# Patient Record
Sex: FEMALE | Race: WHITE | NOT HISPANIC OR LATINO | Employment: OTHER | URBAN - METROPOLITAN AREA
[De-identification: names, ages, dates, MRNs, and addresses within clinical notes are randomized per-mention and may not be internally consistent; named-entity substitution may affect disease eponyms.]

---

## 2017-01-13 ENCOUNTER — TELEPHONE (OUTPATIENT)
Dept: CARDIOLOGY | Facility: CLINIC | Age: 72
End: 2017-01-13

## 2017-01-13 NOTE — TELEPHONE ENCOUNTER
Patient has 3 concerns today 1) Refill for Praluent will be coming from her pharmacy 2) She wants to make sure her Edis for the Praluent is still in place? 3) She takes Megha for car sickness and found out with her medications there are contraindications with her medications and Megha.  What medication do we suggest for her for car sickness?  Recommend she contact Polina on Team 1 for Praluent questions. And Contact [facility] number given for recommendations to take for car sickness that is safe for her.  Patient states she is very disappointed that there are so many medication reactions to medications and in the past pharmacists have not been helpful.

## 2017-01-18 ENCOUNTER — OFFICE VISIT (OUTPATIENT)
Dept: CARDIOLOGY | Facility: CLINIC | Age: 72
End: 2017-01-18
Payer: COMMERCIAL

## 2017-01-18 VITALS — HEIGHT: 68 IN | HEART RATE: 68 BPM | SYSTOLIC BLOOD PRESSURE: 134 MMHG | DIASTOLIC BLOOD PRESSURE: 56 MMHG

## 2017-01-18 DIAGNOSIS — T75.3XXA MOTION SICKNESS, INITIAL ENCOUNTER: ICD-10-CM

## 2017-01-18 DIAGNOSIS — I25.110 CORONARY ARTERY DISEASE INVOLVING NATIVE CORONARY ARTERY OF NATIVE HEART WITH UNSTABLE ANGINA PECTORIS (H): Primary | ICD-10-CM

## 2017-01-18 PROCEDURE — 99214 OFFICE O/P EST MOD 30 MIN: CPT | Performed by: PHYSICIAN ASSISTANT

## 2017-01-18 RX ORDER — CIPROFLOXACIN 500 MG/1
500 TABLET, FILM COATED ORAL 2 TIMES DAILY
COMMUNITY
End: 2017-02-15

## 2017-01-18 RX ORDER — RANOLAZINE 500 MG/1
500 TABLET, EXTENDED RELEASE ORAL
Qty: 30 TABLET | Refills: 11 | Status: ON HOLD
Start: 2017-01-18 | End: 2017-02-24

## 2017-01-18 RX ORDER — SCOLOPAMINE TRANSDERMAL SYSTEM 1 MG/1
PATCH, EXTENDED RELEASE TRANSDERMAL
Qty: 4 PATCH | Refills: 3 | Status: SHIPPED
Start: 2017-01-18 | End: 2017-02-15

## 2017-01-18 NOTE — Clinical Note
2017    Gaye Zimmer  [facility]  [address] [place] [address]    RE: Sarah EMELIA Longo       Dear Colleague,    I had the pleasure of seeing Sarah KAPOOR Donta in the [facility].    PRIMARY CARDIOLOGIST:  Dr. Feroz Burgos.      REASON FOR VISIT:  Coronary artery disease followup.      Ms. Longo is a 71-year-old woman with past medical history significant for the followin.  Coronary artery disease with a history of stenting in , where she underwent a Taxus stent to the RCA.  She redeveloped chest and back discomfort in the spring of 2016, had an abnormal stress test and went for an angiogram.  She was found to have a trifurcation lesion in the OM2, as well as a lesion in the distal LAD.  There was plan for medical management, but she ended up at [place] with trifurcation stents to her superior and main branch of the obtuse marginal and a dilated inferior branch.  She also had a stented LAD at that time.  She redeveloped symptoms again in the  and was taken to the Cath Lab and was found to have a 90% restenosis of the proximal OM bifurcation stent in the superior branch and this was stented.   2.  Dyslipidemia, intolerant to statins, on Praluent.   3.  Hypertension.   4.  Type 2 diabetes.   5.  Type 1 Brugada pattern provoked by fever and possible Lamictal use (this differs from Brugada syndrome).  Please refer to consult by Dr. Nation 2016.      I had met her before the angiogram when she described back pain and chest heaviness, worsened with exercise and relieved with rest.  She underwent stenting and her back pain never really improved.  She also continued to have intermittent chest pain.  We worked on increasing Imdur to 60 mg, but she felt like her blood pressures were too low and although she felt fine, she was just very concerned that she would pass out with her lower diastolic in the 40s.  Since that time she has done okay.  She has  been taking his pantoprazole about once a day and this seems to be somewhat helpful.  She got a cortisone shot in her back.  This was not helpful.  She was told by her orthopedist that she has stenosis, scoliosis and arthritis of her spine and this is some of the reason for her back pain.  She is exercising.  She is doing about a half an hour on the treadmill or bike and then this sometimes ends in  extreme fatigue or occasionally with pain in her back and otherwise she does okay.  She is taking her medications as directed.  She has multiple questions about interactions, one with pantoprazole and Plavix, and another whether she can take gray safely for motion sickness, and finally whether or not she needs a stress test.      SOCIAL HISTORY:  She is .  Her 's name is Kwadwo.  She has a dog at home.  She is a lifelong nonsmoker, no alcohol use.  She was previously eating about 3 hamburgers a week, working on changing her diet.  She is exercising now multiple times a week.  She has 3 children.  She has a daughter who is 44, who is trying to conceive via in vitro fertilization.      PHYSICAL EXAMINATION:   GENERAL:  Well-developed, well-nourished woman in no acute distress.   HEENT:  Normocephalic, atraumatic.   HEART:  Regular in rate and rhythm.  I do not appreciate murmur, rub or gallop.  Carotids without bruit.   LUNGS:  Clear bilaterally.   EXTREMITIES:  Without peripheral edema with 2+ posterior tibialis pulses bilaterally.   SKIN:  Warm and dry.     Outpatient Encounter Prescriptions as of 1/18/2017   Medication Sig Dispense Refill     ciprofloxacin (CIPRO) 500 MG tablet Take 500 mg by mouth 2 times daily       PANTOPRAZOLE SODIUM PO Take 20 mg by mouth every morning (before breakfast)       scopolamine (TRANSDERM) 72 hr patch Place 1 patch onto the skin every 72 hours.  Apply to hairless area behind one ear at least 4 hours before travel.  Remove old patch and change every 3 days. 4 patch 3      ranolazine (RANEXA) 500 MG 12 hr tablet Take 1 tablet (500 mg) by mouth every morning (before breakfast) 30 tablet 11     lisinopril (PRINIVIL/ZESTRIL) 2.5 MG tablet Take 1 tablet (2.5 mg) by mouth daily 90 tablet 3     nebivolol (BYSTOLIC) 2.5 MG tablet Take 1 tablet (2.5 mg) by mouth daily 90 tablet 3     isosorbide mononitrate (IMDUR) 60 MG 24 hr tablet Take 1 tablet (60 mg) by mouth daily (Patient taking differently: Take 60 mg by mouth daily Patient taking 1/2 tab daily ( 30mg ) daily) 90 tablet 3     linagliptin-metFORMIN (JENTADUETO) 2.5-500 MG TABS per tablet Take 1 tablet by mouth 2 times daily (with meals)       cholecalciferol 2000 UNITS CAPS Take 1 capsule by mouth daily       amoxicillin (AMOXIL) 500 MG capsule PRN before dental work       NONFORMULARY Tumeric 500 mg bid       clopidogrel (PLAVIX) 75 MG tablet Take 1 tablet (75 mg) by mouth daily 90 tablet 3     mirabegron (MYRBETRIQ) 50 MG 24 hr tablet Take 1 tablet (50 mg) by mouth daily 90 tablet 3     Gymnema Sylvestris Leaf POWD 500 mg daily       nitroglycerin (NITROSTAT) 0.4 MG SL tablet Place 1 tablet (0.4 mg) under the tongue every 5 minutes as needed for chest pain If symptoms persist after 3 doses (15 minutes) call 911. 25 tablet 3     alirocumab (PRALUENT) 75 MG/ML injectable pen Inject 1 mL (75 mg) Subcutaneous every 14 days 2 mL 11     STATIN NOT PRESCRIBED, INTENTIONAL, 1 each At Bedtime Statin not prescribed intentionally due to Allergy to statin 0 each 0     Omega-3 Fatty Acids (OMEGA-3 FISH OIL PO) Take 2,400 mg by mouth 2 times daily (with meals)       CINNAMON PO Take 2 capsules by mouth 2 times daily       GLIMEPIRIDE 4 MG OR TABS 1 twice daily       SYNTHROID 125 MCG OR TABS 1 tablet daily       RESTASIS 0.05 % OP EMUL twice daily       CO ENZYME Q-10 50 MG OR CAPS daliy  0     ASPIRIN 81 MG OR TABS 1 tab po QD (Once per day)       No facility-administered encounter medications on file as of 1/18/2017.      ASSESSMENT AND PLAN:    1.  Coronary artery disease with some ongoing back discomfort that may be orthopedic given her scoliosis, arthritis and spinal stenosis versus anginal; I think it is less likely to be anginal as she had no change with stenting.  That being said, she does have some fatigue as well with exercise and I do think it is reasonable at this point to do a trial of ranolazine.  She is very concerned about her blood pressure being lower and is not interested in any medications that would drop her blood pressure.  We are going to do a trial of 500 mg of ranolazine in the morning.  If this is helpful, we could increase to 500 mg b.i.d.  Cost will likely be an issue, but we will see if it is worth fighting that fight and see how symptoms go.  I did discuss possible adverse effects including constipation with this patient.  We will otherwise continue her cardiac medications that include aspirin, Plavix, Bystolic and lisinopril.  We will also continue her Imdur 30 mg.  She does not feel comfortable going up to 60 mg.   2.  Dyslipidemia, intolerant of statins, on Praluent with excellent control.   3.  Hypertension, reasonable control.   4.  Type 2 diabetes per primary.   5.  Motion sickness.  I did some research about the interaction with gray and possible Plavix and aspirin use.  There is a recommendation against gray and Coumadin, but I found nothing on Plavix and aspirin.  I think if she needs to take gray 1 day for a long trip, this is reasonable.  As an alternative, I also prescribed scopolamine patches for her to use if necessary.   6.  Arthritic pain.  I did say, in general, she should avoid NSAIDs.  Again now if she needs to take 1 Aleve or 2 Aleve tablets once a week, I think this is reasonable to help with her pain.  I did recommend she not take more than that.      Thank you for allowing me to participate in this delightful patient's care.     Sincerely,    Karen Alexander PA-C

## 2017-01-18 NOTE — MR AVS SNAPSHOT
After Visit Summary   1/18/2017    Sarah Longo    MRN: [id number]           Patient Information     Date Of Birth          1945        Visit Information        Provider Department      1/18/2017 10:10 AM More, Karen Torres PA-C [facility]        Today's Diagnoses     Coronary artery disease involving native coronary artery of native heart with unstable angina pectoris (H)    -  1     Motion sickness, initial encounter           Care Instructions    Thanks for coming into [facility] today.    We discussed: that you can take Aleve/ naproxen once in a while (one a week or so) if needed for pain.    Try scopolamine patches for your motion sickness.  You can leave these on for up to 3 days.      Medication changes:  Increase pantoprazole to twice a day.    Try ranolazine 500 mg once a day to see if your back pain/ angina improves.  If it does, we can look at increasing it to twice a day.       Follow up: as scheduled with Dr. Burgos.        Please call my nurse at [phone] with any questions or concerns.    Scheduling phone number: [phone]  Reminder: Please bring in all current medications, over the counter supplements and vitamin bottles to your next appointment.                Follow-ups after your visit        Your next 10 appointments already scheduled     Feb 15, 2017  2:45 PM   Return Visit with Feroz Burgos MD   [facility] ([facility])    [address] [place] [id number] [phone]              Who to contact     If you have questions or need follow up information about today's clinic visit or your schedule please contact [facility] directly at [phone].  Normal or non-critical lab and imaging results will be communicated to you by MyChart, letter or phone within 4 business days after  "the clinic has received the results. If you do not hear from us within 7 days, please contact the clinic through [facility] or phone. If you have a critical or abnormal lab result, we will notify you by phone as soon as possible.  Submit refill requests through [facility] or call your pharmacy and they will forward the refill request to us. Please allow 3 business days for your refill to be completed.          Additional Information About Your Visit        Care EveryWhere ID     This is your Care EveryWhere ID. This could be used by other organizations to access your [place] medical records  [id number]        Your Vitals Were     Pulse Height                68 1.727 m (5' 8\")           Blood Pressure from Last 3 Encounters:   01/18/17 134/56   11/30/16 120/60   11/22/16 106/61    Weight from Last 3 Encounters:   11/30/16 78.926 kg (174 lb)   11/22/16 78.019 kg (172 lb)   11/18/16 78.382 kg (172 lb 12.8 oz)              Today, you had the following     No orders found for display         Today's Medication Changes          These changes are accurate as of: 1/18/17 11:16 AM.  If you have any questions, ask your nurse or doctor.               Start taking these medicines.        Dose/Directions    ranolazine 500 MG 12 hr tablet   Commonly known as:  RANEXA   Used for:  Coronary artery disease involving native coronary artery of native heart with unstable angina pectoris (H)   Started by:  Karen Alexander PA-C        Dose:  500 mg   Take 1 tablet (500 mg) by mouth every morning (before breakfast)   Quantity:  30 tablet   Refills:  11       scopolamine 72 hr patch   Commonly known as:  TRANSDERM   Used for:  Motion sickness, initial encounter   Started by:  Karen Alexander PA-C        Place 1 patch onto the skin every 72 hours.  Apply to hairless area behind one ear at least 4 hours before travel.  Remove old patch and change every 3 days.   Quantity:  4 patch   Refills:  3         These medicines have " changed or have updated prescriptions.        Dose/Directions    isosorbide mononitrate 60 MG 24 hr tablet   Commonly known as:  IMDUR   This may have changed:  additional instructions   Used for:  Stable angina (H)        Dose:  60 mg   Take 1 tablet (60 mg) by mouth daily   Quantity:  90 tablet   Refills:  3            Where to get your medicines      These medications were sent to [facility] - [facility] [address] [facility] [address]  [address] [facility] [address]     Phone:  [phone]    - scopolamine 72 hr patch      Some of these will need a paper prescription and others can be bought over the counter.  Ask your nurse if you have questions.     You don't need a prescription for these medications    - ranolazine 500 MG 12 hr tablet             Primary Care Provider Office Phone # Fax #    Gaye Zimmer [phone]       [facility] [address] [facility] [address]        Thank you!     Thank you for choosing [facility] [place]  for your care. Our goal is always to provide you with excellent care. Hearing back from our patients is one way we can continue to improve our services. Please take a few minutes to complete the written survey that you may receive in the mail after your visit with us. Thank you!             Your Updated Medication List - Protect others around you: Learn how to safely use, store and throw away your medicines at www.disposemymeds.org.          This list is accurate as of: 1/18/17 11:16 AM.  Always use your most recent med list.                   Brand Name Dispense Instructions for use    alirocumab 75 MG/ML injectable pen    PRALUENT    2 mL    Inject 1 mL (75 mg) Subcutaneous every 14 days       amoxicillin 500 MG capsule    AMOXIL     PRN before dental work       aspirin 81 MG tablet      1 tab po QD (Once per day)       cholecalciferol 2000 UNITS Caps      Take 1 capsule by mouth daily       CINNAMON  PO      Take 2 capsules by mouth 2 times daily       CIPRO 500 MG tablet   Generic drug:  ciprofloxacin      Take 500 mg by mouth 2 times daily       clopidogrel 75 MG tablet    PLAVIX    90 tablet    Take 1 tablet (75 mg) by mouth daily       Co Enzyme Q-10 50 MG Caps      daliy       glimepiride 4 MG tablet    AMARYL     1 twice daily       Gymnema Sylvestris Leaf Powd      500 mg daily       isosorbide mononitrate 60 MG 24 hr tablet    IMDUR    90 tablet    Take 1 tablet (60 mg) by mouth daily       JENTADUETO 2.5-500 MG Tabs per tablet   Generic drug:  linagliptin-metFORMIN      Take 1 tablet by mouth 2 times daily (with meals)       lisinopril 2.5 MG tablet    PRINIVIL/Zestril    90 tablet    Take 1 tablet (2.5 mg) by mouth daily       mirabegron 50 MG 24 hr tablet    MYRBETRIQ    90 tablet    Take 1 tablet (50 mg) by mouth daily       nebivolol 2.5 MG tablet    BYSTOLIC    90 tablet    Take 1 tablet (2.5 mg) by mouth daily       nitroglycerin 0.4 MG sublingual tablet    NITROSTAT    25 tablet    Place 1 tablet (0.4 mg) under the tongue every 5 minutes as needed for chest pain If symptoms persist after 3 doses (15 minutes) call 911.       NONFORMULARY      Tumeric 500 mg bid       OMEGA-3 FISH OIL PO      Take 2,400 mg by mouth 2 times daily (with meals)       PANTOPRAZOLE SODIUM PO      Take 20 mg by mouth every morning (before breakfast)       ranolazine 500 MG 12 hr tablet    RANEXA    30 tablet    Take 1 tablet (500 mg) by mouth every morning (before breakfast)       RESTASIS 0.05 % ophthalmic emulsion   Generic drug:  cycloSPORINE      twice daily       scopolamine 72 hr patch    TRANSDERM    4 patch    Place 1 patch onto the skin every 72 hours.  Apply to hairless area behind one ear at least 4 hours before travel.  Remove old patch and change every 3 days.       STATIN NOT PRESCRIBED (INTENTIONAL)     0 each    1 each At Bedtime Statin not prescribed intentionally due to Allergy to statin       SYNTHROID  125 MCG tablet   Generic drug:  levothyroxine      1 tablet daily

## 2017-01-18 NOTE — PROGRESS NOTES
PRIMARY CARDIOLOGIST:  Dr. Feroz Burgos.      REASON FOR VISIT:  Coronary artery disease followup.      HISTORY OF PRESENT ILLNESS:  Ms. Longo is a 71-year-old woman with past medical history significant for the followin.  Coronary artery disease with a history of stenting in , where she underwent a Taxus stent to the RCA.  She redeveloped chest and back discomfort in the spring of 2016, had an abnormal stress test and went for an angiogram.  She was found to have a trifurcation lesion in the OM2, as well as a lesion in the distal LAD.  There was plan for medical management, but she ended up at [place] with trifurcation stents to her superior and main branch of the obtuse marginal and a dilated inferior branch.  She also had a stented LAD at that time.  She redeveloped symptoms again in the  and was taken to the Cath Lab and was found to have a 90% restenosis of the proximal OM bifurcation stent in the superior branch and this was stented.   2.  Dyslipidemia, intolerant to statins, on Praluent.   3.  Hypertension.   4.  Type 2 diabetes.   5.  Type 1 Brugada pattern provoked by fever and possible Lamictal use (this differs from Brugada syndrome).  Please refer to consult by Dr. Nation 2016.      I had met her before the angiogram when she described back pain and chest heaviness, worsened with exercise and relieved with rest.  She underwent stenting and her back pain never really improved.  She also continued to have intermittent chest pain.  We worked on increasing Imdur to 60 mg, but she felt like her blood pressures were too low and although she felt fine, she was just very concerned that she would pass out with her lower diastolic in the 40s.  Since that time she has done okay.  She has been taking his pantoprazole about once a day and this seems to be somewhat helpful.  She got a cortisone shot in her back.  This was not helpful.  She was told by her orthopedist that she has stenosis, scoliosis  and arthritis of her spine and this is some of the reason for her back pain.  She is exercising.  She is doing about a half an hour on the treadmill or bike and then this sometimes ends in  extreme fatigue or occasionally with pain in her back and otherwise she does okay.  She is taking her medications as directed.  She has multiple questions about interactions, one with pantoprazole and Plavix, and another whether she can take gray safely for motion sickness, and finally whether or not she needs a stress test.      SOCIAL HISTORY:  She is .  Her 's name is Kwadwo.  She has a dog at home.  She is a lifelong nonsmoker, no alcohol use.  She was previously eating about 3 hamburgers a week, working on changing her diet.  She is exercising now multiple times a week.  She has 3 children.  She has a daughter who is 44, who is trying to conceive via in vitro fertilization.      PHYSICAL EXAMINATION:   GENERAL:  Well-developed, well-nourished woman in no acute distress.   HEENT:  Normocephalic, atraumatic.   HEART:  Regular in rate and rhythm.  I do not appreciate murmur, rub or gallop.  Carotids without bruit.   LUNGS:  Clear bilaterally.   EXTREMITIES:  Without peripheral edema with 2+ posterior tibialis pulses bilaterally.   SKIN:  Warm and dry.      ASSESSMENT AND PLAN:   1.  Coronary artery disease with some ongoing back discomfort that may be orthopedic given her scoliosis, arthritis and spinal stenosis versus anginal; I think it is less likely to be anginal as she had no change with stenting.  That being said, she does have some fatigue as well with exercise and I do think it is reasonable at this point to do a trial of ranolazine.  She is very concerned about her blood pressure being lower and is not interested in any medications that would drop her blood pressure.  We are going to do a trial of 500 mg of ranolazine in the morning.  If this is helpful, we could increase to 500 mg b.i.d.  Cost will  likely be an issue, but we will see if it is worth fighting that fight and see how symptoms go.  I did discuss possible adverse effects including constipation with this patient.  We will otherwise continue her cardiac medications that include aspirin, Plavix, Bystolic and lisinopril.  We will also continue her Imdur 30 mg.  She does not feel comfortable going up to 60 mg.   2.  Dyslipidemia, intolerant of statins, on Praluent with excellent control.   3.  Hypertension, reasonable control.   4.  Type 2 diabetes per primary.   5.  Motion sickness.  I did some research about the interaction with gray and possible Plavix and aspirin use.  There is a recommendation against gray and Coumadin, but I found nothing on Plavix and aspirin.  I think if she needs to take gray 1 day for a long trip, this is reasonable.  As an alternative, I also prescribed scopolamine patches for her to use if necessary.   6.  Arthritic pain.  I did say, in general, she should avoid NSAIDs.  Again now if she needs to take 1 Aleve or 2 Aleve tablets once a week, I think this is reasonable to help with her pain.  I did recommend she not take more than that.      Thank you for allowing me to participate in this delightful patient's care.      SHYANN Elizabeth PA-C             D: 2017 11:41   T: 2017 17:21   MT: al      Name:     KATHY PERRY   MRN:      [id number]        Account:      [id number]   :      1945           Service Date: 2017      Document: [id number]

## 2017-01-18 NOTE — PROGRESS NOTES
[id number]  HPI and Plan:   See dictation    Orders this Visit:  No orders of the defined types were placed in this encounter.     Orders Placed This Encounter   Medications     ciprofloxacin (CIPRO) 500 MG tablet     Sig: Take 500 mg by mouth 2 times daily     PANTOPRAZOLE SODIUM PO     Sig: Take 20 mg by mouth every morning (before breakfast)     scopolamine (TRANSDERM) 72 hr patch     Sig: Place 1 patch onto the skin every 72 hours.  Apply to hairless area behind one ear at least 4 hours before travel.  Remove old patch and change every 3 days.     Dispense:  4 patch     Refill:  3     ranolazine (RANEXA) 500 MG 12 hr tablet     Sig: Take 1 tablet (500 mg) by mouth every morning (before breakfast)     Dispense:  30 tablet     Refill:  11     There are no discontinued medications.      Encounter Diagnoses   Name Primary?     Coronary artery disease involving native coronary artery of native heart with unstable angina pectoris (H) Yes     Motion sickness, initial encounter        CURRENT MEDICATIONS:  Current Outpatient Prescriptions   Medication Sig Dispense Refill     ciprofloxacin (CIPRO) 500 MG tablet Take 500 mg by mouth 2 times daily       PANTOPRAZOLE SODIUM PO Take 20 mg by mouth every morning (before breakfast)       scopolamine (TRANSDERM) 72 hr patch Place 1 patch onto the skin every 72 hours.  Apply to hairless area behind one ear at least 4 hours before travel.  Remove old patch and change every 3 days. 4 patch 3     ranolazine (RANEXA) 500 MG 12 hr tablet Take 1 tablet (500 mg) by mouth every morning (before breakfast) 30 tablet 11     lisinopril (PRINIVIL/ZESTRIL) 2.5 MG tablet Take 1 tablet (2.5 mg) by mouth daily 90 tablet 3     nebivolol (BYSTOLIC) 2.5 MG tablet Take 1 tablet (2.5 mg) by mouth daily 90 tablet 3     isosorbide mononitrate (IMDUR) 60 MG 24 hr tablet Take 1 tablet (60 mg) by mouth daily (Patient taking differently: Take 60 mg by mouth daily Patient taking 1/2 tab daily ( 30mg ) daily) 90  tablet 3     linagliptin-metFORMIN (JENTADUETO) 2.5-500 MG TABS per tablet Take 1 tablet by mouth 2 times daily (with meals)       cholecalciferol 2000 UNITS CAPS Take 1 capsule by mouth daily       amoxicillin (AMOXIL) 500 MG capsule PRN before dental work       NONFORMULARY Tumeric 500 mg bid       clopidogrel (PLAVIX) 75 MG tablet Take 1 tablet (75 mg) by mouth daily 90 tablet 3     mirabegron (MYRBETRIQ) 50 MG 24 hr tablet Take 1 tablet (50 mg) by mouth daily 90 tablet 3     Gymnema Sylvestris Leaf POWD 500 mg daily       nitroglycerin (NITROSTAT) 0.4 MG SL tablet Place 1 tablet (0.4 mg) under the tongue every 5 minutes as needed for chest pain If symptoms persist after 3 doses (15 minutes) call 911. 25 tablet 3     alirocumab (PRALUENT) 75 MG/ML injectable pen Inject 1 mL (75 mg) Subcutaneous every 14 days 2 mL 11     STATIN NOT PRESCRIBED, INTENTIONAL, 1 each At Bedtime Statin not prescribed intentionally due to Allergy to statin 0 each 0     Omega-3 Fatty Acids (OMEGA-3 FISH OIL PO) Take 2,400 mg by mouth 2 times daily (with meals)       CINNAMON PO Take 2 capsules by mouth 2 times daily       GLIMEPIRIDE 4 MG OR TABS 1 twice daily       SYNTHROID 125 MCG OR TABS 1 tablet daily       RESTASIS 0.05 % OP EMUL twice daily       CO ENZYME Q-10 50 MG OR CAPS daliy  0     ASPIRIN 81 MG OR TABS 1 tab po QD (Once per day)         ALLERGIES     Allergies   Allergen Reactions     No Known Drug Allergies        PAST MEDICAL HISTORY:  Past Medical History   Diagnosis Date     Type II or unspecified type diabetes mellitus without mention of complication, not stated as uncontrolled      Dr. Majano     Other and unspecified hyperlipidemia      Unspecified essential hypertension      Osteoarthritis      HYPERPLASTIC  POLYP       colonoscopy in 5-10 yrs.     Coronary artery disease 4/28/2016     PTCA with MAYA x 2 to OM1 and MAYA x 1 to p.LAD (4/21/2016 at [place]); PTCA with intracoronary stent placement of RCA June 2004; MAYA to  OM1 2016     Hypothyroidism      hypothyroid     Cystocele, midline 2010     Depression      OAB (overactive bladder)      Postmenopausal bleeding      Urinary frequency 9/29/10     followed by urology. no benefit from detrol or sanctura. month trial of macrobid and flomax 10/10     Shoulder blade pain 2016       PAST SURGICAL HISTORY:  Past Surgical History   Procedure Laterality Date     C ct angiography coronary  2005     mod soft plaque LAD and Cx, follow up with left heart cath     C ligate fallopian tube       Hc removal of tonsils,13+ y/o       Surgical history of -        Uterine endometrial ablation     Excise lesion upper extremity  2013     Procedure: EXCISE LESION UPPER EXTREMITY;  EXCISION RIGHT ARM ANTICUBITAL LIPOMA ;  Surgeon: Jayson Joe MD;  Location: [facility]     Colonoscopy       Remove stimulator sacral nerve N/A 2015     Procedure: REMOVE STIMULATOR SACRAL NERVE;  Surgeon: Gertrudis Frausto MD;  Location: [facility]     Heart cath left heart cath  3/2/2016     No intervention - aggressive medical management     Heart cath left heart cath  2016      MAYA x 2 to OM1, MAYA to LAD, at [place]     Knee surgery  4/20/10     right knee replacement     Heart cath left heart cath  2004     MAYA to RCA     Heart cath left heart cath  3/28/2002     Mild to moderate 3 vessel CAD. Medical management recommended.      Heart cath left heart cath  2016     MAYA to OM1       FAMILY HISTORY:  Family History   Problem Relation Age of Onset     C.A.D. Father      MI , age 83, had high lipids     Hypertension Mother      Genitourinary Problems Mother      renal failure     CEREBROVASCULAR DISEASE Maternal Grandfather      cerebral hemorrhage     DIABETES Maternal Uncle      Cancer - colorectal Maternal Aunt      DIABETES Maternal Grandmother      Fractures Mother      hip     Hyperlipidemia Father      Hypertension Father      Coronary Artery Disease Father       "OSTEOPOROSIS Mother        SOCIAL HISTORY:  Social History     Social History     Marital Status:      Spouse Name: Kwadwo     Number of Children: 3     Years of Education: N/A     Occupational History     PT Aide None       Retired     Social History Main Topics     Smoking status: Never Smoker      Smokeless tobacco: None     Alcohol Use: No     Drug Use: No     Sexual Activity:     Partners: Male     Birth Control/ Protection: Post-menopausal     Other Topics Concern     Caffeine Concern Yes     6-7 cups caffeine per day     Sleep Concern No     Stress Concern Yes     Weight Concern No     Special Diet No     eats healthy      Exercise Yes      walking & active life style      Parent/Sibling W/ Cabg, Mi Or Angioplasty Before 65f 55m? No     Social History Narrative       Review of Systems:  Skin:  Negative     Eyes:  Positive for glasses  ENT:  Negative    Respiratory:  Positive for cough  Cardiovascular:    Positive for;chest pain (with exercise, center of back, resolved with rest.)  Gastroenterology: Negative    Genitourinary:  Positive for (UTI , currently on cipro)    Musculoskeletal:  Positive for back pain;arthritis (stenosis, no relief from last cortisone)  Neurologic:  Negative    Psychiatric:  Negative    Heme/Lymph/Imm:  Positive for allergies  Endocrine:  Positive for diabetes    Physical Exam:  Vitals: /56 mmHg  Pulse 68  Ht 1.727 m (5' 8\")  Wt    Please refer to dictation for physical exam    Recent Lab Results:  LIPID RESULTS:  Lab Results   Component Value Date    CHOL 148 09/27/2016    HDL 70 09/27/2016    LDL 64 09/27/2016    TRIG 69 09/27/2016    CHOLHDLRATIO 4.2 10/08/2015    CHOLHDLRATIO 2.6 03/23/2015       LIVER ENZYME RESULTS:  Lab Results   Component Value Date    AST 17 10/08/2015    ALT 7 09/27/2016       CBC RESULTS:  Lab Results   Component Value Date    WBC 4.1 11/22/2016    RBC 4.09 11/22/2016    HGB 12.9 11/22/2016    HCT 37.8 11/22/2016    MCV 92 11/22/2016    MCH " 31.5 11/22/2016    MCHC 34.1 11/22/2016    RDW 12.9 11/22/2016     11/22/2016       BMP RESULTS:  Lab Results   Component Value Date     11/25/2016    POTASSIUM 5.1 11/25/2016    CHLORIDE 104 11/25/2016    CO2 28 11/22/2016    ANIONGAP 5 11/22/2016    * 11/25/2016    GLC 89 05/11/2012    BUN 20 11/25/2016    BUN NOT APPLICABLE 05/11/2012    CR 0.81 11/25/2016    CR 0.74 11/01/2004    GFRESTIMATED >90  Non  GFR Calc   11/22/2016    GFRESTBLACK >90   GFR Calc   11/22/2016    LUCIUS 10 11/25/2016        A1C RESULTS:  Lab Results   Component Value Date    A1C 7.8* 10/08/2015       INR RESULTS:  Lab Results   Component Value Date    INR 0.90 11/22/2016    INR 0.92 02/25/2016           [facility] [place]  [address] [place] [address]

## 2017-02-09 ENCOUNTER — TRANSFERRED RECORDS (OUTPATIENT)
Dept: CARDIOLOGY | Facility: CLINIC | Age: 72
End: 2017-02-09

## 2017-02-09 LAB
ANION GAP SERPL CALCULATED.3IONS-SCNC: NORMAL MMOL/L
BUN SERPL-MCNC: 19 MG/DL
CALCIUM SERPL-MCNC: 10.3 MG/DL
CHLORIDE SERPLBLD-SCNC: 104 MMOL/L
CHOL/HDLC RATIO - QUEST: 2
CHOLEST SERPL-MCNC: 136 MG/DL
CO2 SERPL-SCNC: 25 MMOL/L
CREAT SERPL-MCNC: 0.73 MG/DL
GFR SERPL CREATININE-BSD FRML MDRD: 97 ML/MIN/1.73M2
GLUCOSE SERPL-MCNC: 185 MG/DL (ref 70–99)
HBA1C MFR BLD: 9.2 % (ref 0–5.7)
HDLC SERPL-MCNC: 67 MG/DL
LDLC SERPL CALC-MCNC: 56 MG/DL
NONHDLC SERPL-MCNC: NORMAL MG/DL
POTASSIUM SERPL-SCNC: 4.5 MMOL/L
SODIUM SERPL-SCNC: 139 MMOL/L
T4 FREE SERPL-MCNC: 1.39 NG/DL
TRIGL SERPL-MCNC: 64 MG/DL
TSH SERPL-ACNC: 0.42 MCU/ML

## 2017-02-15 ENCOUNTER — OFFICE VISIT (OUTPATIENT)
Dept: CARDIOLOGY | Facility: CLINIC | Age: 72
End: 2017-02-15
Attending: PHYSICIAN ASSISTANT
Payer: COMMERCIAL

## 2017-02-15 VITALS
HEIGHT: 68 IN | BODY MASS INDEX: 26.52 KG/M2 | HEART RATE: 72 BPM | SYSTOLIC BLOOD PRESSURE: 110 MMHG | DIASTOLIC BLOOD PRESSURE: 62 MMHG | WEIGHT: 175 LBS

## 2017-02-15 DIAGNOSIS — R07.9 ACUTE CHEST PAIN: ICD-10-CM

## 2017-02-15 DIAGNOSIS — I25.110 CORONARY ARTERY DISEASE INVOLVING NATIVE CORONARY ARTERY OF NATIVE HEART WITH UNSTABLE ANGINA PECTORIS (H): Primary | ICD-10-CM

## 2017-02-15 DIAGNOSIS — E78.00 HYPERCHOLESTEROLEMIA: ICD-10-CM

## 2017-02-15 DIAGNOSIS — I10 ESSENTIAL HYPERTENSION WITH GOAL BLOOD PRESSURE LESS THAN 130/80: ICD-10-CM

## 2017-02-15 DIAGNOSIS — E11.9 TYPE 2 DIABETES MELLITUS WITHOUT COMPLICATION, UNSPECIFIED LONG TERM INSULIN USE STATUS: ICD-10-CM

## 2017-02-15 DIAGNOSIS — I20.89 STABLE ANGINA (H): ICD-10-CM

## 2017-02-15 PROCEDURE — 99214 OFFICE O/P EST MOD 30 MIN: CPT | Performed by: INTERNAL MEDICINE

## 2017-02-15 NOTE — LETTER
February 15, 2017             Gaye Zimmer MD    [facility]   [address] [place] [address]      RE:    Sarah Longo   MRN:  [id number]   :  1945      Dear Dr. Zimmer:       I had the opportunity to see Ms. Sarah Longo in Cardiology Clinic today at the [facility] in [place] for reevaluation of recurrent coronary artery disease.  She has cardiac risk factors including hypertension, diabetes and dyslipidemia.  Her diabetes recently has not been under good control, but her cholesterol numbers have been excellent since she has been on Praluent, a PCSK9 inhibitor.  She underwent angioplasty and stenting of the proximal LAD and a large obtuse marginal artery at the [facility] early in  for treatment of symptoms of angina which we believe was primarily aching discomfort in her upper back between her shoulder blades.  She then developed recurrent symptoms and a stress test showed a large area of inferolateral ischemia.  I referred her back to the cardiac catheterization laboratory and she had evidence of severe in-stent restenosis of her circumflex artery in November.  Her right coronary artery had been previously stented and looked good.  Her LAD stent looked good, although she had 80% stenosis in the very distal portion of the LAD where the vessel was not more than 2 mm in diameter.  We treated her restenotic lesion with angioplasty and drug-eluting stent and she has been on aspirin and Plavix since then for prevention of stent thrombosis.      Unfortunately, she continues to have a lot of back pain problems.  It sounds like it is involving her upper, middle and lower back and it is certainly difficult to separate these pains from her chronic arthritis issues.  She is seeing a back surgeon here in the near future.  She did try an epidural injection, but apparently it was not successful in helping to relieve her discomfort.  She is not able to exercise as well  as she would like because of the chronic back pain.  Our attempts at treating this with medications have not been successful at preventing her discomfort.  She continues on isosorbide mononitrate 30 mg a day.  She did increase the dose to 45 mg recently.  She did not take Ranexa.  She is concerned about her EKG which had demonstrated findings for Brugada criteria in the past when she had a febrile illness.  Her blood pressure control is excellent and heart rate is good.  She has many questions about drug interactions with Plavix.      At this point, I am not sure what we are dealing with in terms of her symptoms, but they seem to be primarily chronic back pain issues.  I did review her angiogram films with her and showed her the results of her previous angiogram from November after her stenting of her circumflex.      On examination today, her blood pressure was 110/62, heart rate 72 and weight 175 pounds.  Lungs were clear Heart rhythm is regular.  She has no cardiac murmurs or carotid bruits.      IMPRESSIONS:  Ms. Sarah Longo is a 71-year-old woman with recurrent coronary artery disease.  She has had stenting of her right coronary artery and most recently her LAD and circumflex earlier in 2016 with restenosis 6 months later involving her circumflex and that was stented again in November with a drug-eluting stent.  It did not seem to change her back pain symptoms after she had revascularization.  She does have 80% stenosis in the apical LAD, within a vessel may be too small to treat with angioplasty or stenting.  I told her that we should manage that medically.  I believe we have pretty much maximized her antianginal medications.  She is hesitant to use her Ranexa and I agreed that with her Brugada ECG in the past that we should probably not take the chance.  However, I am really not sure whether she is having angina at all.  This may all be related to her musculoskeletal back pain issues.      I suggested we  do a stress test to see if there is any significant ischemia at this point and she wished to wait a few weeks on that.  After we get that result, we can talk to her more about whether we should reconsider another angiogram or continue with medical management and focus on her arthritic back pain issues.      Thank you for allowing me to participate in Ms. Longo' care.      Sincerely,         Feroz Burgos MD, F.A.C.C.

## 2017-02-15 NOTE — PROGRESS NOTES
HPI and Plan:   See dictation    Orders Placed This Encounter   Procedures     NM Lexiscan stress test (nuc card)     Basic metabolic panel     Follow-Up with Cardiologist     Follow-Up with Cardiac Advanced Practice Provider       Orders Placed This Encounter   Medications     linagliptin-metFORMIN (JENTADUETO) 2.5-1000 MG per tablet     Sig: Take 1 tablet by mouth 2 times daily (with meals)       Medications Discontinued During This Encounter   Medication Reason     ciprofloxacin (CIPRO) 500 MG tablet Stopped by Patient     linagliptin-metFORMIN (JENTADUETO) 2.5-500 MG TABS per tablet Stopped by Patient     scopolamine (TRANSDERM) 72 hr patch Stopped by Patient         Encounter Diagnoses   Name Primary?     Stable angina (H)      Coronary artery disease involving native coronary artery of native heart with unstable angina pectoris (H) Yes     Hypercholesterolemia      Essential hypertension with goal blood pressure less than 130/80      Type 2 diabetes mellitus without complication, unspecified long term insulin use status (H)      Acute chest pain        CURRENT MEDICATIONS:  Current Outpatient Prescriptions   Medication Sig Dispense Refill     linagliptin-metFORMIN (JENTADUETO) 2.5-1000 MG per tablet Take 1 tablet by mouth 2 times daily (with meals)       PANTOPRAZOLE SODIUM PO Take 20 mg by mouth 2 times daily (before meals)        lisinopril (PRINIVIL/ZESTRIL) 2.5 MG tablet Take 1 tablet (2.5 mg) by mouth daily 90 tablet 3     nebivolol (BYSTOLIC) 2.5 MG tablet Take 1 tablet (2.5 mg) by mouth daily 90 tablet 3     isosorbide mononitrate (IMDUR) 60 MG 24 hr tablet Take 1 tablet (60 mg) by mouth daily (Patient taking differently: Take 60 mg by mouth daily Patient has been taking 45mg) 90 tablet 3     cholecalciferol 2000 UNITS CAPS Take 1 capsule by mouth daily       amoxicillin (AMOXIL) 500 MG capsule PRN before dental work       NONFORMULARY Tumeric 500 mg bid       clopidogrel (PLAVIX) 75 MG tablet Take 1  tablet (75 mg) by mouth daily 90 tablet 3     mirabegron (MYRBETRIQ) 50 MG 24 hr tablet Take 1 tablet (50 mg) by mouth daily 90 tablet 3     Gymnema Sylvestris Leaf POWD 500 mg daily       nitroglycerin (NITROSTAT) 0.4 MG SL tablet Place 1 tablet (0.4 mg) under the tongue every 5 minutes as needed for chest pain If symptoms persist after 3 doses (15 minutes) call 911. 25 tablet 3     alirocumab (PRALUENT) 75 MG/ML injectable pen Inject 1 mL (75 mg) Subcutaneous every 14 days 2 mL 11     STATIN NOT PRESCRIBED, INTENTIONAL, 1 each At Bedtime Statin not prescribed intentionally due to Allergy to statin 0 each 0     Omega-3 Fatty Acids (OMEGA-3 FISH OIL PO) Take 2,400 mg by mouth 2 times daily (with meals)       CINNAMON PO Take 2 capsules by mouth 2 times daily       GLIMEPIRIDE 4 MG OR TABS 1 tablet BID       SYNTHROID 125 MCG OR TABS 1 tablet daily       RESTASIS 0.05 % OP EMUL twice daily       CO ENZYME Q-10 50 MG OR CAPS daliy  0     ASPIRIN 81 MG OR TABS 1 tab po QD (Once per day)       ranolazine (RANEXA) 500 MG 12 hr tablet Take 1 tablet (500 mg) by mouth every morning (before breakfast) (Patient not taking: Reported on 2/15/2017) 30 tablet 11       ALLERGIES     Allergies   Allergen Reactions     No Known Drug Allergies        PAST MEDICAL HISTORY:  Past Medical History   Diagnosis Date     Coronary artery disease 4/28/2016     PTCA with MAYA x 2 to OM1 and MAYA x 1 to p.LAD (4/21/2016 at [place]); PTCA with intracoronary stent placement of RCA June 2004; MAYA to OM1 11/2016     Cystocele, midline 09/29/2010     Depression      HYPERPLASTIC  POLYP       colonoscopy in 5-10 yrs.     Hypothyroidism      hypothyroid     OAB (overactive bladder)      Osteoarthritis      Other and unspecified hyperlipidemia      Postmenopausal bleeding      Shoulder blade pain 5/31/2016     Type II or unspecified type diabetes mellitus without mention of complication, not stated as uncontrolled      Dr. Majano     Unspecified essential  hypertension      Urinary frequency 9/29/10     followed by urology. no benefit from detrol or sanctura. month trial of macrobid and flomax 10/10       PAST SURGICAL HISTORY:  Past Surgical History   Procedure Laterality Date     C ct angiography coronary  2005     mod soft plaque LAD and Cx, follow up with left heart cath     C ligate fallopian tube       Hc removal of tonsils,13+ y/o       Surgical history of -        Uterine endometrial ablation     Excise lesion upper extremity  2013     Procedure: EXCISE LESION UPPER EXTREMITY;  EXCISION RIGHT ARM ANTICUBITAL LIPOMA ;  Surgeon: Jayson Joe MD;  Location: [facility]     Colonoscopy       Remove stimulator sacral nerve N/A 2015     Procedure: REMOVE STIMULATOR SACRAL NERVE;  Surgeon: Gertrudis Frausto MD;  Location: [facility]     Heart cath left heart cath  3/2/2016     No intervention - aggressive medical management     Heart cath left heart cath  2016      MAYA x 2 to OM1, MAYA to LAD, at [place]     Knee surgery  4/20/10     right knee replacement     Heart cath left heart cath  2004     MAYA to RCA     Heart cath left heart cath  3/28/2002     Mild to moderate 3 vessel CAD. Medical management recommended.      Heart cath left heart cath  2016     MAYA to OM1       FAMILY HISTORY:  Family History   Problem Relation Age of Onset     C.A.D. Father      MI , age 83, had high lipids     Hyperlipidemia Father      Hypertension Father      Coronary Artery Disease Father      Hypertension Mother      Genitourinary Problems Mother      renal failure     Fractures Mother      hip     OSTEOPOROSIS Mother      CEREBROVASCULAR DISEASE Maternal Grandfather      cerebral hemorrhage     DIABETES Maternal Uncle      Cancer - colorectal Maternal Aunt      DIABETES Maternal Grandmother        SOCIAL HISTORY:  Social History     Social History     Marital status:      Spouse name: Kwadwo     Number of children: 3     Years of education:  "N/A     Occupational History     PT Aide None       Retired     Social History Main Topics     Smoking status: Never Smoker     Smokeless tobacco: None     Alcohol use No     Drug use: No     Sexual activity: Yes     Partners: Male     Birth control/ protection: Post-menopausal     Other Topics Concern     Caffeine Concern Yes     6-7 cups caffeine per day     Sleep Concern No     Stress Concern Yes     Weight Concern No     Special Diet No     eats healthy      Exercise Yes      walking & active life style      Parent/Sibling W/ Cabg, Mi Or Angioplasty Before 65f 55m? No     Social History Narrative       Review of Systems:  Skin:  Negative       Eyes:  Positive for glasses cataract surgery 2014  ENT:  Negative      Respiratory:  Positive for cough;dyspnea on exertion occasional dry cough   Cardiovascular:    Positive for;palpitations;chest pain    Gastroenterology: Negative      Genitourinary:  Positive for urinary frequency    Musculoskeletal:  Positive for back pain;arthritis upper back pain between shoulder blades  Neurologic:  Negative      Psychiatric:  Positive for anxiety;excessive stress;depression;sleep disturbances    Heme/Lymph/Imm:  Positive for allergies    Endocrine:  Positive for diabetes      Physical Exam:  Vitals: /62  Pulse 72  Ht 1.727 m (5' 8\")  Wt 79.4 kg (175 lb)  BMI 26.61 kg/m2    Constitutional:           Skin:           Head:           Eyes:           ENT:           Neck:           Chest:             Cardiac:                    Abdomen:           Vascular:                                          Extremities and Back:                 Neurological:                 CC  Karen Alexander PA-C  [facility] AT [place] [address]  [place] [id number]              "

## 2017-02-15 NOTE — MR AVS SNAPSHOT
After Visit Summary   2/15/2017    Sarah Longo    MRN: [id number]           Patient Information     Date Of Birth          1945        Visit Information        Provider Department      2/15/2017 2:45 PM Feroz Burgos MD [facility] AT [place]        Today's Diagnoses     Coronary artery disease involving native coronary artery of native heart with unstable angina pectoris (H)    -  1    Stable angina (H)        Hypercholesterolemia        Essential hypertension with goal blood pressure less than 130/80        Type 2 diabetes mellitus without complication, unspecified long term insulin use status (H)        Acute chest pain           Follow-ups after your visit        Additional Services     Follow-Up with Cardiac Advanced Practice Provider           Follow-Up with Cardiologist                 Your next 10 appointments already scheduled     May 23, 2017 10:00 AM CDT   PHYSICAL with Shireen Neves MD   [facility] ([facility])    [address] [place] [address]  [address]  [facility] [address]   [phone]            May 23, 2017 10:30 AM CDT   MA SCREENING DIGITAL BILATERAL with WEMA1   [facility] [place] ([facility])    [address] [place] [address], [address]  [facility] [address]   [phone]           Do not use any powder, lotion or deodorant under your arms or on your breast. If you do, we will ask you to remove it before your exam.  Wear comfortable, two-piece clothing.  If you have any allergies, tell your care team.  Bring any previous mammograms from other facilities or have them mailed to the breast center.              Future tests that were ordered for you today     Open Future Orders        Priority Expected Expires Ordered    Follow-Up with Cardiac Advanced Practice Provider Routine 5/16/2017 2/15/2018 2/15/2017    Basic metabolic panel Routine 8/14/2017 2/15/2018 2/15/2017     "Follow-Up with Cardiologist Routine 8/14/2017 2/15/2018 2/15/2017    NM Lexiscan stress test (nuc card) Routine 2/22/2017 2/15/2018 2/15/2017            Who to contact     If you have questions or need follow up information about today's clinic visit or your schedule please contact [facility] AT [place] directly at [phone].  Normal or non-critical lab and imaging results will be communicated to you by MyChart, letter or phone within 4 business days after the clinic has received the results. If you do not hear from us within 7 days, please contact the clinic through MyChart or phone. If you have a critical or abnormal lab result, we will notify you by phone as soon as possible.  Submit refill requests through [employer] or call your pharmacy and they will forward the refill request to us. Please allow 3 business days for your refill to be completed.          Additional Information About Your Visit        Care EveryWhere ID     This is your Care EveryWhere ID. This could be used by other organizations to access your [place] medical records  [id number]        Your Vitals Were     Pulse Height BMI (Body Mass Index)             72 1.727 m (5' 8\") 26.61 kg/m2          Blood Pressure from Last 3 Encounters:   02/15/17 110/62   01/18/17 134/56   11/30/16 120/60    Weight from Last 3 Encounters:   02/15/17 79.4 kg (175 lb)   11/30/16 78.9 kg (174 lb)   11/22/16 78 kg (172 lb)              We Performed the Following     Follow-Up with Cardiologist          Today's Medication Changes          These changes are accurate as of: 2/15/17  3:34 PM.  If you have any questions, ask your nurse or doctor.               These medicines have changed or have updated prescriptions.        Dose/Directions    isosorbide mononitrate 60 MG 24 hr tablet   Commonly known as:  IMDUR   This may have changed:  additional instructions   Used for:  Stable angina (H)        Dose:  60 mg   Take 1 tablet (60 mg) by " mouth daily   Quantity:  90 tablet   Refills:  3                Primary Care Provider Office Phone # Fax #    Gaye Zimmer [phone]       [facility] [address]        Thank you!     Thank you for choosing [facility] [place]  for your care. Our goal is always to provide you with excellent care. Hearing back from our patients is one way we can continue to improve our services. Please take a few minutes to complete the written survey that you may receive in the mail after your visit with us. Thank you!             Your Updated Medication List - Protect others around you: Learn how to safely use, store and throw away your medicines at www.disposemymeds.org.          This list is accurate as of: 2/15/17  3:34 PM.  Always use your most recent med list.                   Brand Name Dispense Instructions for use    alirocumab 75 MG/ML injectable pen    PRALUENT    2 mL    Inject 1 mL (75 mg) Subcutaneous every 14 days       amoxicillin 500 MG capsule    AMOXIL     PRN before dental work       aspirin 81 MG tablet      1 tab po QD (Once per day)       cholecalciferol 2000 UNITS Caps      Take 1 capsule by mouth daily       CINNAMON PO      Take 2 capsules by mouth 2 times daily       clopidogrel 75 MG tablet    PLAVIX    90 tablet    Take 1 tablet (75 mg) by mouth daily       Co Enzyme Q-10 50 MG Caps      daliy       glimepiride 4 MG tablet    AMARYL     1 tablet BID       Gymnema Sylvestris Leaf Powd      500 mg daily       isosorbide mononitrate 60 MG 24 hr tablet    IMDUR    90 tablet    Take 1 tablet (60 mg) by mouth daily       JENTADUETO 2.5-1000 MG per tablet   Generic drug:  linagliptin-metFORMIN      Take 1 tablet by mouth 2 times daily (with meals)       lisinopril 2.5 MG tablet    PRINIVIL/Zestril    90 tablet    Take 1 tablet (2.5 mg) by mouth daily       mirabegron 50 MG 24 hr tablet    MYRBETRIQ    90 tablet    Take 1  tablet (50 mg) by mouth daily       nebivolol 2.5 MG tablet    BYSTOLIC    90 tablet    Take 1 tablet (2.5 mg) by mouth daily       nitroglycerin 0.4 MG sublingual tablet    NITROSTAT    25 tablet    Place 1 tablet (0.4 mg) under the tongue every 5 minutes as needed for chest pain If symptoms persist after 3 doses (15 minutes) call 911.       NONFORMULARY      Tumeric 500 mg bid       OMEGA-3 FISH OIL PO      Take 2,400 mg by mouth 2 times daily (with meals)       PANTOPRAZOLE SODIUM PO      Take 20 mg by mouth 2 times daily (before meals)       ranolazine 500 MG 12 hr tablet    RANEXA    30 tablet    Take 1 tablet (500 mg) by mouth every morning (before breakfast)       RESTASIS 0.05 % ophthalmic emulsion   Generic drug:  cycloSPORINE      twice daily       STATIN NOT PRESCRIBED (INTENTIONAL)     0 each    1 each At Bedtime Statin not prescribed intentionally due to Allergy to statin       SYNTHROID 125 MCG tablet   Generic drug:  levothyroxine      1 tablet daily

## 2017-02-16 ENCOUNTER — TELEPHONE (OUTPATIENT)
Dept: CARDIOLOGY | Facility: CLINIC | Age: 72
End: 2017-02-16

## 2017-02-16 NOTE — PROGRESS NOTES
February 15, 2017             Gaye Zimmer MD    [facility]   [address] [place] [address]      RE:    Sarah Longo   MRN:  [id number]   :  1945      Dear Dr. Zimmer:       I had the opportunity to see Ms. Sarah Longo in Cardiology Clinic today at the [facility] in [place] for reevaluation of recurrent coronary artery disease.  She has cardiac risk factors including hypertension, diabetes and dyslipidemia.  Her diabetes recently has not been under good control, but her cholesterol numbers have been excellent since she has been on Praluent, a PCSK9 inhibitor.  She underwent angioplasty and stenting of the proximal LAD and a large obtuse marginal artery at the [facility] early in  for treatment of symptoms of angina which we believe was primarily aching discomfort in her upper back between her shoulder blades.  She then developed recurrent symptoms and a stress test showed a large area of inferolateral ischemia.  I referred her back to the cardiac catheterization laboratory and she had evidence of severe in-stent restenosis of her circumflex artery in November.  Her right coronary artery had been previously stented and looked good.  Her LAD stent looked good, although she had 80% stenosis in the very distal portion of the LAD where the vessel was not more than 2 mm in diameter.  We treated her restenotic lesion with angioplasty and drug-eluting stent and she has been on aspirin and Plavix since then for prevention of stent thrombosis.      Unfortunately, she continues to have a lot of back pain problems.  It sounds like it is involving her upper, middle and lower back and it is certainly difficult to separate these pains from her chronic arthritis issues.  She is seeing a back surgeon here in the near future.  She did try an epidural injection, but apparently it was not successful in helping to relieve her discomfort.  She is not able to exercise as well  as she would like because of the chronic back pain.  Our attempts at treating this with medications have not been successful at preventing her discomfort.  She continues on isosorbide mononitrate 30 mg a day.  She did increase the dose to 45 mg recently.  She did not take Ranexa.  She is concerned about her EKG which had demonstrated findings for Brugada criteria in the past when she had a febrile illness.  Her blood pressure control is excellent and heart rate is good.  She has many questions about drug interactions with Plavix.      At this point, I am not sure what we are dealing with in terms of her symptoms, but they seem to be primarily chronic back pain issues.  I did review her angiogram films with her and showed her the results of her previous angiogram from November after her stenting of her circumflex.      On examination today, her blood pressure was 110/62, heart rate 72 and weight 175 pounds.  Lungs were clear Heart rhythm is regular.  She has no cardiac murmurs or carotid bruits.      IMPRESSIONS:  Ms. Sarah Longo is a 71-year-old woman with recurrent coronary artery disease.  She has had stenting of her right coronary artery and most recently her LAD and circumflex earlier in 2016 with restenosis 6 months later involving her circumflex and that was stented again in November with a drug-eluting stent.  It did not seem to change her back pain symptoms after she had revascularization.  She does have 80% stenosis in the apical LAD, within a vessel may be too small to treat with angioplasty or stenting.  I told her that we should manage that medically.  I believe we have pretty much maximized her antianginal medications.  She is hesitant to use her Ranexa and I agreed that with her Brugada ECG in the past that we should probably not take the chance.  However, I am really not sure whether she is having angina at all.  This may all be related to her musculoskeletal back pain issues.      I suggested we  do a stress test to see if there is any significant ischemia at this point and she wished to wait a few weeks on that.  After we get that result, we can talk to her more about whether we should reconsider another angiogram or continue with medical management and focus on her arthritic back pain issues.      Thank you for allowing me to participate in Ms. Longo' care.      Sincerely,         Feroz Lea MD, F.A.C.C.         FEROZ LEA MD, [facility]             D: 02/15/2017 17:27   T: 2017 09:20   MT: K[place]      Name:     KATHY LONGO   MRN:      [id number]        Account:      [id number]   :      1945           Service Date: 02/15/2017      Document: [id number]

## 2017-02-16 NOTE — TELEPHONE ENCOUNTER
Pt called - left voice message to please call her back regarding lexiscan tomorrow.  Called pt - Pt is aware to not have caffiene 12hrs before test.  She will not take Metformin in the morning.  All other medications are okay to take.  She is not on a diuretic.    Pt requesting a call tomorrow afternoon with results.  Informed her I will get results if they are available tomorrow afternoon.  Dr. Burgos will be back in clinic on Tuesday - in [place] on Monday.  No further questions.  Call prn.

## 2017-02-17 ENCOUNTER — HOSPITAL ENCOUNTER (OUTPATIENT)
Dept: CARDIOLOGY | Facility: CLINIC | Age: 72
Discharge: HOME | End: 2017-02-17
Attending: INTERNAL MEDICINE | Admitting: INTERNAL MEDICINE
Payer: COMMERCIAL

## 2017-02-17 ENCOUNTER — TELEPHONE (OUTPATIENT)
Dept: CARDIOLOGY | Facility: CLINIC | Age: 72
End: 2017-02-17

## 2017-02-17 DIAGNOSIS — I20.89 STABLE ANGINA (H): ICD-10-CM

## 2017-02-17 PROCEDURE — 93018 CV STRESS TEST I&R ONLY: CPT | Performed by: INTERNAL MEDICINE

## 2017-02-17 PROCEDURE — 78452 HT MUSCLE IMAGE SPECT MULT: CPT | Mod: 26 | Performed by: INTERNAL MEDICINE

## 2017-02-17 PROCEDURE — 93016 CV STRESS TEST SUPVJ ONLY: CPT | Performed by: INTERNAL MEDICINE

## 2017-02-17 PROCEDURE — 25000128 H RX IP 250 OP 636: Performed by: INTERNAL MEDICINE

## 2017-02-17 PROCEDURE — A9502 TC99M TETROFOSMIN: HCPCS | Performed by: INTERNAL MEDICINE

## 2017-02-17 PROCEDURE — 93017 CV STRESS TEST TRACING ONLY: CPT

## 2017-02-17 PROCEDURE — 34300033 ZZH RX 343: Performed by: INTERNAL MEDICINE

## 2017-02-17 RX ORDER — ACYCLOVIR 200 MG/1
0-1 CAPSULE ORAL
Status: DISCONTINUED | OUTPATIENT
Start: 2017-02-17 | End: 2017-02-18 | Stop reason: HOSPADM

## 2017-02-17 RX ORDER — AMINOPHYLLINE 25 MG/ML
50-100 INJECTION, SOLUTION INTRAVENOUS
Status: COMPLETED | OUTPATIENT
Start: 2017-02-17 | End: 2017-02-17

## 2017-02-17 RX ORDER — ALBUTEROL SULFATE 90 UG/1
2 AEROSOL, METERED RESPIRATORY (INHALATION) EVERY 5 MIN PRN
Status: DISCONTINUED | OUTPATIENT
Start: 2017-02-17 | End: 2017-02-18 | Stop reason: HOSPADM

## 2017-02-17 RX ORDER — REGADENOSON 0.08 MG/ML
0.4 INJECTION, SOLUTION INTRAVENOUS ONCE
Status: COMPLETED | OUTPATIENT
Start: 2017-02-17 | End: 2017-02-17

## 2017-02-17 RX ADMIN — REGADENOSON 0.4 MG: 0.08 INJECTION, SOLUTION INTRAVENOUS at 13:51

## 2017-02-17 RX ADMIN — TETROFOSMIN 3.38 MCI.: 0.23 INJECTION, POWDER, LYOPHILIZED, FOR SOLUTION INTRAVENOUS at 12:18

## 2017-02-17 RX ADMIN — AMINOPHYLLINE 50 MG: 25 INJECTION, SOLUTION INTRAVENOUS at 15:16

## 2017-02-17 RX ADMIN — TETROFOSMIN 9.1 MCI.: 0.23 INJECTION, POWDER, LYOPHILIZED, FOR SOLUTION INTRAVENOUS at 13:52

## 2017-02-17 NOTE — TELEPHONE ENCOUNTER
"Called pt - reviewed lexiscan nuc gxt results - moderate amount of ischemia inferior mid to basal, inferolateral wall.  Reviewed with Dr. Burgos who recommends pt come into office next Tuesday 02/22/2017 for office visit.  Pt instructed to \"take it easy\" over the weekend per Dr. Burgos.  Plan for office visit Tuesday with tentative cor angio Friday Feb 24.    Reviewed area of ischemia \"usually\" supplied with RCA or Cflx or OM branch which was stented in Nov 2016.  Not able to say if it is instent restenosis or where the narrowing definitely is.  Pt understands.  She will come for OV Tuesday with DR. Burgos.    "

## 2017-02-17 NOTE — TELEPHONE ENCOUNTER
Dr. Jiang read nuclear stress test and recommends patient limit her activity over the weekend, make sure she is on the appropriate medications and needs an appt early next week for an angiogram.  Consulted Dr. Burgos and will see patient on Tuesday at 11:15 Feb. 21, 2017 to review results, as her back pain might be from this and he does not recommend waiting as long as last time.  Hold for possible heart cath was placed with Dr. Lee on Friday 2/24.

## 2017-02-21 ENCOUNTER — OFFICE VISIT (OUTPATIENT)
Dept: CARDIOLOGY | Facility: CLINIC | Age: 72
End: 2017-02-21
Payer: COMMERCIAL

## 2017-02-21 VITALS
HEIGHT: 68 IN | HEART RATE: 63 BPM | WEIGHT: 173.8 LBS | BODY MASS INDEX: 26.34 KG/M2 | SYSTOLIC BLOOD PRESSURE: 118 MMHG | DIASTOLIC BLOOD PRESSURE: 62 MMHG

## 2017-02-21 DIAGNOSIS — E11.9 TYPE 2 DIABETES MELLITUS WITHOUT COMPLICATION, UNSPECIFIED LONG TERM INSULIN USE STATUS: ICD-10-CM

## 2017-02-21 DIAGNOSIS — I10 ESSENTIAL HYPERTENSION: ICD-10-CM

## 2017-02-21 DIAGNOSIS — E78.00 HYPERCHOLESTEROLEMIA: ICD-10-CM

## 2017-02-21 DIAGNOSIS — I20.9 ANGINA PECTORIS (H): Primary | ICD-10-CM

## 2017-02-21 DIAGNOSIS — I25.110 CORONARY ARTERY DISEASE INVOLVING NATIVE CORONARY ARTERY OF NATIVE HEART WITH UNSTABLE ANGINA PECTORIS (H): ICD-10-CM

## 2017-02-21 DIAGNOSIS — R94.39 ABNORMAL CARDIOVASCULAR STRESS TEST: ICD-10-CM

## 2017-02-21 PROCEDURE — 99214 OFFICE O/P EST MOD 30 MIN: CPT | Performed by: INTERNAL MEDICINE

## 2017-02-21 NOTE — MR AVS SNAPSHOT
After Visit Summary   2/21/2017    Sarah Longo    MRN: [id number]           Patient Information     Date Of Birth          1945        Visit Information        Provider Department      2/21/2017 11:15 AM Feroz Burgos MD [facility] AT [place]        Today's Diagnoses     Angina pectoris (H)    -  1    Essential hypertension        Coronary artery disease involving native coronary artery of native heart with unstable angina pectoris (H)        Hypercholesterolemia        Type 2 diabetes mellitus without complication, unspecified long term insulin use status (H)        Abnormal cardiovascular stress test           Follow-ups after your visit        Additional Services     Follow-Up with Cardiologist                 Your next 10 appointments already scheduled     May 23, 2017 10:00 AM CDT   PHYSICAL with Shireen Neves MD   [facility] ([facility]    [address] [place] [address] [address]  [facility] [address]   [phone]            May 23, 2017 10:30 AM CDT   MA SCREENING DIGITAL BILATERAL with WEMA1   [facility] ([facility])    [address] [place] [address] [address]  [facility] [address]   [phone]           Do not use any powder, lotion or deodorant under your arms or on your breast. If you do, we will ask you to remove it before your exam.  Wear comfortable, two-piece clothing.  If you have any allergies, tell your care team.  Bring any previous mammograms from other facilities or have them mailed to the breast center.              Future tests that were ordered for you today     Open Future Orders        Priority Expected Expires Ordered    Follow-Up with Cardiologist Routine 3/23/2017 2/21/2018 2/21/2017    Cardiac Cath: Coronary Angiography Adult Order Routine 2/24/2017 2/16/2018 2/21/2017            Who to contact     If you have questions or need follow up  "information about today's clinic visit or your schedule please contact [facility] AT [place] directly at [phone].  Normal or non-critical lab and imaging results will be communicated to you by MyChart, letter or phone within 4 business days after the clinic has received the results. If you do not hear from us within 7 days, please contact the clinic through MyChart or phone. If you have a critical or abnormal lab result, we will notify you by phone as soon as possible.  Submit refill requests through [employer] or call your pharmacy and they will forward the refill request to us. Please allow 3 business days for your refill to be completed.          Additional Information About Your Visit        Care EveryWhere ID     This is your Care EveryWhere ID. This could be used by other organizations to access your [place] medical records  [id number]        Your Vitals Were     Pulse Height BMI (Body Mass Index)             63 1.727 m (5' 8\") 26.43 kg/m2          Blood Pressure from Last 3 Encounters:   02/21/17 118/62   02/15/17 110/62   01/18/17 134/56    Weight from Last 3 Encounters:   02/21/17 78.8 kg (173 lb 12.8 oz)   02/15/17 79.4 kg (175 lb)   11/30/16 78.9 kg (174 lb)                 Today's Medication Changes          These changes are accurate as of: 2/21/17 12:26 PM.  If you have any questions, ask your nurse or doctor.               These medicines have changed or have updated prescriptions.        Dose/Directions    isosorbide mononitrate 60 MG 24 hr tablet   Commonly known as:  IMDUR   This may have changed:  additional instructions   Used for:  Stable angina (H)        Dose:  60 mg   Take 1 tablet (60 mg) by mouth daily   Quantity:  90 tablet   Refills:  3                Primary Care Provider Office Phone # Fax #    Gaye Zimmer [phone]       [facility] [address] [place] [address]        Thank you!     Thank you for choosing [facility] AT [place]  for your care. Our goal is always to provide you with excellent care. Hearing back from our patients is one way we can continue to improve our services. Please take a few minutes to complete the written survey that you may receive in the mail after your visit with us. Thank you!             Your Updated Medication List - Protect others around you: Learn how to safely use, store and throw away your medicines at www.disposemymeds.org.          This list is accurate as of: 2/21/17 12:26 PM.  Always use your most recent med list.                   Brand Name Dispense Instructions for use    alirocumab 75 MG/ML injectable pen    PRALUENT    2 mL    Inject 1 mL (75 mg) Subcutaneous every 14 days       amoxicillin 500 MG capsule    AMOXIL     PRN before dental work       aspirin 81 MG tablet      1 tab po QD (Once per day)       cholecalciferol 2000 UNITS Caps      Take 1 capsule by mouth daily       CINNAMON PO      Take 2 capsules by mouth 2 times daily       clopidogrel 75 MG tablet    PLAVIX    90 tablet    Take 1 tablet (75 mg) by mouth daily       Co Enzyme Q-10 50 MG Caps      daliy       glimepiride 4 MG tablet    AMARYL     1 tablet BID       Gymnema Sylvestris Leaf Powd      500 mg daily       isosorbide mononitrate 60 MG 24 hr tablet    IMDUR    90 tablet    Take 1 tablet (60 mg) by mouth daily       JENTADUETO 2.5-1000 MG per tablet   Generic drug:  linagliptin-metFORMIN      Take 1 tablet by mouth 2 times daily (with meals)       lisinopril 2.5 MG tablet    PRINIVIL/Zestril    90 tablet    Take 1 tablet (2.5 mg) by mouth daily       mirabegron 50 MG 24 hr tablet    MYRBETRIQ    90 tablet    Take 1 tablet (50 mg) by mouth daily       nebivolol 2.5 MG tablet    BYSTOLIC    90 tablet    Take 1 tablet (2.5 mg) by mouth daily       nitroglycerin 0.4 MG sublingual tablet    NITROSTAT    25 tablet    Place 1 tablet (0.4 mg) under the tongue every 5 minutes as needed for  chest pain If symptoms persist after 3 doses (15 minutes) call 911.       NONFORMULARY      Tumeric 500 mg bid       OMEGA-3 FISH OIL PO      Take 2,400 mg by mouth 2 times daily (with meals)       PANTOPRAZOLE SODIUM PO      Take 20 mg by mouth 2 times daily (before meals)       ranolazine 500 MG 12 hr tablet    RANEXA    30 tablet    Take 1 tablet (500 mg) by mouth every morning (before breakfast)       RESTASIS 0.05 % ophthalmic emulsion   Generic drug:  cycloSPORINE      twice daily       SALONPAS 1.2-5.7-6.3 % Ptch   Generic drug:  Camphor-Menthol-Methyl Sal      Patient uses 3-4 patches daily       STATIN NOT PRESCRIBED (INTENTIONAL)     0 each    1 each At Bedtime Statin not prescribed intentionally due to Allergy to statin       SYNTHROID 125 MCG tablet   Generic drug:  levothyroxine      1 tablet daily

## 2017-02-21 NOTE — PROGRESS NOTES
2017             Gaye Zimmer MD    [facility]   [address] [place] [address]      RE:    Sarah Longo   MRN:  [id number]   :  1945      Dear Dr. Zimmer      I had the opportunity to see Ms. Sarah Longo in Cardiology Clinic today at the [facility] in [place] for reevaluation of coronary artery disease and anginal symptoms.  I just saw her recently on 02/15/2017 and she indicated that she continued to have aching discomfort in her upper back between her shoulder blades similar to what she had prior to her angioplasty and stenting procedures performed initially in 2016 at the [facility].  At that time she had severe disease within the circumflex, just proximal to large trifurcating obtuse marginal artery.  She also had a significant lesion within the proximal LAD stented with good result at that time.  The stent within the right coronary artery was widely patent.        Unfortunately, she had recurrent discomfort in November and I referred her back for coronary angiography at that time.  She was found to have severe restenosis in the proximal portion of her circumflex stent and that was restented with another drug-eluting stent at that time.  She tells me that she never really had relief of her upper back pain after that procedure.  She has continued to have that discomfort over the last 3 months.  The pattern that is difficult for me to elucidate, but it does not necessarily seem typical for angina.  It seems to be more continuous and not necessarily a pattern consistent with angina.  However, it is somewhat similar by description to her previous anginal episodes.      For that reason, I referred her back for stress testing to look at that situation again and the stress test does suggest some mild ischemia within a moderate territory involving the inferior and inferolateral walls.  There is also a small apical reversible defect.  According to  the last angiogram, she had a severe stenosis within the apical LAD which probably corresponds to that territory.      She is back today to talk about the results of that stress test and to try to determine the next steps in her evaluation and treatment        On examination today, her blood pressure 118/62, heart rate 63 and weight 173 pounds.  Her lungs are clear.  Heart rhythm is regular.  I do not hear any cardiac or carotid bruits.      IMPRESSIONS:  Ms. Sarah Longo is a 71-year-old woman with coronary artery disease including a history of stenting of her right coronary artery many years ago and more recently stenting of her circumflex and LAD in 04/2016 followed by a significant circumflex restenosis treated with repeat stenting of her circumflex artery in 11/2016.  At that time her only residual significant disease was within the apical LAD, an artery that is probably too small to treat with angioplasty or stenting.  I reviewed her nuclear imaging films with her, which clearly do show some mild ischemia within the inferior, inferolateral wall, although I am hopeful that that could possibly be artifactual and be an artifact resulting from difficulty with normalization of images.  Nevertheless, she does have continued discomfort in her upper back between her shoulder blades and she is contemplating some treatment of that as musculoskeletal pain.  I had hoped to rule out the possibility of recurrent coronary artery disease first using stress testing, but her abnormal stress test result gives me no option but to refer her on for coronary angiography for further evaluation.      We certainly could find that she has no new coronary artery disease blockages since her November angiogram and I think we would all be very relieved that that was the case.  I certainly do not think her apical LAD is causing her significant problems.  The other possibility includes recurrent restenosis within the circumflex artery  which may be amenable to repeat stenting.  If not, we might need to consider brachytherapy or bypass surgery.  She is definitely not interested in bypass surgery unless it is the very last option available.  If that is the case, she would probably want to stop and talk to her cardiologist that she saw at the [facility] to discuss that issue further.  She would be agreeable to repeat stenting right away if that could be done.      I have reviewed the angiogram with her again as well as risks and benefits and she is agreeable to proceed.  I will get that set up here with a couple of days and continue on her medical therapy until then.      Thank you for allowing me to participate in Ms. Longo' care.      Sincerely,         Feroz Lea MD, F.A.C.C.         FEROZ LEA MD, [facility]             D: 2017 17:10   T: 2017 17:45   MT: KENDALL      Name:     KATHY LONGO   MRN:      [id number]        Account:      [id number]   :      1945           Service Date: 2017      Document: [id number]

## 2017-02-21 NOTE — PROGRESS NOTES
HPI and Plan:   See dictation    Orders Placed This Encounter   Procedures     Follow-Up with Cardiologist       Orders Placed This Encounter   Medications     Camphor-Menthol-Methyl Sal (SALONPAS) 1.2-5.7-6.3 % PTCH     Sig: Patient uses 3-4 patches daily       There are no discontinued medications.      Encounter Diagnoses   Name Primary?     Essential hypertension      Coronary artery disease involving native coronary artery of native heart with unstable angina pectoris (H)      Hypercholesterolemia      Type 2 diabetes mellitus without complication, unspecified long term insulin use status (H)      Angina pectoris (H) Yes     Abnormal cardiovascular stress test        CURRENT MEDICATIONS:  Current Outpatient Prescriptions   Medication Sig Dispense Refill     Camphor-Menthol-Methyl Sal (SALONPAS) 1.2-5.7-6.3 % PTCH Patient uses 3-4 patches daily       linagliptin-metFORMIN (JENTADUETO) 2.5-1000 MG per tablet Take 1 tablet by mouth 2 times daily (with meals)       PANTOPRAZOLE SODIUM PO Take 20 mg by mouth 2 times daily (before meals)        lisinopril (PRINIVIL/ZESTRIL) 2.5 MG tablet Take 1 tablet (2.5 mg) by mouth daily 90 tablet 3     nebivolol (BYSTOLIC) 2.5 MG tablet Take 1 tablet (2.5 mg) by mouth daily 90 tablet 3     isosorbide mononitrate (IMDUR) 60 MG 24 hr tablet Take 1 tablet (60 mg) by mouth daily (Patient taking differently: Take 60 mg by mouth daily Patient has been taking 45mg) 90 tablet 3     cholecalciferol 2000 UNITS CAPS Take 1 capsule by mouth daily       NONFORMULARY Tumeric 500 mg bid       clopidogrel (PLAVIX) 75 MG tablet Take 1 tablet (75 mg) by mouth daily 90 tablet 3     mirabegron (MYRBETRIQ) 50 MG 24 hr tablet Take 1 tablet (50 mg) by mouth daily 90 tablet 3     Gymnema Sylvestris Leaf POWD 500 mg daily       nitroglycerin (NITROSTAT) 0.4 MG SL tablet Place 1 tablet (0.4 mg) under the tongue every 5 minutes as needed for chest pain If symptoms persist after 3 doses (15 minutes) call  [phone]. 25 tablet 3     alirocumab (PRALUENT) 75 MG/ML injectable pen Inject 1 mL (75 mg) Subcutaneous every 14 days 2 mL 11     Omega-3 Fatty Acids (OMEGA-3 FISH OIL PO) Take 2,400 mg by mouth 2 times daily (with meals)       CINNAMON PO Take 2 capsules by mouth 2 times daily       GLIMEPIRIDE 4 MG OR TABS 1 tablet BID       SYNTHROID 125 MCG OR TABS 1 tablet daily       RESTASIS 0.05 % OP EMUL twice daily       CO ENZYME Q-10 50 MG OR CAPS daliy  0     ASPIRIN 81 MG OR TABS 1 tab po QD (Once per day)       ranolazine (RANEXA) 500 MG 12 hr tablet Take 1 tablet (500 mg) by mouth every morning (before breakfast) (Patient not taking: Reported on 2/15/2017) 30 tablet 11     amoxicillin (AMOXIL) 500 MG capsule PRN before dental work       STATIN NOT PRESCRIBED, INTENTIONAL, 1 each At Bedtime Statin not prescribed intentionally due to Allergy to statin 0 each 0       ALLERGIES     Allergies   Allergen Reactions     No Known Drug Allergies        PAST MEDICAL HISTORY:  Past Medical History   Diagnosis Date     Coronary artery disease 4/28/2016     PTCA with MAYA x 2 to OM1 and MAYA x 1 to p.LAD (4/21/2016 at [place]); PTCA with intracoronary stent placement of RCA June 2004; MAYA to OM1 11/2016     Cystocele, midline 09/29/2010     Depression      HYPERPLASTIC  POLYP       colonoscopy in 5-10 yrs.     Hypothyroidism      hypothyroid     OAB (overactive bladder)      Osteoarthritis      Other and unspecified hyperlipidemia      Postmenopausal bleeding      Shoulder blade pain 5/31/2016     Type II or unspecified type diabetes mellitus without mention of complication, not stated as uncontrolled      Dr. Majano     Unspecified essential hypertension      Urinary frequency 9/29/10     followed by urology. no benefit from detrol or sanctura. month trial of macrobid and flomax 10/10       PAST SURGICAL HISTORY:  Past Surgical History   Procedure Laterality Date     C ct angiography coronary  1/2005     mod soft plaque LAD and Cx, follow  up with left heart cath     C ligate fallopian tube       Hc removal of tonsils,13+ y/o       Surgical history of -        Uterine endometrial ablation     Excise lesion upper extremity  2013     Procedure: EXCISE LESION UPPER EXTREMITY;  EXCISION RIGHT ARM ANTICUBITAL LIPOMA ;  Surgeon: Jayson Joe MD;  Location: [facility]     Colonoscopy       Remove stimulator sacral nerve N/A 2015     Procedure: REMOVE STIMULATOR SACRAL NERVE;  Surgeon: Gertrudis Frausto MD;  Location: [facility]     Heart cath left heart cath  3/2/2016     No intervention - aggressive medical management     Heart cath left heart cath  2016      MAYA x 2 to OM1, MAYA to LAD, at [facility]     Knee surgery  4/20/10     right knee replacement     Heart cath left heart cath  2004     MAYA to RCA     Heart cath left heart cath  3/28/2002     Mild to moderate 3 vessel CAD. Medical management recommended.      Heart cath left heart cath  2016     MAYA to OM1       FAMILY HISTORY:  Family History   Problem Relation Age of Onset     C.A.D. Father      MI , age 83, had high lipids     Hyperlipidemia Father      Hypertension Father      Coronary Artery Disease Father      Hypertension Mother      Genitourinary Problems Mother      renal failure     Fractures Mother      hip     OSTEOPOROSIS Mother      CEREBROVASCULAR DISEASE Maternal Grandfather      cerebral hemorrhage     DIABETES Maternal Uncle      Cancer - colorectal Maternal Aunt      DIABETES Maternal Grandmother        SOCIAL HISTORY:  Social History     Social History     Marital status:      Spouse name: Kwadwo     Number of children: 3     Years of education: N/A     Occupational History     PT Aide None       Retired     Social History Main Topics     Smoking status: Never Smoker     Smokeless tobacco: None     Alcohol use No     Drug use: No     Sexual activity: Yes     Partners: Male     Birth control/ protection: Post-menopausal     Other Topics Concern  "    Caffeine Concern Yes     6-7 cups caffeine per day     Sleep Concern No     Stress Concern Yes     Weight Concern No     Special Diet No     eats healthy      Exercise Yes      walking & active life style      Parent/Sibling W/ Cabg, Mi Or Angioplasty Before 65f 55m? No     Social History Narrative       Review of Systems:  Skin:  Negative       Eyes:  Positive for glasses    ENT:  Negative      Respiratory:  Negative       Cardiovascular:    Positive for;chest pain on and off  Gastroenterology: Negative      Genitourinary:  not assessed      Musculoskeletal:  Positive for back pain;arthritis    Neurologic:  Negative      Psychiatric:  Positive for anxiety    Heme/Lymph/Imm:  Negative      Endocrine:  Positive for diabetes      Physical Exam:  Vitals: /62  Pulse 63  Ht 1.727 m (5' 8\")  Wt 78.8 kg (173 lb 12.8 oz)  BMI 26.43 kg/m2    Constitutional:  cooperative, alert and oriented, well developed, well nourished, in no acute distress        Skin:  warm and dry to the touch, no apparent skin lesions or masses noted        Head:  normocephalic, no masses or lesions        Eyes:  pupils equal and round, conjunctivae and lids unremarkable, sclera white, no xanthalasma, EOMS intact, no nystagmus        ENT:  no pallor or cyanosis, dentition good        Neck:  carotid pulses are full and equal bilaterally, JVP normal, no carotid bruit, no thyromegaly        Chest:  normal breath sounds, clear to auscultation, normal A-P diameter, normal symmetry, normal respiratory excursion, no use of accessory muscles          Cardiac: regular rhythm, normal S1/S2, no S3 or S4, apical impulse not displaced, no murmurs, gallops or rubs                  Abdomen:           Vascular: pulses full and equal, no bruits auscultated                                        Extremities and Back:  no deformities, clubbing, cyanosis, erythema observed;no edema              Neurological:  affect appropriate, oriented to time, person and " place;no gross motor deficits              IWONA MCKAY [facility]  [address] [place] [address]

## 2017-02-21 NOTE — LETTER
2017             Gaye Zimmer MD    [facility]   [address] [place] [address]      RE:    Sarah Longo   MRN:  [id number]   :  1945      Dear Dr. Zimmer      I had the opportunity to see Ms. Sarah Longo in Cardiology Clinic today at the [facility] in [place] for reevaluation of coronary artery disease and anginal symptoms.  I just saw her recently on 02/15/2017 and she indicated that she continued to have aching discomfort in her upper back between her shoulder blades similar to what she had prior to her angioplasty and stenting procedures performed initially in 2016 at the [facility].  At that time she had severe disease within the circumflex, just proximal to large trifurcating obtuse marginal artery.  She also had a significant lesion within the proximal LAD stented with good result at that time.  The stent within the right coronary artery was widely patent.        Unfortunately, she had recurrent discomfort in November and I referred her back for coronary angiography at that time.  She was found to have severe restenosis in the proximal portion of her circumflex stent and that was restented with another drug-eluting stent at that time.  She tells me that she never really had relief of her upper back pain after that procedure.  She has continued to have that discomfort over the last 3 months.  The pattern that is difficult for me to elucidate, but it does not necessarily seem typical for angina.  It seems to be more continuous and not necessarily a pattern consistent with angina.  However, it is somewhat similar by description to her previous anginal episodes.      For that reason, I referred her back for stress testing to look at that situation again and the stress test does suggest some mild ischemia within a moderate territory involving the inferior and inferolateral walls.  There is also a small apical reversible defect.  According to  the last angiogram, she had a severe stenosis within the apical LAD which probably corresponds to that territory.      She is back today to talk about the results of that stress test and to try to determine the next steps in her evaluation and treatment        On examination today, her blood pressure 118/62, heart rate 63 and weight 173 pounds.  Her lungs are clear.  Heart rhythm is regular.  I do not hear any cardiac or carotid bruits.      IMPRESSIONS:  Ms. Sarah Longo is a 71-year-old woman with coronary artery disease including a history of stenting of her right coronary artery many years ago and more recently stenting of her circumflex and LAD in 04/2016 followed by a significant circumflex restenosis treated with repeat stenting of her circumflex artery in 11/2016.  At that time her only residual significant disease was within the apical LAD, an artery that is probably too small to treat with angioplasty or stenting.  I reviewed her nuclear imaging films with her, which clearly do show some mild ischemia within the inferior, inferolateral wall, although I am hopeful that that could possibly be artifactual and be an artifact resulting from difficulty with normalization of images.  Nevertheless, she does have continued discomfort in her upper back between her shoulder blades and she is contemplating some treatment of that as musculoskeletal pain.  I had hoped to rule out the possibility of recurrent coronary artery disease first using stress testing, but her abnormal stress test result gives me no option but to refer her on for coronary angiography for further evaluation.      We certainly could find that she has no new coronary artery disease blockages since her November angiogram and I think we would all be very relieved that that was the case.  I certainly do not think her apical LAD is causing her significant problems.  The other possibility includes recurrent restenosis within the circumflex artery  which may be amenable to repeat stenting.  If not, we might need to consider brachytherapy or bypass surgery.  She is definitely not interested in bypass surgery unless it is the very last option available.  If that is the case, she would probably want to stop and talk to her cardiologist that she saw at the [facility] to discuss that issue further.  She would be agreeable to repeat stenting right away if that could be done.      I have reviewed the angiogram with her again as well as risks and benefits and she is agreeable to proceed.  I will get that set up here with a couple of days and continue on her medical therapy until then.      Thank you for allowing me to participate in Ms. Longo' care.      Sincerely,         Feroz Burgos MD, F.A.C.C.

## 2017-02-23 ENCOUNTER — TELEPHONE (OUTPATIENT)
Dept: CARDIOLOGY | Facility: CLINIC | Age: 72
End: 2017-02-23

## 2017-02-23 DIAGNOSIS — R94.39 ABNORMAL CARDIOVASCULAR STRESS TEST: Primary | ICD-10-CM

## 2017-02-23 RX ORDER — LIDOCAINE 40 MG/G
CREAM TOPICAL
Status: CANCELLED | OUTPATIENT
Start: 2017-02-23

## 2017-02-23 RX ORDER — SODIUM CHLORIDE 9 MG/ML
INJECTION, SOLUTION INTRAVENOUS CONTINUOUS
Status: CANCELLED | OUTPATIENT
Start: 2017-02-23

## 2017-02-23 RX ORDER — POTASSIUM CHLORIDE 1500 MG/1
20 TABLET, EXTENDED RELEASE ORAL
Status: CANCELLED | OUTPATIENT
Start: 2017-02-23

## 2017-02-23 NOTE — TELEPHONE ENCOUNTER
Spoke to patient to review left heart cath instructions scheduled tomorrow, 2/24/17 to arrive at 9am for a  11am procedure in [place]. Pt to check in at [place] desk. Instructed patient to not eat or drink after midnight and take AM medications to include 81mg Aspirin and 75mg Plavix. Verified patient does NOT have a known contrast allergy. Verified pt's  Kwadwo will drive patient home and be available 24 hours post angiogram. Verified pt will hold Metformin-Linagliptin and Glimepiride the morning of procedure. Reviewed to hold Metformin-Linagliptin x 2 days post procedure and need for BMP at PMD and get OK from PM to restart. Pt stated understanding and will call PMD as soon as off phone with me. Answered all Rayna's questions and verbalized understanding. Orders placed in EPIC. JOLANTA Carpenter

## 2017-02-23 NOTE — TELEPHONE ENCOUNTER
Received message from JOLANTA Burciaga that patient has questions about reapplying for funding through the [employer]. I contacted patient to let her know since she was awarded funding last year she is already in the system. I encouraged her to call the [employer] number to initiate her application as it may take a few months or more to get approval. Pt has hx of CAD (s/p MAYA x2 to OM and MAYA to LAD at [place] 4/2016; MAYA to RCA 2004), HTN, HLD. Praluent has been working well for her. Her last LDL (9/27/16) was 64, down from 3/17/16 which was 126. I gave her my name and contact number should she have any other questions or concerns.

## 2017-02-23 NOTE — TELEPHONE ENCOUNTER
Called pt to review pre-cath instructions. Spoke with spouse, pt unavailable. Pt will be back around 1:30-2pm today. Will call back then. JOLANTA Carpenter

## 2017-02-24 ENCOUNTER — APPOINTMENT (OUTPATIENT)
Dept: CARDIOLOGY | Facility: CLINIC | Age: 72
End: 2017-02-24
Attending: INTERNAL MEDICINE
Payer: COMMERCIAL

## 2017-02-24 ENCOUNTER — HOSPITAL ENCOUNTER (OUTPATIENT)
Facility: CLINIC | Age: 72
Discharge: HOME | End: 2017-02-24
Attending: INTERNAL MEDICINE | Admitting: INTERNAL MEDICINE
Payer: COMMERCIAL

## 2017-02-24 VITALS
SYSTOLIC BLOOD PRESSURE: 121 MMHG | BODY MASS INDEX: 26.22 KG/M2 | HEART RATE: 61 BPM | OXYGEN SATURATION: 97 % | HEIGHT: 68 IN | TEMPERATURE: 97.9 F | DIASTOLIC BLOOD PRESSURE: 71 MMHG | WEIGHT: 173 LBS | RESPIRATION RATE: 16 BRPM

## 2017-02-24 DIAGNOSIS — Z98.890 STATUS POST CORONARY ANGIOGRAM: Primary | ICD-10-CM

## 2017-02-24 DIAGNOSIS — R94.39 ABNORMAL CARDIOVASCULAR STRESS TEST: ICD-10-CM

## 2017-02-24 LAB
ANION GAP SERPL CALCULATED.3IONS-SCNC: 5 MMOL/L (ref 3–14)
APTT PPP: 23 SEC (ref 22–37)
BUN SERPL-MCNC: 15 MG/DL (ref 7–30)
CALCIUM SERPL-MCNC: 9.1 MG/DL (ref 8.5–10.1)
CHLORIDE SERPL-SCNC: 111 MMOL/L (ref 94–109)
CO2 SERPL-SCNC: 26 MMOL/L (ref 20–32)
CREAT SERPL-MCNC: 0.64 MG/DL (ref 0.52–1.04)
ERYTHROCYTE [DISTWIDTH] IN BLOOD BY AUTOMATED COUNT: 12.9 % (ref 10–15)
GFR SERPL CREATININE-BSD FRML MDRD: ABNORMAL ML/MIN/1.7M2
GLUCOSE SERPL-MCNC: 189 MG/DL (ref 70–99)
HCT VFR BLD AUTO: 36 % (ref 35–47)
HGB BLD-MCNC: 12.1 G/DL (ref 11.7–15.7)
INR PPP: 0.94 (ref 0.86–1.14)
MCH RBC QN AUTO: 30.8 PG (ref 26.5–33)
MCHC RBC AUTO-ENTMCNC: 33.6 G/DL (ref 31.5–36.5)
MCV RBC AUTO: 92 FL (ref 78–100)
PLATELET # BLD AUTO: 179 10E9/L (ref 150–450)
POTASSIUM SERPL-SCNC: 4.6 MMOL/L (ref 3.4–5.3)
RBC # BLD AUTO: 3.93 10E12/L (ref 3.8–5.2)
SODIUM SERPL-SCNC: 142 MMOL/L (ref 133–144)
WBC # BLD AUTO: 4.4 10E9/L (ref 4–11)

## 2017-02-24 PROCEDURE — 27210914 ZZH SHEATH CR8

## 2017-02-24 PROCEDURE — 93458 L HRT ARTERY/VENTRICLE ANGIO: CPT

## 2017-02-24 PROCEDURE — 93571 IV DOP VEL&/PRESS C FLO 1ST: CPT | Mod: 26 | Performed by: INTERNAL MEDICINE

## 2017-02-24 PROCEDURE — 36415 COLL VENOUS BLD VENIPUNCTURE: CPT | Performed by: INTERNAL MEDICINE

## 2017-02-24 PROCEDURE — B2151ZZ FLUOROSCOPY OF LEFT HEART USING LOW OSMOLAR CONTRAST: ICD-10-PCS | Performed by: INTERNAL MEDICINE

## 2017-02-24 PROCEDURE — 25000128 H RX IP 250 OP 636: Performed by: INTERNAL MEDICINE

## 2017-02-24 PROCEDURE — 93005 ELECTROCARDIOGRAM TRACING: CPT

## 2017-02-24 PROCEDURE — 93458 L HRT ARTERY/VENTRICLE ANGIO: CPT | Mod: 26 | Performed by: INTERNAL MEDICINE

## 2017-02-24 PROCEDURE — 99153 MOD SED SAME PHYS/QHP EA: CPT

## 2017-02-24 PROCEDURE — 99152 MOD SED SAME PHYS/QHP 5/>YRS: CPT | Performed by: INTERNAL MEDICINE

## 2017-02-24 PROCEDURE — 85027 COMPLETE CBC AUTOMATED: CPT | Performed by: INTERNAL MEDICINE

## 2017-02-24 PROCEDURE — C1769 GUIDE WIRE: HCPCS

## 2017-02-24 PROCEDURE — 27211089 ZZH KIT ACIST INJECTOR CR3

## 2017-02-24 PROCEDURE — 85730 THROMBOPLASTIN TIME PARTIAL: CPT | Performed by: INTERNAL MEDICINE

## 2017-02-24 PROCEDURE — 4A023N7 MEASUREMENT OF CARDIAC SAMPLING AND PRESSURE, LEFT HEART, PERCUTANEOUS APPROACH: ICD-10-PCS | Performed by: INTERNAL MEDICINE

## 2017-02-24 PROCEDURE — 27210787 ZZH MANIFOLD CR2

## 2017-02-24 PROCEDURE — 27210795 ZZH PAD DEFIB QUICK CR4

## 2017-02-24 PROCEDURE — 85610 PROTHROMBIN TIME: CPT | Performed by: INTERNAL MEDICINE

## 2017-02-24 PROCEDURE — 27210946 ZZH KIT HC TOTES DISP CR8

## 2017-02-24 PROCEDURE — 40000065 ZZH STATISTIC EKG NON-CHARGEABLE

## 2017-02-24 PROCEDURE — 40000852 ZZH STATISTIC HEART CATH LAB OR EP LAB

## 2017-02-24 PROCEDURE — 93571 IV DOP VEL&/PRESS C FLO 1ST: CPT

## 2017-02-24 PROCEDURE — 99152 MOD SED SAME PHYS/QHP 5/>YRS: CPT

## 2017-02-24 PROCEDURE — 25000125 ZZHC RX 250: Performed by: INTERNAL MEDICINE

## 2017-02-24 PROCEDURE — 27210856 ZZH ACCESS HEART CATH CR2

## 2017-02-24 PROCEDURE — 93010 ELECTROCARDIOGRAM REPORT: CPT | Performed by: INTERNAL MEDICINE

## 2017-02-24 PROCEDURE — 80048 BASIC METABOLIC PNL TOTAL CA: CPT | Performed by: INTERNAL MEDICINE

## 2017-02-24 PROCEDURE — B2111ZZ FLUOROSCOPY OF MULTIPLE CORONARY ARTERIES USING LOW OSMOLAR CONTRAST: ICD-10-PCS | Performed by: INTERNAL MEDICINE

## 2017-02-24 PROCEDURE — 99153 MOD SED SAME PHYS/QHP EA: CPT | Performed by: INTERNAL MEDICINE

## 2017-02-24 PROCEDURE — 4A033BC MEASUREMENT OF ARTERIAL PRESSURE, CORONARY, PERCUTANEOUS APPROACH: ICD-10-PCS | Performed by: INTERNAL MEDICINE

## 2017-02-24 RX ORDER — ENALAPRILAT 1.25 MG/ML
1.25-2.5 INJECTION INTRAVENOUS
Status: DISCONTINUED | OUTPATIENT
Start: 2017-02-24 | End: 2017-02-24 | Stop reason: HOSPADM

## 2017-02-24 RX ORDER — HYDROCODONE BITARTRATE AND ACETAMINOPHEN 5; 325 MG/1; MG/1
1-2 TABLET ORAL EVERY 4 HOURS PRN
Status: DISCONTINUED | OUTPATIENT
Start: 2017-02-24 | End: 2017-02-24 | Stop reason: HOSPADM

## 2017-02-24 RX ORDER — POTASSIUM CHLORIDE 1500 MG/1
20 TABLET, EXTENDED RELEASE ORAL
Status: DISCONTINUED | OUTPATIENT
Start: 2017-02-24 | End: 2017-02-24 | Stop reason: HOSPADM

## 2017-02-24 RX ORDER — HYDRALAZINE HYDROCHLORIDE 20 MG/ML
10-20 INJECTION INTRAMUSCULAR; INTRAVENOUS
Status: DISCONTINUED | OUTPATIENT
Start: 2017-02-24 | End: 2017-02-24 | Stop reason: HOSPADM

## 2017-02-24 RX ORDER — GLIMEPIRIDE 4 MG/1
4 TABLET ORAL 2 TIMES DAILY WITH MEALS
Status: DISCONTINUED | OUTPATIENT
Start: 2017-02-24 | End: 2017-02-24 | Stop reason: HOSPADM

## 2017-02-24 RX ORDER — ACETAMINOPHEN 325 MG/1
325-650 TABLET ORAL EVERY 4 HOURS PRN
Status: DISCONTINUED | OUTPATIENT
Start: 2017-02-24 | End: 2017-02-24 | Stop reason: HOSPADM

## 2017-02-24 RX ORDER — LIDOCAINE 40 MG/G
CREAM TOPICAL
Status: DISCONTINUED | OUTPATIENT
Start: 2017-02-24 | End: 2017-02-24 | Stop reason: HOSPADM

## 2017-02-24 RX ORDER — VERAPAMIL HYDROCHLORIDE 2.5 MG/ML
1-2.5 INJECTION, SOLUTION INTRAVENOUS
Status: DISCONTINUED | OUTPATIENT
Start: 2017-02-24 | End: 2017-02-24 | Stop reason: HOSPADM

## 2017-02-24 RX ORDER — PANTOPRAZOLE SODIUM 20 MG/1
20 TABLET, DELAYED RELEASE ORAL
Status: DISCONTINUED | OUTPATIENT
Start: 2017-02-24 | End: 2017-02-24 | Stop reason: HOSPADM

## 2017-02-24 RX ORDER — FLUMAZENIL 0.1 MG/ML
0.2 INJECTION, SOLUTION INTRAVENOUS
Status: DISCONTINUED | OUTPATIENT
Start: 2017-02-24 | End: 2017-02-24 | Stop reason: HOSPADM

## 2017-02-24 RX ORDER — SODIUM CHLORIDE 9 MG/ML
INJECTION, SOLUTION INTRAVENOUS CONTINUOUS
Status: DISCONTINUED | OUTPATIENT
Start: 2017-02-24 | End: 2017-02-24 | Stop reason: HOSPADM

## 2017-02-24 RX ORDER — NALOXONE HYDROCHLORIDE 0.4 MG/ML
0.4 INJECTION, SOLUTION INTRAMUSCULAR; INTRAVENOUS; SUBCUTANEOUS EVERY 5 MIN PRN
Status: DISCONTINUED | OUTPATIENT
Start: 2017-02-24 | End: 2017-02-24 | Stop reason: HOSPADM

## 2017-02-24 RX ORDER — PHENYLEPHRINE HCL IN 0.9% NACL 1 MG/10 ML
20-100 SYRINGE (ML) INTRAVENOUS
Status: DISCONTINUED | OUTPATIENT
Start: 2017-02-24 | End: 2017-02-24 | Stop reason: HOSPADM

## 2017-02-24 RX ORDER — NIFEDIPINE 10 MG/1
10 CAPSULE ORAL
Status: DISCONTINUED | OUTPATIENT
Start: 2017-02-24 | End: 2017-02-24 | Stop reason: HOSPADM

## 2017-02-24 RX ORDER — NITROGLYCERIN 0.4 MG/1
0.4 TABLET SUBLINGUAL EVERY 5 MIN PRN
Status: DISCONTINUED | OUTPATIENT
Start: 2017-02-24 | End: 2017-02-24 | Stop reason: HOSPADM

## 2017-02-24 RX ORDER — HEPARIN SODIUM 1000 [USP'U]/ML
1000-10000 INJECTION, SOLUTION INTRAVENOUS; SUBCUTANEOUS EVERY 5 MIN PRN
Status: DISCONTINUED | OUTPATIENT
Start: 2017-02-24 | End: 2017-02-24 | Stop reason: HOSPADM

## 2017-02-24 RX ORDER — DIPHENHYDRAMINE HYDROCHLORIDE 50 MG/ML
25-50 INJECTION INTRAMUSCULAR; INTRAVENOUS
Status: DISCONTINUED | OUTPATIENT
Start: 2017-02-24 | End: 2017-02-24 | Stop reason: HOSPADM

## 2017-02-24 RX ORDER — BUPIVACAINE HYDROCHLORIDE 2.5 MG/ML
1-10 INJECTION, SOLUTION EPIDURAL; INFILTRATION; INTRACAUDAL
Status: DISCONTINUED | OUTPATIENT
Start: 2017-02-24 | End: 2017-02-24 | Stop reason: HOSPADM

## 2017-02-24 RX ORDER — DEXTROSE MONOHYDRATE 25 G/50ML
25-50 INJECTION, SOLUTION INTRAVENOUS
Status: DISCONTINUED | OUTPATIENT
Start: 2017-02-24 | End: 2017-02-24 | Stop reason: HOSPADM

## 2017-02-24 RX ORDER — NEBIVOLOL 2.5 MG/1
2.5 TABLET ORAL DAILY
Status: DISCONTINUED | OUTPATIENT
Start: 2017-02-24 | End: 2017-02-24 | Stop reason: HOSPADM

## 2017-02-24 RX ORDER — MORPHINE SULFATE 2 MG/ML
1-2 INJECTION, SOLUTION INTRAMUSCULAR; INTRAVENOUS EVERY 5 MIN PRN
Status: DISCONTINUED | OUTPATIENT
Start: 2017-02-24 | End: 2017-02-24 | Stop reason: HOSPADM

## 2017-02-24 RX ORDER — EPTIFIBATIDE 2 MG/ML
180 INJECTION, SOLUTION INTRAVENOUS EVERY 10 MIN PRN
Status: DISCONTINUED | OUTPATIENT
Start: 2017-02-24 | End: 2017-02-24 | Stop reason: HOSPADM

## 2017-02-24 RX ORDER — ASPIRIN 81 MG/1
81 TABLET, CHEWABLE ORAL DAILY
Status: DISCONTINUED | OUTPATIENT
Start: 2017-02-25 | End: 2017-02-24 | Stop reason: HOSPADM

## 2017-02-24 RX ORDER — NALOXONE HYDROCHLORIDE 0.4 MG/ML
.1-.4 INJECTION, SOLUTION INTRAMUSCULAR; INTRAVENOUS; SUBCUTANEOUS
Status: DISCONTINUED | OUTPATIENT
Start: 2017-02-24 | End: 2017-02-24 | Stop reason: HOSPADM

## 2017-02-24 RX ORDER — CLOPIDOGREL BISULFATE 75 MG/1
75 TABLET ORAL DAILY
Status: DISCONTINUED | OUTPATIENT
Start: 2017-02-25 | End: 2017-02-24 | Stop reason: HOSPADM

## 2017-02-24 RX ORDER — DEXTROSE MONOHYDRATE 25 G/50ML
12.5-5 INJECTION, SOLUTION INTRAVENOUS EVERY 30 MIN PRN
Status: DISCONTINUED | OUTPATIENT
Start: 2017-02-24 | End: 2017-02-24 | Stop reason: HOSPADM

## 2017-02-24 RX ORDER — NICOTINE POLACRILEX 4 MG
15-30 LOZENGE BUCCAL
Status: DISCONTINUED | OUTPATIENT
Start: 2017-02-24 | End: 2017-02-24 | Stop reason: HOSPADM

## 2017-02-24 RX ORDER — FUROSEMIDE 10 MG/ML
20-100 INJECTION INTRAMUSCULAR; INTRAVENOUS
Status: DISCONTINUED | OUTPATIENT
Start: 2017-02-24 | End: 2017-02-24 | Stop reason: HOSPADM

## 2017-02-24 RX ORDER — LIDOCAINE HYDROCHLORIDE 10 MG/ML
30 INJECTION, SOLUTION EPIDURAL; INFILTRATION; INTRACAUDAL; PERINEURAL
Status: DISCONTINUED | OUTPATIENT
Start: 2017-02-24 | End: 2017-02-24 | Stop reason: HOSPADM

## 2017-02-24 RX ORDER — ONDANSETRON 2 MG/ML
4 INJECTION INTRAMUSCULAR; INTRAVENOUS EVERY 4 HOURS PRN
Status: DISCONTINUED | OUTPATIENT
Start: 2017-02-24 | End: 2017-02-24 | Stop reason: HOSPADM

## 2017-02-24 RX ORDER — METHYLPREDNISOLONE SODIUM SUCCINATE 125 MG/2ML
125 INJECTION, POWDER, LYOPHILIZED, FOR SOLUTION INTRAMUSCULAR; INTRAVENOUS
Status: DISCONTINUED | OUTPATIENT
Start: 2017-02-24 | End: 2017-02-24 | Stop reason: HOSPADM

## 2017-02-24 RX ORDER — NITROGLYCERIN 5 MG/ML
100-500 VIAL (ML) INTRAVENOUS
Status: DISCONTINUED | OUTPATIENT
Start: 2017-02-24 | End: 2017-02-24 | Stop reason: HOSPADM

## 2017-02-24 RX ORDER — NICARDIPINE HYDROCHLORIDE 2.5 MG/ML
100 INJECTION INTRAVENOUS
Status: DISCONTINUED | OUTPATIENT
Start: 2017-02-24 | End: 2017-02-24 | Stop reason: HOSPADM

## 2017-02-24 RX ORDER — CLOPIDOGREL BISULFATE 75 MG/1
75 TABLET ORAL
Status: DISCONTINUED | OUTPATIENT
Start: 2017-02-24 | End: 2017-02-24 | Stop reason: HOSPADM

## 2017-02-24 RX ORDER — CLOPIDOGREL BISULFATE 75 MG/1
300-600 TABLET ORAL
Status: DISCONTINUED | OUTPATIENT
Start: 2017-02-24 | End: 2017-02-24 | Stop reason: HOSPADM

## 2017-02-24 RX ORDER — DOPAMINE HYDROCHLORIDE 160 MG/100ML
2-20 INJECTION, SOLUTION INTRAVENOUS CONTINUOUS PRN
Status: DISCONTINUED | OUTPATIENT
Start: 2017-02-24 | End: 2017-02-24 | Stop reason: HOSPADM

## 2017-02-24 RX ORDER — POTASSIUM CHLORIDE 29.8 MG/ML
20 INJECTION INTRAVENOUS
Status: DISCONTINUED | OUTPATIENT
Start: 2017-02-24 | End: 2017-02-24 | Stop reason: HOSPADM

## 2017-02-24 RX ORDER — IOPAMIDOL 755 MG/ML
65 INJECTION, SOLUTION INTRAVASCULAR ONCE
Status: COMPLETED | OUTPATIENT
Start: 2017-02-24 | End: 2017-02-24

## 2017-02-24 RX ORDER — NALOXONE HYDROCHLORIDE 0.4 MG/ML
.2-.4 INJECTION, SOLUTION INTRAMUSCULAR; INTRAVENOUS; SUBCUTANEOUS
Status: DISCONTINUED | OUTPATIENT
Start: 2017-02-24 | End: 2017-02-24 | Stop reason: HOSPADM

## 2017-02-24 RX ORDER — LIDOCAINE HYDROCHLORIDE 10 MG/ML
1-10 INJECTION, SOLUTION EPIDURAL; INFILTRATION; INTRACAUDAL; PERINEURAL
Status: COMPLETED | OUTPATIENT
Start: 2017-02-24 | End: 2017-02-24

## 2017-02-24 RX ORDER — NITROGLYCERIN 20 MG/100ML
.07-2 INJECTION INTRAVENOUS CONTINUOUS PRN
Status: DISCONTINUED | OUTPATIENT
Start: 2017-02-24 | End: 2017-02-24 | Stop reason: HOSPADM

## 2017-02-24 RX ORDER — ASPIRIN 81 MG/1
81-324 TABLET, CHEWABLE ORAL
Status: DISCONTINUED | OUTPATIENT
Start: 2017-02-24 | End: 2017-02-24 | Stop reason: HOSPADM

## 2017-02-24 RX ORDER — PROTAMINE SULFATE 10 MG/ML
1-5 INJECTION, SOLUTION INTRAVENOUS
Status: DISCONTINUED | OUTPATIENT
Start: 2017-02-24 | End: 2017-02-24 | Stop reason: HOSPADM

## 2017-02-24 RX ORDER — LORAZEPAM 2 MG/ML
.5-2 INJECTION INTRAMUSCULAR EVERY 4 HOURS PRN
Status: DISCONTINUED | OUTPATIENT
Start: 2017-02-24 | End: 2017-02-24 | Stop reason: HOSPADM

## 2017-02-24 RX ORDER — PRASUGREL 10 MG/1
10-60 TABLET, FILM COATED ORAL
Status: DISCONTINUED | OUTPATIENT
Start: 2017-02-24 | End: 2017-02-24 | Stop reason: HOSPADM

## 2017-02-24 RX ORDER — ADENOSINE 3 MG/ML
12-12000 INJECTION, SOLUTION INTRAVENOUS
Status: DISCONTINUED | OUTPATIENT
Start: 2017-02-24 | End: 2017-02-24 | Stop reason: HOSPADM

## 2017-02-24 RX ORDER — POTASSIUM CHLORIDE 7.45 MG/ML
10 INJECTION INTRAVENOUS
Status: DISCONTINUED | OUTPATIENT
Start: 2017-02-24 | End: 2017-02-24 | Stop reason: HOSPADM

## 2017-02-24 RX ORDER — ASPIRIN 325 MG
325 TABLET ORAL
Status: DISCONTINUED | OUTPATIENT
Start: 2017-02-24 | End: 2017-02-24 | Stop reason: HOSPADM

## 2017-02-24 RX ORDER — PROTAMINE SULFATE 10 MG/ML
25-100 INJECTION, SOLUTION INTRAVENOUS EVERY 5 MIN PRN
Status: DISCONTINUED | OUTPATIENT
Start: 2017-02-24 | End: 2017-02-24 | Stop reason: HOSPADM

## 2017-02-24 RX ORDER — EPTIFIBATIDE 2 MG/ML
2 INJECTION, SOLUTION INTRAVENOUS CONTINUOUS PRN
Status: DISCONTINUED | OUTPATIENT
Start: 2017-02-24 | End: 2017-02-24 | Stop reason: HOSPADM

## 2017-02-24 RX ORDER — FENTANYL CITRATE 50 UG/ML
25-50 INJECTION, SOLUTION INTRAMUSCULAR; INTRAVENOUS
Status: DISCONTINUED | OUTPATIENT
Start: 2017-02-24 | End: 2017-02-24 | Stop reason: HOSPADM

## 2017-02-24 RX ORDER — DOBUTAMINE HYDROCHLORIDE 200 MG/100ML
2-20 INJECTION INTRAVENOUS CONTINUOUS PRN
Status: DISCONTINUED | OUTPATIENT
Start: 2017-02-24 | End: 2017-02-24 | Stop reason: HOSPADM

## 2017-02-24 RX ORDER — LEVOTHYROXINE SODIUM 125 UG/1
125 TABLET ORAL DAILY
Status: DISCONTINUED | OUTPATIENT
Start: 2017-02-25 | End: 2017-02-24 | Stop reason: HOSPADM

## 2017-02-24 RX ORDER — NITROGLYCERIN 5 MG/ML
100-200 VIAL (ML) INTRAVENOUS
Status: DISCONTINUED | OUTPATIENT
Start: 2017-02-24 | End: 2017-02-24 | Stop reason: HOSPADM

## 2017-02-24 RX ORDER — SODIUM NITROPRUSSIDE 25 MG/ML
100-200 INJECTION INTRAVENOUS
Status: DISCONTINUED | OUTPATIENT
Start: 2017-02-24 | End: 2017-02-24 | Stop reason: HOSPADM

## 2017-02-24 RX ORDER — LISINOPRIL 2.5 MG/1
2.5 TABLET ORAL DAILY
Status: DISCONTINUED | OUTPATIENT
Start: 2017-02-24 | End: 2017-02-24 | Stop reason: HOSPADM

## 2017-02-24 RX ORDER — PROMETHAZINE HYDROCHLORIDE 25 MG/ML
6.25-25 INJECTION, SOLUTION INTRAMUSCULAR; INTRAVENOUS EVERY 4 HOURS PRN
Status: DISCONTINUED | OUTPATIENT
Start: 2017-02-24 | End: 2017-02-24 | Stop reason: HOSPADM

## 2017-02-24 RX ADMIN — IOPAMIDOL 65 ML: 755 INJECTION, SOLUTION INTRAVASCULAR at 13:15

## 2017-02-24 RX ADMIN — LIDOCAINE HYDROCHLORIDE 1 ML: 10 INJECTION, SOLUTION EPIDURAL; INFILTRATION; INTRACAUDAL; PERINEURAL at 12:40

## 2017-02-24 RX ADMIN — SODIUM CHLORIDE: 9 INJECTION, SOLUTION INTRAVENOUS at 09:04

## 2017-02-24 RX ADMIN — FENTANYL CITRATE 50 MCG: 50 INJECTION, SOLUTION INTRAMUSCULAR; INTRAVENOUS at 12:34

## 2017-02-24 RX ADMIN — ADENOSINE 96 MCG: 3 INJECTION, SOLUTION INTRAVENOUS at 13:04

## 2017-02-24 RX ADMIN — FENTANYL CITRATE 50 MCG: 50 INJECTION, SOLUTION INTRAMUSCULAR; INTRAVENOUS at 12:39

## 2017-02-24 RX ADMIN — LIDOCAINE HYDROCHLORIDE 9 ML: 10 INJECTION, SOLUTION EPIDURAL; INFILTRATION; INTRACAUDAL; PERINEURAL at 12:51

## 2017-02-24 RX ADMIN — ADENOSINE 96 MCG: 3 INJECTION, SOLUTION INTRAVENOUS at 13:03

## 2017-02-24 RX ADMIN — MIDAZOLAM HYDROCHLORIDE 1 MG: 1 INJECTION, SOLUTION INTRAMUSCULAR; INTRAVENOUS at 12:34

## 2017-02-24 RX ADMIN — MIDAZOLAM HYDROCHLORIDE 1 MG: 1 INJECTION, SOLUTION INTRAMUSCULAR; INTRAVENOUS at 12:39

## 2017-02-24 NOTE — IP AVS SNAPSHOT
MRN:[id number]                      After Visit Summary   2/24/2017    Sarah Longo    MRN: [id number]           Visit Information        Department      2/24/2017  7:19 AM [facility]          Review of your medicines      UNREVIEWED medicines. Ask your doctor about these medicines        Dose / Directions    alirocumab 75 MG/ML injectable pen   Commonly known as:  PRALUENT   Used for:  Hyperlipidemia, unspecified hyperlipidemia        Dose:  75 mg   Inject 1 mL (75 mg) Subcutaneous every 14 days   Quantity:  2 mL   Refills:  11       amoxicillin 500 MG capsule   Commonly known as:  AMOXIL        PRN before dental work   Refills:  0       aspirin 81 MG tablet        1 tab po QD (Once per day)   Refills:  0       cholecalciferol 2000 UNITS Caps        Dose:  1 capsule   Take 1 capsule by mouth daily   Refills:  0       CINNAMON PO        Dose:  2 capsule   Take 2 capsules by mouth 2 times daily   Refills:  0       clopidogrel 75 MG tablet   Commonly known as:  PLAVIX   Used for:  Coronary artery disease involving native coronary artery of native heart without angina pectoris        Dose:  75 mg   Take 1 tablet (75 mg) by mouth daily   Quantity:  90 tablet   Refills:  3       Co Enzyme Q-10 50 MG Caps        daliy   Refills:  0       glimepiride 4 MG tablet   Commonly known as:  AMARYL        1 tablet BID   Refills:  0       Gymnema Sylvestris Leaf Powd        Dose:  500 mg   500 mg daily   Refills:  0       isosorbide mononitrate 60 MG 24 hr tablet   Commonly known as:  IMDUR   Used for:  Stable angina (H)        Dose:  60 mg   Take 1 tablet (60 mg) by mouth daily   Quantity:  90 tablet   Refills:  3       JENTADUETO 2.5-1000 MG per tablet   Generic drug:  linagliptin-metFORMIN        Dose:  1 tablet   Take 1 tablet by mouth 2 times daily (with meals)   Refills:  0       lisinopril 2.5 MG tablet   Commonly known as:  PRINIVIL/Zestril   Used for:  Stable angina (H)         Dose:  2.5 mg   Take 1 tablet (2.5 mg) by mouth daily   Quantity:  90 tablet   Refills:  3       mirabegron 50 MG 24 hr tablet   Commonly known as:  MYRBETRIQ   Used for:  OAB (overactive bladder)        Dose:  50 mg   Take 1 tablet (50 mg) by mouth daily   Quantity:  90 tablet   Refills:  3       nebivolol 2.5 MG tablet   Commonly known as:  BYSTOLIC   Used for:  Coronary artery disease involving native coronary artery of native heart without angina pectoris        Dose:  2.5 mg   Take 1 tablet (2.5 mg) by mouth daily   Quantity:  90 tablet   Refills:  3       nitroglycerin 0.4 MG sublingual tablet   Commonly known as:  NITROSTAT   Used for:  Coronary artery disease involving native coronary artery of native heart without angina pectoris        Dose:  0.4 mg   Place 1 tablet (0.4 mg) under the tongue every 5 minutes as needed for chest pain If symptoms persist after 3 doses (15 minutes) call 911.   Quantity:  25 tablet   Refills:  3       NONFORMULARY        Tumeric 500 mg bid   Refills:  0       OMEGA-3 FISH OIL PO        Dose:  2400 mg   Take 2,400 mg by mouth 2 times daily (with meals)   Refills:  0       PANTOPRAZOLE SODIUM PO        Dose:  20 mg   Take 20 mg by mouth 2 times daily (before meals)   Refills:  0       RESTASIS 0.05 % ophthalmic emulsion   Generic drug:  cycloSPORINE        twice daily   Refills:  0       SALONPAS 1.2-5.7-6.3 % Ptch   Generic drug:  Camphor-Menthol-Methyl Sal        Patient uses 3-4 patches daily   Refills:  0       STATIN NOT PRESCRIBED (INTENTIONAL)   Used for:  Coronary artery disease involving native coronary artery of native heart without angina pectoris        Dose:  1 each   1 each At Bedtime Statin not prescribed intentionally due to Allergy to statin   Quantity:  0 each   Refills:  0       SYNTHROID 125 MCG tablet   Generic drug:  levothyroxine        1 tablet daily   Refills:  0                Protect others around you: Learn how to safely use, store and throw away your  medicines at www.disposemymeds.org.         Follow-ups after your visit        Your next 10 appointments already scheduled     Mar 16, 2017  3:10 PM CDT   Return Visit with Karen Alexander PA-C   [facility]ART AT [place] ([facility])    [address]  [place] [address]   [phone]            May 17, 2017  8:45 AM CDT   Return Visit with Feroz Burgos MD   [facility] AT [place] ([facility])    [address]  [facility] [address]   [phone]            May 23, 2017 10:00 AM CDT   [facility] Sh[facility]en [facility]es [facility]   [facility]    [address]  [facility] [address]   [phone]            May 23, 2017 10:30 AM CDT   MA SCREENING DIGITAL BILATERAL with WEMA1   [facility] ([facility] [place])    [address] [place] [address], [address]  [facility] [address]   [phone]           Do not use any powder, lotion or deodorant under your arms or on your breast. If you do, we will ask you to remove it before your exam.  Wear comfortable, two-piece clothing.  If you have any allergies, tell your care team.  Bring any previous mammograms from other facilities or have them mailed to the breast center.               Care Instructions        After Care Instructions     Discharge Instructions - IF on Metformin (Glucophage or Glucovance) or Metformin containing medications       IF on Metformin (Glucophage or Glucovance) or Metformin containing medications , schedule a Basic Metabolic Panel at [facility] Heart or Primary Clinic in 48 - 72 hours post procedure and PRIOR TO resuming the Metformin or Metformin containing medications.  Hold Metformin (Glucophage or Glucovance) or Metformin containing medications until after the Basic Metabolic Panel on the 2nd or 3rd day following the procedure.  May resume  after blood draw is complete.                  Further instructions from your care team       Cardiac Angioplasty/Stent Discharge Instructions    [facility] at [place]:  [phone] [facility] (7 days a week)    After you go home:      Have an adult stay with you until tomorrow.    Drink extra fluids for 2 days.    You may resume your normal diet.    No smoking       For 24 hours - due to the sedation you received:    Relax and take it easy.    Do NOT make any important or legal decisions.    Do NOT drive or operate machines at home or at work.    Do NOT drink alcohol.    Care of Wrist & Groin Puncture Sites:      For the first 24 hrs - check the puncture site every 1-2 hours while awake.    For 2 days, when you cough, sneeze, laugh or move your bowels,  hold your hand over the puncture site and press firmly on/above the site    Remove the bandaid after 24 hours. If there is minor oozing, apply another bandaid and remove it after 12 hours.    It is normal to have a small bruise or pea size lump at the site.    You may shower tomorrow. Do NOT take a bath, or use a hot tub or pool for at least 3 days. Do NOT scrub the site. Do not use lotion or powder near the puncture site.    Activity - For 2 days:    Wrist site:      do not use your hand or arm to support your weight (such as rising from a chair)     do not bend your wrist (such as lifting a garage door).    do not lift more than 5 pounds or exercise your arm (such as tennis, golf or bowling).    Do NOT do any heavy activity such as exercise, lifting, or straining.     Groin site:        No stooping or squatting    Do NOT do any heavy activity such as exercise, lifting, or straining.     No housework, yard work or any activity that make you sweat    Do NOT lift more than 10 pounds    Bleeding:    If you start bleeding from the site in your wrist:      Sit down and press firmly on/above the site with your fingers for 10 minutes.     Once  "bleeding stops, keep your arm still for 2 hours.     Call [facility] as soon as you can.    If you start bleeding from the site in your groin:      Lie down flat and press firmly on/above the site for 10 minutes.    Once bleeding stops, lay flat for 2 hours.     Call [facility] as soon as you can.    Call 911 right away if you have heavy bleeding or bleeding that does not stop.      Medicines:      You are taking antiplatelet medications Plavix, do not stop taking it .        You are on Metformin (Glucophage), do not restart it until you have blood tests (within 2 to 3 days after discharge).  After you have your blood drawn, you may restart the Metformin.    Take your medications as directed.    If you have stopped any other medicines, check with your provider about when to restart them.    Follow Up Appointments:      Follow up with [facility] Nurse Practitioner at [facility] of patient preference in 7-10 days.    Call the clinic if:      You have increased pain or a large or growing hard lump around the site.    The site is red, swollen, hot or tender.    Blood or fluid is draining from the site.    You have chills or a fever greater than 101 F (38 C).    Your arm or leg turns feels numb, cool or changes color.    You have hives, a rash or unusual itching.    New pain in the back or belly that you cannot control with Tylenol.    Any questions or concerns.    [facility] [place]:    [phone] [facility] (7 days a week)                   Additional Information About Your Visit        Care EveryWhere ID     This is your Care EveryWhere ID. This could be used by other organizations to access your [place] medical records  [id number]        Your Vitals Were     Blood Pressure Pulse Temperature Respirations Height Weight    100/54 61 97.9  F (36.6  C) (Oral) 16 1.727 m (5' 8\") 78.5 kg (173 lb)    Pulse Oximetry BMI (Body Mass Index)                96% 26.3 kg/m2           Primary Care " Provider Office Phone # Fax Dunia Zimmer [phone]      Thank you!     Thank you for choosing [facility] for your care. Our goal is always to provide you with excellent care. Hearing back from our patients is one way we can continue to improve our services. Please take a few minutes to complete the written survey that you may receive in the mail after you visit with us. Thank you!             Medication List: This is a list of all your medications and when to take them. Check marks below indicate your daily home schedule. Keep this list as a reference.      Medications           Morning Afternoon Evening Bedtime As Needed    alirocumab 75 MG/ML injectable pen   Commonly known as:  PRALUENT   Inject 1 mL (75 mg) Subcutaneous every 14 days                                amoxicillin 500 MG capsule   Commonly known as:  AMOXIL   PRN before dental work                                aspirin 81 MG tablet   1 tab po QD (Once per day)                                cholecalciferol 2000 UNITS Caps   Take 1 capsule by mouth daily                                CINNAMON PO   Take 2 capsules by mouth 2 times daily                                clopidogrel 75 MG tablet   Commonly known as:  PLAVIX   Take 1 tablet (75 mg) by mouth daily                                Co Enzyme Q-10 50 MG Caps   daliy                                glimepiride 4 MG tablet   Commonly known as:  AMARYL   1 tablet BID                                Gymnema Sylvestris Leaf Powd   500 mg daily                                isosorbide mononitrate 60 MG 24 hr tablet   Commonly known as:  IMDUR   Take 1 tablet (60 mg) by mouth daily                                JENTADUETO 2.5-1000 MG per tablet   Take 1 tablet by mouth 2 times daily (with meals)   Generic drug:  linagliptin-metFORMIN                                lisinopril 2.5 MG tablet   Commonly known as:  PRINIVIL/Zestril   Take 1 tablet (2.5 mg) by mouth daily                                 mirabegron 50 MG 24 hr tablet   Commonly known as:  MYRBETRIQ   Take 1 tablet (50 mg) by mouth daily                                nebivolol 2.5 MG tablet   Commonly known as:  BYSTOLIC   Take 1 tablet (2.5 mg) by mouth daily                                nitroglycerin 0.4 MG sublingual tablet   Commonly known as:  NITROSTAT   Place 1 tablet (0.4 mg) under the tongue every 5 minutes as needed for chest pain If symptoms persist after 3 doses (15 minutes) call 911.                                NONFORMULARY   Tumeric 500 mg bid                                OMEGA-3 FISH OIL PO   Take 2,400 mg by mouth 2 times daily (with meals)                                PANTOPRAZOLE SODIUM PO   Take 20 mg by mouth 2 times daily (before meals)                                RESTASIS 0.05 % ophthalmic emulsion   twice daily   Generic drug:  cycloSPORINE                                SALONPAS 1.2-5.7-6.3 % Ptch   Patient uses 3-4 patches daily   Generic drug:  Camphor-Menthol-Methyl Sal                                STATIN NOT PRESCRIBED (INTENTIONAL)   1 each At Bedtime Statin not prescribed intentionally due to Allergy to statin                                SYNTHROID 125 MCG tablet   1 tablet daily   Generic drug:  levothyroxine

## 2017-02-24 NOTE — IP AVS SNAPSHOT
[place] [address]    [place] [address]    Phone:  [phone]                                       After Visit Summary   2/24/2017    Sarah Longo    MRN: [id number]           After Visit Summary Signature Page     I have received my discharge instructions, and my questions have been answered. I have discussed any challenges I see with this plan with the nurse or doctor.    ..........................................................................................................................................  Patient/Patient Representative Signature      ..........................................................................................................................................  Patient Representative Print Name and Relationship to Patient    ..................................................               ................................................  Date                                            Time    ..........................................................................................................................................  Reviewed by Signature/Title    ...................................................              ..............................................  Date                                                            Time

## 2017-02-24 NOTE — DISCHARGE INSTRUCTIONS
Cardiac Angioplasty/Stent Discharge Instructions    [facility] at [place]:  [phone] [facility] (7 days a week)    After you go home:      Have an adult stay with you until tomorrow.    Drink extra fluids for 2 days.    You may resume your normal diet.    No smoking       For 24 hours - due to the sedation you received:    Relax and take it easy.    Do NOT make any important or legal decisions.    Do NOT drive or operate machines at home or at work.    Do NOT drink alcohol.    Care of Wrist & Groin Puncture Sites:      For the first 24 hrs - check the puncture site every 1-2 hours while awake.    For 2 days, when you cough, sneeze, laugh or move your bowels,  hold your hand over the puncture site and press firmly on/above the site    Remove the bandaid after 24 hours. If there is minor oozing, apply another bandaid and remove it after 12 hours.    It is normal to have a small bruise or pea size lump at the site.    You may shower tomorrow. Do NOT take a bath, or use a hot tub or pool for at least 3 days. Do NOT scrub the site. Do not use lotion or powder near the puncture site.    Activity - For 2 days:    Wrist site:      do not use your hand or arm to support your weight (such as rising from a chair)     do not bend your wrist (such as lifting a garage door).    do not lift more than 5 pounds or exercise your arm (such as tennis, golf or bowling).    Do NOT do any heavy activity such as exercise, lifting, or straining.     Groin site:        No stooping or squatting    Do NOT do any heavy activity such as exercise, lifting, or straining.     No housework, yard work or any activity that make you sweat    Do NOT lift more than 10 pounds    Bleeding:    If you start bleeding from the site in your wrist:      Sit down and press firmly on/above the site with your fingers for 10 minutes.     Once bleeding stops, keep your arm still for 2 hours.     Call [facility] as soon as you can.    If  you start bleeding from the site in your groin:      Lie down flat and press firmly on/above the site for 10 minutes.    Once bleeding stops, lay flat for 2 hours.     Call [facility] as soon as you can.    Call 911 right away if you have heavy bleeding or bleeding that does not stop.      Medicines:      You are taking antiplatelet medications Plavix, do not stop taking it .        You are on Metformin (Glucophage), do not restart it until you have blood tests (within 2 to 3 days after discharge).  After you have your blood drawn, you may restart the Metformin.    Take your medications as directed.    If you have stopped any other medicines, check with your provider about when to restart them.    Follow Up Appointments:      Follow up with [facility] Nurse Practitioner at [facility] of patient preference in 7-10 days.    Call the clinic if:      You have increased pain or a large or growing hard lump around the site.    The site is red, swollen, hot or tender.    Blood or fluid is draining from the site.    You have chills or a fever greater than 101 F (38 C).    Your arm or leg turns feels numb, cool or changes color.    You have hives, a rash or unusual itching.    New pain in the back or belly that you cannot control with Tylenol.    Any questions or concerns.    [facility] at [place]:    [phone] [facility] (7 days a week)

## 2017-02-24 NOTE — PROGRESS NOTES
Pt resting comfortably post angiogram, VSS. Right groin and left wrist sites are dry and intact with no bleeding or hematoma. Report given to Shanthi DURAND for shift change.

## 2017-02-24 NOTE — PROGRESS NOTES
1730- Up and walked to bathroom and voided. Right groin and left wrist intact. Eating dinner.  1800- Care Suites Discharge Summary  Discharge Criteria:   Discharge Criteria met per MD orders: Yes.   Vital signs stable.     Pt demonstrates ability to ambulate safely: Yes.  (See discharge questionnaire for additional information)  Discharge instructions & education:   Discharge instructions reviewed with patient and . Patient verbalizes understanding.   Medications:   Patient will be discharging on new medications- No. Patient verbalizes reason for use, start date, and side effects NA.  Items returned to patient:   Home and hospital acquired medications returned to patient NA   Listed belongings gathered and returned to patient: Yes  Patient discharged to home in car with  driving.  Shanthi Holden RN

## 2017-02-24 NOTE — PROCEDURES
Previously placed stents are widely patent.  Nl L MAin.  50% stenosis in lateral branch of trifurcation OM 1 (unchanged)  50% mid and distal LAD with 80% apical LAD.  30-50% mid RCA FFR into PL 0.86    LVEDP 14. LVEF 55%    Rec Med Rx. I suspect back pain is back pain

## 2017-02-25 LAB — INTERPRETATION ECG - MUSE: NORMAL

## 2017-03-03 ENCOUNTER — TELEPHONE (OUTPATIENT)
Dept: CARDIOLOGY | Facility: CLINIC | Age: 72
End: 2017-03-03

## 2017-03-03 NOTE — TELEPHONE ENCOUNTER
Pt called to report that she is having issues getting albert money through the [employer] for her praluent. She also spoke to [facility] and they do not provide aid for medicare patients. Pt was told to call [facility] Friday 3/10 to see if any albert money is available. I told pt I will send update to  to see if he is ok with pt switching to repatha if she is unable to get assistance for the praluent. Patrick STOVER

## 2017-03-03 NOTE — TELEPHONE ENCOUNTER
Patient called and left  advising that her Left Wrist was black and blue and still painful s/p Heart Cath on 2/24/17(patient had failed left radial approach and was subsequently accessed via R fem artery). RN called patient to inquire further regarding her symptoms. Patient denied being febrile, denied warmth or redness to the left wrist, denied firmness around the puncture site, and denied sensation loss, numbness, coolness, or tingling to her left hand and fingers. RN advised patient that black and blue bruising is expected and that healing can time can vary based on individual patient. RN advised patient to minimize use of Left wrist until symptoms resolve. RN reviewed with patient that she should call clinic if she becomes febrile (>101), develops redness or warmth to the left wrist, experiences sensation loss, extended periods of coolness, or numbness to the left hand or fingers, or if skin surrounding puncture site on left wrist becomes firm. Patient acknowledged understanding and agreed with plan.

## 2017-03-07 ENCOUNTER — TELEPHONE (OUTPATIENT)
Dept: CARDIOLOGY | Facility: CLINIC | Age: 72
End: 2017-03-07

## 2017-03-07 ENCOUNTER — OFFICE VISIT (OUTPATIENT)
Dept: CARDIOLOGY | Facility: CLINIC | Age: 72
End: 2017-03-07
Payer: COMMERCIAL

## 2017-03-07 VITALS — HEART RATE: 62 BPM | HEIGHT: 68 IN | DIASTOLIC BLOOD PRESSURE: 60 MMHG | SYSTOLIC BLOOD PRESSURE: 104 MMHG

## 2017-03-07 DIAGNOSIS — I25.10 CORONARY ARTERY DISEASE INVOLVING NATIVE CORONARY ARTERY OF NATIVE HEART WITHOUT ANGINA PECTORIS: ICD-10-CM

## 2017-03-07 DIAGNOSIS — I10 ESSENTIAL HYPERTENSION: ICD-10-CM

## 2017-03-07 DIAGNOSIS — M25.532 LEFT WRIST PAIN: Primary | ICD-10-CM

## 2017-03-07 DIAGNOSIS — E78.5 HYPERLIPIDEMIA, UNSPECIFIED HYPERLIPIDEMIA TYPE: ICD-10-CM

## 2017-03-07 PROCEDURE — 99215 OFFICE O/P EST HI 40 MIN: CPT | Performed by: PHYSICIAN ASSISTANT

## 2017-03-07 NOTE — LETTER
3/7/2017    Gaye Zimmer  [facility] [place]   [address]  [facility] [address]    RE: Sarah Longo       Dear Colleague,    I had the pleasure of seeing Sarah Longo in the [facility].    PRIMARY CARDIOLOGIST:  Dr. Feroz Burgos.      REASON FOR CLINIC VISIT:   Left wrist pain     Sarah is a very delightful 71-year-old female with past medical history notable for CAD (history of MAYA to the RCA in 2004, received trifurcation stenting to the OM2 in 2016, she redeveloped chest discomfort in 11/2016 requiring repeat coronary angiogram with intervention to proximal OM with bifurcation stenting, more recently on 02/24/2017 the patient was set up for coronary angiogram for anginal symptoms.  Her previous stents were patent.  She was noted to have 50% lesion in the PDA with FFR of 0.86 and 50% in the mid and distal LAD as well as 80% in the apical LAD which were all noted to be unchanged since her last angiogram.  Continued medical management was recommended for patient's 3-vessel coronary artery disease), dyslipidemia (intolerant to statins, on Praluent), hypertension, type 2 diabetes, and type 1 Brugada pattern which was provoked by fever and possible Lamictal use (refer to Dr. Nation' clinic note on 07/02/2016).      Rayna presents to clinic today for left wrist pain as well as cold sensation to her left hand.  Rayna states that she is still somewhat tender on the left wrist.  She also notices that her fingers will get really cold at times. Not necessarily triggered by cold air. She has noticed her skin turning white. The patient worries that there may be something wrong with her left wrist since the angiogram, and she would like to have it evaluated.  She denies any numbness, or tingling.  She denies any drainage or bleeding at at insertion site.  She denies any weakness to the left wrist.  The patient has no documented history of Raynaud's phenomenon.  She is on the  smallest dose of Bystolic, which she has been tolerating well.      CURRENT CARDIAC MEDICATIONS:   1.  Lisinopril 2.5 mg daily.   2.  Bystolic 2.5 mg daily.   3.  Imdur 45 mg daily.   4.  Plavix 75 mg daily.   5.  Nitrostat 0.4 mg as needed for chest discomfort.   6.  Aspirin 81 mg daily.      The remainder of her allergies, social history and review of systems were reviewed and as are documented separately.      VITAL SIGNS:  Blood pressure 104/60 mmHg, pulse 62 beats per minute.  Weight not assessed today.      PHYSICAL EXAMINATION:   GENERAL:  NAD.   RESPIRATORY:  Clear to auscultation bilaterally.   CARDIAC:  Regular rate and rhythm without murmurs, clicks or gallop.   SKIN:  Very Mild ecchymosis at the left radial site. No evidence of hematoma. No skin discoloration of either hand.   VASCULAR:  1+ left radial pulse, no bruit, 2+ right radial pulse.  Capillary refill is less than 2 seconds on both hands.       ASSESSMENT AND PLAN:   Rayna is a delightful 71-year-old female who recently had coronary angiogram for evaluation of anginal symptoms.  Her previous stents were noted to be widely patient.  She was noted to have moderate lesions without any significant changes compared to previous studies.  No intervention was performed.  She was recommended with continued medical management for her 3-vessel coronary artery disease.  Left radial approach was attempted initially.  Approach was switched to femoral site as the wire could not be advanced through the Left radial artery.      Rayna's past medical history is notable for CAD with multiple stentings since 2005, hypertension, hyperlipidemia, hypothyroidism, diabetes type 2, Brugada type 1 pattern that was thought to be provoked by fever and possible use of Lamictal.        Rayna presents to Cardiology Clinic today for continued left wrist discomfort as well as intermittent cold sensation to her left fingers.  On exam, she has intact radial pulses, but on the left side  it is noted to be 1+.  Capillary refill is normal.  I do not appreciate any evidence of hematoma.  No bruit.  No numbness or tingling or weakness to the left hand.  At this time, I do not think she has significant injury to her left wrist from the angiogram.  I suspect she continues to have discomfort as multiple attempts were made to access through the Left radial artery per cath report. The patient later did admit that her discomfort has improved partially since the angiogram.  At this time, we will continue to watch this.     In regards to intermittent cold sensation to her left hand, I suspect this may represent Raynaud's phenomenon.  The patient has not had this issue in the past.  She states that it became more of a concern in the last few months.  Her recent TSH has been normal.  She is on the smallest dose of Bystolic.  She was on a different beta blockade agent in the past, and this was discontinued due to profound fatigue.     At this time, we will continue to monitor her symptoms.  She does have followup with my colleague, Karen Alexander, on 03/16/2017.  If the cold sensation to her left hand continues or gets worse, we can certainly try stopping Bystolic for the short term to evaluate for any improvement. Patient stated the cold sensation is not too bothersome. The patient verbalized understanding of this.      I also reviewed the case with Dr. Lee today, who did not recommend any further evaluation of her left wrist discomfort.      Rayna verbalized understanding of the care plan today, and she is in agreement with it.       Thank you for allowing me to participate in the care of this delightful patient today.     Sincerely,    Cynthia Huff PA-C     [facility]

## 2017-03-07 NOTE — MR AVS SNAPSHOT
After Visit Summary   3/7/2017    Sarah Longo    MRN: [id number]           Patient Information     Date Of Birth          1945        Visit Information        Provider Department      3/7/2017 1:50 PM Cynthia Huff PA-C [facility]        Today's Diagnoses     Left wrist pain    -  1    Coronary artery disease involving native coronary artery of native heart without angina pectoris        Essential hypertension        Hyperlipidemia, unspecified hyperlipidemia type          Care Instructions    Today's Plan:   1. It is expected for you to have some Left wrist tenderness as they tried to go in through your wrist artery multiple times. This probably irritated the tissue around your wrist artery. You have good blood flow to your Left hand. Let's continue to monitor this. Dr. Lee is in agreement with me. If the pain gets worse, give us a call.     2. Let us know if you continue to have coolness or discoloration to your fingers. We can try to stop your Bystolic to see if this improves it.     3. Contact your endocrinologist about your thyroid medication questions.     4. Come back to see my colleague, Karen Alexander, on 3/16/17.     If you have questions or concerns please call my nurse at [phone].     Scheduling phone number: [phone]  Reminder: Please bring in all current medications, over the counter supplements and vitamin bottles to your next appointment.    It was a pleasure seeing you today!     Cynthia Huff  3/7/2017            Follow-ups after your visit        Your next 10 appointments already scheduled     Mar 16, 2017  3:10 PM CDT   Return Visit with Karen Alexander PA-C   [facility] ([facility])    [address] [place] [id number]   [phone]            May 17, 2017  8:45 AM CDT   Return Visit with Feroz Burgos MD   [facility]  "[facility] [place] ([facility])    [address]  [place] [address] [phone]            May 23, 2017 10:00 AM CDT   PHYSICAL with Shireen Neves MD   [facility] [place] ([facility] [place])    [address]  [address]  [place] [address]   [phone]            May 23, 2017 10:30 AM CDT   MA SCREENING DIGITAL BILATERAL with WEMA1   [facility] [place] ([facility])    [address]  [place] [address]   [phone]           Do not use any powder, lotion or deodorant under your arms or on your breast. If you do, we will ask you to remove it before your exam.  Wear comfortable, two-piece clothing.  If you have any allergies, tell your care team.  Bring any previous mammograms from other facilities or have them mailed to the breast center.              Who to contact     If you have questions or need follow up information about today's clinic visit or your schedule please contact [facility] [place] directly at [phone].  Normal or non-critical lab and imaging results will be communicated to you by MyChart, letter or phone within 4 business days after the clinic has received the results. If you do not hear from us within 7 days, please contact the clinic through Skinfix[employer] or phone. If you have a critical or abnormal lab result, we will notify you by phone as soon as possible.  Submit refill requests through VeriTran or call your pharmacy and they will forward the refill request to us. Please allow 3 business days for your refill to be completed.          Additional Information About Your Visit        VeriTran Information     VeriTran lets you send messages to your doctor, view your test results, renew your prescriptions, schedule appointments and more. To sign up, go to [email or web address] . Click on \"Log in\" on the left side of the screen, " "which will take you to the Welcome page. Then click on \"Sign up Now\" on the right side of the page.     You will be asked to enter the access code listed below, as well as some personal information. Please follow the directions to create your username and password.     Your access code is: [email or web address]  Expires: 2017  4:20 PM     Your access code will  in 90 days. If you need help or a new code, please call your [place] clinic or [phone].        Care EveryWhere ID     This is your Care EveryWhere ID. This could be used by other organizations to access your [place] medical records  [id number]        Your Vitals Were     Pulse Height                62 1.727 m (5' 8\")           Blood Pressure from Last 3 Encounters:   17 104/60   17 121/71   17 118/62    Weight from Last 3 Encounters:   17 78.5 kg (173 lb)   17 78.8 kg (173 lb 12.8 oz)   02/15/17 79.4 kg (175 lb)              Today, you had the following     No orders found for display         Today's Medication Changes          These changes are accurate as of: 3/7/17  4:20 PM.  If you have any questions, ask your nurse or doctor.               These medicines have changed or have updated prescriptions.        Dose/Directions    isosorbide mononitrate 60 MG 24 hr tablet   Commonly known as:  IMDUR   This may have changed:  additional instructions   Used for:  Stable angina (H)        Dose:  60 mg   Take 1 tablet (60 mg) by mouth daily   Quantity:  90 tablet   Refills:  3                Primary Care Provider Office Phone # Fax #    Gaye NIVIA Salgadoheidi [phone]       [facility] [address] [address]        Thank you!     Thank you for choosing [facility] [place]  for your care. Our goal is always to provide you with excellent care. Hearing back from our patients is one way we can continue to improve our services. Please take a few minutes to complete the " written survey that you may receive in the mail after your visit with us. Thank you!             Your Updated Medication List - Protect others around you: Learn how to safely use, store and throw away your medicines at www.disposemymeds.org.          This list is accurate as of: 3/7/17  4:20 PM.  Always use your most recent med list.                   Brand Name Dispense Instructions for use    alirocumab 75 MG/ML injectable pen    PRALUENT    2 mL    Inject 1 mL (75 mg) Subcutaneous every 14 days       amoxicillin 500 MG capsule    AMOXIL     PRN before dental work       aspirin 81 MG tablet      1 tab po QD (Once per day)       cholecalciferol 2000 UNITS Caps      Take 1 capsule by mouth daily       CINNAMON PO      Take 2 capsules by mouth 2 times daily       clopidogrel 75 MG tablet    PLAVIX    90 tablet    Take 1 tablet (75 mg) by mouth daily       Co Enzyme Q-10 50 MG Caps      daliy       glimepiride 4 MG tablet    AMARYL     1 tablet BID       Gymnema Sylvestris Leaf Powd      500 mg 2 times daily       isosorbide mononitrate 60 MG 24 hr tablet    IMDUR    90 tablet    Take 1 tablet (60 mg) by mouth daily       JENTADUETO 2.5-1000 MG per tablet   Generic drug:  linagliptin-metFORMIN      Take 1 tablet by mouth 2 times daily (with meals)       lisinopril 2.5 MG tablet    PRINIVIL/Zestril    90 tablet    Take 1 tablet (2.5 mg) by mouth daily       mirabegron 50 MG 24 hr tablet    MYRBETRIQ    90 tablet    Take 1 tablet (50 mg) by mouth daily       nebivolol 2.5 MG tablet    BYSTOLIC    90 tablet    Take 1 tablet (2.5 mg) by mouth daily       nitroglycerin 0.4 MG sublingual tablet    NITROSTAT    25 tablet    Place 1 tablet (0.4 mg) under the tongue every 5 minutes as needed for chest pain If symptoms persist after 3 doses (15 minutes) call 911.       NONFORMULARY      Tumeric 500 mg bid       OMEGA-3 FISH OIL PO      Take 2,400 mg by mouth 2 times daily (with meals)       PANTOPRAZOLE SODIUM PO      Take 20  mg by mouth 2 times daily (before meals)       RESTASIS 0.05 % ophthalmic emulsion   Generic drug:  cycloSPORINE      twice daily       SALONPAS 1.2-5.7-6.3 % Ptch   Generic drug:  Camphor-Menthol-Methyl Sal      Patient uses 3-4 patches daily       STATIN NOT PRESCRIBED (INTENTIONAL)     0 each    1 each At Bedtime Statin not prescribed intentionally due to Allergy to statin       SYNTHROID 125 MCG tablet   Generic drug:  levothyroxine      1 tablet daily

## 2017-03-07 NOTE — PATIENT INSTRUCTIONS
Today's Plan:   1. It is expected for you to have some Left wrist tenderness as they tried to go in through your wrist artery multiple times. This probably irritated the tissue around your wrist artery. You have good blood flow to your Left hand. Let's continue to monitor this. Dr. Lee is in agreement with me. If the pain gets worse, give us a call.     2. Let us know if you continue to have coolness or discoloration to your fingers. We can try to stop your Bystolic to see if this improves it.     3. Contact your endocrinologist about your thyroid medication questions.     4. Come back to see my colleague, Karen Alexander, on 3/16/17.     If you have questions or concerns please call my nurse at [phone].     Scheduling phone number: [phone]  Reminder: Please bring in all current medications, over the counter supplements and vitamin bottles to your next appointment.    It was a pleasure seeing you today!     Cynthia Huff  3/7/2017

## 2017-03-07 NOTE — TELEPHONE ENCOUNTER
"Patient called and left VM today advising that she was experiencing left hand \"coolness and white and blue discoloration.\" Dr. Burgos advised of patients symptoms and recommended patient be seen today in clinic by MARK. RN called patient and advised what Dr. Burgos recommended. Patient agreeable to plan. Patient scheduled to see MARK Cynthia Quintanilla at 1:50pm.   "

## 2017-03-07 NOTE — PROGRESS NOTES
HPI and Plan:   See dictation. Confirmation # [id number].    Orders this Visit:  No orders of the defined types were placed in this encounter.    No orders of the defined types were placed in this encounter.    There are no discontinued medications.      No diagnosis found.    CURRENT MEDICATIONS:  Current Outpatient Prescriptions   Medication Sig Dispense Refill     Camphor-Menthol-Methyl Sal (SALONPAS) 1.2-5.7-6.3 % PTCH Patient uses 3-4 patches daily       linagliptin-metFORMIN (JENTADUETO) 2.5-1000 MG per tablet Take 1 tablet by mouth 2 times daily (with meals)       PANTOPRAZOLE SODIUM PO Take 20 mg by mouth 2 times daily (before meals)        lisinopril (PRINIVIL/ZESTRIL) 2.5 MG tablet Take 1 tablet (2.5 mg) by mouth daily 90 tablet 3     nebivolol (BYSTOLIC) 2.5 MG tablet Take 1 tablet (2.5 mg) by mouth daily 90 tablet 3     isosorbide mononitrate (IMDUR) 60 MG 24 hr tablet Take 1 tablet (60 mg) by mouth daily (Patient taking differently: Take 60 mg by mouth daily Patient has been taking 45 mg) 90 tablet 3     cholecalciferol 2000 UNITS CAPS Take 1 capsule by mouth daily       amoxicillin (AMOXIL) 500 MG capsule PRN before dental work       NONFORMULARY Tumeric 500 mg bid       clopidogrel (PLAVIX) 75 MG tablet Take 1 tablet (75 mg) by mouth daily 90 tablet 3     mirabegron (MYRBETRIQ) 50 MG 24 hr tablet Take 1 tablet (50 mg) by mouth daily 90 tablet 3     Gymnema Sylvestris Leaf POWD 500 mg 2 times daily        nitroglycerin (NITROSTAT) 0.4 MG SL tablet Place 1 tablet (0.4 mg) under the tongue every 5 minutes as needed for chest pain If symptoms persist after 3 doses (15 minutes) call 911. 25 tablet 3     alirocumab (PRALUENT) 75 MG/ML injectable pen Inject 1 mL (75 mg) Subcutaneous every 14 days 2 mL 11     STATIN NOT PRESCRIBED, INTENTIONAL, 1 each At Bedtime Statin not prescribed intentionally due to Allergy to statin 0 each 0     Omega-3 Fatty Acids (OMEGA-3 FISH OIL PO) Take 2,400 mg by mouth 2 times daily  (with meals)       CINNAMON PO Take 2 capsules by mouth 2 times daily       GLIMEPIRIDE 4 MG OR TABS 1 tablet BID       SYNTHROID 125 MCG OR TABS 1 tablet daily       RESTASIS 0.05 % OP EMUL twice daily       CO ENZYME Q-10 50 MG OR CAPS daliy  0     ASPIRIN 81 MG OR TABS 1 tab po QD (Once per day)         ALLERGIES     Allergies   Allergen Reactions     No Known Drug Allergies        PAST MEDICAL HISTORY:  Past Medical History   Diagnosis Date     Coronary artery disease 4/28/2016     PTCA with MAYA x 2 to OM1 and MAYA x 1 to p.LAD (4/21/2016 at [place]); PTCA with intracoronary stent placement of RCA June 2004; MAYA to OM1 11/2016     Cystocele, midline 09/29/2010     Depression      HYPERPLASTIC  POLYP       colonoscopy in 5-10 yrs.     Hypothyroidism      hypothyroid     OAB (overactive bladder)      Osteoarthritis      Other and unspecified hyperlipidemia      Postmenopausal bleeding      Shoulder blade pain 5/31/2016     Type II or unspecified type diabetes mellitus without mention of complication, not stated as uncontrolled      Dr. Majano     Unspecified essential hypertension      Urinary frequency 9/29/10     followed by urology. no benefit from detrol or sanctura. month trial of macrobid and flomax 10/10       PAST SURGICAL HISTORY:  Past Surgical History   Procedure Laterality Date     C ct angiography coronary  1/2005     mod soft plaque LAD and Cx, follow up with left heart cath     C ligate fallopian tube       Hc removal of tonsils,13+ y/o       Surgical history of -        Uterine endometrial ablation     Excise lesion upper extremity  6/5/2013     Procedure: EXCISE LESION UPPER EXTREMITY;  EXCISION RIGHT ARM ANTICUBITAL LIPOMA ;  Surgeon: Jayson Joe MD;  Location: [facility]     Colonoscopy       Remove stimulator sacral nerve N/A 11/2/2015     Procedure: REMOVE STIMULATOR SACRAL NERVE;  Surgeon: Gertruids Frausto MD;  Location: [facility]     Heart cath left heart cath  3/2/2016     No  intervention - aggressive medical management     Heart cath left heart cath  2016      MAYA x 2 to OM1, MAYA to LAD, at [place]     Knee surgery  4/20/10     right knee replacement     Heart cath left heart cath  2004     MAYA to RCA     Heart cath left heart cath  3/28/2002     Mild to moderate 3 vessel CAD. Medical management recommended.      Heart cath left heart cath  2016     MAYA to OM1       FAMILY HISTORY:  Family History   Problem Relation Age of Onset     C.A.D. Father      MI , age 83, had high lipids     Hyperlipidemia Father      Hypertension Father      Coronary Artery Disease Father      Hypertension Mother      Genitourinary Problems Mother      renal failure     Fractures Mother      hip     OSTEOPOROSIS Mother      CEREBROVASCULAR DISEASE Maternal Grandfather      cerebral hemorrhage     DIABETES Maternal Uncle      Cancer - colorectal Maternal Aunt      DIABETES Maternal Grandmother        SOCIAL HISTORY:  Social History     Social History     Marital status:      Spouse name: Kwadwo     Number of children: 3     Years of education: N/A     Occupational History     PT Aide None       Retired     Social History Main Topics     Smoking status: Never Smoker     Smokeless tobacco: None     Alcohol use No     Drug use: No     Sexual activity: Yes     Partners: Male     Birth control/ protection: Post-menopausal     Other Topics Concern     Caffeine Concern Yes     6-7 cups caffeine per day     Sleep Concern No     Stress Concern Yes     Weight Concern No     Special Diet No     eats healthy      Exercise Yes      walking & active life style      Parent/Sibling W/ Cabg, Mi Or Angioplasty Before 65f 55m? No     Social History Narrative       Review of Systems:  Skin:  Negative     Eyes:  Positive for glasses  ENT:  Negative    Respiratory:  Positive for dyspnea on exertion (going up and down stairs)  Cardiovascular:  Negative    Gastroenterology: Negative    Genitourinary:   "Negative    Musculoskeletal:  Positive for back pain;arthritis  Neurologic:  Negative    Psychiatric:  Positive for anxiety  Heme/Lymph/Imm:  Negative    Endocrine:  Positive for diabetes    Physical Exam:  Vitals: /60  Pulse 62  Ht 1.727 m (5' 8\")   Please refer to dictation for physical exam    Recent Lab Results:  LIPID RESULTS:  Lab Results   Component Value Date    CHOL 148 09/27/2016    HDL 70 09/27/2016    LDL 64 09/27/2016    TRIG 69 09/27/2016    CHOLHDLRATIO 4.2 10/08/2015    CHOLHDLRATIO 2.6 03/23/2015       LIVER ENZYME RESULTS:  Lab Results   Component Value Date    AST 17 10/08/2015    ALT 7 09/27/2016       CBC RESULTS:  Lab Results   Component Value Date    WBC 4.4 02/24/2017    RBC 3.93 02/24/2017    HGB 12.1 02/24/2017    HCT 36.0 02/24/2017    MCV 92 02/24/2017    MCH 30.8 02/24/2017    MCHC 33.6 02/24/2017    RDW 12.9 02/24/2017     02/24/2017       BMP RESULTS:  Lab Results   Component Value Date     02/24/2017    POTASSIUM 4.6 02/24/2017    CHLORIDE 111 (H) 02/24/2017    CO2 26 02/24/2017    ANIONGAP 5 02/24/2017     (H) 02/24/2017    GLC 89 05/11/2012    BUN 15 02/24/2017    BUN NOT APPLICABLE 05/11/2012    CR 0.64 02/24/2017    CR 0.74 11/01/2004    GFRESTIMATED >90  Non  GFR Calc   02/24/2017    GFRESTBLACK >90   GFR Calc   02/24/2017    LUCIUS 9.1 02/24/2017        A1C RESULTS:  Lab Results   Component Value Date    A1C 7.8 (A) 10/08/2015       INR RESULTS:  Lab Results   Component Value Date    INR 0.94 02/24/2017    INR 0.90 11/22/2016         Cynthia Huff PA-C   3/7/2017  Pager: [phone]          "

## 2017-03-08 NOTE — PROGRESS NOTES
PRIMARY CARDIOLOGIST:  Dr. Feroz Burgos.      REASON FOR CLINIC VISIT:   Left wrist pain      HISTORY OF PRESENT ILLNESS:     Sarah is a very delightful 71-year-old female with past medical history notable for CAD (history of MAYA to the RCA in 2004, received trifurcation stenting to the OM2 in 2016, she redeveloped chest discomfort in 11/2016 requiring repeat coronary angiogram with intervention to proximal OM with bifurcation stenting, more recently on 02/24/2017 the patient was set up for coronary angiogram for anginal symptoms.  Her previous stents were patent.  She was noted to have 50% lesion in the PDA with FFR of 0.86 and 50% in the mid and distal LAD as well as 80% in the apical LAD which were all noted to be unchanged since her last angiogram.  Continued medical management was recommended for patient's 3-vessel coronary artery disease), dyslipidemia (intolerant to statins, on Praluent), hypertension, type 2 diabetes, and type 1 Brugada pattern which was provoked by fever and possible Lamictal use (refer to Dr. Nation' clinic note on 07/02/2016).      Rayna presents to clinic today for left wrist pain as well as cold sensation to her left hand.  Rayna states that she is still somewhat tender on the left wrist.  She also notices that her fingers will get really cold at times. Not necessarily triggered by cold air. She has noticed her skin turning white. The patient worries that there may be something wrong with her left wrist since the angiogram, and she would like to have it evaluated.  She denies any numbness, or tingling.  She denies any drainage or bleeding at at insertion site.  She denies any weakness to the left wrist.  The patient has no documented history of Raynaud's phenomenon.  She is on the smallest dose of Bystolic, which she has been tolerating well.      CURRENT CARDIAC MEDICATIONS:   1.  Lisinopril 2.5 mg daily.   2.  Bystolic 2.5 mg daily.   3.  Imdur 45 mg daily.   4.  Plavix 75 mg daily.   5.   Nitrostat 0.4 mg as needed for chest discomfort.   6.  Aspirin 81 mg daily.      The remainder of her allergies, social history and review of systems were reviewed and as are documented separately.      VITAL SIGNS:  Blood pressure 104/60 mmHg, pulse 62 beats per minute.  Weight not assessed today.      PHYSICAL EXAMINATION:   GENERAL:  NAD.   RESPIRATORY:  Clear to auscultation bilaterally.   CARDIAC:  Regular rate and rhythm without murmurs, clicks or gallop.   SKIN:  Very Mild ecchymosis at the left radial site. No evidence of hematoma. No skin discoloration of either hand.   VASCULAR:  1+ left radial pulse, no bruit, 2+ right radial pulse.  Capillary refill is less than 2 seconds on both hands.       ASSESSMENT AND PLAN:   Rayna is a delightful 71-year-old female who recently had coronary angiogram for evaluation of anginal symptoms.  Her previous stents were noted to be widely patient.  She was noted to have moderate lesions without any significant changes compared to previous studies.  No intervention was performed.  She was recommended with continued medical management for her 3-vessel coronary artery disease.  Left radial approach was attempted initially.  Approach was switched to femoral site as the wire could not be advanced through the Left radial artery.      Rayna's past medical history is notable for CAD with multiple stentings since 2005, hypertension, hyperlipidemia, hypothyroidism, diabetes type 2, Brugada type 1 pattern that was thought to be provoked by fever and possible use of Lamictal.        Rayna presents to Cardiology Clinic today for continued left wrist discomfort as well as intermittent cold sensation to her left fingers.  On exam, she has intact radial pulses, but on the left side it is noted to be 1+.  Capillary refill is normal.  I do not appreciate any evidence of hematoma.  No bruit.  No numbness or tingling or weakness to the left hand.  At this time, I do not think she has  significant injury to her left wrist from the angiogram.  I suspect she continues to have discomfort as multiple attempts were made to access through the Left radial artery per cath report. The patient later did admit that her discomfort has improved partially since the angiogram.  At this time, we will continue to watch this.     In regards to intermittent cold sensation to her left hand, I suspect this may represent Raynaud's phenomenon.  The patient has not had this issue in the past.  She states that it became more of a concern in the last few months.  Her recent TSH has been normal.  She is on the smallest dose of Bystolic.  She was on a different beta blockade agent in the past, and this was discontinued due to profound fatigue.     At this time, we will continue to monitor her symptoms.  She does have followup with my colleague, Karen Alexander, on 2017.  If the cold sensation to her left hand continues or gets worse, we can certainly try stopping Bystolic for the short term to evaluate for any improvement. Patient stated the cold sensation is not too bothersome. The patient verbalized understanding of this.      I also reviewed the case with Dr. Lee today, who did not recommend any further evaluation of her left wrist discomfort.      Rayna verbalized understanding of the care plan today, and she is in agreement with it.       Thank you for allowing me to participate in the care of this delightful patient today.         CHELY SANCHEZ PA-C             D: 2017 14:46   T: 2017 02:06   MT: LD      Name:     KATHY PERRY   MRN:      [id number]        Account:      [id number]   :      1945           Service Date: 2017      Document: [id number]

## 2017-03-10 ENCOUNTER — TELEPHONE (OUTPATIENT)
Dept: CARDIOLOGY | Facility: CLINIC | Age: 72
End: 2017-03-10

## 2017-03-10 DIAGNOSIS — E78.5 HYPERLIPIDEMIA LDL GOAL <100: Primary | ICD-10-CM

## 2017-03-10 NOTE — TELEPHONE ENCOUNTER
Received message from patient that she has been communicating with the [employer] to continue using the rest of her albert for the use of Praluent. At this point she is quite frustrated because she isnt' getting anywhere. I contacted patient to discuss the possibility of switching from Praluent to Repatha. I explained that  gave his okay to do this. I also explained that she may get better coverage for the medication through her insurance. Pt currently has [employer] with medicare part D. I discussed that if ARE approves the medication and the co-pay is high she may be eligible for funding assistance through the [employer]. Pt was quite surprise and more than willing to switch over to Repatha. Her coverage for Praluent will end the first week of April. I told her I would submit Repatha script for [place] and contact her when I hear back. I will send her some information on Repatha. Will plan on touching base with patient when I hear back from  Specialty

## 2017-03-14 ENCOUNTER — TELEPHONE (OUTPATIENT)
Dept: CARDIOLOGY | Facility: CLINIC | Age: 72
End: 2017-03-14

## 2017-03-16 ENCOUNTER — OFFICE VISIT (OUTPATIENT)
Dept: CARDIOLOGY | Facility: CLINIC | Age: 72
End: 2017-03-16
Payer: COMMERCIAL

## 2017-03-16 VITALS — SYSTOLIC BLOOD PRESSURE: 106 MMHG | DIASTOLIC BLOOD PRESSURE: 66 MMHG | HEIGHT: 68 IN | HEART RATE: 68 BPM

## 2017-03-16 DIAGNOSIS — E78.2 MIXED HYPERLIPIDEMIA: ICD-10-CM

## 2017-03-16 DIAGNOSIS — I10 ESSENTIAL HYPERTENSION: Primary | ICD-10-CM

## 2017-03-16 DIAGNOSIS — I25.10 CORONARY ARTERY DISEASE INVOLVING NATIVE CORONARY ARTERY OF NATIVE HEART WITHOUT ANGINA PECTORIS: ICD-10-CM

## 2017-03-16 PROCEDURE — 99214 OFFICE O/P EST MOD 30 MIN: CPT | Performed by: PHYSICIAN ASSISTANT

## 2017-03-16 NOTE — MR AVS SNAPSHOT
After Visit Summary   3/16/2017    Sarah Longo    MRN: [id number]           Patient Information     Date Of Birth          1945        Visit Information        Provider Department      3/16/2017 3:10 PM More, Karen Torres PA-C [facility] [place]        Today's Diagnoses     Essential hypertension    -  1    Mixed hyperlipidemia        Coronary artery disease involving native coronary artery of native heart without angina pectoris          Care Instructions    Thanks for coming into [facility] clinic today.    We discussed: I'm glad your angiogram look good.   Please discuss with your endocrinologist about what your insulin regimen would look like if you did it.  It may not be as bad as you think it would be.      Tell your granddaughter that she should look into getting a new insulin pump.  She should also ask who gets pancreas transplant or islet cells.      Medication changes:  Continue current medications.      Follow up: as scheduled with Dr. Burgos.        Please call my nurse at [phone] with any questions or concerns.    Scheduling phone number: [phone]  Reminder: Please bring in all current medications, over the counter supplements and vitamin bottles to your next appointment.              Follow-ups after your visit        Your next 10 appointments already scheduled     May 17, 2017  8:45 AM CDT   Return Visit with Feroz Burgos MD   [facility] [place] ([facility])    [address]  [place] [address] [phone]            May 23, 2017 10:00 AM CDT   PHYSICAL with Shireen Neves MD   [facility] [place] ([facility] [place])    [address]  [address]  [place] [address] [phone]            May 23, 2017 10:30 AM CDT   MA SCREENING DIGITAL BILATERAL with WEMA1   [facility] [place] ([place]  "[place] [facility]    [address]  [place] [address] [phone]           Do not use any powder, lotion or deodorant under your arms or on your breast. If you do, we will ask you to remove it before your exam.  Wear comfortable, two-piece clothing.  If you have any allergies, tell your care team.  Bring any previous mammograms from other facilities or have them mailed to the breast center.              Who to contact     If you have questions or need follow up information about today's clinic visit or your schedule please contact [facility] [place] directly at [phone].  Normal or non-critical lab and imaging results will be communicated to you by MyChart, letter or phone within 4 business days after the clinic has received the results. If you do not hear from us within 7 days, please contact the clinic through [email or web address] or phone. If you have a critical or abnormal lab result, we will notify you by phone as soon as possible.  Submit refill requests through AskBot or call your pharmacy and they will forward the refill request to us. Please allow 3 business days for your refill to be completed.          Additional Information About Your Visit        MyChart Information     AskBot lets you send messages to your doctor, view your test results, renew your prescriptions, schedule appointments and more. To sign up, go to [email or web address] . Click on \"Log in\" on the left side of the screen, which will take you to the Welcome page. Then click on \"Sign up Now\" on the right side of the page.     You will be asked to enter the access code listed below, as well as some personal information. Please follow the directions to create your username and password.     Your access code is: [email or web address]  Expires: 2017  5:20 PM     Your access code will  in 90 days. If you need help or a new code, please call your [place] clinic or [phone].      " "  Care EveryWhere ID     This is your Care EveryWhere ID. This could be used by other organizations to access your [facility] medical records  [id number]        Your Vitals Were     Pulse Height                68 1.727 m (5' 8\")           Blood Pressure from Last 3 Encounters:   03/16/17 106/66   03/07/17 104/60   02/24/17 121/71    Weight from Last 3 Encounters:   02/24/17 78.5 kg (173 lb)   02/21/17 78.8 kg (173 lb 12.8 oz)   02/15/17 79.4 kg (175 lb)              Today, you had the following     No orders found for display         Today's Medication Changes          These changes are accurate as of: 3/16/17  4:08 PM.  If you have any questions, ask your nurse or doctor.               These medicines have changed or have updated prescriptions.        Dose/Directions    evolocumab 140 MG/ML prefilled autoinjector   Commonly known as:  REPATHA SURECLICK   This may have changed:  additional instructions   Used for:  Hyperlipidemia LDL goal <100        Dose:  140 mg   Inject 1 mL (140 mg) Subcutaneous every 14 days   Quantity:  2 mL   Refills:  11       isosorbide mononitrate 60 MG 24 hr tablet   Commonly known as:  IMDUR   This may have changed:  additional instructions   Used for:  Stable angina (H)        Dose:  60 mg   Take 1 tablet (60 mg) by mouth daily   Quantity:  90 tablet   Refills:  3                Primary Care Provider Office Phone # Fax #    Gaye Zimmer [phone]       [place] [address] [address] [facility] [address]        Thank you!     Thank you for choosing [facility] [place]  for your care. Our goal is always to provide you with excellent care. Hearing back from our patients is one way we can continue to improve our services. Please take a few minutes to complete the written survey that you may receive in the mail after your visit with us. Thank you!             Your Updated Medication List - Protect others around you: Learn how to safely " use, store and throw away your medicines at www.disposemymeds.org.          This list is accurate as of: 3/16/17  4:08 PM.  Always use your most recent med list.                   Brand Name Dispense Instructions for use    alirocumab 75 MG/ML injectable pen    PRALUENT    2 mL    Inject 1 mL (75 mg) Subcutaneous every 14 days       amoxicillin 500 MG capsule    AMOXIL     PRN before dental work       aspirin 81 MG tablet      1 tab po QD (Once per day)       cholecalciferol 2000 UNITS Caps      Take 1 capsule by mouth daily       CINNAMON PO      Take 2 capsules by mouth 2 times daily       clopidogrel 75 MG tablet    PLAVIX    90 tablet    Take 1 tablet (75 mg) by mouth daily       Co Enzyme Q-10 50 MG Caps      daliy       evolocumab 140 MG/ML prefilled autoinjector    REPATHA SURECLICK    2 mL    Inject 1 mL (140 mg) Subcutaneous every 14 days       glimepiride 4 MG tablet    AMARYL     1 tablet BID       Gymnema Sylvestris Leaf Powd      500 mg 2 times daily       isosorbide mononitrate 60 MG 24 hr tablet    IMDUR    90 tablet    Take 1 tablet (60 mg) by mouth daily       JENTADUETO 2.5-1000 MG per tablet   Generic drug:  linagliptin-metFORMIN      Take 1 tablet by mouth 2 times daily (with meals)       lisinopril 2.5 MG tablet    PRINIVIL/Zestril    90 tablet    Take 1 tablet (2.5 mg) by mouth daily       mirabegron 50 MG 24 hr tablet    MYRBETRIQ    90 tablet    Take 1 tablet (50 mg) by mouth daily       nebivolol 2.5 MG tablet    BYSTOLIC    90 tablet    Take 1 tablet (2.5 mg) by mouth daily       nitroglycerin 0.4 MG sublingual tablet    NITROSTAT    25 tablet    Place 1 tablet (0.4 mg) under the tongue every 5 minutes as needed for chest pain If symptoms persist after 3 doses (15 minutes) call 911.       NONFORMULARY      Tumeric 500 mg bid       OMEGA-3 FISH OIL PO      Take 2,400 mg by mouth 2 times daily (with meals)       PANTOPRAZOLE SODIUM PO      Take 20 mg by mouth 2 times daily (before meals)        RESTASIS 0.05 % ophthalmic emulsion   Generic drug:  cycloSPORINE      twice daily       SALONPAS 1.2-5.7-6.3 % Ptch   Generic drug:  Camphor-Menthol-Methyl Sal      Patient uses 3-4 patches daily       STATIN NOT PRESCRIBED (INTENTIONAL)     0 each    1 each At Bedtime Statin not prescribed intentionally due to Allergy to statin       SYNTHROID 125 MCG tablet   Generic drug:  levothyroxine      1 tablet daily

## 2017-03-16 NOTE — PATIENT INSTRUCTIONS
Thanks for coming into [facility] clinic today.    We discussed: I'm glad your angiogram look good.   Please discuss with your endocrinologist about what your insulin regimen would look like if you did it.  It may not be as bad as you think it would be.      Tell your granddaughter that she should look into getting a new insulin pump.  She should also ask who gets pancreas transplant or islet cells.      Medication changes:  Continue current medications.      Follow up: as scheduled with Dr. Burgos.        Please call my nurse at [phone] with any questions or concerns.    Scheduling phone number: [phone]  Reminder: Please bring in all current medications, over the counter supplements and vitamin bottles to your next appointment.

## 2017-03-16 NOTE — PROGRESS NOTES
[id number]  HPI and Plan:   See dictation    Orders this Visit:  No orders of the defined types were placed in this encounter.    No orders of the defined types were placed in this encounter.    There are no discontinued medications.      Encounter Diagnoses   Name Primary?     Essential hypertension Yes     Mixed hyperlipidemia      Coronary artery disease involving native coronary artery of native heart without angina pectoris        CURRENT MEDICATIONS:  Current Outpatient Prescriptions   Medication Sig Dispense Refill     evolocumab (REPATHA SURECLICK) 140 MG/ML prefilled autoinjector Inject 1 mL (140 mg) Subcutaneous every 14 days (Patient taking differently: Inject 140 mg Subcutaneous every 14 days Has not started) 2 mL 11     Camphor-Menthol-Methyl Sal (SALONPAS) 1.2-5.7-6.3 % PTCH Patient uses 3-4 patches daily       linagliptin-metFORMIN (JENTADUETO) 2.5-1000 MG per tablet Take 1 tablet by mouth 2 times daily (with meals)       PANTOPRAZOLE SODIUM PO Take 20 mg by mouth 2 times daily (before meals)        lisinopril (PRINIVIL/ZESTRIL) 2.5 MG tablet Take 1 tablet (2.5 mg) by mouth daily 90 tablet 3     nebivolol (BYSTOLIC) 2.5 MG tablet Take 1 tablet (2.5 mg) by mouth daily 90 tablet 3     isosorbide mononitrate (IMDUR) 60 MG 24 hr tablet Take 1 tablet (60 mg) by mouth daily (Patient taking differently: Take 60 mg by mouth daily Patient has been taking 45 mg) 90 tablet 3     cholecalciferol 2000 UNITS CAPS Take 1 capsule by mouth daily       amoxicillin (AMOXIL) 500 MG capsule PRN before dental work       NONFORMULARY Tumeric 500 mg bid       clopidogrel (PLAVIX) 75 MG tablet Take 1 tablet (75 mg) by mouth daily 90 tablet 3     mirabegron (MYRBETRIQ) 50 MG 24 hr tablet Take 1 tablet (50 mg) by mouth daily 90 tablet 3     Gymnema Sylvestris Leaf POWD 500 mg 2 times daily        nitroglycerin (NITROSTAT) 0.4 MG SL tablet Place 1 tablet (0.4 mg) under the tongue every 5 minutes as needed for chest pain If symptoms  persist after 3 doses (15 minutes) call 911. 25 tablet 3     alirocumab (PRALUENT) 75 MG/ML injectable pen Inject 1 mL (75 mg) Subcutaneous every 14 days 2 mL 11     Omega-3 Fatty Acids (OMEGA-3 FISH OIL PO) Take 2,400 mg by mouth 2 times daily (with meals)       CINNAMON PO Take 2 capsules by mouth 2 times daily       GLIMEPIRIDE 4 MG OR TABS 1 tablet BID       SYNTHROID 125 MCG OR TABS 1 tablet daily       RESTASIS 0.05 % OP EMUL twice daily       CO ENZYME Q-10 50 MG OR CAPS daliy  0     ASPIRIN 81 MG OR TABS 1 tab po QD (Once per day)       STATIN NOT PRESCRIBED, INTENTIONAL, 1 each At Bedtime Statin not prescribed intentionally due to Allergy to statin (Patient not taking: Reported on 3/16/2017) 0 each 0       ALLERGIES     Allergies   Allergen Reactions     No Known Drug Allergies        PAST MEDICAL HISTORY:  Past Medical History   Diagnosis Date     Coronary artery disease 4/28/2016     PTCA with MAYA x 2 to OM1 and MAYA x 1 to p.LAD (4/21/2016 at [place]); PTCA with intracoronary stent placement of RCA June 2004; MAYA to OM1 11/2016     Cystocele, midline 09/29/2010     Depression      HYPERPLASTIC  POLYP       colonoscopy in 5-10 yrs.     Hypothyroidism      hypothyroid     OAB (overactive bladder)      Osteoarthritis      Other and unspecified hyperlipidemia      Postmenopausal bleeding      Shoulder blade pain 5/31/2016     Type II or unspecified type diabetes mellitus without mention of complication, not stated as uncontrolled      Dr. Majano     Unspecified essential hypertension      Urinary frequency 9/29/10     followed by urology. no benefit from detrol or sanctura. month trial of macrobid and flomax 10/10       PAST SURGICAL HISTORY:  Past Surgical History   Procedure Laterality Date     C ct angiography coronary  1/2005     mod soft plaque LAD and Cx, follow up with left heart cath     C ligate fallopian tube       Hc removal of tonsils,13+ y/o       Surgical history of -        Uterine endometrial  ablation     Excise lesion upper extremity  2013     Procedure: EXCISE LESION UPPER EXTREMITY;  EXCISION RIGHT ARM ANTICUBITAL LIPOMA ;  Surgeon: Jayson Joe MD;  Location: [facility]     Colonoscopy       Remove stimulator sacral nerve N/A 2015     Procedure: REMOVE STIMULATOR SACRAL NERVE;  Surgeon: Gertrudis Frausto MD;  Location: [facility]     Heart cath left heart cath  3/2/2016     No intervention - aggressive medical management     Heart cath left heart cath  2016      MAYA x 2 to OM1, MAYA to LAD, at [place]     Knee surgery  4/20/10     right knee replacement     Heart cath left heart cath  2004     MAYA to RCA     Heart cath left heart cath  3/28/2002     Mild to moderate 3 vessel CAD. Medical management recommended.      Heart cath left heart cath  2016     MAYA to OM1       FAMILY HISTORY:  Family History   Problem Relation Age of Onset     C.A.D. Father      MI , age 83, had high lipids     Hyperlipidemia Father      Hypertension Father      Coronary Artery Disease Father      Hypertension Mother      Genitourinary Problems Mother      renal failure     Fractures Mother      hip     OSTEOPOROSIS Mother      CEREBROVASCULAR DISEASE Maternal Grandfather      cerebral hemorrhage     DIABETES Maternal Uncle      Cancer - colorectal Maternal Aunt      DIABETES Maternal Grandmother        SOCIAL HISTORY:  Social History     Social History     Marital status:      Spouse name: Kwadwo     Number of children: 3     Years of education: N/A     Occupational History     PT Aide None       Retired     Social History Main Topics     Smoking status: Never Smoker     Smokeless tobacco: None     Alcohol use No     Drug use: No     Sexual activity: Yes     Partners: Male     Birth control/ protection: Post-menopausal     Other Topics Concern     Caffeine Concern Yes     6-7 cups caffeine per day     Sleep Concern No     Stress Concern Yes     Weight Concern No     Special Diet No      "eats healthy      Exercise Yes      walking & active life style      Parent/Sibling W/ Cabg, Mi Or Angioplasty Before 65f 55m? No     Social History Narrative       Review of Systems:  Skin:  Negative     Eyes:  Positive for glasses  ENT:       Respiratory:  Positive for dyspnea on exertion  Cardiovascular:  Negative;palpitations;chest pain;cyanosis;syncope or near-syncope;lightheadedness;dizziness;fatigue;edema    Gastroenterology: Negative    Genitourinary:  not assessed    Musculoskeletal:  Negative for back pain;arthritis  Neurologic:  Negative    Psychiatric:  Positive for sleep disturbances;anxiety  Heme/Lymph/Imm:  Positive for allergies  Endocrine:  Positive for diabetes    Physical Exam:  Vitals: /66 (BP Location: Left arm, Cuff Size: Adult Regular)  Pulse 68  Ht 1.727 m (5' 8\")   Please refer to dictation for physical exam    Recent Lab Results:  LIPID RESULTS:  Lab Results   Component Value Date    CHOL 148 09/27/2016    HDL 70 09/27/2016    LDL 64 09/27/2016    TRIG 69 09/27/2016    CHOLHDLRATIO 4.2 10/08/2015    CHOLHDLRATIO 2.6 03/23/2015       LIVER ENZYME RESULTS:  Lab Results   Component Value Date    AST 17 10/08/2015    ALT 7 09/27/2016       CBC RESULTS:  Lab Results   Component Value Date    WBC 4.4 02/24/2017    RBC 3.93 02/24/2017    HGB 12.1 02/24/2017    HCT 36.0 02/24/2017    MCV 92 02/24/2017    MCH 30.8 02/24/2017    MCHC 33.6 02/24/2017    RDW 12.9 02/24/2017     02/24/2017       BMP RESULTS:  Lab Results   Component Value Date     02/24/2017    POTASSIUM 4.6 02/24/2017    CHLORIDE 111 (H) 02/24/2017    CO2 26 02/24/2017    ANIONGAP 5 02/24/2017     (H) 02/24/2017    GLC 89 05/11/2012    BUN 15 02/24/2017    BUN NOT APPLICABLE 05/11/2012    CR 0.64 02/24/2017    CR 0.74 11/01/2004    GFRESTIMATED >90  Non  GFR Calc   02/24/2017    GFRESTBLACK >90   GFR Calc   02/24/2017    LUCIUS 9.1 02/24/2017        A1C RESULTS:  Lab Results "   Component Value Date    A1C 7.8 (A) 10/08/2015       INR RESULTS:  Lab Results   Component Value Date    INR 0.94 02/24/2017    INR 0.90 11/22/2016           CC  No referring provider defined for this encounter.

## 2017-03-17 NOTE — PROGRESS NOTES
PRIMARY CARDIOLOGIST:  Dr. Feroz Burgos.      REASON FOR VISIT:  Coronary artery disease, angiogram followup.      HISTORY OF PRESENT ILLNESS:  Ms. Longo is a 71-year-old woman with past medical history significant for the followin.  Coronary artery disease with history of stenting back to  where she underwent a Taxus stent to her RCA.  She redeveloped symptoms in the spring of 2016; her anginal equivalent is chest and back discomfort.  She had an abnormal stress test and went for an angiogram where she was found to have a trifurcation lesion of the OM2 as well as the distal LAD lesion.  This was initially managed medically, but she ended up at [place] with trifurcation stents to her superior and main branch of the obtuse marginal and dilated the inferior branch.  She also had an LAD stent at that time.  She developed symptoms again in the  and was taken back to the Cath Lab and found to have a 90% restenosis of proximal OM bifurcation stent in the superior branch.  This was stented.  Unfortunately, she continued to have discomfort and had a stress test showing mild ischemia and went again back for an angiogram; this was .  Here, she was found to have patent stents, negative FFR across her PDA with her ongoing 80% LAD lesion.   2.  Dyslipidemia, intolerant to statins, on Praluent.   3.  Hypertension.   4.  Type 2 diabetes.   5.  Type 1 Brugada pattern provoked by fever and possible Lamictal use (this differs from Brugada syndrome).  Please refer to consult by Dr. Nation 2016.   6.  Pain in her left wrist with difficult access from the radial site.      She comes in today for routine followup.  She was seen by Ms. Daria PA-C, last week for her left wrist pain.  She had good pulses then and the pain was improving and there was no further workup required.  She tells me this week her pain has continued to improve.  Her symptoms overall are okay.  She continues to have coldness in her fingers  and sometimes the fingers get white.  Her cold hands do not really bother her and she does not have to wear gloves in the grocery store.  Her cardiac symptoms are otherwise stable.  She still has dyspnea on exertion but not chest pain at this point.  She has been working without difficulty.  She tells me that her endocrinologist would like her to go on insulin, but she does not want to.  She refuses to tell me her hemoglobin A1c numbers because they are too high.      SOCIAL HISTORY:  She is .  Her 's name is Kwadwo.  They have a dog at home.  She is a lifelong nonsmoker, no alcohol use.  Previously was eating poorly, 3 hamburgers a week, now working on changing her diet.  She is exercising.  She has 3 children, including a daughter who is 44, trying to conceive by in vitro fertilization.  She also has a granddaughter who is 18, who wants to become a PA and is a type 1 diabetic, resistant on going on a pump.      PHYSICAL EXAMINATION:   GENERAL:  Well-developed, well-nourished, chatty woman in no acute distress.   HEENT:  Normocephalic, atraumatic.   HEART:  Regular in rate and rhythm.  I do not appreciate murmur, rub or gallop.  Carotids are without bruit.   LUNGS:  Clear bilaterally.   EXTREMITIES:  Without peripheral edema, 2+ radial and ulnar pulses bilaterally.      ASSESSMENT AND PLAN:   1.  Coronary artery disease with recent angiogram without significant restenosis.  We discussed limiting her risk factors for future MI by appropriate diet, exercise, excellent lipid control and most importantly control of her diabetes.  I did encourage her to consider going on insulin if this would improve her A1c; she is considering.  Otherwise, at this point her anginal symptoms are fairly stable to resolved.   2.  Dyslipidemia, intolerant to statins.  Now switching to Repatha due to cost issues.  Excellent control.  LDL 64, HDL 70, total cholesterol 148.   3.  Hypertension, well-controlled.   4.  Cold hands,  likely secondary to Bystolic adverse effects.  She is fine staying on it now that she knows what she is doing.   5.  Type 2 diabetes.  Again, I strongly recommend if her endocrinologist is recommending insulin to get her A1c under control, I would agree.  I encouraged her to look at what the regimen would look like before deciding absolutely she will not do it.  She is agreeable to that.      Thank you for allowing me to participate in this delightful patient's care.  We will have her follow up as scheduled with Dr. Burgos in May, sooner with concerns.         EDWIN MCNAMARA PA-C             D: 2017 17:06   T: 2017 10:31   MT: al      Name:     KATHY PERRY   MRN:      [id number]        Account:      [id number]   :      1945           Service Date: 2017      Document: [id number]

## 2017-03-21 ENCOUNTER — TELEPHONE (OUTPATIENT)
Dept: CARDIOLOGY | Facility: CLINIC | Age: 72
End: 2017-03-21

## 2017-03-21 NOTE — TELEPHONE ENCOUNTER
Received message from patient asking for a 90 supply of Praluent. She was informed by [facility] that her prescription  on 3/10/17. Pt said she still has albert funds from the [employer] and would like to use it up. I contacted [facility] pharmacy and gave a verbal order to pharmacist (Megan) for 90 day supply. Pt is in the process of switching to Repatha (this was okay'd by ), however, the prescription was just submitted on 3/10, approval/denial is pending at this point. I contacted patient to let her know I called in a verbal order for her Praluent, she gave verbal understanding that she does not have any refills after the 90 days. I explained that I will contact her once Repatha is approved or denied. Left message for Joi Waggoner at  Specialty pharmacy to find out the status of Repatha application. Awaiting return call.

## 2017-03-23 ENCOUNTER — TELEPHONE (OUTPATIENT)
Dept: CARDIOLOGY | Facility: CLINIC | Age: 72
End: 2017-03-23

## 2017-03-23 NOTE — TELEPHONE ENCOUNTER
[facility] Prior Authorization Team   Phone: [phone]  Fax: [phone]    PA Initiation    Medication: Repatha  Insurance Company: [employer] - Phone [phone] Fax [phone]  Pharmacy Filling the Rx: [facility] - [place], [place] - [address]  Filling Pharmacy Phone: [phone]  Filling Pharmacy Fax: [phone]  Start Date: 3/23/2017

## 2017-03-23 NOTE — TELEPHONE ENCOUNTER
Returning patient's call. Pt stated that [facility] did not honor the 90 day supply of Praluent, however, they did honor it for one month. Pt said this will at least buy her a month's time until she hears from [facility] for approval/denial for Repatha. She was hoping to use the rest of her PAN foundation fund to cover her for 90 days. I told her I will contact the  Specialty pharmacy to find out where we are in the process. I explained to her that if Repatha is denied we will proceed with an appeal letter. I will update patient when I hear back from  Specialty pharmacy.

## 2017-03-27 NOTE — TELEPHONE ENCOUNTER
PRIOR AUTHORIZATION DENIED    Medication: Repatha - Denied    Denial Date: 3/25/2017    Denial Rational: Missing information: baseline lipid panel, high intensity statins tried and failed and adverse side effects experienced with statins. Note: Lipid panel submitted included all results from 3/21/2007 to present and tried/failed included in initial request were: Crestor 5mg, Lipitor 20mg daily, Caduet, Zetia 10mg daily, Welchol - all caused muscle aches.     Appeal Information: Appeals can can be done over the phone by calling [phone] be faxed to:    [facility]  Complaints, Appeals, and Grievances  [address]   [place] [address]    Please let us know if you would like to appeal this denial. If appealing, please provide a letter of medical necessity.

## 2017-03-30 ENCOUNTER — TELEPHONE (OUTPATIENT)
Dept: CARDIOLOGY | Facility: CLINIC | Age: 72
End: 2017-03-30

## 2017-03-30 NOTE — TELEPHONE ENCOUNTER
Contacted patient to let her know [employer] has denied Repatha and that I will be sending an appeal letter. I explained that the turn around may be running 10-14 days. Once the appeal letter is addressed with an approval she will be notified with the amount of the co-pay. If the co-pay is too expensive she can submit an application to the [employer] for financial assistance. She gave verbal understanding of instructions and had no other questions.

## 2017-04-05 ENCOUNTER — TELEPHONE (OUTPATIENT)
Dept: CARDIOLOGY | Facility: CLINIC | Age: 72
End: 2017-04-05

## 2017-04-05 NOTE — TELEPHONE ENCOUNTER
An appeal letter for the denial of Repatha was faxed to [place] (attention: Appeals) @ [phone]. Will await response then call patient back with approval or denial.

## 2017-04-06 ENCOUNTER — TELEPHONE (OUTPATIENT)
Dept: CARDIOLOGY | Facility: CLINIC | Age: 72
End: 2017-04-06

## 2017-04-06 NOTE — TELEPHONE ENCOUNTER
Attempt to reach pt to review this with her. Home phone busy, mobile phone no answer and VM full. Will attempt at another time.

## 2017-04-06 NOTE — TELEPHONE ENCOUNTER
Placed call to [facility]. Spoke with rep in regards to getting the last 30 days of Praluent filled ASAP, prior to 4/10 so pt can use [employer] money for her co-pay. Rep states her Praluent PA  on 17 so she cannot fill any more Praluent. I asked why the delay - as per our records pt called directly for last refill on 3/22/17 and in our records JOLANTA Fish also verbally authorized for her last refill on 3/23/17. Rep states it was filled - two injections of Praluent shipped to pt on 3/22/17.     Essentially, pt's PA approval for Praluent from 16 - 17 is now  and we are awaiting decision on appeal letter for Repatha. Pt did tell me she isn't due for her last Praluent inj until  (she has one last one at home in the fridge). I informed her that we 'should' have an answer from her insurance prior to  when she would be due for another dose.

## 2017-04-06 NOTE — TELEPHONE ENCOUNTER
Returned pt call. She was wondering whether or not we heard if Repatha was approved or denied. Informed pt Repatha denied - appeal letter has been sent and now we are awaiting her insurance to respond. She has already been informed by JOLANTA Fish that a decision from her insurance company on the appeal will be about 14 days. Pt very concerned and became quite upset that she does not yet know this will go through. Also very concerned she won't be able to use her PAN money for the last 30 days of Praluent refills that was approved on last year's Rx. States PAN will only pay until 4/10.     Told pt we are working very hard for her and doing what we can - it is a waiting process on the insurance. I will call [facility] pharmacy to see if they've filled last 2 injections of Praluent and billed PAN yet and pt wishes to call [facility] to check on the appeals status.

## 2017-04-06 NOTE — TELEPHONE ENCOUNTER
Pt returned the call. Reviewed with her the below - that last refill on her 4/4/2016 to 4/4/2017 PA approval was sent to her on 3/22/17. Pt confused on why she cannot get more, after a new Rx was sent on 3/22. Explained to her, this med requires a new PA approval by her insurance annually before it could be filled. Pt understood but very frustrated with insurance. Again - pt has Praluent that will cover her until 4/28 when she would need Repatha filled by if approved. Told pt 'IF' Repatha approved we could get her samples until shipments are set up with [facility] pharmacy, but again, possibility that Repatha might still be denied. Pt understood, and is aware nothing left to discuss until we hear back from [facility] on appeal.

## 2017-04-12 NOTE — TELEPHONE ENCOUNTER
I received update from JOLANTA Duval for  that [facility] called and pt has been approved for repatha. Pt called earlier this morning to find out if she has been approved. I called her back to let her know. She is going to call [facility] to find out cost of medication. If too expensive will have to send [employer] safety net foundation forms. Patrick STOVER

## 2017-04-12 NOTE — TELEPHONE ENCOUNTER
Pt left message that the cost of Repatha is to expensive for her. [facility] application faxed to [phone] to see if patient can qualify for funding assistance. Will await response from [employer].

## 2017-04-13 NOTE — TELEPHONE ENCOUNTER
Contacted patient to let her know her [employer] application was faxed yesterday. I informed her that it may take 7-10 days. Pt mentioned that she had spoken with [facility] pharmacy yesterday to see if she could get a PA for another Praluent injection. She said she was trying to use the rest of her funding from the [employer], however, she was not able to this due to the expiration date of the funding. I did inform her that a PA for Praluent would not be allowed because she has already been approved for Repatha. The cost of the Repatha for her would be $ 460.00 per injection. The next step is to see if she can qualify for financial assistance from [employer]. I told patient I will call [employer] today to see if they did receive her application. I discussed that if the application process will be longer than 10 days, it may be appropriate to ask for Repatha samples. I will call patient back today to update her.

## 2017-04-18 ENCOUNTER — TELEPHONE (OUTPATIENT)
Dept: CARDIOLOGY | Facility: CLINIC | Age: 72
End: 2017-04-18

## 2017-04-18 NOTE — TELEPHONE ENCOUNTER
Received message Repatha samples from [facility] this morning. Contacted patient to let her know she can be supplemented with Repatha samples (one months' worth - 2 injections) while she is waiting for a response from the [employer]. Pt will  samples tomorrow (4/19) morning. Told to ask for Team 1 RN.

## 2017-04-24 ENCOUNTER — TELEPHONE (OUTPATIENT)
Dept: CARDIOLOGY | Facility: CLINIC | Age: 72
End: 2017-04-24

## 2017-05-17 ENCOUNTER — TELEPHONE (OUTPATIENT)
Dept: CARDIOLOGY | Facility: CLINIC | Age: 72
End: 2017-05-17

## 2017-05-17 ENCOUNTER — OFFICE VISIT (OUTPATIENT)
Dept: CARDIOLOGY | Facility: CLINIC | Age: 72
End: 2017-05-17
Attending: INTERNAL MEDICINE
Payer: COMMERCIAL

## 2017-05-17 VITALS
WEIGHT: 174 LBS | HEIGHT: 68 IN | HEART RATE: 67 BPM | DIASTOLIC BLOOD PRESSURE: 58 MMHG | SYSTOLIC BLOOD PRESSURE: 112 MMHG | BODY MASS INDEX: 26.37 KG/M2

## 2017-05-17 DIAGNOSIS — E78.2 MIXED HYPERLIPIDEMIA: ICD-10-CM

## 2017-05-17 DIAGNOSIS — I25.10 CORONARY ARTERY DISEASE INVOLVING NATIVE CORONARY ARTERY OF NATIVE HEART WITHOUT ANGINA PECTORIS: ICD-10-CM

## 2017-05-17 DIAGNOSIS — E11.9 TYPE 2 DIABETES MELLITUS WITHOUT COMPLICATION, UNSPECIFIED LONG TERM INSULIN USE STATUS: ICD-10-CM

## 2017-05-17 DIAGNOSIS — I20.89 STABLE ANGINA (H): Primary | ICD-10-CM

## 2017-05-17 DIAGNOSIS — R94.39 ABNORMAL CARDIOVASCULAR STRESS TEST: ICD-10-CM

## 2017-05-17 DIAGNOSIS — I10 ESSENTIAL HYPERTENSION: ICD-10-CM

## 2017-05-17 PROCEDURE — 99214 OFFICE O/P EST MOD 30 MIN: CPT | Performed by: INTERNAL MEDICINE

## 2017-05-17 RX ORDER — ISOSORBIDE MONONITRATE 30 MG/1
45 TABLET, EXTENDED RELEASE ORAL DAILY
Qty: 135 TABLET | Refills: 3 | Status: SHIPPED | OUTPATIENT
Start: 2017-05-17 | End: 2017-12-12

## 2017-05-17 RX ORDER — RANOLAZINE 500 MG/1
500 TABLET, EXTENDED RELEASE ORAL 2 TIMES DAILY
Qty: 60 TABLET | Refills: 11 | Status: SHIPPED | OUTPATIENT
Start: 2017-05-17 | End: 2017-08-16

## 2017-05-17 NOTE — PROGRESS NOTES
HPI and Plan:   See dictation    Orders Placed This Encounter   Procedures     Lipid Profile     ALT     Follow-Up with Cardiologist       Orders Placed This Encounter   Medications     isosorbide mononitrate (IMDUR) 30 MG 24 hr tablet     Sig: Take 1.5 tablets (45 mg) by mouth daily     Dispense:  135 tablet     Refill:  3     ranolazine (RANEXA) 500 MG 12 hr tablet     Sig: Take 1 tablet (500 mg) by mouth 2 times daily     Dispense:  60 tablet     Refill:  11       Medications Discontinued During This Encounter   Medication Reason     alirocumab (PRALUENT) 75 MG/ML injectable pen Alternate therapy     isosorbide mononitrate (IMDUR) 60 MG 24 hr tablet Reorder         Encounter Diagnoses   Name Primary?     Abnormal cardiovascular stress test      Essential hypertension      Stable angina (H) Yes     Coronary artery disease involving native coronary artery of native heart without angina pectoris      Mixed hyperlipidemia      Type 2 diabetes mellitus without complication, unspecified long term insulin use status (H)        CURRENT MEDICATIONS:  Current Outpatient Prescriptions   Medication Sig Dispense Refill     isosorbide mononitrate (IMDUR) 30 MG 24 hr tablet Take 1.5 tablets (45 mg) by mouth daily 135 tablet 3     ranolazine (RANEXA) 500 MG 12 hr tablet Take 1 tablet (500 mg) by mouth 2 times daily 60 tablet 11     evolocumab (REPATHA SURECLICK) 140 MG/ML prefilled autoinjector Inject 1 mL (140 mg) Subcutaneous every 14 days (Patient taking differently: Inject 140 mg Subcutaneous every 14 days Has not started) 2 mL 11     linagliptin-metFORMIN (JENTADUETO) 2.5-1000 MG per tablet Take 1 tablet by mouth 2 times daily (with meals)       PANTOPRAZOLE SODIUM PO Take 20 mg by mouth 2 times daily (before meals)        lisinopril (PRINIVIL/ZESTRIL) 2.5 MG tablet Take 1 tablet (2.5 mg) by mouth daily 90 tablet 3     nebivolol (BYSTOLIC) 2.5 MG tablet Take 1 tablet (2.5 mg) by mouth daily 90 tablet 3     cholecalciferol  2000 UNITS CAPS Take 1 capsule by mouth daily       NONFORMULARY Tumeric 500 mg bid       clopidogrel (PLAVIX) 75 MG tablet Take 1 tablet (75 mg) by mouth daily 90 tablet 3     mirabegron (MYRBETRIQ) 50 MG 24 hr tablet Take 1 tablet (50 mg) by mouth daily 90 tablet 3     Luannea Sylvestris Leaf POWD 500 mg 2 times daily        nitroglycerin (NITROSTAT) 0.4 MG SL tablet Place 1 tablet (0.4 mg) under the tongue every 5 minutes as needed for chest pain If symptoms persist after 3 doses (15 minutes) call 911. 25 tablet 3     Omega-3 Fatty Acids (OMEGA-3 FISH OIL PO) Take 2,400 mg by mouth 2 times daily (with meals)       CINNAMON PO Take 2 capsules by mouth 2 times daily       GLIMEPIRIDE 4 MG OR TABS 1 tablet BID       SYNTHROID 125 MCG OR TABS 1 tablet daily       RESTASIS 0.05 % OP EMUL twice daily       CO ENZYME Q-10 50 MG OR CAPS daliy  0     ASPIRIN 81 MG OR TABS 1 tab po QD (Once per day)       Camphor-Menthol-Methyl Sal (SALONPAS) 1.2-5.7-6.3 % PTCH Patient uses 3-4 patches daily       [DISCONTINUED] isosorbide mononitrate (IMDUR) 60 MG 24 hr tablet Take 1 tablet (60 mg) by mouth daily (Patient taking differently: Take 60 mg by mouth daily Patient has been taking 45 mg) 90 tablet 3     amoxicillin (AMOXIL) 500 MG capsule Reported on 5/17/2017       STATIN NOT PRESCRIBED, INTENTIONAL, 1 each At Bedtime Statin not prescribed intentionally due to Allergy to statin (Patient not taking: Reported on 3/16/2017) 0 each 0       ALLERGIES     Allergies   Allergen Reactions     No Known Drug Allergies        PAST MEDICAL HISTORY:  Past Medical History:   Diagnosis Date     Coronary artery disease 4/28/2016    PTCA with MAYA x 2 to OM1 and MAYA x 1 to p.LAD (4/21/2016 at [place]); PTCA with intracoronary stent placement of RCA June 2004; MAYA to OM1 11/2016     Cystocele, midline 09/29/2010     Depression      HYPERPLASTIC  POLYP      colonoscopy in 5-10 yrs.     Hypothyroidism     hypothyroid     OAB (overactive bladder)       Osteoarthritis      Other and unspecified hyperlipidemia      Postmenopausal bleeding      Shoulder blade pain 2016     Type II or unspecified type diabetes mellitus without mention of complication, not stated as uncontrolled     Dr. Majano     Unspecified essential hypertension      Urinary frequency 9/29/10    followed by urology. no benefit from detrol or sanctura. month trial of macrobid and flomax 10/10       PAST SURGICAL HISTORY:  Past Surgical History:   Procedure Laterality Date     C CT ANGIOGRAPHY CORONARY  2005    mod soft plaque LAD and Cx, follow up with left heart cath     C LIGATE FALLOPIAN TUBE       COLONOSCOPY       EXCISE LESION UPPER EXTREMITY  2013    Procedure: EXCISE LESION UPPER EXTREMITY;  EXCISION RIGHT ARM ANTICUBITAL LIPOMA ;  Surgeon: Jayson Joe MD;  Location: [facility] REMOVAL OF TONSILS,12+ Y/O       HEART CATH LEFT HEART CATH  3/2/2016    No intervention - aggressive medical management     HEART CATH LEFT HEART CATH  2016     MAYA x 2 to OM1, MAYA to LAD, at [place]     HEART CATH LEFT HEART CATH  2004    MAYA to RCA     HEART CATH LEFT HEART CATH  3/28/2002    Mild to moderate 3 vessel CAD. Medical management recommended.      HEART CATH LEFT HEART CATH  2016    MAYA to OM1     KNEE SURGERY  4/20/10    right knee replacement     REMOVE STIMULATOR SACRAL NERVE N/A 2015    Procedure: REMOVE STIMULATOR SACRAL NERVE;  Surgeon: Gertrudis Frausto MD;  Location: [facility]     SURGICAL HISTORY OF -       Uterine endometrial ablation       FAMILY HISTORY:  Family History   Problem Relation Age of Onset     C.A.D. Father      MI , age 83, had high lipids     Hyperlipidemia Father      Hypertension Father      Coronary Artery Disease Father      Hypertension Mother      Genitourinary Problems Mother      renal failure     Fractures Mother      hip     OSTEOPOROSIS Mother      CEREBROVASCULAR DISEASE Maternal Grandfather      cerebral hemorrhage  "    DIABETES Maternal Uncle      Cancer - colorectal Maternal Aunt      DIABETES Maternal Grandmother        SOCIAL HISTORY:  Social History     Social History     Marital status:      Spouse name: Kwadwo     Number of children: 3     Years of education: N/A     Occupational History     PT Aide None       Retired     Social History Main Topics     Smoking status: Never Smoker     Smokeless tobacco: None     Alcohol use No     Drug use: No     Sexual activity: Yes     Partners: Male     Birth control/ protection: Post-menopausal     Other Topics Concern     Caffeine Concern Yes     6-7 cups caffeine per day     Sleep Concern No     Stress Concern Yes     Weight Concern No     Special Diet No     eats healthy      Exercise Yes      walking & active life style      Parent/Sibling W/ Cabg, Mi Or Angioplasty Before 65f 55m? No     Social History Narrative       Review of Systems:  Skin:  Negative       Eyes:  Positive for glasses    ENT:  Negative      Respiratory:  Negative       Cardiovascular:  Negative      Gastroenterology: Negative      Genitourinary:  not assessed      Musculoskeletal:  Positive for arthritis pain everywhere  Neurologic:  Positive for numbness or tingling of hands hands will turn white occ.  Psychiatric:  Positive for anxiety    Heme/Lymph/Imm:  Negative      Endocrine:  Positive for diabetes;thyroid disorder      Physical Exam:  Vitals: /58  Pulse 67  Ht 1.727 m (5' 8\")  Wt 78.9 kg (174 lb)  BMI 26.46 kg/m2    Constitutional:  cooperative, alert and oriented, well developed, well nourished, in no acute distress        Skin:  warm and dry to the touch, no apparent skin lesions or masses noted        Head:  normocephalic, no masses or lesions        Eyes:  pupils equal and round, conjunctivae and lids unremarkable, sclera white, no xanthalasma, EOMS intact, no nystagmus        ENT:  no pallor or cyanosis, dentition good        Neck:  carotid pulses are full and equal bilaterally, JVP " normal, no carotid bruit, no thyromegaly        Chest:  normal breath sounds, clear to auscultation, normal A-P diameter, normal symmetry, normal respiratory excursion, no use of accessory muscles          Cardiac: regular rhythm, normal S1/S2, no S3 or S4, apical impulse not displaced, no murmurs, gallops or rubs                  Abdomen:           Vascular: pulses full and equal, no bruits auscultated                                        Extremities and Back:  no deformities, clubbing, cyanosis, erythema observed;no edema              Neurological:  affect appropriate, oriented to time, person and place;no gross motor deficits              CC  Feroz Burgos MD   [facility]  [address]  [place] [address]

## 2017-05-17 NOTE — MR AVS SNAPSHOT
After Visit Summary   5/17/2017    Sarah Longo    MRN: [id number]           Patient Information     Date Of Birth          1945        Visit Information        Provider Department      5/17/2017 8:45 AM Feroz Burgos MD [facility] [place]        Today's Diagnoses     Stable angina (H)    -  1    Abnormal cardiovascular stress test        Essential hypertension        Coronary artery disease involving native coronary artery of native heart without angina pectoris        Mixed hyperlipidemia        Type 2 diabetes mellitus without complication, unspecified long term insulin use status (H)           Follow-ups after your visit        Additional Services     Follow-Up with Cardiologist                 Your next 10 appointments already scheduled     May 23, 2017 10:00 AM CDT   PHYSICAL with Shireen Neves MD   [facility] ([facility])    [address] [place] [address]   [phone]            May 23, 2017 10:30 AM CDT   MA SCREENING DIGITAL BILATERAL with WEMA1   [facility] ([facility])    [address] [place] [address] [address]  [facility] [address]   [phone]           Do not use any powder, lotion or deodorant under your arms or on your breast. If you do, we will ask you to remove it before your exam.  Wear comfortable, two-piece clothing.  If you have any allergies, tell your care team.  Bring any previous mammograms from other facilities or have them mailed to the breast center.              Future tests that were ordered for you today     Open Future Orders        Priority Expected Expires Ordered    ALT Routine 5/17/2017 5/17/2018 5/17/2017    Lipid Profile Routine 5/18/2017 5/17/2018 5/17/2017    Follow-Up with Cardiologist Routine 9/14/2017 5/17/2018 5/17/2017            Who to contact     If you have questions or need follow up information  "about today's clinic visit or your schedule please contact [facility] AT [place] directly at [phone].  Normal or non-critical lab and imaging results will be communicated to you by MyChart, letter or phone within 4 business days after the clinic has received the results. If you do not hear from us within 7 days, please contact the clinic through Ofercityhart or phone. If you have a critical or abnormal lab result, we will notify you by phone as soon as possible.  Submit refill requests through Sailthru or call your pharmacy and they will forward the refill request to us. Please allow 3 business days for your refill to be completed.          Additional Information About Your Visit        OfercityharNXTM Information     Sailthru lets you send messages to your doctor, view your test results, renew your prescriptions, schedule appointments and more. To sign up, go to [email or web address] . Click on \"Log in\" on the left side of the screen, which will take you to the Welcome page. Then click on \"Sign up Now\" on the right side of the page.     You will be asked to enter the access code listed below, as well as some personal information. Please follow the directions to create your username and password.     Your access code is: [email or web address]  Expires: 2017  5:20 PM     Your access code will  in 90 days. If you need help or a new code, please call your [place] clinic or [phone].        Care EveryWhere ID     This is your Care EveryWhere ID. This could be used by other organizations to access your [place] medical records  [id number]        Your Vitals Were     Pulse Height BMI (Body Mass Index)             67 1.727 m (5' 8\") 26.46 kg/m2          Blood Pressure from Last 3 Encounters:   17 112/58   17 106/66   17 104/60    Weight from Last 3 Encounters:   17 78.9 kg (174 lb)   17 78.5 kg (173 lb)   17 78.8 kg (173 lb 12.8 oz)              We " Performed the Following     Follow-Up with Cardiologist          Today's Medication Changes          These changes are accurate as of: 5/17/17  9:49 AM.  If you have any questions, ask your nurse or doctor.               Start taking these medicines.        Dose/Directions    ranolazine 500 MG 12 hr tablet   Commonly known as:  RANEXA   Used for:  Stable angina (H)   Started by:  Feroz Burgos MD        Dose:  500 mg   Take 1 tablet (500 mg) by mouth 2 times daily   Quantity:  60 tablet   Refills:  11         These medicines have changed or have updated prescriptions.        Dose/Directions    evolocumab 140 MG/ML prefilled autoinjector   Commonly known as:  REPATHA SURECLICK   This may have changed:  additional instructions   Used for:  Hyperlipidemia LDL goal <100        Dose:  140 mg   Inject 1 mL (140 mg) Subcutaneous every 14 days   Quantity:  2 mL   Refills:  11       isosorbide mononitrate 30 MG 24 hr tablet   Commonly known as:  IMDUR   This may have changed:    - medication strength  - how much to take   Used for:  Stable angina (H)   Changed by:  Feroz Burgos MD        Dose:  45 mg   Take 1.5 tablets (45 mg) by mouth daily   Quantity:  135 tablet   Refills:  3         Stop taking these medicines if you haven't already. Please contact your care team if you have questions.     alirocumab 75 MG/ML injectable pen   Commonly known as:  PRALUENT   Stopped by:  Feroz Burgos MD                Where to get your medicines      These medications were sent to [facility] [address] [place] [address]     Phone:  [phone]     isosorbide mononitrate 30 MG 24 hr tablet         Some of these will need a paper prescription and others can be bought over the counter.  Ask your nurse if you have questions.     Bring a paper prescription for each of these medications     ranolazine 500 MG 12 hr tablet                Primary Care Provider Office  Phone # Fax #    Gaye Zimmer [phone]       [facility] [address]  [facility] [address]        Thank you!     Thank you for choosing [facility] AT [place]  for your care. Our goal is always to provide you with excellent care. Hearing back from our patients is one way we can continue to improve our services. Please take a few minutes to complete the written survey that you may receive in the mail after your visit with us. Thank you!             Your Updated Medication List - Protect others around you: Learn how to safely use, store and throw away your medicines at www.disposemymeds.org.          This list is accurate as of: 5/17/17  9:49 AM.  Always use your most recent med list.                   Brand Name Dispense Instructions for use    amoxicillin 500 MG capsule    AMOXIL     Reported on 5/17/2017       aspirin 81 MG tablet      1 tab po QD (Once per day)       cholecalciferol 2000 UNITS Caps      Take 1 capsule by mouth daily       CINNAMON PO      Take 2 capsules by mouth 2 times daily       clopidogrel 75 MG tablet    PLAVIX    90 tablet    Take 1 tablet (75 mg) by mouth daily       Co Enzyme Q-10 50 MG Caps      daliy       evolocumab 140 MG/ML prefilled autoinjector    REPATHA SURECLICK    2 mL    Inject 1 mL (140 mg) Subcutaneous every 14 days       glimepiride 4 MG tablet    AMARYL     1 tablet BID       Gymnema Sylvestris Leaf Powd      500 mg 2 times daily       isosorbide mononitrate 30 MG 24 hr tablet    IMDUR    135 tablet    Take 1.5 tablets (45 mg) by mouth daily       JENTADUETO 2.5-1000 MG per tablet   Generic drug:  linagliptin-metFORMIN      Take 1 tablet by mouth 2 times daily (with meals)       lisinopril 2.5 MG tablet    PRINIVIL/Zestril    90 tablet    Take 1 tablet (2.5 mg) by mouth daily       mirabegron 50 MG 24 hr tablet    MYRBETRIQ    90 tablet    Take 1 tablet (50 mg) by mouth daily       nebivolol 2.5 MG tablet     BYSTOLIC    90 tablet    Take 1 tablet (2.5 mg) by mouth daily       nitroglycerin 0.4 MG sublingual tablet    NITROSTAT    25 tablet    Place 1 tablet (0.4 mg) under the tongue every 5 minutes as needed for chest pain If symptoms persist after 3 doses (15 minutes) call 911.       NONFORMULARY      Tumeric 500 mg bid       OMEGA-3 FISH OIL PO      Take 2,400 mg by mouth 2 times daily (with meals)       PANTOPRAZOLE SODIUM PO      Take 20 mg by mouth 2 times daily (before meals)       ranolazine 500 MG 12 hr tablet    RANEXA    60 tablet    Take 1 tablet (500 mg) by mouth 2 times daily       RESTASIS 0.05 % ophthalmic emulsion   Generic drug:  cycloSPORINE      twice daily       SALONPAS 1.2-5.7-6.3 % Ptch   Generic drug:  Camphor-Menthol-Methyl Sal      Patient uses 3-4 patches daily       STATIN NOT PRESCRIBED (INTENTIONAL)     0 each    1 each At Bedtime Statin not prescribed intentionally due to Allergy to statin       SYNTHROID 125 MCG tablet   Generic drug:  levothyroxine      1 tablet daily

## 2017-05-17 NOTE — TELEPHONE ENCOUNTER
She needs to be on the Plavix. She should restart it but I need to talk to the oral surgeon. Can you get the doctor's name for me so I can call him? Thanks. FREEMAN

## 2017-05-17 NOTE — TELEPHONE ENCOUNTER
Call received by Team 1 nurses. 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 Wants Dr. Burgos input on the matter.   Will message Dr. Burgos to advise.

## 2017-05-18 ENCOUNTER — TRANSFERRED RECORDS (OUTPATIENT)
Dept: CARDIOLOGY | Facility: CLINIC | Age: 72
End: 2017-05-18

## 2017-05-18 ENCOUNTER — TELEPHONE (OUTPATIENT)
Dept: CARDIOLOGY | Facility: CLINIC | Age: 72
End: 2017-05-18

## 2017-05-18 ENCOUNTER — DOCUMENTATION ONLY (OUTPATIENT)
Dept: CARDIOLOGY | Facility: CLINIC | Age: 72
End: 2017-05-18

## 2017-05-18 NOTE — PROGRESS NOTES
I called Dr. 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 Ms. Longo called us and told us that she was instructed to stop her plavix for this procedure, which I think carries a high risk of stent thombosis. Dr. Aguiar tells me that he did not tell her to hold her plavix. In fact, he told her that he had completed her treatment in the office yesterday after he performed a biopsy of her gums and observed her for half hour with no bleeding issues found. He plans no further treatment. She should continue her plavix and aspirin as usual. Feroz Burgos MD

## 2017-05-18 NOTE — TELEPHONE ENCOUNTER
"Called and updated that Dr. Burgos and Oral Surgeon talked today, and yes, she is to continue on Plavix and ASA. And that the oral surgeon stated that no planned further oral surgery.   Patient stated, \"well not yet, I am awaiting the results of the biopsy, and I may need more surgery.\"  reassured that will need to address that when and if it occurs.  "

## 2017-05-18 NOTE — PROGRESS NOTES
HISTORY OF PRESENT ILLNESS:  I had the opportunity to see Ms. Sarah Longo in Cardiology Clinic today at the [facility] in [place] for reevaluation of recurrent coronary artery disease and cardiac risk factors including hypertension, dyslipidemia and type 2 diabetes.  As you know, she underwent angioplasty and stenting of her obtuse marginal and LAD at the [facility] in 04/2016 and then returned with recurrent symptoms in 11/2016 and had restenosis with repeat stenting of her obtuse marginal at that time.  This is a drug-eluting stent.  The LAD did not show any evidence of restenosis.  There was significant residual disease in the apical LAD, but this is a very small caliber artery and had not changed from previous angiograms and was therefore left alone, and medical therapy was recommended.      She was having some concerning symptoms again in February which prompted us to refer her for a Lexiscan nuclear perfusion imaging study on 02/17/2017.  That study demonstrated a moderate-sized inferior and inferolateral defect suggestive of ischemia, but when we went on to do the angiogram again, there was no change in her coronary artery disease findings since the completion of her November angiogram study.  There was no progression in her restenosis or new significant lesions.  Again, medical management was recommended.      Since then, she has been bothered by a lot of back pain.  She has low, middle and upper back pain.  However, it was the upper back pain that we were concerned about in the past as a possible anginal equivalent.  This certainly makes it challenging for us to determine whether she is having recurrent angina or whether this may be all attributable to musculoskeletal pain.  She takes Imdur 45 mg a day and believes that the discomfort in her upper back seems to get better shortly after she takes her Imdur.  This discomfort does not reliably occur with exertion but seems to come  back if she tries to mow the lawn, which of course requires some upper extremity exercise.  She does not have any chest pain symptoms or discomfort down her arms or in her neck or jaw.  She has no shortness of breath or dizzy spells.      She continues to have the chronic leg pains that she attributes to statin drug use from the past.  She is currently on Repatha and seems to be doing well with that.      PHYSICAL EXAMINATION:  On examination today her blood pressure was 112/58, heart rate 67, weight 174 pounds.  Her lungs are clear.  Heart rhythm is regular.  She has no cardiac murmurs or carotid bruits.      IMPRESSIONS:  Ms. Sarah Longo is a 72-year-old woman with multiple cardiac risk factors including hypertension, dyslipidemia and suboptimally controlled type 2 diabetes.  She also has recurrent coronary artery disease, and I am concerned that she may have had some increased risk of restenosis due to her poorly controlled diabetes.  Her hemoglobin A1c in February was 9.2.  However, I am not highly suspicious that she has significant restenosis at this time.  She does have some persistent mild discomfort in her upper back between her shoulder blades which may have been an anginal equivalent in the past but does not feel as severe as it has when she had last restenosis episode.  For that reason, I think we can continue to try to treat this medically and I will have her stay on the Imdur, which seems to help.  I suggested that she try Ranexa 500 mg p.o. b.i.d. and have given her some samples.  If the discomfort gets better with this, perhaps we can continue that medication as well.  If not, that may give more support to the idea that this may not be cardiac in nature.      I will have her follow up with some repeat cholesterol numbers to make sure that she is doing well on her Repatha and have her follow up with me again in September.  In the meantime, I have encouraged her to follow up with you and her  endocrinologist to manage her diabetes more aggressively and to follow her endocrinologist's advice in terms of medical therapy to accomplish that goal.      Thank you for allowing me to participate in Ms. Longo' care.  She did mention that she was going to have a biopsy of her gums for evaluation of a small spot of possible cancer.  I advised her not to stop her aspirin and Plavix for that procedure but to manage any kind of persistent bleeding locally.  She apparently is not a good candidate for an epidural injection because of the use of Plavix.  I do not think we can stop that in her situation until at least a year has gone by, in order to avoid the possibility of acute thrombosis or restenosis.      Thank you for allowing me to participate in Ms. Longo' care.      cc:   [place] [address]         GUNNAR LEA MD, [facility]             D: 2017 10:51   T: 2017 04:55   MT: LD      Name:     KATHY LONGO   MRN:      [id number]        Account:      [id number]   :      1945           Service Date: 2017      Document: [id number]

## 2017-05-23 ENCOUNTER — RADIANT APPOINTMENT (OUTPATIENT)
Dept: MAMMOGRAPHY | Facility: CLINIC | Age: 72
End: 2017-05-23
Payer: COMMERCIAL

## 2017-05-23 ENCOUNTER — OFFICE VISIT (OUTPATIENT)
Dept: OBGYN | Facility: CLINIC | Age: 72
End: 2017-05-23
Payer: COMMERCIAL

## 2017-05-23 VITALS
DIASTOLIC BLOOD PRESSURE: 56 MMHG | BODY MASS INDEX: 26.37 KG/M2 | WEIGHT: 174 LBS | SYSTOLIC BLOOD PRESSURE: 92 MMHG | HEIGHT: 68 IN

## 2017-05-23 DIAGNOSIS — Z01.419 ENCOUNTER FOR GYNECOLOGICAL EXAMINATION WITHOUT ABNORMAL FINDING: Primary | ICD-10-CM

## 2017-05-23 DIAGNOSIS — Z12.31 VISIT FOR SCREENING MAMMOGRAM: ICD-10-CM

## 2017-05-23 PROCEDURE — 99397 PER PM REEVAL EST PAT 65+ YR: CPT | Performed by: OBSTETRICS & GYNECOLOGY

## 2017-05-23 PROCEDURE — G0202 SCR MAMMO BI INCL CAD: HCPCS | Mod: TC

## 2017-05-23 NOTE — MR AVS SNAPSHOT
"              After Visit Summary   5/23/2017    Sarah Longo    MRN: [id number]           Patient Information     Date Of Birth          1945        Visit Information        Provider Department      5/23/2017 10:00 AM Shireen Neves MD [facility]        Today's Diagnoses     Encounter for gynecological examination without abnormal finding    -  1       Follow-ups after your visit        Your next 10 appointments already scheduled     Aug 28, 2017 10:45 AM CDT   Return Visit with Feroz Burgos MD   [facility] ([facility])    [address] [place] [address]  [place] [id number] [phone]              Who to contact     If you have questions or need follow up information about today's clinic visit or your schedule please contact [facility] directly at [phone].  Normal or non-critical lab and imaging results will be communicated to you by MyChart, letter or phone within 4 business days after the clinic has received the results. If you do not hear from us within 7 days, please contact the clinic through MyChart or phone. If you have a critical or abnormal lab result, we will notify you by phone as soon as possible.  Submit refill requests through Canevaflor or call your pharmacy and they will forward the refill request to us. Please allow 3 business days for your refill to be completed.          Additional Information About Your Visit        MyChart Information     Canevaflor lets you send messages to your doctor, view your test results, renew your prescriptions, schedule appointments and more. To sign up, go to [email or web address] . Click on \"Log in\" on the left side of the screen, which will take you to the Welcome page. Then click on \"Sign up Now\" on the right side of the page.     You will be asked to enter the access code listed below, as well as some personal information. Please follow the " "directions to create your username and password.     Your access code is: [email or web address]  Expires: 2017  5:20 PM     Your access code will  in 90 days. If you need help or a new code, please call your [place] clinic or [phone].        Care EveryWhere ID     This is your Care EveryWhere ID. This could be used by other organizations to access your [place] medical records  [id number]        Your Vitals Were     Height BMI (Body Mass Index)                5' 8\" (1.727 m) 26.46 kg/m2           Blood Pressure from Last 3 Encounters:   17 92/56   17 112/58   17 106/66    Weight from Last 3 Encounters:   17 174 lb (78.9 kg)   17 174 lb (78.9 kg)   17 173 lb (78.5 kg)              Today, you had the following     No orders found for display         Today's Medication Changes          These changes are accurate as of: 17 11:30 AM.  If you have any questions, ask your nurse or doctor.               These medicines have changed or have updated prescriptions.        Dose/Directions    evolocumab 140 MG/ML prefilled autoinjector   Commonly known as:  REPATHA SURECLICK   This may have changed:  additional instructions   Used for:  Hyperlipidemia LDL goal <100        Dose:  140 mg   Inject 1 mL (140 mg) Subcutaneous every 14 days   Quantity:  2 mL   Refills:  11                Primary Care Provider Office Phone # Fax #    Gaye GONZÁLES Naomiheidi [phone]       [address] [address]        Thank you!     Thank you for choosing [facility]  for your care. Our goal is always to provide you with excellent care. Hearing back from our patients is one way we can continue to improve our services. Please take a few minutes to complete the written survey that you may receive in the mail after your visit with us. Thank you!             Your Updated Medication List - Protect others around you: Learn how to safely use, store " and throw away your medicines at www.disposemymeds.org.          This list is accurate as of: 5/23/17 11:30 AM.  Always use your most recent med list.                   Brand Name Dispense Instructions for use    amoxicillin 500 MG capsule    AMOXIL     Reported on 5/17/2017       aspirin 81 MG tablet      1 tab po QD (Once per day)       cholecalciferol 2000 UNITS Caps      Take 1 capsule by mouth daily       CINNAMON PO      Take 2 capsules by mouth 2 times daily       clopidogrel 75 MG tablet    PLAVIX    90 tablet    Take 1 tablet (75 mg) by mouth daily       Co Enzyme Q-10 50 MG Caps      daliy       evolocumab 140 MG/ML prefilled autoinjector    REPATHA SURECLICK    2 mL    Inject 1 mL (140 mg) Subcutaneous every 14 days       glimepiride 4 MG tablet    AMARYL     1 tablet BID       Gymnema Sylvestris Leaf Powd      500 mg 2 times daily       isosorbide mononitrate 30 MG 24 hr tablet    IMDUR    135 tablet    Take 1.5 tablets (45 mg) by mouth daily       JENTADUETO 2.5-1000 MG per tablet   Generic drug:  linagliptin-metFORMIN      Take 1 tablet by mouth 2 times daily (with meals)       lisinopril 2.5 MG tablet    PRINIVIL/Zestril    90 tablet    Take 1 tablet (2.5 mg) by mouth daily       mirabegron 50 MG 24 hr tablet    MYRBETRIQ    90 tablet    Take 1 tablet (50 mg) by mouth daily       nebivolol 2.5 MG tablet    BYSTOLIC    90 tablet    Take 1 tablet (2.5 mg) by mouth daily       nitroglycerin 0.4 MG sublingual tablet    NITROSTAT    25 tablet    Place 1 tablet (0.4 mg) under the tongue every 5 minutes as needed for chest pain If symptoms persist after 3 doses (15 minutes) call 911.       NONFORMULARY      Tumeric 500 mg bid       OMEGA-3 FISH OIL PO      Take 2,400 mg by mouth 2 times daily (with meals)       PANTOPRAZOLE SODIUM PO      Take 20 mg by mouth 2 times daily (before meals)       ranolazine 500 MG 12 hr tablet    RANEXA    60 tablet    Take 1 tablet (500 mg) by mouth 2 times daily       RESTASIS  0.05 % ophthalmic emulsion   Generic drug:  cycloSPORINE      twice daily       SALONPAS 1.2-5.7-6.3 % Ptch   Generic drug:  Camphor-Menthol-Methyl Sal      Patient uses 3-4 patches daily       STATIN NOT PRESCRIBED (INTENTIONAL)     0 each    1 each At Bedtime Statin not prescribed intentionally due to Allergy to statin       SYNTHROID 125 MCG tablet   Generic drug:  levothyroxine      1 tablet daily

## 2017-05-23 NOTE — PROGRESS NOTES
Sarah is a 72 year old  female who presents for annual exam.     Besides routine health maintenance, still having incontinence problems, using pessary and Myrebetriq, but hard due to expense.    Do you have a Health Care Directive?: No: Advance care planning was reviewed with patient; patient declined at this time.    Fall risk:   Fallen 2 or more times in the past year?: No  Any fall with injury in the past year?: No    HPI:  Patient followed by Dr Nayak  Failed interstim  Multiple stents, on plavix, not surgical candidate  Patient has pessary, needs to clean once a month  The patient's PCP is Gaye Zimmer as needed and also has endocrinologist and cardiologist.      GYNECOLOGIC HISTORY:  No LMP recorded. Patient is postmenopausal..   reports that she has never smoked. She does not have any smokeless tobacco history on file.  Patient is not sexually active.  STD testing offered?  not sexually active      HEALTH MAINTENANCE:  Cholesterol:   Cholesterol   Date Value Ref Range Status   2016 148 <200 mg/dL Final   2016 139 <200 mg/dL Final   Last Mammo: 16, Result: normal, Next Mammo: today   Pap: N/A, over age 71  Colonoscopy:  05, Result: polyp, repeat 5 years, Next Colonoscopy: patient unsure if needing another exam, thought is was due 10 years,   Health maintenance updated:  yes    HISTORY:  Obstetric History       T3      TAB0   SAB0   E0   M0   L3       # Outcome Date GA Lbr Berhane/2nd Weight Sex Delivery Anes PTL Lv   3 Term     F    Y   2 Term     F    Y   1 Term     M    Y        Patient Active Problem List   Diagnosis     Essential hypertension     Hyperlipidemia     Osteoarthrosis, unspecified whether generalized or localized, involving lower leg     Diabetes mellitus, type 2 (H)     Other specified gastritis without mention of hemorrhage     Low back pain     Lumbar radiculopathy     CAD (coronary artery disease)     Status post coronary angiogram      Shoulder blade pain     Past Surgical History:   Procedure Laterality Date     C CT ANGIOGRAPHY CORONARY  2005    mod soft plaque LAD and Cx, follow up with left heart cath     C LIGATE FALLOPIAN TUBE       COLONOSCOPY       EXCISE LESION UPPER EXTREMITY  2013    Procedure: EXCISE LESION UPPER EXTREMITY;  EXCISION RIGHT ARM ANTICUBITAL LIPOMA ;  Surgeon: Jayson Joe MD;  Location: [facility] REMOVAL OF TONSILS,12+ Y/O       HEART CATH LEFT HEART CATH  3/2/2016    No intervention - aggressive medical management     HEART CATH LEFT HEART CATH  2016     MAYA x 2 to OM1, MAYA to LAD, at [facility]     HEART CATH LEFT HEART CATH  2004    MAYA to RCA     HEART CATH LEFT HEART CATH  3/28/2002    Mild to moderate 3 vessel CAD. Medical management recommended.      HEART CATH LEFT HEART CATH  2016    MAYA to OM1     KNEE SURGERY  4/20/10    right knee replacement     REMOVE STIMULATOR SACRAL NERVE N/A 2015    Procedure: REMOVE STIMULATOR SACRAL NERVE;  Surgeon: Gertrudis Frausto MD;  Location: [facility]     SURGICAL HISTORY OF -       Uterine endometrial ablation      Social History   Substance Use Topics     Smoking status: Never Smoker     Smokeless tobacco: Not on file     Alcohol use No      Problem (# of Occurrences) Relation (Name,Age of Onset)    C.A.D. (1) Father: MI , age 83, had high lipids    CEREBROVASCULAR DISEASE (1) Maternal Grandfather: cerebral hemorrhage    Cancer - colorectal (1) Maternal Aunt    Coronary Artery Disease (1) Father    DIABETES (2) Maternal Uncle, Maternal Grandmother    Fractures (1) Mother: hip    Genitourinary Problems (1) Mother: renal failure    Hyperlipidemia (1) Father    Hypertension (2) Father, Mother    OSTEOPOROSIS (1) Mother            Current Outpatient Prescriptions   Medication Sig     isosorbide mononitrate (IMDUR) 30 MG 24 hr tablet Take 1.5 tablets (45 mg) by mouth daily     ranolazine (RANEXA) 500 MG 12 hr tablet Take 1 tablet (500 mg)  by mouth 2 times daily     evolocumab (REPATHA SURECLICK) 140 MG/ML prefilled autoinjector Inject 1 mL (140 mg) Subcutaneous every 14 days (Patient taking differently: Inject 140 mg Subcutaneous every 14 days Has not started)     Camphor-Menthol-Methyl Sal (SALONPAS) 1.2-5.7-6.3 % PTCH Patient uses 3-4 patches daily     linagliptin-metFORMIN (JENTADUETO) 2.5-1000 MG per tablet Take 1 tablet by mouth 2 times daily (with meals)     PANTOPRAZOLE SODIUM PO Take 20 mg by mouth 2 times daily (before meals)      lisinopril (PRINIVIL/ZESTRIL) 2.5 MG tablet Take 1 tablet (2.5 mg) by mouth daily     nebivolol (BYSTOLIC) 2.5 MG tablet Take 1 tablet (2.5 mg) by mouth daily     cholecalciferol 2000 UNITS CAPS Take 1 capsule by mouth daily     amoxicillin (AMOXIL) 500 MG capsule Reported on 5/17/2017     NONFORMULARY Tumeric 500 mg bid     clopidogrel (PLAVIX) 75 MG tablet Take 1 tablet (75 mg) by mouth daily     mirabegron (MYRBETRIQ) 50 MG 24 hr tablet Take 1 tablet (50 mg) by mouth daily     Gymnema Sylvestris Leaf POWD 500 mg 2 times daily      nitroglycerin (NITROSTAT) 0.4 MG SL tablet Place 1 tablet (0.4 mg) under the tongue every 5 minutes as needed for chest pain If symptoms persist after 3 doses (15 minutes) call 911.     STATIN NOT PRESCRIBED, INTENTIONAL, 1 each At Bedtime Statin not prescribed intentionally due to Allergy to statin     Omega-3 Fatty Acids (OMEGA-3 FISH OIL PO) Take 2,400 mg by mouth 2 times daily (with meals)     CINNAMON PO Take 2 capsules by mouth 2 times daily     GLIMEPIRIDE 4 MG OR TABS 1 tablet BID     SYNTHROID 125 MCG OR TABS 1 tablet daily     RESTASIS 0.05 % OP EMUL twice daily     CO ENZYME Q-10 50 MG OR CAPS daliy     ASPIRIN 81 MG OR TABS 1 tab po QD (Once per day)     No current facility-administered medications for this visit.        Allergies   Allergen Reactions     No Known Drug Allergies        Past medical, surgical, social and family history were reviewed and updated in  "EPIC.    ROS:   12 point review of systems negative other than symptoms noted below.  Genitourinary: Frequency  Musculoskeletal: Joint Pain and Muscle Cramps  Psychiatric: Anxiety    EXAM:  BP 92/56  Ht 5' 8\" (1.727 m)  Wt 174 lb (78.9 kg)  BMI 26.46 kg/m2   BMI: Body mass index is 26.46 kg/(m^2).    EXAM:  Constitutional: Appearance: Well nourished, well developed alert, in no acute distress  Neck:  Lymph Nodes:  No lymphadenopathy present    Thyroid:  Gland size normal, nontender, no nodules or masses present  on palpation  Chest:  Respiratory Effort:  Breathing unlabored  Cardiovascular:Heart    Auscultation:  Regular rate, normal rhythm, no murmurs present  Breasts: Inspection of Breasts:  No lymphadenopathy present    Palpation of Breasts and Axillae:  No masses present on palpation, no  breast tenderness    Axillary Lymph Nodes:  No lymphadenopathy present  Gastrointestinal:  Abdominal Examination:  Abdomen nontender to palpation, tone normal without     rigidity or guarding, no masses present, umbilicus without lesions    Liver and speen:  No hepatomegaly present, liver nontender to palpation    Hernias:  No hernias present  Lymphatic: Lymph Nodes:  No other lymphadenopathy present  Skin:  General Inspection:  No rashes present, no lesions present, no areas of  discoloration.    Genitalia and Groin:  No rashes present, no lesions present, no areas of  discoloration, no masses present  Neurologic/Psychiatric:    Mental Status:  Oriented X3     Pelvic Exam:  External Genitalia:     Normal appearance for age, no discharge present, no tenderness present, no inflammatory lesions present, color normal  Vagina:     Normal vaginal vault without central or paravaginal defects, no discharge present, no inflammatory lesions present, no masses present  Bladder:     Nontender to palpation  Urethra:   Urethral Body:  Urethra palpation normal, urethra structural support normal   Urethral Meatus:  No erythema or lesions " present  Cervix:     Appearance healthy, no lesions present, nontender to palpation, no bleeding present  Uterus:     Nontender to palpation, no masses present, position anteflexed, mobility: normal  Adnexa:     No adnexal tenderness present, no adnexal masses present  Perineum:     Perineum within normal limits, no evidence of trauma, no rashes or skin lesions present  Anus:     Anus within normal limits, no hemorrhoids present  Inguinal Lymph Nodes:     No lymphadenopathy present  Pubic Hair:     Normal pubic hair distribution for age  Genitalia and Groin:     No rashes present, no lesions present, no areas of discoloration, no masses present    COUNSELING:   Reviewed preventive health counseling, as reflected in patient instructions       disc prolapse    BMI:  Body mass index is 26.46 kg/(m^2).  Weight management plan: Patient was referred to their PCP to discuss a diet and exercise plan.   reports that she has never smoked. She does not have any smokeless tobacco history on file.      ASSESSMENT:  72 year old female with satisfactory annual exam.    ICD-10-CM    1. Encounter for gynecological examination without abnormal finding Z01.419        PLAN:  Needs lash and mesh suspension with Dr Brown if she ever gets off plavix      Shireen Neves MD

## 2017-06-06 ENCOUNTER — TELEPHONE (OUTPATIENT)
Dept: CARDIOLOGY | Facility: CLINIC | Age: 72
End: 2017-06-06

## 2017-06-06 NOTE — TELEPHONE ENCOUNTER
"Received message that patient has questions regarding a letter she received from [employer]. Contacted patient to discuss the questions she has. She will receive free medication (Repatha) from [employer] from 5/24/17 thru 12/31/17. Pt said she is \"very grateful\" for the free medication. She wanted to know if she has to re-apply to [employer] [facility] before the end date. I suggested that she could call [employer] several months prior to the end date to see what the process will be. She agreed to do so.   "

## 2017-06-08 ENCOUNTER — TRANSFERRED RECORDS (OUTPATIENT)
Dept: HEALTH INFORMATION MANAGEMENT | Facility: CLINIC | Age: 72
End: 2017-06-08

## 2017-06-23 DIAGNOSIS — I25.10 CORONARY ARTERY DISEASE INVOLVING NATIVE CORONARY ARTERY OF NATIVE HEART WITHOUT ANGINA PECTORIS: Primary | ICD-10-CM

## 2017-07-12 DIAGNOSIS — I25.10 CORONARY ARTERY DISEASE INVOLVING NATIVE CORONARY ARTERY OF NATIVE HEART WITHOUT ANGINA PECTORIS: ICD-10-CM

## 2017-07-12 RX ORDER — CLOPIDOGREL BISULFATE 75 MG/1
75 TABLET ORAL DAILY
Qty: 90 TABLET | Refills: 3 | Status: SHIPPED | OUTPATIENT
Start: 2017-07-12 | End: 2017-07-17

## 2017-07-12 NOTE — TELEPHONE ENCOUNTER
Refill for Plavix 75 mg sent per patient request to [facility] in [place] for 90 days with 3 refills.

## 2017-07-17 DIAGNOSIS — I25.10 CORONARY ARTERY DISEASE INVOLVING NATIVE CORONARY ARTERY OF NATIVE HEART WITHOUT ANGINA PECTORIS: ICD-10-CM

## 2017-07-17 RX ORDER — CLOPIDOGREL BISULFATE 75 MG/1
75 TABLET ORAL DAILY
Qty: 90 TABLET | Refills: 2 | Status: SHIPPED | OUTPATIENT
Start: 2017-07-17 | End: 2017-12-12

## 2017-08-02 ENCOUNTER — TELEPHONE (OUTPATIENT)
Dept: CARDIOLOGY | Facility: CLINIC | Age: 72
End: 2017-08-02

## 2017-08-02 NOTE — TELEPHONE ENCOUNTER
Patient called in stating that she needs a PA through [facility] for Bystolic 2.5 MG daily. Out of pocket is 400.00/mo. I told her that I will send a request to our PA team and we will keep her updated. Adi

## 2017-08-02 NOTE — TELEPHONE ENCOUNTER
Prior Authorization Approval    Authorization Effective Date: 7/3/2017  Authorization Expiration Date: 8/2/2018  Medication: Bystolic 2.5 MG - APPROVED  Approved Dose/Quantity:   Reference #:     Insurance Company: [employer] - Phone [phone] Fax [phone]  Expected CoPay: $136.51 @ 90 DAY SUPPLY     CoPay Card Available:      Foundation Assistance Needed:    Which Pharmacy is filling the prescription (Not needed for infusion/clinic administered): [facility] [address] [place] [address]  Pharmacy Notified: Yes  Patient Notified: Yes

## 2017-08-03 DIAGNOSIS — I25.10 CAD (CORONARY ARTERY DISEASE): ICD-10-CM

## 2017-08-03 DIAGNOSIS — I10 ESSENTIAL HYPERTENSION: Primary | ICD-10-CM

## 2017-08-03 RX ORDER — NEBIVOLOL 5 MG/1
TABLET ORAL
Qty: 90 TABLET | Refills: 3 | Status: SHIPPED | OUTPATIENT
Start: 2017-08-03 | End: 2017-08-28

## 2017-08-03 NOTE — TELEPHONE ENCOUNTER
After multiple phone calls and conversations with patient today, she finally decided to go with Bystolic because she is tolerating this so well and does not want to chance switching to a different beta blocker that could give her side effects. To lower her out of pocket cost we submitted a new Rx for Bystolic 5 MG-1/2 tablet daily # 45 x 3 refills. The RX that was sent over this morning dispensed #90 but pharmacy felt it should be dispensed as # 45. It will cost her 68.26 which is acceptable to her rather than 136.51 for 90 days. Adi

## 2017-08-03 NOTE — TELEPHONE ENCOUNTER
Patient informed of PA approval for Bystolic but not willing to do co pay.Apparently, she is in the so called do-nut hole. She talked to [facility] about formulary beta blockers. Covered drugs would be Nadolol,Bisoprolol and labetalol. Labetalol is a tier 2 and no PA required. It would cost 24-36.00/mo. If we called [employer]( [phone]) to change to a tier 1 it would not cost her anything. She is requesting Dr. Burgos to see if he would approve Labetalol 100 MG BID in place of Bystolic 2.5 MG. I told her that our team would run this by Dr. Burgos next Monday being that she is done to about 5 pills remaining. Adi

## 2017-08-15 ENCOUNTER — TRANSFERRED RECORDS (OUTPATIENT)
Dept: HEALTH INFORMATION MANAGEMENT | Facility: CLINIC | Age: 72
End: 2017-08-15

## 2017-08-25 ENCOUNTER — DOCUMENTATION ONLY (OUTPATIENT)
Dept: CARDIOLOGY | Facility: CLINIC | Age: 72
End: 2017-08-25

## 2017-08-28 ENCOUNTER — OFFICE VISIT (OUTPATIENT)
Dept: CARDIOLOGY | Facility: CLINIC | Age: 72
End: 2017-08-28
Attending: INTERNAL MEDICINE
Payer: COMMERCIAL

## 2017-08-28 VITALS
DIASTOLIC BLOOD PRESSURE: 59 MMHG | HEART RATE: 61 BPM | SYSTOLIC BLOOD PRESSURE: 131 MMHG | WEIGHT: 169 LBS | BODY MASS INDEX: 25.61 KG/M2 | HEIGHT: 68 IN

## 2017-08-28 DIAGNOSIS — I10 ESSENTIAL HYPERTENSION: ICD-10-CM

## 2017-08-28 DIAGNOSIS — E11.9 TYPE 2 DIABETES MELLITUS WITHOUT COMPLICATION, UNSPECIFIED LONG TERM INSULIN USE STATUS: ICD-10-CM

## 2017-08-28 DIAGNOSIS — I20.89 STABLE ANGINA (H): ICD-10-CM

## 2017-08-28 DIAGNOSIS — E78.2 MIXED HYPERLIPIDEMIA: ICD-10-CM

## 2017-08-28 DIAGNOSIS — I25.10 CORONARY ARTERY DISEASE INVOLVING NATIVE CORONARY ARTERY OF NATIVE HEART WITHOUT ANGINA PECTORIS: Primary | ICD-10-CM

## 2017-08-28 DIAGNOSIS — E78.5 HYPERLIPIDEMIA LDL GOAL <100: ICD-10-CM

## 2017-08-28 LAB
ALT SERPL W P-5'-P-CCNC: 11 U/L (ref 5–30)
CHOLEST SERPL-MCNC: 111 MG/DL
HDLC SERPL-MCNC: 53 MG/DL
LDLC SERPL CALC-MCNC: 45 MG/DL
NONHDLC SERPL-MCNC: 58 MG/DL
TRIGL SERPL-MCNC: 66 MG/DL

## 2017-08-28 PROCEDURE — 99214 OFFICE O/P EST MOD 30 MIN: CPT | Performed by: INTERNAL MEDICINE

## 2017-08-28 PROCEDURE — 36415 COLL VENOUS BLD VENIPUNCTURE: CPT | Performed by: INTERNAL MEDICINE

## 2017-08-28 PROCEDURE — 84460 ALANINE AMINO (ALT) (SGPT): CPT | Performed by: INTERNAL MEDICINE

## 2017-08-28 PROCEDURE — 80061 LIPID PANEL: CPT | Performed by: INTERNAL MEDICINE

## 2017-08-28 RX ORDER — NEBIVOLOL 5 MG/1
5 TABLET ORAL DAILY
Qty: 90 TABLET | Refills: 3 | Status: SHIPPED | OUTPATIENT
Start: 2017-08-28 | End: 2018-02-02

## 2017-08-28 RX ORDER — LISINOPRIL 5 MG/1
2.5 TABLET ORAL DAILY
Qty: 45 TABLET | Refills: 3 | Status: SHIPPED | OUTPATIENT
Start: 2017-08-28 | End: 2018-02-02

## 2017-08-28 NOTE — MR AVS SNAPSHOT
"              After Visit Summary   8/28/2017    Sarah Longo    MRN: [id number]           Patient Information     Date Of Birth          1945        Visit Information        Provider Department      8/28/2017 10:45 AM Feroz Burgos MD [facility]        Today's Diagnoses     Coronary artery disease involving native coronary artery of native heart without angina pectoris    -  1    Essential hypertension        Stable angina (H)        Type 2 diabetes mellitus without complication, unspecified long term insulin use status (H)        Hyperlipidemia LDL goal <100           Follow-ups after your visit        Additional Services     Follow-Up with Cardiologist                 Future tests that were ordered for you today     Open Future Orders        Priority Expected Expires Ordered    Follow-Up with Cardiologist Routine 2/24/2018 8/28/2018 8/28/2017            Who to contact     If you have questions or need follow up information about today's clinic visit or your schedule please contact [facility] directly at [phone].  Normal or non-critical lab and imaging results will be communicated to you by Neuralahart, letter or phone within 4 business days after the clinic has received the results. If you do not hear from us within 7 days, please contact the clinic through [facility] or phone. If you have a critical or abnormal lab result, we will notify you by phone as soon as possible.  Submit refill requests through [employer] or call your pharmacy and they will forward the refill request to us. Please allow 3 business days for your refill to be completed.          Additional Information About Your Visit        Neuralahart Information     ActionFlow lets you send messages to your doctor, view your test results, renew your prescriptions, schedule appointments and more. To sign up, go to [email or web address] . Click on \"Log in\" on the " "left side of the screen, which will take you to the Welcome page. Then click on \"Sign up Now\" on the right side of the page.     You will be asked to enter the access code listed below, as well as some personal information. Please follow the directions to create your username and password.     Your access code is: [email or web address]  Expires: 2017 11:50 AM     Your access code will  in 90 days. If you need help or a new code, please call your [facility] clinic or [phone].        Care EveryWhere ID     This is your Care EveryWhere ID. This could be used by other organizations to access your [facility] medical records  [id number]        Your Vitals Were     Pulse Height BMI (Body Mass Index)             61 1.727 m (5' 8\") 25.7 kg/m2          Blood Pressure from Last 3 Encounters:   17 131/59   17 92/56   17 112/58    Weight from Last 3 Encounters:   17 76.7 kg (169 lb)   17 78.9 kg (174 lb)   17 78.9 kg (174 lb)              We Performed the Following     Follow-Up with Cardiologist          Today's Medication Changes          These changes are accurate as of: 17 11:50 AM.  If you have any questions, ask your nurse or doctor.               These medicines have changed or have updated prescriptions.        Dose/Directions    evolocumab 140 MG/ML prefilled autoinjector   Commonly known as:  REPATHA SURECLICK   This may have changed:  additional instructions   Used for:  Hyperlipidemia LDL goal <100        Dose:  140 mg   Inject 1 mL (140 mg) Subcutaneous every 14 days   Quantity:  2 mL   Refills:  11       lisinopril 5 MG tablet   Commonly known as:  PRINIVIL/ZESTRIL   This may have changed:  medication strength   Used for:  Stable angina (H)   Changed by:  Feroz Burgos MD        Dose:  2.5 mg   Take 0.5 tablets (2.5 mg) by mouth daily   Quantity:  45 tablet   Refills:  3       nebivolol 5 MG tablet   Commonly known as:  BYSTOLIC   This may have changed:    - how " much to take  - how to take this  - when to take this  - additional instructions   Used for:  Essential hypertension, Coronary artery disease involving native coronary artery of native heart without angina pectoris   Changed by:  Feroz Burgos MD        Dose:  5 mg   Take 1 tablet (5 mg) by mouth daily   Quantity:  90 tablet   Refills:  3            Where to get your medicines      These medications were sent to [facility] [id number] [address]  [address] [facility] [address]     Phone:  [phone]     lisinopril 5 MG tablet    nebivolol 5 MG tablet                Primary Care Provider Office Phone # Fax #    Gaye Zimmer [phone]       [facility] [address]  [facility] [address]        Equal Access to Services     JESSICA LITTLE AH: Hadii marc cartwright hadasho Soomaali, waaxda luqadaha, qaybta kaalmada adeegyada, waxay idiin hayaan jamie khguy taylor . So [facility] [phone].    ATENCIÓN: Si habla español, tiene a briggs disposición servicios gratuitos de asistencia lingüística. Llame al [phone].    We comply with applicable federal civil rights laws and [place] laws. We do not discriminate on the basis of race, color, national origin, age, disability sex, sexual orientation or gender identity.            Thank you!     Thank you for choosing [facility]  for your care. Our goal is always to provide you with excellent care. Hearing back from our patients is one way we can continue to improve our services. Please take a few minutes to complete the written survey that you may receive in the mail after your visit with us. Thank you!             Your Updated Medication List - Protect others around you: Learn how to safely use, store and throw away your medicines at www.disposemymeds.org.          This list is accurate as of: 8/28/17 11:50 AM.  Always use your most recent med list.                   Brand  Name Dispense Instructions for use Diagnosis    amoxicillin 500 MG capsule    AMOXIL     as needed Reported on 5/17/2017        aspirin 81 MG tablet      1 tab po QD (Once per day)        cholecalciferol 2000 UNITS Caps      Take 1 capsule by mouth daily        CINNAMON PO      Take 2 capsules by mouth 2 times daily        clopidogrel 75 MG tablet    PLAVIX    90 tablet    Take 1 tablet (75 mg) by mouth daily    Coronary artery disease involving native coronary artery of native heart without angina pectoris       Co Enzyme Q-10 50 MG Caps      daliy        evolocumab 140 MG/ML prefilled autoinjector    REPATHA SURECLICK    2 mL    Inject 1 mL (140 mg) Subcutaneous every 14 days    Hyperlipidemia LDL goal <100       glimepiride 4 MG tablet    AMARYL     1 tablet BID        Gymnema Sylvestris Leaf Powd      500 mg 2 times daily        isosorbide mononitrate 30 MG 24 hr tablet    IMDUR    135 tablet    Take 1.5 tablets (45 mg) by mouth daily    Stable angina (H)       JENTADUETO 2.5-1000 MG per tablet   Generic drug:  linagliptin-metFORMIN      Take 1 tablet by mouth 2 times daily (with meals)        lisinopril 5 MG tablet    PRINIVIL/ZESTRIL    45 tablet    Take 0.5 tablets (2.5 mg) by mouth daily    Stable angina (H)       mirabegron 50 MG 24 hr tablet    MYRBETRIQ    90 tablet    Take 1 tablet (50 mg) by mouth daily    OAB (overactive bladder)       nebivolol 5 MG tablet    BYSTOLIC    90 tablet    Take 1 tablet (5 mg) by mouth daily    Essential hypertension, Coronary artery disease involving native coronary artery of native heart without angina pectoris       nitroGLYcerin 0.4 MG sublingual tablet    NITROSTAT    25 tablet    Place 1 tablet (0.4 mg) under the tongue every 5 minutes as needed for chest pain If symptoms persist after 3 doses (15 minutes) call 911.    Coronary artery disease involving native coronary artery of native heart without angina pectoris       NONFORMULARY      Tumeric 500 mg bid         OMEGA-3 FISH OIL PO      Take 2,400 mg by mouth 2 times daily (with meals)        PANTOPRAZOLE SODIUM PO      Take 20 mg by mouth 2 times daily (before meals)        RESTASIS 0.05 % ophthalmic emulsion   Generic drug:  cycloSPORINE      twice daily        SALONPAS 1.2-5.7-6.3 % Ptch   Generic drug:  Camphor-Menthol-Methyl Sal      Patient uses 3-4 patches daily        STATIN NOT PRESCRIBED (INTENTIONAL)     0 each    1 each At Bedtime Statin not prescribed intentionally due to Allergy to statin    Coronary artery disease involving native coronary artery of native heart without angina pectoris       SYNTHROID 125 MCG tablet   Generic drug:  levothyroxine      1 tablet daily

## 2017-08-28 NOTE — LETTER
2017             Gaye Zimmer MD   [place] [id number]      RE: Sarah Longo   MRN: [id number]   : 1945       Dear Gaye:      I had the opportunity to see Ms. Sarah Longo in Cardiology Clinic today at the [facility] in [place] for reevaluation of recurrent coronary artery disease and multiple risk factors, including hypertension, dyslipidemia and type 2 diabetes.  Her last revascularization was performed on 2016 at the [facility].  She had a stress test in 2017 suggesting some inferior and inferolateral ischemia.  A repeat cardiac catheterization in 2017 revealed that her stents were widely patent with no evidence of restenosis, and there were no new significant coronary artery disease lesions identified.  She did not require additional stenting.  Medical management was recommended.        Her anginal equivalent seems to be upper back pain between her shoulder blades.  She has had one episode of this in the last few months since I saw her.  It happened when she woke up in the morning.  She took a nitroglycerin and lay down with her feet up, and the pain went away.  Otherwise, she has done well and has not had any chest pain, shortness of breath, palpitations, lightheadedness or syncope.      She continues to deal with low back pain issues as well and is going to talk to a neurosurgeon about consideration of back surgery.        PHYSICAL EXAMINATION:  Blood pressure is 131/59.  She tells me her blood pressure is high because she is quite stressed about financial issues.  Apparently, her  has been racking up large amounts of credit card debt.  Her heart rate is 61 beats per minute.  Weight is 169 pounds.  Her lungs are clear.  Her rhythm is regular.  She has no cardiac murmurs or carotid bruits.      IMPRESSIONS:  Ms. Sarah Longo is a 72-year-old woman with multiple cardiac risk  factors, including hypertension, dyslipidemia and type 2 diabetes.  Her blood pressure is generally under excellent control, her cholesterol numbers are now excellent after starting Repatha, and her type 2 diabetes is being managed aggressively.  She has had some recurrent angina issues, primarily with pain between her shoulder blades in her upper back, but that seems to be relatively infrequent now.  She is no longer on Repatha that we had recommended initially.  She was on Ranexa for 2 months with no noticeable benefit and stopped it due to cost reasons as well.      I am pleased that she is doing relatively well and not having significant heart symptoms at this point.  I told her that she could stop her Plavix on 11/22/2017 and consider back surgery at that time.  Otherwise, I will plan to see her back again in 6 months for reevaluation.      Sincerely,      Feroz Burgos MD, FACC

## 2017-08-28 NOTE — PROGRESS NOTES
2017             Gaye Zimmer MD   [place] [id number]      RE: Sarah Longo   MRN: [id number]   : 1945       Dear Gaye:      I had the opportunity to see Ms. Sarah Longo in Cardiology Clinic today at the [facility] in [place] for reevaluation of recurrent coronary artery disease and multiple risk factors, including hypertension, dyslipidemia and type 2 diabetes.  Her last revascularization was performed on 2016 at the [facility].  She had a stress test in 2017 suggesting some inferior and inferolateral ischemia.  A repeat cardiac catheterization in 2017 revealed that her stents were widely patent with no evidence of restenosis, and there were no new significant coronary artery disease lesions identified.  She did not require additional stenting.  Medical management was recommended.        Her anginal equivalent seems to be upper back pain between her shoulder blades.  She has had one episode of this in the last few months since I saw her.  It happened when she woke up in the morning.  She took a nitroglycerin and lay down with her feet up, and the pain went away.  Otherwise, she has done well and has not had any chest pain, shortness of breath, palpitations, lightheadedness or syncope.      She continues to deal with low back pain issues as well and is going to talk to a neurosurgeon about consideration of back surgery.        PHYSICAL EXAMINATION:  Blood pressure is 131/59.  She tells me her blood pressure is high because she is quite stressed about financial issues.  Apparently, her  has been racking up large amounts of credit card debt.  Her heart rate is 61 beats per minute.  Weight is 169 pounds.  Her lungs are clear.  Her rhythm is regular.  She has no cardiac murmurs or carotid bruits.      IMPRESSIONS:  Ms. Sarah Longo is a 72-year-old woman with multiple cardiac risk  factors, including hypertension, dyslipidemia and type 2 diabetes.  Her blood pressure is generally under excellent control, her cholesterol numbers are now excellent after starting Repatha, and her type 2 diabetes is being managed aggressively.  She has had some recurrent angina issues, primarily with pain between her shoulder blades in her upper back, but that seems to be relatively infrequent now.  She is no longer on Repatha that we had recommended initially.  She was on Ranexa for 2 months with no noticeable benefit and stopped it due to cost reasons as well.      I am pleased that she is doing relatively well and not having significant heart symptoms at this point.  I told her that she could stop her Plavix on 2017 and consider back surgery at that time.  Otherwise, I will plan to see her back again in 6 months for reevaluation.      Sincerely,      Feroz Burgos MD, [facility]         FEROZ BURGOS MD, [facility]             D: 2017 12:57   T: 2017 13:17   MT: [email or web address]      Name:     KATHY PERRY   MRN:      [id number]        Account:      [id number]   :      1945           Service Date: 2017      Document: [id number]

## 2017-08-28 NOTE — PROGRESS NOTES
HPI and Plan:   See dictation    Orders Placed This Encounter   Procedures     Follow-Up with Cardiologist       Orders Placed This Encounter   Medications     nebivolol (BYSTOLIC) 5 MG tablet     Sig: Take 1 tablet (5 mg) by mouth daily     Dispense:  90 tablet     Refill:  3     lisinopril (PRINIVIL/ZESTRIL) 5 MG tablet     Sig: Take 0.5 tablets (2.5 mg) by mouth daily     Dispense:  45 tablet     Refill:  3       Medications Discontinued During This Encounter   Medication Reason     nebivolol (BYSTOLIC) 5 MG tablet Reorder     lisinopril (PRINIVIL/ZESTRIL) 2.5 MG tablet Reorder         Encounter Diagnoses   Name Primary?     Coronary artery disease involving native coronary artery of native heart without angina pectoris Yes     Essential hypertension      Stable angina (H)      Type 2 diabetes mellitus without complication, unspecified long term insulin use status (H)      Hyperlipidemia LDL goal <100        CURRENT MEDICATIONS:  Current Outpatient Prescriptions   Medication Sig Dispense Refill     nebivolol (BYSTOLIC) 5 MG tablet Take 1 tablet (5 mg) by mouth daily 90 tablet 3     lisinopril (PRINIVIL/ZESTRIL) 5 MG tablet Take 0.5 tablets (2.5 mg) by mouth daily 45 tablet 3     clopidogrel (PLAVIX) 75 MG tablet Take 1 tablet (75 mg) by mouth daily 90 tablet 2     isosorbide mononitrate (IMDUR) 30 MG 24 hr tablet Take 1.5 tablets (45 mg) by mouth daily 135 tablet 3     evolocumab (REPATHA SURECLICK) 140 MG/ML prefilled autoinjector Inject 1 mL (140 mg) Subcutaneous every 14 days (Patient taking differently: Inject 140 mg Subcutaneous every 14 days Has not started) 2 mL 11     Camphor-Menthol-Methyl Sal (SALONPAS) 1.2-5.7-6.3 % PTCH Patient uses 3-4 patches daily       linagliptin-metFORMIN (JENTADUETO) 2.5-1000 MG per tablet Take 1 tablet by mouth 2 times daily (with meals)       PANTOPRAZOLE SODIUM PO Take 20 mg by mouth 2 times daily (before meals)        cholecalciferol 2000 UNITS CAPS Take 1 capsule by mouth  daily       amoxicillin (AMOXIL) 500 MG capsule as needed Reported on 5/17/2017       NONFORMULARY Tumeric 500 mg bid       mirabegron (MYRBETRIQ) 50 MG 24 hr tablet Take 1 tablet (50 mg) by mouth daily 90 tablet 3     Gymnema Sylvestris Leaf POWD 500 mg 2 times daily        nitroglycerin (NITROSTAT) 0.4 MG SL tablet Place 1 tablet (0.4 mg) under the tongue every 5 minutes as needed for chest pain If symptoms persist after 3 doses (15 minutes) call 911. 25 tablet 3     STATIN NOT PRESCRIBED, INTENTIONAL, 1 each At Bedtime Statin not prescribed intentionally due to Allergy to statin 0 each 0     Omega-3 Fatty Acids (OMEGA-3 FISH OIL PO) Take 2,400 mg by mouth 2 times daily (with meals)       CINNAMON PO Take 2 capsules by mouth 2 times daily       GLIMEPIRIDE 4 MG OR TABS 1 tablet BID       SYNTHROID 125 MCG OR TABS 1 tablet daily       RESTASIS 0.05 % OP EMUL twice daily       CO ENZYME Q-10 50 MG OR CAPS daliy  0     ASPIRIN 81 MG OR TABS 1 tab po QD (Once per day)       [DISCONTINUED] nebivolol (BYSTOLIC) 5 MG tablet Take 1/2 pill ( 2.5 MG ) once daily 90 tablet 3     [DISCONTINUED] lisinopril (PRINIVIL/ZESTRIL) 2.5 MG tablet Take 1 tablet (2.5 mg) by mouth daily 90 tablet 3       ALLERGIES     Allergies   Allergen Reactions     No Known Drug Allergies        PAST MEDICAL HISTORY:  Past Medical History:   Diagnosis Date     Coronary artery disease 4/28/2016    PTCA with MAYA x 2 to OM1 and MAYA x 1 to p.LAD (4/21/2016 at [place]); PTCA with intracoronary stent placement of RCA June 2004; MAYA to OM1 11/2016     Cystocele, midline 09/29/2010     Depression      HYPERPLASTIC  POLYP      colonoscopy in 5-10 yrs.     Hypothyroidism     hypothyroid     OAB (overactive bladder)      Osteoarthritis      Other and unspecified hyperlipidemia      Postmenopausal bleeding      Shoulder blade pain 5/31/2016     Type II or unspecified type diabetes mellitus without mention of complication, not stated as uncontrolled     Dr. Majano      Unspecified essential hypertension      Urinary frequency 9/29/10    followed by urology. no benefit from detrol or sanctura. month trial of macrobid and flomax 10/10       PAST SURGICAL HISTORY:  Past Surgical History:   Procedure Laterality Date     C CT ANGIOGRAPHY CORONARY  2005    mod soft plaque LAD and Cx, follow up with left heart cath     C LIGATE FALLOPIAN TUBE       COLONOSCOPY       EXCISE LESION UPPER EXTREMITY  2013    Procedure: EXCISE LESION UPPER EXTREMITY;  EXCISION RIGHT ARM ANTICUBITAL LIPOMA ;  Surgeon: Jayson Joe MD;  Location: [facility] REMOVAL OF TONSILS,12+ Y/O       HEART CATH LEFT HEART CATH  3/2/2016    No intervention - aggressive medical management     HEART CATH LEFT HEART CATH  2016     MAYA x 2 to OM1, MAYA to LAD, at [place]     HEART CATH LEFT HEART CATH  2004    MAYA to RCA     HEART CATH LEFT HEART CATH  3/28/2002    Mild to moderate 3 vessel CAD. Medical management recommended.      HEART CATH LEFT HEART CATH  2016    MAYA to OM1     KNEE SURGERY  4/20/10    right knee replacement     REMOVE STIMULATOR SACRAL NERVE N/A 2015    Procedure: REMOVE STIMULATOR SACRAL NERVE;  Surgeon: Gertrudis Frausto MD;  Location: [facility]     SURGICAL HISTORY OF -       Uterine endometrial ablation       FAMILY HISTORY:  Family History   Problem Relation Age of Onset     C.A.D. Father      MI , age 83, had high lipids     Hyperlipidemia Father      Hypertension Father      Coronary Artery Disease Father      Hypertension Mother      Genitourinary Problems Mother      renal failure     Fractures Mother      hip     OSTEOPOROSIS Mother      CEREBROVASCULAR DISEASE Maternal Grandfather      cerebral hemorrhage     DIABETES Maternal Uncle      Cancer - colorectal Maternal Aunt      DIABETES Maternal Grandmother        SOCIAL HISTORY:  Social History     Social History     Marital status:      Spouse name: Kwadwo     Number of children: 3     Years  "of education: N/A     Occupational History     PT Aide None       Retired     Social History Main Topics     Smoking status: Never Smoker     Smokeless tobacco: None     Alcohol use No     Drug use: No     Sexual activity: Yes     Partners: Male     Birth control/ protection: Post-menopausal     Other Topics Concern     Caffeine Concern Yes     6-7 cups caffeine per day     Sleep Concern No     Stress Concern Yes     Weight Concern No     Special Diet No     eats healthy      Exercise Yes      walking & active life style      Parent/Sibling W/ Cabg, Mi Or Angioplasty Before 65f 55m? No     Social History Narrative       Review of Systems:  Skin:  Negative       Eyes:  Positive for glasses    ENT:  Negative      Respiratory:  Positive for dyspnea on exertion (going up stairs) occasional dry cough   Cardiovascular:    Positive for (chest discomfort followed by pain in the back)    Gastroenterology: Negative      Genitourinary:  Negative      Musculoskeletal:  Positive for arthritis pain everywhere  Neurologic:  Positive for numbness or tingling of hands hands will turn white occ.  Psychiatric:  Positive for anxiety    Heme/Lymph/Imm:  Negative      Endocrine:  Positive for diabetes;thyroid disorder      Physical Exam:  Vitals: /59  Pulse 61  Ht 1.727 m (5' 8\")  Wt 76.7 kg (169 lb)  BMI 25.7 kg/m2    Constitutional:  cooperative, alert and oriented, well developed, well nourished, in no acute distress        Skin:  warm and dry to the touch, no apparent skin lesions or masses noted        Head:  normocephalic, no masses or lesions        Eyes:  pupils equal and round, conjunctivae and lids unremarkable, sclera white, no xanthalasma, EOMS intact, no nystagmus        ENT:  no pallor or cyanosis, dentition good        Neck:  carotid pulses are full and equal bilaterally, JVP normal, no carotid bruit, no thyromegaly        Chest:  normal breath sounds, clear to auscultation, normal A-P diameter, normal symmetry, " normal respiratory excursion, no use of accessory muscles          Cardiac: regular rhythm, normal S1/S2, no S3 or S4, apical impulse not displaced, no murmurs, gallops or rubs                  Abdomen:           Vascular: pulses full and equal, no bruits auscultated                                        Extremities and Back:  no deformities, clubbing, cyanosis, erythema observed;no edema              Neurological:  affect appropriate, oriented to time, person and place;no gross motor deficits              CC  Feroz Burgos MD  [id number]  [place] [id number]

## 2017-09-18 ENCOUNTER — TELEPHONE (OUTPATIENT)
Dept: OBGYN | Facility: CLINIC | Age: 72
End: 2017-09-18

## 2017-09-18 NOTE — TELEPHONE ENCOUNTER
Is concerned with symptoms she is having - would like to discuss with nurse    Wants to know if there is a test to detect ovarian cancer. Has been told she does not need a pap anymore.

## 2017-09-18 NOTE — TELEPHONE ENCOUNTER
There isn't a good screening test for ovarian cancer  Her husbands family history has no relation to her.  GI is the appropriate place for her evaluation.    We can see her for a pelvic exam to feel ovaries if she wishes but blood tests don't help this and ultrasound can't pick it up early so is only ordered if her pelvic exam was abnormal, not for screening purposes since research studies have shown that blood tests and ultrasounds don't help in early detection but are only useful when a pelvic mass is present.    She had a normal exam in May so highly unlikely any ovarian cancer is present.

## 2017-09-18 NOTE — TELEPHONE ENCOUNTER
"Pt has symptoms of cramping, loose stools and bloating. Pt does not get PAP due to age, pt has been to PCP had labs and everything is fine. Pt is now going to GI for her symptoms. Pt met a women who had ovarian cancer and she is nervous about her GI issues now being due to these symptoms. Pt states she is even more nervous as\"all or her husbands female relatives\" have had ovarian cancer. Pt would like to know if there is a test that can be done for ovarian cancer and would like to have this screening if for nothing other than piece of mind. Routing to Dr. Neves to review and advise. .   "

## 2017-10-09 ENCOUNTER — OFFICE VISIT (OUTPATIENT)
Dept: OBGYN | Facility: CLINIC | Age: 72
End: 2017-10-09
Payer: COMMERCIAL

## 2017-10-09 VITALS
HEART RATE: 64 BPM | SYSTOLIC BLOOD PRESSURE: 126 MMHG | DIASTOLIC BLOOD PRESSURE: 60 MMHG | WEIGHT: 172 LBS | HEIGHT: 68 IN | BODY MASS INDEX: 26.07 KG/M2

## 2017-10-09 DIAGNOSIS — R14.0 BLOATING: Primary | ICD-10-CM

## 2017-10-09 PROCEDURE — 99213 OFFICE O/P EST LOW 20 MIN: CPT | Performed by: OBSTETRICS & GYNECOLOGY

## 2017-10-09 NOTE — PROGRESS NOTES
SUBJECTIVE:                                                   Sarah Longo is a 72 year old female who presents to clinic today for the following health issue(s):  Patient presents with:  Gyn Exam: also c/o L foot ulcer, had pedicure recently so not sure if from there      HPI:  Patient very anxious about ovarian cancer, multiple cases on husbands side  Saw GI and had negative evaluate  No bleeding or spotting  Had mainly loose stools and bloating.   Has a sore on foot after pedicure.          No LMP recorded. Patient is postmenopausal..   Patient is sexually active, .  Using menopause for contraception.    reports that she has never smoked. She has never used smokeless tobacco.      STD testing offered?  Declined    Health maintenance updated:  yes    Today's PHQ-2 Score: No flowsheet data found.  Today's PHQ-9 Score:   PHQ-9 SCORE 2016   Total Score 3     Today's DIANNA-7 Score:   DIANNA-7 SCORE 2016   Total Score 6       Problem list and histories reviewed & adjusted, as indicated.  Additional history: as documented.    Patient Active Problem List   Diagnosis     Essential hypertension     Hyperlipidemia     Osteoarthrosis, unspecified whether generalized or localized, involving lower leg     Diabetes mellitus, type 2 (H)     Other specified gastritis without mention of hemorrhage     Low back pain     Lumbar radiculopathy     CAD (coronary artery disease)     Status post coronary angiogram     Shoulder blade pain     Past Surgical History:   Procedure Laterality Date     ------------OTHER-------------      Interstim     Ablation       C CT ANGIOGRAPHY CORONARY  2005    mod soft plaque LAD and Cx, follow up with left heart cath     C LIGATE FALLOPIAN TUBE       COLONOSCOPY       EXCISE LESION UPPER EXTREMITY  2013    Procedure: EXCISE LESION UPPER EXTREMITY;  EXCISION RIGHT ARM ANTICUBITAL LIPOMA ;  Surgeon: Jayson Joe MD;  Location: [facility] REMOVAL OF TONSILS,12+ Y/O        HEART CATH LEFT HEART CATH  3/2/2016    No intervention - aggressive medical management     HEART CATH LEFT HEART CATH  2016     MAYA x 2 to OM1, MAYA to LAD, at [place]     HEART CATH LEFT HEART CATH  2004    MAYA to RCA     HEART CATH LEFT HEART CATH  3/28/2002    Mild to moderate 3 vessel CAD. Medical management recommended.      HEART CATH LEFT HEART CATH  2016    MAYA to OM1     KNEE SURGERY  4/20/10    right knee replacement     REMOVE STIMULATOR SACRAL NERVE N/A 2015    Procedure: REMOVE STIMULATOR SACRAL NERVE;  Surgeon: Gertrudis Frausto MD;  Location: [facility]     SURGICAL HISTORY OF -       Uterine endometrial ablation      Social History   Substance Use Topics     Smoking status: Never Smoker     Smokeless tobacco: Never Used     Alcohol use No      Problem (# of Occurrences) Relation (Name,Age of Onset)    C.A.D. (1) Father: MI , age 83, had high lipids    CEREBROVASCULAR DISEASE (1) Maternal Grandfather: cerebral hemorrhage    Colon Cancer (1) Maternal Aunt    Coronary Artery Disease (1) Father    DIABETES (2) Maternal Uncle, Maternal Grandmother    Fractures (1) Mother: hip    Genitourinary Problems (1) Mother: renal failure    Hyperlipidemia (1) Father    Hypertension (2) Father, Mother    OSTEOPOROSIS (1) Mother            Current Outpatient Prescriptions   Medication Sig     nebivolol (BYSTOLIC) 5 MG tablet Take 1 tablet (5 mg) by mouth daily     lisinopril (PRINIVIL/ZESTRIL) 5 MG tablet Take 0.5 tablets (2.5 mg) by mouth daily     clopidogrel (PLAVIX) 75 MG tablet Take 1 tablet (75 mg) by mouth daily     isosorbide mononitrate (IMDUR) 30 MG 24 hr tablet Take 1.5 tablets (45 mg) by mouth daily     evolocumab (REPATHA SURECLICK) 140 MG/ML prefilled autoinjector Inject 1 mL (140 mg) Subcutaneous every 14 days (Patient taking differently: Inject 140 mg Subcutaneous every 14 days Has not started)     Camphor-Menthol-Methyl Sal (SALONPAS) 1.2-5.7-6.3 % PTCH Patient uses 3-4  "patches daily     linagliptin-metFORMIN (JENTADUETO) 2.5-1000 MG per tablet Take 1 tablet by mouth 2 times daily (with meals)     PANTOPRAZOLE SODIUM PO Take 20 mg by mouth 2 times daily (before meals)      cholecalciferol 2000 UNITS CAPS Take 1 capsule by mouth daily     NONFORMULARY Tumeric 500 mg bid     mirabegron (MYRBETRIQ) 50 MG 24 hr tablet Take 1 tablet (50 mg) by mouth daily     Gymnema Sylvestris Leaf POWD 500 mg 2 times daily      nitroglycerin (NITROSTAT) 0.4 MG SL tablet Place 1 tablet (0.4 mg) under the tongue every 5 minutes as needed for chest pain If symptoms persist after 3 doses (15 minutes) call 911.     STATIN NOT PRESCRIBED, INTENTIONAL, 1 each At Bedtime Statin not prescribed intentionally due to Allergy to statin     Omega-3 Fatty Acids (OMEGA-3 FISH OIL PO) Take 2,400 mg by mouth 2 times daily (with meals)     CINNAMON PO Take 2 capsules by mouth 2 times daily     GLIMEPIRIDE 4 MG OR TABS 1 tablet BID     SYNTHROID 125 MCG OR TABS 1 tablet daily     RESTASIS 0.05 % OP EMUL twice daily     CO ENZYME Q-10 50 MG OR CAPS daliy     ASPIRIN 81 MG OR TABS 1 tab po QD (Once per day)     amoxicillin (AMOXIL) 500 MG capsule as needed Reported on 5/17/2017     No current facility-administered medications for this visit.      Allergies   Allergen Reactions     No Known Drug Allergies        ROS:  12 point review of systems negative other than symptoms noted below.  Gastrointestinal: Diarrhea  Genitourinary: Cramps, Pelvic Pain and Urgency  Skin: New Skin Lesions  Musculoskeletal: Joint Pain  Psychiatric: Anxiety    OBJECTIVE:     /60  Pulse 64  Ht 5' 8\" (1.727 m)  Wt 172 lb (78 kg)  BMI 26.15 kg/m2  Body mass index is 26.15 kg/(m^2).    Exam:  Constitutional:  Appearance: Well nourished, well developed alert, in no acute distress  Gastrointestinal:  Abdominal Examination:  Abdomen nontender to palpation, tone normal without rigidity or guarding, no masses present, umbilicus without lesions; " Liver/Spleen:  No hepatomegaly present, liver nontender to palpation; Hernias:  No hernias present  Skin:General Inspection:  No rashes present, no lesions present, no areas of discoloration; Genitalia and Groin:  No rashes present, no lesions present, no areas of discoloration, no masses present.  Neurologic/Psychiatric:  Mental Status:  Oriented X3   Pelvic Exam:  External Genitalia:     Normal appearance for age, no discharge present, no tenderness present, no inflammatory lesions present, color normal  Vagina:     Normal vaginal vault without central or paravaginal defects, no discharge present, no inflammatory lesions present, no masses present  Bladder:     Nontender to palpation  Urethra:   Urethral Body:  Urethra palpation normal, urethra structural support normal   Urethral Meatus:  No erythema or lesions present  Cervix:     Appearance healthy, no lesions present, nontender to palpation, no bleeding present  Uterus:     Uterus: firm, normal sized and nontender, anteverted in position.   Adnexa:     No adnexal tenderness present, no adnexal masses present  Perineum:     Perineum within normal limits, no evidence of trauma, no rashes or skin lesions present  Anus:     Anus within normal limits, no hemorrhoids present  Inguinal Lymph Nodes:     No lymphadenopathy present  Pubic Hair:     Normal pubic hair distribution for age  Genitalia and Groin:     No rashes present, no lesions present, no areas of discoloration, no masses present       In-Clinic Test Results:  No results found for this or any previous visit (from the past 24 hour(s)).    ASSESSMENT/PLAN:                                                        ICD-10-CM    1. Bloating R14.0        There are no Patient Instructions on file for this visit.    Reassured patient  She has a normal pelvic exam and no sign of gyn issues  She is overly anxious about cancer so I tried to reassure her.   Face to face time 15 minute, more than 50% counceling    Shireen  KEYA Neves MD  [facility] FOR [facility]

## 2017-10-09 NOTE — MR AVS SNAPSHOT
"              After Visit Summary   10/9/2017    Sarah Longo    MRN: [id number]           Patient Information     Date Of Birth          1945        Visit Information        Provider Department      10/9/2017 11:20 AM Shireen Neves MD [facility]        Today's Diagnoses     Bloating    -  1       Follow-ups after your visit        Who to contact     If you have questions or need follow up information about today's clinic visit or your schedule please contact [facility] directly at [phone].  Normal or non-critical lab and imaging results will be communicated to you by [employer], letter or phone within 4 business days after the clinic has received the results. If you do not hear from us within 7 days, please contact the clinic through [employer] or phone. If you have a critical or abnormal lab result, we will notify you by phone as soon as possible.  Submit refill requests through [employer] or call your pharmacy and they will forward the refill request to us. Please allow 3 business days for your refill to be completed.          Additional Information About Your Visit        MyChart Information     [employer] lets you send messages to your doctor, view your test results, renew your prescriptions, schedule appointments and more. To sign up, go to [email or web address] . Click on \"Log in\" on the left side of the screen, which will take you to the Welcome page. Then click on \"Sign up Now\" on the right side of the page.     You will be asked to enter the access code listed below, as well as some personal information. Please follow the directions to create your username and password.     Your access code is: [email or web address]  Expires: 2017 11:50 AM     Your access code will  in 90 days. If you need help or a new code, please call your [facility]) or [phone].        Care EveryWhere ID     This is your Care EveryWhere ID. This could be used by other " "organizations to access your [facility] medical records  [id number]        Your Vitals Were     Pulse Height BMI (Body Mass Index)             64 5' 8\" (1.727 m) 26.15 kg/m2          Blood Pressure from Last 3 Encounters:   10/09/17 126/60   08/28/17 131/59   05/23/17 92/56    Weight from Last 3 Encounters:   10/09/17 172 lb (78 kg)   08/28/17 169 lb (76.7 kg)   05/23/17 174 lb (78.9 kg)              Today, you had the following     No orders found for display         Today's Medication Changes          These changes are accurate as of: 10/9/17  2:27 PM.  If you have any questions, ask your nurse or doctor.               These medicines have changed or have updated prescriptions.        Dose/Directions    evolocumab 140 MG/ML prefilled autoinjector   Commonly known as:  REPATHA SURECLICK   This may have changed:  additional instructions   Used for:  Hyperlipidemia LDL goal <100        Dose:  140 mg   Inject 1 mL (140 mg) Subcutaneous every 14 days   Quantity:  2 mL   Refills:  11                Primary Care Provider Office Phone # Fax #    Gaye Zimmer [phone]       [facility] [address] [place] [address]        Equal Access to Services     JESSICA LITTLE AH: Hadii marc cartwright hadanno Sokennethali, waaxda luqadaha, qaybta kaalmada adeegyada, meredith castillo. So [facility] [phone].    ATENCIÓN: Si habla español, tiene a briggs disposición servicios gratuitos de asistencia lingüística. Llame al [phone].    We comply with applicable federal civil rights laws and [place] laws. We do not discriminate on the basis of race, color, national origin, age, disability, sex, sexual orientation, or gender identity.            Thank you!     Thank you for choosing [facility]  for your care. Our goal is always to provide you with excellent care. Hearing back from our patients is one way we can continue to improve our services. Please take a few minutes to " complete the written survey that you may receive in the mail after your visit with us. Thank you!             Your Updated Medication List - Protect others around you: Learn how to safely use, store and throw away your medicines at www.disposemymeds.org.          This list is accurate as of: 10/9/17  2:27 PM.  Always use your most recent med list.                   Brand Name Dispense Instructions for use Diagnosis    amoxicillin 500 MG capsule    AMOXIL     as needed Reported on 5/17/2017        aspirin 81 MG tablet      1 tab po QD (Once per day)        cholecalciferol 2000 UNITS Caps      Take 1 capsule by mouth daily        CINNAMON PO      Take 2 capsules by mouth 2 times daily        clopidogrel 75 MG tablet    PLAVIX    90 tablet    Take 1 tablet (75 mg) by mouth daily    Coronary artery disease involving native coronary artery of native heart without angina pectoris       Co Enzyme Q-10 50 MG Caps      daliy        evolocumab 140 MG/ML prefilled autoinjector    REPATHA SURECLICK    2 mL    Inject 1 mL (140 mg) Subcutaneous every 14 days    Hyperlipidemia LDL goal <100       glimepiride 4 MG tablet    AMARYL     1 tablet BID        Gymnema Sylvestris Leaf Powd      500 mg 2 times daily        isosorbide mononitrate 30 MG 24 hr tablet    IMDUR    135 tablet    Take 1.5 tablets (45 mg) by mouth daily    Stable angina (H)       JENTADUETO 2.5-1000 MG per tablet   Generic drug:  linagliptin-metFORMIN      Take 1 tablet by mouth 2 times daily (with meals)        lisinopril 5 MG tablet    PRINIVIL/ZESTRIL    45 tablet    Take 0.5 tablets (2.5 mg) by mouth daily    Stable angina (H)       mirabegron 50 MG 24 hr tablet    MYRBETRIQ    90 tablet    Take 1 tablet (50 mg) by mouth daily    OAB (overactive bladder)       nebivolol 5 MG tablet    BYSTOLIC    90 tablet    Take 1 tablet (5 mg) by mouth daily    Essential hypertension, Coronary artery disease involving native coronary artery of native heart without angina  pectoris       nitroGLYcerin 0.4 MG sublingual tablet    NITROSTAT    25 tablet    Place 1 tablet (0.4 mg) under the tongue every 5 minutes as needed for chest pain If symptoms persist after 3 doses (15 minutes) call 911.    Coronary artery disease involving native coronary artery of native heart without angina pectoris       NONFORMULARY      Tumeric 500 mg bid        OMEGA-3 FISH OIL PO      Take 2,400 mg by mouth 2 times daily (with meals)        PANTOPRAZOLE SODIUM PO      Take 20 mg by mouth 2 times daily (before meals)        RESTASIS 0.05 % ophthalmic emulsion   Generic drug:  cycloSPORINE      twice daily        SALONPAS 1.2-5.7-6.3 % Ptch   Generic drug:  Camphor-Menthol-Methyl Sal      Patient uses 3-4 patches daily        STATIN NOT PRESCRIBED (INTENTIONAL)     0 each    1 each At Bedtime Statin not prescribed intentionally due to Allergy to statin    Coronary artery disease involving native coronary artery of native heart without angina pectoris       SYNTHROID 125 MCG tablet   Generic drug:  levothyroxine      1 tablet daily

## 2017-10-31 ENCOUNTER — CARE COORDINATION (OUTPATIENT)
Dept: CARDIOLOGY | Facility: CLINIC | Age: 72
End: 2017-10-31

## 2017-10-31 ENCOUNTER — TELEPHONE (OUTPATIENT)
Dept: CARDIOLOGY | Facility: CLINIC | Age: 72
End: 2017-10-31

## 2017-10-31 NOTE — TELEPHONE ENCOUNTER
RN called patient back to discuss the [employer] year end application requirements. Per the [employer], the patient was told she needed to apply ASAP. Patient will complete her part of the form and bring it to the office tomorrow. RN will complete the clinic's part of the form and fax to [employer].

## 2017-10-31 NOTE — PROGRESS NOTES
VM from pt stating she had a question for nurse. Returned call to pt, she states she figured it out and no longer has a question. Reviewed with pt nurse line for Dr. Burgos to call. Pt wrote it down. Told pt to call with questions or concerns in the future. JOLANTA Carpenter 1:30 PM 10/31/17

## 2017-11-14 ENCOUNTER — MEDICAL CORRESPONDENCE (OUTPATIENT)
Dept: HEALTH INFORMATION MANAGEMENT | Facility: CLINIC | Age: 72
End: 2017-11-14

## 2017-11-14 NOTE — TELEPHONE ENCOUNTER
[employer] [facility] application for Repatha signed by Dr. Burgso. Form faxed to [employer], copy sent to scan, copy mailed to pt.     Called pt to update: called her home-was directed to call her cell, [phone], no answer and no option for VM. Will try again later.    Ilene Shelton [facility] RN 11:48 AM 11/14/17  [phone]

## 2017-11-14 NOTE — TELEPHONE ENCOUNTER
Reviewed w/ pt [employer] for Repatha had been signed by Dr. Burgos, copy to her, to scan to [employer]. She appreciates assistance.    Ilene Shelton [facility] RN 1:14 PM 11/14/17  [phone]

## 2017-11-20 ENCOUNTER — CARE COORDINATION (OUTPATIENT)
Dept: CARDIOLOGY | Facility: CLINIC | Age: 72
End: 2017-11-20

## 2017-11-20 NOTE — TELEPHONE ENCOUNTER
Received VM from pt who states she received word today that her Repatha request was approved.  Will forward to Repatha coordinator, JOLANTA Berrios as FYI.  JOLANTA Gallardo 2:10 PM 11/20/2017

## 2017-11-20 NOTE — PROGRESS NOTES
"Received call from pt requesting to know when she can discontinue Plavix.    Per chart review, Dr. Burgos's OV note from 8/28/17 states, \"I told her that she could stop her Plavix on 11/22/2017 and consider back surgery at that time.\"    Reviewed with pt that Dr. Burgos recommends she can stop Plavix on 11/22/17.  Pt verbalized understanding and states she will contact back doctor to figure out when she can epidural after she stops Plavix on 11/22/17.  JOLANTA Gallardo 2:07 PM 11/20/2017    "

## 2017-12-12 ENCOUNTER — DOCUMENTATION ONLY (OUTPATIENT)
Dept: CARDIOLOGY | Facility: CLINIC | Age: 72
End: 2017-12-12

## 2017-12-12 ENCOUNTER — CARE COORDINATION (OUTPATIENT)
Dept: CARDIOLOGY | Facility: CLINIC | Age: 72
End: 2017-12-12

## 2017-12-12 ENCOUNTER — HOSPITAL ENCOUNTER (EMERGENCY)
Facility: CLINIC | Age: 72
Discharge: HOME | End: 2017-12-13
Attending: EMERGENCY MEDICINE | Admitting: EMERGENCY MEDICINE
Payer: COMMERCIAL

## 2017-12-12 DIAGNOSIS — R42 LIGHTHEADEDNESS: ICD-10-CM

## 2017-12-12 DIAGNOSIS — I20.89 STABLE ANGINA (H): ICD-10-CM

## 2017-12-12 DIAGNOSIS — R73.9 HYPERGLYCEMIA: ICD-10-CM

## 2017-12-12 DIAGNOSIS — N30.00 ACUTE CYSTITIS WITHOUT HEMATURIA: ICD-10-CM

## 2017-12-12 LAB
ALBUMIN UR-MCNC: NEGATIVE MG/DL
ANION GAP SERPL CALCULATED.3IONS-SCNC: 11 MMOL/L (ref 6–17)
APPEARANCE UR: CLEAR
BACTERIA #/AREA URNS HPF: ABNORMAL /HPF
BILIRUB UR QL STRIP: NEGATIVE
BUN SERPL-MCNC: 23 MG/DL (ref 7–30)
CA-I BLD-SCNC: 5.2 MG/DL (ref 4.4–5.2)
CHLORIDE BLD-SCNC: 104 MMOL/L (ref 94–109)
CO2 BLD-SCNC: 23 MMOL/L (ref 20–32)
COLOR UR AUTO: ABNORMAL
CREAT BLD-MCNC: 0.8 MG/DL (ref 0.52–1.04)
GFR SERPL CREATININE-BSD FRML MDRD: 71 ML/MIN/1.7M2
GLUCOSE BLD-MCNC: 399 MG/DL (ref 70–99)
GLUCOSE UR STRIP-MCNC: >499 MG/DL
HCT VFR BLD CALC: 36 %PCV (ref 35–47)
HGB BLD CALC-MCNC: 12.2 G/DL (ref 11.7–15.7)
HGB UR QL STRIP: NEGATIVE
KETONES UR STRIP-MCNC: NEGATIVE MG/DL
LEUKOCYTE ESTERASE UR QL STRIP: ABNORMAL
NITRATE UR QL: NEGATIVE
PH UR STRIP: 6 PH (ref 5–7)
POTASSIUM BLD-SCNC: 4.4 MMOL/L (ref 3.4–5.3)
RBC #/AREA URNS AUTO: 2 /HPF (ref 0–2)
SODIUM BLD-SCNC: 138 MMOL/L (ref 133–144)
SOURCE: ABNORMAL
SP GR UR STRIP: 1.03 (ref 1–1.03)
SQUAMOUS #/AREA URNS AUTO: <1 /HPF (ref 0–1)
UROBILINOGEN UR STRIP-MCNC: 0 MG/DL (ref 0–2)
WBC #/AREA URNS AUTO: 22 /HPF (ref 0–2)

## 2017-12-12 PROCEDURE — 80047 BASIC METABLC PNL IONIZED CA: CPT

## 2017-12-12 PROCEDURE — 99283 EMERGENCY DEPT VISIT LOW MDM: CPT | Mod: 25

## 2017-12-12 PROCEDURE — 96360 HYDRATION IV INFUSION INIT: CPT

## 2017-12-12 PROCEDURE — 87086 URINE CULTURE/COLONY COUNT: CPT | Performed by: EMERGENCY MEDICINE

## 2017-12-12 PROCEDURE — 85014 HEMATOCRIT: CPT

## 2017-12-12 PROCEDURE — 81001 URINALYSIS AUTO W/SCOPE: CPT | Performed by: EMERGENCY MEDICINE

## 2017-12-12 PROCEDURE — 87088 URINE BACTERIA CULTURE: CPT | Performed by: EMERGENCY MEDICINE

## 2017-12-12 PROCEDURE — 87186 SC STD MICRODIL/AGAR DIL: CPT | Performed by: EMERGENCY MEDICINE

## 2017-12-12 PROCEDURE — 25000128 H RX IP 250 OP 636: Performed by: EMERGENCY MEDICINE

## 2017-12-12 RX ORDER — ISOSORBIDE MONONITRATE 30 MG/1
15 TABLET, EXTENDED RELEASE ORAL DAILY
COMMUNITY
End: 2018-01-16

## 2017-12-12 RX ORDER — CEPHALEXIN 500 MG/1
500 CAPSULE ORAL ONCE
Status: COMPLETED | OUTPATIENT
Start: 2017-12-12 | End: 2017-12-13

## 2017-12-12 RX ORDER — CEPHALEXIN 500 MG/1
500 CAPSULE ORAL 2 TIMES DAILY
Qty: 14 CAPSULE | Refills: 0 | Status: SHIPPED | OUTPATIENT
Start: 2017-12-12 | End: 2017-12-19

## 2017-12-12 RX ADMIN — SODIUM CHLORIDE, POTASSIUM CHLORIDE, SODIUM LACTATE AND CALCIUM CHLORIDE 1000 ML: 600; 310; 30; 20 INJECTION, SOLUTION INTRAVENOUS at 23:06

## 2017-12-12 ASSESSMENT — ENCOUNTER SYMPTOMS
LIGHT-HEADEDNESS: 1
DIZZINESS: 1
NAUSEA: 1

## 2017-12-12 NOTE — ED AVS SNAPSHOT
[facility] Emergency Department    [address] [place] [address]    Phone:  [phone]    Fax:  [phone]                                       Sarah Longo   MRN: [id number]    Department:  [facility] Emergency Department   Date of Visit:  12/12/2017           Patient Information     Date Of Birth          1945        Your diagnoses for this visit were:     Acute cystitis without hematuria     Lightheadedness     Hyperglycemia        You were seen by Blair Sawyer MD.      Follow-up Information     Follow up with Gaye Zimmer In 2 days.    Specialty:  Internal Medicine    Contact information:    [facility]  [address] [facility] [address]  [phone]          Discharge Instructions         Understanding Urinary Tract Infections (UTIs)  Most UTIs are caused by bacteria, although they may also be caused by viruses or fungi. Bacteria from the bowel are the most common source of infection. The infection may start because of any of the following:    Sexual activity. During sex, bacteria can travel from the penis, vagina, or rectum into the urethra.     Bacteria on the skin outside the rectum may travel into the urethra. This is more common in women since the rectum and urethra are closer to each other than in men. Wiping from front to back after using the toilet and keeping the area clean can help prevent germs from getting to the urethra.    Blockage of urine flow through the urinary tract. If urine sits too long, germs may start to grow out of control.      Parts of the urinary tract  The infection can occur in any part of the urinary tract.    The kidneys collect and store urine.    The ureters carry urine from the kidneys to the bladder.    The bladder holds urine until you are ready to let it out.    The urethra carries urine from the bladder out of the body. It is shorter in women, so bacteria can move through it more  easily. The urethra is longer in men, so a UTI is less likely to reach the bladder or kidneys in men.  Date Last Reviewed: 1/1/2017 2000-2017 [employer]. [address] [place] [address], [place] [address]. All rights reserved. This information is not intended as a substitute for professional medical care. Always follow your healthcare professional's instructions.    Discharge Instructions  Urinary Tract Infection  You or your child have been diagnosed with a urinary tract infection, or UTI. The urinary tract includes the kidneys (which make urine/pee), ureters (the tubes that carry urine/pee from the kidneys to the bladder), the bladder (which stores urine/pee), and urethra (the tube that carries urine/pee out of the bladder). Urinary tract infections occur when bacteria travel up the urethra into the bladder (bladder infection) and, in some cases, from there into the kidneys (kidney infection).  Generally, every Emergency Department visit should have a follow-up clinic visit with either a primary or a specialty clinic/provider. Please follow-up as instructed by your emergency provider today.  Return to the Emergency Department if:    You or your child have severe back pain.    You or your child are vomiting (throwing up) so that you cannot take your medicine.    You or your child have a new fever (had not previously had a fever) over 101 F.    You or your child have confusion or are very weak, or feel very ill.    Your child seems much more ill, will not wake up, will not respond right, or is crying for a long time and will not calm down.    You or your child are showing signs of dehydration. These signs may include decreased urination (pee), dry mouth/gums/tongue, or decreased activity.    Follow-up with your provider:     Children under 24 months need to be seen by their regular provider within one week after a diagnosis of a UTI. It may be necessary to do some more tests to look at the child s kidney or  bladder.    You should begin to feel better within 24 - 48 hours of starting your antibiotic; follow-up with your regular clinic/doctor/provider if this is not the case.    Treatment:     You will be treated with an antibiotic to kill the bacteria. We have to make an educated guess, based on what we know about common bacteria and antibiotics, as to which antibiotic will work for your infection. We will be correct most times but there will be some cases where the antibiotic chosen is not correct (see urine cultures below).    Take a pain medication such as acetaminophen (Tylenol ) or ibuprofen (Advil , Motrin , Nuprin ).    Phenazopyridine (Pyridium , Uristat ) is a prescription medication that numbs the bladder to reduce the burning pain of some UTIs.  The same medication is available in a non-prescription version (Azo-Standard , Urodol ). This medication will change the color of the urine and tears (usually blue or orange). If you wear contacts, do not wear them while taking this medication as they may be stained by the medication.    Urine Cultures:    If indicated, a urine culture may have been performed today. This test generally takes 24-48 hours to complete so the results are not known at this time. The results can confirm that an infection is present but also determine which antibiotic is effective for the specific bacteria that is causing the infection. If your urine culture shows that the antibiotic you were given today will not work to treat your infection, we will attempt to contact you to make arrangements to change the antibiotic. If the culture confirms that the antibiotic is effective for your infection, you will not be contacted. We often recommend follow-up with your regular physician/provider on the culture results regardless of this process.    Antibiotic Warning:     If you have been placed on antibiotics - watch for signs of allergic reaction.  These include rash, lip swelling, difficulty  "breathing, wheezing, and dizziness.  If you develop any of these symptoms, stop the antibiotic immediately and go to an emergency room or urgent care for evaluation.    Probiotics: If you have been given an antibiotic, you may want to also take a probiotic pill or eat yogurt with live cultures. Probiotics have \"good bacteria\" to help your intestines stay healthy. Studies have shown that probiotics help prevent diarrhea and other intestine problems (including C. diff infection) when you take antibiotics. You can buy these without a prescription in the pharmacy section of the store.   If you were given a prescription for medicine here today, be sure to read all of the information (including the package insert) that comes with your prescription.  This will include important information about the medicine, its side effects, and any warnings that you need to know about.  The pharmacist who fills the prescription can provide more information and answer questions you may have about the medicine.  If you have questions or concerns that the pharmacist cannot address, please call or return to the Emergency Department.   Remember that you can always come back to the Emergency Department if you are not able to see your regular provider in the amount of time listed above, if you get any new symptoms, or if there is anything that worries you.          Future Appointments        Provider Department Dept Phone Center    2/2/2018 1:15 PM GUNNAR LEA MD [facility] [phone] [facility]      24 Hour Appointment Hotline       To make an appointment at any [facility], call [phone] ([phone]). If you don't have a family doctor or clinic, we will help you find one. [facility] are conveniently located to serve the needs of you and your family.             Review of your medicines      START taking        Dose / Directions Last dose taken    cephALEXin 500 MG capsule "   Commonly known as:  KEFLEX   Dose:  500 mg   Quantity:  14 capsule        Take 1 capsule (500 mg) by mouth 2 times daily for 7 days   Refills:  0          Our records show that you are taking the medicines listed below. If these are incorrect, please call your family doctor or clinic.        Dose / Directions Last dose taken    amoxicillin 500 MG capsule   Commonly known as:  AMOXIL        as needed Reported on 5/17/2017   Refills:  0        aspirin 81 MG tablet        1 tab po QD (Once per day)   Refills:  0        cholecalciferol 2000 UNITS Caps   Dose:  1 capsule        Take 1 capsule by mouth daily   Refills:  0        CINNAMON PO   Dose:  2 capsule        Take 2 capsules by mouth 2 times daily   Refills:  0        Co Enzyme Q-10 50 MG Caps        daliy   Refills:  0        evolocumab 140 MG/ML prefilled autoinjector   Commonly known as:  REPATHA SURECLICK   Dose:  140 mg   Quantity:  2 mL        Inject 1 mL (140 mg) Subcutaneous every 14 days   Refills:  11        glimepiride 4 MG tablet   Commonly known as:  AMARYL        1 tablet BID   Refills:  0        Gymnema Sylvestris Leaf Powd   Dose:  500 mg        500 mg 2 times daily   Refills:  0        isosorbide mononitrate 30 MG 24 hr tablet   Commonly known as:  IMDUR   Dose:  15 mg        Take 15 mg by mouth daily   Refills:  0        JENTADUETO 2.5-1000 MG per tablet   Dose:  1 tablet   Generic drug:  linagliptin-metFORMIN        Take 1 tablet by mouth 2 times daily (with meals)   Refills:  0        lisinopril 5 MG tablet   Commonly known as:  PRINIVIL/ZESTRIL   Dose:  2.5 mg   Quantity:  45 tablet        Take 0.5 tablets (2.5 mg) by mouth daily   Refills:  3        mirabegron 50 MG 24 hr tablet   Commonly known as:  MYRBETRIQ   Dose:  50 mg   Quantity:  90 tablet        Take 1 tablet (50 mg) by mouth daily   Refills:  3        nebivolol 5 MG tablet   Commonly known as:  BYSTOLIC   Dose:  5 mg   Quantity:  90 tablet        Take 1 tablet (5 mg) by mouth daily    Refills:  3        nitroGLYcerin 0.4 MG sublingual tablet   Commonly known as:  NITROSTAT   Dose:  0.4 mg   Quantity:  25 tablet        Place 1 tablet (0.4 mg) under the tongue every 5 minutes as needed for chest pain If symptoms persist after 3 doses (15 minutes) call 911.   Refills:  3        NONFORMULARY        Tumeric 500 mg bid   Refills:  0        OMEGA-3 FISH OIL PO   Dose:  2400 mg        Take 2,400 mg by mouth 2 times daily (with meals)   Refills:  0        PANTOPRAZOLE SODIUM PO   Dose:  20 mg        Take 20 mg by mouth 2 times daily (before meals)   Refills:  0        RESTASIS 0.05 % ophthalmic emulsion   Generic drug:  cycloSPORINE        twice daily   Refills:  0        SALONPAS 1.2-5.7-6.3 % Ptch   Generic drug:  Camphor-Menthol-Methyl Sal        Patient uses 3-4 patches daily   Refills:  0        STATIN NOT PRESCRIBED (INTENTIONAL)   Dose:  1 each   Quantity:  0 each        1 each At Bedtime Statin not prescribed intentionally due to Allergy to statin   Refills:  0        SYNTHROID 125 MCG tablet   Generic drug:  levothyroxine        1 tablet daily   Refills:  0                Prescriptions were sent or printed at these locations (1 Prescription)                   Other Prescriptions                Printed at Department/Unit printer (1 of 1)         cephALEXin (KEFLEX) 500 MG capsule                Procedures and tests performed during your visit     ISTAT Basic Met ICa HCT POCT    ISTAT Chem 8    Peripheral IV: Standard    UA with Microscopic      Orders Needing Specimen Collection     None      Pending Results     No orders found from 12/10/2017 to 12/13/2017.            Pending Culture Results     No orders found from 12/10/2017 to 12/13/2017.            Pending Results Instructions     If you had any lab results that were not finalized at the time of your Discharge, you can call the ED Lab Result RN at [phone]. You will be contacted by this team for any positive Lab results or changes in  treatment. The nurses are available 7 days a week from 10A to 6:30P.  You can leave a message 24 hours per day and they will return your call.        Test Results From Your Hospital Stay        12/12/2017 11:36 PM      Component Results     Component Value Ref Range & Units Status    Color Urine Straw  Final    Appearance Urine Clear  Final    Glucose Urine >499 (A) NEG^Negative mg/dL Final    Bilirubin Urine Negative NEG^Negative Final    Ketones Urine Negative NEG^Negative mg/dL Final    Specific Gravity Urine 1.029 1.003 - 1.035 Final    Blood Urine Negative NEG^Negative Final    pH Urine 6.0 5.0 - 7.0 pH Final    Protein Albumin Urine Negative NEG^Negative mg/dL Final    Urobilinogen mg/dL 0.0 0.0 - 2.0 mg/dL Final    Nitrite Urine Negative NEG^Negative Final    Leukocyte Esterase Urine Moderate (A) NEG^Negative Final    Source Midstream Urine  Final    WBC Urine 22 (H) 0 - 2 /HPF Final    RBC Urine 2 0 - 2 /HPF Final    Bacteria Urine Few (A) NEG^Negative /HPF Final    Squamous Epithelial /HPF Urine <1 0 - 1 /HPF Final         12/12/2017 11:51 PM      Component Results     Component Value Ref Range & Units Status    Sodium 138 133 - 144 mmol/L Final    Potassium 4.4 3.4 - 5.3 mmol/L Final    Chloride 104 94 - 109 mmol/L Final    Total CO2 23 20 - 32 mmol/L Final    Anion Gap 11 6 - 17 mmol/L Final    Glucose 399 (H) 70 - 99 mg/dL Final    Urea Nitrogen 23 7 - 30 mg/dL Final    Creatinine 0.8 0.52 - 1.04 mg/dL Final    GFR Estimate 71 >60 mL/min/1.7m2 Final    GFR Estimate If Black 86 >60 mL/min/1.7m2 Final    Calcium Ionized 5.2 4.4 - 5.2 mg/dL Final    Hemoglobin 12.2 11.7 - 15.7 g/dL Final    Hematocrit - POCT 36 35.0 - 47.0 %PCV Final                Clinical Quality Measure: Blood Pressure Screening     Your blood pressure was checked while you were in the emergency department today. The last reading we obtained was  BP: 160/65 . Please read the guidelines below about what these numbers mean and what you  "should do about them.  If your systolic blood pressure (the top number) is less than 120 and your diastolic blood pressure (the bottom number) is less than 80, then your blood pressure is normal. There is nothing more that you need to do about it.  If your systolic blood pressure (the top number) is 120-139 or your diastolic blood pressure (the bottom number) is 80-89, your blood pressure may be higher than it should be. You should have your blood pressure rechecked within a year by a primary care provider.  If your systolic blood pressure (the top number) is 140 or greater or your diastolic blood pressure (the bottom number) is 90 or greater, you may have high blood pressure. High blood pressure is treatable, but if left untreated over time it can put you at risk for heart attack, stroke, or kidney failure. You should have your blood pressure rechecked by a primary care provider within the next 4 weeks.  If your provider in the emergency department today gave you specific instructions to follow-up with your doctor or provider even sooner than that, you should follow that instruction and not wait for up to 4 weeks for your follow-up visit.        Thank you for choosing [facility]       Thank you for choosing [facility] for your care. Our goal is always to provide you with excellent care. Hearing back from our patients is one way we can continue to improve our services. Please take a few minutes to complete the written survey that you may receive in the mail after you visit with us. Thank you!        [employer] Information     [email or web address] lets you send messages to your doctor, view your test results, renew your prescriptions, schedule appointments and more. To sign up, go to [email or web address] . Click on \"Log in\" on the left side of the screen, which will take you to the Welcome page. Then click on \"Sign up Now\" on the right side of the page.     You will be asked to enter the access code listed below, as well as some " personal information. Please follow the directions to create your username and password.     Your access code is: [email or web address]  Expires: 3/12/2018 11:52 PM     Your access code will  in 90 days. If you need help or a new code, please call your [facility] clinic or [phone].        Care EveryWhere ID     This is your Care EveryWhere ID. This could be used by other organizations to access your [facility] medical records  [id number]        Equal Access to Services     [facility] : Hadleno templetono Soindra, waaxda luqadaha, qaybta kaalmada adelamontyacalista, meredith taylor . So [facility] [phone].    ATENCIÓN: Si habla español, tiene a briggs disposición servicios gratuitos de asistencia lingüística. Llame al [phone].    We comply with applicable federal civil rights laws and [place] laws. We do not discriminate on the basis of race, color, national origin, age, disability, sex, sexual orientation, or gender identity.            After Visit Summary       This is your record. Keep this with you and show to your community pharmacist(s) and doctor(s) at your next visit.

## 2017-12-12 NOTE — ED AVS SNAPSHOT
[facility] Emergency Department    [address]    [facility] [address]    Phone:  [phone]    Fax:  [phone]                                       Sarah Longo   MRN: [id number]    Department:  [facility] Emergency Department   Date of Visit:  12/12/2017           After Visit Summary Signature Page     I have received my discharge instructions, and my questions have been answered. I have discussed any challenges I see with this plan with the nurse or doctor.    ..........................................................................................................................................  Patient/Patient Representative Signature      ..........................................................................................................................................  Patient Representative Print Name and Relationship to Patient    ..................................................               ................................................  Date                                            Time    ..........................................................................................................................................  Reviewed by Signature/Title    ...................................................              ..............................................  Date                                                            Time

## 2017-12-12 NOTE — PROGRESS NOTES
Adjusted pt's med list to reflect current imdur dose.    Ilene Shelton [facility] RN 4:45 PM 12/12/17  [phone]

## 2017-12-12 NOTE — PROGRESS NOTES
Pt calls to report lightheadedness. She denies presyncope/syncope. She reports BP was checked after she received epidural inj for back pain and was 104/50. She states she is uncomfortable w/ BP being this low.     In the AM she takes imdur 30mg (ordered for 45mg daily), and in PM she takes lisinopril 2.5mg, and bystolic 5mg. She believes imdur is causing her sx and low BP.     We discussed options re: med adjustments Dr. Burgos may recommend. She prefers to lower imdur to 15mg QAM and hold tonight's lisinopril and bystolic. She has not been experiencing CP. I instructed her to be watchful for reoccurrence of CP and asked her to call RN if she has worsening lightheadedness, or if sx don't improve after above changes, if she has increased CP episodes. Discussed w/ her that we can arrange in clinic f/u if sx don't improve w/ above changes. I told her to call 911 if she feels presyncopal/syncopal.     She does not have access to a BP cuff. She wonders if Dr. Burgos will rx one for her and what cost will be. Will forward to Dr. Burgos and I told her to call her insurance to discuss cost.     Ilene Shelton [facility] RN 4:41 PM 12/12/17  [phone]

## 2017-12-13 VITALS
SYSTOLIC BLOOD PRESSURE: 153 MMHG | DIASTOLIC BLOOD PRESSURE: 71 MMHG | RESPIRATION RATE: 18 BRPM | TEMPERATURE: 98.4 F | HEART RATE: 68 BPM | OXYGEN SATURATION: 99 %

## 2017-12-13 PROCEDURE — 25000132 ZZH RX MED GY IP 250 OP 250 PS 637: Performed by: EMERGENCY MEDICINE

## 2017-12-13 RX ADMIN — CEPHALEXIN 500 MG: 500 CAPSULE ORAL at 00:02

## 2017-12-13 NOTE — DISCHARGE INSTRUCTIONS
Understanding Urinary Tract Infections (UTIs)  Most UTIs are caused by bacteria, although they may also be caused by viruses or fungi. Bacteria from the bowel are the most common source of infection. The infection may start because of any of the following:    Sexual activity. During sex, bacteria can travel from the penis, vagina, or rectum into the urethra.     Bacteria on the skin outside the rectum may travel into the urethra. This is more common in women since the rectum and urethra are closer to each other than in men. Wiping from front to back after using the toilet and keeping the area clean can help prevent germs from getting to the urethra.    Blockage of urine flow through the urinary tract. If urine sits too long, germs may start to grow out of control.      Parts of the urinary tract  The infection can occur in any part of the urinary tract.    The kidneys collect and store urine.    The ureters carry urine from the kidneys to the bladder.    The bladder holds urine until you are ready to let it out.    The urethra carries urine from the bladder out of the body. It is shorter in women, so bacteria can move through it more easily. The urethra is longer in men, so a UTI is less likely to reach the bladder or kidneys in men.  Date Last Reviewed: 1/1/2017 2000-2017 [employer]. [address] [place] [address], [place] [id number]. All rights reserved. This information is not intended as a substitute for professional medical care. Always follow your healthcare professional's instructions.    Discharge Instructions  Urinary Tract Infection  You or your child have been diagnosed with a urinary tract infection, or UTI. The urinary tract includes the kidneys (which make urine/pee), ureters (the tubes that carry urine/pee from the kidneys to the bladder), the bladder (which stores urine/pee), and urethra (the tube that carries urine/pee out of the bladder). Urinary tract infections occur when bacteria  travel up the urethra into the bladder (bladder infection) and, in some cases, from there into the kidneys (kidney infection).  Generally, every Emergency Department visit should have a follow-up clinic visit with either a primary or a specialty clinic/provider. Please follow-up as instructed by your emergency provider today.  Return to the Emergency Department if:    You or your child have severe back pain.    You or your child are vomiting (throwing up) so that you cannot take your medicine.    You or your child have a new fever (had not previously had a fever) over 101 F.    You or your child have confusion or are very weak, or feel very ill.    Your child seems much more ill, will not wake up, will not respond right, or is crying for a long time and will not calm down.    You or your child are showing signs of dehydration. These signs may include decreased urination (pee), dry mouth/gums/tongue, or decreased activity.    Follow-up with your provider:     Children under 24 months need to be seen by their regular provider within one week after a diagnosis of a UTI. It may be necessary to do some more tests to look at the child s kidney or bladder.    You should begin to feel better within 24 - 48 hours of starting your antibiotic; follow-up with your regular clinic/doctor/provider if this is not the case.    Treatment:     You will be treated with an antibiotic to kill the bacteria. We have to make an educated guess, based on what we know about common bacteria and antibiotics, as to which antibiotic will work for your infection. We will be correct most times but there will be some cases where the antibiotic chosen is not correct (see urine cultures below).    Take a pain medication such as acetaminophen (Tylenol ) or ibuprofen (Advil , Motrin , Nuprin ).    Phenazopyridine (Pyridium , Uristat ) is a prescription medication that numbs the bladder to reduce the burning pain of some UTIs.  The same medication is  "available in a non-prescription version (Azo-Standard , Urodol ). This medication will change the color of the urine and tears (usually blue or orange). If you wear contacts, do not wear them while taking this medication as they may be stained by the medication.    Urine Cultures:    If indicated, a urine culture may have been performed today. This test generally takes 24-48 hours to complete so the results are not known at this time. The results can confirm that an infection is present but also determine which antibiotic is effective for the specific bacteria that is causing the infection. If your urine culture shows that the antibiotic you were given today will not work to treat your infection, we will attempt to contact you to make arrangements to change the antibiotic. If the culture confirms that the antibiotic is effective for your infection, you will not be contacted. We often recommend follow-up with your regular physician/provider on the culture results regardless of this process.    Antibiotic Warning:     If you have been placed on antibiotics - watch for signs of allergic reaction.  These include rash, lip swelling, difficulty breathing, wheezing, and dizziness.  If you develop any of these symptoms, stop the antibiotic immediately and go to an emergency room or urgent care for evaluation.    Probiotics: If you have been given an antibiotic, you may want to also take a probiotic pill or eat yogurt with live cultures. Probiotics have \"good bacteria\" to help your intestines stay healthy. Studies have shown that probiotics help prevent diarrhea and other intestine problems (including C. diff infection) when you take antibiotics. You can buy these without a prescription in the pharmacy section of the store.   If you were given a prescription for medicine here today, be sure to read all of the information (including the package insert) that comes with your prescription.  This will include important " information about the medicine, its side effects, and any warnings that you need to know about.  The pharmacist who fills the prescription can provide more information and answer questions you may have about the medicine.  If you have questions or concerns that the pharmacist cannot address, please call or return to the Emergency Department.   Remember that you can always come back to the Emergency Department if you are not able to see your regular provider in the amount of time listed above, if you get any new symptoms, or if there is anything that worries you.

## 2017-12-13 NOTE — ED PROVIDER NOTES
History     Chief Complaint:  Dizziness      HPI   Sarah Longo is a 72 year old diabetic female, with a history of HTN. who presents with dizziness, which she described as lightheadedness, and nausea in the context of an epidural given this afternoon. She was told if she had lightheadedness to come to the ED. She was lightheaded upon standing up prior to arrival with a blood pressure of 104/50, which has been moving up and down. She notes her sugars are high, which they normally are after she receives the epidural steroid injection. She's not been vomiting.    Allergies:  No known drug allergies.     Medications:    Imdur  Bystolic  Lisinopril  Evolocumab   Salonpas  Linagliptin-Metformin  Pantoprazole  Cholecalciferol  Mirabegron  Gymnema Sylvestris Leaf  Nitroglycerin  Statin  Cinnamon  Glimepiride  Synthroid  Restasis   Coenzyme Q  Aspirin 81 mg     Past Medical History:    Arthritis  CAD  Cystocele  Depression  Hyperplastic polyp  Hypothyroidism  Overactive bladder  Osteoarthritis  HLD  HTN  Postmenopausal bleeding  DM type 2    Past Surgical History:    Ablation  CT angiography coronary  Ligate fallopian tube  Colonoscopy  Excise lesion upper extremity   Tonsillectomy  Left heart cath, no intervention  Left heart cath, MAYA x2 to OM1, MAYA to LAD   Left heart cath, MAYA to RCA  Left heart cath, mild to moderate 3 vessel CAD  Left heart cath, MAYA to OM1  Knee surgery  Remove stimulator sacral nerve  Uterine endometrial ablation    Family History:    CAD  HLD  HTN  Osteoporosis     Social History:  Marital Status:   Presents to the ED with her .   Tobacco Use: Never  Alcohol Use: No  PCP: Gaye Zimmer     Review of Systems   Gastrointestinal: Positive for nausea.   Neurological: Positive for dizziness and light-headedness.   All other systems reviewed and are negative.    Physical Exam   First Vitals:  BP: 184/77  Pulse: 94  Temp: 98.4  F (36.9  C)  Resp: 18  SpO2: 99 %      Physical  Exam  General: Patient is alert and interactive when I enter the room  Head:  The scalp, face, and head appear normal  Eyes:  The pupils are equal, round, and reactive to light    Conjunctivae and sclerae are normal  ENT:    External acoustic canals are normal    The oropharynx is normal without erythema.     Uvula is in the midline  Neck:  Normal range of motion  CV:  Regular rate. S1/S2. No murmurs.   Resp:  Lungs are clear without wheezes or rales. No distress  GI:  Abdomen is soft, no rigidity, guarding, or rebound    No distension. No tenderness to palpation in any quadrant.     MS:  Normal tone. Joints grossly normal without effusions.     No asymmetric leg swelling, calf or thigh tenderness.      Normal motor assessment of all extremities.  Skin:  No rash or lesions noted. Normal capillary refill noted  Neuro: Speech is normal and fluent. Face is symmetric.     Moving all extremities well. 5/5 UE and LE strength. Reflexes 2+ and symmetric.   Psych:  Awake. Alert.  Normal affect.  Appropriate interactions.  Lymph: No anterior cervical lymphadenopathy noted      Emergency Department Course     Laboratory:  Component Results   Component Value Flag Ref Range Units Status Collected Lab   Color Urine Straw    Final 12/12/2017 11:12 PM [facility]   Appearance Urine Clear    Final 12/12/2017 11:12 PM [facility]   Glucose Urine >499 (A) NEG^Negative mg/dL Final 12/12/2017 11:12 PM [facility]   Bilirubin Urine Negative  NEG^Negative  Final 12/12/2017 11:12 PM [facility]   Ketones Urine Negative  NEG^Negative mg/dL Final 12/12/2017 11:12 PM [facility]   Specific Gravity Urine 1.029  1.003 - 1.035  Final 12/12/2017 11:12 PM [facility]   Blood Urine Negative  NEG^Negative  Final 12/12/2017 11:12 PM [facility]   pH Urine 6.0  5.0 - 7.0 pH Final 12/12/2017 11:12 PM [facility]   Protein Albumin Urine Negative  NEG^Negative mg/dL Final 12/12/2017 11:12 PM [facility]   Urobilinogen mg/dL 0.0  0.0 - 2.0 mg/dL Final 12/12/2017 11:12 PM [facility]   Nitrite Urine  Negative  NEG^Negative  Final 12/12/2017 11:12 PM [facility]   Leukocyte Esterase Urine Moderate (A) NEG^Negative  Final 12/12/2017 11:12 PM [facility]   Source Midstream Urine    Final 12/12/2017 11:12 PM [facility]   WBC Urine 22 (H) 0 - 2 /HPF Final 12/12/2017 11:12 PM [facility]   RBC Urine 2  0 - 2 /HPF Final 12/12/2017 11:12 PM [facility]   Bacteria Urine Few (A) NEG^Negative /HPF Final 12/12/2017 11:12 PM [facility]   Squamous Epithelial /HPF Urine <1  0 - 1 /HPF Final 12/12/2017 11:12 PM [facility]       Interventions:  2306: Lactated ringers, 1 liter, IV bolus    Medications   lactated ringers BOLUS 1,000 mL (1,000 mLs Intravenous New Bag 12/12/17 2306)       Emergency Department Course:  Nursing notes and vitals reviewed.  2258: I performed an exam of the patient as documented above.  The above workup was undertaken.  1150*: I rechecked the patient and discussed results.  She ambulated and did well    Findings and plan explained to the Patient. Patient discharged home, status improved, with instructions regarding supportive care, medications, and reasons to return as well as the importance of close follow-up was reviewed. Patient was prescribed  keflex.     Impression & Plan      Medical Decision Making:  Sarah Longo is a 72 year old female who presents for evaluation of lightheadedness.  This is clearly not a vertiginous sensation so would not do further workup for peripheral or central vertigo.  A broad differential of their lightheadedness was considered including anemia, electrolyte abnormality, medication, stroike, ACS, orthostasis, dissection, dehydration, presyncope, arrhythmias, metabolic disturbances, endocrine disturbances, hyperglycemia, infection, etc. No serious etiologies of lightheadedness were found in workup here today.  They feel improved after fluids.  She has hyperglycemia without acidosis or an anion gap. This is likely due to steroid effect from her epidural today. She also has a urinary tract infection.  I will treat her with Keflex at home we talked about minimizing falls tonightI feel she is safe to go home at this time. I think the hyperglycemia will trend down slowly as a steroid leaves her system. I would not hospitalize her at this point.Supportive outpatient management is indicated.       Diagnosis:    ICD-10-CM    1. Acute cystitis without hematuria N30.00 ISTAT Basic Met ICa HCT POCT     ISTAT Basic Met ICa HCT POCT   2. Lightheadedness R42    3. Hyperglycemia R73.9        Disposition:  discharged to home    Discharge Medications:  New Prescriptions    CEPHALEXIN (KEFLEX) 500 MG CAPSULE    Take 1 capsule (500 mg) by mouth 2 times daily for 7 days              Blair Sawyer MD  12/13/17 0004

## 2017-12-13 NOTE — ED NOTES
Pt reports she had an epidural this afternoon for back pain. Ever since then pt has been feeling dizzy and was concerned that her blood pressure was low.

## 2017-12-13 NOTE — TELEPHONE ENCOUNTER
OK to reduce Imdur to 15 mg daily. Do not make too many changes at once. BP is not dangerously low. BP cuff is ok to prescribe. Send Rx to patient. EE

## 2017-12-14 NOTE — PROGRESS NOTES
Called and spoke with pt.  Discussed that Dr. Burgos agrees that she can decrease Imdur to 15mg daily.  Also discussed that Dr. Burgos will Rx BP cuff.  She reports she saw her PMD yesterday, 12/13, and was given Rx for BP cuff that was denied by her insurance and nurse and PMD clinic is working on appealing the denial. Advised pt that once she gets BP cuff, call clinic if BP is consistently low even with decreased Imdur dose of 15mg daily and will review with Dr. Moody or Felipe.  Pt verbalized understanding and agrees with this plan.  JOLANTA Gallardo 4:40 PM 12/14/2017

## 2017-12-15 ENCOUNTER — TELEPHONE (OUTPATIENT)
Dept: EMERGENCY MEDICINE | Facility: CLINIC | Age: 72
End: 2017-12-15

## 2017-12-15 DIAGNOSIS — Z91.89 AT RISK FOR NAUSEA: ICD-10-CM

## 2017-12-15 DIAGNOSIS — N30.00 ACUTE CYSTITIS WITHOUT HEMATURIA: ICD-10-CM

## 2017-12-15 LAB
BACTERIA SPEC CULT: ABNORMAL
Lab: ABNORMAL
SPECIMEN SOURCE: ABNORMAL

## 2017-12-15 RX ORDER — SULFAMETHOXAZOLE/TRIMETHOPRIM 800-160 MG
1 TABLET ORAL 2 TIMES DAILY
Qty: 6 TABLET | Refills: 0 | Status: SHIPPED | OUTPATIENT
Start: 2017-12-15 | End: 2017-12-18

## 2017-12-15 RX ORDER — ONDANSETRON 4 MG/1
4 TABLET, ORALLY DISINTEGRATING ORAL EVERY 12 HOURS PRN
Qty: 10 TABLET | Refills: 0 | Status: SHIPPED | OUTPATIENT
Start: 2017-12-15 | End: 2017-12-18

## 2017-12-15 NOTE — TELEPHONE ENCOUNTER
[facility] Emergency Department Lab result notification [Adult-Female]    [place] ED lab result protocol used  Urine Culture    Reason for call  Notify of lab results, assess symptoms,  review ED providers recommendations/discharge instructions (if necessary) and advise per ED lab result f/u protocol    Lab Result (including Rx patient on, if applicable)  Final Urine Culture Report on 12/15/17  [place] ED discharge antibiotic: Cephalexin (Keflex) 500 mg capsule, 1 capsule (500 mg) by mouth 2 times daily for 7 days  #1. Bacteria, >100,000 colonies/mL Citrobacter freundii complex,  is [RESISTANT] to ED discharge antibiotic.   Change in treatment as per [place] ED Lab result protocol.     Information table from ED Provider visit on 12/12/17  ED diagnosis   Acute cystitis without hematuria    ED provider   Blair Sawyer MD   Symptoms reported at ED visit (Chief complaint, HPI) Sarah Longo is a 72 year old diabetic female, with a history of HTN. who presents with dizziness, which she described as lightheadedness, and nausea in the context of an epidural given this afternoon. She was told if she had lightheadedness to come to the ED. She was lightheaded upon standing up prior to arrival with a blood pressure of 104/50, which has been moving up and down. She notes her sugars are high, which they normally are after she receives the epidural steroid injection. She's not been vomiting.      ED providers Impression and Plan (applicable information) Sarah Longo is a 72 year old female who presents for evaluation of lightheadedness.  This is clearly not a vertiginous sensation so would not do further workup for peripheral or central vertigo.  A broad differential of their lightheadedness was considered including anemia, electrolyte abnormality, medication, stroike, ACS, orthostasis, dissection, dehydration, presyncope, arrhythmias, metabolic disturbances, endocrine disturbances,  "hyperglycemia, infection, etc. No serious etiologies of lightheadedness were found in workup here today.  They feel improved after fluids.  She has hyperglycemia without acidosis or an anion gap. This is likely due to steroid effect from her epidural today. She also has a urinary tract infection. I will treat her with Keflex at home we talked about minimizing falls tonightI feel she is safe to go home at this time. I think the hyperglycemia will trend down slowly as a steroid leaves her system. I would not hospitalize her at this point.Supportive outpatient management is indicated.    Significant Medical hx, if applicable CAD, HTN, DM, overactive bladder, cystocele   Coumadin/Warfarin [Yes /No] No   Creatinine Level (mg/dl) Not available   Creatinine clearance (ml/min), if applicable Not available   Pregnant (Yes/No/NA) No   Breastfeeding (Yes/No/NA) No   Allergies NKDA   Weight, if applicable 78 Kg      RN Assessment (Patient s current Symptoms), include time called.  [Insert Left message here if message left]  Msg left at 10:04 am.  \"I've been feeling miserable\".  Continued urinary frequency/urgency.  Home care advise, questions answered.  Zofran ordered PRN per RN protocol per patient's request.    RN Recommendations/Instructions per [place] ED lab result protocol  Patient notified of lab result and treatment recommendations.  Rx for Bactrim sent to [Pharmacy - [facility]].  RN reviewed information about stopping Keflex once Bactrim obtained.    Please Contact your PCP clinic or return to the Emergency department if your:    Symptoms return.    Symptoms do not improve after 3 days on antibiotic.    Symptoms do not resolve after completing antibiotic.    Symptoms worsen or other concerning symptom's.    PCP follow-up Questions asked: YES       Kika Warren RN    [facility] RN  Lung Nodule and ED Lab Results F/U RN  Epic pool (ED late result f/u RN) : P [id number]   # [phone]    Copy of Lab result   " Order   Urine Culture Aerobic Bacterial [[id number]] (Order [id number])   Exam Information   Exam Date Exam Time Accession # Results    12/12/17 11:12 PM [id number]    Component Results   Component Collected Lab   Specimen Description 12/12/2017 11:12 PM 75   Midstream Urine   Special Requests 12/12/2017 11:12 PM 75   Specimen received in preservative   Culture Micro (Abnormal) 12/12/2017 11:12 PM 75   >100,000 colonies/mL   Citrobacter freundii complex      Culture & Susceptibility   CITROBACTER FREUNDII COMPLEX   Antibiotic Interpretation Sensitivity Unit Method Status   AMPICILLIN Resistant >=32 ug/mL SUHA Final   AMPICILLIN/SULBACTAM Resistant >=32 ug/mL SUHA Final   CEFAZOLIN Resistant >=64 ug/mL SUHA Final   Comment: Cefazolin SUHA breakpoints are for the treatment of uncomplicated urinary tract   infections.  For the treatment of systemic infections, please contact the   laboratory for additional testing.   CEFEPIME Sensitive <=1 ug/mL SUHA Final   CEFOXITIN Resistant >=64 ug/mL SUHA Final   CEFTAZIDIME Sensitive <=1 ug/mL SUHA Final   CEFTRIAXONE Sensitive <=1 ug/mL SUHA Final   CIPROFLOXACIN Sensitive <=0.25 ug/mL SUHA Final   GENTAMICIN Sensitive <=1 ug/mL SUHA Final   LEVOFLOXACIN Sensitive <=0.12 ug/mL SUHA Final   NITROFURANTOIN Sensitive <=16 ug/mL SUHA Final   Piperacillin/Tazo Sensitive <=4 ug/mL SUHA Final   TOBRAMYCIN Sensitive <=1 ug/mL SUHA Final   Trimethoprim/Sulfa Sensitive <=1/19 ug/mL SUHA Final

## 2018-01-16 ENCOUNTER — CARE COORDINATION (OUTPATIENT)
Dept: CARDIOLOGY | Facility: CLINIC | Age: 73
End: 2018-01-16

## 2018-01-16 DIAGNOSIS — I10 BENIGN ESSENTIAL HYPERTENSION: Primary | ICD-10-CM

## 2018-01-16 RX ORDER — ISOSORBIDE MONONITRATE 30 MG/1
15 TABLET, EXTENDED RELEASE ORAL DAILY
Qty: 30 TABLET | Refills: 0 | Status: SHIPPED | OUTPATIENT
Start: 2018-01-16 | End: 2018-02-02

## 2018-01-16 NOTE — TELEPHONE ENCOUNTER
Received call from patient requesting a refill on her Imdur. Pt was last seen on 8/2017 by , no changes in medication dose. Told patient will refill Imdur for 30 days. She has a scheduled 6 month f/u to see  on 2/2/18. Medication (Imdur 30 mg) tabs e-scripted to [facility] pharmacy in [place] per pt request. Dorinda Leahy RN 11:15 AM 01/16/18

## 2018-01-25 ENCOUNTER — TELEPHONE (OUTPATIENT)
Dept: CARDIOLOGY | Facility: CLINIC | Age: 73
End: 2018-01-25

## 2018-01-25 NOTE — TELEPHONE ENCOUNTER
Pt called - left voice message to call back  Called pt back - she is concerned over cost of Bystolic.  She has enough to last until her appt 02/02/2018 with Dr. Burgos.  Asked her to continue and discuss at that OV with Dr. Burgos.  Pt taking 1/2tab of the 5 mg bystolic daily  She also takes 1/2tab of lisinopril 5mg daily  She states BP has been labile-  She called 12/12/2017 with concerns of lightheadedness and /60.  she was told to present to ER if presyncope or syncope.   Pt went to ER- In ER dictation it is noted BP was 104/70 but Vital signs flow sheet showing /77 . HR 92bpm.  12/12/2017 ER BMP showing normal results except glucose 399.   Pt also states she is very fatigued - wonders if she should have B12.  Encouraged her to follow up with PCP regarding that question.  OV 02/02/2018.

## 2018-02-02 ENCOUNTER — OFFICE VISIT (OUTPATIENT)
Dept: CARDIOLOGY | Facility: CLINIC | Age: 73
End: 2018-02-02
Payer: COMMERCIAL

## 2018-02-02 VITALS
HEIGHT: 68 IN | BODY MASS INDEX: 26.27 KG/M2 | WEIGHT: 173.3 LBS | HEART RATE: 72 BPM | DIASTOLIC BLOOD PRESSURE: 74 MMHG | SYSTOLIC BLOOD PRESSURE: 138 MMHG

## 2018-02-02 DIAGNOSIS — I20.89 STABLE ANGINA (H): ICD-10-CM

## 2018-02-02 DIAGNOSIS — G89.29 CHRONIC LOW BACK PAIN WITHOUT SCIATICA, UNSPECIFIED BACK PAIN LATERALITY: ICD-10-CM

## 2018-02-02 DIAGNOSIS — I10 ESSENTIAL HYPERTENSION: ICD-10-CM

## 2018-02-02 DIAGNOSIS — I25.10 CORONARY ARTERY DISEASE INVOLVING NATIVE CORONARY ARTERY OF NATIVE HEART WITHOUT ANGINA PECTORIS: Primary | ICD-10-CM

## 2018-02-02 DIAGNOSIS — M54.50 CHRONIC LOW BACK PAIN WITHOUT SCIATICA, UNSPECIFIED BACK PAIN LATERALITY: ICD-10-CM

## 2018-02-02 DIAGNOSIS — R07.9 CHEST PAIN, UNSPECIFIED TYPE: ICD-10-CM

## 2018-02-02 DIAGNOSIS — E78.5 HYPERLIPIDEMIA, UNSPECIFIED HYPERLIPIDEMIA TYPE: ICD-10-CM

## 2018-02-02 DIAGNOSIS — E11.9 TYPE 2 DIABETES MELLITUS WITHOUT COMPLICATION, UNSPECIFIED LONG TERM INSULIN USE STATUS: ICD-10-CM

## 2018-02-02 PROCEDURE — 99214 OFFICE O/P EST MOD 30 MIN: CPT | Performed by: INTERNAL MEDICINE

## 2018-02-02 RX ORDER — LISINOPRIL 5 MG/1
5 TABLET ORAL DAILY
Qty: 90 TABLET | Refills: 3 | Status: SHIPPED | OUTPATIENT
Start: 2018-02-02 | End: 2018-05-09

## 2018-02-02 RX ORDER — LABETALOL 100 MG/1
50 TABLET, FILM COATED ORAL 2 TIMES DAILY
Qty: 90 TABLET | Refills: 3 | Status: SHIPPED | OUTPATIENT
Start: 2018-02-02 | End: 2018-02-05 | Stop reason: ALTCHOICE

## 2018-02-02 RX ORDER — ISOSORBIDE MONONITRATE 30 MG/1
15 TABLET, EXTENDED RELEASE ORAL DAILY
Qty: 45 TABLET | Refills: 3 | Status: SHIPPED | OUTPATIENT
Start: 2018-02-02 | End: 2018-02-28

## 2018-02-02 RX ORDER — CELECOXIB 100 MG/1
100 CAPSULE ORAL DAILY PRN
Qty: 60 CAPSULE | Refills: 11 | Status: SHIPPED | OUTPATIENT
Start: 2018-02-02 | End: 2018-03-07

## 2018-02-02 NOTE — PROGRESS NOTES
HPI and Plan:   See dictation    Orders Placed This Encounter   Procedures     NM Lexiscan stress test (nuc card)     Basic metabolic panel     Lipid Profile     ALT     Follow-Up with Cardiologist     Follow-Up with Cardiac Advanced Practice Provider       Orders Placed This Encounter   Medications     empagliflozin (JARDIANCE) 25 MG TABS tablet     Sig: Take 25 mg by mouth every other day     labetalol (NORMODYNE) 100 MG tablet     Sig: Take 0.5 tablets (50 mg) by mouth 2 times daily     Dispense:  90 tablet     Refill:  3     isosorbide mononitrate (IMDUR) 30 MG 24 hr tablet     Sig: Take 0.5 tablets (15 mg) by mouth daily     Dispense:  45 tablet     Refill:  3     lisinopril (PRINIVIL/ZESTRIL) 5 MG tablet     Sig: Take 1 tablet (5 mg) by mouth daily     Dispense:  90 tablet     Refill:  3     celecoxib (CELEBREX) 100 MG capsule     Sig: Take 1 capsule (100 mg) by mouth daily as needed for moderate pain     Dispense:  60 capsule     Refill:  11       Medications Discontinued During This Encounter   Medication Reason     linagliptin-metFORMIN (JENTADUETO) 2.5-1000 MG per tablet Stopped by Patient     PANTOPRAZOLE SODIUM PO Stopped by Patient     nebivolol (BYSTOLIC) 5 MG tablet      isosorbide mononitrate (IMDUR) 30 MG 24 hr tablet Reorder     lisinopril (PRINIVIL/ZESTRIL) 5 MG tablet Reorder         Encounter Diagnoses   Name Primary?     Coronary artery disease involving native coronary artery of native heart without angina pectoris Yes     Essential hypertension      Stable angina (H)      Hyperlipidemia, unspecified hyperlipidemia type      Type 2 diabetes mellitus without complication, unspecified long term insulin use status (H)      Chest pain, unspecified type      Chronic low back pain without sciatica, unspecified back pain laterality        CURRENT MEDICATIONS:  Current Outpatient Prescriptions   Medication Sig Dispense Refill     empagliflozin (JARDIANCE) 25 MG TABS tablet Take 25 mg by mouth every  other day       labetalol (NORMODYNE) 100 MG tablet Take 0.5 tablets (50 mg) by mouth 2 times daily 90 tablet 3     isosorbide mononitrate (IMDUR) 30 MG 24 hr tablet Take 0.5 tablets (15 mg) by mouth daily 45 tablet 3     lisinopril (PRINIVIL/ZESTRIL) 5 MG tablet Take 1 tablet (5 mg) by mouth daily 90 tablet 3     celecoxib (CELEBREX) 100 MG capsule Take 1 capsule (100 mg) by mouth daily as needed for moderate pain 60 capsule 11     evolocumab (REPATHA SURECLICK) 140 MG/ML prefilled autoinjector Inject 1 mL (140 mg) Subcutaneous every 14 days (Patient taking differently: Inject 140 mg Subcutaneous every 14 days Has not started) 2 mL 11     Camphor-Menthol-Methyl Sal (SALONPAS) 1.2-5.7-6.3 % PTCH Patient uses 3-4 patches daily       cholecalciferol 2000 UNITS CAPS Take 1 capsule by mouth daily       amoxicillin (AMOXIL) 500 MG capsule as needed Reported on 5/17/2017       NONFORMULARY Tumeric 500 mg bid       mirabegron (MYRBETRIQ) 50 MG 24 hr tablet Take 1 tablet (50 mg) by mouth daily 90 tablet 3     Gymnema Sylvestris Leaf POWD 500 mg 2 times daily        nitroglycerin (NITROSTAT) 0.4 MG SL tablet Place 1 tablet (0.4 mg) under the tongue every 5 minutes as needed for chest pain If symptoms persist after 3 doses (15 minutes) call 911. 25 tablet 3     Omega-3 Fatty Acids (OMEGA-3 FISH OIL PO) Take 2,400 mg by mouth 2 times daily (with meals)       CINNAMON PO Take 2 capsules by mouth 2 times daily       GLIMEPIRIDE 4 MG OR TABS 1 tablet BID       SYNTHROID 125 MCG OR TABS 1 tablet daily       RESTASIS 0.05 % OP EMUL twice daily       CO ENZYME Q-10 50 MG OR CAPS daliy  0     ASPIRIN 81 MG OR TABS 1 tab po QD (Once per day)       [DISCONTINUED] isosorbide mononitrate (IMDUR) 30 MG 24 hr tablet Take 0.5 tablets (15 mg) by mouth daily 30 tablet 0     [DISCONTINUED] lisinopril (PRINIVIL/ZESTRIL) 5 MG tablet Take 0.5 tablets (2.5 mg) by mouth daily 45 tablet 3     STATIN NOT PRESCRIBED, INTENTIONAL, 1 each At Bedtime  Statin not prescribed intentionally due to Allergy to statin 0 each 0       ALLERGIES     Allergies   Allergen Reactions     No Known Drug Allergies        PAST MEDICAL HISTORY:  Past Medical History:   Diagnosis Date     Arthritis      Coronary artery disease 04/28/2016    PTCA with MAYA x 2 to OM1 and MAYA x 1 to p.LAD (4/21/2016 at [facility]); PTCA with intracoronary stent placement of RCA June 2004; MAYA to OM1 11/2016     Cystocele, midline 09/29/2010     Depression      HYPERPLASTIC  POLYP      colonoscopy in 5-10 yrs.     Hypothyroidism     hypothyroid- Dr Majano     OAB (overactive bladder)     complex voiding dysfct, failed interstim     Osteoarthritis      Other and unspecified hyperlipidemia      Postmenopausal bleeding      Shoulder blade pain 5/31/2016     Type II or unspecified type diabetes mellitus without mention of complication, not stated as uncontrolled     Dr. Majano     Unspecified essential hypertension      Urinary frequency 9/29/10    followed by urology. no benefit from detrol or sanctura. month trial of macrobid and flomax 10/10       PAST SURGICAL HISTORY:  Past Surgical History:   Procedure Laterality Date     ------------OTHER-------------      Interstim     Ablation       C CT ANGIOGRAPHY CORONARY  1/2005    mod soft plaque LAD and Cx, follow up with left heart cath     C LIGATE FALLOPIAN TUBE       COLONOSCOPY       EXCISE LESION UPPER EXTREMITY  6/5/2013    Procedure: EXCISE LESION UPPER EXTREMITY;  EXCISION RIGHT ARM ANTICUBITAL LIPOMA ;  Surgeon: Jayson Joe MD;  Location: [facility] REMOVAL OF TONSILS,13+ Y/O       HEART CATH LEFT HEART CATH  3/2/2016    No intervention - aggressive medical management     HEART CATH LEFT HEART CATH  4/21/2016     MAYA x 2 to OM1, MAYA to LAD, at [facility]     HEART CATH LEFT HEART CATH  6/29/2004    MAYA to RCA     HEART CATH LEFT HEART CATH  3/28/2002    Mild to moderate 3 vessel CAD. Medical management recommended.      HEART CATH LEFT HEART CATH   2016    MAYA to OM1     KNEE SURGERY  4/20/10    right knee replacement     REMOVE STIMULATOR SACRAL NERVE N/A 2015    Procedure: REMOVE STIMULATOR SACRAL NERVE;  Surgeon: Gertrudis Frausto MD;  Location: [facility]     SURGICAL HISTORY OF -       Uterine endometrial ablation       FAMILY HISTORY:  Family History   Problem Relation Age of Onset     C.A.D. Father      MI , age 83, had high lipids     Hyperlipidemia Father      Hypertension Father      Coronary Artery Disease Father      Hypertension Mother      Genitourinary Problems Mother      renal failure     Fractures Mother      hip     OSTEOPOROSIS Mother      CEREBROVASCULAR DISEASE Maternal Grandfather      cerebral hemorrhage     DIABETES Maternal Uncle      Colon Cancer Maternal Aunt      DIABETES Maternal Grandmother        SOCIAL HISTORY:  Social History     Social History     Marital status:      Spouse name: Kwadwo     Number of children: 3     Years of education: N/A     Occupational History     PT Aide None       Retired     Social History Main Topics     Smoking status: Never Smoker     Smokeless tobacco: Never Used     Alcohol use No     Drug use: No     Sexual activity: Yes     Partners: Male     Birth control/ protection: Post-menopausal     Other Topics Concern     Caffeine Concern Yes     6-7 cups caffeine per day     Sleep Concern No     Stress Concern Yes     Weight Concern No     Special Diet No     eats healthy      Exercise Yes      walking & active life style      Parent/Sibling W/ Cabg, Mi Or Angioplasty Before 65f 55m? No     Social History Narrative       Review of Systems:  Skin:  Negative       Eyes:  Positive for glasses    ENT:  Negative      Respiratory:  Positive for dyspnea on exertion occ   Cardiovascular:    fatigue;chest pain;Positive for occ chest pain and fatigue not improving  Gastroenterology: Negative      Genitourinary:  Negative      Musculoskeletal:  Positive for arthritis;back pain;neck pain   "  Neurologic:  Positive for numbness or tingling of feet    Psychiatric:  Positive for anxiety    Heme/Lymph/Imm:  Negative      Endocrine:  Positive for thyroid disorder;diabetes      Physical Exam:  Vitals: /74  Pulse 72  Ht 1.727 m (5' 8\")  Wt 78.6 kg (173 lb 4.8 oz)  BMI 26.35 kg/m2    Constitutional:  cooperative, alert and oriented, well developed, well nourished, in no acute distress        Skin:  warm and dry to the touch, no apparent skin lesions or masses noted          Head:  normocephalic, no masses or lesions        Eyes:  pupils equal and round, conjunctivae and lids unremarkable, sclera white, no xanthalasma, EOMS intact, no nystagmus        Lymph:      ENT:  no pallor or cyanosis, dentition good        Neck:  carotid pulses are full and equal bilaterally, JVP normal, no carotid bruit        Respiratory:  normal breath sounds, clear to auscultation, normal A-P diameter, normal symmetry, normal respiratory excursion, no use of accessory muscles         Cardiac: regular rhythm, normal S1/S2, no S3 or S4, apical impulse not displaced, no murmurs, gallops or rubs                pulses full and equal, no bruits auscultated                                        GI:  abdomen soft;BS normoactive        Extremities and Muscular Skeletal:  no deformities, clubbing, cyanosis, erythema observed;no edema              Neurological:  no gross motor deficits;affect appropriate        Psych:  Alert and Oriented x 3        CC  No referring provider defined for this encounter.              "

## 2018-02-02 NOTE — LETTER
2/2/2018      Gaye Zimmer  [facility] [address]  [facility] [address]      RE: Sarah Longo       Dear Colleague,    I had the pleasure of seeing Sarah Longo in the [facility].    Service Date: 02/02/2018      HISTORY OF PRESENT ILLNESS:  I had the opportunity to see Mr. Sarah Longo in Cardiology Clinic today at the [facility] in [place] for reevaluation of recurrent coronary artery disease and multiple cardiac risk factors, including hypertension, dyslipidemia and type 2 diabetes.        Her last revascularization was performed in 11/2016 at the [facility].  She had concerns about recurrent coronary artery disease in 02/2017 and a stress test did suggest some inferior and inferolateral ischemia.  Fortunately, her cardiac catheterization after that stress test did not reveal any new coronary disease.  Her stents were widely patent with no evidence of restenosis.      Today, she has concerns again that she may be having recurrent coronary disease problems.  She tells me that she has left-sided chest discomfort from time to time, although it does not seem to be very predictable.  It does not seem to be occurring with any type of exertion and resolves on its own without any treatment.  There is no discomfort in the left arm, neck, jaw or back at the time of this left chest discomfort, but she does have left arm pain at other times.  In the past, she had complained primarily about upper back discomfort between her shoulder blades, but she is not having that now.  She is having mostly chronic low back pain problems due to arthritis.        In addition, she has worsening fatigue and some shortness of breath symptoms and believes that these may be also signs of recurrent coronary disease problems.      She has multiple medication intolerances and I have tried to work with her to provide her medications that would treat her cardiac risk  factors without causing her significant side effects.  However, some of those are more expensive and she has difficulty affording them.  That is certainly another issue to contend with.      She has been working with an orthopedic surgeon to consider back surgery for her chronic low back pain and they have suggested that she try injection therapy instead.      PHYSICAL EXAMINATION:  Blood pressure was 138/74.  I retook it myself and found it to be 134/62.  Heart rate was 72 and weight 173 pounds.  Her lungs are clear.  Heart rhythm is regular.  I do not hear any cardiac murmurs or carotid bruits.      Her cholesterol numbers have been excellent since switching to Repatha.  She is intolerant to statin drugs due to severe muscle pains.      IMPRESSIONS:  Ms. Sarah Longo is a 72-year-old woman with hypertension, dyslipidemia and type 2 diabetes who has had a history of recurrent coronary artery disease requiring multiple revascularization procedures.  This includes drug-eluting stent to the right coronary artery in 2004, drug-eluting stents to the OM and LAD in 04/2016 at the [facility] with recurrent symptoms in 11/2016 and evidence of restenosis within the obtuse marginal, treated once again with stenting here at [facility].  In 02/2017, she had an abnormal stress test, but her cardiac catheterization did not reveal any new coronary artery disease.      She now has some concerning, but atypical symptoms including some left-sided chest discomfort, left arm pain, fatigue and shortness of breath.  I think it is reasonable to reevaluate her stress test at this time rather than going directly to cardiac catheterization.  I will order a Lexiscan nuclear perfusion imaging study and a followup visit with us to discuss those results.      Balancing her medications as the unique challenge.  I will go ahead and stop Bystolic because it is too expensive for her.  I will start her on labetalol 50 mg p.o. b.i.d.  I  will continue isosorbide mononitrate 15 mg daily and increase her lisinopril to 5 mg daily at bedtime.  I encouraged her to use less Naprosyn and she feels that Celebrex is helpful for her.  I have given her some of those to use but encouraged her to use them sparingly only when needed and only once a day.      I will plan to see her back for a routine visit again in 6 months.       Outpatient Encounter Prescriptions as of 2/2/2018   Medication Sig Dispense Refill     empagliflozin (JARDIANCE) 25 MG TABS tablet Take 25 mg by mouth every other day       labetalol (NORMODYNE) 100 MG tablet Take 0.5 tablets (50 mg) by mouth 2 times daily 90 tablet 3     isosorbide mononitrate (IMDUR) 30 MG 24 hr tablet Take 0.5 tablets (15 mg) by mouth daily 45 tablet 3     lisinopril (PRINIVIL/ZESTRIL) 5 MG tablet Take 1 tablet (5 mg) by mouth daily 90 tablet 3     celecoxib (CELEBREX) 100 MG capsule Take 1 capsule (100 mg) by mouth daily as needed for moderate pain 60 capsule 11     evolocumab (REPATHA SURECLICK) 140 MG/ML prefilled autoinjector Inject 1 mL (140 mg) Subcutaneous every 14 days (Patient taking differently: Inject 140 mg Subcutaneous every 14 days Has not started) 2 mL 11     Camphor-Menthol-Methyl Sal (SALONPAS) 1.2-5.7-6.3 % PTCH Patient uses 3-4 patches daily       cholecalciferol 2000 UNITS CAPS Take 1 capsule by mouth daily       amoxicillin (AMOXIL) 500 MG capsule as needed Reported on 5/17/2017       NONFORMULARY Tumeric 500 mg bid       mirabegron (MYRBETRIQ) 50 MG 24 hr tablet Take 1 tablet (50 mg) by mouth daily 90 tablet 3     Gymnema Sylvestris Leaf POWD 500 mg 2 times daily        nitroglycerin (NITROSTAT) 0.4 MG SL tablet Place 1 tablet (0.4 mg) under the tongue every 5 minutes as needed for chest pain If symptoms persist after 3 doses (15 minutes) call 911. 25 tablet 3     Omega-3 Fatty Acids (OMEGA-3 FISH OIL PO) Take 2,400 mg by mouth 2 times daily (with meals)       CINNAMON PO Take 2 capsules by mouth  2 times daily       GLIMEPIRIDE 4 MG OR TABS 1 tablet BID       SYNTHROID 125 MCG OR TABS 1 tablet daily       RESTASIS 0.05 % OP EMUL twice daily       CO ENZYME Q-10 50 MG OR CAPS daliy  0     ASPIRIN 81 MG OR TABS 1 tab po QD (Once per day)       [DISCONTINUED] isosorbide mononitrate (IMDUR) 30 MG 24 hr tablet Take 0.5 tablets (15 mg) by mouth daily 30 tablet 0     [DISCONTINUED] nebivolol (BYSTOLIC) 5 MG tablet Take 1 tablet (5 mg) by mouth daily (Patient taking differently: Take 2.5 mg by mouth daily ) 90 tablet 3     [DISCONTINUED] lisinopril (PRINIVIL/ZESTRIL) 5 MG tablet Take 0.5 tablets (2.5 mg) by mouth daily 45 tablet 3     [DISCONTINUED] linagliptin-metFORMIN (JENTADUETO) 2.5-1000 MG per tablet Take 1 tablet by mouth 2 times daily (with meals)       [DISCONTINUED] PANTOPRAZOLE SODIUM PO Take 20 mg by mouth 2 times daily (before meals)        STATIN NOT PRESCRIBED, INTENTIONAL, 1 each At Bedtime Statin not prescribed intentionally due to Allergy to statin 0 each 0     No facility-administered encounter medications on file as of 2/2/2018.        Again, thank you for allowing me to participate in the care of your patient.      Sincerely,    GUNNAR LEA MD     [facility]

## 2018-02-02 NOTE — MR AVS SNAPSHOT
After Visit Summary   2/2/2018    Sarah Longo    MRN: [id number]           Patient Information     Date Of Birth          1945        Visit Information        Provider Department      2/2/2018 1:15 PM Feroz Burgos MD [facility]        Today's Diagnoses     Coronary artery disease involving native coronary artery of native heart without angina pectoris    -  1    Essential hypertension        Stable angina (H)        Hyperlipidemia, unspecified hyperlipidemia type        Type 2 diabetes mellitus without complication, unspecified long term insulin use status (H)        Chest pain, unspecified type        Chronic low back pain without sciatica, unspecified back pain laterality           Follow-ups after your visit        Additional Services     Follow-Up with Cardiac Advanced Practice Provider           Follow-Up with Cardiologist                 Future tests that were ordered for you today     Open Future Orders        Priority Expected Expires Ordered    Follow-Up with Cardiologist Routine 8/1/2018 2/2/2019 2/2/2018    Basic metabolic panel Routine 8/1/2018 2/2/2019 2/2/2018    Lipid Profile Routine 8/1/2018 2/2/2019 2/2/2018    ALT Routine 8/1/2018 2/2/2019 2/2/2018    Follow-Up with Cardiac Advanced Practice Provider Routine 3/4/2018 2/2/2019 2/2/2018    NM Lexiscan stress test (nuc card) Routine 2/21/2018 2/2/2019 2/2/2018            Who to contact     If you have questions or need follow up information about today's clinic visit or your schedule please contact [facility] directly at [phone].  Normal or non-critical lab and imaging results will be communicated to you by MyChart, letter or phone within 4 business days after the clinic has received the results. If you do not hear from us within 7 days, please contact the clinic through MyChart or phone. If you have a critical or abnormal lab result, we  "will notify you by phone as soon as possible.  Submit refill requests through [employer] or call your pharmacy and they will forward the refill request to us. Please allow 3 business days for your refill to be completed.          Additional Information About Your Visit        I-lightinghart Information     [employer] lets you send messages to your doctor, view your test results, renew your prescriptions, schedule appointments and more. To sign up, go to [email or web address] . Click on \"Log in\" on the left side of the screen, which will take you to the Welcome page. Then click on \"Sign up Now\" on the right side of the page.     You will be asked to enter the access code listed below, as well as some personal information. Please follow the directions to create your username and password.     Your access code is: [email or web address]  Expires: 3/12/2018 11:52 PM     Your access code will  in 90 days. If you need help or a new code, please call your [place] clinic or [phone].        Care EveryWhere ID     This is your Care EveryWhere ID. This could be used by other organizations to access your [place] medical records  [id number]        Your Vitals Were     Pulse Height BMI (Body Mass Index)             72 1.727 m (5' 8\") 26.35 kg/m2          Blood Pressure from Last 3 Encounters:   18 138/74   17 153/71   10/09/17 126/60    Weight from Last 3 Encounters:   18 78.6 kg (173 lb 4.8 oz)   10/09/17 78 kg (172 lb)   17 76.7 kg (169 lb)              We Performed the Following     Follow-Up with Cardiologist          Today's Medication Changes          These changes are accurate as of 18  2:12 PM.  If you have any questions, ask your nurse or doctor.               Start taking these medicines.        Dose/Directions    celecoxib 100 MG capsule   Commonly known as:  celeBREX   Used for:  Chronic low back pain without sciatica, unspecified back pain laterality   Started by:  Feroz Burgos MD        " Dose:  100 mg   Take 1 capsule (100 mg) by mouth daily as needed for moderate pain   Quantity:  60 capsule   Refills:  11       labetalol 100 MG tablet   Commonly known as:  NORMODYNE   Used for:  Essential hypertension   Started by:  Feroz Burgos MD        Dose:  50 mg   Take 0.5 tablets (50 mg) by mouth 2 times daily   Quantity:  90 tablet   Refills:  3         These medicines have changed or have updated prescriptions.        Dose/Directions    evolocumab 140 MG/ML prefilled autoinjector   Commonly known as:  REPATHA SURECLICK   This may have changed:  additional instructions   Used for:  Hyperlipidemia LDL goal <100        Dose:  140 mg   Inject 1 mL (140 mg) Subcutaneous every 14 days   Quantity:  2 mL   Refills:  11       lisinopril 5 MG tablet   Commonly known as:  PRINIVIL/ZESTRIL   This may have changed:  how much to take   Used for:  Stable angina (H)   Changed by:  Feroz Burgos MD        Dose:  5 mg   Take 1 tablet (5 mg) by mouth daily   Quantity:  90 tablet   Refills:  3         Stop taking these medicines if you haven't already. Please contact your care team if you have questions.     nebivolol 5 MG tablet   Commonly known as:  BYSTOLIC   Stopped by:  Feroz Burgos MD                Where to get your medicines      Some of these will need a paper prescription and others can be bought over the counter.  Ask your nurse if you have questions.     Bring a paper prescription for each of these medications     celecoxib 100 MG capsule    isosorbide mononitrate 30 MG 24 hr tablet    labetalol 100 MG tablet    lisinopril 5 MG tablet                Primary Care Provider Office Phone # Fax #    Gaye GONZÁLES Naomiheidi [phone]       [address] [facility] [address]        Equal Access to Services     [facility]: Hadii marc Zambrano, wadiane quintanilla, qaybta jose toribio, meredith catsillo. So [facility] [phone].    ATENCIÓN: Si  misty love, tiene a briggs disposición servicios gratuitos de asistencia lingüística. Ajit akers [phone].    We comply with applicable federal civil rights laws and [place] laws. We do not discriminate on the basis of race, color, national origin, age, disability, sex, sexual orientation, or gender identity.            Thank you!     Thank you for choosing [facility]  for your care. Our goal is always to provide you with excellent care. Hearing back from our patients is one way we can continue to improve our services. Please take a few minutes to complete the written survey that you may receive in the mail after your visit with us. Thank you!             Your Updated Medication List - Protect others around you: Learn how to safely use, store and throw away your medicines at www.disposemymeds.org.          This list is accurate as of 2/2/18  2:12 PM.  Always use your most recent med list.                   Brand Name Dispense Instructions for use Diagnosis    amoxicillin 500 MG capsule    AMOXIL     as needed Reported on 5/17/2017        aspirin 81 MG tablet      1 tab po QD (Once per day)        celecoxib 100 MG capsule    celeBREX    60 capsule    Take 1 capsule (100 mg) by mouth daily as needed for moderate pain    Chronic low back pain without sciatica, unspecified back pain laterality       cholecalciferol 2000 UNITS Caps      Take 1 capsule by mouth daily        CINNAMON PO      Take 2 capsules by mouth 2 times daily        Co Enzyme Q-10 50 MG Caps      daliy        evolocumab 140 MG/ML prefilled autoinjector    REPATHA SURECLICK    2 mL    Inject 1 mL (140 mg) Subcutaneous every 14 days    Hyperlipidemia LDL goal <100       glimepiride 4 MG tablet    AMARYL     1 tablet BID        Gymnema Sylvestris Leaf Powd      500 mg 2 times daily        isosorbide mononitrate 30 MG 24 hr tablet    IMDUR    45 tablet    Take 0.5 tablets (15 mg) by mouth daily        JARDIANCE 25  MG Tabs tablet   Generic drug:  empagliflozin      Take 25 mg by mouth every other day        labetalol 100 MG tablet    NORMODYNE    90 tablet    Take 0.5 tablets (50 mg) by mouth 2 times daily    Essential hypertension       lisinopril 5 MG tablet    PRINIVIL/ZESTRIL    90 tablet    Take 1 tablet (5 mg) by mouth daily    Stable angina (H)       mirabegron 50 MG 24 hr tablet    MYRBETRIQ    90 tablet    Take 1 tablet (50 mg) by mouth daily    OAB (overactive bladder)       nitroGLYcerin 0.4 MG sublingual tablet    NITROSTAT    25 tablet    Place 1 tablet (0.4 mg) under the tongue every 5 minutes as needed for chest pain If symptoms persist after 3 doses (15 minutes) call 911.    Coronary artery disease involving native coronary artery of native heart without angina pectoris       NONFORMULARY      Tumeric 500 mg bid        OMEGA-3 FISH OIL PO      Take 2,400 mg by mouth 2 times daily (with meals)        RESTASIS 0.05 % ophthalmic emulsion   Generic drug:  cycloSPORINE      twice daily        SALONPAS 1.2-5.7-6.3 % Ptch   Generic drug:  Camphor-Menthol-Methyl Sal      Patient uses 3-4 patches daily        STATIN NOT PRESCRIBED (INTENTIONAL)     0 each    1 each At Bedtime Statin not prescribed intentionally due to Allergy to statin    Coronary artery disease involving native coronary artery of native heart without angina pectoris       SYNTHROID 125 MCG tablet   Generic drug:  levothyroxine      1 tablet daily

## 2018-02-02 NOTE — PROGRESS NOTES
Service Date: 02/02/2018      HISTORY OF PRESENT ILLNESS:  I had the opportunity to see Mr. Sarah Longo in Cardiology Clinic today at the [facility] in [place] for reevaluation of recurrent coronary artery disease and multiple cardiac risk factors, including hypertension, dyslipidemia and type 2 diabetes.        Her last revascularization was performed in 11/2016 at the [facility].  She had concerns about recurrent coronary artery disease in 02/2017 and a stress test did suggest some inferior and inferolateral ischemia.  Fortunately, her cardiac catheterization after that stress test did not reveal any new coronary disease.  Her stents were widely patent with no evidence of restenosis.      Today, she has concerns again that she may be having recurrent coronary disease problems.  She tells me that she has left-sided chest discomfort from time to time, although it does not seem to be very predictable.  It does not seem to be occurring with any type of exertion and resolves on its own without any treatment.  There is no discomfort in the left arm, neck, jaw or back at the time of this left chest discomfort, but she does have left arm pain at other times.  In the past, she had complained primarily about upper back discomfort between her shoulder blades, but she is not having that now.  She is having mostly chronic low back pain problems due to arthritis.        In addition, she has worsening fatigue and some shortness of breath symptoms and believes that these may be also signs of recurrent coronary disease problems.      She has multiple medication intolerances and I have tried to work with her to provide her medications that would treat her cardiac risk factors without causing her significant side effects.  However, some of those are more expensive and she has difficulty affording them.  That is certainly another issue to contend with.      She has been working with an orthopedic surgeon  to consider back surgery for her chronic low back pain and they have suggested that she try injection therapy instead.      PHYSICAL EXAMINATION:  Blood pressure was 138/74.  I retook it myself and found it to be 134/62.  Heart rate was 72 and weight 173 pounds.  Her lungs are clear.  Heart rhythm is regular.  I do not hear any cardiac murmurs or carotid bruits.      Her cholesterol numbers have been excellent since switching to Repatha.  She is intolerant to statin drugs due to severe muscle pains.      IMPRESSIONS:  Ms. Sarah Longo is a 72-year-old woman with hypertension, dyslipidemia and type 2 diabetes who has had a history of recurrent coronary artery disease requiring multiple revascularization procedures.  This includes drug-eluting stent to the right coronary artery in 2004, drug-eluting stents to the OM and LAD in 04/2016 at the [facility] with recurrent symptoms in 11/2016 and evidence of restenosis within the obtuse marginal, treated once again with stenting here at [facility].  In 02/2017, she had an abnormal stress test, but her cardiac catheterization did not reveal any new coronary artery disease.      She now has some concerning, but atypical symptoms including some left-sided chest discomfort, left arm pain, fatigue and shortness of breath.  I think it is reasonable to reevaluate her stress test at this time rather than going directly to cardiac catheterization.  I will order a Lexiscan nuclear perfusion imaging study and a followup visit with us to discuss those results.      Balancing her medications as the unique challenge.  I will go ahead and stop Bystolic because it is too expensive for her.  I will start her on labetalol 50 mg p.o. b.i.d.  I will continue isosorbide mononitrate 15 mg daily and increase her lisinopril to 5 mg daily at bedtime.  I encouraged her to use less Naprosyn and she feels that Celebrex is helpful for her.  I have given her some of those to use but  encouraged her to use them sparingly only when needed and only once a day.      I will plan to see her back for a routine visit again in 6 months.      cc:      Gaye Zimmer MD    [address] [place] [address]         GUNNAR LEA MD, [facility]             D: 2018   T: 2018   MT: KENDALL      Name:     KATHY PERRY   MRN:      [id number]        Account:      [id number]   :      1945           Service Date: 2018      Document: [id number]

## 2018-02-05 ENCOUNTER — TELEPHONE (OUTPATIENT)
Dept: CARDIOLOGY | Facility: CLINIC | Age: 73
End: 2018-02-05

## 2018-02-05 DIAGNOSIS — I10 BENIGN ESSENTIAL HYPERTENSION: Primary | ICD-10-CM

## 2018-02-05 RX ORDER — CARVEDILOL 6.25 MG/1
6.25 TABLET ORAL 2 TIMES DAILY WITH MEALS
Qty: 60 TABLET | Refills: 3 | Status: SHIPPED | OUTPATIENT
Start: 2018-02-05 | End: 2018-02-22

## 2018-02-05 NOTE — TELEPHONE ENCOUNTER
Reviewed medications with Dr. Burgos  Do not start labetolol per pt request.  Start Carvedilol 6.25mg twice a day with food.  Follow up OV 3-4weeks with Felipe GABRIEL to assess BP/HR and titrate if needed.  Left message for pt.  Rx to pharmacy per pt request.

## 2018-02-05 NOTE — TELEPHONE ENCOUNTER
"Pt called - left voice message to please call back regarding her medications.  Called pt back -    She has been reading on the internet regarding labetolol and sees that it can cause \"cognitive\" issues.  She would like to try carvedilol instead.  She states it is \"less expensvie\"  And doesn't have the cognitive concerns per her research.  Reviewed pt medications-    She has increased lisinopril to 5mg  She has not tried celebrex yet.  She takes ASA 81mg daily  She states she is on metformin 500mg 2 tabs twice a day which was not on her med list in EPIC.  She also takes amaryl (glypiride)   She is on Repatha   Isosorbide mono 15mg daily  Occasionally uses myrbetriq -  For overactive bladder.    "

## 2018-02-21 ENCOUNTER — HOSPITAL ENCOUNTER (OUTPATIENT)
Dept: CARDIOLOGY | Facility: CLINIC | Age: 73
Discharge: HOME | End: 2018-02-21
Attending: INTERNAL MEDICINE | Admitting: INTERNAL MEDICINE
Payer: COMMERCIAL

## 2018-02-21 VITALS — SYSTOLIC BLOOD PRESSURE: 136 MMHG | HEART RATE: 86 BPM | DIASTOLIC BLOOD PRESSURE: 60 MMHG

## 2018-02-21 DIAGNOSIS — R07.9 CHEST PAIN, UNSPECIFIED TYPE: ICD-10-CM

## 2018-02-21 DIAGNOSIS — I25.10 CORONARY ARTERY DISEASE INVOLVING NATIVE CORONARY ARTERY OF NATIVE HEART WITHOUT ANGINA PECTORIS: ICD-10-CM

## 2018-02-21 PROCEDURE — 93017 CV STRESS TEST TRACING ONLY: CPT

## 2018-02-21 PROCEDURE — A9502 TC99M TETROFOSMIN: HCPCS | Performed by: INTERNAL MEDICINE

## 2018-02-21 PROCEDURE — 93016 CV STRESS TEST SUPVJ ONLY: CPT | Performed by: INTERNAL MEDICINE

## 2018-02-21 PROCEDURE — 25000128 H RX IP 250 OP 636: Performed by: INTERNAL MEDICINE

## 2018-02-21 PROCEDURE — 78452 HT MUSCLE IMAGE SPECT MULT: CPT | Mod: 26 | Performed by: INTERNAL MEDICINE

## 2018-02-21 PROCEDURE — 93018 CV STRESS TEST I&R ONLY: CPT | Performed by: INTERNAL MEDICINE

## 2018-02-21 PROCEDURE — 34300033 ZZH RX 343: Performed by: INTERNAL MEDICINE

## 2018-02-21 RX ORDER — AMINOPHYLLINE 25 MG/ML
50-100 INJECTION, SOLUTION INTRAVENOUS
Status: DISCONTINUED | OUTPATIENT
Start: 2018-02-21 | End: 2018-02-22 | Stop reason: HOSPADM

## 2018-02-21 RX ORDER — REGADENOSON 0.08 MG/ML
0.4 INJECTION, SOLUTION INTRAVENOUS ONCE
Status: COMPLETED | OUTPATIENT
Start: 2018-02-21 | End: 2018-02-21

## 2018-02-21 RX ORDER — ACYCLOVIR 200 MG/1
0-1 CAPSULE ORAL
Status: DISCONTINUED | OUTPATIENT
Start: 2018-02-21 | End: 2018-02-22 | Stop reason: HOSPADM

## 2018-02-21 RX ORDER — ALBUTEROL SULFATE 90 UG/1
2 AEROSOL, METERED RESPIRATORY (INHALATION) EVERY 5 MIN PRN
Status: DISCONTINUED | OUTPATIENT
Start: 2018-02-21 | End: 2018-02-22 | Stop reason: HOSPADM

## 2018-02-21 RX ADMIN — TETROFOSMIN 3.5 MCI.: 1.38 INJECTION, POWDER, LYOPHILIZED, FOR SOLUTION INTRAVENOUS at 09:51

## 2018-02-21 RX ADMIN — TETROFOSMIN 9.47 MCI.: 1.38 INJECTION, POWDER, LYOPHILIZED, FOR SOLUTION INTRAVENOUS at 11:22

## 2018-02-21 RX ADMIN — REGADENOSON 0.4 MG: 0.08 INJECTION, SOLUTION INTRAVENOUS at 11:11

## 2018-02-22 ENCOUNTER — TELEPHONE (OUTPATIENT)
Dept: CARDIOLOGY | Facility: CLINIC | Age: 73
End: 2018-02-22

## 2018-02-22 NOTE — TELEPHONE ENCOUNTER
"Called pt with nuclear gxt results.  Per results- \"no significant ischemia\".  EF 61%.  Informed pt of these results.  She states she is \"extremely tired\".  Reviewed that she stopped her Carvedilol on her own.  I will take it off her med list.  She continues on lisinopril 5mg daily, Imdur 15mg daily.    She is pleased that her gxt came out okay but \"wants some answers\" regarding her fatigue.  Recommended she see her PCP, Dr. Zimmer.   WILMA sloan/Felipe GABRIEL 03/07/2018.  "

## 2018-02-28 DIAGNOSIS — I25.10 CAD (CORONARY ARTERY DISEASE): ICD-10-CM

## 2018-02-28 DIAGNOSIS — I25.10 CAD (CORONARY ARTERY DISEASE): Primary | ICD-10-CM

## 2018-02-28 RX ORDER — ISOSORBIDE MONONITRATE 30 MG/1
15 TABLET, EXTENDED RELEASE ORAL DAILY
Qty: 45 TABLET | Refills: 3 | Status: SHIPPED | OUTPATIENT
Start: 2018-02-28 | End: 2018-03-07

## 2018-02-28 RX ORDER — ISOSORBIDE MONONITRATE 30 MG/1
15 TABLET, EXTENDED RELEASE ORAL DAILY
Qty: 45 TABLET | Refills: 3 | Status: SHIPPED | OUTPATIENT
Start: 2018-02-28 | End: 2018-02-28

## 2018-02-28 NOTE — TELEPHONE ENCOUNTER
Received call from patient with refill request for Isosorbide mononitrate. Pt seen by  on 2/2/18, no dose changes on that ov. Medication e-scripted to [facility] pharmacy in [place] per pt request. Dorinda Leahy RN 2:27 PM 02/28/18

## 2018-03-07 ENCOUNTER — OFFICE VISIT (OUTPATIENT)
Dept: CARDIOLOGY | Facility: CLINIC | Age: 73
End: 2018-03-07
Attending: INTERNAL MEDICINE
Payer: COMMERCIAL

## 2018-03-07 VITALS
HEIGHT: 68 IN | BODY MASS INDEX: 25.52 KG/M2 | HEART RATE: 73 BPM | SYSTOLIC BLOOD PRESSURE: 118 MMHG | WEIGHT: 168.4 LBS | DIASTOLIC BLOOD PRESSURE: 62 MMHG

## 2018-03-07 DIAGNOSIS — E78.00 HYPERCHOLESTEROLEMIA: Primary | ICD-10-CM

## 2018-03-07 DIAGNOSIS — I10 BENIGN ESSENTIAL HYPERTENSION: ICD-10-CM

## 2018-03-07 DIAGNOSIS — I25.10 CORONARY ARTERY DISEASE INVOLVING NATIVE CORONARY ARTERY OF NATIVE HEART WITHOUT ANGINA PECTORIS: ICD-10-CM

## 2018-03-07 PROCEDURE — 99214 OFFICE O/P EST MOD 30 MIN: CPT | Performed by: PHYSICIAN ASSISTANT

## 2018-03-07 RX ORDER — ISOSORBIDE MONONITRATE 30 MG/1
30 TABLET, EXTENDED RELEASE ORAL DAILY
Qty: 90 TABLET | Refills: 3 | Status: SHIPPED | OUTPATIENT
Start: 2018-03-07 | End: 2018-04-30

## 2018-03-07 RX ORDER — MIRABEGRON 50 MG/1
50 TABLET, EXTENDED RELEASE ORAL
COMMUNITY
End: 2018-05-29

## 2018-03-07 RX ORDER — PYRIDOXINE HCL (VITAMIN B6) 100 MG
TABLET ORAL DAILY
COMMUNITY
End: 2019-06-03

## 2018-03-07 NOTE — PATIENT INSTRUCTIONS
Thanks for coming into [facility] clinic today.    We discussed:   Your stress test looked good, normal strength and better than before .     Here's a few ideas to help the overall picture.    Try taking mybetriq for 1 or 2 nights to see if it helps you sleep.    Try a seasonal affective disorder light in the morning.    Consider seeing a pain clinic for your back pain and looking into things like medical marijuana, accupuncture, massage or a spinal stimulator, etc.        Medication changes:  Increase to imdur to 30 mg once a day.    Look into pricing on bystolic.  We may be able to get by with just these for blood pressure.        Follow up: with me in about 1 month.        Please call my nurse at [phone] with any questions or concerns.    Scheduling phone number: [phone]  Reminder: Please bring in all current medications, over the counter supplements and vitamin bottles to your next appointment.

## 2018-03-07 NOTE — LETTER
3/7/2018      Gaye Zimmer  [facility] [address]  [facility] [address]      RE: Sarah Longo       Dear Colleague,    I had the pleasure of seeing Sarah KAPOOR Donta in the [facility].    Service Date: 2018      PRIMARY CARDIOLOGIST:  Dr. Burgos.      REASON FOR VISIT:  Hypertension, coronary artery disease, stress test followup.      HISTORY OF PRESENT ILLNESS:  Ms. Longo is a 72-year-old woman with past cardiac history significant for the followin.  Coronary artery disease with stenting back in  when she underwent Taxus stent to RCA and she redeveloped symptoms in the spring of 2016 with her anginal equivalent of chest and back discomfort.  She had an abnormal stress test and was found to have a trifurcation lesion of the OM2 as well as a distal LAD lesion.  This was initially managed medically due to the complexity of intervention, and she ended up at [facility] with trifurcation stents to her superior and main branch of the obtuse marginal and dilation of the inferior branch.  In addition, she had an LAD stent placed.  She redeveloped symptoms in the  of  and was taken back to the Cath Lab and found to have a 90% restenosis of the proximal OM bifurcation stent in the superior branch and it was stented.  She has gone back for angiogram since then, and stents were patent with negative FFRs.   2.  Dyslipidemia, intolerant to statins and is on Repatha.   3.  Hypertension.   4.  Type 2 diabetes.   5.  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  Please refer to consult from Dr. Nation 2016).      She comes in with multiple concerns.  She was seen by Dr. Burgos about a month ago with profound fatigue and frustration over not being able to afford Bystolic because the price had gone up.  He switched her to labetalol 50 mg b.i.d. and increased her lisinopril to 5 mg at bedtime.  She took  this and felt that she had cognitive issues.  On her own she researched and wanted to try carvedilol.  This was started, and she felt extremely tired with this.  She also underwent a stress test that had no infarct or ischemia but some subtle ST depression.      Her other concerns today include a lot of chronic pain.  She has known spinal stenosis that has pain in her bilateral legs.  She has pain in her low back due to arthritis.  She sleeps poorly as she has an overactive bladder and has to be up and down all night.  She then never falls back to sleep soundly.  She continues to be very fatigued but does not want to take a nap because she feels that it is not a good use of her time.  She tried melatonin and this did help with her sleep, but she was too groggy in the morning.  She takes caffeine pills to help with her energy as well as drinks coffee in the morning.  She does note that she has pain all day long.  It waxes and wanes, but she is rarely without pain.  She has seen an orthopedist.  She has not seen a pain clinic.  She has tried multiple injections through [facility].  She has not tried acupuncture, massage or pain clinic.  She has not been discussed like a spinal stimulator.      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  She is a lifelong nonsmoker, no alcohol use.      PHYSICAL EXAMINATION:   VITAL SIGNS:  Blood pressure today initially is elevated at 138/68, it comes down to 118/62 with talking, pulse is 73, weight is 168 pounds, BMI of 25.   HEENT:  Normocephalic, atraumatic.   HEART:  Regular rate and rhythm without murmur, rub or gallop.   LUNGS:  Clear bilaterally.  Her carotids are quiet.   EXTREMITIES:  Radial pulses are 2+.   EXTREMITIES:  She has no peripheral edema.   SKIN:  Warm and dry.      ASSESSMENT AND PLAN:   1.  Hypertension with adverse effects to carvedilol and labetalol, with her doing well on Bystolic, but it being too expensive.  I am sure her pain also  contributes to her hypertension.  She has had a good response of her blood pressure with Imdur in the past, but it actually dropped too low.  At this point, she was on Bystolic as well.  We discussed that perhaps if we can get her on just a decent dose of Imdur, maybe we could keep her off the beta blocker for both cost and adverse effects reasons.  She typically is very sensitive to medications, and limiting them may be our best bet.  As such, we will keep her off Bystolic, as she is off it right now, continue her lisinopril at 5 mg daily and increase her Imdur from 15 mg daily to 30 mg daily in the morning.  Fortunately, she is not having anginal symptoms.   2.  Coronary artery disease with history of multiple interventions with stress test done on 02/21/2018 showing no infarct or ischemia but sagging ST segments seen diffusely, with 1 mm in the infero and anterolateral leads which is nondiagnostic.  She does not have her back discomfort or chest discomfort, and I suspect most of her fatigue is from her chronic pain.  We will continue to manage this conservatively with aspirin and Repatha as well as Imdur and lisinopril.   3.  Dyslipidemia, on Repatha with intolerance to statins.  LDL 45, HDL 63, total cholesterol 111, well controlled.   4.  Chronic pain in her back.  I did encourage her to seek out other options, perhaps a pain clinic, and actually getting some sleep with the help of melatonin or others would be helpful.  She is interested in medical marijuana, and I did direct her to these clinical facilities.  We do not prescribe medical marijuana.  She would have to get this from her primary or another provider.  I did suggest she start with the pain clinic.  I think if we can get her pain controlled, her fatigue will improve and her blood pressures will come down as well.      Thank you for allowing me to participate in this delightful patient's care.  I will see her back in 1 month.      Karen Alexander PA-C          EDWIN MCNAMARA PA-C             D: 2018   T: 2018   MT: LD      Name:     KATHY PERRY   MRN:      [id number]        Account:      [id number]   :      1945           Service Date: 2018      Document: [id number]         Outpatient Encounter Prescriptions as of 3/7/2018   Medication Sig Dispense Refill     empagliflozin (JARDIANCE) 25 MG TABS tablet Take 12.5 mg by mouth every other day       mirabegron (MYRBETRIQ) 50 MG 24 hr tablet Take 50 mg by mouth Patient not taking daily       Cranberry 500 MG CAPS Take by mouth daily       isosorbide mononitrate (IMDUR) 30 MG 24 hr tablet Take 1 tablet (30 mg) by mouth daily 90 tablet 3     lisinopril (PRINIVIL/ZESTRIL) 5 MG tablet Take 1 tablet (5 mg) by mouth daily 90 tablet 3     evolocumab (REPATHA SURECLICK) 140 MG/ML prefilled autoinjector Inject 1 mL (140 mg) Subcutaneous every 14 days (Patient taking differently: Inject 140 mg Subcutaneous every 14 days Has not started) 2 mL 11     Camphor-Menthol-Methyl Sal (SALONPAS) 1.2-5.7-6.3 % PTCH Patient uses 3-4 patches daily       cholecalciferol 2000 UNITS CAPS Take 1 capsule by mouth daily       amoxicillin (AMOXIL) 500 MG capsule as needed Reported on 2017       NONFORMULARY Tumeric 500 mg bid       Gymnema Sylvestris Leaf POWD 500 mg 2 times daily        nitroglycerin (NITROSTAT) 0.4 MG SL tablet Place 1 tablet (0.4 mg) under the tongue every 5 minutes as needed for chest pain If symptoms persist after 3 doses (15 minutes) call 746. 25 tablet 3     Omega-3 Fatty Acids (OMEGA-3 FISH OIL PO) Take 2,400 mg by mouth 2 times daily (with meals)       CINNAMON PO Take 2 capsules by mouth 2 times daily       GLIMEPIRIDE 4 MG OR TABS 1 tablet BID       SYNTHROID 125 MCG OR TABS 1 tablet daily       CO ENZYME Q-10 50 MG OR CAPS daliy  0     ASPIRIN 81 MG OR TABS 1 tab po QD (Once per day)       [DISCONTINUED] isosorbide mononitrate (IMDUR) 30 MG 24 hr tablet Take 0.5 tablets (15  mg) by mouth daily 45 tablet 3     [DISCONTINUED] empagliflozin (JARDIANCE) 25 MG TABS tablet Take 25 mg by mouth every other day       [DISCONTINUED] celecoxib (CELEBREX) 100 MG capsule Take 1 capsule (100 mg) by mouth daily as needed for moderate pain 60 capsule 11     [DISCONTINUED] mirabegron (MYRBETRIQ) 50 MG 24 hr tablet Take 1 tablet (50 mg) by mouth daily 90 tablet 3     STATIN NOT PRESCRIBED, INTENTIONAL, 1 each At Bedtime Statin not prescribed intentionally due to Allergy to statin 0 each 0     [DISCONTINUED] RESTASIS 0.05 % OP EMUL twice daily       No facility-administered encounter medications on file as of 3/7/2018.        Again, thank you for allowing me to participate in the care of your patient.      Sincerely,    Karen Alexander PA-C     [facility]

## 2018-03-07 NOTE — LETTER
3/7/2018    Gaye Zimmer  [facility] [address]  [facility] [address]    RE: Sarah Longo       Dear Colleague,    I had the pleasure of seeing Sarah Longo in the [facility].    [id number]  HPI and Plan:   See dictation    Orders this Visit:  Orders Placed This Encounter   Procedures     Follow-Up with [facility] - MARK visit     Orders Placed This Encounter   Medications     empagliflozin (JARDIANCE) 25 MG TABS tablet     Sig: Take 12.5 mg by mouth every other day     mirabegron (MYRBETRIQ) 50 MG 24 hr tablet     Sig: Take 50 mg by mouth Patient not taking daily     Cranberry 500 MG CAPS     Sig: Take by mouth daily     isosorbide mononitrate (IMDUR) 30 MG 24 hr tablet     Sig: Take 1 tablet (30 mg) by mouth daily     Dispense:  90 tablet     Refill:  3     Medications Discontinued During This Encounter   Medication Reason     celecoxib (CELEBREX) 100 MG capsule Stopped by Patient     empagliflozin (JARDIANCE) 25 MG TABS tablet Dose adjustment     mirabegron (MYRBETRIQ) 50 MG 24 hr tablet Medication Reconciliation Clean Up     RESTASIS 0.05 % OP EMUL Stopped by Patient     isosorbide mononitrate (IMDUR) 30 MG 24 hr tablet Reorder         Encounter Diagnoses   Name Primary?     Coronary artery disease involving native coronary artery of native heart without angina pectoris      Hypercholesterolemia Yes     Benign essential hypertension        CURRENT MEDICATIONS:  Current Outpatient Prescriptions   Medication Sig Dispense Refill     empagliflozin (JARDIANCE) 25 MG TABS tablet Take 12.5 mg by mouth every other day       mirabegron (MYRBETRIQ) 50 MG 24 hr tablet Take 50 mg by mouth Patient not taking daily       Cranberry 500 MG CAPS Take by mouth daily       isosorbide mononitrate (IMDUR) 30 MG 24 hr tablet Take 1 tablet (30 mg) by mouth daily 90 tablet 3     lisinopril (PRINIVIL/ZESTRIL) 5 MG tablet Take 1 tablet (5 mg) by mouth daily 90 tablet 3     evolocumab  (REPATHA SURECLICK) 140 MG/ML prefilled autoinjector Inject 1 mL (140 mg) Subcutaneous every 14 days (Patient taking differently: Inject 140 mg Subcutaneous every 14 days Has not started) 2 mL 11     Camphor-Menthol-Methyl Sal (SALONPAS) 1.2-5.7-6.3 % PTCH Patient uses 3-4 patches daily       cholecalciferol 2000 UNITS CAPS Take 1 capsule by mouth daily       amoxicillin (AMOXIL) 500 MG capsule as needed Reported on 5/17/2017       NONFORMULARY Tumeric 500 mg bid       Gymnema Sylvestris Leaf POWD 500 mg 2 times daily        nitroglycerin (NITROSTAT) 0.4 MG SL tablet Place 1 tablet (0.4 mg) under the tongue every 5 minutes as needed for chest pain If symptoms persist after 3 doses (15 minutes) call 911. 98 tablet 3     Omega-3 Fatty Acids (OMEGA-3 FISH OIL PO) Take 2,400 mg by mouth 2 times daily (with meals)       CINNAMON PO Take 2 capsules by mouth 2 times daily       GLIMEPIRIDE 4 MG OR TABS 1 tablet BID       SYNTHROID 125 MCG OR TABS 1 tablet daily       CO ENZYME Q-10 50 MG OR CAPS daliy  0     ASPIRIN 81 MG OR TABS 1 tab po QD (Once per day)       [DISCONTINUED] isosorbide mononitrate (IMDUR) 30 MG 24 hr tablet Take 0.5 tablets (15 mg) by mouth daily 45 tablet 3     STATIN NOT PRESCRIBED, INTENTIONAL, 1 each At Bedtime Statin not prescribed intentionally due to Allergy to statin 0 each 0       ALLERGIES     Allergies   Allergen Reactions     No Known Drug Allergies        PAST MEDICAL HISTORY:  Past Medical History:   Diagnosis Date     Arthritis      Coronary artery disease 04/28/2016    PTCA with MAYA x 2 to OM1 and MAYA x 1 to p.LAD (4/21/2016 at [place]); PTCA with intracoronary stent placement of RCA June 2004; MAYA to OM1 11/2016     Cystocele, midline 09/29/2010     Depression      HYPERPLASTIC  POLYP      colonoscopy in 5-10 yrs.     Hypothyroidism     hypothyroid- Dr Majano     OAB (overactive bladder)     complex voiding dysfct, failed interstim     Osteoarthritis      Other and unspecified  hyperlipidemia      Postmenopausal bleeding      Shoulder blade pain 2016     Type II or unspecified type diabetes mellitus without mention of complication, not stated as uncontrolled     Dr. Majano     Unspecified essential hypertension      Urinary frequency 9/29/10    followed by urology. no benefit from detrol or sanctura. month trial of macrobid and flomax 10/10       PAST SURGICAL HISTORY:  Past Surgical History:   Procedure Laterality Date     ------------OTHER-------------      Interstim     Ablation       C CT ANGIOGRAPHY CORONARY  2005    mod soft plaque LAD and Cx, follow up with left heart cath     C LIGATE FALLOPIAN TUBE       COLONOSCOPY       EXCISE LESION UPPER EXTREMITY  2013    Procedure: EXCISE LESION UPPER EXTREMITY;  EXCISION RIGHT ARM ANTICUBITAL LIPOMA ;  Surgeon: Jayson Joe MD;  Location: [facility] REMOVAL OF TONSILS,12+ Y/O       HEART CATH LEFT HEART CATH  3/2/2016    No intervention - aggressive medical management     HEART CATH LEFT HEART CATH  2016     MAYA x 2 to OM1, MAYA to LAD, at [facility]     HEART CATH LEFT HEART CATH  2004    MAYA to RCA     HEART CATH LEFT HEART CATH  3/28/2002    Mild to moderate 3 vessel CAD. Medical management recommended.      HEART CATH LEFT HEART CATH  2016    MAYA to OM1     KNEE SURGERY  4/20/10    right knee replacement     REMOVE STIMULATOR SACRAL NERVE N/A 2015    Procedure: REMOVE STIMULATOR SACRAL NERVE;  Surgeon: Gertrudis Frausto MD;  Location: [facility]     SURGICAL HISTORY OF -       Uterine endometrial ablation       FAMILY HISTORY:  Family History   Problem Relation Age of Onset     C.A.D. Father      MI , age 83, had high lipids     Hyperlipidemia Father      Hypertension Father      Coronary Artery Disease Father      Hypertension Mother      Genitourinary Problems Mother      renal failure     Fractures Mother      hip     OSTEOPOROSIS Mother      CEREBROVASCULAR DISEASE Maternal Grandfather   "    cerebral hemorrhage     DIABETES Maternal Uncle      Colon Cancer Maternal Aunt      DIABETES Maternal Grandmother        SOCIAL HISTORY:  Social History     Social History     Marital status:      Spouse name: Kwadwo     Number of children: 3     Years of education: N/A     Occupational History     PT Aide None       Retired     Social History Main Topics     Smoking status: Never Smoker     Smokeless tobacco: Never Used     Alcohol use No     Drug use: No     Sexual activity: Yes     Partners: Male     Birth control/ protection: Post-menopausal     Other Topics Concern     Caffeine Concern Yes     6-7 cups caffeine per day     Sleep Concern No     Stress Concern Yes     Weight Concern No     Special Diet No     eats healthy      Exercise Yes      walking & active life style      Parent/Sibling W/ Cabg, Mi Or Angioplasty Before 65f 55m? No     Social History Narrative       Review of Systems:  Skin:  Negative     Eyes:  Positive for glasses  ENT:  Negative    Respiratory:  Positive for shortness of breath  Cardiovascular:    fatigue;chest pain;Positive for  Gastroenterology: Negative    Genitourinary:  Negative    Musculoskeletal:  Positive for arthritis;back pain;neck pain  Neurologic:  Positive for numbness or tingling of feet  Psychiatric:  Positive for anxiety  Heme/Lymph/Imm:  Negative    Endocrine:  Positive for thyroid disorder;diabetes    Physical Exam:  Vitals: /62  Pulse 73  Ht 1.727 m (5' 8\")  Wt 76.4 kg (168 lb 6.4 oz)  BMI 25.61 kg/m2   Please refer to dictation for physical exam    Recent Lab Results:  LIPID RESULTS:  Lab Results   Component Value Date    CHOL 111 08/28/2017    HDL 53 08/28/2017    LDL 45 08/28/2017    TRIG 66 08/28/2017    CHOLHDLRATIO 2.0 02/09/2017    CHOLHDLRATIO 2.6 03/23/2015       LIVER ENZYME RESULTS:  Lab Results   Component Value Date    AST 17 10/08/2015    ALT 11 08/28/2017       CBC RESULTS:  Lab Results   Component Value Date    WBC 4.4 02/24/2017    " RBC 3.93 02/24/2017    HGB 12.2 12/12/2017    HCT 36.0 02/24/2017    MCV 92 02/24/2017    MCH 30.8 02/24/2017    MCHC 33.6 02/24/2017    RDW 12.9 02/24/2017     02/24/2017       BMP RESULTS:  Lab Results   Component Value Date     12/12/2017    POTASSIUM 4.4 12/12/2017    CHLORIDE 104 12/12/2017    CO2 26 02/24/2017    ANIONGAP 11 12/12/2017     (H) 12/12/2017    BUN 23 12/12/2017    CR 0.64 02/24/2017    GFRESTIMATED 71 12/12/2017    GFRESTBLACK 86 12/12/2017    LUCIUS 9.1 02/24/2017        A1C RESULTS:  Lab Results   Component Value Date    A1C 9.2 02/09/2017       INR RESULTS:  Lab Results   Component Value Date    INR 0.94 02/24/2017    INR 0.90 11/22/2016           CC  Feroz Burgos MD  [address]  [place] [id number]        Thank you for allowing me to participate in the care of your patient.      Sincerely,     SHERRY StephenC     [facility]    cc:   Feroz Burgos MD  [address]  [place] [id number]

## 2018-03-07 NOTE — PROGRESS NOTES
[id number]  HPI and Plan:   See dictation    Orders this Visit:  Orders Placed This Encounter   Procedures     Follow-[facility] with [facility] - [facility] visit     Orders Placed This Encounter   Medications     empagliflozin (JARDIANCE) 25 MG TABS tablet     Sig: Take 12.5 mg by mouth every other day     mirabegron (MYRBETRIQ) 50 MG 24 hr tablet     Sig: Take 50 mg by mouth Patient not taking daily     Cranberry 500 MG CAPS     Sig: Take by mouth daily     isosorbide mononitrate (IMDUR) 30 MG 24 hr tablet     Sig: Take 1 tablet (30 mg) by mouth daily     Dispense:  90 tablet     Refill:  3     Medications Discontinued During This Encounter   Medication Reason     celecoxib (CELEBREX) 100 MG capsule Stopped by Patient     empagliflozin (JARDIANCE) 25 MG TABS tablet Dose adjustment     mirabegron (MYRBETRIQ) 50 MG 24 hr tablet Medication Reconciliation Clean Up     RESTASIS 0.05 % OP EMUL Stopped by Patient     isosorbide mononitrate (IMDUR) 30 MG 24 hr tablet Reorder         Encounter Diagnoses   Name Primary?     Coronary artery disease involving native coronary artery of native heart without angina pectoris      Hypercholesterolemia Yes     Benign essential hypertension        CURRENT MEDICATIONS:  Current Outpatient Prescriptions   Medication Sig Dispense Refill     empagliflozin (JARDIANCE) 25 MG TABS tablet Take 12.5 mg by mouth every other day       mirabegron (MYRBETRIQ) 50 MG 24 hr tablet Take 50 mg by mouth Patient not taking daily       Cranberry 500 MG CAPS Take by mouth daily       isosorbide mononitrate (IMDUR) 30 MG 24 hr tablet Take 1 tablet (30 mg) by mouth daily 90 tablet 3     lisinopril (PRINIVIL/ZESTRIL) 5 MG tablet Take 1 tablet (5 mg) by mouth daily 90 tablet 3     evolocumab (REPATHA SURECLICK) 140 MG/ML prefilled autoinjector Inject 1 mL (140 mg) Subcutaneous every 14 days (Patient taking differently: Inject 140 mg Subcutaneous every 14 days Has not started) 2 mL 11     Camphor-Menthol-Methyl Sal (SALONPAS)  1.2-5.7-6.3 % PTCH Patient uses 3-4 patches daily       cholecalciferol 2000 UNITS CAPS Take 1 capsule by mouth daily       amoxicillin (AMOXIL) 500 MG capsule as needed Reported on 5/17/2017       NONFORMULARY Tumeric 500 mg bid       Gymnema Sylvestris Leaf POWD 500 mg 2 times daily        nitroglycerin (NITROSTAT) 0.4 MG SL tablet Place 1 tablet (0.4 mg) under the tongue every 5 minutes as needed for chest pain If symptoms persist after 3 doses (15 minutes) call 911. 25 tablet 3     Omega-3 Fatty Acids (OMEGA-3 FISH OIL PO) Take 2,400 mg by mouth 2 times daily (with meals)       CINNAMON PO Take 2 capsules by mouth 2 times daily       GLIMEPIRIDE 4 MG OR TABS 1 tablet BID       SYNTHROID 125 MCG OR TABS 1 tablet daily       CO ENZYME Q-10 50 MG OR CAPS daliy  0     ASPIRIN 81 MG OR TABS 1 tab po QD (Once per day)       [DISCONTINUED] isosorbide mononitrate (IMDUR) 30 MG 24 hr tablet Take 0.5 tablets (15 mg) by mouth daily 45 tablet 3     STATIN NOT PRESCRIBED, INTENTIONAL, 1 each At Bedtime Statin not prescribed intentionally due to Allergy to statin 0 each 0       ALLERGIES     Allergies   Allergen Reactions     No Known Drug Allergies        PAST MEDICAL HISTORY:  Past Medical History:   Diagnosis Date     Arthritis      Coronary artery disease 04/28/2016    PTCA with MAAY x 2 to OM1 and MAYA x 1 to p.LAD (4/21/2016 at [place]); PTCA with intracoronary stent placement of RCA June 2004; MAYA to OM1 11/2016     Cystocele, midline 09/29/2010     Depression      HYPERPLASTIC  POLYP      colonoscopy in 5-10 yrs.     Hypothyroidism     hypothyroid- Dr Majano     OAB (overactive bladder)     complex voiding dysfct, failed interstim     Osteoarthritis      Other and unspecified hyperlipidemia      Postmenopausal bleeding      Shoulder blade pain 5/31/2016     Type II or unspecified type diabetes mellitus without mention of complication, not stated as uncontrolled     Dr. Majano     Unspecified essential hypertension       Urinary frequency 9/29/10    followed by urology. no benefit from detrol or sanctura. month trial of macrobid and flomax 10/10       PAST SURGICAL HISTORY:  Past Surgical History:   Procedure Laterality Date     ------------OTHER-------------      Interstim     Ablation       C CT ANGIOGRAPHY CORONARY  2005    mod soft plaque LAD and Cx, follow up with left heart cath     C LIGATE FALLOPIAN TUBE       COLONOSCOPY       EXCISE LESION UPPER EXTREMITY  2013    Procedure: EXCISE LESION UPPER EXTREMITY;  EXCISION RIGHT ARM ANTICUBITAL LIPOMA ;  Surgeon: Jayson Joe MD;  Location: [facility] REMOVAL OF TONSILS,13+ Y/O       HEART CATH LEFT HEART CATH  3/2/2016    No intervention - aggressive medical management     HEART CATH LEFT HEART CATH  2016     MAYA x 2 to OM1, MAYA to LAD, at [place]     HEART CATH LEFT HEART CATH  2004    MAYA to RCA     HEART CATH LEFT HEART CATH  3/28/2002    Mild to moderate 3 vessel CAD. Medical management recommended.      HEART CATH LEFT HEART CATH  2016    MAYA to OM1     KNEE SURGERY  4/20/10    right knee replacement     REMOVE STIMULATOR SACRAL NERVE N/A 2015    Procedure: REMOVE STIMULATOR SACRAL NERVE;  Surgeon: Gertrudis Frausto MD;  Location: [facility]     SURGICAL HISTORY OF -       Uterine endometrial ablation       FAMILY HISTORY:  Family History   Problem Relation Age of Onset     C.A.D. Father      MI , age 83, had high lipids     Hyperlipidemia Father      Hypertension Father      Coronary Artery Disease Father      Hypertension Mother      Genitourinary Problems Mother      renal failure     Fractures Mother      hip     OSTEOPOROSIS Mother      CEREBROVASCULAR DISEASE Maternal Grandfather      cerebral hemorrhage     DIABETES Maternal Uncle      Colon Cancer Maternal Aunt      DIABETES Maternal Grandmother        SOCIAL HISTORY:  Social History     Social History     Marital status:      Spouse name: Kwadwo Larsen  "children: 3     Years of education: N/A     Occupational History     PT Aide None       Retired     Social History Main Topics     Smoking status: Never Smoker     Smokeless tobacco: Never Used     Alcohol use No     Drug use: No     Sexual activity: Yes     Partners: Male     Birth control/ protection: Post-menopausal     Other Topics Concern     Caffeine Concern Yes     6-7 cups caffeine per day     Sleep Concern No     Stress Concern Yes     Weight Concern No     Special Diet No     eats healthy      Exercise Yes      walking & active life style      Parent/Sibling W/ Cabg, Mi Or Angioplasty Before 65f 55m? No     Social History Narrative       Review of Systems:  Skin:  Negative     Eyes:  Positive for glasses  ENT:  Negative    Respiratory:  Positive for shortness of breath  Cardiovascular:    fatigue;chest pain;Positive for  Gastroenterology: Negative    Genitourinary:  Negative    Musculoskeletal:  Positive for arthritis;back pain;neck pain  Neurologic:  Positive for numbness or tingling of feet  Psychiatric:  Positive for anxiety  Heme/Lymph/Imm:  Negative    Endocrine:  Positive for thyroid disorder;diabetes    Physical Exam:  Vitals: /62  Pulse 73  Ht 1.727 m (5' 8\")  Wt 76.4 kg (168 lb 6.4 oz)  BMI 25.61 kg/m2   Please refer to dictation for physical exam    Recent Lab Results:  LIPID RESULTS:  Lab Results   Component Value Date    CHOL 111 08/28/2017    HDL 53 08/28/2017    LDL 45 08/28/2017    TRIG 66 08/28/2017    CHOLHDLRATIO 2.0 02/09/2017    CHOLHDLRATIO 2.6 03/23/2015       LIVER ENZYME RESULTS:  Lab Results   Component Value Date    AST 17 10/08/2015    ALT 11 08/28/2017       CBC RESULTS:  Lab Results   Component Value Date    WBC 4.4 02/24/2017    RBC 3.93 02/24/2017    HGB 12.2 12/12/2017    HCT 36.0 02/24/2017    MCV 92 02/24/2017    MCH 30.8 02/24/2017    MCHC 33.6 02/24/2017    RDW 12.9 02/24/2017     02/24/2017       BMP RESULTS:  Lab Results   Component Value Date    NA " 138 12/12/2017    POTASSIUM 4.4 12/12/2017    CHLORIDE 104 12/12/2017    CO2 26 02/24/2017    ANIONGAP 11 12/12/2017     (H) 12/12/2017    BUN 23 12/12/2017    CR 0.64 02/24/2017    GFRESTIMATED 71 12/12/2017    GFRESTBLACK 86 12/12/2017    LUCIUS 9.1 02/24/2017        A1C RESULTS:  Lab Results   Component Value Date    A1C 9.2 02/09/2017       INR RESULTS:  Lab Results   Component Value Date    INR 0.94 02/24/2017    INR 0.90 11/22/2016           CC  Feroz Burgos MD  [id number]  [place] [id number]

## 2018-03-07 NOTE — MR AVS SNAPSHOT
After Visit Summary   3/7/2018    Sarah Longo    MRN: [id number]           Patient Information     Date Of Birth          1945        Visit Information        Provider Department      3/7/2018 12:30 PM More, Karen Torres PA-C [facility]   [place]        Today's Diagnoses     Hypercholesterolemia    -  1    Coronary artery disease involving native coronary artery of native heart without angina pectoris        Benign essential hypertension          Care Instructions    Thanks for coming into [facility] today.    We discussed:   Your stress test looked good, normal strength and better than before .     Here's a few ideas to help the overall picture.    Try taking mybetriq for 1 or 2 nights to see if it helps you sleep.    Try a seasonal affective disorder light in the morning.    Consider seeing a pain clinic for your back pain and looking into things like medical marijuana, accupuncture, massage or a spinal stimulator, etc.        Medication changes:  Increase to imdur to 30 mg once a day.    Look into pricing on bystolic.  We may be able to get by with just these for blood pressure.        Follow up: with me in about 1 month.        Please call my nurse at [phone] with any questions or concerns.    Scheduling phone number: [phone]  Reminder: Please bring in all current medications, over the counter supplements and vitamin bottles to your next appointment.                  Follow-ups after your visit        Additional Services     Follow-Up with [facility] - [facility] visit                 Future tests that were ordered for you today     Open Future Orders        Priority Expected Expires Ordered    Follow-Up with [facility] - [facility] visit Routine 4/4/2018 3/7/2019 3/7/2018            Who to contact     If you have questions or need follow up information about today's clinic visit or your schedule please contact [facility]  "[facility]   [place] directly at [phone].  Normal or non-critical lab and imaging results will be communicated to you by MyChart, letter or phone within 4 business days after the clinic has received the results. If you do not hear from us within 7 days, please contact the clinic through MyChart or phone. If you have a critical or abnormal lab result, we will notify you by phone as soon as possible.  Submit refill requests through [employer] or call your pharmacy and they will forward the refill request to us. Please allow 3 business days for your refill to be completed.          Additional Information About Your Visit        [employer] (formerly [employer]) Information     XMOS lets you send messages to your doctor, view your test results, renew your prescriptions, schedule appointments and more. To sign up, go to [email or web address] . Click on \"Log in\" on the left side of the screen, which will take you to the Welcome page. Then click on \"Sign up Now\" on the right side of the page.     You will be asked to enter the access code listed below, as well as some personal information. Please follow the directions to create your username and password.     Your access code is: [email or web address]  Expires: 3/12/2018 11:52 PM     Your access code will  in 90 days. If you need help or a new code, please call your [place] [facility] clinic or [phone].        Care EveryWhere ID     This is your Care EveryWhere ID. This could be used by other organizations to access your [place] [facility] medical records  [id number]        Your Vitals Were     Pulse Height BMI (Body Mass Index)             73 1.727 m (5' 8\") 25.61 kg/m2          Blood Pressure from Last 3 Encounters:   18 138/68   18 136/60   18 138/74    Weight from Last 3 Encounters:   18 76.4 kg (168 lb 6.4 oz)   18 78.6 kg (173 lb 4.8 oz)   10/09/17 78 kg (172 lb)              We Performed the Following     Follow-Up with Cardiac Advanced Practice Provider  "         Today's Medication Changes          These changes are accurate as of 3/7/18  1:20 PM.  If you have any questions, ask your nurse or doctor.               These medicines have changed or have updated prescriptions.        Dose/Directions    evolocumab 140 MG/ML prefilled autoinjector   Commonly known as:  REPATHA SURECLICK   This may have changed:  additional instructions   Used for:  Hyperlipidemia LDL goal <100        Dose:  140 mg   Inject 1 mL (140 mg) Subcutaneous every 14 days   Quantity:  2 mL   Refills:  11       isosorbide mononitrate 30 MG 24 hr tablet   Commonly known as:  IMDUR   This may have changed:  how much to take   Used for:  Coronary artery disease involving native coronary artery of native heart without angina pectoris   Changed by:  Karen Alexander PA-C        Dose:  30 mg   Take 1 tablet (30 mg) by mouth daily   Quantity:  90 tablet   Refills:  3       JARDIANCE 25 MG Tabs tablet   This may have changed:  Another medication with the same name was removed. Continue taking this medication, and follow the directions you see here.   Generic drug:  empagliflozin   Changed by:  Karen Alexander PA-C        Dose:  12.5 mg   Take 12.5 mg by mouth every other day   Refills:  0            Where to get your medicines      These medications were sent to [id number] - [place], [place] - [address]  [address] [facility] [address]     Phone:  [phone]     isosorbide mononitrate 30 MG 24 hr tablet                Primary Care Provider Office Phone # Fax #    Gaye Zimmer [phone]       [address] [facility] [address]        Equal Access to Services     MARIANELA LITTLE : Hadii marc templetono Soindra, waaxda luqadaha, qaybta kaalmada malu, meredith castillo. So [facility] [place] [facility] [phone].    ATENCIÓN: Si habla español, tiene a briggs disposición servicios gratuitos de asistencia lingüística. Llame al  [phone].    We comply with applicable federal civil rights laws and [place] laws. We do not discriminate on the basis of race, color, national origin, age, disability, sex, sexual orientation, or gender identity.            Thank you!     Thank you for choosing [facility]  for your care. Our goal is always to provide you with excellent care. Hearing back from our patients is one way we can continue to improve our services. Please take a few minutes to complete the written survey that you may receive in the mail after your visit with us. Thank you!             Your Updated Medication List - Protect others around you: Learn how to safely use, store and throw away your medicines at www.disposemymeds.org.          This list is accurate as of 3/7/18  1:20 PM.  Always use your most recent med list.                   Brand Name Dispense Instructions for use Diagnosis    amoxicillin 500 MG capsule    AMOXIL     as needed Reported on 5/17/2017        aspirin 81 MG tablet      1 tab po QD (Once per day)        cholecalciferol 2000 UNITS Caps      Take 1 capsule by mouth daily        CINNAMON PO      Take 2 capsules by mouth 2 times daily        Co Enzyme Q-10 50 MG Caps      daliy        Cranberry 500 MG Caps      Take by mouth daily        evolocumab 140 MG/ML prefilled autoinjector    REPATHA SURECLICK    2 mL    Inject 1 mL (140 mg) Subcutaneous every 14 days    Hyperlipidemia LDL goal <100       glimepiride 4 MG tablet    AMARYL     1 tablet BID        Gymnema Sylvestris Leaf Powd      500 mg 2 times daily        isosorbide mononitrate 30 MG 24 hr tablet    IMDUR    90 tablet    Take 1 tablet (30 mg) by mouth daily    Coronary artery disease involving native coronary artery of native heart without angina pectoris       JARDIANCE 25 MG Tabs tablet   Generic drug:  empagliflozin      Take 12.5 mg by mouth every other day        lisinopril 5 MG tablet    PRINIVIL/ZESTRIL    90  tablet    Take 1 tablet (5 mg) by mouth daily    Stable angina (H)       MYRBETRIQ 50 MG 24 hr tablet   Generic drug:  mirabegron      Take 50 mg by mouth Patient not taking daily        nitroGLYcerin 0.4 MG sublingual tablet    NITROSTAT    25 tablet    Place 1 tablet (0.4 mg) under the tongue every 5 minutes as needed for chest pain If symptoms persist after 3 doses (15 minutes) call 911.    Coronary artery disease involving native coronary artery of native heart without angina pectoris       NONFORMULARY      Tumeric 500 mg bid        OMEGA-3 FISH OIL PO      Take 2,400 mg by mouth 2 times daily (with meals)        SALONPAS 1.2-5.7-6.3 % Ptch   Generic drug:  Camphor-Menthol-Methyl Sal      Patient uses 3-4 patches daily        STATIN NOT PRESCRIBED (INTENTIONAL)     0 each    1 each At Bedtime Statin not prescribed intentionally due to Allergy to statin    Coronary artery disease involving native coronary artery of native heart without angina pectoris       SYNTHROID 125 MCG tablet   Generic drug:  levothyroxine      1 tablet daily

## 2018-03-08 NOTE — PROGRESS NOTES
Service Date: 2018      PRIMARY CARDIOLOGIST:  Dr. Burgos.      REASON FOR VISIT:  Hypertension, coronary artery disease, stress test followup.      HISTORY OF PRESENT ILLNESS:  Ms. Longo is a 72-year-old woman with past cardiac history significant for the followin.  Coronary artery disease with stenting back in  when she underwent Taxus stent to RCA and she redeveloped symptoms in the spring of 2016 with her anginal equivalent of chest and back discomfort.  She had an abnormal stress test and was found to have a trifurcation lesion of the OM2 as well as a distal LAD lesion.  This was initially managed medically due to the complexity of intervention, and she ended up at [place] with trifurcation stents to her superior and main branch of the obtuse marginal and dilation of the inferior branch.  In addition, she had an LAD stent placed.  She redeveloped symptoms in the  of  and was taken back to the Cath Lab and found to have a 90% restenosis of the proximal OM bifurcation stent in the superior branch and it was stented.  She has gone back for angiogram since then, and stents were patent with negative FFRs.   2.  Dyslipidemia, intolerant to statins and is on Repatha.   3.  Hypertension.   4.  Type 2 diabetes.   5.  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  Please refer to consult from Dr. Nation 2016).      She comes in with multiple concerns.  She was seen by Dr. Burgos about a month ago with profound fatigue and frustration over not being able to afford Bystolic because the price had gone up.  He switched her to labetalol 50 mg b.i.d. and increased her lisinopril to 5 mg at bedtime.  She took this and felt that she had cognitive issues.  On her own she researched and wanted to try carvedilol.  This was started, and she felt extremely tired with this.  She also underwent a stress test that had no infarct or ischemia but some subtle  ST depression.      Her other concerns today include a lot of chronic pain.  She has known spinal stenosis that has pain in her bilateral legs.  She has pain in her low back due to arthritis.  She sleeps poorly as she has an overactive bladder and has to be up and down all night.  She then never falls back to sleep soundly.  She continues to be very fatigued but does not want to take a nap because she feels that it is not a good use of her time.  She tried melatonin and this did help with her sleep, but she was too groggy in the morning.  She takes caffeine pills to help with her energy as well as drinks coffee in the morning.  She does note that she has pain all day long.  It waxes and wanes, but she is rarely without pain.  She has seen an orthopedist.  She has not seen a pain clinic.  She has tried multiple injections through [facility].  She has not tried acupuncture, massage or pain clinic.  She has not been discussed like a spinal stimulator.      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  She is a lifelong nonsmoker, no alcohol use.      PHYSICAL EXAMINATION:   VITAL SIGNS:  Blood pressure today initially is elevated at 138/68, it comes down to 118/62 with talking, pulse is 73, weight is 168 pounds, BMI of 25.   HEENT:  Normocephalic, atraumatic.   HEART:  Regular rate and rhythm without murmur, rub or gallop.   LUNGS:  Clear bilaterally.  Her carotids are quiet.   EXTREMITIES:  Radial pulses are 2+.   EXTREMITIES:  She has no peripheral edema.   SKIN:  Warm and dry.      ASSESSMENT AND PLAN:   1.  Hypertension with adverse effects to carvedilol and labetalol, with her doing well on Bystolic, but it being too expensive.  I am sure her pain also contributes to her hypertension.  She has had a good response of her blood pressure with Imdur in the past, but it actually dropped too low.  At this point, she was on Bystolic as well.  We discussed that perhaps if we can get her on just a decent  dose of Imdur, maybe we could keep her off the beta blocker for both cost and adverse effects reasons.  She typically is very sensitive to medications, and limiting them may be our best bet.  As such, we will keep her off Bystolic, as she is off it right now, continue her lisinopril at 5 mg daily and increase her Imdur from 15 mg daily to 30 mg daily in the morning.  Fortunately, she is not having anginal symptoms.   2.  Coronary artery disease with history of multiple interventions with stress test done on 2018 showing no infarct or ischemia but sagging ST segments seen diffusely, with 1 mm in the infero and anterolateral leads which is nondiagnostic.  She does not have her back discomfort or chest discomfort, and I suspect most of her fatigue is from her chronic pain.  We will continue to manage this conservatively with aspirin and Repatha as well as Imdur and lisinopril.   3.  Dyslipidemia, on Repatha with intolerance to statins.  LDL 45, HDL 63, total cholesterol 111, well controlled.   4.  Chronic pain in her back.  I did encourage her to seek out other options, perhaps a pain clinic, and actually getting some sleep with the help of melatonin or others would be helpful.  She is interested in medical marijuana, and I did direct her to these clinical facilities.  We do not prescribe medical marijuana.  She would have to get this from her primary or another provider.  I did suggest she start with the pain clinic.  I think if we can get her pain controlled, her fatigue will improve and her blood pressures will come down as well.      Thank you for allowing me to participate in this delightful patient's care.  I will see her back in 1 month.      SHYANN Elizabeth PA-C             D: 2018   T: 2018   MT: LD      Name:     KATHY PERRY   MRN:      [id number]        Account:      [id number]   :      1945           Service Date: 2018      Document:  [id number]

## 2018-04-30 ENCOUNTER — OFFICE VISIT (OUTPATIENT)
Dept: CARDIOLOGY | Facility: CLINIC | Age: 73
End: 2018-04-30
Attending: PHYSICIAN ASSISTANT
Payer: COMMERCIAL

## 2018-04-30 VITALS
BODY MASS INDEX: 25.61 KG/M2 | WEIGHT: 169 LBS | SYSTOLIC BLOOD PRESSURE: 122 MMHG | OXYGEN SATURATION: 98 % | HEART RATE: 74 BPM | DIASTOLIC BLOOD PRESSURE: 70 MMHG | HEIGHT: 68 IN

## 2018-04-30 DIAGNOSIS — M48.00 SPINAL STENOSIS, UNSPECIFIED SPINAL REGION: Primary | ICD-10-CM

## 2018-04-30 DIAGNOSIS — I25.10 CORONARY ARTERY DISEASE INVOLVING NATIVE CORONARY ARTERY OF NATIVE HEART WITHOUT ANGINA PECTORIS: ICD-10-CM

## 2018-04-30 PROCEDURE — 99214 OFFICE O/P EST MOD 30 MIN: CPT | Performed by: PHYSICIAN ASSISTANT

## 2018-04-30 RX ORDER — TRAMADOL HYDROCHLORIDE 50 MG/1
50 TABLET ORAL EVERY 6 HOURS PRN
Qty: 30 TABLET | Refills: 3 | Status: SHIPPED | OUTPATIENT
Start: 2018-04-30

## 2018-04-30 RX ORDER — ISOSORBIDE MONONITRATE 30 MG/1
15 TABLET, EXTENDED RELEASE ORAL DAILY
Qty: 90 TABLET | Refills: 3 | Status: SHIPPED | OUTPATIENT
Start: 2018-04-30 | End: 2018-06-25

## 2018-04-30 NOTE — PATIENT INSTRUCTIONS
Thanks for coming into [facility] clinic today.    We discussed: blood pressure overall looks good.    Let us know if you can find Bystolic affordably, then we can restart.        Medication changes:  Continue current medications.    I'd rather have you take tramadol rather than too much ibuprofen or Aleve.      Follow up: with Dr. Burgos with labs in August.        Please call my nurse at [phone] with any questions or concerns.    Scheduling phone number: [phone]  Reminder: Please bring in all current medications, over the counter supplements and vitamin bottles to your next appointment.

## 2018-04-30 NOTE — MR AVS SNAPSHOT
After Visit Summary   4/30/2018    Sarah Longo    MRN: [id number]           Patient Information     Date Of Birth          1945        Visit Information        Provider Department      4/30/2018 3:50 PM More, Karen Torres PA-C [facility]        Today's Diagnoses     Spinal stenosis, unspecified spinal region    -  1    Coronary artery disease involving native coronary artery of native heart without angina pectoris          Care Instructions    Thanks for coming into [facility] clinic today.    We discussed: blood pressure overall looks good.    Let us know if you can find Bystolic affordably, then we can restart.        Medication changes:  Continue current medications.    I'd rather have you take tramadol rather than too much ibuprofen or Aleve.      Follow up: with Dr. Burgos with labs in August.        Please call my nurse at [phone] with any questions or concerns.    Scheduling phone number: [phone]  Reminder: Please bring in all current medications, over the counter supplements and vitamin bottles to your next appointment.                  Follow-ups after your visit        Additional Services     Follow-Up with Cardiologist                 Your next 10 appointments already scheduled     May 29, 2018 10:00 AM CDT   PHYSICAL with Shireen Neves MD   [facility] ([facility])    [address] [place] [id number]   [phone]            May 29, 2018 10:45 AM CDT   (Arrive by 10:30 AM)   MA SCREENING DIGITAL BILATERAL with WEMA1   [facility] ([facility])    [address] [place] [address] [address]  [facility] [address]   [phone]           Do not use any powder, lotion or deodorant under your arms or on your breast. If you do, we will ask you to remove it before your exam.  Wear comfortable, two-piece clothing.   "If you have any allergies, tell your care team.  Bring any previous mammograms from other facilities or have them mailed to the breast center. Three-dimensional (3D) mammograms are available at [facility] locations in [facility], [place], [place], [facility], [place], [place], and [place]. [facility] locations include [place] and [facility] in [place]. Benefits of 3D mammograms include: - Improved rate of cancer detection - Decreases your chance of having to go back for more tests, which means fewer: - \"False-positive\" results (This means that there is an abnormal area but it isn't cancer.) - Invasive testing procedures, such as a biopsy or surgery - Can provide clearer images of the breast if you have dense breast tissue. 3D mammography is an optional exam that anyone can have with a 2D mammogram. It doesn't replace or take the place of a 2D mammogram. 2D mammograms remain an effective screening test for all women.  Not all insurance companies cover the cost of a 3D mammogram. Check with your insurance.              Future tests that were ordered for you today     Open Future Orders        Priority Expected Expires Ordered    Lipid Profile Routine 8/28/2018 4/30/2019 4/30/2018    ALT Routine 8/28/2018 4/30/2019 4/30/2018    Basic metabolic panel Routine 8/28/2018 4/30/2019 4/30/2018    Follow-Up with Cardiologist Routine 8/28/2018 4/30/2019 4/30/2018            Who to contact     If you have questions or need follow up information about today's clinic visit or your schedule please contact [facility] directly at [phone].  Normal or non-critical lab and imaging results will be communicated to you by MyChart, letter or phone within 4 business days after the clinic has received the results. If you do not hear from us within 7 days, please contact the clinic through MyChart or phone. If you have a critical or abnormal lab result, we will notify " "you by phone as soon as possible.  Submit refill requests through [employer] or call your pharmacy and they will forward the refill request to us. Please allow 3 business days for your refill to be completed.          Additional Information About Your Visit        [employer] Information     [employer] lets you send messages to your doctor, view your test results, renew your prescriptions, schedule appointments and more. To sign up, go to [email or web address] . Click on \"Log in\" on the left side of the screen, which will take you to the Welcome page. Then click on \"Sign up Now\" on the right side of the page.     You will be asked to enter the access code listed below, as well as some personal information. Please follow the directions to create your username and password.     Your access code is: [email or web address]  Expires: 2018  4:04 PM     Your access code will  in 90 days. If you need help or a new code, please call your [facility] clinic or [phone].        Care EveryWhere ID     This is your Care EveryWhere ID. This could be used by other organizations to access your [facility] medical records  [id number]        Your Vitals Were     Pulse Height Pulse Oximetry BMI (Body Mass Index)          74 1.727 m (5' 8\") 98% 25.7 kg/m2         Blood Pressure from Last 3 Encounters:   18 122/70   18 118/62   18 136/60    Weight from Last 3 Encounters:   18 76.7 kg (169 lb)   18 76.4 kg (168 lb 6.4 oz)   18 78.6 kg (173 lb 4.8 oz)              We Performed the Following     Follow-Up with [facility] visit          Today's Medication Changes          These changes are accurate as of 18  4:07 PM.  If you have any questions, ask your nurse or doctor.               Start taking these medicines.        Dose/Directions    traMADol 50 MG tablet   Commonly known as:  ULTRAM   Used for:  Spinal stenosis, unspecified spinal region   Started by:  Karen Alexander PA-C        Dose:  " 50 mg   Take 1 tablet (50 mg) by mouth every 6 hours as needed for moderate to severe pain   Quantity:  30 tablet   Refills:  3         These medicines have changed or have updated prescriptions.        Dose/Directions    evolocumab 140 MG/ML prefilled autoinjector   Commonly known as:  REPATHA SURECLICK   This may have changed:  additional instructions   Used for:  Hyperlipidemia LDL goal <100        Dose:  140 mg   Inject 1 mL (140 mg) Subcutaneous every 14 days   Quantity:  2 mL   Refills:  11            Where to get your medicines      Some of these will need a paper prescription and others can be bought over the counter.  Ask your nurse if you have questions.     Bring a paper prescription for each of these medications     isosorbide mononitrate 30 MG 24 hr tablet    traMADol 50 MG tablet               Information about OPIOIDS     PRESCRIPTION OPIOIDS: WHAT YOU NEED TO KNOW   You have a prescription for an opioid (narcotic) pain medicine. Opioids can cause addiction. If you have a history of chemical dependency of any type, you are at a higher risk of becoming addicted to opioids. Only take this medicine after all other options have been tried. Take it for as short a time and as few doses as possible.     Do not:    Drive. If you drive while taking these medicines, you could be arrested for driving under the influence (DUI).    Operate heavy machinery    Do any other dangerous activities while taking these medicines.     Drink any alcohol while taking these medicines.      Take with any other medicines that contain acetaminophen. Read all labels carefully. Look for the word  acetaminophen  or  Tylenol.  Ask your pharmacist if you have questions or are unsure.    Store your pills in a secure place, locked if possible. We will not replace any lost or stolen medicine. If you don t finish your medicine, please throw away (dispose) as directed by your pharmacist. The [place] Pollution Control Agency has more  information about safe disposal: https://www.pca.[facility].[place].us/living-green/managing-unwanted-medications    All opioids tend to cause constipation. Drink plenty of water and eat foods that have a lot of fiber, such as fruits, vegetables, prune juice, apple juice and high-fiber cereal. Take a laxative (Miralax, milk of magnesia, Colace, Senna) if you don t move your bowels at least every other day.          Primary Care Provider Office Phone # Fax #    Gaye Zimmer [phone]       [facility] [address] [facility] [address]        Equal Access to Services     JESSICA LITTLE : Hadii aad ku hadasho Soindra, waaxda luqadaha, qaybta kaalmada malu, meredith taylor . So [facility] [phone].    ATENCIÓN: Si habla español, tiene a briggs disposición servicios gratuitos de asistencia lingüística. Llame al [phone].    We comply with applicable federal civil rights laws and [place] laws. We do not discriminate on the basis of race, color, national origin, age, disability, sex, sexual orientation, or gender identity.            Thank you!     Thank you for choosing [facility]  for your care. Our goal is always to provide you with excellent care. Hearing back from our patients is one way we can continue to improve our services. Please take a few minutes to complete the written survey that you may receive in the mail after your visit with us. Thank you!             Your Updated Medication List - Protect others around you: Learn how to safely use, store and throw away your medicines at www.disposemymeds.org.          This list is accurate as of 4/30/18  4:07 PM.  Always use your most recent med list.                   Brand Name Dispense Instructions for use Diagnosis    amoxicillin 500 MG capsule    AMOXIL     as needed Reported on 5/17/2017        aspirin 81 MG tablet      1 tab po QD (Once per day)        cholecalciferol 2000 units  Caps      Take 1 capsule by mouth daily        CINNAMON PO      Take 2 capsules by mouth 2 times daily        Co Enzyme Q-10 50 MG Caps      daliy        Cranberry 500 MG Caps      Take by mouth daily        evolocumab 140 MG/ML prefilled autoinjector    REPATHA SURECLICK    2 mL    Inject 1 mL (140 mg) Subcutaneous every 14 days    Hyperlipidemia LDL goal <100       glimepiride 4 MG tablet    AMARYL     1 tablet BID        Gymnema Sylvestris Leaf Powd      500 mg 2 times daily        isosorbide mononitrate 30 MG 24 hr tablet    IMDUR    90 tablet    Take 0.5 tablets (15 mg) by mouth daily    Coronary artery disease involving native coronary artery of native heart without angina pectoris       JARDIANCE 25 MG Tabs tablet   Generic drug:  empagliflozin      Take 12.5 mg by mouth every other day        lisinopril 5 MG tablet    PRINIVIL/ZESTRIL    90 tablet    Take 1 tablet (5 mg) by mouth daily    Stable angina (H)       MYRBETRIQ 50 MG 24 hr tablet   Generic drug:  mirabegron      Take 50 mg by mouth Patient not taking daily        nitroGLYcerin 0.4 MG sublingual tablet    NITROSTAT    25 tablet    Place 1 tablet (0.4 mg) under the tongue every 5 minutes as needed for chest pain If symptoms persist after 3 doses (15 minutes) call 911.    Coronary artery disease involving native coronary artery of native heart without angina pectoris       NONFORMULARY      Tumeric 500 mg bid        OMEGA-3 FISH OIL PO      Take 2,400 mg by mouth 2 times daily (with meals)        SALONPAS 1.2-5.7-6.3 % Ptch   Generic drug:  Camphor-Menthol-Methyl Sal      Patient uses 3-4 patches daily        STATIN NOT PRESCRIBED (INTENTIONAL)     0 each    1 each At Bedtime Statin not prescribed intentionally due to Allergy to statin    Coronary artery disease involving native coronary artery of native heart without angina pectoris       SYNTHROID 125 MCG tablet   Generic drug:  levothyroxine      1 tablet daily        traMADol 50 MG tablet     ULTRAM    30 tablet    Take 1 tablet (50 mg) by mouth every 6 hours as needed for moderate to severe pain    Spinal stenosis, unspecified spinal region

## 2018-04-30 NOTE — LETTER
4/30/2018    Gaye Zimmer  [facility] [address]  [place] [address]    RE: Sarah Longo       Dear Colleague,    I had the pleasure of seeing Sarah Longo in the [facility].    [id number]  HPI and Plan:   See dictation    Orders this Visit:  Orders Placed This Encounter   Procedures     Lipid Profile     ALT     Basic metabolic panel     Follow-Up with Cardiologist     Orders Placed This Encounter   Medications     traMADol (ULTRAM) 50 MG tablet     Sig: Take 1 tablet (50 mg) by mouth every 6 hours as needed for moderate to severe pain     Dispense:  30 tablet     Refill:  3     isosorbide mononitrate (IMDUR) 30 MG 24 hr tablet     Sig: Take 0.5 tablets (15 mg) by mouth daily     Dispense:  90 tablet     Refill:  3     Medications Discontinued During This Encounter   Medication Reason     isosorbide mononitrate (IMDUR) 30 MG 24 hr tablet Reorder         Encounter Diagnoses   Name Primary?     Coronary artery disease involving native coronary artery of native heart without angina pectoris      Spinal stenosis, unspecified spinal region Yes       CURRENT MEDICATIONS:  Current Outpatient Prescriptions   Medication Sig Dispense Refill     amoxicillin (AMOXIL) 500 MG capsule as needed Reported on 5/17/2017       ASPIRIN 81 MG OR TABS 1 tab po QD (Once per day)       Camphor-Menthol-Methyl Sal (SALONPAS) 1.2-5.7-6.3 % PTCH Patient uses 3-4 patches daily       cholecalciferol 2000 UNITS CAPS Take 1 capsule by mouth daily       CINNAMON PO Take 2 capsules by mouth 2 times daily       CO ENZYME Q-10 50 MG OR CAPS daliy  0     Cranberry 500 MG CAPS Take by mouth daily       empagliflozin (JARDIANCE) 25 MG TABS tablet Take 12.5 mg by mouth every other day       evolocumab (REPATHA SURECLICK) 140 MG/ML prefilled autoinjector Inject 1 mL (140 mg) Subcutaneous every 14 days (Patient taking differently: Inject 140 mg Subcutaneous every 14 days Has not started) 2 mL 11      GLIMEPIRIDE 4 MG OR TABS 1 tablet BID       Gymnema Sylvestris Leaf POWD 500 mg 2 times daily        isosorbide mononitrate (IMDUR) 30 MG 24 hr tablet Take 0.5 tablets (15 mg) by mouth daily 90 tablet 3     lisinopril (PRINIVIL/ZESTRIL) 5 MG tablet Take 1 tablet (5 mg) by mouth daily 90 tablet 3     mirabegron (MYRBETRIQ) 50 MG 24 hr tablet Take 50 mg by mouth Patient not taking daily       nitroglycerin (NITROSTAT) 0.4 MG SL tablet Place 1 tablet (0.4 mg) under the tongue every 5 minutes as needed for chest pain If symptoms persist after 3 doses (15 minutes) call 911. 25 tablet 3     NONFORMULARY Tumeric 500 mg bid       Omega-3 Fatty Acids (OMEGA-3 FISH OIL PO) Take 2,400 mg by mouth 2 times daily (with meals)       STATIN NOT PRESCRIBED, INTENTIONAL, 1 each At Bedtime Statin not prescribed intentionally due to Allergy to statin 0 each 0     SYNTHROID 125 MCG OR TABS 1 tablet daily       traMADol (ULTRAM) 50 MG tablet Take 1 tablet (50 mg) by mouth every 6 hours as needed for moderate to severe pain 30 tablet 3     [DISCONTINUED] isosorbide mononitrate (IMDUR) 30 MG 24 hr tablet Take 1 tablet (30 mg) by mouth daily (Patient taking differently: Take 15 mg by mouth daily ) 90 tablet 3       ALLERGIES     Allergies   Allergen Reactions     No Known Drug Allergies        PAST MEDICAL HISTORY:  Past Medical History:   Diagnosis Date     Arthritis      Coronary artery disease 04/28/2016    PTCA with MAYA x 2 to OM1 and MAYA x 1 to p.LAD (4/21/2016 at [place]); PTCA with intracoronary stent placement of RCA June 2004; MAYA to OM1 11/2016     Cystocele, midline 09/29/2010     Depression      HYPERPLASTIC  POLYP      colonoscopy in 5-10 yrs.     Hypothyroidism     hypothyroid- Dr Majano     OAB (overactive bladder)     complex voiding dysfct, failed interstim     Osteoarthritis      Other and unspecified hyperlipidemia      Postmenopausal bleeding      Shoulder blade pain 5/31/2016     Type II or unspecified type diabetes  mellitus without mention of complication, not stated as uncontrolled     Dr. Majano     Unspecified essential hypertension      Urinary frequency 9/29/10    followed by urology. no benefit from detrol or sanctura. month trial of macrobid and flomax 10/10       PAST SURGICAL HISTORY:  Past Surgical History:   Procedure Laterality Date     ------------OTHER-------------      Interstim     Ablation       C CT ANGIOGRAPHY CORONARY  2005    mod soft plaque LAD and Cx, follow up with left heart cath     C LIGATE FALLOPIAN TUBE       COLONOSCOPY       EXCISE LESION UPPER EXTREMITY  2013    Procedure: EXCISE LESION UPPER EXTREMITY;  EXCISION RIGHT ARM ANTICUBITAL LIPOMA ;  Surgeon: Jayson Joe MD;  Location: [facility] REMOVAL OF TONSILS,12+ Y/O       HEART CATH LEFT HEART CATH  3/2/2016    No intervention - aggressive medical management     HEART CATH LEFT HEART CATH  2016     MAYA x 2 to OM1, MAYA to LAD, at [place]     HEART CATH LEFT HEART CATH  2004    MAYA to RCA     HEART CATH LEFT HEART CATH  3/28/2002    Mild to moderate 3 vessel CAD. Medical management recommended.      HEART CATH LEFT HEART CATH  2016    MAYA to OM1     KNEE SURGERY  4/20/10    right knee replacement     REMOVE STIMULATOR SACRAL NERVE N/A 2015    Procedure: REMOVE STIMULATOR SACRAL NERVE;  Surgeon: Gertrudis Frausto MD;  Location: [facility]     SURGICAL HISTORY OF -       Uterine endometrial ablation       FAMILY HISTORY:  Family History   Problem Relation Age of Onset     C.A.D. Father      MI , age 83, had high lipids     Hyperlipidemia Father      Hypertension Father      Coronary Artery Disease Father      Hypertension Mother      Genitourinary Problems Mother      renal failure     Fractures Mother      hip     OSTEOPOROSIS Mother      CEREBROVASCULAR DISEASE Maternal Grandfather      cerebral hemorrhage     DIABETES Maternal Uncle      Colon Cancer Maternal Aunt      DIABETES Maternal Grandmother   "      SOCIAL HISTORY:  Social History     Social History     Marital status:      Spouse name: Kwadwo     Number of children: 3     Years of education: N/A     Occupational History     PT Aide None       Retired     Social History Main Topics     Smoking status: Never Smoker     Smokeless tobacco: Never Used     Alcohol use No     Drug use: No     Sexual activity: Yes     Partners: Male     Birth control/ protection: Post-menopausal     Other Topics Concern     Caffeine Concern Yes     6-7 cups caffeine per day     Sleep Concern No     Stress Concern Yes     Weight Concern No     Special Diet No     eats healthy      Exercise Yes      walking & active life style      Parent/Sibling W/ Cabg, Mi Or Angioplasty Before 65f 55m? No     Social History Narrative       Review of Systems:  Skin:  Negative     Eyes:  Positive for glasses  ENT:  Negative    Respiratory:  Positive for dyspnea on exertion  Cardiovascular:    fatigue;Positive for  Gastroenterology: Negative    Genitourinary:  Negative    Musculoskeletal:  Positive for arthritis;back pain;neck pain  Neurologic:  Positive for numbness or tingling of feet  Psychiatric:  Positive for anxiety  Heme/Lymph/Imm:  Negative    Endocrine:  Positive for thyroid disorder;diabetes    Physical Exam:  Vitals: /70  Pulse 74  Ht 1.727 m (5' 8\")  Wt 76.7 kg (169 lb)  SpO2 98%  BMI 25.7 kg/m2   Please refer to dictation for physical exam    Recent Lab Results:  LIPID RESULTS:  Lab Results   Component Value Date    CHOL 111 08/28/2017    HDL 53 08/28/2017    LDL 45 08/28/2017    TRIG 66 08/28/2017    CHOLHDLRATIO 2.0 02/09/2017    CHOLHDLRATIO 2.6 03/23/2015       LIVER ENZYME RESULTS:  Lab Results   Component Value Date    AST 17 10/08/2015    ALT 11 08/28/2017       CBC RESULTS:  Lab Results   Component Value Date    WBC 4.4 02/24/2017    RBC 3.93 02/24/2017    HGB 12.2 12/12/2017    HCT 36.0 02/24/2017    MCV 92 02/24/2017    MCH 30.8 02/24/2017    MCHC 33.6 " 02/24/2017    RDW 12.9 02/24/2017     02/24/2017       BMP RESULTS:  Lab Results   Component Value Date     12/12/2017    POTASSIUM 4.4 12/12/2017    CHLORIDE 104 12/12/2017    CO2 26 02/24/2017    ANIONGAP 11 12/12/2017     (H) 12/12/2017    BUN 23 12/12/2017    CR 0.64 02/24/2017    GFRESTIMATED 71 12/12/2017    GFRESTBLACK 86 12/12/2017    LUCIUS 9.1 02/24/2017        A1C RESULTS:  Lab Results   Component Value Date    A1C 9.2 02/09/2017       INR RESULTS:  Lab Results   Component Value Date    INR 0.94 02/24/2017    INR 0.90 11/22/2016           CC  Karen Alexander PA-C  [address]  [place] [id number]        Thank you for allowing me to participate in the care of your patient.      Sincerely,     Karen Alexander PA-C     [facility]    cc:   Karen Alexander PA-C  [address]  [place] [id number]

## 2018-04-30 NOTE — LETTER
2018      Gaye Zimmer  [facility] [address]  [facility] [address]      RE: Sarah Longo       Dear Colleague,    I had the pleasure of seeing Sarah KAPOOR Donta in the [facility].    Service Date: 2018      PRIMARY CARDIOLOGIST:  Dr. Burgos.      REASON FOR VISIT:  Hypertension, coronary artery disease followup.      HISTORY OF PRESENT ILLNESS:  Ms. Longo is a 72-year-old woman with past medical history significant for the followin.  Coronary artery disease with stenting back in  where she underwent Taxus stent to her RCA.  She redeveloped symptoms in the spring of 2016 with chest and back discomfort, which is her anginal equivalent, and was found to have abnormal stress test and a trifurcation lesion of the OM2 as well as a distal LAD lesion.  This was initially managed medically due to the complexity of the intervention, and then she ended up at [place] with trifurcation stents to her superior and main branch of the obtuse marginal and dilatation of the inferior branch.  She also had an LAD stent placed.  She redeveloped symptoms in the  of  was found to have a 90% restenosis of the proximal OM bifurcation stent in the superior branch, and this was stented.  Since then she has had an angiogram with patent stents and negative fractional flow reserves, last in 2017.   2.  Dyslipidemia.  Intolerant of statins, on Repatha.   3.  Hypertension.   4.  Type 2 diabetes.   5.  Type 1 Brugada pattern provoked by fever and possible Lamictal use (this differs from Brugada syndrome -- please refer to Dr. Nation' consult 2016).            I last saw her because she had had problems with her blood pressure.  She had preferred Bystolic but due to cost had been switched to labetalol, and that caused fatigue.  She tried carvedilol and felt fatigued with that as well.  She underwent a stress test, and this showed no infarct or ischemia but some  subtle ST depression.  She had a lot of chronic pain at that time, and we discussed just controlling her blood pressures with her current medications, not adding the beta blocker back on.      Today she comes in stating she is doing okay from a heart standpoint.  She has back pain, but this is a complex with her spinal stenosis.  She has not had chest pain.  She denies shortness of breath outside of what she would expect.  It is off and on, but she does not think it is abnormal.  She denies orthopnea or PND.  She continues to struggle with back pain.  She has seen a variety of specialists and is now considering a human amniotic fluid injection, and apparently was told that the wrong steroid was used in her back and that perhaps she could get a different steroid injection that would be helpful.  She is pursuing this but needs insurance to cover them.      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  She is a lifelong nonsmoker, no alcohol use.      PHYSICAL EXAMINATION:   GENERAL:  Well-developed, well-nourished woman in no acute distress.   HEENT:  Normocephalic, atraumatic.   HEART:  Regular in rate and rhythm with a 2/6 systolic murmur heard best at the lower left sternal border.   LUNGS:  Clear.  Her carotids are quiet.   EXTREMITIES:  No peripheral edema.   SKIN:  Warm and dry.      ASSESSMENT AND PLAN:   1.  Hypertension, well-controlled.  Intolerant to carvedilol and labetalol due to fatigue and well tolerated on Bystolic, but off due to cost.  At this point, her blood pressure is reasonably controlled on Imdur 15 mg daily and lisinopril 5 mg daily.  Her EF is normal, and although having her on a beta blocker would be ideal, do not have absolutely strong evidence that would require her to be on one.  If she can get Bystolic in an affordable fashion, I would consider restarting it.  If not, we will keep her on the medications she is on.   2.  Coronary artery disease with history of  multiple interventions without recurrent symptoms.  We will maintain her on aspirin and Repatha.   3.  Dyslipidemia, on Repatha with good results.  Repeat lipid panel this summer.   4.  Chronic back and neck pain with her taking Aleve 2 pills in the morning as well as ibuprofen occasionally later in the day and Tylenol.  The Tylenol is not helpful for her.  At this point, I actually prefer to have her take tramadol instead of the NSAIDs, both for her hypertension and her coronary history.  For that reason, I did give her a prescription of 50 mg tablets of tramadol 30 with 3 refills.  Future prescriptions would ideally go through her primary or pain clinic, but again given the cardiac risks of NSAIDs, Ultram is likely a reasonable choice.  I also encouraged her to continue pursuing alternate workup for her pain, and she is doing so.                      Thank you for allowing me to participate in this delightful patient's care.      SHYANN Elizabeth PA-C      D: 2018   T: 2018   MT: LD      Name:     KATHY PERRY   MRN:      [id number]        Account:      [id number]   :      1945           Service Date: 2018      Document: [id number]         Outpatient Encounter Prescriptions as of 2018   Medication Sig Dispense Refill     amoxicillin (AMOXIL) 500 MG capsule as needed Reported on 2017       ASPIRIN 81 MG OR TABS 1 tab po QD (Once per day)       Camphor-Menthol-Methyl Sal (SALONPAS) 1.2-5.7-6.3 % PTCH Patient uses 3-4 patches daily       cholecalciferol 2000 UNITS CAPS Take 1 capsule by mouth daily       CINNAMON PO Take 2 capsules by mouth 2 times daily       CO ENZYME Q-10 50 MG OR CAPS daliy  0     Cranberry 500 MG CAPS Take by mouth daily       empagliflozin (JARDIANCE) 25 MG TABS tablet Take 12.5 mg by mouth every other day       evolocumab (REPATHA SURECLICK) 140 MG/ML prefilled autoinjector Inject 1 mL (140 mg) Subcutaneous every 14 days  (Patient taking differently: Inject 140 mg Subcutaneous every 14 days Has not started) 2 mL 11     GLIMEPIRIDE 4 MG OR TABS 1 tablet BID       Gymnema Sylvestris Leaf POWD 500 mg 2 times daily        isosorbide mononitrate (IMDUR) 30 MG 24 hr tablet Take 0.5 tablets (15 mg) by mouth daily 90 tablet 3     lisinopril (PRINIVIL/ZESTRIL) 5 MG tablet Take 1 tablet (5 mg) by mouth daily 90 tablet 3     mirabegron (MYRBETRIQ) 50 MG 24 hr tablet Take 50 mg by mouth Patient not taking daily       nitroglycerin (NITROSTAT) 0.4 MG SL tablet Place 1 tablet (0.4 mg) under the tongue every 5 minutes as needed for chest pain If symptoms persist after 3 doses (15 minutes) call 911. 25 tablet 3     NONFORMULARY Tumeric 500 mg bid       Omega-3 Fatty Acids (OMEGA-3 FISH OIL PO) Take 2,400 mg by mouth 2 times daily (with meals)       STATIN NOT PRESCRIBED, INTENTIONAL, 1 each At Bedtime Statin not prescribed intentionally due to Allergy to statin 0 each 0     SYNTHROID 125 MCG OR TABS 1 tablet daily       traMADol (ULTRAM) 50 MG tablet Take 1 tablet (50 mg) by mouth every 6 hours as needed for moderate to severe pain 30 tablet 3     [DISCONTINUED] isosorbide mononitrate (IMDUR) 30 MG 24 hr tablet Take 1 tablet (30 mg) by mouth daily (Patient taking differently: Take 15 mg by mouth daily ) 90 tablet 3     No facility-administered encounter medications on file as of 4/30/2018.        Again, thank you for allowing me to participate in the care of your patient.      Sincerely,    Karen Alexander PA-C     [facility]

## 2018-04-30 NOTE — PROGRESS NOTES
[id number]  HPI and Plan:   See dictation    Orders this Visit:  Orders Placed This Encounter   Procedures     Lipid Profile     ALT     Basic metabolic panel     Follow-Up with Cardiologist     Orders Placed This Encounter   Medications     traMADol (ULTRAM) 50 MG tablet     Sig: Take 1 tablet (50 mg) by mouth every 6 hours as needed for moderate to severe pain     Dispense:  30 tablet     Refill:  3     isosorbide mononitrate (IMDUR) 30 MG 24 hr tablet     Sig: Take 0.5 tablets (15 mg) by mouth daily     Dispense:  90 tablet     Refill:  3     Medications Discontinued During This Encounter   Medication Reason     isosorbide mononitrate (IMDUR) 30 MG 24 hr tablet Reorder         Encounter Diagnoses   Name Primary?     Coronary artery disease involving native coronary artery of native heart without angina pectoris      Spinal stenosis, unspecified spinal region Yes       CURRENT MEDICATIONS:  Current Outpatient Prescriptions   Medication Sig Dispense Refill     amoxicillin (AMOXIL) 500 MG capsule as needed Reported on 5/17/2017       ASPIRIN 81 MG OR TABS 1 tab po QD (Once per day)       Camphor-Menthol-Methyl Sal (SALONPAS) 1.2-5.7-6.3 % PTCH Patient uses 3-4 patches daily       cholecalciferol 2000 UNITS CAPS Take 1 capsule by mouth daily       CINNAMON PO Take 2 capsules by mouth 2 times daily       CO ENZYME Q-10 50 MG OR CAPS daliy  0     Cranberry 500 MG CAPS Take by mouth daily       empagliflozin (JARDIANCE) 25 MG TABS tablet Take 12.5 mg by mouth every other day       evolocumab (REPATHA SURECLICK) 140 MG/ML prefilled autoinjector Inject 1 mL (140 mg) Subcutaneous every 14 days (Patient taking differently: Inject 140 mg Subcutaneous every 14 days Has not started) 2 mL 11     GLIMEPIRIDE 4 MG OR TABS 1 tablet BID       Gymnema Sylvestris Leaf POWD 500 mg 2 times daily        isosorbide mononitrate (IMDUR) 30 MG 24 hr tablet Take 0.5 tablets (15 mg) by mouth daily 90 tablet 3     lisinopril (PRINIVIL/ZESTRIL) 5  MG tablet Take 1 tablet (5 mg) by mouth daily 90 tablet 3     mirabegron (MYRBETRIQ) 50 MG 24 hr tablet Take 50 mg by mouth Patient not taking daily       nitroglycerin (NITROSTAT) 0.4 MG SL tablet Place 1 tablet (0.4 mg) under the tongue every 5 minutes as needed for chest pain If symptoms persist after 3 doses (15 minutes) call 911. 25 tablet 3     NONFORMULARY Tumeric 500 mg bid       Omega-3 Fatty Acids (OMEGA-3 FISH OIL PO) Take 2,400 mg by mouth 2 times daily (with meals)       STATIN NOT PRESCRIBED, INTENTIONAL, 1 each At Bedtime Statin not prescribed intentionally due to Allergy to statin 0 each 0     SYNTHROID 125 MCG OR TABS 1 tablet daily       traMADol (ULTRAM) 50 MG tablet Take 1 tablet (50 mg) by mouth every 6 hours as needed for moderate to severe pain 30 tablet 3     [DISCONTINUED] isosorbide mononitrate (IMDUR) 30 MG 24 hr tablet Take 1 tablet (30 mg) by mouth daily (Patient taking differently: Take 15 mg by mouth daily ) 90 tablet 3       ALLERGIES     Allergies   Allergen Reactions     No Known Drug Allergies        PAST MEDICAL HISTORY:  Past Medical History:   Diagnosis Date     Arthritis      Coronary artery disease 04/28/2016    PTCA with MAYA x 2 to OM1 and MAYA x 1 to p.LAD (4/21/2016 at [place]); PTCA with intracoronary stent placement of RCA June 2004; MAYA to OM1 11/2016     Cystocele, midline 09/29/2010     Depression      HYPERPLASTIC  POLYP      colonoscopy in 5-10 yrs.     Hypothyroidism     hypothyroid- Dr Majano     OAB (overactive bladder)     complex voiding dysfct, failed interstim     Osteoarthritis      Other and unspecified hyperlipidemia      Postmenopausal bleeding      Shoulder blade pain 5/31/2016     Type II or unspecified type diabetes mellitus without mention of complication, not stated as uncontrolled     Dr. Majano     Unspecified essential hypertension      Urinary frequency 9/29/10    followed by urology. no benefit from detrol or sanctura. month trial of macrobid and  flomax 10/10       PAST SURGICAL HISTORY:  Past Surgical History:   Procedure Laterality Date     ------------OTHER-------------      Interstim     Ablation       C CT ANGIOGRAPHY CORONARY  2005    mod soft plaque LAD and Cx, follow up with left heart cath     C LIGATE FALLOPIAN TUBE       COLONOSCOPY       EXCISE LESION UPPER EXTREMITY  2013    Procedure: EXCISE LESION UPPER EXTREMITY;  EXCISION RIGHT ARM ANTICUBITAL LIPOMA ;  Surgeon: Jayson Joe MD;  Location: [facility] REMOVAL OF TONSILS,12+ Y/O       HEART CATH LEFT HEART CATH  3/2/2016    No intervention - aggressive medical management     HEART CATH LEFT HEART CATH  2016     MAYA x 2 to OM1, MAYA to LAD, at [place]     HEART CATH LEFT HEART CATH  2004    MAYA to RCA     HEART CATH LEFT HEART CATH  3/28/2002    Mild to moderate 3 vessel CAD. Medical management recommended.      HEART CATH LEFT HEART CATH  2016    MAYA to OM1     KNEE SURGERY  4/20/10    right knee replacement     REMOVE STIMULATOR SACRAL NERVE N/A 2015    Procedure: REMOVE STIMULATOR SACRAL NERVE;  Surgeon: Gertrudis Frausto MD;  Location: [facility]     SURGICAL HISTORY OF -       Uterine endometrial ablation       FAMILY HISTORY:  Family History   Problem Relation Age of Onset     C.A.D. Father      MI , age 83, had high lipids     Hyperlipidemia Father      Hypertension Father      Coronary Artery Disease Father      Hypertension Mother      Genitourinary Problems Mother      renal failure     Fractures Mother      hip     OSTEOPOROSIS Mother      CEREBROVASCULAR DISEASE Maternal Grandfather      cerebral hemorrhage     DIABETES Maternal Uncle      Colon Cancer Maternal Aunt      DIABETES Maternal Grandmother        SOCIAL HISTORY:  Social History     Social History     Marital status:      Spouse name: Kwadwo     Number of children: 3     Years of education: N/A     Occupational History     PT Aide None       Retired     Social History Main  "Topics     Smoking status: Never Smoker     Smokeless tobacco: Never Used     Alcohol use No     Drug use: No     Sexual activity: Yes     Partners: Male     Birth control/ protection: Post-menopausal     Other Topics Concern     Caffeine Concern Yes     6-7 cups caffeine per day     Sleep Concern No     Stress Concern Yes     Weight Concern No     Special Diet No     eats healthy      Exercise Yes      walking & active life style      Parent/Sibling W/ Cabg, Mi Or Angioplasty Before 65f 55m? No     Social History Narrative       Review of Systems:  Skin:  Negative     Eyes:  Positive for glasses  ENT:  Negative    Respiratory:  Positive for dyspnea on exertion  Cardiovascular:    fatigue;Positive for  Gastroenterology: Negative    Genitourinary:  Negative    Musculoskeletal:  Positive for arthritis;back pain;neck pain  Neurologic:  Positive for numbness or tingling of feet  Psychiatric:  Positive for anxiety  Heme/Lymph/Imm:  Negative    Endocrine:  Positive for thyroid disorder;diabetes    Physical Exam:  Vitals: /70  Pulse 74  Ht 1.727 m (5' 8\")  Wt 76.7 kg (169 lb)  SpO2 98%  BMI 25.7 kg/m2   Please refer to dictation for physical exam    Recent Lab Results:  LIPID RESULTS:  Lab Results   Component Value Date    CHOL 111 08/28/2017    HDL 53 08/28/2017    LDL 45 08/28/2017    TRIG 66 08/28/2017    CHOLHDLRATIO 2.0 02/09/2017    CHOLHDLRATIO 2.6 03/23/2015       LIVER ENZYME RESULTS:  Lab Results   Component Value Date    AST 17 10/08/2015    ALT 11 08/28/2017       CBC RESULTS:  Lab Results   Component Value Date    WBC 4.4 02/24/2017    RBC 3.93 02/24/2017    HGB 12.2 12/12/2017    HCT 36.0 02/24/2017    MCV 92 02/24/2017    MCH 30.8 02/24/2017    MCHC 33.6 02/24/2017    RDW 12.9 02/24/2017     02/24/2017       BMP RESULTS:  Lab Results   Component Value Date     12/12/2017    POTASSIUM 4.4 12/12/2017    CHLORIDE 104 12/12/2017    CO2 26 02/24/2017    ANIONGAP 11 12/12/2017     " (H) 12/12/2017    BUN 23 12/12/2017    CR 0.64 02/24/2017    GFRESTIMATED 71 12/12/2017    GFRESTBLACK 86 12/12/2017    LUCIUS 9.1 02/24/2017        A1C RESULTS:  Lab Results   Component Value Date    A1C 9.2 02/09/2017       INR RESULTS:  Lab Results   Component Value Date    INR 0.94 02/24/2017    INR 0.90 11/22/2016           CC  Karen Alexander, PA-C  [id number]  [place] [id number]

## 2018-05-01 NOTE — PROGRESS NOTES
Service Date: 2018      PRIMARY CARDIOLOGIST:  Dr. Burgos.      REASON FOR VISIT:  Hypertension, coronary artery disease followup.      HISTORY OF PRESENT ILLNESS:  Ms. Longo is a 72-year-old woman with past medical history significant for the followin.  Coronary artery disease with stenting back in  where she underwent Taxus stent to her RCA.  She redeveloped symptoms in the spring of 2016 with chest and back discomfort, which is her anginal equivalent, and was found to have abnormal stress test and a trifurcation lesion of the OM2 as well as a distal LAD lesion.  This was initially managed medically due to the complexity of the intervention, and then she ended up at [place] with trifurcation stents to her superior and main branch of the obtuse marginal and dilatation of the inferior branch.  She also had an LAD stent placed.  She redeveloped symptoms in the  of  was found to have a 90% restenosis of the proximal OM bifurcation stent in the superior branch, and this was stented.  Since then she has had an angiogram with patent stents and negative fractional flow reserves, last in 2017.   2.  Dyslipidemia.  Intolerant of statins, on Repatha.   3.  Hypertension.   4.  Type 2 diabetes.   5.  Type 1 Brugada pattern provoked by fever and possible Lamictal use (this differs from Brugada syndrome -- please refer to Dr. Nation' consult 2016).      I last saw her because she had had problems with her blood pressure.  She had preferred Bystolic but due to cost had been switched to labetalol, and that caused fatigue.  She tried carvedilol and felt fatigued with that as well.  She underwent a stress test, and this showed no infarct or ischemia but some subtle ST depression.  She had a lot of chronic pain at that time, and we discussed just controlling her blood pressures with her current medications, not adding the beta blocker back on.      Today she comes in stating she is doing okay from a heart  standpoint.  She has back pain, but this is a complex with her spinal stenosis.  She has not had chest pain.  She denies shortness of breath outside of what she would expect.  It is off and on, but she does not think it is abnormal.  She denies orthopnea or PND.  She continues to struggle with back pain.  She has seen a variety of specialists and is now considering a human amniotic fluid injection, and apparently was told that the wrong steroid was used in her back and that perhaps she could get a different steroid injection that would be helpful.  She is pursuing this but needs insurance to cover them.      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  She is a lifelong nonsmoker, no alcohol use.      PHYSICAL EXAMINATION:   GENERAL:  Well-developed, well-nourished woman in no acute distress.   HEENT:  Normocephalic, atraumatic.   HEART:  Regular in rate and rhythm with a 2/6 systolic murmur heard best at the lower left sternal border.   LUNGS:  Clear.  Her carotids are quiet.   EXTREMITIES:  No peripheral edema.   SKIN:  Warm and dry.      ASSESSMENT AND PLAN:   1.  Hypertension, well-controlled.  Intolerant to carvedilol and labetalol due to fatigue and well tolerated on Bystolic, but off due to cost.  At this point, her blood pressure is reasonably controlled on Imdur 15 mg daily and lisinopril 5 mg daily.  Her EF is normal, and although having her on a beta blocker would be ideal, do not have absolutely strong evidence that would require her to be on one.  If she can get Bystolic in an affordable fashion, I would consider restarting it.  If not, we will keep her on the medications she is on.   2.  Coronary artery disease with history of multiple interventions without recurrent symptoms.  We will maintain her on aspirin and Repatha.   3.  Dyslipidemia, on Repatha with good results.  Repeat lipid panel this summer.   4.  Chronic back and neck pain with her taking Aleve 2 pills in the morning  as well as ibuprofen occasionally later in the day and Tylenol.  The Tylenol is not helpful for her.  At this point, I actually prefer to have her take tramadol instead of the NSAIDs, both for her hypertension and her coronary history.  For that reason, I did give her a prescription of 50 mg tablets of tramadol 30 with 3 refills.  Future prescriptions would ideally go through her primary or pain clinic, but again given the cardiac risks of NSAIDs, Ultram is likely a reasonable choice.  I also encouraged her to continue pursuing alternate workup for her pain, and she is doing so.      Thank you for allowing me to participate in this delightful patient's care.      SHYANN Elizabeth PA-C             D: 2018   T: 2018   MT: LD      Name:     KATHY PERRY   MRN:      [id number]        Account:      [id number]   :      1945           Service Date: 2018      Document: [id number]

## 2018-05-09 DIAGNOSIS — I20.89 STABLE ANGINA (H): ICD-10-CM

## 2018-05-09 RX ORDER — LISINOPRIL 5 MG/1
5 TABLET ORAL DAILY
Qty: 90 TABLET | Refills: 3 | Status: SHIPPED | OUTPATIENT
Start: 2018-05-09 | End: 2019-06-26

## 2018-05-09 NOTE — TELEPHONE ENCOUNTER
Received message from patient that she needs a refill for her Lisinopril. Pt was last seen by Felipe on 4/30/18.  Medication e-scripted to [facility] pharmacy per her request. Call attempted to reach patient to let her know medication was e-scripted to her pharmacy. Phone busy, unable to leave message. Dorinda Leahy RN 3:17 PM 05/09/18

## 2018-05-29 ENCOUNTER — OFFICE VISIT (OUTPATIENT)
Dept: OBGYN | Facility: CLINIC | Age: 73
End: 2018-05-29
Payer: COMMERCIAL

## 2018-05-29 ENCOUNTER — RADIANT APPOINTMENT (OUTPATIENT)
Dept: MAMMOGRAPHY | Facility: CLINIC | Age: 73
End: 2018-05-29
Payer: COMMERCIAL

## 2018-05-29 VITALS
DIASTOLIC BLOOD PRESSURE: 60 MMHG | BODY MASS INDEX: 25.4 KG/M2 | WEIGHT: 167.6 LBS | SYSTOLIC BLOOD PRESSURE: 112 MMHG | HEIGHT: 68 IN | HEART RATE: 78 BPM

## 2018-05-29 DIAGNOSIS — Z01.419 ENCOUNTER FOR GYNECOLOGICAL EXAMINATION WITHOUT ABNORMAL FINDING: Primary | ICD-10-CM

## 2018-05-29 DIAGNOSIS — Z12.31 VISIT FOR SCREENING MAMMOGRAM: ICD-10-CM

## 2018-05-29 DIAGNOSIS — N39.41 URINARY INCONTINENCE, URGE: ICD-10-CM

## 2018-05-29 PROCEDURE — 77063 BREAST TOMOSYNTHESIS BI: CPT | Mod: TC

## 2018-05-29 PROCEDURE — 99397 PER PM REEVAL EST PAT 65+ YR: CPT | Performed by: OBSTETRICS & GYNECOLOGY

## 2018-05-29 PROCEDURE — 77067 SCR MAMMO BI INCL CAD: CPT | Mod: TC

## 2018-05-29 RX ORDER — MIRABEGRON 50 MG/1
50 TABLET, EXTENDED RELEASE ORAL DAILY
Qty: 90 TABLET | Refills: 3 | Status: SHIPPED | OUTPATIENT
Start: 2018-05-29 | End: 2019-06-03

## 2018-05-29 ASSESSMENT — ANXIETY QUESTIONNAIRES
IF YOU CHECKED OFF ANY PROBLEMS ON THIS QUESTIONNAIRE, HOW DIFFICULT HAVE THESE PROBLEMS MADE IT FOR YOU TO DO YOUR WORK, TAKE CARE OF THINGS AT HOME, OR GET ALONG WITH OTHER PEOPLE: SOMEWHAT DIFFICULT
GAD7 TOTAL SCORE: 5
7. FEELING AFRAID AS IF SOMETHING AWFUL MIGHT HAPPEN: NOT AT ALL
5. BEING SO RESTLESS THAT IT IS HARD TO SIT STILL: NOT AT ALL
2. NOT BEING ABLE TO STOP OR CONTROL WORRYING: SEVERAL DAYS
6. BECOMING EASILY ANNOYED OR IRRITABLE: SEVERAL DAYS
3. WORRYING TOO MUCH ABOUT DIFFERENT THINGS: SEVERAL DAYS
1. FEELING NERVOUS, ANXIOUS, OR ON EDGE: SEVERAL DAYS

## 2018-05-29 ASSESSMENT — PATIENT HEALTH QUESTIONNAIRE - PHQ9: 5. POOR APPETITE OR OVEREATING: SEVERAL DAYS

## 2018-05-29 NOTE — PROGRESS NOTES
Sarah is a 73 year old  female who presents for annual exam.     Besides routine health maintenance, she has no other health concerns today .    Do you have a Health Care Directive?: No: Advance care planning was reviewed with patient; patient declined at this time.    Fall risk:   Fallen 2 or more times in the past year?: No  Any fall with injury in the past year?: No    HPI:  The patient's PCP is Gaye Zimmer.    Patient wanted to discuss her ongoing issues with severe back pain.  Has seen spine surgery and they are reluctant to try surgery due to possible lack of benefit or worsening of her condition.  Patient wants to try a therapy with amniotic fluid injection. Hoping to get prior auth paperwork done with her orthopedic MD.   Her pain is really impacting her life.     Has known coronary artery disease with stents, diabetes.     Long history of OVERACTIVE BLADDER, complex voiding dysfct.  Failed interstim implant.  She  has pretty significant urge incontinence and the myrbetriq is the only thing that helps.  [place] so has been [place]  [place] at a [place] [facility] who uses a [place] supplier. Wants refill with paper prescription today.  Wondering about possible botox injections which I have suggested she hold off for now.  She has a very complicated medical history and if she develops inability to avoid it would be very hard to deal with on top of all her other problems.     GYNECOLOGIC HISTORY:  No LMP recorded. Patient is postmenopausal..   reports that she has never smoked. She has never used smokeless tobacco.    Patient is not sexually active.  STD testing offered?  Declined  Last PHQ-9 score on record=   PHQ-9 SCORE 2018   Total Score 5     Last GAD7 score on record=   DIANNA-7 SCORE 2016   Total Score 6 5     Alcohol Score = 0    HEALTH MAINTENANCE:  Cholesterol: Patient has labs done with her pcp.  Last Mammo: 17, Result: normal, Next Mammo: today    Pap: (No results found for: PAP )  DEXA: 17 (Care Everywhere)  Colonoscopy: 5/5/15 (Care Everywhere), Result:  polyps, Next Colonoscopy: 8 years per patient    Health maintenance updated:  yes    HISTORY:  Obstetric History       T3      L3     SAB0   TAB0   Ectopic0   Multiple0   Live Births3       # Outcome Date GA Lbr Berhane/2nd Weight Sex Delivery Anes PTL Lv   3 Term     F    DEE   2 Term     F    DEE   1 Term     M    DEE        Patient Active Problem List   Diagnosis     Benign essential hypertension     Hypercholesterolemia     Osteoarthrosis, unspecified whether generalized or localized, involving lower leg     Diabetes mellitus, type 2 (H)     Other specified gastritis without mention of hemorrhage     Low back pain     Lumbar radiculopathy     Coronary artery disease involving native coronary artery of native heart without angina pectoris     Status post coronary angiogram     Shoulder blade pain     Past Surgical History:   Procedure Laterality Date     ------------OTHER-------------      Interstim     Ablation       C CT ANGIOGRAPHY CORONARY  2005    mod soft plaque LAD and Cx, follow up with left heart cath     C LIGATE FALLOPIAN TUBE       COLONOSCOPY       EXCISE LESION UPPER EXTREMITY  2013    Procedure: EXCISE LESION UPPER EXTREMITY;  EXCISION RIGHT ARM ANTICUBITAL LIPOMA ;  Surgeon: Jayson Joe MD;  Location: [facility] REMOVAL OF TONSILS,12+ Y/O       HEART CATH LEFT HEART CATH  3/2/2016    No intervention - aggressive medical management     HEART CATH LEFT HEART CATH  2016     MAYA x 2 to OM1, MAYA to LAD, at [place]     HEART CATH LEFT HEART CATH  2004    MAYA to RCA     HEART CATH LEFT HEART CATH  3/28/2002    Mild to moderate 3 vessel CAD. Medical management recommended.      HEART CATH LEFT HEART CATH  2016    MAYA to OM1     KNEE SURGERY  4/20/10    right knee replacement     REMOVE STIMULATOR SACRAL NERVE N/A 2015    Procedure: REMOVE  STIMULATOR SACRAL NERVE;  Surgeon: Gertrudis Frausto MD;  Location: [facility]     SURGICAL HISTORY OF -       Uterine endometrial ablation      Social History   Substance Use Topics     Smoking status: Never Smoker     Smokeless tobacco: Never Used     Alcohol use No      Problem (# of Occurrences) Relation (Name,Age of Onset)    C.A.D. (1) Father: MI , age 83, had high lipids    CEREBROVASCULAR DISEASE (1) Maternal Grandfather: cerebral hemorrhage    Colon Cancer (1) Maternal Aunt    Coronary Artery Disease (1) Father    DIABETES (2) Maternal Uncle, Maternal Grandmother    Fractures (1) Mother: hip    Genitourinary Problems (1) Mother: renal failure    Hyperlipidemia (1) Father    Hypertension (2) Father, Mother    OSTEOPOROSIS (1) Mother            Current Outpatient Prescriptions   Medication Sig     amoxicillin (AMOXIL) 500 MG capsule as needed Reported on 2017     ASPIRIN 81 MG OR TABS 1 tab po QD (Once per day)     Camphor-Menthol-Methyl Sal (SALONPAS) 1.2-5.7-6.3 % PTCH Patient uses 3-4 patches daily     CINNAMON PO Take 2 capsules by mouth 2 times daily     CO ENZYME Q-10 50 MG OR CAPS daliy     Cranberry 500 MG CAPS Take by mouth daily     empagliflozin (JARDIANCE) 25 MG TABS tablet Take 12.5 mg by mouth every other day     evolocumab (REPATHA SURECLICK) 140 MG/ML prefilled autoinjector Inject 1 mL (140 mg) Subcutaneous every 14 days (Patient taking differently: Inject 140 mg Subcutaneous every 14 days Has not started)     GLIMEPIRIDE 4 MG OR TABS 1 tablet BID     Gymnema Sylvestris Leaf POWD 500 mg 2 times daily      isosorbide mononitrate (IMDUR) 30 MG 24 hr tablet Take 0.5 tablets (15 mg) by mouth daily     lisinopril (PRINIVIL/ZESTRIL) 5 MG tablet Take 1 tablet (5 mg) by mouth daily     mirabegron (MYRBETRIQ) 50 MG 24 hr tablet Take 1 tablet (50 mg) by mouth daily Patient not taking daily     nitroglycerin (NITROSTAT) 0.4 MG SL tablet Place 1 tablet (0.4 mg) under the tongue every 5 minutes  "as needed for chest pain If symptoms persist after 3 doses (15 minutes) call 911.     NONFORMULARY Tumeric 500 mg bid     Omega-3 Fatty Acids (OMEGA-3 FISH OIL PO) Take 2,400 mg by mouth 2 times daily (with meals)     STATIN NOT PRESCRIBED, INTENTIONAL, 1 each At Bedtime Statin not prescribed intentionally due to Allergy to statin     SYNTHROID 125 MCG OR TABS 1 tablet daily     traMADol (ULTRAM) 50 MG tablet Take 1 tablet (50 mg) by mouth every 6 hours as needed for moderate to severe pain     No current facility-administered medications for this visit.        Allergies   Allergen Reactions     No Known Drug Allergies        Past medical, surgical, social and family history were reviewed and updated in EPIC.    ROS:   12 point review of systems negative other than symptoms noted below.  Constitutional: Fatigue  Eyes: Spots  Respiratory: Shortness of Breath  Gastrointestinal: Bloating  Genitourinary: Urgency  Musculoskeletal: Joint Pain, Muscle Cramps and Muscular Weakness  Psychiatric: Anxiety    EXAM:  /60  Pulse 78  Ht 5' 8\" (1.727 m)  Wt 167 lb 9.6 oz (76 kg)  BMI 25.48 kg/m2   BMI: Body mass index is 25.48 kg/(m^2).    EXAM:  Constitutional: Appearance: Well nourished, well developed alert, in no acute distress  Neck:  Lymph Nodes:  No lymphadenopathy present    Thyroid:  Gland size normal, nontender, no nodules or masses present  on palpation  Chest:  Respiratory Effort:  Breathing unlabored  Cardiovascular:Heart    Auscultation:  Regular rate, normal rhythm, no murmurs present  Breasts: Inspection of Breasts:  No lymphadenopathy present., Palpation of Breasts and Axillae:  No masses present on palpation, no breast tenderness., Axillary Lymph Nodes:  No lymphadenopathy present. and No nodularity, asymmetry or nipple discharge bilaterally.  Gastrointestinal:  Abdominal Examination:  Abdomen nontender to palpation, tone normal without     rigidity or guarding, no masses present, umbilicus without " lesions    Liver and speen:  No hepatomegaly present, liver nontender to palpation    Hernias:  No hernias present  Lymphatic: Lymph Nodes:  No other lymphadenopathy present  Skin:  General Inspection:  No rashes present, no lesions present, no areas of  discoloration.    Genitalia and Groin:  No rashes present, no lesions present, no areas of  discoloration, no masses present  Neurologic/Psychiatric:    Mental Status:  Oriented X3     Pelvic Exam:  External Genitalia:     Normal appearance for age, no discharge present, no tenderness present, no inflammatory lesions present, color normal  Vagina:     Normal vaginal vault without central or paravaginal defects, no discharge present, no inflammatory lesions present, no masses present  Bladder:     Nontender to palpation  Urethra:   Urethral Body:  Urethra palpation normal, urethra structural support normal   Urethral Meatus:  No erythema or lesions present  Cervix:     Appearance healthy, no lesions present, nontender to palpation, no bleeding present  Uterus:     Uterus: firm, normal sized and nontender, midplane in position.   Adnexa:     No adnexal tenderness present, no adnexal masses present  Perineum:     Perineum within normal limits, no evidence of trauma, no rashes or skin lesions present  Anus:     Anus within normal limits, no hemorrhoids present  Inguinal Lymph Nodes:     No lymphadenopathy present  Pubic Hair:     Normal pubic hair distribution for age  Genitalia and Groin:     No rashes present, no lesions present, no areas of discoloration, no masses present        BMI:  Body mass index is 25.48 kg/(m^2).  Weight management plan: wt is ok for her age   reports that she has never smoked. She has never used smokeless tobacco.      ASSESSMENT:  73 year old female with satisfactory annual exam.    ICD-10-CM    1. Encounter for gynecological examination without abnormal finding Z01.419    2. Urinary incontinence, urge N39.41 mirabegron (MYRBETRIQ) 50 MG 24  hr tablet       PLAN:  Refilled myrbetriq  Patient will follow up with her ortho again for short office consult to discuss prior authorization paperwork for [employer] insurance.   Internal medicine and cardiology manage the rest of her medical problems.       Shireen Neves MD

## 2018-05-29 NOTE — MR AVS SNAPSHOT
"              After Visit Summary   5/29/2018    Sarah Longo    MRN: [id number]           Patient Information     Date Of Birth          1945        Visit Information        Provider Department      5/29/2018 10:00 AM Shireen Neves MD [facility]        Today's Diagnoses     Encounter for gynecological examination without abnormal finding    -  1    Urinary incontinence, urge           Follow-ups after your visit        Your next 10 appointments already scheduled     Jul 27, 2018 10:15 AM CDT   Return Visit with Feroz Burgos MD   [facility] ([facility])    [address] [place], [place] [id number]  [facility] [id number] [phone] OPT 2              Who to contact     If you have questions or need follow up information about today's clinic visit or your schedule please contact [facility] directly at [phone].  Normal or non-critical lab and imaging results will be communicated to you by MyChart, letter or phone within 4 business days after the clinic has received the results. If you do not hear from us within 7 days, please contact the clinic through MyChart or phone. If you have a critical or abnormal lab result, we will notify you by phone as soon as possible.  Submit refill requests through [employer] or call your pharmacy and they will forward the refill request to us. Please allow 3 business days for your refill to be completed.          Additional Information About Your Visit        MyChart Information     Cylex lets you send messages to your doctor, view your test results, renew your prescriptions, schedule appointments and more. To sign up, go to [email or web address] . Click on \"Log in\" on the left side of the screen, which will take you to the Welcome page. Then click on \"Sign up Now\" on the right side of the page.     You will be asked to enter the access code listed below, as well as some personal " "information. Please follow the directions to create your username and password.     Your access code is: [email or web address]  Expires: 2018  4:04 PM     Your access code will  in 90 days. If you need help or a new code, please call your [facility] clinic or [phone].        Care EveryWhere ID     This is your Care EveryWhere ID. This could be used by other organizations to access your [facility] medical records  [id number]        Your Vitals Were     Pulse Height BMI (Body Mass Index)             78 5' 8\" (1.727 m) 25.48 kg/m2          Blood Pressure from Last 3 Encounters:   18 112/60   18 122/70   18 118/62    Weight from Last 3 Encounters:   18 167 lb 9.6 oz (76 kg)   18 169 lb (76.7 kg)   18 168 lb 6.4 oz (76.4 kg)              Today, you had the following     No orders found for display         Today's Medication Changes          These changes are accurate as of 18  9:48 PM.  If you have any questions, ask your nurse or doctor.               These medicines have changed or have updated prescriptions.        Dose/Directions    evolocumab 140 MG/ML prefilled autoinjector   Commonly known as:  REPATHA SURECLICK   This may have changed:  additional instructions   Used for:  Hyperlipidemia LDL goal <100        Dose:  140 mg   Inject 1 mL (140 mg) Subcutaneous every 14 days   Quantity:  2 mL   Refills:  11       mirabegron 50 MG 24 hr tablet   Commonly known as:  MYRBETRIQ   This may have changed:  when to take this   Used for:  Urinary incontinence, urge   Changed by:  Shireen Neves MD        Dose:  50 mg   Take 1 tablet (50 mg) by mouth daily Patient not taking daily   Quantity:  90 tablet   Refills:  3            Where to get your medicines      Some of these will need a paper prescription and others can be bought over the counter.  Ask your nurse if you have questions.     Bring a paper prescription for each of these medications     mirabegron 50 MG 24 hr tablet "                Primary Care Provider Office Phone # Fax #    Gaye Zimmer [phone]       [facility] [address] [facility] [address]        Equal Access to Services     JESSICA LITTLE : Hadii aad ku hadanno Soomaali, waaxda luqadaha, qaybta kaalmada adeegyada, meredith levin laPauliecara castillo. So [facility] [phone].    ATENCIÓN: Si habla español, tiene a briggs disposición servicios gratuitos de asistencia lingüística. [facility] [phone].    We comply with applicable federal civil rights laws and [place] laws. We do not discriminate on the basis of race, color, national origin, age, disability, sex, sexual orientation, or gender identity.            Thank you!     Thank you for choosing [facility]  for your care. Our goal is always to provide you with excellent care. Hearing back from our patients is one way we can continue to improve our services. Please take a few minutes to complete the written survey that you may receive in the mail after your visit with us. Thank you!             Your Updated Medication List - Protect others around you: Learn how to safely use, store and throw away your medicines at www.disposemymeds.org.          This list is accurate as of 5/29/18  9:48 PM.  Always use your most recent med list.                   Brand Name Dispense Instructions for use Diagnosis    amoxicillin 500 MG capsule    AMOXIL     as needed Reported on 5/17/2017        aspirin 81 MG tablet      1 tab po QD (Once per day)        CINNAMON PO      Take 2 capsules by mouth 2 times daily        Co Enzyme Q-10 50 MG Caps      daliy        Cranberry 500 MG Caps      Take by mouth daily        evolocumab 140 MG/ML prefilled autoinjector    REPATHA SURECLICK    2 mL    Inject 1 mL (140 mg) Subcutaneous every 14 days    Hyperlipidemia LDL goal <100       glimepiride 4 MG tablet    AMARYL     1 tablet BID        Gymnema Sylvestris Leaf Powd      500 mg 2 times daily         isosorbide mononitrate 30 MG 24 hr tablet    IMDUR    90 tablet    Take 0.5 tablets (15 mg) by mouth daily    Coronary artery disease involving native coronary artery of native heart without angina pectoris       JARDIANCE 25 MG Tabs tablet   Generic drug:  empagliflozin      Take 12.5 mg by mouth every other day        lisinopril 5 MG tablet    PRINIVIL/ZESTRIL    90 tablet    Take 1 tablet (5 mg) by mouth daily    Stable angina (H)       mirabegron 50 MG 24 hr tablet    MYRBETRIQ    90 tablet    Take 1 tablet (50 mg) by mouth daily Patient not taking daily    Urinary incontinence, urge       nitroGLYcerin 0.4 MG sublingual tablet    NITROSTAT    25 tablet    Place 1 tablet (0.4 mg) under the tongue every 5 minutes as needed for chest pain If symptoms persist after 3 doses (15 minutes) call 911.    Coronary artery disease involving native coronary artery of native heart without angina pectoris       NONFORMULARY      Tumeric 500 mg bid        OMEGA-3 FISH OIL PO      Take 2,400 mg by mouth 2 times daily (with meals)        SALONPAS 1.2-5.7-6.3 % Ptch   Generic drug:  Camphor-Menthol-Methyl Sal      Patient uses 3-4 patches daily        STATIN NOT PRESCRIBED (INTENTIONAL)     0 each    1 each At Bedtime Statin not prescribed intentionally due to Allergy to statin    Coronary artery disease involving native coronary artery of native heart without angina pectoris       SYNTHROID 125 MCG tablet   Generic drug:  levothyroxine      1 tablet daily        traMADol 50 MG tablet    ULTRAM    30 tablet    Take 1 tablet (50 mg) by mouth every 6 hours as needed for moderate to severe pain    Spinal stenosis, unspecified spinal region

## 2018-05-30 ASSESSMENT — ANXIETY QUESTIONNAIRES: GAD7 TOTAL SCORE: 5

## 2018-05-30 ASSESSMENT — PATIENT HEALTH QUESTIONNAIRE - PHQ9: SUM OF ALL RESPONSES TO PHQ QUESTIONS 1-9: 5

## 2018-06-25 DIAGNOSIS — I25.10 CORONARY ARTERY DISEASE INVOLVING NATIVE CORONARY ARTERY OF NATIVE HEART WITHOUT ANGINA PECTORIS: ICD-10-CM

## 2018-06-25 RX ORDER — ISOSORBIDE MONONITRATE 30 MG/1
15 TABLET, EXTENDED RELEASE ORAL DAILY
Qty: 90 TABLET | Refills: 3 | Status: SHIPPED | OUTPATIENT
Start: 2018-06-25 | End: 2019-10-03

## 2018-06-25 NOTE — TELEPHONE ENCOUNTER
Received VM from pt requesting refill of Imdur be sent to [facility].     Received refill request for:  Imdur  Last OV was: 4/30/18 with SHYANN Wang  F/U scheduled: 7/27/18 with Dr. Burgos  New script sent to: [facility] in [place], [place]    Attempted to call pt to review that Imdur Rx has been sent to [facility], phone line busy.    JOLANTA Gallardo 11:04 AM 6/25/2018

## 2018-07-27 ENCOUNTER — OFFICE VISIT (OUTPATIENT)
Dept: CARDIOLOGY | Facility: CLINIC | Age: 73
End: 2018-07-27
Payer: COMMERCIAL

## 2018-07-27 VITALS
WEIGHT: 169.3 LBS | HEIGHT: 68 IN | DIASTOLIC BLOOD PRESSURE: 62 MMHG | HEART RATE: 64 BPM | BODY MASS INDEX: 25.66 KG/M2 | SYSTOLIC BLOOD PRESSURE: 128 MMHG

## 2018-07-27 DIAGNOSIS — E11.9 TYPE 2 DIABETES MELLITUS WITHOUT COMPLICATION, UNSPECIFIED LONG TERM INSULIN USE STATUS: ICD-10-CM

## 2018-07-27 DIAGNOSIS — E78.5 HYPERLIPIDEMIA, UNSPECIFIED HYPERLIPIDEMIA TYPE: ICD-10-CM

## 2018-07-27 DIAGNOSIS — I25.10 CORONARY ARTERY DISEASE INVOLVING NATIVE CORONARY ARTERY OF NATIVE HEART WITHOUT ANGINA PECTORIS: Primary | ICD-10-CM

## 2018-07-27 DIAGNOSIS — I25.10 CORONARY ARTERY DISEASE INVOLVING NATIVE CORONARY ARTERY OF NATIVE HEART WITHOUT ANGINA PECTORIS: ICD-10-CM

## 2018-07-27 DIAGNOSIS — I10 BENIGN ESSENTIAL HYPERTENSION: ICD-10-CM

## 2018-07-27 DIAGNOSIS — E78.00 HYPERCHOLESTEROLEMIA: ICD-10-CM

## 2018-07-27 LAB
ALT SERPL W P-5'-P-CCNC: 25 U/L (ref 5–30)
ANION GAP SERPL CALCULATED.3IONS-SCNC: 14.6 MMOL/L (ref 6–17)
BUN SERPL-MCNC: 19 MG/DL (ref 7–30)
CALCIUM SERPL-MCNC: 10 MG/DL (ref 8.5–10.5)
CHLORIDE SERPL-SCNC: 102 MMOL/L (ref 98–107)
CHOLEST SERPL-MCNC: 130 MG/DL
CO2 SERPL-SCNC: 27 MMOL/L (ref 23–29)
CREAT SERPL-MCNC: 0.81 MG/DL (ref 0.7–1.3)
GFR SERPL CREATININE-BSD FRML MDRD: 70 ML/MIN/1.7M2
GLUCOSE SERPL-MCNC: 185 MG/DL (ref 70–105)
HDLC SERPL-MCNC: 64 MG/DL
LDLC SERPL CALC-MCNC: 51 MG/DL
NONHDLC SERPL-MCNC: 66 MG/DL
POTASSIUM SERPL-SCNC: 4.6 MMOL/L (ref 3.5–5.1)
SODIUM SERPL-SCNC: 139 MMOL/L (ref 136–145)
TRIGL SERPL-MCNC: 73 MG/DL

## 2018-07-27 PROCEDURE — 99214 OFFICE O/P EST MOD 30 MIN: CPT | Performed by: INTERNAL MEDICINE

## 2018-07-27 PROCEDURE — 80061 LIPID PANEL: CPT | Performed by: INTERNAL MEDICINE

## 2018-07-27 PROCEDURE — 80048 BASIC METABOLIC PNL TOTAL CA: CPT | Performed by: INTERNAL MEDICINE

## 2018-07-27 PROCEDURE — 36415 COLL VENOUS BLD VENIPUNCTURE: CPT | Performed by: INTERNAL MEDICINE

## 2018-07-27 PROCEDURE — 84460 ALANINE AMINO (ALT) (SGPT): CPT | Performed by: INTERNAL MEDICINE

## 2018-07-27 NOTE — LETTER
7/27/2018      Gaye Zimmer  [facility] [address] [facility] [address]      RE: Sarah Longo       Dear Colleague,    I had the pleasure of seeing Sarah Longo in the [facility].    Service Date: 07/27/2018      HISTORY OF PRESENT ILLNESS:  I had the opportunity to see Ms. Sarah Longo in Cardiology Clinic today for re-evaluation of recurrent coronary artery disease issues and multiple cardiac risk factors including hypertension, dyslipidemia and type 2 diabetes.      Her last coronary revascularization procedure was performed in 11/2016 at the [facility].  Since then, we have had concerns about recurrent coronary artery disease, but her stress tests have been stable.  Her nuclear perfusion study with Lexiscan on 02/21/2018 demonstrated no areas of significant ischemia or infarction and her ejection fraction was normal at 61%.  However, she has had multiple angioplasty and stenting procedures and we have had to remain very vigilant regarding recurrent coronary artery disease.  Her revascularization has included 2 stents to her obtuse marginal, stenting to the LAD, and stenting of her right coronary artery in the more distant past in 2004.  Fortunately, she is not currently experiencing any symptoms suggestive of angina.  She is not having any chest pain and is not having any of the upper back discomfort between her shoulder blades that she had prior to her last stenting procedure.  Her breathing seems appropriate.      She is now far more limited by severe unrelenting low back pain problems.  She indicates that she has had a diagnosis of spinal stenosis and is looking into procedures for treatment.      Her dyslipidemia is under excellent control with Repatha.  She had tried multiple different statin drugs and had been intolerant to those medications in the past.  She seems to tolerate Repatha quite well.  Her blood pressure has also been under  consistently good control.  She is on medical therapy for her diabetes and doing well with that as well.        On examination, currently her blood pressure is 128/62, heart rate 64 and weight 169 pounds.  Her lungs are clear.  Heart rhythm is regular.  I hear no cardiac murmurs or carotid bruits.      IMPRESSIONS:  Ms. Sarah Longo is a 73-year-old woman with recurrent coronary artery disease and multivessel coronary artery stenting in the past.  She has multiple cardiac risk factors including hypertension, type 2 diabetes and dyslipidemia which are all treated well with medical therapy currently.  Fortunately, she is not having any symptoms suggestive of recurrent coronary artery disease at this time.      Nevertheless, she remains at increased risk of significant cardiac complications based on her long and complex cardiac history as she considers management options for her chronic low back pain problems which have become disabling.  Her least appropriate option at this point seems to be back surgery which would incur increased risk of cardiac complications as compared to minimally invasive options.      We will continue to follow up with her for cardiac issues and plan on seeing her back in Cardiology Clinic again in 6 months for re-evaluation and repeat a stress test again at that time.      cc:   [place] [address]         GUNNAR LEA MD, [facility]             D: 2018   T: 2018   MT: ERIKA      Name:     SARAH LONGO   MRN:      [id number]        Account:      [id number]   :      1945           Service Date: 2018      Document: [id number]         Outpatient Encounter Prescriptions as of 2018   Medication Sig Dispense Refill     amoxicillin (AMOXIL) 500 MG capsule as needed Reported on 2017       ASPIRIN 81 MG OR TABS 1 tab po QD (Once per day)       Camphor-Menthol-Methyl Sal (SALONPAS)  1.2-5.7-6.3 % PTCH Patient uses 3-4 patches daily       CINNAMON PO Take 2 capsules by mouth 2 times daily       CO ENZYME Q-10 50 MG OR CAPS daliy  0     Cranberry 500 MG CAPS Take by mouth daily       empagliflozin (JARDIANCE) 25 MG TABS tablet Take 12.5 mg by mouth every other day       evolocumab (REPATHA SURECLICK) 140 MG/ML prefilled autoinjector Inject 1 mL (140 mg) Subcutaneous every 14 days (Patient taking differently: Inject 140 mg Subcutaneous every 14 days Has not started) 2 mL 11     GLIMEPIRIDE 4 MG OR TABS 1 tablet BID       Gymnema Sylvestris Leaf POWD 500 mg 2 times daily        isosorbide mononitrate (IMDUR) 30 MG 24 hr tablet Take 0.5 tablets (15 mg) by mouth daily 90 tablet 3     lisinopril (PRINIVIL/ZESTRIL) 5 MG tablet Take 1 tablet (5 mg) by mouth daily 90 tablet 3     MILK THISTLE PO Take by mouth daily       mirabegron (MYRBETRIQ) 50 MG 24 hr tablet Take 1 tablet (50 mg) by mouth daily Patient not taking daily 90 tablet 3     nitroglycerin (NITROSTAT) 0.4 MG SL tablet Place 1 tablet (0.4 mg) under the tongue every 5 minutes as needed for chest pain If symptoms persist after 3 doses (15 minutes) call 911. 25 tablet 3     NONFORMULARY Tumeric 500 mg bid       Omega-3 Fatty Acids (OMEGA-3 FISH OIL PO) Take 2,400 mg by mouth 2 times daily (with meals)       SYNTHROID 125 MCG OR TABS 1 tablet daily       traMADol (ULTRAM) 50 MG tablet Take 1 tablet (50 mg) by mouth every 6 hours as needed for moderate to severe pain 30 tablet 3     VITAMIN E PO Take by mouth daily       STATIN NOT PRESCRIBED, INTENTIONAL, 1 each At Bedtime Statin not prescribed intentionally due to Allergy to statin (Patient not taking: Reported on 7/27/2018) 0 each 0     No facility-administered encounter medications on file as of 7/27/2018.      Again, thank you for allowing me to participate in the care of your patient.      Sincerely,    GUNNAR LEA MD     [facility]

## 2018-07-27 NOTE — MR AVS SNAPSHOT
After Visit Summary   7/27/2018    Sarah Longo    MRN: [id number]           Patient Information     Date Of Birth          1945        Visit Information        Provider Department      7/27/2018 10:15 AM Feroz Burgos MD [facility]        Today's Diagnoses     Coronary artery disease involving native coronary artery of native heart without angina pectoris    -  1    Hypercholesterolemia        Benign essential hypertension        Type 2 diabetes mellitus without complication, unspecified long term insulin use status (H)           Follow-ups after your visit        Additional Services     Follow-Up with Cardiologist                 Future tests that were ordered for you today     Open Future Orders        Priority Expected Expires Ordered    NM Lexiscan stress test (nuc card) Routine 1/23/2019 7/27/2019 7/27/2018    Follow-Up with Cardiologist Routine 1/23/2019 7/27/2019 7/27/2018    Basic metabolic panel Routine 1/23/2019 7/27/2019 7/27/2018            Who to contact     If you have questions or need follow up information about today's clinic visit or your schedule please contact [facility] directly at [phone].  Normal or non-critical lab and imaging results will be communicated to you by Bandwave Systemshart, letter or phone within 4 business days after the clinic has received the results. If you do not hear from us within 7 days, please contact the clinic through HydroLogext or phone. If you have a critical or abnormal lab result, we will notify you by phone as soon as possible.  Submit refill requests through Reniac or call your pharmacy and they will forward the refill request to us. Please allow 3 business days for your refill to be completed.          Additional Information About Your Visit        Bandwave SystemsharZipnosis Information     Reniac lets you send messages to your doctor, view your test results, renew your  "prescriptions, schedule appointments and more. To sign up, go to [email or web address] . Click on \"Log in\" on the left side of the screen, which will take you to the Welcome page. Then click on \"Sign up Now\" on the right side of the page.     You will be asked to enter the access code listed below, as well as some personal information. Please follow the directions to create your username and password.     Your access code is: [email or web address]  Expires: 10/25/2018  8:51 AM     Your access code will  in 90 days. If you need help or a new code, please call your [facility] clinic or [phone].        Care EveryWhere ID     This is your Care EveryWhere ID. This could be used by other organizations to access your [facility] medical records  [id number]        Your Vitals Were     Pulse Height BMI (Body Mass Index)             64 1.727 m (5' 8\") 25.74 kg/m2          Blood Pressure from Last 3 Encounters:   18 128/62   18 112/60   18 122/70    Weight from Last 3 Encounters:   18 76.8 kg (169 lb 4.8 oz)   18 76 kg (167 lb 9.6 oz)   18 76.7 kg (169 lb)              We Performed the Following     Follow-Up with Cardiologist          Today's Medication Changes          These changes are accurate as of 18 12:59 PM.  If you have any questions, ask your nurse or doctor.               These medicines have changed or have updated prescriptions.        Dose/Directions    evolocumab 140 MG/ML prefilled autoinjector   Commonly known as:  REPATHA SURECLICK   This may have changed:  additional instructions   Used for:  Hyperlipidemia LDL goal <100        Dose:  140 mg   Inject 1 mL (140 mg) Subcutaneous every 14 days   Quantity:  2 mL   Refills:  11                Primary Care Provider Office Phone # Fax #    Gaye Zimmer [phone]       [facility] [address] [place] [address]        Equal Access to Services     JESSICA LITTLE AH: Shamir Zambrano, " waaxda lucheryadaha, qaybta kaalmada malu, meredith moraaamichel ah. So [facility] [phone].    ATENCIÓN: Si misty love, tiene a briggs disposición servicios gratuitos de asistencia lingüística. Ajit al [phone].    We comply with applicable federal civil rights laws and [place] laws. We do not discriminate on the basis of race, color, national origin, age, disability, sex, sexual orientation, or gender identity.            Thank you!     Thank you for choosing [facility]  for your care. Our goal is always to provide you with excellent care. Hearing back from our patients is one way we can continue to improve our services. Please take a few minutes to complete the written survey that you may receive in the mail after your visit with us. Thank you!             Your Updated Medication List - Protect others around you: Learn how to safely use, store and throw away your medicines at www.disposemymeds.org.          This list is accurate as of 7/27/18 12:59 PM.  Always use your most recent med list.                   Brand Name Dispense Instructions for use Diagnosis    amoxicillin 500 MG capsule    AMOXIL     as needed Reported on 5/17/2017        aspirin 81 MG tablet      1 tab po QD (Once per day)        CINNAMON PO      Take 2 capsules by mouth 2 times daily        Co Enzyme Q-10 50 MG Caps      daliy        Cranberry 500 MG Caps      Take by mouth daily        evolocumab 140 MG/ML prefilled autoinjector    REPATHA SURECLICK    2 mL    Inject 1 mL (140 mg) Subcutaneous every 14 days    Hyperlipidemia LDL goal <100       glimepiride 4 MG tablet    AMARYL     1 tablet BID        Gymnema Sylvestris Leaf Powd      500 mg 2 times daily        isosorbide mononitrate 30 MG 24 hr tablet    IMDUR    90 tablet    Take 0.5 tablets (15 mg) by mouth daily    Coronary artery disease involving native coronary artery of native heart without angina pectoris       JARDIANCE  25 MG Tabs tablet   Generic drug:  empagliflozin      Take 12.5 mg by mouth every other day        lisinopril 5 MG tablet    PRINIVIL/ZESTRIL    90 tablet    Take 1 tablet (5 mg) by mouth daily    Stable angina (H)       MILK THISTLE PO      Take by mouth daily        mirabegron 50 MG 24 hr tablet    MYRBETRIQ    90 tablet    Take 1 tablet (50 mg) by mouth daily Patient not taking daily    Urinary incontinence, urge       nitroGLYcerin 0.4 MG sublingual tablet    NITROSTAT    25 tablet    Place 1 tablet (0.4 mg) under the tongue every 5 minutes as needed for chest pain If symptoms persist after 3 doses (15 minutes) call 911.    Coronary artery disease involving native coronary artery of native heart without angina pectoris       NONFORMULARY      Tumeric 500 mg bid        OMEGA-3 FISH OIL PO      Take 2,400 mg by mouth 2 times daily (with meals)        SALONPAS 1.2-5.7-6.3 % Ptch   Generic drug:  Camphor-Menthol-Methyl Sal      Patient uses 3-4 patches daily        STATIN NOT PRESCRIBED (INTENTIONAL)     0 each    1 each At Bedtime Statin not prescribed intentionally due to Allergy to statin    Coronary artery disease involving native coronary artery of native heart without angina pectoris       SYNTHROID 125 MCG tablet   Generic drug:  levothyroxine      1 tablet daily        traMADol 50 MG tablet    ULTRAM    30 tablet    Take 1 tablet (50 mg) by mouth every 6 hours as needed for moderate to severe pain    Spinal stenosis, unspecified spinal region       VITAMIN E PO      Take by mouth daily

## 2018-07-27 NOTE — PROGRESS NOTES
HPI and Plan:   See dictation    Orders Placed This Encounter   Procedures     NM Lexiscan stress test (nuc card)     Basic metabolic panel     Follow-Up with Cardiologist       Orders Placed This Encounter   Medications     MILK THISTLE PO     Sig: Take by mouth daily     VITAMIN E PO     Sig: Take by mouth daily       There are no discontinued medications.      Encounter Diagnoses   Name Primary?     Coronary artery disease involving native coronary artery of native heart without angina pectoris Yes     Hypercholesterolemia      Benign essential hypertension      Type 2 diabetes mellitus without complication, unspecified long term insulin use status (H)        CURRENT MEDICATIONS:  Current Outpatient Prescriptions   Medication Sig Dispense Refill     amoxicillin (AMOXIL) 500 MG capsule as needed Reported on 5/17/2017       ASPIRIN 81 MG OR TABS 1 tab po QD (Once per day)       Camphor-Menthol-Methyl Sal (SALONPAS) 1.2-5.7-6.3 % PTCH Patient uses 3-4 patches daily       CINNAMON PO Take 2 capsules by mouth 2 times daily       CO ENZYME Q-10 50 MG OR CAPS daliy  0     Cranberry 500 MG CAPS Take by mouth daily       empagliflozin (JARDIANCE) 25 MG TABS tablet Take 12.5 mg by mouth every other day       evolocumab (REPATHA SURECLICK) 140 MG/ML prefilled autoinjector Inject 1 mL (140 mg) Subcutaneous every 14 days (Patient taking differently: Inject 140 mg Subcutaneous every 14 days Has not started) 2 mL 11     GLIMEPIRIDE 4 MG OR TABS 1 tablet BID       Gymnema Sylvestris Leaf POWD 500 mg 2 times daily        isosorbide mononitrate (IMDUR) 30 MG 24 hr tablet Take 0.5 tablets (15 mg) by mouth daily 90 tablet 3     lisinopril (PRINIVIL/ZESTRIL) 5 MG tablet Take 1 tablet (5 mg) by mouth daily 90 tablet 3     MILK THISTLE PO Take by mouth daily       mirabegron (MYRBETRIQ) 50 MG 24 hr tablet Take 1 tablet (50 mg) by mouth daily Patient not taking daily 90 tablet 3     nitroglycerin (NITROSTAT) 0.4 MG SL tablet Place 1  tablet (0.4 mg) under the tongue every 5 minutes as needed for chest pain If symptoms persist after 3 doses (15 minutes) call 911. 25 tablet 3     NONFORMULARY Tumeric 500 mg bid       Omega-3 Fatty Acids (OMEGA-3 FISH OIL PO) Take 2,400 mg by mouth 2 times daily (with meals)       SYNTHROID 125 MCG OR TABS 1 tablet daily       traMADol (ULTRAM) 50 MG tablet Take 1 tablet (50 mg) by mouth every 6 hours as needed for moderate to severe pain 30 tablet 3     VITAMIN E PO Take by mouth daily       STATIN NOT PRESCRIBED, INTENTIONAL, 1 each At Bedtime Statin not prescribed intentionally due to Allergy to statin (Patient not taking: Reported on 7/27/2018) 0 each 0       ALLERGIES     Allergies   Allergen Reactions     No Known Drug Allergies        PAST MEDICAL HISTORY:  Past Medical History:   Diagnosis Date     Arthritis      Coronary artery disease 04/28/2016    PTCA with MAYA x 2 to OM1 and MAYA x 1 to p.LAD (4/21/2016 at [place]); PTCA with intracoronary stent placement of RCA June 2004; MAYA to OM1 11/2016     Cystocele, midline 09/29/2010     Depression      HYPERPLASTIC  POLYP      colonoscopy in 5-10 yrs.     Hypothyroidism     hypothyroid- Dr Majano     OAB (overactive bladder)     complex voiding dysfct, failed interstim     Osteoarthritis      Other and unspecified hyperlipidemia      Postmenopausal bleeding      Shoulder blade pain 5/31/2016     Type II or unspecified type diabetes mellitus without mention of complication, not stated as uncontrolled     Dr. Majano     Unspecified essential hypertension      Urinary frequency 9/29/10    followed by urology. no benefit from detrol or sanctura. month trial of macrobid and flomax 10/10       PAST SURGICAL HISTORY:  Past Surgical History:   Procedure Laterality Date     ------------OTHER-------------      Interstim     Ablation       C CT ANGIOGRAPHY CORONARY  1/2005    mod soft plaque LAD and Cx, follow up with left heart cath     C LIGATE FALLOPIAN TUBE        COLONOSCOPY       EXCISE LESION UPPER EXTREMITY  2013    Procedure: EXCISE LESION UPPER EXTREMITY;  EXCISION RIGHT ARM ANTICUBITAL LIPOMA ;  Surgeon: Jayson Joe MD;  Location: [facility] REMOVAL OF TONSILS,12+ Y/O       HEART CATH LEFT HEART CATH  3/2/2016    No intervention - aggressive medical management     HEART CATH LEFT HEART CATH  2016     MAYA x 2 to OM1, MAYA to LAD, at [place]     HEART CATH LEFT HEART CATH  2004    MAYA to RCA     HEART CATH LEFT HEART CATH  3/28/2002    Mild to moderate 3 vessel CAD. Medical management recommended.      HEART CATH LEFT HEART CATH  2016    MAYA to OM1     KNEE SURGERY  4/20/10    right knee replacement     REMOVE STIMULATOR SACRAL NERVE N/A 2015    Procedure: REMOVE STIMULATOR SACRAL NERVE;  Surgeon: Gertrudis Frausto MD;  Location: [facility]     SURGICAL HISTORY OF -       Uterine endometrial ablation       FAMILY HISTORY:  Family History   Problem Relation Age of Onset     C.A.D. Father      MI , age 83, had high lipids     Hyperlipidemia Father      Hypertension Father      Coronary Artery Disease Father      Hypertension Mother      Genitourinary Problems Mother      renal failure     Fractures Mother      hip     Osteoperosis Mother      Cerebrovascular Disease Maternal Grandfather      cerebral hemorrhage     Diabetes Maternal Uncle      Colon Cancer Maternal Aunt      Diabetes Maternal Grandmother        SOCIAL HISTORY:  Social History     Social History     Marital status:      Spouse name: Kwadwo     Number of children: 3     Years of education: N/A     Occupational History     PT Aide None       Retired     Social History Main Topics     Smoking status: Never Smoker     Smokeless tobacco: Never Used     Alcohol use No     Drug use: No     Sexual activity: No     Other Topics Concern     Caffeine Concern Yes     6-7 cups caffeine per day     Sleep Concern No     Stress Concern Yes     Weight Concern No     Special  "Diet No     eats healthy      Exercise Yes      walking & active life style      Parent/Sibling W/ Cabg, Mi Or Angioplasty Before 65f 55m? No     Social History Narrative       Review of Systems:  Skin:  Negative       Eyes:  Positive for glasses    ENT:  Negative      Respiratory:  Positive for dyspnea on exertion occ   Cardiovascular:    palpitations;Positive for    Gastroenterology: Negative      Genitourinary:  Negative      Musculoskeletal:  Positive for arthritis;joint pain;back pain    Neurologic:  Negative      Psychiatric:  Positive for anxiety    Heme/Lymph/Imm:  Negative      Endocrine:  Positive for thyroid disorder      Physical Exam:  Vitals: /62  Pulse 64  Ht 1.727 m (5' 8\")  Wt 76.8 kg (169 lb 4.8 oz)  BMI 25.74 kg/m2    Constitutional:  cooperative, alert and oriented, well developed, well nourished, in no acute distress        Skin:  warm and dry to the touch, no apparent skin lesions or masses noted          Head:  normocephalic, no masses or lesions        Eyes:  pupils equal and round, conjunctivae and lids unremarkable, sclera white, no xanthalasma, EOMS intact, no nystagmus        Lymph:      ENT:  no pallor or cyanosis, dentition good        Neck:  carotid pulses are full and equal bilaterally, JVP normal, no carotid bruit        Respiratory:  normal breath sounds, clear to auscultation, normal A-P diameter, normal symmetry, normal respiratory excursion, no use of accessory muscles         Cardiac: regular rhythm, normal S1/S2, no S3 or S4, apical impulse not displaced, no murmurs, gallops or rubs                pulses full and equal, no bruits auscultated                                        GI:  abdomen soft;BS normoactive        Extremities and Muscular Skeletal:  no deformities, clubbing, cyanosis, erythema observed;no edema              Neurological:  no gross motor deficits;affect appropriate        Psych:  Alert and Oriented x 3        CC  Gaye Zimmer  [facility] " [facility]  [address] [place] [address]

## 2018-07-27 NOTE — PROGRESS NOTES
Service Date: 07/27/2018      HISTORY OF PRESENT ILLNESS:  I had the opportunity to see Ms. Sarah Longo in Cardiology Clinic today for re-evaluation of recurrent coronary artery disease issues and multiple cardiac risk factors including hypertension, dyslipidemia and type 2 diabetes.      Her last coronary revascularization procedure was performed in 11/2016 at the [facility].  Since then, we have had concerns about recurrent coronary artery disease, but her stress tests have been stable.  Her nuclear perfusion study with Lexiscan on 02/21/2018 demonstrated no areas of significant ischemia or infarction and her ejection fraction was normal at 61%.  However, she has had multiple angioplasty and stenting procedures and we have had to remain very vigilant regarding recurrent coronary artery disease.  Her revascularization has included 2 stents to her obtuse marginal, stenting to the LAD, and stenting of her right coronary artery in the more distant past in 2004.  Fortunately, she is not currently experiencing any symptoms suggestive of angina.  She is not having any chest pain and is not having any of the upper back discomfort between her shoulder blades that she had prior to her last stenting procedure.  Her breathing seems appropriate.      She is now far more limited by severe unrelenting low back pain problems.  She indicates that she has had a diagnosis of spinal stenosis and is looking into procedures for treatment.      Her dyslipidemia is under excellent control with Repatha.  She had tried multiple different statin drugs and had been intolerant to those medications in the past.  She seems to tolerate Repatha quite well.  Her blood pressure has also been under consistently good control.  She is on medical therapy for her diabetes and doing well with that as well.        On examination, currently her blood pressure is 128/62, heart rate 64 and weight 169 pounds.  Her lungs are clear.  Heart rhythm is  regular.  I hear no cardiac murmurs or carotid bruits.      IMPRESSIONS:  Ms. Sarah Longo is a 73-year-old woman with recurrent coronary artery disease and multivessel coronary artery stenting in the past.  She has multiple cardiac risk factors including hypertension, type 2 diabetes and dyslipidemia which are all treated well with medical therapy currently.  Fortunately, she is not having any symptoms suggestive of recurrent coronary artery disease at this time.      Nevertheless, she remains at increased risk of significant cardiac complications based on her long and complex cardiac history as she considers management options for her chronic low back pain problems which have become disabling.  Her least appropriate option at this point seems to be back surgery which would incur increased risk of cardiac complications as compared to minimally invasive options.      We will continue to follow up with her for cardiac issues and plan on seeing her back in Cardiology Clinic again in 6 months for re-evaluation and repeat a stress test again at that time.      cc:   [place] [address]         GUNNAR LEA MD, [facility]             D: 2018   T: 2018   MT: ERIKA      Name:     SARAH LONGO   MRN:      [id number]        Account:      [id number]   :      1945           Service Date: 2018      Document: [id number]

## 2018-07-27 NOTE — LETTER
7/27/2018    Gaye Zimmer  [facility] [address]  [place] [address]    RE: Sarah Longo       Dear Colleague,    I had the pleasure of seeing Sarah Longo in the [facility].    HPI and Plan:   See dictation    Orders Placed This Encounter   Procedures     NM Lexiscan stress test (nuc card)     Basic metabolic panel     Follow-Up with Cardiologist       Orders Placed This Encounter   Medications     MILK THISTLE PO     Sig: Take by mouth daily     VITAMIN E PO     Sig: Take by mouth daily       There are no discontinued medications.      Encounter Diagnoses   Name Primary?     Coronary artery disease involving native coronary artery of native heart without angina pectoris Yes     Hypercholesterolemia      Benign essential hypertension      Type 2 diabetes mellitus without complication, unspecified long term insulin use status (H)        CURRENT MEDICATIONS:  Current Outpatient Prescriptions   Medication Sig Dispense Refill     amoxicillin (AMOXIL) 500 MG capsule as needed Reported on 5/17/2017       ASPIRIN 81 MG OR TABS 1 tab po QD (Once per day)       Camphor-Menthol-Methyl Sal (SALONPAS) 1.2-5.7-6.3 % PTCH Patient uses 3-4 patches daily       CINNAMON PO Take 2 capsules by mouth 2 times daily       CO ENZYME Q-10 50 MG OR CAPS daliy  0     Cranberry 500 MG CAPS Take by mouth daily       empagliflozin (JARDIANCE) 25 MG TABS tablet Take 12.5 mg by mouth every other day       evolocumab (REPATHA SURECLICK) 140 MG/ML prefilled autoinjector Inject 1 mL (140 mg) Subcutaneous every 14 days (Patient taking differently: Inject 140 mg Subcutaneous every 14 days Has not started) 2 mL 11     GLIMEPIRIDE 4 MG OR TABS 1 tablet BID       Gymnema Sylvestris Leaf POWD 500 mg 2 times daily        isosorbide mononitrate (IMDUR) 30 MG 24 hr tablet Take 0.5 tablets (15 mg) by mouth daily 90 tablet 3     lisinopril (PRINIVIL/ZESTRIL) 5 MG tablet Take 1 tablet (5 mg) by mouth  daily 90 tablet 3     MILK THISTLE PO Take by mouth daily       mirabegron (MYRBETRIQ) 50 MG 24 hr tablet Take 1 tablet (50 mg) by mouth daily Patient not taking daily 90 tablet 3     nitroglycerin (NITROSTAT) 0.4 MG SL tablet Place 1 tablet (0.4 mg) under the tongue every 5 minutes as needed for chest pain If symptoms persist after 3 doses (15 minutes) call 911. 25 tablet 3     NONFORMULARY Tumeric 500 mg bid       Omega-3 Fatty Acids (OMEGA-3 FISH OIL PO) Take 2,400 mg by mouth 2 times daily (with meals)       SYNTHROID 125 MCG OR TABS 1 tablet daily       traMADol (ULTRAM) 50 MG tablet Take 1 tablet (50 mg) by mouth every 6 hours as needed for moderate to severe pain 30 tablet 3     VITAMIN E PO Take by mouth daily       STATIN NOT PRESCRIBED, INTENTIONAL, 1 each At Bedtime Statin not prescribed intentionally due to Allergy to statin (Patient not taking: Reported on 7/27/2018) 0 each 0       ALLERGIES     Allergies   Allergen Reactions     No Known Drug Allergies        PAST MEDICAL HISTORY:  Past Medical History:   Diagnosis Date     Arthritis      Coronary artery disease 04/28/2016    PTCA with MAYA x 2 to OM1 and MAYA x 1 to p.LAD (4/21/2016 at [place]); PTCA with intracoronary stent placement of RCA June 2004; MAYA to OM1 11/2016     Cystocele, midline 09/29/2010     Depression      HYPERPLASTIC  POLYP      colonoscopy in 5-10 yrs.     Hypothyroidism     hypothyroid- Dr Majano     OAB (overactive bladder)     complex voiding dysfct, failed interstim     Osteoarthritis      Other and unspecified hyperlipidemia      Postmenopausal bleeding      Shoulder blade pain 5/31/2016     Type II or unspecified type diabetes mellitus without mention of complication, not stated as uncontrolled     Dr. Majano     Unspecified essential hypertension      Urinary frequency 9/29/10    followed by urology. no benefit from detrol or sanctura. month trial of macrobid and flomax 10/10       PAST SURGICAL HISTORY:  Past Surgical History:    Procedure Laterality Date     ------------OTHER-------------      Interstim     Ablation       C CT ANGIOGRAPHY CORONARY  2005    mod soft plaque LAD and Cx, follow up with left heart cath     C LIGATE FALLOPIAN TUBE       COLONOSCOPY       EXCISE LESION UPPER EXTREMITY  2013    Procedure: EXCISE LESION UPPER EXTREMITY;  EXCISION RIGHT ARM ANTICUBITAL LIPOMA ;  Surgeon: Jayson Joe MD;  Location: [facility] REMOVAL OF TONSILS,13+ Y/O       HEART CATH LEFT HEART CATH  3/2/2016    No intervention - aggressive medical management     HEART CATH LEFT HEART CATH  2016     MAYA x 2 to OM1, MAYA to LAD, at [place]     HEART CATH LEFT HEART CATH  2004    MAYA to RCA     HEART CATH LEFT HEART CATH  3/28/2002    Mild to moderate 3 vessel CAD. Medical management recommended.      HEART CATH LEFT HEART CATH  2016    MAYA to OM1     KNEE SURGERY  4/20/10    right knee replacement     REMOVE STIMULATOR SACRAL NERVE N/A 2015    Procedure: REMOVE STIMULATOR SACRAL NERVE;  Surgeon: Gertrudis Frausto MD;  Location: [facility]     SURGICAL HISTORY OF -       Uterine endometrial ablation       FAMILY HISTORY:  Family History   Problem Relation Age of Onset     C.A.D. Father      MI , age 83, had high lipids     Hyperlipidemia Father      Hypertension Father      Coronary Artery Disease Father      Hypertension Mother      Genitourinary Problems Mother      renal failure     Fractures Mother      hip     Osteoperosis Mother      Cerebrovascular Disease Maternal Grandfather      cerebral hemorrhage     Diabetes Maternal Uncle      Colon Cancer Maternal Aunt      Diabetes Maternal Grandmother        SOCIAL HISTORY:  Social History     Social History     Marital status:      Spouse name: Kwadwo     Number of children: 3     Years of education: N/A     Occupational History     PT Aide None       Retired     Social History Main Topics     Smoking status: Never Smoker     Smokeless tobacco: Never  "Used     Alcohol use No     Drug use: No     Sexual activity: No     Other Topics Concern     Caffeine Concern Yes     6-7 cups caffeine per day     Sleep Concern No     Stress Concern Yes     Weight Concern No     Special Diet No     eats healthy      Exercise Yes      walking & active life style      Parent/Sibling W/ Cabg, Mi Or Angioplasty Before 65f 55m? No     Social History Narrative       Review of Systems:  Skin:  Negative       Eyes:  Positive for glasses    ENT:  Negative      Respiratory:  Positive for dyspnea on exertion occ   Cardiovascular:    palpitations;Positive for    Gastroenterology: Negative      Genitourinary:  Negative      Musculoskeletal:  Positive for arthritis;joint pain;back pain    Neurologic:  Negative      Psychiatric:  Positive for anxiety    Heme/Lymph/Imm:  Negative      Endocrine:  Positive for thyroid disorder      Physical Exam:  Vitals: /62  Pulse 64  Ht 1.727 m (5' 8\")  Wt 76.8 kg (169 lb 4.8 oz)  BMI 25.74 kg/m2    Constitutional:  cooperative, alert and oriented, well developed, well nourished, in no acute distress        Skin:  warm and dry to the touch, no apparent skin lesions or masses noted          Head:  normocephalic, no masses or lesions        Eyes:  pupils equal and round, conjunctivae and lids unremarkable, sclera white, no xanthalasma, EOMS intact, no nystagmus        Lymph:      ENT:  no pallor or cyanosis, dentition good        Neck:  carotid pulses are full and equal bilaterally, JVP normal, no carotid bruit        Respiratory:  normal breath sounds, clear to auscultation, normal A-P diameter, normal symmetry, normal respiratory excursion, no use of accessory muscles         Cardiac: regular rhythm, normal S1/S2, no S3 or S4, apical impulse not displaced, no murmurs, gallops or rubs                pulses full and equal, no bruits auscultated                                        GI:  abdomen soft;BS normoactive        Extremities and Muscular " Skeletal:  no deformities, clubbing, cyanosis, erythema observed;no edema              Neurological:  no gross motor deficits;affect appropriate        Psych:  Alert and Oriented x 3        CC  Gaye [place]KINS  [address] [place] [address]                Thank you for allowing me to participate in the care of your patient.      Sincerely,     GUNNAR LEA MD     [facility]    cc:   Gaye PERKINS  [address] [id number]

## 2018-08-02 ENCOUNTER — CARE COORDINATION (OUTPATIENT)
Dept: CARDIOLOGY | Facility: CLINIC | Age: 73
End: 2018-08-02

## 2018-08-02 NOTE — PROGRESS NOTES
Pt called back and stated  was going to state that she is high risk for back surgery in most recent dictation. I reviewed OV note from 7/27 and it does state that:      Nevertheless, she remains at increased risk of significant cardiac complications based on her long and complex cardiac history as she considers management options for her chronic low back pain problems which have become disabling.  Her least appropriate option at this point seems to be back surgery which would incur increased risk of cardiac complications as compared to minimally invasive options.     Pt stated this would be sufficient and will have her   OV note this afternoon. Patrick STOVER August 2, 2018, 3:34 PM

## 2018-08-02 NOTE — PROGRESS NOTES
Pt called and left message stating  was to write her a letter that would be helpful for a case she is fighting against her insurance company. I left message for pt to call back to discuss further. Will message update to . Patrick STOVER August 2, 2018, 10:03 AM

## 2018-11-01 DIAGNOSIS — E78.5 HYPERLIPIDEMIA LDL GOAL <100: ICD-10-CM

## 2018-11-02 ENCOUNTER — TELEPHONE (OUTPATIENT)
Dept: CARDIOLOGY | Facility: CLINIC | Age: 73
End: 2018-11-02

## 2018-11-02 NOTE — TELEPHONE ENCOUNTER
[facility] Prior Authorization Team   Phone: [phone]  Fax: [phone]    PA Initiation    Medication: Repatha - PA Pending  Insurance Company: [employer] - Phone [phone] Fax [phone]  Pharmacy Filling the Rx: [facility] - [place], [place] - [facility] [address]  Filling Pharmacy Phone: [phone]  Filling Pharmacy Fax: [phone]  Start Date: 11/2/2018

## 2018-11-02 NOTE — TELEPHONE ENCOUNTER
Prior Authorization Approval    Authorization Effective Date: 10/3/2018  Authorization Expiration Date: 11/1/2021  Medication: Repatha - PA Approved  Approved Dose/Quantity: 2ml per 28 days  Reference #: [id number]   Insurance Company: [employer] - Phone [phone] Fax [phone]  Expected CoPay: $458.25     CoPay Card Available:      Foundation Assistance Needed:    Which Pharmacy is filling the prescription (Not needed for infusion/clinic administered): [facility] - [place], [place] - [facility] [address]  Pharmacy Notified: Yes  Patient Notified: Yes

## 2018-11-19 ENCOUNTER — TELEPHONE (OUTPATIENT)
Dept: CARDIOLOGY | Facility: CLINIC | Age: 73
End: 2018-11-19

## 2018-11-19 NOTE — TELEPHONE ENCOUNTER
RN called pt back in response to a voicemail message. Clarification given regarding the [employer] forms. Pt will complete and send back the required sections of the form.

## 2019-01-10 ENCOUNTER — TELEPHONE (OUTPATIENT)
Dept: CARDIOLOGY | Facility: CLINIC | Age: 74
End: 2019-01-10

## 2019-01-10 NOTE — TELEPHONE ENCOUNTER
RN spoke with patient regarding the [employer]. Per [employer] the application for renewal was not received. RN called [employer] and they had not received the application sent on 11/16/18. RN refaxed the application today and informed pt. Pt will call with any questions or concerns.

## 2019-01-29 ENCOUNTER — HOSPITAL ENCOUNTER (OUTPATIENT)
Dept: CARDIOLOGY | Facility: CLINIC | Age: 74
Discharge: HOME | End: 2019-01-29
Attending: INTERNAL MEDICINE | Admitting: INTERNAL MEDICINE
Payer: COMMERCIAL

## 2019-01-29 VITALS — HEART RATE: 85 BPM | SYSTOLIC BLOOD PRESSURE: 118 MMHG | DIASTOLIC BLOOD PRESSURE: 60 MMHG

## 2019-01-29 DIAGNOSIS — I10 BENIGN ESSENTIAL HYPERTENSION: ICD-10-CM

## 2019-01-29 DIAGNOSIS — I25.10 CORONARY ARTERY DISEASE INVOLVING NATIVE CORONARY ARTERY OF NATIVE HEART WITHOUT ANGINA PECTORIS: ICD-10-CM

## 2019-01-29 LAB
ANION GAP SERPL CALCULATED.3IONS-SCNC: 12.3 MMOL/L (ref 6–17)
BUN SERPL-MCNC: 18 MG/DL (ref 7–30)
CALCIUM SERPL-MCNC: 10.2 MG/DL (ref 8.5–10.5)
CHLORIDE SERPL-SCNC: 102 MMOL/L (ref 98–107)
CO2 SERPL-SCNC: 28 MMOL/L (ref 23–29)
CREAT SERPL-MCNC: 0.84 MG/DL (ref 0.7–1.3)
GFR SERPL CREATININE-BSD FRML MDRD: 67 ML/MIN/{1.73_M2}
GLUCOSE SERPL-MCNC: 216 MG/DL (ref 70–105)
POTASSIUM SERPL-SCNC: 4.3 MMOL/L (ref 3.5–5.1)
SODIUM SERPL-SCNC: 138 MMOL/L (ref 136–145)

## 2019-01-29 PROCEDURE — 93016 CV STRESS TEST SUPVJ ONLY: CPT | Performed by: INTERNAL MEDICINE

## 2019-01-29 PROCEDURE — 36415 COLL VENOUS BLD VENIPUNCTURE: CPT | Performed by: INTERNAL MEDICINE

## 2019-01-29 PROCEDURE — 78452 HT MUSCLE IMAGE SPECT MULT: CPT | Mod: 26 | Performed by: INTERNAL MEDICINE

## 2019-01-29 PROCEDURE — 25000128 H RX IP 250 OP 636: Performed by: INTERNAL MEDICINE

## 2019-01-29 PROCEDURE — 34300033 ZZH RX 343: Performed by: INTERNAL MEDICINE

## 2019-01-29 PROCEDURE — 80048 BASIC METABOLIC PNL TOTAL CA: CPT | Performed by: INTERNAL MEDICINE

## 2019-01-29 PROCEDURE — A9502 TC99M TETROFOSMIN: HCPCS | Performed by: INTERNAL MEDICINE

## 2019-01-29 PROCEDURE — 93018 CV STRESS TEST I&R ONLY: CPT | Performed by: INTERNAL MEDICINE

## 2019-01-29 PROCEDURE — 78452 HT MUSCLE IMAGE SPECT MULT: CPT

## 2019-01-29 RX ORDER — CAFFEINE 200 MG
200 TABLET ORAL
Status: DISCONTINUED | OUTPATIENT
Start: 2019-01-29 | End: 2019-01-30 | Stop reason: HOSPADM

## 2019-01-29 RX ORDER — ALBUTEROL SULFATE 90 UG/1
2 AEROSOL, METERED RESPIRATORY (INHALATION) EVERY 5 MIN PRN
Status: DISCONTINUED | OUTPATIENT
Start: 2019-01-29 | End: 2019-01-30 | Stop reason: HOSPADM

## 2019-01-29 RX ORDER — CAFFEINE CITRATE 20 MG/ML
60 SOLUTION INTRAVENOUS
Status: DISCONTINUED | OUTPATIENT
Start: 2019-01-29 | End: 2019-01-30 | Stop reason: HOSPADM

## 2019-01-29 RX ORDER — AMINOPHYLLINE 25 MG/ML
50-100 INJECTION, SOLUTION INTRAVENOUS
Status: DISCONTINUED | OUTPATIENT
Start: 2019-01-29 | End: 2019-01-30 | Stop reason: HOSPADM

## 2019-01-29 RX ORDER — REGADENOSON 0.08 MG/ML
0.4 INJECTION, SOLUTION INTRAVENOUS ONCE
Status: COMPLETED | OUTPATIENT
Start: 2019-01-29 | End: 2019-01-29

## 2019-01-29 RX ORDER — ACYCLOVIR 200 MG/1
0-1 CAPSULE ORAL
Status: DISCONTINUED | OUTPATIENT
Start: 2019-01-29 | End: 2019-01-30 | Stop reason: HOSPADM

## 2019-01-29 RX ADMIN — REGADENOSON 0.4 MG: 0.08 INJECTION, SOLUTION INTRAVENOUS at 13:05

## 2019-01-29 RX ADMIN — TETROFOSMIN 9.53 MCI.: 1.38 INJECTION, POWDER, LYOPHILIZED, FOR SOLUTION INTRAVENOUS at 13:08

## 2019-01-29 RX ADMIN — TETROFOSMIN 3.5 MCI.: 1.38 INJECTION, POWDER, LYOPHILIZED, FOR SOLUTION INTRAVENOUS at 11:45

## 2019-01-30 ENCOUNTER — CARE COORDINATION (OUTPATIENT)
Dept: CARDIOLOGY | Facility: CLINIC | Age: 74
End: 2019-01-30

## 2019-01-30 NOTE — PROGRESS NOTES
Stress test dated 1/29/19 ordered by Dr. Burgos noted. New area of small to medium ischemia. Pt has visit on Friday 2/1 with Dr. Burgos for review. Messaged to Dr. Burgos with update prior to 2/1 visit. JOLANTA Carpenter 10:22 AM 01/30/19 1/29/2019 1:09 PM  KATHY PERRY  73 years  Female  1945.    Indication/Clinical History: Coronary artery disease    Impression  1.  Myocardial perfusion imaging using single isotope technique  demonstrated a small to medium area of inferior wall ischemia.   2. Gated images demonstrated normal wall motion.  The left ventricular  systolic function is calculated at 60%.  3. Compared to the prior study from February 2018 the inferior  ischemia appears to be new.

## 2019-02-01 ENCOUNTER — OFFICE VISIT (OUTPATIENT)
Dept: CARDIOLOGY | Facility: CLINIC | Age: 74
End: 2019-02-01
Payer: COMMERCIAL

## 2019-02-01 VITALS
HEART RATE: 68 BPM | BODY MASS INDEX: 25.72 KG/M2 | HEIGHT: 68 IN | SYSTOLIC BLOOD PRESSURE: 120 MMHG | DIASTOLIC BLOOD PRESSURE: 62 MMHG | WEIGHT: 169.7 LBS

## 2019-02-01 DIAGNOSIS — I20.89 STABLE ANGINA (H): ICD-10-CM

## 2019-02-01 DIAGNOSIS — R94.39 ABNORMAL CARDIOVASCULAR STRESS TEST: ICD-10-CM

## 2019-02-01 DIAGNOSIS — R93.1 ABNORMAL FINDINGS ON DIAGNOSTIC IMAGING OF HEART AND CORONARY CIRCULATION: ICD-10-CM

## 2019-02-01 DIAGNOSIS — E78.5 HYPERLIPIDEMIA LDL GOAL <100: ICD-10-CM

## 2019-02-01 DIAGNOSIS — I10 ESSENTIAL HYPERTENSION: ICD-10-CM

## 2019-02-01 DIAGNOSIS — I25.10 CORONARY ARTERY DISEASE INVOLVING NATIVE CORONARY ARTERY OF NATIVE HEART WITHOUT ANGINA PECTORIS: Primary | ICD-10-CM

## 2019-02-01 PROCEDURE — 99214 OFFICE O/P EST MOD 30 MIN: CPT | Performed by: INTERNAL MEDICINE

## 2019-02-01 RX ORDER — CHOLECALCIFEROL (VITAMIN D3) 50 MCG
1 TABLET ORAL 2 TIMES DAILY
COMMUNITY

## 2019-02-01 ASSESSMENT — MIFFLIN-ST. JEOR: SCORE: 1328.25

## 2019-02-01 NOTE — LETTER
2/1/2019    Gaye Zimmer  [facility] [address] [place] [address]    RE: Sarah Longo       Dear Colleague,    I had the pleasure of seeing Sarah Longo in the [facility].    HPI and Plan:   See dictation    Orders Placed This Encounter   Procedures     Lipid Profile     ALT     Basic metabolic panel     Basic metabolic panel     Follow-Up with Cardiologist     Follow-Up with Cardiac Advanced Practice Provider       Orders Placed This Encounter   Medications     metFORMIN (GLUCOPHAGE) 1000 MG tablet     Sig: Take 1,000 mg by mouth 2 times daily (with meals)     vitamin D3 (CHOLECALCIFEROL) 2000 units tablet     Sig: Take 1 tablet by mouth 2 times daily     TURMERIC PO     Sig: Take by mouth 2 times daily       Medications Discontinued During This Encounter   Medication Reason     NONFORMULARY Medication Reconciliation Clean Up         Encounter Diagnoses   Name Primary?     Coronary artery disease involving native coronary artery of native heart without angina pectoris Yes     Hyperlipidemia LDL goal <100      Essential hypertension      Stable angina (H)      Abnormal cardiovascular stress test      Abnormal findings on diagnostic imaging of heart and coronary circulation        CURRENT MEDICATIONS:  Current Outpatient Medications   Medication Sig Dispense Refill     amoxicillin (AMOXIL) 500 MG capsule as needed Reported on 5/17/2017       ASPIRIN 81 MG OR TABS 1 tab po QD (Once per day)       Camphor-Menthol-Methyl Sal (SALONPAS) 1.2-5.7-6.3 % PTCH Patient uses 3-4 patches daily       CINNAMON PO Take 2 capsules by mouth 2 times daily       Cranberry 500 MG CAPS Take by mouth daily       empagliflozin (JARDIANCE) 25 MG TABS tablet Take 12.5 mg by mouth every other day       evolocumab (REPATHA SURECLICK) 140 MG/ML prefilled autoinjector Inject 1 mL (140 mg) Subcutaneous every 14 days 2 mL 11     GLIMEPIRIDE 4 MG OR TABS 1 tablet BID       Gymnema  Sylvestris Leaf POWD 500 mg 2 times daily        isosorbide mononitrate (IMDUR) 30 MG 24 hr tablet Take 0.5 tablets (15 mg) by mouth daily 90 tablet 3     lisinopril (PRINIVIL/ZESTRIL) 5 MG tablet Take 1 tablet (5 mg) by mouth daily 90 tablet 3     metFORMIN (GLUCOPHAGE) 1000 MG tablet Take 1,000 mg by mouth 2 times daily (with meals)       MILK THISTLE PO Take by mouth daily       mirabegron (MYRBETRIQ) 50 MG 24 hr tablet Take 1 tablet (50 mg) by mouth daily Patient not taking daily 90 tablet 3     nitroglycerin (NITROSTAT) 0.4 MG SL tablet Place 1 tablet (0.4 mg) under the tongue every 5 minutes as needed for chest pain If symptoms persist after 3 doses (15 minutes) call 911. 25 tablet 3     Omega-3 Fatty Acids (OMEGA-3 FISH OIL PO) Take 2,400 mg by mouth 2 times daily (with meals)       SYNTHROID 125 MCG OR TABS 1 tablet daily       traMADol (ULTRAM) 50 MG tablet Take 1 tablet (50 mg) by mouth every 6 hours as needed for moderate to severe pain 30 tablet 3     TURMERIC PO Take by mouth 2 times daily       vitamin D3 (CHOLECALCIFEROL) 2000 units tablet Take 1 tablet by mouth 2 times daily       VITAMIN E PO Take by mouth daily       CO ENZYME Q-10 50 MG OR CAPS daliy  0     STATIN NOT PRESCRIBED, INTENTIONAL, 1 each At Bedtime Statin not prescribed intentionally due to Allergy to statin (Patient not taking: Reported on 7/27/2018) 0 each 0       ALLERGIES     Allergies   Allergen Reactions     No Known Drug Allergies        PAST MEDICAL HISTORY:  Past Medical History:   Diagnosis Date     Arthritis      Coronary artery disease 04/28/2016    PTCA with MAYA x 2 to OM1 and MAYA x 1 to p.LAD (4/21/2016 at [place]); PTCA with intracoronary stent placement of RCA June 2004; MAYA to OM1 11/2016     Cystocele, midline 09/29/2010     Depression      HYPERPLASTIC  POLYP      colonoscopy in 5-10 yrs.     Hypothyroidism     hypothyroid- Dr Majano     OAB (overactive bladder)     complex voiding dysfct, failed interstim      Osteoarthritis      Other and unspecified hyperlipidemia      Postmenopausal bleeding      Shoulder blade pain 2016     Type II or unspecified type diabetes mellitus without mention of complication, not stated as uncontrolled     Dr. Majano     Unspecified essential hypertension      Urinary frequency 9/29/10    followed by urology. no benefit from detrol or sanctura. month trial of macrobid and flomax 10/10       PAST SURGICAL HISTORY:  Past Surgical History:   Procedure Laterality Date     ------------OTHER-------------      Interstim     Ablation       C CT ANGIOGRAPHY CORONARY  2005    mod soft plaque LAD and Cx, follow up with left heart cath     C LIGATE FALLOPIAN TUBE       COLONOSCOPY       EXCISE LESION UPPER EXTREMITY  2013    Procedure: EXCISE LESION UPPER EXTREMITY;  EXCISION RIGHT ARM ANTICUBITAL LIPOMA ;  Surgeon: Jayson Joe MD;  Location: [facility] REMOVAL OF TONSILS,13+ Y/O       HEART CATH LEFT HEART CATH  3/2/2016    No intervention - aggressive medical management     HEART CATH LEFT HEART CATH  2016     MAYA x 2 to OM1, MAYA to LAD, at [place]     HEART CATH LEFT HEART CATH  2004    MAYA to RCA     HEART CATH LEFT HEART CATH  3/28/2002    Mild to moderate 3 vessel CAD. Medical management recommended.      HEART CATH LEFT HEART CATH  2016    MAYA to OM1     KNEE SURGERY  4/20/10    right knee replacement     REMOVE STIMULATOR SACRAL NERVE N/A 2015    Procedure: REMOVE STIMULATOR SACRAL NERVE;  Surgeon: Gertrudis Frausto MD;  Location: [facility]     SURGICAL HISTORY OF -       Uterine endometrial ablation       FAMILY HISTORY:  Family History   Problem Relation Age of Onset     C.A.D. Father         MI , age 83, had high lipids     Hyperlipidemia Father      Hypertension Father      Coronary Artery Disease Father      Hypertension Mother      Genitourinary Problems Mother         renal failure     Fractures Mother         hip     Osteoporosis Mother       Cerebrovascular Disease Maternal Grandfather         cerebral hemorrhage     Diabetes Maternal Uncle      Colon Cancer Maternal Aunt      Diabetes Maternal Grandmother        SOCIAL HISTORY:  Social History     Socioeconomic History     Marital status:      Spouse name: Kwadwo     Number of children: 3     Years of education: None     Highest education level: None   Social Needs     Financial resource strain: None     Food insecurity - worry: None     Food insecurity - inability: None     Transportation needs - medical: None     Transportation needs - non-medical: None   Occupational History     Occupation: PT Aide     Employer: NONE      Employer: RETIRED   Tobacco Use     Smoking status: Never Smoker     Smokeless tobacco: Never Used   Substance and Sexual Activity     Alcohol use: No     Alcohol/week: 0.0 oz     Drug use: No     Sexual activity: No     Partners: Male     Birth control/protection: Post-menopausal   Other Topics Concern      Service Not Asked     Blood Transfusions Not Asked     Caffeine Concern Yes     Comment: 6-7 cups caffeine per day     Occupational Exposure Not Asked     Hobby Hazards Not Asked     Sleep Concern No     Stress Concern Yes     Weight Concern No     Special Diet No     Comment: eats healthy      Back Care Not Asked     Exercise Yes     Comment:  walking & active life style      Bike Helmet Not Asked     Seat Belt Not Asked     Self-Exams Not Asked     Parent/sibling w/ CABG, MI or angioplasty before 65F 55M? No   Social History Narrative     None       Review of Systems:  Skin:  Negative       Eyes:  Positive for glasses    ENT:  Negative      Respiratory:  Positive for dyspnea on exertion occ   Cardiovascular:    chest pain;Positive for occ  Gastroenterology: Negative      Genitourinary:  Negative      Musculoskeletal:  Positive for back pain;arthritis    Neurologic:  Negative      Psychiatric:  Positive for anxiety    Heme/Lymph/Imm:  Negative      Endocrine:   "Positive for thyroid disorder;diabetes      Physical Exam:  Vitals: /62   Pulse 68   Ht 1.727 m (5' 8\")   Wt 77 kg (169 lb 11.2 oz)   BMI 25.80 kg/m       Constitutional:  cooperative, alert and oriented, well developed, well nourished, in no acute distress        Skin:  warm and dry to the touch, no apparent skin lesions or masses noted          Head:  normocephalic, no masses or lesions        Eyes:  pupils equal and round, conjunctivae and lids unremarkable, sclera white, no xanthalasma, EOMS intact, no nystagmus        Lymph:      ENT:  no pallor or cyanosis, dentition good        Neck:  carotid pulses are full and equal bilaterally, JVP normal, no carotid bruit        Respiratory:  normal breath sounds, clear to auscultation, normal A-P diameter, normal symmetry, normal respiratory excursion, no use of accessory muscles         Cardiac: regular rhythm, normal S1/S2, no S3 or S4, apical impulse not displaced, no murmurs, gallops or rubs                pulses full and equal, no bruits auscultated                                        GI:  abdomen soft;BS normoactive        Extremities and Muscular Skeletal:  no deformities, clubbing, cyanosis, erythema observed;no edema              Neurological:  no gross motor deficits;affect appropriate        Psych:  Alert and Oriented x 3        CC  Feroz Burgos MD  [address]  [place] [address]                Thank you for allowing me to participate in the care of your patient.      Sincerely,     FEROZ BURGOS MD     [facility]    cc:   Feroz Burgos MD  [address]  [place] [address]        "

## 2019-02-01 NOTE — PROGRESS NOTES
HPI and Plan:   See dictation    Orders Placed This Encounter   Procedures     Lipid Profile     ALT     Basic metabolic panel     Basic metabolic panel     Follow-Up with Cardiologist     Follow-Up with Cardiac Advanced Practice Provider       Orders Placed This Encounter   Medications     metFORMIN (GLUCOPHAGE) 1000 MG tablet     Sig: Take 1,000 mg by mouth 2 times daily (with meals)     vitamin D3 (CHOLECALCIFEROL) 2000 units tablet     Sig: Take 1 tablet by mouth 2 times daily     TURMERIC PO     Sig: Take by mouth 2 times daily       Medications Discontinued During This Encounter   Medication Reason     NONFORMULARY Medication Reconciliation Clean Up         Encounter Diagnoses   Name Primary?     Coronary artery disease involving native coronary artery of native heart without angina pectoris Yes     Hyperlipidemia LDL goal <100      Essential hypertension      Stable angina (H)      Abnormal cardiovascular stress test      Abnormal findings on diagnostic imaging of heart and coronary circulation        CURRENT MEDICATIONS:  Current Outpatient Medications   Medication Sig Dispense Refill     amoxicillin (AMOXIL) 500 MG capsule as needed Reported on 5/17/2017       ASPIRIN 81 MG OR TABS 1 tab po QD (Once per day)       Camphor-Menthol-Methyl Sal (SALONPAS) 1.2-5.7-6.3 % PTCH Patient uses 3-4 patches daily       CINNAMON PO Take 2 capsules by mouth 2 times daily       Cranberry 500 MG CAPS Take by mouth daily       empagliflozin (JARDIANCE) 25 MG TABS tablet Take 12.5 mg by mouth every other day       evolocumab (REPATHA SURECLICK) 140 MG/ML prefilled autoinjector Inject 1 mL (140 mg) Subcutaneous every 14 days 2 mL 11     GLIMEPIRIDE 4 MG OR TABS 1 tablet BID       Gymnema Sylvestris Leaf POWD 500 mg 2 times daily        isosorbide mononitrate (IMDUR) 30 MG 24 hr tablet Take 0.5 tablets (15 mg) by mouth daily 90 tablet 3     lisinopril (PRINIVIL/ZESTRIL) 5 MG tablet Take 1 tablet (5 mg) by mouth daily 90 tablet  3     metFORMIN (GLUCOPHAGE) 1000 MG tablet Take 1,000 mg by mouth 2 times daily (with meals)       MILK THISTLE PO Take by mouth daily       mirabegron (MYRBETRIQ) 50 MG 24 hr tablet Take 1 tablet (50 mg) by mouth daily Patient not taking daily 90 tablet 3     nitroglycerin (NITROSTAT) 0.4 MG SL tablet Place 1 tablet (0.4 mg) under the tongue every 5 minutes as needed for chest pain If symptoms persist after 3 doses (15 minutes) call 911. 25 tablet 3     Omega-3 Fatty Acids (OMEGA-3 FISH OIL PO) Take 2,400 mg by mouth 2 times daily (with meals)       SYNTHROID 125 MCG OR TABS 1 tablet daily       traMADol (ULTRAM) 50 MG tablet Take 1 tablet (50 mg) by mouth every 6 hours as needed for moderate to severe pain 30 tablet 3     TURMERIC PO Take by mouth 2 times daily       vitamin D3 (CHOLECALCIFEROL) 2000 units tablet Take 1 tablet by mouth 2 times daily       VITAMIN E PO Take by mouth daily       CO ENZYME Q-10 50 MG OR CAPS daliy  0     STATIN NOT PRESCRIBED, INTENTIONAL, 1 each At Bedtime Statin not prescribed intentionally due to Allergy to statin (Patient not taking: Reported on 7/27/2018) 0 each 0       ALLERGIES     Allergies   Allergen Reactions     No Known Drug Allergies        PAST MEDICAL HISTORY:  Past Medical History:   Diagnosis Date     Arthritis      Coronary artery disease 04/28/2016    PTCA with MAYA x 2 to OM1 and MAYA x 1 to p.LAD (4/21/2016 at [place]); PTCA with intracoronary stent placement of RCA June 2004; MAYA to OM1 11/2016     Cystocele, midline 09/29/2010     Depression      HYPERPLASTIC  POLYP      colonoscopy in 5-10 yrs.     Hypothyroidism     hypothyroid- Dr Majano     OAB (overactive bladder)     complex voiding dysfct, failed interstim     Osteoarthritis      Other and unspecified hyperlipidemia      Postmenopausal bleeding      Shoulder blade pain 5/31/2016     Type II or unspecified type diabetes mellitus without mention of complication, not stated as uncontrolled     Dr. Majano      Unspecified essential hypertension      Urinary frequency 9/29/10    followed by urology. no benefit from detrol or sanctura. month trial of macrobid and flomax 10/10       PAST SURGICAL HISTORY:  Past Surgical History:   Procedure Laterality Date     ------------OTHER-------------      Interstim     Ablation       C CT ANGIOGRAPHY CORONARY  2005    mod soft plaque LAD and Cx, follow up with left heart cath     C LIGATE FALLOPIAN TUBE       COLONOSCOPY       EXCISE LESION UPPER EXTREMITY  2013    Procedure: EXCISE LESION UPPER EXTREMITY;  EXCISION RIGHT ARM ANTICUBITAL LIPOMA ;  Surgeon: Jayson Joe MD;  Location: [facility] REMOVAL OF TONSILS,13+ Y/O       HEART CATH LEFT HEART CATH  3/2/2016    No intervention - aggressive medical management     HEART CATH LEFT HEART CATH  2016     MAYA x 2 to OM1, MAYA to LAD, at [place]     HEART CATH LEFT HEART CATH  2004    MAYA to RCA     HEART CATH LEFT HEART CATH  3/28/2002    Mild to moderate 3 vessel CAD. Medical management recommended.      HEART CATH LEFT HEART CATH  2016    MAYA to OM1     KNEE SURGERY  4/20/10    right knee replacement     REMOVE STIMULATOR SACRAL NERVE N/A 2015    Procedure: REMOVE STIMULATOR SACRAL NERVE;  Surgeon: Gertrudis Frausto MD;  Location: [facility]     SURGICAL HISTORY OF -       Uterine endometrial ablation       FAMILY HISTORY:  Family History   Problem Relation Age of Onset     C.A.D. Father         MI , age 83, had high lipids     Hyperlipidemia Father      Hypertension Father      Coronary Artery Disease Father      Hypertension Mother      Genitourinary Problems Mother         renal failure     Fractures Mother         hip     Osteoporosis Mother      Cerebrovascular Disease Maternal Grandfather         cerebral hemorrhage     Diabetes Maternal Uncle      Colon Cancer Maternal Aunt      Diabetes Maternal Grandmother        SOCIAL HISTORY:  Social History     Socioeconomic History     Marital  "status:      Spouse name: Kwadwo     Number of children: 3     Years of education: None     Highest education level: None   Social Needs     Financial resource strain: None     Food insecurity - worry: None     Food insecurity - inability: None     Transportation needs - medical: None     Transportation needs - non-medical: None   Occupational History     Occupation: PT Aide     Employer: NONE      Employer: RETIRED   Tobacco Use     Smoking status: Never Smoker     Smokeless tobacco: Never Used   Substance and Sexual Activity     Alcohol use: No     Alcohol/week: 0.0 oz     Drug use: No     Sexual activity: No     Partners: Male     Birth control/protection: Post-menopausal   Other Topics Concern      Service Not Asked     Blood Transfusions Not Asked     Caffeine Concern Yes     Comment: 6-7 cups caffeine per day     Occupational Exposure Not Asked     Hobby Hazards Not Asked     Sleep Concern No     Stress Concern Yes     Weight Concern No     Special Diet No     Comment: eats healthy      Back Care Not Asked     Exercise Yes     Comment:  walking & active life style      Bike Helmet Not Asked     Seat Belt Not Asked     Self-Exams Not Asked     Parent/sibling w/ CABG, MI or angioplasty before 65F 55M? No   Social History Narrative     None       Review of Systems:  Skin:  Negative       Eyes:  Positive for glasses    ENT:  Negative      Respiratory:  Positive for dyspnea on exertion occ   Cardiovascular:    chest pain;Positive for occ  Gastroenterology: Negative      Genitourinary:  Negative      Musculoskeletal:  Positive for back pain;arthritis    Neurologic:  Negative      Psychiatric:  Positive for anxiety    Heme/Lymph/Imm:  Negative      Endocrine:  Positive for thyroid disorder;diabetes      Physical Exam:  Vitals: /62   Pulse 68   Ht 1.727 m (5' 8\")   Wt 77 kg (169 lb 11.2 oz)   BMI 25.80 kg/m      Constitutional:  cooperative, alert and oriented, well developed, well nourished, in " no acute distress        Skin:  warm and dry to the touch, no apparent skin lesions or masses noted          Head:  normocephalic, no masses or lesions        Eyes:  pupils equal and round, conjunctivae and lids unremarkable, sclera white, no xanthalasma, EOMS intact, no nystagmus        Lymph:      ENT:  no pallor or cyanosis, dentition good        Neck:  carotid pulses are full and equal bilaterally, JVP normal, no carotid bruit        Respiratory:  normal breath sounds, clear to auscultation, normal A-P diameter, normal symmetry, normal respiratory excursion, no use of accessory muscles         Cardiac: regular rhythm, normal S1/S2, no S3 or S4, apical impulse not displaced, no murmurs, gallops or rubs                pulses full and equal, no bruits auscultated                                        GI:  abdomen soft;BS normoactive        Extremities and Muscular Skeletal:  no deformities, clubbing, cyanosis, erythema observed;no edema              Neurological:  no gross motor deficits;affect appropriate        Psych:  Alert and Oriented x 3        CC  Feroz Burgos MD  [address]  [place] [id number]

## 2019-02-01 NOTE — PROGRESS NOTES
Service Date: 02/01/2019      HISTORY OF PRESENT ILLNESS:  I had the opportunity to see Ms. Sarah Longo in Cardiology Clinic today at [facility] in [place] for reevaluation of recurrent coronary artery disease and multiple cardiac risk factors including hypertension, dyslipidemia and type 2 diabetes.      She has had multiple angioplasty and stenting procedures in the past dating back to 2004 when she had stenting of her right coronary artery.  In 2016 she underwent stenting of the LAD and left circumflex at the [facility].  She returned with recurrent symptoms in 11/2016 and had evidence of significant restenosis in one of the stents to the obtuse marginal and had that stented again.  In 02/2017 she was having recurrent symptoms, although her stress test was unremarkable for significant ischemia.  We decided to reevaluate for recurrent restenosis issues with an angiogram, and fortunately everything looked good there.      She now returns with continuing symptoms primarily of back pain.  She has chronic low back pain issues and intermittent back pain in her upper back between her shoulder blades radiating to her posterior left shoulder and down her left arm.  A few days ago her pain was more severe, radiating to both arms and really concerned her for recurrent heart pain issues.  We did a stress test on 01/29/2019 which suggested a small to moderate-sized area of ischemia involving the inferior wall.  I reviewed those nuclear imaging results and images personally, and I agree there is some concern for ischemia there.      She has been on excellent medical therapy.  She cannot tolerate statins but has been on Repatha with excellent cholesterol numbers.  Her diabetes is being treated aggressively.  Her blood pressure numbers have been excellent.        PHYSICAL EXAMINATION:  On today's examination her blood pressure is 120/62, heart rate 68 and weight 169 pounds.  Her lungs are clear.   Heart rhythm is regular.  She has no cardiac murmurs or carotid bruits.      IMPRESSIONS:  Ms. Sarah Perry is a 73-year-old woman with a history of multiple angioplasty and stenting procedures for recurrent coronary artery disease in the past.  This includes stenting of the right coronary artery (), stenting of the LAD and obtuse marginal arteries (), and treatment of restenosis within the obtuse marginal with additional stenting ().  She now has recurrent pain primarily in the left upper back radiating down both arms and into the left shoulder, which may have been her anginal pattern in the past.  Her stress test is abnormal suggesting inferior ischemia.  I suggested that we need to reevaluate this situation with coronary angiography again.  She understands and agrees to proceed.  She understands the risks of bleeding, renal failure, heart attack, stroke, emergency surgery and death.      She has had procedures done by Dr. Lee in the past and requests that only Dr. Lee do her procedure.  She would appreciate a radial artery approach.  We will have her follow up with us again after the procedure for reevaluation.      cc:   [place] [address]         GUNNAR LEA MD, [facility]             D: 2019   T: 2019   MT: LD      Name:     SARAH PERRY   MRN:      [id number]        Account:      [id number]   :      1945           Service Date: 2019      Document: [id number]

## 2019-02-01 NOTE — LETTER
2/1/2019      Gaye Zimmer  [facility] [address]  [facility] [address]      RE: Sarah Longo       Dear Colleague,    I had the pleasure of seeing Sarah Longo in the [facility].    Service Date: 02/01/2019      HISTORY OF PRESENT ILLNESS:  I had the opportunity to see Ms. Sarah Longo in Cardiology Clinic today at [facility] in [place] for reevaluation of recurrent coronary artery disease and multiple cardiac risk factors including hypertension, dyslipidemia and type 2 diabetes.      She has had multiple angioplasty and stenting procedures in the past dating back to 2004 when she had stenting of her right coronary artery.  In 2016 she underwent stenting of the LAD and left circumflex at the [facility].  She returned with recurrent symptoms in 11/2016 and had evidence of significant restenosis in one of the stents to the obtuse marginal and had that stented again.  In 02/2017 she was having recurrent symptoms, although her stress test was unremarkable for significant ischemia.  We decided to reevaluate for recurrent restenosis issues with an angiogram, and fortunately everything looked good there.      She now returns with continuing symptoms primarily of back pain.  She has chronic low back pain issues and intermittent back pain in her upper back between her shoulder blades radiating to her posterior left shoulder and down her left arm.  A few days ago her pain was more severe, radiating to both arms and really concerned her for recurrent heart pain issues.  We did a stress test on 01/29/2019 which suggested a small to moderate-sized area of ischemia involving the inferior wall.  I reviewed those nuclear imaging results and images personally, and I agree there is some concern for ischemia there.      She has been on excellent medical therapy.  She cannot tolerate statins but has been on Repatha with excellent cholesterol numbers.   Her diabetes is being treated aggressively.  Her blood pressure numbers have been excellent.        PHYSICAL EXAMINATION:  On today's examination her blood pressure is 120/62, heart rate 68 and weight 169 pounds.  Her lungs are clear.  Heart rhythm is regular.  She has no cardiac murmurs or carotid bruits.      IMPRESSIONS:  Ms. Sarah Perry is a 73-year-old woman with a history of multiple angioplasty and stenting procedures for recurrent coronary artery disease in the past.  This includes stenting of the right coronary artery (), stenting of the LAD and obtuse marginal arteries (), and treatment of restenosis within the obtuse marginal with additional stenting ().  She now has recurrent pain primarily in the left upper back radiating down both arms and into the left shoulder, which may have been her anginal pattern in the past.  Her stress test is abnormal suggesting inferior ischemia.  I suggested that we need to reevaluate this situation with coronary angiography again.  She understands and agrees to proceed.  She understands the risks of bleeding, renal failure, heart attack, stroke, emergency surgery and death.      She has had procedures done by Dr. Lee in the past and requests that only Dr. Lee do her procedure.  She would appreciate a radial artery approach.  We will have her follow up with us again after the procedure for reevaluation.      cc:   [place] [address]         GUNNAR LEA MD, [facility]             D: 2019   T: 2019   MT: LD      Name:     SARAH PERRY   MRN:      [id number]        Account:      [id number]   :      1945           Service Date: 2019      Document: [id number]         Outpatient Encounter Medications as of 2019   Medication Sig Dispense Refill     amoxicillin (AMOXIL) 500 MG capsule as needed Reported on 2017       ASPIRIN 81 MG OR TABS  1 tab po QD (Once per day)       Camphor-Menthol-Methyl Sal (SALONPAS) 1.2-5.7-6.3 % PTCH Patient uses 3-4 patches daily       CINNAMON PO Take 2 capsules by mouth 2 times daily       Cranberry 500 MG CAPS Take by mouth daily       empagliflozin (JARDIANCE) 25 MG TABS tablet Take 12.5 mg by mouth every other day       evolocumab (REPATHA SURECLICK) 140 MG/ML prefilled autoinjector Inject 1 mL (140 mg) Subcutaneous every 14 days 2 mL 11     GLIMEPIRIDE 4 MG OR TABS 1 tablet BID       Gymnema Sylvestris Leaf POWD 500 mg 2 times daily        isosorbide mononitrate (IMDUR) 30 MG 24 hr tablet Take 0.5 tablets (15 mg) by mouth daily 90 tablet 3     lisinopril (PRINIVIL/ZESTRIL) 5 MG tablet Take 1 tablet (5 mg) by mouth daily 90 tablet 3     metFORMIN (GLUCOPHAGE) 1000 MG tablet Take 1,000 mg by mouth 2 times daily (with meals)       MILK THISTLE PO Take by mouth daily       mirabegron (MYRBETRIQ) 50 MG 24 hr tablet Take 1 tablet (50 mg) by mouth daily Patient not taking daily 90 tablet 3     nitroglycerin (NITROSTAT) 0.4 MG SL tablet Place 1 tablet (0.4 mg) under the tongue every 5 minutes as needed for chest pain If symptoms persist after 3 doses (15 minutes) call 911. 25 tablet 3     Omega-3 Fatty Acids (OMEGA-3 FISH OIL PO) Take 2,400 mg by mouth 2 times daily (with meals)       SYNTHROID 125 MCG OR TABS 1 tablet daily       traMADol (ULTRAM) 50 MG tablet Take 1 tablet (50 mg) by mouth every 6 hours as needed for moderate to severe pain 30 tablet 3     TURMERIC PO Take by mouth 2 times daily       vitamin D3 (CHOLECALCIFEROL) 2000 units tablet Take 1 tablet by mouth 2 times daily       VITAMIN E PO Take by mouth daily       CO ENZYME Q-10 50 MG OR CAPS daliy  0     STATIN NOT PRESCRIBED, INTENTIONAL, 1 each At Bedtime Statin not prescribed intentionally due to Allergy to statin (Patient not taking: Reported on 7/27/2018) 0 each 0     [DISCONTINUED] NONFORMULARY Tumeric 500 mg bid       No facility-administered  encounter medications on file as of 2/1/2019.            Again, thank you for allowing me to participate in the care of your patient.      Sincerely,    GUNNAR LEA MD     [facility]

## 2019-02-05 ENCOUNTER — TRANSFERRED RECORDS (OUTPATIENT)
Dept: HEALTH INFORMATION MANAGEMENT | Facility: CLINIC | Age: 74
End: 2019-02-05

## 2019-02-13 ENCOUNTER — CARE COORDINATION (OUTPATIENT)
Dept: CARDIOLOGY | Facility: CLINIC | Age: 74
End: 2019-02-13

## 2019-02-13 DIAGNOSIS — I25.10 CAD (CORONARY ARTERY DISEASE): Primary | ICD-10-CM

## 2019-02-13 RX ORDER — POTASSIUM CHLORIDE 1500 MG/1
20 TABLET, EXTENDED RELEASE ORAL
Status: CANCELLED | OUTPATIENT
Start: 2019-02-13

## 2019-02-13 RX ORDER — LIDOCAINE 40 MG/G
CREAM TOPICAL
Status: CANCELLED | OUTPATIENT
Start: 2019-02-13

## 2019-02-13 RX ORDER — ASPIRIN 81 MG/1
81 TABLET ORAL DAILY
Status: CANCELLED | OUTPATIENT
Start: 2019-02-13

## 2019-02-13 RX ORDER — SODIUM CHLORIDE 9 MG/ML
INJECTION, SOLUTION INTRAVENOUS CONTINUOUS
Status: CANCELLED | OUTPATIENT
Start: 2019-02-13

## 2019-02-13 NOTE — PROGRESS NOTES
Pt scheduled for left heart cath on 2/14 @ 10:00, check in time @ 0800, at [facility]. Pt called with prep instructions. Pt instructed not to eat or drink anything after midnight, except for AM medications with small sip of water. Pt to take her ASA in the AM. She will hold her oral diabetic meds morning of cath, including her metformin. Pt to hold metformin 48 hours post cath. She will have post cath BMP on 2/15 to make sure renal function stable before resuming metformin. No known dye allergy. Pt has someone to drive her and stay with her post cath. She had no further questions at this time. Patrick STOVER February 13, 2019, 2:17 PM

## 2019-02-14 ENCOUNTER — HOSPITAL ENCOUNTER (OUTPATIENT)
Facility: CLINIC | Age: 74
Discharge: HOME | End: 2019-02-14
Admitting: INTERNAL MEDICINE
Payer: COMMERCIAL

## 2019-02-14 ENCOUNTER — SURGERY (OUTPATIENT)
Age: 74
End: 2019-02-14
Payer: COMMERCIAL

## 2019-02-14 VITALS
HEIGHT: 68 IN | OXYGEN SATURATION: 98 % | RESPIRATION RATE: 16 BRPM | DIASTOLIC BLOOD PRESSURE: 68 MMHG | SYSTOLIC BLOOD PRESSURE: 119 MMHG | TEMPERATURE: 98 F | WEIGHT: 169.7 LBS | HEART RATE: 71 BPM | BODY MASS INDEX: 25.72 KG/M2

## 2019-02-14 DIAGNOSIS — R94.39 ABNORMAL CARDIOVASCULAR STRESS TEST: ICD-10-CM

## 2019-02-14 DIAGNOSIS — I25.10 CORONARY ARTERY DISEASE INVOLVING NATIVE CORONARY ARTERY OF NATIVE HEART WITHOUT ANGINA PECTORIS: ICD-10-CM

## 2019-02-14 LAB
ANION GAP SERPL CALCULATED.3IONS-SCNC: 7 MMOL/L (ref 3–14)
APTT PPP: 23 SEC (ref 22–37)
BUN SERPL-MCNC: 21 MG/DL (ref 7–30)
CALCIUM SERPL-MCNC: 9.6 MG/DL (ref 8.5–10.1)
CHLORIDE SERPL-SCNC: 110 MMOL/L (ref 94–109)
CO2 SERPL-SCNC: 24 MMOL/L (ref 20–32)
CREAT SERPL-MCNC: 0.65 MG/DL (ref 0.52–1.04)
ERYTHROCYTE [DISTWIDTH] IN BLOOD BY AUTOMATED COUNT: 12.8 % (ref 10–15)
GFR SERPL CREATININE-BSD FRML MDRD: 88 ML/MIN/{1.73_M2}
GLUCOSE SERPL-MCNC: 197 MG/DL (ref 70–99)
HCT VFR BLD AUTO: 38 % (ref 35–47)
HGB BLD-MCNC: 12.6 G/DL (ref 11.7–15.7)
INR PPP: 0.88 (ref 0.86–1.14)
MCH RBC QN AUTO: 29.9 PG (ref 26.5–33)
MCHC RBC AUTO-ENTMCNC: 33.2 G/DL (ref 31.5–36.5)
MCV RBC AUTO: 90 FL (ref 78–100)
PLATELET # BLD AUTO: 184 10E9/L (ref 150–450)
POTASSIUM SERPL-SCNC: 4.4 MMOL/L (ref 3.4–5.3)
RBC # BLD AUTO: 4.21 10E12/L (ref 3.8–5.2)
SODIUM SERPL-SCNC: 141 MMOL/L (ref 133–144)
WBC # BLD AUTO: 4.2 10E9/L (ref 4–11)

## 2019-02-14 PROCEDURE — 40000235 ZZH STATISTIC TELEMETRY

## 2019-02-14 PROCEDURE — 99152 MOD SED SAME PHYS/QHP 5/>YRS: CPT | Performed by: INTERNAL MEDICINE

## 2019-02-14 PROCEDURE — 85610 PROTHROMBIN TIME: CPT | Performed by: INTERNAL MEDICINE

## 2019-02-14 PROCEDURE — 93458 L HRT ARTERY/VENTRICLE ANGIO: CPT | Performed by: INTERNAL MEDICINE

## 2019-02-14 PROCEDURE — 25000128 H RX IP 250 OP 636: Performed by: INTERNAL MEDICINE

## 2019-02-14 PROCEDURE — 36415 COLL VENOUS BLD VENIPUNCTURE: CPT

## 2019-02-14 PROCEDURE — 99152 MOD SED SAME PHYS/QHP 5/>YRS: CPT | Mod: GC | Performed by: INTERNAL MEDICINE

## 2019-02-14 PROCEDURE — 80048 BASIC METABOLIC PNL TOTAL CA: CPT | Performed by: INTERNAL MEDICINE

## 2019-02-14 PROCEDURE — 93010 ELECTROCARDIOGRAM REPORT: CPT | Performed by: INTERNAL MEDICINE

## 2019-02-14 PROCEDURE — 27210794 ZZH OR GENERAL SUPPLY STERILE: Performed by: INTERNAL MEDICINE

## 2019-02-14 PROCEDURE — 25000125 ZZHC RX 250: Performed by: INTERNAL MEDICINE

## 2019-02-14 PROCEDURE — C1894 INTRO/SHEATH, NON-LASER: HCPCS | Performed by: INTERNAL MEDICINE

## 2019-02-14 PROCEDURE — C1769 GUIDE WIRE: HCPCS | Performed by: INTERNAL MEDICINE

## 2019-02-14 PROCEDURE — 99153 MOD SED SAME PHYS/QHP EA: CPT | Performed by: INTERNAL MEDICINE

## 2019-02-14 PROCEDURE — 40000065 ZZH STATISTIC EKG NON-CHARGEABLE

## 2019-02-14 PROCEDURE — 85730 THROMBOPLASTIN TIME PARTIAL: CPT | Performed by: INTERNAL MEDICINE

## 2019-02-14 PROCEDURE — 25800030 ZZH RX IP 258 OP 636: Performed by: INTERNAL MEDICINE

## 2019-02-14 PROCEDURE — 93005 ELECTROCARDIOGRAM TRACING: CPT

## 2019-02-14 PROCEDURE — 85027 COMPLETE CBC AUTOMATED: CPT | Performed by: INTERNAL MEDICINE

## 2019-02-14 PROCEDURE — 40000852 ZZH STATISTIC HEART CATH LAB OR EP LAB

## 2019-02-14 PROCEDURE — 93458 L HRT ARTERY/VENTRICLE ANGIO: CPT | Mod: 26 | Performed by: INTERNAL MEDICINE

## 2019-02-14 RX ORDER — NEBIVOLOL 2.5 MG/1
2.5 TABLET ORAL DAILY
Qty: 30 TABLET | Refills: 11 | Status: SHIPPED | OUTPATIENT
Start: 2019-02-14 | End: 2019-02-15

## 2019-02-14 RX ORDER — PHENYLEPHRINE HCL IN 0.9% NACL 1 MG/10 ML
20-100 SYRINGE (ML) INTRAVENOUS
Status: DISCONTINUED | OUTPATIENT
Start: 2019-02-14 | End: 2019-02-14 | Stop reason: HOSPADM

## 2019-02-14 RX ORDER — NIFEDIPINE 10 MG/1
10 CAPSULE ORAL
Status: DISCONTINUED | OUTPATIENT
Start: 2019-02-14 | End: 2019-02-14 | Stop reason: HOSPADM

## 2019-02-14 RX ORDER — SODIUM NITROPRUSSIDE 25 MG/ML
100-200 INJECTION INTRAVENOUS
Status: DISCONTINUED | OUTPATIENT
Start: 2019-02-14 | End: 2019-02-14 | Stop reason: HOSPADM

## 2019-02-14 RX ORDER — HEPARIN SODIUM 1000 [USP'U]/ML
1000-10000 INJECTION, SOLUTION INTRAVENOUS; SUBCUTANEOUS EVERY 5 MIN PRN
Status: DISCONTINUED | OUTPATIENT
Start: 2019-02-14 | End: 2019-02-14 | Stop reason: HOSPADM

## 2019-02-14 RX ORDER — NALOXONE HYDROCHLORIDE 0.4 MG/ML
.1-.4 INJECTION, SOLUTION INTRAMUSCULAR; INTRAVENOUS; SUBCUTANEOUS
Status: DISCONTINUED | OUTPATIENT
Start: 2019-02-14 | End: 2019-02-14 | Stop reason: HOSPADM

## 2019-02-14 RX ORDER — PROPOFOL 10 MG/ML
5-75 INJECTION, EMULSION INTRAVENOUS CONTINUOUS
Status: DISCONTINUED | OUTPATIENT
Start: 2019-02-14 | End: 2019-02-14 | Stop reason: HOSPADM

## 2019-02-14 RX ORDER — NALOXONE HYDROCHLORIDE 0.4 MG/ML
0.4 INJECTION, SOLUTION INTRAMUSCULAR; INTRAVENOUS; SUBCUTANEOUS EVERY 5 MIN PRN
Status: DISCONTINUED | OUTPATIENT
Start: 2019-02-14 | End: 2019-02-14 | Stop reason: HOSPADM

## 2019-02-14 RX ORDER — SODIUM CHLORIDE 9 MG/ML
INJECTION, SOLUTION INTRAVENOUS CONTINUOUS
Status: DISCONTINUED | OUTPATIENT
Start: 2019-02-14 | End: 2019-02-14 | Stop reason: HOSPADM

## 2019-02-14 RX ORDER — ADENOSINE 3 MG/ML
12-12000 INJECTION, SOLUTION INTRAVENOUS
Status: DISCONTINUED | OUTPATIENT
Start: 2019-02-14 | End: 2019-02-14 | Stop reason: HOSPADM

## 2019-02-14 RX ORDER — POTASSIUM CHLORIDE 1500 MG/1
20 TABLET, EXTENDED RELEASE ORAL
Status: DISCONTINUED | OUTPATIENT
Start: 2019-02-14 | End: 2019-02-14 | Stop reason: HOSPADM

## 2019-02-14 RX ORDER — POTASSIUM CHLORIDE 29.8 MG/ML
20 INJECTION INTRAVENOUS
Status: DISCONTINUED | OUTPATIENT
Start: 2019-02-14 | End: 2019-02-14 | Stop reason: HOSPADM

## 2019-02-14 RX ORDER — PROPOFOL 10 MG/ML
10-20 INJECTION, EMULSION INTRAVENOUS EVERY 30 MIN PRN
Status: DISCONTINUED | OUTPATIENT
Start: 2019-02-14 | End: 2019-02-14 | Stop reason: HOSPADM

## 2019-02-14 RX ORDER — ASPIRIN 325 MG
325 TABLET ORAL
Status: DISCONTINUED | OUTPATIENT
Start: 2019-02-14 | End: 2019-02-14 | Stop reason: HOSPADM

## 2019-02-14 RX ORDER — LIDOCAINE HYDROCHLORIDE 10 MG/ML
30 INJECTION, SOLUTION EPIDURAL; INFILTRATION; INTRACAUDAL; PERINEURAL
Status: DISCONTINUED | OUTPATIENT
Start: 2019-02-14 | End: 2019-02-14 | Stop reason: HOSPADM

## 2019-02-14 RX ORDER — NITROGLYCERIN 5 MG/ML
100-200 VIAL (ML) INTRAVENOUS
Status: DISCONTINUED | OUTPATIENT
Start: 2019-02-14 | End: 2019-02-14 | Stop reason: HOSPADM

## 2019-02-14 RX ORDER — NITROGLYCERIN 20 MG/100ML
.07-2 INJECTION INTRAVENOUS CONTINUOUS PRN
Status: DISCONTINUED | OUTPATIENT
Start: 2019-02-14 | End: 2019-02-14 | Stop reason: HOSPADM

## 2019-02-14 RX ORDER — ONDANSETRON 2 MG/ML
4 INJECTION INTRAMUSCULAR; INTRAVENOUS EVERY 4 HOURS PRN
Status: DISCONTINUED | OUTPATIENT
Start: 2019-02-14 | End: 2019-02-14 | Stop reason: HOSPADM

## 2019-02-14 RX ORDER — HYDROCODONE BITARTRATE AND ACETAMINOPHEN 5; 325 MG/1; MG/1
1-2 TABLET ORAL EVERY 4 HOURS PRN
Status: DISCONTINUED | OUTPATIENT
Start: 2019-02-14 | End: 2019-02-14 | Stop reason: HOSPADM

## 2019-02-14 RX ORDER — ATROPINE SULFATE 0.1 MG/ML
.5-1 INJECTION INTRAVENOUS
Status: DISCONTINUED | OUTPATIENT
Start: 2019-02-14 | End: 2019-02-14 | Stop reason: HOSPADM

## 2019-02-14 RX ORDER — METOPROLOL TARTRATE 1 MG/ML
5 INJECTION, SOLUTION INTRAVENOUS EVERY 5 MIN PRN
Status: DISCONTINUED | OUTPATIENT
Start: 2019-02-14 | End: 2019-02-14 | Stop reason: HOSPADM

## 2019-02-14 RX ORDER — ENALAPRILAT 1.25 MG/ML
1.25-2.5 INJECTION INTRAVENOUS
Status: DISCONTINUED | OUTPATIENT
Start: 2019-02-14 | End: 2019-02-14 | Stop reason: HOSPADM

## 2019-02-14 RX ORDER — FLUMAZENIL 0.1 MG/ML
0.2 INJECTION, SOLUTION INTRAVENOUS
Status: DISCONTINUED | OUTPATIENT
Start: 2019-02-14 | End: 2019-02-14 | Stop reason: HOSPADM

## 2019-02-14 RX ORDER — DEXTROSE MONOHYDRATE 25 G/50ML
12.5-5 INJECTION, SOLUTION INTRAVENOUS EVERY 30 MIN PRN
Status: DISCONTINUED | OUTPATIENT
Start: 2019-02-14 | End: 2019-02-14 | Stop reason: HOSPADM

## 2019-02-14 RX ORDER — NICARDIPINE HYDROCHLORIDE 2.5 MG/ML
100 INJECTION INTRAVENOUS
Status: DISCONTINUED | OUTPATIENT
Start: 2019-02-14 | End: 2019-02-14 | Stop reason: HOSPADM

## 2019-02-14 RX ORDER — NITROGLYCERIN 0.4 MG/1
0.4 TABLET SUBLINGUAL EVERY 5 MIN PRN
Status: DISCONTINUED | OUTPATIENT
Start: 2019-02-14 | End: 2019-02-14 | Stop reason: HOSPADM

## 2019-02-14 RX ORDER — VERAPAMIL HYDROCHLORIDE 2.5 MG/ML
1-2.5 INJECTION, SOLUTION INTRAVENOUS
Status: COMPLETED | OUTPATIENT
Start: 2019-02-14 | End: 2019-02-14

## 2019-02-14 RX ORDER — LORAZEPAM 2 MG/ML
.5-2 INJECTION INTRAMUSCULAR EVERY 4 HOURS PRN
Status: DISCONTINUED | OUTPATIENT
Start: 2019-02-14 | End: 2019-02-14 | Stop reason: HOSPADM

## 2019-02-14 RX ORDER — CLOPIDOGREL 300 MG/1
300-600 TABLET, FILM COATED ORAL
Status: DISCONTINUED | OUTPATIENT
Start: 2019-02-14 | End: 2019-02-14 | Stop reason: HOSPADM

## 2019-02-14 RX ORDER — DIPHENHYDRAMINE HYDROCHLORIDE 50 MG/ML
25-50 INJECTION INTRAMUSCULAR; INTRAVENOUS
Status: DISCONTINUED | OUTPATIENT
Start: 2019-02-14 | End: 2019-02-14 | Stop reason: HOSPADM

## 2019-02-14 RX ORDER — PROTAMINE SULFATE 10 MG/ML
1-5 INJECTION, SOLUTION INTRAVENOUS
Status: DISCONTINUED | OUTPATIENT
Start: 2019-02-14 | End: 2019-02-14 | Stop reason: HOSPADM

## 2019-02-14 RX ORDER — POTASSIUM CHLORIDE 7.45 MG/ML
10 INJECTION INTRAVENOUS
Status: DISCONTINUED | OUTPATIENT
Start: 2019-02-14 | End: 2019-02-14 | Stop reason: HOSPADM

## 2019-02-14 RX ORDER — METOPROLOL SUCCINATE 25 MG/1
25 TABLET, EXTENDED RELEASE ORAL DAILY
Qty: 30 TABLET | Refills: 0 | Status: SHIPPED | OUTPATIENT
Start: 2019-02-14 | End: 2019-02-15

## 2019-02-14 RX ORDER — NEBIVOLOL 2.5 MG/1
2.5 TABLET ORAL DAILY
Status: DISCONTINUED | OUTPATIENT
Start: 2019-02-14 | End: 2019-02-14 | Stop reason: HOSPADM

## 2019-02-14 RX ORDER — NITROGLYCERIN 5 MG/ML
100-500 VIAL (ML) INTRAVENOUS
Status: DISCONTINUED | OUTPATIENT
Start: 2019-02-14 | End: 2019-02-14 | Stop reason: HOSPADM

## 2019-02-14 RX ORDER — ASPIRIN 81 MG/1
81 TABLET ORAL DAILY
Status: DISCONTINUED | OUTPATIENT
Start: 2019-02-14 | End: 2019-02-14 | Stop reason: HOSPADM

## 2019-02-14 RX ORDER — FENTANYL CITRATE 50 UG/ML
25-50 INJECTION, SOLUTION INTRAMUSCULAR; INTRAVENOUS
Status: DISCONTINUED | OUTPATIENT
Start: 2019-02-14 | End: 2019-02-14 | Stop reason: HOSPADM

## 2019-02-14 RX ORDER — EPINEPHRINE 1 MG/ML
0.3 INJECTION, SOLUTION, CONCENTRATE INTRAVENOUS
Status: DISCONTINUED | OUTPATIENT
Start: 2019-02-14 | End: 2019-02-14 | Stop reason: HOSPADM

## 2019-02-14 RX ORDER — PROTAMINE SULFATE 10 MG/ML
25-100 INJECTION, SOLUTION INTRAVENOUS EVERY 5 MIN PRN
Status: DISCONTINUED | OUTPATIENT
Start: 2019-02-14 | End: 2019-02-14 | Stop reason: HOSPADM

## 2019-02-14 RX ORDER — MORPHINE SULFATE 2 MG/ML
1-2 INJECTION, SOLUTION INTRAMUSCULAR; INTRAVENOUS EVERY 5 MIN PRN
Status: DISCONTINUED | OUTPATIENT
Start: 2019-02-14 | End: 2019-02-14 | Stop reason: HOSPADM

## 2019-02-14 RX ORDER — CLOPIDOGREL BISULFATE 75 MG/1
75 TABLET ORAL
Status: DISCONTINUED | OUTPATIENT
Start: 2019-02-14 | End: 2019-02-14 | Stop reason: HOSPADM

## 2019-02-14 RX ORDER — HYDRALAZINE HYDROCHLORIDE 20 MG/ML
10-20 INJECTION INTRAMUSCULAR; INTRAVENOUS
Status: DISCONTINUED | OUTPATIENT
Start: 2019-02-14 | End: 2019-02-14 | Stop reason: HOSPADM

## 2019-02-14 RX ORDER — METHYLPREDNISOLONE SODIUM SUCCINATE 125 MG/2ML
125 INJECTION, POWDER, LYOPHILIZED, FOR SOLUTION INTRAMUSCULAR; INTRAVENOUS
Status: DISCONTINUED | OUTPATIENT
Start: 2019-02-14 | End: 2019-02-14 | Stop reason: HOSPADM

## 2019-02-14 RX ORDER — LIDOCAINE 40 MG/G
CREAM TOPICAL
Status: DISCONTINUED | OUTPATIENT
Start: 2019-02-14 | End: 2019-02-14 | Stop reason: HOSPADM

## 2019-02-14 RX ORDER — NALOXONE HYDROCHLORIDE 0.4 MG/ML
.2-.4 INJECTION, SOLUTION INTRAMUSCULAR; INTRAVENOUS; SUBCUTANEOUS
Status: DISCONTINUED | OUTPATIENT
Start: 2019-02-14 | End: 2019-02-14 | Stop reason: HOSPADM

## 2019-02-14 RX ORDER — DOPAMINE HYDROCHLORIDE 160 MG/100ML
2-20 INJECTION, SOLUTION INTRAVENOUS CONTINUOUS PRN
Status: DISCONTINUED | OUTPATIENT
Start: 2019-02-14 | End: 2019-02-14 | Stop reason: HOSPADM

## 2019-02-14 RX ORDER — FUROSEMIDE 10 MG/ML
20-100 INJECTION INTRAMUSCULAR; INTRAVENOUS
Status: DISCONTINUED | OUTPATIENT
Start: 2019-02-14 | End: 2019-02-14 | Stop reason: HOSPADM

## 2019-02-14 RX ORDER — LIDOCAINE HYDROCHLORIDE 10 MG/ML
1-10 INJECTION, SOLUTION EPIDURAL; INFILTRATION; INTRACAUDAL; PERINEURAL
Status: DISCONTINUED | OUTPATIENT
Start: 2019-02-14 | End: 2019-02-14 | Stop reason: HOSPADM

## 2019-02-14 RX ORDER — ASPIRIN 81 MG/1
81-324 TABLET, CHEWABLE ORAL
Status: DISCONTINUED | OUTPATIENT
Start: 2019-02-14 | End: 2019-02-14 | Stop reason: HOSPADM

## 2019-02-14 RX ORDER — IOPAMIDOL 755 MG/ML
INJECTION, SOLUTION INTRAVASCULAR
Status: DISCONTINUED | OUTPATIENT
Start: 2019-02-14 | End: 2019-02-14 | Stop reason: HOSPADM

## 2019-02-14 RX ORDER — BUPIVACAINE HYDROCHLORIDE 2.5 MG/ML
1-10 INJECTION, SOLUTION EPIDURAL; INFILTRATION; INTRACAUDAL
Status: DISCONTINUED | OUTPATIENT
Start: 2019-02-14 | End: 2019-02-14 | Stop reason: HOSPADM

## 2019-02-14 RX ORDER — VERAPAMIL HYDROCHLORIDE 2.5 MG/ML
1-2.5 INJECTION, SOLUTION INTRAVENOUS
Status: DISCONTINUED | OUTPATIENT
Start: 2019-02-14 | End: 2019-02-14 | Stop reason: HOSPADM

## 2019-02-14 RX ORDER — DOBUTAMINE HYDROCHLORIDE 200 MG/100ML
2-20 INJECTION INTRAVENOUS CONTINUOUS PRN
Status: DISCONTINUED | OUTPATIENT
Start: 2019-02-14 | End: 2019-02-14 | Stop reason: HOSPADM

## 2019-02-14 RX ORDER — PRASUGREL 10 MG/1
10-60 TABLET, FILM COATED ORAL
Status: DISCONTINUED | OUTPATIENT
Start: 2019-02-14 | End: 2019-02-14 | Stop reason: HOSPADM

## 2019-02-14 RX ORDER — ATROPINE SULFATE 0.1 MG/ML
0.5 INJECTION INTRAVENOUS EVERY 5 MIN PRN
Status: DISCONTINUED | OUTPATIENT
Start: 2019-02-14 | End: 2019-02-14 | Stop reason: HOSPADM

## 2019-02-14 RX ORDER — ACETAMINOPHEN 325 MG/1
325-650 TABLET ORAL EVERY 4 HOURS PRN
Status: DISCONTINUED | OUTPATIENT
Start: 2019-02-14 | End: 2019-02-14 | Stop reason: HOSPADM

## 2019-02-14 RX ADMIN — FENTANYL CITRATE 50 MCG: 50 INJECTION, SOLUTION INTRAMUSCULAR; INTRAVENOUS at 11:05

## 2019-02-14 RX ADMIN — HEPARIN SODIUM 5000 UNITS: 1000 INJECTION, SOLUTION INTRAVENOUS; SUBCUTANEOUS at 10:55

## 2019-02-14 RX ADMIN — NITROGLYCERIN 200 MCG: 5 INJECTION, SOLUTION INTRAVENOUS at 10:54

## 2019-02-14 RX ADMIN — IOPAMIDOL 80 ML: 755 INJECTION, SOLUTION INTRAVENOUS at 11:14

## 2019-02-14 RX ADMIN — MIDAZOLAM 1 MG: 1 INJECTION INTRAMUSCULAR; INTRAVENOUS at 11:06

## 2019-02-14 RX ADMIN — FENTANYL CITRATE 50 MCG: 50 INJECTION, SOLUTION INTRAMUSCULAR; INTRAVENOUS at 10:53

## 2019-02-14 RX ADMIN — MIDAZOLAM 1 MG: 1 INJECTION INTRAMUSCULAR; INTRAVENOUS at 10:53

## 2019-02-14 RX ADMIN — SODIUM CHLORIDE: 9 INJECTION, SOLUTION INTRAVENOUS at 08:30

## 2019-02-14 RX ADMIN — MIDAZOLAM 1 MG: 1 INJECTION INTRAMUSCULAR; INTRAVENOUS at 10:48

## 2019-02-14 RX ADMIN — LIDOCAINE HYDROCHLORIDE 1 ML: 10 INJECTION, SOLUTION EPIDURAL; INFILTRATION; INTRACAUDAL; PERINEURAL at 10:52

## 2019-02-14 RX ADMIN — VERAPAMIL HYDROCHLORIDE 2.5 MG: 2.5 INJECTION, SOLUTION INTRAVENOUS at 10:54

## 2019-02-14 ASSESSMENT — MIFFLIN-ST. JEOR: SCORE: 1328.25

## 2019-02-14 NOTE — DISCHARGE INSTRUCTIONS
Cardiac Angiogram Discharge Instructions - Right Radial Artery    After you go home:      Have an adult stay with you until tomorrow.    Drink extra fluids for 2 days.    You may resume your normal diet.    No smoking       For 24 hours - due to the sedation you received:    Relax and take it easy.    Do NOT make any important or legal decisions.    Do NOT drive or operate machines at home or at work.    Do NOT drink alcohol.    Care of Wrist Puncture Site:      For the first 24 hrs - check the puncture site every 1-2 hours while awake.    It is normal to have soreness at the puncture site and mild tingling in your hand for up to 3 days.    Remove the bandaid after 24 hours. If there is minor oozing, apply another bandaid and remove it after 12 hours.    You may shower tomorrow.  Do NOT take a bath, or use a hot tub or pool for at least 3 days. Do NOT scrub the site. Do not use lotion or powder near the puncture site.           Activity:        For 2 days:     do not use your hand or arm to support your weight (such as rising from a chair)     do not bend your wrist (such as lifting a garage door).    do not lift more than 5 pounds or exercise your arm (such as tennis, golf or bowling).    Do NOT do any heavy activity such as exercise, lifting, or straining.     Bleeding:      If you start bleeding from the site in your wrist, sit down and press firmly on/above the site for 10 minutes.     Once bleeding stops, keep arm still for 2 hours.     Call [facility] as soon as you can.       Call 911 right away if you have heavy bleeding or bleeding that does not stop.      Medicines:    New prescription for metoprolol XL or Bystolic.  Per Dr. Lee you may choose the one that is right for you base on cost and tolerated side effects.  DO NOT TAKE BOTH.      If you are taking an antiplatelet medication such as Plavix, Brilinta or Effient, do not stop taking it until you talk to your cardiologist.        If you are on  Metformin (Glucophage), do not restart it until you have blood tests (within 2 to 3 days after discharge).  After you have your blood drawn, you may restart the Metformin.     Take your medications, including blood thinners, unless your provider tells you not to.  If you take Coumadin (Warfarin), have your INR checked by your provider in  3-5 days. Call your clinic to schedule this.    If you have stopped any medicines, check with your provider about when to restart them.    Follow Up Appointments:      Follow up with [facility] Nurse Practitioner at [facility] of patient preference in 7-10 days.    Call the clinic if:      You have a large or growing hard lump around the site.    The site is red, swollen, hot or tender.    Blood or fluid is draining from the site.    You have chills or a fever greater than 101 F (38 C).    Your arm feels numb, cool or changes color.    You have hives, a rash or unusual itching.    Any questions or concerns.          [facility] [place]:    [phone] [facility] (7 days a week)

## 2019-02-14 NOTE — IP AVS SNAPSHOT
MRN:[id number]                      After Visit Summary   2/14/2019    Sarah Longo    MRN: [id number]           Visit Information        Department      2/14/2019  7:33 AM [facility]          Review of your medicines      UNREVIEWED medicines. Ask your doctor about these medicines       Dose / Directions   amoxicillin 500 MG capsule  Commonly known as:  AMOXIL      as needed Reported on 5/17/2017  Refills:  0     aspirin 81 MG tablet  Commonly known as:  ASA      1 tab po QD (Once per day)  Refills:  0     CINNAMON PO      Dose:  2 capsule  Take 2 capsules by mouth 2 times daily  Refills:  0     Co Enzyme Q-10 50 MG Caps      daliy  Refills:  0     Cranberry 500 MG Caps      Take by mouth daily  Refills:  0     evolocumab 140 MG/ML prefilled autoinjector  Commonly known as:  REPATHA SURECLICK  Used for:  Hyperlipidemia LDL goal <100      Dose:  140 mg  Inject 1 mL (140 mg) Subcutaneous every 14 days  Quantity:  2 mL  Refills:  11     glimepiride 4 MG tablet  Commonly known as:  AMARYL      1 tablet BID  Refills:  0     Gymnema Sylvestris Leaf Powd      Dose:  500 mg  500 mg 2 times daily  Refills:  0     isosorbide mononitrate 30 MG 24 hr tablet  Commonly known as:  IMDUR  Used for:  Coronary artery disease involving native coronary artery of native heart without angina pectoris      Dose:  15 mg  Take 0.5 tablets (15 mg) by mouth daily  Quantity:  90 tablet  Refills:  3     JARDIANCE 25 MG Tabs tablet  Generic drug:  empagliflozin      Dose:  12.5 mg  Take 12.5 mg by mouth every other day  Refills:  0     lisinopril 5 MG tablet  Commonly known as:  PRINIVIL/ZESTRIL  Used for:  Stable angina (H)      Dose:  5 mg  Take 1 tablet (5 mg) by mouth daily  Quantity:  90 tablet  Refills:  3     metFORMIN 1000 MG tablet  Commonly known as:  GLUCOPHAGE      Dose:  1000 mg  Take 1,000 mg by mouth 2 times daily (with meals)  Refills:  0     MILK THISTLE PO      Take by mouth  daily  Refills:  0     mirabegron 50 MG 24 hr tablet  Commonly known as:  MYRBETRIQ  Used for:  Urinary incontinence, urge      Dose:  50 mg  Take 1 tablet (50 mg) by mouth daily Patient not taking daily  Quantity:  90 tablet  Refills:  3     nitroGLYcerin 0.4 MG sublingual tablet  Commonly known as:  NITROSTAT  Used for:  Coronary artery disease involving native coronary artery of native heart without angina pectoris      Dose:  0.4 mg  Place 1 tablet (0.4 mg) under the tongue every 5 minutes as needed for chest pain If symptoms persist after 3 doses (15 minutes) call 911.  Quantity:  25 tablet  Refills:  3     OMEGA-3 FISH OIL PO      Dose:  2400 mg  Take 2,400 mg by mouth 2 times daily (with meals)  Refills:  0     SALONPAS 1.2-5.7-6.3 % Ptch  Generic drug:  Camphor-Menthol-Methyl Sal      Patient uses 3-4 patches daily  Refills:  0     STATIN NOT PRESCRIBED  Commonly known as:  INTENTIONAL  Used for:  Coronary artery disease involving native coronary artery of native heart without angina pectoris      Dose:  1 each  1 each At Bedtime Statin not prescribed intentionally due to Allergy to statin  Quantity:  0 each  Refills:  0     SYNTHROID 125 MCG tablet  Generic drug:  levothyroxine      1 tablet daily  Refills:  0     traMADol 50 MG tablet  Commonly known as:  ULTRAM  Used for:  Spinal stenosis, unspecified spinal region      Dose:  50 mg  Take 1 tablet (50 mg) by mouth every 6 hours as needed for moderate to severe pain  Quantity:  30 tablet  Refills:  3     TURMERIC PO      Take by mouth 2 times daily  Refills:  0     vitamin D3 2000 units tablet  Commonly known as:  CHOLECALCIFEROL      Dose:  1 tablet  Take 1 tablet by mouth 2 times daily  Refills:  0     VITAMIN E PO      Take by mouth daily  Refills:  0        START taking       Dose / Directions   metoprolol succinate ER 25 MG 24 hr tablet  Commonly known as:  TOPROL-XL  Used for:  Coronary artery disease involving native coronary artery of native heart  without angina pectoris      Dose:  25 mg  Take 1 tablet (25 mg) by mouth daily  Quantity:  30 tablet  Refills:  0     nebivolol 2.5 MG tablet  Commonly known as:  BYSTOLIC  Used for:  Coronary artery disease involving native coronary artery of native heart without angina pectoris      Dose:  2.5 mg  Take 1 tablet (2.5 mg) by mouth daily  Quantity:  30 tablet  Refills:  11           Where to get your medicines      Some of these will need a paper prescription and others can be bought over the counter. Ask your nurse if you have questions.    Bring a paper prescription for each of these medications  metoprolol succinate ER 25 MG 24 hr tablet  nebivolol 2.5 MG tablet           Prescriptions were sent or printed at these locations (2 Prescriptions)            [id number] - [place], [place] - [address]   [address] [facility] [address]    Telephone:  [phone]   Fax:  [phone]   Hours:                  Printed at Department/Unit printer (2 of 2)         metoprolol succinate ER (TOPROL-XL) 25 MG 24 hr tablet               nebivolol (BYSTOLIC) 2.5 MG tablet                Protect others around you: Learn how to safely use, store and throw away your medicines at www.disposemymeds.org.       Follow-ups after your visit       Your next 10 appointments already scheduled    Feb 15, 2019  1:00 PM CST  LAB with [facility]  [facility] ([facility]) [address] [place] [id number] [phone]   Please do not eat 10-12 hours before your appointment if you are coming in fasting for labs on lipids, cholesterol, or glucose (sugar). This does not apply to pregnant women. Water, hot tea and black coffee (with nothing added) are okay. Do not drink other fluids, diet soda or chew gum.   Feb 27, 2019  3:50 PM CST  Return Visit with Karen Alexander PA-C  [facility] ([facility]) [address] [place] [address]  [place] [id number]  [facility] [address]  [phone] OPT 2   Mar 07, 2019  9:30 AM CST  LAB with [facility]  [facility] ([facility]) [address] [place] [id number]  [phone]   Please do not eat 10-12 hours before your appointment if you are coming in fasting for labs on lipids, cholesterol, or glucose (sugar). This does not apply to pregnant women. Water, hot tea and black coffee (with nothing added) are okay. Do not drink other fluids, diet soda or chew gum.   Mar 07, 2019 10:30 AM CST  Return Visit with Karen Alexander PA-C  [facility] ([facility]) [address] [place] [phone]  [phone] OPT 2      Care Instructions       After Care Instructions     Discharge Instructions - IF on Metformin (Glucophage or Glucovance) or Metformin containing medications      IF on Metformin (Glucophage or Glucovance) or Metformin containing medications , schedule a Basic Metabolic Panel at [facility] or Primary Clinic in 48 - 72 hours post procedure and PRIOR TO resuming the Metformin or Metformin containing medications.  Hold Metformin (Glucophage or Glucovance) or Metformin containing medications until after the Basic Metabolic Panel on the 2nd or 3rd day following the procedure.  May resume after blood draw is complete.           Further instructions from your care team       Cardiac Angiogram Discharge Instructions - Right Radial Artery    After you go home:      Have an adult stay with you until tomorrow.    Drink extra fluids for 2 days.    You may resume your normal diet.    No smoking       For 24 hours - due to the sedation you received:    Relax and take it easy.    Do NOT make any important or legal decisions.    Do NOT drive or operate machines at home or at work.    Do NOT drink alcohol.    Care of Wrist Puncture Site:      For the first 24 hrs - check the  puncture site every 1-2 hours while awake.    It is normal to have soreness at the puncture site and mild tingling in your hand for up to 3 days.    Remove the bandaid after 24 hours. If there is minor oozing, apply another bandaid and remove it after 12 hours.    You may shower tomorrow.  Do NOT take a bath, or use a hot tub or pool for at least 3 days. Do NOT scrub the site. Do not use lotion or powder near the puncture site.           Activity:        For 2 days:     do not use your hand or arm to support your weight (such as rising from a chair)     do not bend your wrist (such as lifting a garage door).    do not lift more than 5 pounds or exercise your arm (such as tennis, golf or bowling).    Do NOT do any heavy activity such as exercise, lifting, or straining.     Bleeding:      If you start bleeding from the site in your wrist, sit down and press firmly on/above the site for 10 minutes.     Once bleeding stops, keep arm still for 2 hours.     Call [facility] as soon as you can.       Call 911 right away if you have heavy bleeding or bleeding that does not stop.      Medicines:    New prescription for metoprolol XL or Bystolic.  Per Dr. Lee you may choose the one that is right for you base on cost and tolerated side effects.  DO NOT TAKE BOTH.      If you are taking an antiplatelet medication such as Plavix, Brilinta or Effient, do not stop taking it until you talk to your cardiologist.        If you are on Metformin (Glucophage), do not restart it until you have blood tests (within 2 to 3 days after discharge).  After you have your blood drawn, you may restart the Metformin.     Take your medications, including blood thinners, unless your provider tells you not to.  If you take Coumadin (Warfarin), have your INR checked by your provider in  3-5 days. Call your clinic to schedule this.    If you have stopped any medicines, check with your provider about when to restart them.    Follow Up  "Appointments:      Follow up with [facility] Nurse Practitioner at [facility] of patient preference in 7-10 days.    Call the clinic if:      You have a large or growing hard lump around the site.    The site is red, swollen, hot or tender.    Blood or fluid is draining from the site.    You have chills or a fever greater than 101 F (38 C).    Your arm feels numb, cool or changes color.    You have hives, a rash or unusual itching.    Any questions or concerns.          [facility] [place]:    [phone] [facility] (7 days a week)            Additional Information About Your Visit       Care EveryWhere ID    This is your Care EveryWhere ID. This could be used by other organizations to access your [facility] medical records  [id number]       Your Vitals Were     Blood Pressure   105/61          Pulse   67          Temperature   98  F (36.7  C) (Oral)          Respirations   16          Height   1.727 m (5' 8\")             Weight   77 kg (169 lb 11.2 oz)    Pulse Oximetry   97%    BMI (Body Mass Index)   25.80 kg/m           Primary Care Provider Office Phone # Fax #    Gaye Zimmer [phone]      Equal Access to Services    JESSICA LITTLE : Hadii aad ku hadasho Soomaali, waaxda luqadaha, qaybta kaalmada adeegyada, waxay maitein hayerroln jamie castillo. So [facility] [phone].    ATENCIÓN: Si habla español, tiene a briggs disposición servicios gratuitos de asistencia lingüística. Reganame al [phone].    We comply with applicable federal civil rights laws and [place] laws. We do not discriminate on the basis of race, color, national origin, age, disability, sex, sexual orientation, or gender identity.           Thank you!    Thank you for choosing [facility] for your care. Our goal is always to provide you with excellent care. Hearing back from our patients is one way we can continue to improve our services. Please take a few minutes to complete the written survey that " you may receive in the mail after you visit with us. Thank you!            Medication List      Medications          Morning Afternoon Evening Bedtime As Needed    metoprolol succinate ER 25 MG 24 hr tablet  Also known as:  TOPROL-XL  INSTRUCTIONS:  Take 1 tablet (25 mg) by mouth daily                     nebivolol 2.5 MG tablet  Also known as:  BYSTOLIC  INSTRUCTIONS:  Take 1 tablet (2.5 mg) by mouth daily                       ASK your doctor about these medications          Morning Afternoon Evening Bedtime As Needed    amoxicillin 500 MG capsule  Also known as:  AMOXIL  INSTRUCTIONS:  as needed Reported on 5/17/2017                     aspirin 81 MG tablet  Also known as:  ASA  INSTRUCTIONS:  1 tab po QD (Once per day)                     CINNAMON PO  INSTRUCTIONS:  Take 2 capsules by mouth 2 times daily                     Co Enzyme Q-10 50 MG Caps  INSTRUCTIONS:  daliy                     Cranberry 500 MG Caps  INSTRUCTIONS:  Take by mouth daily                     evolocumab 140 MG/ML prefilled autoinjector  Also known as:  REPATHA SURECLICK  INSTRUCTIONS:  Inject 1 mL (140 mg) Subcutaneous every 14 days  Doctor's comments:  [employer] safety net foundation renewal                     glimepiride 4 MG tablet  Also known as:  AMARYL  INSTRUCTIONS:  1 tablet BID                     Gymnema Sylvestris Leaf Powd  INSTRUCTIONS:  500 mg 2 times daily                     isosorbide mononitrate 30 MG 24 hr tablet  Also known as:  IMDUR  INSTRUCTIONS:  Take 0.5 tablets (15 mg) by mouth daily                     JARDIANCE 25 MG Tabs tablet  INSTRUCTIONS:  Take 12.5 mg by mouth every other day  Generic drug:  empagliflozin                     lisinopril 5 MG tablet  Also known as:  PRINIVIL/ZESTRIL  INSTRUCTIONS:  Take 1 tablet (5 mg) by mouth daily                     metFORMIN 1000 MG tablet  Also known as:  GLUCOPHAGE  INSTRUCTIONS:  Take 1,000 mg by mouth 2 times daily (with meals)                     MILK THISTLE  PO  INSTRUCTIONS:  Take by mouth daily                     mirabegron 50 MG 24 hr tablet  Also known as:  MYRBETRIQ  INSTRUCTIONS:  Take 1 tablet (50 mg) by mouth daily Patient not taking daily                     nitroGLYcerin 0.4 MG sublingual tablet  Also known as:  NITROSTAT  INSTRUCTIONS:  Place 1 tablet (0.4 mg) under the tongue every 5 minutes as needed for chest pain If symptoms persist after 3 doses (15 minutes) call 911.  LAST TAKEN:  Ask your nurse or doctor                     OMEGA-3 FISH OIL PO  INSTRUCTIONS:  Take 2,400 mg by mouth 2 times daily (with meals)                     SALONPAS 1.2-5.7-6.3 % Ptch  INSTRUCTIONS:  Patient uses 3-4 patches daily  Generic drug:  Camphor-Menthol-Methyl Sal                     STATIN NOT PRESCRIBED  Also known as:  INTENTIONAL  INSTRUCTIONS:  1 each At Bedtime Statin not prescribed intentionally due to Allergy to statin                     SYNTHROID 125 MCG tablet  INSTRUCTIONS:  1 tablet daily  Generic drug:  levothyroxine                     traMADol 50 MG tablet  Also known as:  ULTRAM  INSTRUCTIONS:  Take 1 tablet (50 mg) by mouth every 6 hours as needed for moderate to severe pain                     TURMERIC PO  INSTRUCTIONS:  Take by mouth 2 times daily                     vitamin D3 2000 units tablet  Also known as:  CHOLECALCIFEROL  INSTRUCTIONS:  Take 1 tablet by mouth 2 times daily                     VITAMIN E PO  INSTRUCTIONS:  Take by mouth daily

## 2019-02-14 NOTE — PROGRESS NOTES
Care Suites Arrival    Reason for Visit: Left heart cath.    A/O. Denies pain. Labs WNL. IV flds infusing. Contrast D/C instructions reviewed with pt with verbal understanding received. Copy given to patient. Verbal education re angio/angioplasty/stent reviewed.  All questions & concerns addressed.  Baseline pulses WNL, no femoral bruits.    Pt resting in bed, denies additional needs at this time, call light within reach.  Kwadwo (spouse)  will stay with pt overnight.      0820 Dr Lee at bedside to speak with pt & spouse. Consent signed at this time.    1037  Voided.  To cath lab.    1120 Pt returned from Cath Lab. TR band intact to right wrist.  No oozing or hematoma noted. Area soft & flat. Right arm elevated on pillows. Pt denies pain. Pt instructed on activity restrictions with right wrist. Verbal understanding received from pt.    1215 Air released from TR band per protocol.    1230 Pt taking diet & flds well. No complaints    1330 TR band removed. Bandaid applied to site. Area soft & flat.    1400 OOB - steady on feet. Ambulated in halls to bathroom with good mason. No change in puncture site assessment with activity.     Discharge teaching & instructions given to both pt & spouse, Kwadwo w/ verbal understanding received. All questions & concerns addressed.    1425 Pt discharged per w/c to private vehicle. All personal belongings taken w/ pt.

## 2019-02-14 NOTE — IP AVS SNAPSHOT
[place] [address] [place] [address]  Phone:  [phone]                                    After Visit Summary   2/14/2019    Sarah Longo    MRN: [id number]           After Visit Summary Signature Page    I have received my discharge instructions, and my questions have been answered. I have discussed any challenges I see with this plan with the nurse or doctor.    ..........................................................................................................................................  Patient/Patient Representative Signature      ..........................................................................................................................................  Patient Representative Print Name and Relationship to Patient    ..................................................               ................................................  Date                                   Time    ..........................................................................................................................................  Reviewed by Signature/Title    ...................................................              ..............................................  Date                                               Time          22EPIC Rev 08/18

## 2019-02-15 ENCOUNTER — CARE COORDINATION (OUTPATIENT)
Dept: CARDIOLOGY | Facility: CLINIC | Age: 74
End: 2019-02-15

## 2019-02-15 ENCOUNTER — TELEPHONE (OUTPATIENT)
Dept: CARDIOLOGY | Facility: CLINIC | Age: 74
End: 2019-02-15

## 2019-02-15 DIAGNOSIS — I25.10 CORONARY ARTERY DISEASE INVOLVING NATIVE CORONARY ARTERY OF NATIVE HEART WITHOUT ANGINA PECTORIS: ICD-10-CM

## 2019-02-15 DIAGNOSIS — I25.10 CAD (CORONARY ARTERY DISEASE): ICD-10-CM

## 2019-02-15 LAB
ANION GAP SERPL CALCULATED.3IONS-SCNC: 12.3 MMOL/L (ref 6–17)
BUN SERPL-MCNC: 17 MG/DL (ref 7–30)
CALCIUM SERPL-MCNC: 10 MG/DL (ref 8.5–10.5)
CHLORIDE SERPL-SCNC: 102 MMOL/L (ref 98–107)
CO2 SERPL-SCNC: 27 MMOL/L (ref 23–29)
CREAT SERPL-MCNC: 0.87 MG/DL (ref 0.7–1.3)
GFR SERPL CREATININE-BSD FRML MDRD: 64 ML/MIN/{1.73_M2}
GLUCOSE SERPL-MCNC: 268 MG/DL (ref 70–105)
POTASSIUM SERPL-SCNC: 4.3 MMOL/L (ref 3.5–5.1)
SODIUM SERPL-SCNC: 137 MMOL/L (ref 136–145)

## 2019-02-15 PROCEDURE — 36415 COLL VENOUS BLD VENIPUNCTURE: CPT | Performed by: INTERNAL MEDICINE

## 2019-02-15 PROCEDURE — 80048 BASIC METABOLIC PNL TOTAL CA: CPT | Performed by: INTERNAL MEDICINE

## 2019-02-15 RX ORDER — NEBIVOLOL 5 MG/1
TABLET ORAL
Qty: 90 TABLET | Refills: 3 | Status: SHIPPED | OUTPATIENT
Start: 2019-02-15 | End: 2019-06-03

## 2019-02-15 NOTE — PROGRESS NOTES
Call from pt, she had angiogram yesterday; no interventions. Dr. Lee recommended adding either or Bystolic 2.5mg daily. Pt reports she has been on Toprol XL in the past and does not tolerate it. She has been on Bystolic in the past, but was too expensive. Pt prefers to try to get the Bystolic. Pt reports she did call insurance through [employer] today and they told her our office will need to call [phone] to initiate PA. Pt also requested 5mg tablets of Bystolic and to cut them in half, as it is more affordable for her.    Pt also asked if she should restart her Metformin tomorrow if labs OK this afternoon. Pt scheduled for BMP this afternoon. Told pt, we will watch for results and call her this afternoon to let her know results and if OK to restart Metformin. Also told pt I will attempt to call [facility] about Bystolic and will give her update this afternoon when we call with results of labs. Pt stated understanding.     Called [employer] ([phone]), spoke with Lanette in prior authorization. Pt's plan prefers pt have tried Atenolol/Carvedilol/Metoprolol Tartrate/Metoprolol Succinate; reviewed pt has been on Toprol XL before and has not tolerated it. Pt has been on Bystolic before and tolerated well, just was too expensive. Would like to initiate PA for Bystolic. PA approved for Bystolic 5mg tablets, 1/2 tab daily, 90 day supply. Cost to pt first time would be $286.50 due to deductible of $200, then next refills would only be $86.50 for 90 day supply through [facility]. Per Lanette, will need us to [facility] for this, and then they will call pt to verify shipping address and get to pt.     Returned call to pt, reviewed Bystolic approved and approximate cost above per [facility]. Pt stated understanding. Reviewed samples will be left at  for 1-2 weeks until she can get Bystolic from [employer] Pt stated understanding and will  when she gets labs this afternoon.  JOLANTA Carpneter 11:16 AM 02/15/19

## 2019-02-15 NOTE — TELEPHONE ENCOUNTER
Spoke with Patient, Heart cath wrist puncture site is clean and dry. No bruising. CMS intact. Patient states is follow post cath discharge instructions.   All questions answered.

## 2019-02-16 LAB — INTERPRETATION ECG - MUSE: NORMAL

## 2019-02-18 ENCOUNTER — CARE COORDINATION (OUTPATIENT)
Dept: CARDIOLOGY | Facility: CLINIC | Age: 74
End: 2019-02-18

## 2019-02-18 NOTE — PROGRESS NOTES
Pt called, she has some concerns since taking Bystolic 2.5mg daily. Pt reports she has been wearing her husbands iwatch, and it has been recording her HR. Pt reports typically HR 60-70's. She started Bystolic on Saturday, and that day her HR ranged from 41-70 bpm. Pt reports she looked back at the report and it showed HR in low 50's for most of the night after taking Bystolic. Pt does not know if she had any symptoms, as she was asleep with low HR's. Pt concerned, as she states back in 2016, there was concern for Brugada syndrome. Pt reports she wore a monitor and it did not show that. Pt states she only took Bystolic the one day. She has not taken it since, and HR's back in 60-70's. Pt wondering if she should wear a Holter monitor. Pt has follow up with HARVEY Wang 2/27/19. Pt wanting Dr. Burgos's input. Messaged to Dr. Burgos for review. JOLANTA Carpenter 11:17 AM 02/18/19

## 2019-02-20 NOTE — PROGRESS NOTES
Reviewed with Dr. Burgos in clinic. He said he is not concerned with pt's HR dipping into the 40s at night and said his HR will go into the 30s at night. He said pt can continue taking Bystolic unless other side effects that are bothering her. Returned call to pt, no answer, left  requesting call back. Cherelle Gaston RN on 2/20/2019 at 12:13 PM

## 2019-02-20 NOTE — PROGRESS NOTES
Pt called back and left VM to return the call. Returned the call, reviewed with pt that Dr. Burgos is comfortable with her re-trying Bystolic for HR's as low as 41 bpm during the night. Pt denies any symptoms or low HR's like that during the day when she took Bystolic. Pt hesitant to resume this medication. Pt states she is not comfortable resuming Bystolic and realizes she may be worrying needlessly but said she will wait to come in for her OV on 2/27 with SHYANN Esposito first. Pt wondering if insurance will cover an Apple watch for her to monitor her HR. Pt denies any irregular HR's or tachy or mayra HR's. Pt reports her her HR is in the 60s-70s on average and no symptoms of any sort. She was just worried about the low HR at night the couple of days she took Bystolic and that has been worrying her since. Pt said she will be willing to retry Bystolic after her OV if Karen agrees this medication is necessary. A year ago she tried Bystolic but then stopped it due to the high cost, but pt said she has since learned she can fill some meds via mail order from [facility] at a fraction of the cost. Cherelle Gaston RN on 2/20/2019 at 3:46 PM

## 2019-02-27 ENCOUNTER — OFFICE VISIT (OUTPATIENT)
Dept: CARDIOLOGY | Facility: CLINIC | Age: 74
End: 2019-02-27
Payer: COMMERCIAL

## 2019-02-27 VITALS
SYSTOLIC BLOOD PRESSURE: 130 MMHG | DIASTOLIC BLOOD PRESSURE: 74 MMHG | HEIGHT: 68 IN | HEART RATE: 76 BPM | WEIGHT: 171.4 LBS | BODY MASS INDEX: 25.98 KG/M2 | OXYGEN SATURATION: 99 %

## 2019-02-27 DIAGNOSIS — I25.10 CORONARY ARTERY DISEASE INVOLVING NATIVE CORONARY ARTERY OF NATIVE HEART WITHOUT ANGINA PECTORIS: Primary | ICD-10-CM

## 2019-02-27 PROCEDURE — 99214 OFFICE O/P EST MOD 30 MIN: CPT | Performed by: PHYSICIAN ASSISTANT

## 2019-02-27 ASSESSMENT — MIFFLIN-ST. JEOR: SCORE: 1335.97

## 2019-02-27 NOTE — LETTER
2/27/2019    Gaye Zimmer  [facility] [address]  [facility] [address]    RE: Sarah Longo       Dear Colleague,    I had the pleasure of seeing Sarah Longo in the [facility].    [id number]  HPI and Plan:   See dictation    Orders this Visit:  Orders Placed This Encounter   Procedures     Lipid Profile     ALT     Follow-Up with Cardiologist     Echocardiogram Complete     No orders of the defined types were placed in this encounter.    There are no discontinued medications.      Encounter Diagnosis   Name Primary?     Coronary artery disease involving native coronary artery of native heart without angina pectoris Yes       CURRENT MEDICATIONS:  Current Outpatient Medications   Medication Sig Dispense Refill     amoxicillin (AMOXIL) 500 MG capsule as needed Reported on 5/17/2017       ASPIRIN 81 MG OR TABS 1 tab po QD (Once per day)       Camphor-Menthol-Methyl Sal (SALONPAS) 1.2-5.7-6.3 % PTCH Patient uses 3-4 patches daily       CINNAMON PO Take 2 capsules by mouth 2 times daily       CO ENZYME Q-10 50 MG OR CAPS daliy  0     Cranberry 500 MG CAPS Take by mouth daily       empagliflozin (JARDIANCE) 25 MG TABS tablet Take 12.5 mg by mouth every other day       evolocumab (REPATHA SURECLICK) 140 MG/ML prefilled autoinjector Inject 1 mL (140 mg) Subcutaneous every 14 days 2 mL 11     GLIMEPIRIDE 4 MG OR TABS 1 tablet BID       Gymnema Sylvestris Leaf POWD 500 mg 2 times daily        isosorbide mononitrate (IMDUR) 30 MG 24 hr tablet Take 0.5 tablets (15 mg) by mouth daily 90 tablet 3     lisinopril (PRINIVIL/ZESTRIL) 5 MG tablet Take 1 tablet (5 mg) by mouth daily 90 tablet 3     metFORMIN (GLUCOPHAGE) 1000 MG tablet Take 1,000 mg by mouth 2 times daily (with meals)       MILK THISTLE PO Take by mouth daily       mirabegron (MYRBETRIQ) 50 MG 24 hr tablet Take 1 tablet (50 mg) by mouth daily Patient not taking daily 90 tablet 3     nitroglycerin (NITROSTAT) 0.4 MG  SL tablet Place 1 tablet (0.4 mg) under the tongue every 5 minutes as needed for chest pain If symptoms persist after 3 doses (15 minutes) call 911. 25 tablet 3     Omega-3 Fatty Acids (OMEGA-3 FISH OIL PO) Take 2,400 mg by mouth 2 times daily (with meals)       SYNTHROID 125 MCG OR TABS 1 tablet daily       traMADol (ULTRAM) 50 MG tablet Take 1 tablet (50 mg) by mouth every 6 hours as needed for moderate to severe pain 30 tablet 3     TURMERIC PO Take by mouth 2 times daily       vitamin D3 (CHOLECALCIFEROL) 2000 units tablet Take 1 tablet by mouth 2 times daily       VITAMIN E PO Take by mouth daily       nebivolol (BYSTOLIC) 5 MG tablet Take 0.5 tablet or 2.5mg daily (Patient not taking: Reported on 2/27/2019) 90 tablet 3     STATIN NOT PRESCRIBED, INTENTIONAL, 1 each At Bedtime Statin not prescribed intentionally due to Allergy to statin (Patient not taking: Reported on 7/27/2018) 0 each 0       ALLERGIES     Allergies   Allergen Reactions     No Known Drug Allergies        PAST MEDICAL HISTORY:  Past Medical History:   Diagnosis Date     Arthritis      Coronary artery disease 04/28/2016    PTCA with MAYA x 2 to OM1 and MAYA x 1 to p.LAD (4/21/2016 at [place]); PTCA with intracoronary stent placement of RCA June 2004; MAYA to OM1 11/2016     Cystocele, midline 09/29/2010     Depression      HYPERPLASTIC  POLYP      colonoscopy in 5-10 yrs.     Hypothyroidism     hypothyroid- Dr Majano     OAB (overactive bladder)     complex voiding dysfct, failed interstim     Osteoarthritis      Other and unspecified hyperlipidemia      Postmenopausal bleeding      Shoulder blade pain 5/31/2016     Type II or unspecified type diabetes mellitus without mention of complication, not stated as uncontrolled     Dr. Majano     Unspecified essential hypertension      Urinary frequency 9/29/10    followed by urology. no benefit from detrol or sanctura. month trial of macrobid and flomax 10/10       PAST SURGICAL HISTORY:  Past Surgical  History:   Procedure Laterality Date     ------------OTHER-------------      Interstim     Ablation       C CT ANGIOGRAPHY CORONARY  2005    mod soft plaque LAD and Cx, follow up with left heart cath     C LIGATE FALLOPIAN TUBE      endometrial ablation     CARDIAC SURGERY       CHOLECYSTECTOMY       COLONOSCOPY       CV HEART CATHETERIZATION WITH POSSIBLE INTERVENTION N/A 2019    Procedure: Coronary Angiogram;  Surgeon: uSdarshan Lee MD;  Location:  HEART CARDIAC CATH LAB     EXCISE LESION UPPER EXTREMITY  2013    Procedure: EXCISE LESION UPPER EXTREMITY;  EXCISION RIGHT ARM ANTICUBITAL LIPOMA ;  Surgeon: Jayson Joe MD;  Location: [facility] REMOVAL OF TONSILS,13+ Y/O       HEART CATH LEFT HEART CATH  3/2/2016    No intervention - aggressive medical management     HEART CATH LEFT HEART CATH  2016     MAYA x 2 to OM1, MAYA to LAD, at [place]     HEART CATH LEFT HEART CATH  2004    MAYA to RCA     HEART CATH LEFT HEART CATH  3/28/2002    Mild to moderate 3 vessel CAD. Medical management recommended.      HEART CATH LEFT HEART CATH  2016    MAYA to OM1     hypothyroid       KNEE SURGERY  4/20/10    right knee replacement     ORTHOPEDIC SURGERY      total knee      REMOVE STIMULATOR SACRAL NERVE N/A 2015    Procedure: REMOVE STIMULATOR SACRAL NERVE;  Surgeon: Gertrudis Frausto MD;  Location: [facility]     SURGICAL HISTORY OF -       Uterine endometrial ablation       FAMILY HISTORY:  Family History   Problem Relation Age of Onset     C.A.D. Father         MI , age 83, had high lipids     Hyperlipidemia Father      Hypertension Father      Coronary Artery Disease Father      Hypertension Mother      Genitourinary Problems Mother         renal failure     Fractures Mother         hip     Osteoporosis Mother      Cerebrovascular Disease Maternal Grandfather         cerebral hemorrhage     Diabetes Maternal Uncle      Colon Cancer Maternal Aunt      Diabetes  Maternal Grandmother        SOCIAL HISTORY:  Social History     Socioeconomic History     Marital status:      Spouse name: Kwadwo     Number of children: 3     Years of education: None     Highest education level: None   Occupational History     Occupation: PT Aide     Employer: NONE      Employer: RETIRED   Social Needs     Financial resource strain: None     Food insecurity:     Worry: None     Inability: None     Transportation needs:     Medical: None     Non-medical: None   Tobacco Use     Smoking status: Never Smoker     Smokeless tobacco: Never Used   Substance and Sexual Activity     Alcohol use: No     Alcohol/week: 0.0 oz     Drug use: No     Sexual activity: No     Partners: Male     Birth control/protection: Post-menopausal   Lifestyle     Physical activity:     Days per week: None     Minutes per session: None     Stress: None   Relationships     Social connections:     Talks on phone: None     Gets together: None     Attends Baptism service: None     Active member of club or organization: None     Attends meetings of clubs or organizations: None     Relationship status: None     Intimate partner violence:     Fear of current or ex partner: None     Emotionally abused: None     Physically abused: None     Forced sexual activity: None   Other Topics Concern      Service Not Asked     Blood Transfusions Not Asked     Caffeine Concern Yes     Comment: 6-7 cups caffeine per day     Occupational Exposure Not Asked     Hobby Hazards Not Asked     Sleep Concern No     Stress Concern Yes     Weight Concern No     Special Diet No     Comment: eats healthy      Back Care Not Asked     Exercise Yes     Comment:  walking & active life style      Bike Helmet Not Asked     Seat Belt Not Asked     Self-Exams Not Asked     Parent/sibling w/ CABG, MI or angioplasty before 65F 55M? No   Social History Narrative     None       Review of Systems:  Skin:  Negative     Eyes:  Positive for glasses  ENT:   "Negative    Respiratory:  Positive for dyspnea on exertion  Cardiovascular:  Negative chest pain;Positive for  Gastroenterology: Negative    Genitourinary:  Negative urinary frequency  Musculoskeletal:  Positive for back pain;arthritis  Neurologic:  Negative numbness or tingling of feet  Psychiatric:  Positive for anxiety  Heme/Lymph/Imm:  Negative allergies  Endocrine:  Positive for thyroid disorder;diabetes    Physical Exam:  Vitals: /74   Pulse 76   Ht 1.727 m (5' 8\")   Wt 77.7 kg (171 lb 6.4 oz)   SpO2 99%   BMI 26.06 kg/m      Please refer to dictation for physical exam    Recent Lab Results:  LIPID RESULTS:  Lab Results   Component Value Date    CHOL 130 07/27/2018    HDL 64 07/27/2018    LDL 51 07/27/2018    TRIG 73 07/27/2018    CHOLHDLRATIO 2.0 02/09/2017    CHOLHDLRATIO 2.6 03/23/2015       LIVER ENZYME RESULTS:  Lab Results   Component Value Date    AST 17 10/08/2015    ALT 25 07/27/2018       CBC RESULTS:  Lab Results   Component Value Date    WBC 4.2 02/14/2019    RBC 4.21 02/14/2019    HGB 12.6 02/14/2019    HCT 38.0 02/14/2019    MCV 90 02/14/2019    MCH 29.9 02/14/2019    MCHC 33.2 02/14/2019    RDW 12.8 02/14/2019     02/14/2019       BMP RESULTS:  Lab Results   Component Value Date     02/15/2019    POTASSIUM 4.3 02/15/2019    CHLORIDE 102 02/15/2019    CO2 27 02/15/2019    ANIONGAP 12.3 02/15/2019     (H) 02/15/2019    BUN 17 02/15/2019    CR 0.87 02/15/2019    GFRESTIMATED 64 02/15/2019    GFRESTBLACK 77 02/15/2019    LUCIUS 10.0 02/15/2019        A1C RESULTS:  Lab Results   Component Value Date    A1C 9.2 02/09/2017       INR RESULTS:  Lab Results   Component Value Date    INR 0.88 02/14/2019    INR 0.94 02/24/2017           CC  No referring provider defined for this encounter.        Thank you for allowing me to participate in the care of your patient.      Sincerely,     Karen Alexander PARazC     [facility]    cc:   No referring provider " defined for this encounter.

## 2019-02-27 NOTE — LETTER
2019      Gaye Zimmer  [facility] [address]  [facility] [address]      RE: Sarah EMELIA Longo       Dear Colleague,    I had the pleasure of seeing Sarah KAPOOR Donta in the [facility].    Service Date: 2019      PRIMARY CARDIOLOGIST:  Dr. Feroz Burgos.      REASON FOR VISIT:  Angiogram followup.      HISTORY OF PRESENT ILLNESS:  Ms. Longo is a 73-year-old woman with past medical history significant for the followin.  Coronary artery disease with stenting in , where she underwent a Taxus stent to the RCA.  She redeveloped symptoms in the spring of 2016 with her anginal equivalent being chest and back discomfort, but primarily back.  She had abnormal stress test and was found to have a trifurcation lesion of the OM2 as well as a distal LAD lesion.  This was initially managed medically due to the complexity of the intervention, but she ended up getting trifurcation stents at [place] to superior and main branch of her OM and dilation of her inferior branch.  She also had LAD stents placed.  In the , she was taken back to the Cath Lab for redevelopment of symptoms and she had a 90% in-stent stenosis of the proximal OM bifurcation stent and this was stented.  She last underwent angiogram just last week with patent stents and just a distal apical 80% stenosis.  She was recommended to start on Bystolic by Dr. Lee as an antianginal.  She took 1 dose of this at 2.5 mg.  She has been wearing an Apple watch and noted that her heart rate dropped to 40 overnight and with this discontinued it.  She was concerned that her heart rate was way, way, way too slow.  She otherwise continues to have her intermittent back pain.  This is complex to evaluate as she has known spinal stenosis as well as some other disk degenerative disease in her back.  We spent a lot of time today talking about her various concerns, her diabetes numbers, her back pain, the  options for spinal stenosis, what kind of therapy she should do, what kind of procedure she might want to consider.  Outside of her ongoing back pain, she denies shortness of breath, orthopnea or PND.  She fully admits that she runs high stress and knows this contributes to her concerns.      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  She is a lifelong nonsmoker, no alcohol use.  She has several children.  Her daughter just had a new grandbaby about 7 months ago at the age of 45.      PHYSICAL EXAMINATION:   GENERAL:  Well-developed, well-nourished woman, talkative, in no acute distress.   HEENT:  Normocephalic, atraumatic.   HEART:  Regular today with a 2/6 systolic murmur heard best at the lower left sternal border.   LUNGS:  Clear bilaterally.   EXTREMITIES:  Without any peripheral edema.  Right radial axis site is clean, dry, intact and well-healed without ecchymosis or bruit.      ASSESSMENT AND PLAN:   1.  Coronary artery disease, longstanding, with history of multiple interventions; please see HPI.  She is appropriately on aspirin for this.  Again, difficult to differentiate her symptoms given her complex back issues.  At this point, though, her angiogram is stable.  She does have some distal disease and I would imagine some microvascular disease as well.  Bystolic was prescribed as an antianginal and she tried 1 pill and then stopped it because her heart rate went down to 40.  I spent a long time talking to her today about how most people's heart rate drops overnight.  It can safely go down to 40 without pauses and that I would like her to try Bystolic again.  We will have her check her heart rates on her Apple Watch for 5 days before starting Bystolic, noting the lows at night and then try to do 5 days on Bystolic.  She is willing to consider this.  We will see how it goes.  This would also benefit her blood pressure.   2.  Hypertension, as above.   3.  Dyslipidemia, intolerant to  statins, on Repatha with excellent success.   4.  Chronic back pain.  She continues to work up options for this.  She has been successfully avoiding narcotics, which I think is important.   5.  Type 2 diabetes.  I am pleased to see she is on Jardiance given its cardiac benefits.   6.  I did hear a new murmur today that I have not heard in the past.  We will get an echocardiogram out of completeness.  I do not suspect this is anything severe.  She has not had an echo since .  If this is markedly abnormal, we will call and address this.      Thank you for allowing me to participate in this patient's care.  She will follow up with Dr. Burgos in about 4 months, unless the echo is abnormal, sooner with concerns.      SHYANN Elizabeth PA-C             D: 2019   T: 2019   MT: al      Name:     KATHY PERRY   MRN:      [id number]        Account:      [id number]   :      1945           Service Date: 2019      Document: [id number]         Outpatient Encounter Medications as of 2019   Medication Sig Dispense Refill     amoxicillin (AMOXIL) 500 MG capsule as needed Reported on 2017       ASPIRIN 81 MG OR TABS 1 tab po QD (Once per day)       Camphor-Menthol-Methyl Sal (SALONPAS) 1.2-5.7-6.3 % PTCH Patient uses 3-4 patches daily       CINNAMON PO Take 2 capsules by mouth 2 times daily       CO ENZYME Q-10 50 MG OR CAPS daliy  0     Cranberry 500 MG CAPS Take by mouth daily       empagliflozin (JARDIANCE) 25 MG TABS tablet Take 12.5 mg by mouth every other day       evolocumab (REPATHA SURECLICK) 140 MG/ML prefilled autoinjector Inject 1 mL (140 mg) Subcutaneous every 14 days 2 mL 11     GLIMEPIRIDE 4 MG OR TABS 1 tablet BID       Gymnema Sylvestris Leaf POWD 500 mg 2 times daily        isosorbide mononitrate (IMDUR) 30 MG 24 hr tablet Take 0.5 tablets (15 mg) by mouth daily 90 tablet 3     lisinopril (PRINIVIL/ZESTRIL) 5 MG tablet Take 1 tablet (5 mg) by  mouth daily 90 tablet 3     metFORMIN (GLUCOPHAGE) 1000 MG tablet Take 1,000 mg by mouth 2 times daily (with meals)       MILK THISTLE PO Take by mouth daily       mirabegron (MYRBETRIQ) 50 MG 24 hr tablet Take 1 tablet (50 mg) by mouth daily Patient not taking daily 90 tablet 3     nitroglycerin (NITROSTAT) 0.4 MG SL tablet Place 1 tablet (0.4 mg) under the tongue every 5 minutes as needed for chest pain If symptoms persist after 3 doses (15 minutes) call 911. 25 tablet 3     Omega-3 Fatty Acids (OMEGA-3 FISH OIL PO) Take 2,400 mg by mouth 2 times daily (with meals)       SYNTHROID 125 MCG OR TABS 1 tablet daily       traMADol (ULTRAM) 50 MG tablet Take 1 tablet (50 mg) by mouth every 6 hours as needed for moderate to severe pain 30 tablet 3     TURMERIC PO Take by mouth 2 times daily       vitamin D3 (CHOLECALCIFEROL) 2000 units tablet Take 1 tablet by mouth 2 times daily       VITAMIN E PO Take by mouth daily       nebivolol (BYSTOLIC) 5 MG tablet Take 0.5 tablet or 2.5mg daily (Patient not taking: Reported on 2/27/2019) 90 tablet 3     STATIN NOT PRESCRIBED, INTENTIONAL, 1 each At Bedtime Statin not prescribed intentionally due to Allergy to statin (Patient not taking: Reported on 7/27/2018) 0 each 0     No facility-administered encounter medications on file as of 2/27/2019.        Again, thank you for allowing me to participate in the care of your patient.      Sincerely,    Karen Alexander PA-C     [facility]

## 2019-02-27 NOTE — PATIENT INSTRUCTIONS
Thanks for coming into [facility] today.    We discussed: we'll get an echocardiogram to make sure we're being complete and not miss anything.      Medication changes:  Try taking bystolic for 5 days and follow your heart rate.      Follow up: with Dr. Burgos this summer.        Please call my nurse at [phone] with any questions or concerns.    Scheduling phone number: [phone]  Reminder: Please bring in all current medications, over the counter supplements and vitamin bottles to your next appointment.

## 2019-02-28 NOTE — PROGRESS NOTES
[id number]  HPI and Plan:   See dictation    Orders this Visit:  Orders Placed This Encounter   Procedures     Lipid Profile     ALT     Follow-Up with Cardiologist     Echocardiogram Complete     No orders of the defined types were placed in this encounter.    There are no discontinued medications.      Encounter Diagnosis   Name Primary?     Coronary artery disease involving native coronary artery of native heart without angina pectoris Yes       CURRENT MEDICATIONS:  Current Outpatient Medications   Medication Sig Dispense Refill     amoxicillin (AMOXIL) 500 MG capsule as needed Reported on 5/17/2017       ASPIRIN 81 MG OR TABS 1 tab po QD (Once per day)       Camphor-Menthol-Methyl Sal (SALONPAS) 1.2-5.7-6.3 % [facility] Patient uses 3-4 patches daily       CINNAMON PO Take 2 capsules by mouth 2 times daily       CO ENZYME Q-10 50 MG OR CAPS daliy  0     Cranberry 500 MG CAPS Take by mouth daily       empagliflozin (JARDIANCE) 25 MG TABS tablet Take 12.5 mg by mouth every other day       evolocumab (REPATHA SURECLICK) 140 MG/ML prefilled autoinjector Inject 1 mL (140 mg) Subcutaneous every 14 days 2 mL 11     GLIMEPIRIDE 4 MG OR TABS 1 tablet BID       Gymnema Sylvestris Leaf POWD 500 mg 2 times daily        isosorbide mononitrate (IMDUR) 30 MG 24 hr tablet Take 0.5 tablets (15 mg) by mouth daily 90 tablet 3     lisinopril (PRINIVIL/ZESTRIL) 5 MG tablet Take 1 tablet (5 mg) by mouth daily 90 tablet 3     metFORMIN (GLUCOPHAGE) 1000 MG tablet Take 1,000 mg by mouth 2 times daily (with meals)       MILK THISTLE PO Take by mouth daily       mirabegron (MYRBETRIQ) 50 MG 24 hr tablet Take 1 tablet (50 mg) by mouth daily Patient not taking daily 90 tablet 3     nitroglycerin (NITROSTAT) 0.4 MG SL tablet Place 1 tablet (0.4 mg) under the tongue every 5 minutes as needed for chest pain If symptoms persist after 3 doses (15 minutes) call [phone]. 25 tablet 3     Omega-3 Fatty Acids (OMEGA-3 FISH OIL PO) Take 2,400 mg by mouth 2 times  daily (with meals)       SYNTHROID 125 MCG OR TABS 1 tablet daily       traMADol (ULTRAM) 50 MG tablet Take 1 tablet (50 mg) by mouth every 6 hours as needed for moderate to severe pain 30 tablet 3     TURMERIC PO Take by mouth 2 times daily       vitamin D3 (CHOLECALCIFEROL) 2000 units tablet Take 1 tablet by mouth 2 times daily       VITAMIN E PO Take by mouth daily       nebivolol (BYSTOLIC) 5 MG tablet Take 0.5 tablet or 2.5mg daily (Patient not taking: Reported on 2/27/2019) 90 tablet 3     STATIN NOT PRESCRIBED, INTENTIONAL, 1 each At Bedtime Statin not prescribed intentionally due to Allergy to statin (Patient not taking: Reported on 7/27/2018) 0 each 0       ALLERGIES     Allergies   Allergen Reactions     No Known Drug Allergies        PAST MEDICAL HISTORY:  Past Medical History:   Diagnosis Date     Arthritis      Coronary artery disease 04/28/2016    PTCA with MAYA x 2 to OM1 and MAYA x 1 to p.LAD (4/21/2016 at [facility]); PTCA with intracoronary stent placement of RCA June 2004; MAYA to OM1 11/2016     Cystocele, midline 09/29/2010     Depression      HYPERPLASTIC  POLYP      colonoscopy in 5-10 yrs.     Hypothyroidism     hypothyroid- Dr Majano     OAB (overactive bladder)     complex voiding dysfct, failed interstim     Osteoarthritis      Other and unspecified hyperlipidemia      Postmenopausal bleeding      Shoulder blade pain 5/31/2016     Type II or unspecified type diabetes mellitus without mention of complication, not stated as uncontrolled     Dr. Majano     Unspecified essential hypertension      Urinary frequency 9/29/10    followed by urology. no benefit from detrol or sanctura. month trial of macrobid and flomax 10/10       PAST SURGICAL HISTORY:  Past Surgical History:   Procedure Laterality Date     ------------OTHER-------------      Interstim     Ablation       C CT ANGIOGRAPHY CORONARY  1/2005    mod soft plaque LAD and Cx, follow up with left heart cath     C LIGATE FALLOPIAN TUBE       endometrial ablation     CARDIAC SURGERY       CHOLECYSTECTOMY       COLONOSCOPY       CV HEART CATHETERIZATION WITH POSSIBLE INTERVENTION N/A 2019    Procedure: Coronary Angiogram;  Surgeon: Sudarshan Lee MD;  Location:  HEART CARDIAC CATH LAB     EXCISE LESION UPPER EXTREMITY  2013    Procedure: EXCISE LESION UPPER EXTREMITY;  EXCISION RIGHT ARM ANTICUBITAL LIPOMA ;  Surgeon: Jayson Joe MD;  Location: [facility] REMOVAL OF TONSILS,13+ Y/O       HEART CATH LEFT HEART CATH  3/2/2016    No intervention - aggressive medical management     HEART CATH LEFT HEART CATH  2016     MAYA x 2 to OM1, MAYA to LAD, at [facility]     HEART CATH LEFT HEART CATH  2004    AMYA to RCA     HEART CATH LEFT HEART CATH  3/28/2002    Mild to moderate 3 vessel CAD. Medical management recommended.      HEART CATH LEFT HEART CATH  2016    MAYA to OM1     hypothyroid       KNEE SURGERY  4/20/10    right knee replacement     ORTHOPEDIC SURGERY      total knee      REMOVE STIMULATOR SACRAL NERVE N/A 2015    Procedure: REMOVE STIMULATOR SACRAL NERVE;  Surgeon: Gertrudis Frausto MD;  Location: [facility]     SURGICAL HISTORY OF -       Uterine endometrial ablation       FAMILY HISTORY:  Family History   Problem Relation Age of Onset     C.A.D. Father         MI , age 83, had high lipids     Hyperlipidemia Father      Hypertension Father      Coronary Artery Disease Father      Hypertension Mother      Genitourinary Problems Mother         renal failure     Fractures Mother         hip     Osteoporosis Mother      Cerebrovascular Disease Maternal Grandfather         cerebral hemorrhage     Diabetes Maternal Uncle      Colon Cancer Maternal Aunt      Diabetes Maternal Grandmother        SOCIAL HISTORY:  Social History     Socioeconomic History     Marital status:      Spouse name: Kwadwo     Number of children: 3     Years of education: None     Highest education level: None   Occupational  History     Occupation: PT Aide     Employer: NONE      Employer: RETIRED   Social Needs     Financial resource strain: None     Food insecurity:     Worry: None     Inability: None     Transportation needs:     Medical: None     Non-medical: None   Tobacco Use     Smoking status: Never Smoker     Smokeless tobacco: Never Used   Substance and Sexual Activity     Alcohol use: No     Alcohol/week: 0.0 oz     Drug use: No     Sexual activity: No     Partners: Male     Birth control/protection: Post-menopausal   Lifestyle     Physical activity:     Days per week: None     Minutes per session: None     Stress: None   Relationships     Social connections:     Talks on phone: None     Gets together: None     Attends [employer] service: None     Active member of club or organization: None     Attends meetings of clubs or organizations: None     Relationship status: None     Intimate partner violence:     Fear of current or ex partner: None     Emotionally abused: None     Physically abused: None     Forced sexual activity: None   Other Topics Concern      Service Not Asked     Blood Transfusions Not Asked     Caffeine Concern Yes     Comment: 6-7 cups caffeine per day     Occupational Exposure Not Asked     Hobby Hazards Not Asked     Sleep Concern No     Stress Concern Yes     Weight Concern No     Special Diet No     Comment: eats healthy      Back Care Not Asked     Exercise Yes     Comment:  walking & active life style      Bike Helmet Not Asked     Seat Belt Not Asked     Self-Exams Not Asked     Parent/sibling w/ CABG, MI or angioplasty before 65F 55M? No   Social History Narrative     None       Review of Systems:  Skin:  Negative     Eyes:  Positive for glasses  ENT:  Negative    Respiratory:  Positive for dyspnea on exertion  Cardiovascular:  Negative chest pain;Positive for  Gastroenterology: Negative    Genitourinary:  Negative urinary frequency  Musculoskeletal:  Positive for back  "pain;arthritis  Neurologic:  Negative numbness or tingling of feet  Psychiatric:  Positive for anxiety  Heme/Lymph/Imm:  Negative allergies  Endocrine:  Positive for thyroid disorder;diabetes    Physical Exam:  Vitals: /74   Pulse 76   Ht 1.727 m (5' 8\")   Wt 77.7 kg (171 lb 6.4 oz)   SpO2 99%   BMI 26.06 kg/m     Please refer to dictation for physical exam    Recent Lab Results:  LIPID RESULTS:  Lab Results   Component Value Date    CHOL 130 07/27/2018    HDL 64 07/27/2018    LDL 51 07/27/2018    TRIG 73 07/27/2018    CHOLHDLRATIO 2.0 02/09/2017    CHOLHDLRATIO 2.6 03/23/2015       LIVER ENZYME RESULTS:  Lab Results   Component Value Date    AST 17 10/08/2015    ALT 25 07/27/2018       CBC RESULTS:  Lab Results   Component Value Date    WBC 4.2 02/14/2019    RBC 4.21 02/14/2019    HGB 12.6 02/14/2019    HCT 38.0 02/14/2019    MCV 90 02/14/2019    MCH 29.9 02/14/2019    MCHC 33.2 02/14/2019    RDW 12.8 02/14/2019     02/14/2019       BMP RESULTS:  Lab Results   Component Value Date     02/15/2019    POTASSIUM 4.3 02/15/2019    CHLORIDE 102 02/15/2019    CO2 27 02/15/2019    ANIONGAP 12.3 02/15/2019     (H) 02/15/2019    BUN 17 02/15/2019    CR 0.87 02/15/2019    GFRESTIMATED 64 02/15/2019    GFRESTBLACK 77 02/15/2019    LUCIUS 10.0 02/15/2019        A1C RESULTS:  Lab Results   Component Value Date    A1C 9.2 02/09/2017       INR RESULTS:  Lab Results   Component Value Date    INR 0.88 02/14/2019    INR 0.94 02/24/2017           CC  No referring provider defined for this encounter.      "

## 2019-02-28 NOTE — PROGRESS NOTES
Service Date: 2019      PRIMARY CARDIOLOGIST:  Dr. Feroz Burgos.      REASON FOR VISIT:  Angiogram followup.      HISTORY OF PRESENT ILLNESS:  Ms. Longo is a 73-year-old woman with past medical history significant for the followin.  Coronary artery disease with stenting in , where she underwent a Taxus stent to the RCA.  She redeveloped symptoms in the spring of 2016 with her anginal equivalent being chest and back discomfort, but primarily back.  She had abnormal stress test and was found to have a trifurcation lesion of the OM2 as well as a distal LAD lesion.  This was initially managed medically due to the complexity of the intervention, but she ended up getting trifurcation stents at [facility] to superior and main branch of her OM and dilation of her inferior branch.  She also had LAD stents placed.  In the , she was taken back to the Cath Lab for redevelopment of symptoms and she had a 90% in-stent stenosis of the proximal OM bifurcation stent and this was stented.  She last underwent angiogram just last week with patent stents and just a distal apical 80% stenosis.  She was recommended to start on Bystolic by Dr. Lee as an antianginal.  She took 1 dose of this at 2.5 mg.  She has been wearing an Apple watch and noted that her heart rate dropped to 40 overnight and with this discontinued it.  She was concerned that her heart rate was way, way, way too slow.  She otherwise continues to have her intermittent back pain.  This is complex to evaluate as she has known spinal stenosis as well as some other disk degenerative disease in her back.  We spent a lot of time today talking about her various concerns, her diabetes numbers, her back pain, the options for spinal stenosis, what kind of therapy she should do, what kind of procedure she might want to consider.  Outside of her ongoing back pain, she denies shortness of breath, orthopnea or PND.  She fully admits that she runs high stress  and knows this contributes to her concerns.      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  She is a lifelong nonsmoker, no alcohol use.  She has several children.  Her daughter just had a new grandbaby about 7 months ago at the age of 45.      PHYSICAL EXAMINATION:   GENERAL:  Well-developed, well-nourished woman, talkative, in no acute distress.   HEENT:  Normocephalic, atraumatic.   HEART:  Regular today with a 2/6 systolic murmur heard best at the lower left sternal border.   LUNGS:  Clear bilaterally.   EXTREMITIES:  Without any peripheral edema.  Right radial axis site is clean, dry, intact and well-healed without ecchymosis or bruit.      ASSESSMENT AND PLAN:   1.  Coronary artery disease, longstanding, with history of multiple interventions; please see HPI.  She is appropriately on aspirin for this.  Again, difficult to differentiate her symptoms given her complex back issues.  At this point, though, her angiogram is stable.  She does have some distal disease and I would imagine some microvascular disease as well.  Bystolic was prescribed as an antianginal and she tried 1 pill and then stopped it because her heart rate went down to 40.  I spent a long time talking to her today about how most people's heart rate drops overnight.  It can safely go down to 40 without pauses and that I would like her to try Bystolic again.  We will have her check her heart rates on her Apple Watch for 5 days before starting Bystolic, noting the lows at night and then try to do 5 days on Bystolic.  She is willing to consider this.  We will see how it goes.  This would also benefit her blood pressure.   2.  Hypertension, as above.   3.  Dyslipidemia, intolerant to statins, on Repatha with excellent success.   4.  Chronic back pain.  She continues to work up options for this.  She has been successfully avoiding narcotics, which I think is important.   5.  Type 2 diabetes.  I am pleased to see she is on  Jardiance given its cardiac benefits.   6.  I did hear a new murmur today that I have not heard in the past.  We will get an echocardiogram out of completeness.  I do not suspect this is anything severe.  She has not had an echo since .  If this is markedly abnormal, we will call and address this.      Thank you for allowing me to participate in this patient's care.  She will follow up with Dr. Burgos in about 4 months, unless the echo is abnormal, sooner with concerns.      SHYANN Elizabeth PA-C             D: 2019   T: 2019   MT: al      Name:     KATHY PERRY   MRN:      [id number]        Account:      [id number]   :      1945           Service Date: 2019      Document: [id number]

## 2019-03-05 ENCOUNTER — HOSPITAL ENCOUNTER (OUTPATIENT)
Dept: CARDIOLOGY | Facility: CLINIC | Age: 74
Discharge: HOME | End: 2019-03-05
Attending: PHYSICIAN ASSISTANT | Admitting: PHYSICIAN ASSISTANT
Payer: COMMERCIAL

## 2019-03-05 DIAGNOSIS — I25.10 CORONARY ARTERY DISEASE INVOLVING NATIVE CORONARY ARTERY OF NATIVE HEART WITHOUT ANGINA PECTORIS: ICD-10-CM

## 2019-03-05 PROCEDURE — 93306 TTE W/DOPPLER COMPLETE: CPT | Mod: 26 | Performed by: INTERNAL MEDICINE

## 2019-03-05 PROCEDURE — 93306 TTE W/DOPPLER COMPLETE: CPT

## 2019-05-30 NOTE — PROGRESS NOTES
Sarah is a 74 year old  female who presents for Medicare Limited exam.   However, on arrival noted she had multiple medical concerns to discuss that would not be covered under a medicare limited exam.     Do you have a Health Care Directive?: No: Advance care planning reviewed with patient; information given to patient to review.    Fall risk:   Fallen 2 or more times in the past year?: No  Any fall with injury in the past year?: No    HPI :  Patient here for exam with concerns about her oab and cystocele.   She has concerns today reguarding her ongoing OVERACTIVE BLADDER. She takes myrbetriq and it is the only thing that has helped.  She has been filling in [facility] herself due to cost. We give her a printed prescription. There isn't a better alternative medication at present.   She failed an interstim implant. She has been seen by Dr Nayak at urology.  She offered to try botox injections but patient has been reluctant due to risk of possibly having to self cath.     She also has concerns that her cystocele is getting larger.  She has been using a pessary but she doesn't like the odor and chronic discharge. She has been able to take it in and out by herself.     Patient has long standing chronic back pain issues and has tried many modalities without success. She has degenerative disease in her back and spinal stenosis.     She has long history of cardiac issues surgery can be risky for her according to note from Dr Burgos. She has had multiple stents.  She also has type 2 diabetes.     GYNECOLOGIC HISTORY:  No LMP recorded. Patient is postmenopausal..   reports that she has never smoked. She has never used smokeless tobacco.    STD testing offered?  Declined  Last PHQ-9 score on record=   PHQ-9 SCORE 6/3/2019   PHQ-9 Total Score 0     Last GAD7 score on record=   DIANNA-7 SCORE 2016 2018 6/3/2019   Total Score 6 5 6       HEALTH MAINTENANCE:  Cholesterol: 18   Total= 130, Triglycerides=73,  HDL=64, LDL=51, JVL=392   Last Mammo: 18, Result: normal, Next Mammo: today   Pap: 14 - WNIL / Not needed anymore  DEXA:  19  Colonoscopy:  2015, Result:  Polyps, Next Colonoscopy: Not need anymore.    HISTORY:  OB History    Para Term  AB Living   3 3 3 0 0 3   SAB TAB Ectopic Multiple Live Births   0 0 0 0 3      # Outcome Date GA Lbr Berhane/2nd Weight Sex Delivery Anes PTL Lv   3 Term     F    DEE   2 Term     F    DEE   1 Term     M    DEE     Past Medical History:   Diagnosis Date     Arthritis      Coronary artery disease 2016    PTCA with MAYA x 2 to OM1 and MAYA x 1 to p.LAD (2016 at [facility]); PTCA with intracoronary stent placement of RCA 2004; MAYA to OM1 2016     Cystocele, midline 2010     Depression      HYPERPLASTIC  POLYP      colonoscopy in 5-10 yrs.     Hypothyroidism     hypothyroid- Dr Majano     OAB (overactive bladder)     complex voiding dysfct, failed interstim     Osteoarthritis      Other and unspecified hyperlipidemia      Postmenopausal bleeding      Shoulder blade pain 2016     Type II or unspecified type diabetes mellitus without mention of complication, not stated as uncontrolled     Dr. Majano     Unspecified essential hypertension      Urinary frequency 9/29/10    followed by urology. no benefit from detrol or sanctura. month trial of macrobid and flomax 10/10     Past Surgical History:   Procedure Laterality Date     ------------OTHER-------------      Interstim     Ablation       C CT ANGIOGRAPHY CORONARY  2005    mod soft plaque LAD and Cx, follow up with left heart cath     C LIGATE FALLOPIAN TUBE      endometrial ablation     CARDIAC SURGERY       CHOLECYSTECTOMY       COLONOSCOPY       CV HEART CATHETERIZATION WITH POSSIBLE INTERVENTION N/A 2019    Procedure: Coronary Angiogram;  Surgeon: Sudarshan Lee MD;  Location:  HEART CARDIAC CATH LAB     EXCISE LESION UPPER EXTREMITY  2013    Procedure: EXCISE LESION  UPPER EXTREMITY;  EXCISION RIGHT ARM ANTICUBITAL LIPOMA ;  Surgeon: Jayson Joe MD;  Location: [facility]     HC REMOVAL OF TONSILS,13+ Y/O       HEART CATH LEFT HEART CATH  3/2/2016    No intervention - aggressive medical management     HEART CATH LEFT HEART CATH  2016     MAYA x 2 to OM1, MAYA to LAD, at [facility]     HEART CATH LEFT HEART CATH  2004    MAYA to RCA     HEART CATH LEFT HEART CATH  3/28/2002    Mild to moderate 3 vessel CAD. Medical management recommended.      HEART CATH LEFT HEART CATH  2016    MAYA to OM1     hypothyroid       KNEE SURGERY  4/20/10    right knee replacement     ORTHOPEDIC SURGERY      total knee      REMOVE STIMULATOR SACRAL NERVE N/A 2015    Procedure: REMOVE STIMULATOR SACRAL NERVE;  Surgeon: Gertrudis Frausto MD;  Location: [facility]     SURGICAL HISTORY OF -       Uterine endometrial ablation     Family History   Problem Relation Age of Onset     C.A.D. Father         MI , age 83, had high lipids     Hyperlipidemia Father      Hypertension Father      Coronary Artery Disease Father      Hypertension Mother      Genitourinary Problems Mother         renal failure     Fractures Mother         hip     Osteoporosis Mother      Cerebrovascular Disease Maternal Grandfather         cerebral hemorrhage     Diabetes Maternal Uncle      Colon Cancer Maternal Aunt      Diabetes Maternal Grandmother      Social History     Socioeconomic History     Marital status:      Spouse name: Kwadwo     Number of children: 3     Years of education: Not on file     Highest education level: Not on file   Occupational History     Occupation: PT Aide     Employer: NONE      Employer: RETIRED   Social Needs     Financial resource strain: Not on file     Food insecurity:     Worry: Not on file     Inability: Not on file     Transportation needs:     Medical: Not on file     Non-medical: Not on file   Tobacco Use     Smoking status: Never Smoker     Smokeless tobacco:  Never Used   Substance and Sexual Activity     Alcohol use: No     Alcohol/week: 0.0 oz     Drug use: No     Sexual activity: Never     Partners: Male     Birth control/protection: Post-menopausal   Lifestyle     Physical activity:     Days per week: Not on file     Minutes per session: Not on file     Stress: Not on file   Relationships     Social connections:     Talks on phone: Not on file     Gets together: Not on file     Attends [employer] service: Not on file     Active member of club or organization: Not on file     Attends meetings of clubs or organizations: Not on file     Relationship status: Not on file     Intimate partner violence:     Fear of current or ex partner: Not on file     Emotionally abused: Not on file     Physically abused: Not on file     Forced sexual activity: Not on file   Other Topics Concern      Service Not Asked     Blood Transfusions Not Asked     Caffeine Concern Yes     Comment: 6-7 cups caffeine per day     Occupational Exposure Not Asked     Hobby Hazards Not Asked     Sleep Concern No     Stress Concern Yes     Weight Concern No     Special Diet No     Comment: eats healthy      Back Care Not Asked     Exercise Yes     Comment:  walking & active life style      Bike Helmet Not Asked     Seat Belt Not Asked     Self-Exams Not Asked     Parent/sibling w/ CABG, MI or angioplasty before 65F 55M? No   Social History Narrative     Not on file     Current Outpatient Medications   Medication Sig     ACETAMINOPHEN PO      amoxicillin (AMOXIL) 500 MG capsule as needed Reported on 5/17/2017     ASPIRIN 81 MG OR TABS 1 tab po QD (Once per day)     CINNAMON PO Take 2 capsules by mouth 2 times daily     co-enzyme Q-10 100 MG CAPS capsule Take 100 mg by mouth     empagliflozin (JARDIANCE) 25 MG TABS tablet Take 12.5 mg by mouth every other day     evolocumab (REPATHA SURECLICK) 140 MG/ML prefilled autoinjector Inject 1 mL (140 mg) Subcutaneous every 14 days     GLIMEPIRIDE 4 MG OR  "TABS 1 tablet BID     isosorbide mononitrate (IMDUR) 30 MG 24 hr tablet Take 0.5 tablets (15 mg) by mouth daily     lisinopril (PRINIVIL/ZESTRIL) 5 MG tablet Take 1 tablet (5 mg) by mouth daily     mirabegron (MYRBETRIQ) 50 MG 24 hr tablet Take 1 tablet (50 mg) by mouth daily Patient not taking daily     NITROFURANTOIN PO nitrofurantoin monohydrate/macrocrystals 100 mg capsule     nitroglycerin (NITROSTAT) 0.4 MG SL tablet Place 1 tablet (0.4 mg) under the tongue every 5 minutes as needed for chest pain If symptoms persist after 3 doses (15 minutes) call 911.     Omega-3 Fatty Acids (OMEGA-3 FISH OIL PO) Take 2,400 mg by mouth 2 times daily (with meals)     ONETOUCH ULTRA test strip      STATIN NOT PRESCRIBED, INTENTIONAL, 1 each At Bedtime Statin not prescribed intentionally due to Allergy to statin     SYNTHROID 125 MCG OR TABS 1 tablet daily     traMADol (ULTRAM) 50 MG tablet Take 1 tablet (50 mg) by mouth every 6 hours as needed for moderate to severe pain     TURMERIC PO Take by mouth 2 times daily     vitamin D3 (CHOLECALCIFEROL) 2000 units tablet Take 1 tablet by mouth 2 times daily     VITAMIN E PO Take by mouth daily     No current facility-administered medications for this visit.      Allergies   Allergen Reactions     No Known Drug Allergies        Past medical, surgical, social and family history were reviewed and updated in EPIC.    EXAM:  /50 (BP Location: Right arm, Patient Position: Sitting, Cuff Size: Adult Regular)   Ht 1.702 m (5' 7\")   Wt 76.4 kg (168 lb 6.4 oz)   BMI 26.38 kg/m     BMI: Body mass index is 26.38 kg/m .    Constitutional: Appearance: Well nourished, well developed alert, in no acute distress  Breasts: Inspection of Breasts:  No lymphadenopathy present    Palpation of Breasts and Axillae:  No masses present on palpation, no  breast tenderness    Axillary Lymph Nodes:  No lymphadenopathy present  Neurologic/Psychiatric:    Mental Status:  Oriented X3     Pelvic " Exam:  External Genitalia:     Normal appearance for age, no discharge present, no tenderness present, no inflammatory lesions present, color normal  Vagina:     Moderate cystocele present, not protruding from vagina at this point, no discharge present, no inflammatory lesions present, no masses present  Bladder:     Nontender to palpation  Urethra:   Urethral Body:  Urethra palpation normal, urethra structural support normal   Urethral Meatus:  No erythema or lesions present  Cervix:     Appearance healthy, no lesions present, nontender to palpation, no bleeding present  Uterus:     Uterus: firm, normal sized and nontender, midplane in position.   Adnexa:     No adnexal tenderness present, no adnexal masses present  Perineum:     Perineum within normal limits, no evidence of trauma, no rashes or skin lesions present  Anus:     Anus within normal limits, no hemorrhoids present  Inguinal Lymph Nodes:     No lymphadenopathy present  Pubic Hair:     Normal pubic hair distribution for age  Genitalia and Groin:     No rashes present, no lesions present, no areas of discoloration, no masses present      Body mass index is 26.38 kg/m .  Weight management plan: Patient was referred to their PCP to discuss a diet and exercise plan.   reports that she has never smoked. She has never used smokeless tobacco.      ASSESSMENT:  74 year old female with satisfactory annual exam.    ICD-10-CM    1. Encounter for gynecological examination without abnormal finding Z01.419 Medicare Limited Visit   2. Urinary incontinence, urge N39.41 mirabegron (MYRBETRIQ) 50 MG 24 hr tablet       COUNSELING:   Reviewed preventive health counseling, as reflected in patient instructions       Bladder control    PLAN/PATIENT INSTRUCTIONS:  Patient has long standing OVERACTIVE BLADDER, has been managed by Dr Nayak in past. We fill her myrbetriq.  She gets adequate support with her pessary but just doesn't like the discharge and odor.     She wonders  about surgical repair for her cystocele.  I discussed anterior repair but I have reservations about doing surgery on her with her complex cardiac hi risk status, diabetes and long standing back pain syndrome and spinal stenosis.      I think she should reconsider the botox injections that Dr Nayak has offered. It might really help her oab.   I don't know if she is a candidate for a robotic sacral colpopexy or not. She can discuss with Dr Nayak but I suspect that it wouldn't be worth the risk.  Patient really prefers to avoid major surgery.  Plain anterior repair is an option as well. Discussed with patient that that procedure often doesn't last very long.   Ideally I would prefer she continue to use the pessary since it is her safest option.    Face to face 25 minute, greater than 50% counceling and coordination of care.  90% of visit was spent on evaluation and management of her current medical concerns rather than on preventive services.     Shireen Neves MD

## 2019-06-03 ENCOUNTER — OFFICE VISIT (OUTPATIENT)
Dept: OBGYN | Facility: CLINIC | Age: 74
End: 2019-06-03
Payer: COMMERCIAL

## 2019-06-03 ENCOUNTER — ANCILLARY PROCEDURE (OUTPATIENT)
Dept: MAMMOGRAPHY | Facility: CLINIC | Age: 74
End: 2019-06-03
Payer: COMMERCIAL

## 2019-06-03 VITALS
WEIGHT: 168.4 LBS | HEIGHT: 67 IN | BODY MASS INDEX: 26.43 KG/M2 | DIASTOLIC BLOOD PRESSURE: 50 MMHG | SYSTOLIC BLOOD PRESSURE: 122 MMHG

## 2019-06-03 DIAGNOSIS — N39.41 URINARY INCONTINENCE, URGE: ICD-10-CM

## 2019-06-03 DIAGNOSIS — Z12.31 VISIT FOR SCREENING MAMMOGRAM: ICD-10-CM

## 2019-06-03 DIAGNOSIS — N81.2 CYSTOCELE WITH INCOMPLETE UTEROVAGINAL PROLAPSE: Primary | ICD-10-CM

## 2019-06-03 PROCEDURE — 77063 BREAST TOMOSYNTHESIS BI: CPT | Mod: TC

## 2019-06-03 PROCEDURE — 99214 OFFICE O/P EST MOD 30 MIN: CPT | Performed by: OBSTETRICS & GYNECOLOGY

## 2019-06-03 PROCEDURE — 77067 SCR MAMMO BI INCL CAD: CPT | Mod: TC

## 2019-06-03 RX ORDER — UBIDECARENONE 100 MG
100 CAPSULE ORAL
COMMUNITY
Start: 2015-03-11 | End: 2020-07-06

## 2019-06-03 RX ORDER — MIRABEGRON 50 MG/1
50 TABLET, EXTENDED RELEASE ORAL DAILY
Qty: 90 TABLET | Refills: 3 | Status: SHIPPED | OUTPATIENT
Start: 2019-06-03 | End: 2024-01-10

## 2019-06-03 ASSESSMENT — ANXIETY QUESTIONNAIRES
1. FEELING NERVOUS, ANXIOUS, OR ON EDGE: SEVERAL DAYS
2. NOT BEING ABLE TO STOP OR CONTROL WORRYING: SEVERAL DAYS
6. BECOMING EASILY ANNOYED OR IRRITABLE: SEVERAL DAYS
3. WORRYING TOO MUCH ABOUT DIFFERENT THINGS: SEVERAL DAYS
GAD7 TOTAL SCORE: 6
5. BEING SO RESTLESS THAT IT IS HARD TO SIT STILL: SEVERAL DAYS
7. FEELING AFRAID AS IF SOMETHING AWFUL MIGHT HAPPEN: NOT AT ALL
IF YOU CHECKED OFF ANY PROBLEMS ON THIS QUESTIONNAIRE, HOW DIFFICULT HAVE THESE PROBLEMS MADE IT FOR YOU TO DO YOUR WORK, TAKE CARE OF THINGS AT HOME, OR GET ALONG WITH OTHER PEOPLE: SOMEWHAT DIFFICULT

## 2019-06-03 ASSESSMENT — MIFFLIN-ST. JEOR: SCORE: 1301.49

## 2019-06-03 ASSESSMENT — PATIENT HEALTH QUESTIONNAIRE - PHQ9
SUM OF ALL RESPONSES TO PHQ QUESTIONS 1-9: 0
5. POOR APPETITE OR OVEREATING: SEVERAL DAYS

## 2019-06-03 NOTE — NURSING NOTE
Please abstract the following data from this visit with this patient into the appropriate field in Epic:    Colonoscopy done on this date: 5/5/15 (approximately), by this group: [facility], results were Polyps found.

## 2019-06-03 NOTE — Clinical Note
Please abstract the following data from this visit with this patient into the appropriate field in Epic:Colonoscopy done on this date: 5/5/15 (approximately), by this group: [facility], results were Polyps found.

## 2019-06-04 ASSESSMENT — ANXIETY QUESTIONNAIRES: GAD7 TOTAL SCORE: 6

## 2019-06-05 ENCOUNTER — TELEPHONE (OUTPATIENT)
Dept: OBGYN | Facility: CLINIC | Age: 74
End: 2019-06-05

## 2019-06-05 NOTE — TELEPHONE ENCOUNTER
Pharmacy called to confirm that pt is taking med daily as Rx states she is not. Informed that pt is to take daily. No further questions.

## 2019-06-26 DIAGNOSIS — I20.89 STABLE ANGINA (H): ICD-10-CM

## 2019-06-26 RX ORDER — LISINOPRIL 5 MG/1
5 TABLET ORAL DAILY
Qty: 90 TABLET | Refills: 0 | Status: SHIPPED | OUTPATIENT
Start: 2019-06-26 | End: 2019-10-02

## 2019-07-08 ENCOUNTER — PRE VISIT (OUTPATIENT)
Dept: CARDIOLOGY | Facility: CLINIC | Age: 74
End: 2019-07-08

## 2019-07-11 DIAGNOSIS — I10 ESSENTIAL HYPERTENSION: ICD-10-CM

## 2019-07-11 DIAGNOSIS — I25.10 CORONARY ARTERY DISEASE INVOLVING NATIVE CORONARY ARTERY OF NATIVE HEART WITHOUT ANGINA PECTORIS: ICD-10-CM

## 2019-07-11 LAB
ALT SERPL W P-5'-P-CCNC: 41 U/L (ref 5–30)
ANION GAP SERPL CALCULATED.3IONS-SCNC: 15.9 MMOL/L (ref 6–17)
BUN SERPL-MCNC: 20 MG/DL (ref 7–30)
CALCIUM SERPL-MCNC: 10.5 MG/DL (ref 8.5–10.5)
CHLORIDE SERPL-SCNC: 103 MMOL/L (ref 98–107)
CHOLEST SERPL-MCNC: 129 MG/DL
CO2 SERPL-SCNC: 24 MMOL/L (ref 23–29)
CREAT SERPL-MCNC: 0.9 MG/DL (ref 0.7–1.3)
GFR SERPL CREATININE-BSD FRML MDRD: 61 ML/MIN/{1.73_M2}
GLUCOSE SERPL-MCNC: 159 MG/DL (ref 70–105)
HDLC SERPL-MCNC: 61 MG/DL
LDLC SERPL CALC-MCNC: 50 MG/DL
NONHDLC SERPL-MCNC: 68 MG/DL
POTASSIUM SERPL-SCNC: 4.9 MMOL/L (ref 3.5–5.1)
SODIUM SERPL-SCNC: 138 MMOL/L (ref 136–145)
TRIGL SERPL-MCNC: 90 MG/DL

## 2019-07-11 PROCEDURE — 80061 LIPID PANEL: CPT | Performed by: PHYSICIAN ASSISTANT

## 2019-07-11 PROCEDURE — 36415 COLL VENOUS BLD VENIPUNCTURE: CPT | Performed by: PHYSICIAN ASSISTANT

## 2019-07-11 PROCEDURE — 84460 ALANINE AMINO (ALT) (SGPT): CPT | Performed by: PHYSICIAN ASSISTANT

## 2019-07-11 PROCEDURE — 80048 BASIC METABOLIC PNL TOTAL CA: CPT | Performed by: PHYSICIAN ASSISTANT

## 2019-07-12 ENCOUNTER — OFFICE VISIT (OUTPATIENT)
Dept: CARDIOLOGY | Facility: CLINIC | Age: 74
End: 2019-07-12
Attending: PHYSICIAN ASSISTANT
Payer: COMMERCIAL

## 2019-07-12 VITALS
SYSTOLIC BLOOD PRESSURE: 107 MMHG | BODY MASS INDEX: 25.9 KG/M2 | WEIGHT: 165 LBS | DIASTOLIC BLOOD PRESSURE: 60 MMHG | HEIGHT: 67 IN | HEART RATE: 69 BPM

## 2019-07-12 DIAGNOSIS — I10 ESSENTIAL HYPERTENSION: ICD-10-CM

## 2019-07-12 DIAGNOSIS — I25.10 CORONARY ARTERY DISEASE INVOLVING NATIVE CORONARY ARTERY OF NATIVE HEART WITHOUT ANGINA PECTORIS: Primary | ICD-10-CM

## 2019-07-12 DIAGNOSIS — E78.5 HYPERLIPIDEMIA LDL GOAL <100: ICD-10-CM

## 2019-07-12 PROCEDURE — 99214 OFFICE O/P EST MOD 30 MIN: CPT | Performed by: INTERNAL MEDICINE

## 2019-07-12 RX ORDER — GYMNEMA LEAF 100 %
POWDER (GRAM) MISCELLANEOUS DAILY
Status: ON HOLD | COMMUNITY
End: 2020-07-19

## 2019-07-12 ASSESSMENT — MIFFLIN-ST. JEOR: SCORE: 1286.19

## 2019-07-12 NOTE — LETTER
7/12/2019      Gaye Zimmer  [facility] [address]  [facility] [address]      RE: Sarah Longo       Dear Colleague,    I had the pleasure of seeing Sarah Longo in the [facility].    Service Date: 07/12/2019      HISTORY OF PRESENT ILLNESS:  I had the opportunity to see Ms. Sarah Longo in Cardiology Clinic today at the [facility] in [place] for reevaluation of chronic recurrent coronary artery disease and multiple cardiac risk factors including hypertension, dyslipidemia and type 2 diabetes.  She had stenting of her right coronary artery, originally in 2004 and then in 2016, she required additional stenting of her LAD and left circumflex which was performed at the [facility].  In 11/2016, she had evidence of restenosis of one of the stents of the obtuse marginal artery and that was restented.  She has had symptoms which have been challenging to evaluate since then and we have done a couple of angiograms, just to make sure her symptoms were not representative of progressive cardiac disease.  Her most recent angiogram in 02/2019 showed that there was no change in her coronary disease with no evidence of recurrent restenosis.  She was started on low dose of Bystolic, but did not tolerate that due to low heart rate issues.  Her medication was changed to add isosorbide mononitrate 15 mg daily and that has worked well for her in terms of controlling her blood pressure and possibly her chest discomfort symptoms.  Currently, she has no symptoms suggestive of angina and her breathing is good.  She unfortunately has chronic low back pain problems.      Her cholesterol numbers remain excellent with an LDL of 50, HDL 61.  Her basic metabolic panel is normal.  She has a very mildly elevated ALT level, which has come down since starting some medical treatment prescribed by her liver specialist.      She is using Repatha, which is the PCSK9  inhibitor, for treatment of her dyslipidemia because she is statin intolerant and that is working very well.        PHYSICAL EXAMINATION:  Her blood pressure is 107/60, heart rate 69, weight 165 pounds, which is down a few pounds from previous visits.  Her lungs are clear.  Heart rhythm is regular.  She has a soft systolic ejection murmur at the base.  She has no edema.      IMPRESSIONS:  Ms. Sarah Perry is a 74-year-old woman with hypertension, dyslipidemia and type 2 diabetes as well as some recurrent coronary artery disease problems as described above.  Currently, I believe her coronary disease status is stable.  She is not having any concerning symptoms.  Her risk factors are under excellent control, except for her diabetes, and she is working on that.  I will not make any medication changes at this point and have her follow up with us again in 6 months for reevaluation.      cc:      Gaye Zimmer MD    [address] [place] [address]         [facility]AR LEA MD, [facility]             D: 2019   T: 2019   MT: KENDALL      Name:     SARAH PERRY   MRN:      [id number]        Account:      [id number]   :      1945           Service Date: 2019      Document: [id number]         Outpatient Encounter Medications as of 2019   Medication Sig Dispense Refill     ACETAMINOPHEN PO        ASPIRIN 81 MG OR TABS 1 tab po QD (Once per day)       CICLOPIROX & LACQUER REMOVAL EX Externally apply topically daily       CINNAMON PO Take 2 capsules by mouth 2 times daily       empagliflozin (JARDIANCE) 25 MG TABS tablet Take 12.5 mg by mouth every other day       GLIMEPIRIDE 4 MG OR TABS 1 tablet BID       Gymnema Sylvestris [place] POWD daily       isosorbide mononitrate (IMDUR) 30 MG 24 hr tablet Take 0.5 tablets (15 mg) by mouth daily 90 tablet 3     lisinopril (PRINIVIL/ZESTRIL) 5 MG tablet Take 1 tablet (5 mg) by mouth daily 90 tablet 0     metFORMIN (GLUCOPHAGE) 1000  MG tablet Take 1,000 mg by mouth 2 times daily (with meals)       mirabegron (MYRBETRIQ) 50 MG 24 hr tablet Take 1 tablet (50 mg) by mouth daily Patient not taking daily 90 tablet 3     NITROFURANTOIN PO nitrofurantoin monohydrate/macrocrystals 100 mg capsule       Omega-3 Fatty Acids (OMEGA-3 FISH OIL PO) Take 2,400 mg by mouth 2 times daily (with meals)       SYNTHROID 125 MCG OR TABS 1 tablet daily       traMADol (ULTRAM) 50 MG tablet Take 1 tablet (50 mg) by mouth every 6 hours as needed for moderate to severe pain 30 tablet 3     TURMERIC PO Take by mouth 2 times daily       vitamin D3 (CHOLECALCIFEROL) 2000 units tablet Take 1 tablet by mouth 2 times daily       VITAMIN E PO Take by mouth daily       amoxicillin (AMOXIL) 500 MG capsule as needed Reported on 5/17/2017       co-enzyme Q-10 100 MG CAPS capsule Take 100 mg by mouth       evolocumab (REPATHA SURECLICK) 140 MG/ML prefilled autoinjector Inject 1 mL (140 mg) Subcutaneous every 14 days 2 mL 11     nitroglycerin (NITROSTAT) 0.4 MG SL tablet Place 1 tablet (0.4 mg) under the tongue every 5 minutes as needed for chest pain If symptoms persist after 3 doses (15 minutes) call 911. 25 tablet 3     ONETOUCH ULTRA test strip        STATIN NOT PRESCRIBED, INTENTIONAL, 1 each At Bedtime Statin not prescribed intentionally due to Allergy to statin 0 each 0     No facility-administered encounter medications on file as of 7/12/2019.      Again, thank you for allowing me to participate in the care of your patient.      Sincerely,    GUNNAR LEA MD     [facility]

## 2019-07-12 NOTE — PROGRESS NOTES
Service Date: 07/12/2019      HISTORY OF PRESENT ILLNESS:  I had the opportunity to see Ms. Sarah Longo in Cardiology Clinic today at the [facility] in [place] for reevaluation of chronic recurrent coronary artery disease and multiple cardiac risk factors including hypertension, dyslipidemia and type 2 diabetes.  She had stenting of her right coronary artery, originally in 2004 and then in 2016, she required additional stenting of her LAD and left circumflex which was performed at the [facility].  In 11/2016, she had evidence of restenosis of one of the stents of the obtuse marginal artery and that was restented.  She has had symptoms which have been challenging to evaluate since then and we have done a couple of angiograms, just to make sure her symptoms were not representative of progressive cardiac disease.  Her most recent angiogram in 02/2019 showed that there was no change in her coronary disease with no evidence of recurrent restenosis.  She was started on low dose of Bystolic, but did not tolerate that due to low heart rate issues.  Her medication was changed to add isosorbide mononitrate 15 mg daily and that has worked well for her in terms of controlling her blood pressure and possibly her chest discomfort symptoms.  Currently, she has no symptoms suggestive of angina and her breathing is good.  She unfortunately has chronic low back pain problems.      Her cholesterol numbers remain excellent with an LDL of 50, HDL 61.  Her basic metabolic panel is normal.  She has a very mildly elevated ALT level, which has come down since starting some medical treatment prescribed by her liver specialist.      She is using Repatha, which is the PCSK9 inhibitor, for treatment of her dyslipidemia because she is statin intolerant and that is working very well.      PHYSICAL EXAMINATION:  Her blood pressure is 107/60, heart rate 69, weight 165 pounds, which is down a few pounds from previous  visits.  Her lungs are clear.  Heart rhythm is regular.  She has a soft systolic ejection murmur at the base.  She has no edema.      IMPRESSIONS:  Ms. Sarah Longo is a 74-year-old woman with hypertension, dyslipidemia and type 2 diabetes as well as some recurrent coronary artery disease problems as described above.  Currently, I believe her coronary disease status is stable.  She is not having any concerning symptoms.  Her risk factors are under excellent control, except for her diabetes, and she is working on that.  I will not make any medication changes at this point and have her follow up with us again in 6 months for reevaluation.      cc:      Gaye Zimmer MD    [facility]   [address] [place] [address]         GUNNAR LEA MD, [facility]             D: 2019   T: 2019   MT: KENDALL      Name:     SARAH LONGO   MRN:      [id number]        Account:      [id number]   :      1945           Service Date: 2019      Document: [id number]

## 2019-07-12 NOTE — PROGRESS NOTES
HPI and Plan:   See dictation    Orders Placed This Encounter   Procedures     Basic metabolic panel     Lipid Profile     ALT     Follow-Up with Cardiologist     Follow-Up with Cardiac Advanced Practice Provider       Orders Placed This Encounter   Medications     metFORMIN (GLUCOPHAGE) 1000 MG tablet     Sig: Take 1,000 mg by mouth 2 times daily (with meals)     Gymnema Sylvestris Penermon POWD     Sig: daily     CICLOPIROX & LACQUER REMOVAL EX     Sig: Externally apply topically daily       There are no discontinued medications.      Encounter Diagnoses   Name Primary?     Coronary artery disease involving native coronary artery of native heart without angina pectoris Yes     Hyperlipidemia LDL goal <100      Essential hypertension        CURRENT MEDICATIONS:  Current Outpatient Medications   Medication Sig Dispense Refill     ACETAMINOPHEN PO        ASPIRIN 81 MG OR TABS 1 tab po QD (Once per day)       CICLOPIROX & LACQUER REMOVAL EX Externally apply topically daily       CINNAMON PO Take 2 capsules by mouth 2 times daily       empagliflozin (JARDIANCE) 25 MG TABS tablet Take 12.5 mg by mouth every other day       GLIMEPIRIDE 4 MG OR TABS 1 tablet BID       Gymnema Sylvestris Penermon POWD daily       isosorbide mononitrate (IMDUR) 30 MG 24 hr tablet Take 0.5 tablets (15 mg) by mouth daily 90 tablet 3     lisinopril (PRINIVIL/ZESTRIL) 5 MG tablet Take 1 tablet (5 mg) by mouth daily 90 tablet 0     metFORMIN (GLUCOPHAGE) 1000 MG tablet Take 1,000 mg by mouth 2 times daily (with meals)       mirabegron (MYRBETRIQ) 50 MG 24 hr tablet Take 1 tablet (50 mg) by mouth daily Patient not taking daily 90 tablet 3     NITROFURANTOIN PO nitrofurantoin monohydrate/macrocrystals 100 mg capsule       Omega-3 Fatty Acids (OMEGA-3 FISH OIL PO) Take 2,400 mg by mouth 2 times daily (with meals)       SYNTHROID 125 MCG OR TABS 1 tablet daily       traMADol (ULTRAM) 50 MG tablet Take 1 tablet (50 mg) by mouth every 6 hours as needed for  moderate to severe pain 30 tablet 3     TURMERIC PO Take by mouth 2 times daily       vitamin D3 (CHOLECALCIFEROL) 2000 units tablet Take 1 tablet by mouth 2 times daily       VITAMIN E PO Take by mouth daily       amoxicillin (AMOXIL) 500 MG capsule as needed Reported on 5/17/2017       co-enzyme Q-10 100 MG CAPS capsule Take 100 mg by mouth       evolocumab (REPATHA SURECLICK) 140 MG/ML prefilled autoinjector Inject 1 mL (140 mg) Subcutaneous every 14 days 2 mL 11     nitroglycerin (NITROSTAT) 0.4 MG SL tablet Place 1 tablet (0.4 mg) under the tongue every 5 minutes as needed for chest pain If symptoms persist after 3 doses (15 minutes) call 911. 25 tablet 3     ONETOUCH ULTRA test strip        STATIN NOT PRESCRIBED, INTENTIONAL, 1 each At Bedtime Statin not prescribed intentionally due to Allergy to statin 0 each 0       ALLERGIES     Allergies   Allergen Reactions     No Known Drug Allergies        PAST MEDICAL HISTORY:  Past Medical History:   Diagnosis Date     Arthritis      Coronary artery disease 04/28/2016    cardiac cath 2/2019: patent stents; PTCA with MAYA x 2 to OM1 and MAYA x 1 to p.LAD (4/21/2016 at [place]); PTCA with intracoronary stent placement of RCA June 2004; MAYA to OM1 11/2016     Cystocele, midline 09/29/2010     Depression      HYPERPLASTIC  POLYP      colonoscopy in 5-10 yrs.     Hypothyroidism     hypothyroid- Dr Majano     OAB (overactive bladder)     complex voiding dysfct, failed interstim     Osteoarthritis      Other and unspecified hyperlipidemia      Postmenopausal bleeding      Shoulder blade pain 5/31/2016     Type II or unspecified type diabetes mellitus without mention of complication, not stated as uncontrolled     Dr. Majano     Unspecified essential hypertension      Urinary frequency 9/29/10    followed by urology. no benefit from detrol or sanctura. month trial of macrobid and flomax 10/10       PAST SURGICAL HISTORY:  Past Surgical History:   Procedure Laterality Date      ------------OTHER-------------      Interstim     Ablation       C CT ANGIOGRAPHY CORONARY  2005    mod soft plaque LAD and Cx, follow up with left heart cath     C LIGATE FALLOPIAN TUBE      endometrial ablation     CARDIAC SURGERY       CHOLECYSTECTOMY       COLONOSCOPY       CV HEART CATHETERIZATION WITH POSSIBLE INTERVENTION N/A 2019    Procedure: Coronary Angiogram;  Surgeon: Sudarshan Lee MD;  Location:  HEART CARDIAC CATH LAB; patent stents     EXCISE LESION UPPER EXTREMITY  2013    Procedure: EXCISE LESION UPPER EXTREMITY;  EXCISION RIGHT ARM ANTICUBITAL LIPOMA ;  Surgeon: Jayson Joe MD;  Location: [facility] REMOVAL OF TONSILS,12+ Y/O       HEART CATH LEFT HEART CATH  3/2/2016    No intervention - aggressive medical management     HEART CATH LEFT HEART CATH  2016     MAYA x 2 to OM1, MAYA to LAD, at [place]     HEART CATH LEFT HEART CATH  2004    MAYA to RCA     HEART CATH LEFT HEART CATH  3/28/2002    Mild to moderate 3 vessel CAD. Medical management recommended.      HEART CATH LEFT HEART CATH  2016    MAYA to OM1     hypothyroid       KNEE SURGERY  4/20/10    right knee replacement     ORTHOPEDIC SURGERY      total knee      REMOVE STIMULATOR SACRAL NERVE N/A 2015    Procedure: REMOVE STIMULATOR SACRAL NERVE;  Surgeon: Gertrudis Frausto MD;  Location: [facility]     SURGICAL HISTORY OF -       Uterine endometrial ablation       FAMILY HISTORY:  Family History   Problem Relation Age of Onset     C.A.D. Father         MI , age 83, had high lipids     Hyperlipidemia Father      Hypertension Father      Coronary Artery Disease Father      Hypertension Mother      Genitourinary Problems Mother         renal failure     Fractures Mother         hip     Osteoporosis Mother      Cerebrovascular Disease Maternal Grandfather         cerebral hemorrhage     Diabetes Maternal Uncle      Colon Cancer Maternal Aunt      Diabetes Maternal Grandmother         SOCIAL HISTORY:  Social History     Socioeconomic History     Marital status:      Spouse name: Kwadwo     Number of children: 3     Years of education: None     Highest education level: None   Occupational History     Occupation: PT Aide     Employer: NONE      Employer: RETIRED   Social Needs     Financial resource strain: None     Food insecurity:     Worry: None     Inability: None     Transportation needs:     Medical: None     Non-medical: None   Tobacco Use     Smoking status: Never Smoker     Smokeless tobacco: Never Used   Substance and Sexual Activity     Alcohol use: No     Alcohol/week: 0.0 oz     Drug use: No     Sexual activity: Never     Partners: Male     Birth control/protection: Post-menopausal   Lifestyle     Physical activity:     Days per week: None     Minutes per session: None     Stress: None   Relationships     Social connections:     Talks on phone: None     Gets together: None     Attends Sabianist service: None     Active member of club or organization: None     Attends meetings of clubs or organizations: None     Relationship status: None     Intimate partner violence:     Fear of current or ex partner: None     Emotionally abused: None     Physically abused: None     Forced sexual activity: None   Other Topics Concern      Service Not Asked     Blood Transfusions Not Asked     Caffeine Concern Yes     Comment: 6-7 cups caffeine per day     Occupational Exposure Not Asked     Hobby Hazards Not Asked     Sleep Concern No     Stress Concern Yes     Weight Concern No     Special Diet No     Comment: eats healthy      Back Care Not Asked     Exercise Yes     Comment:  walking & active life style      Bike Helmet Not Asked     Seat Belt Not Asked     Self-Exams Not Asked     Parent/sibling w/ CABG, MI or angioplasty before 65F 55M? No   Social History Narrative     None       Review of Systems:  Skin:  Negative       Eyes:  Positive for glasses cataract surgery 2014  ENT:   "Negative      Respiratory:  Positive for dyspnea on exertion occ   Cardiovascular:  Negative for;chest pain;palpitations   occ  Gastroenterology: Negative      Genitourinary:  Negative urinary frequency not new  Musculoskeletal:  Positive for back pain;arthritis pain everywhere  Neurologic:  Negative numbness or tingling of feet hands will turn white occ.  Psychiatric:  Positive for anxiety    Heme/Lymph/Imm:  Negative allergies    Endocrine:  Positive for thyroid disorder;diabetes      Physical Exam:  Vitals: /60   Pulse 69   Ht 1.702 m (5' 7.01\")   Wt 74.8 kg (165 lb)   BMI 25.84 kg/m      Constitutional:  cooperative, alert and oriented, well developed, well nourished, in no acute distress        Skin:  warm and dry to the touch, no apparent skin lesions or masses noted          Head:  normocephalic, no masses or lesions        Eyes:  pupils equal and round, conjunctivae and lids unremarkable, sclera white, no xanthalasma, EOMS intact, no nystagmus        Lymph:      ENT:  no pallor or cyanosis, dentition good        Neck:  carotid pulses are full and equal bilaterally, JVP normal, no carotid bruit        Respiratory:  normal breath sounds, clear to auscultation, normal A-P diameter, normal symmetry, normal respiratory excursion, no use of accessory muscles         Cardiac: regular rhythm       systolic murmur;grade 1;RUSB        pulses full and equal, no bruits auscultated                                        GI:  abdomen soft;BS normoactive        Extremities and Muscular Skeletal:  no deformities, clubbing, cyanosis, erythema observed;no edema              Neurological:  no gross motor deficits;affect appropriate        Psych:  Alert and Oriented x 3        CC  Karen Alexander, PA-C  [address]  [place] [id number]              "

## 2019-07-12 NOTE — LETTER
7/12/2019    Gaye Zimmer  [facility] [address] [place] [address]    RE: Sarah Longo       Dear Colleague,    I had the pleasure of seeing Sarah Longo in the [facility].    HPI and Plan:   See dictation    Orders Placed This Encounter   Procedures     Basic metabolic panel     Lipid Profile     ALT     Follow-Up with Cardiologist     Follow-Up with Cardiac Advanced Practice Provider       Orders Placed This Encounter   Medications     metFORMIN (GLUCOPHAGE) 1000 MG tablet     Sig: Take 1,000 mg by mouth 2 times daily (with meals)     Gymnema Sylvestris Man POWD     Sig: daily     CICLOPIROX & LACQUER REMOVAL EX     Sig: Externally apply topically daily       There are no discontinued medications.      Encounter Diagnoses   Name Primary?     Coronary artery disease involving native coronary artery of native heart without angina pectoris Yes     Hyperlipidemia LDL goal <100      Essential hypertension        CURRENT MEDICATIONS:  Current Outpatient Medications   Medication Sig Dispense Refill     ACETAMINOPHEN PO        ASPIRIN 81 MG OR TABS 1 tab po QD (Once per day)       CICLOPIROX & LACQUER REMOVAL EX Externally apply topically daily       CINNAMON PO Take 2 capsules by mouth 2 times daily       empagliflozin (JARDIANCE) 25 MG TABS tablet Take 12.5 mg by mouth every other day       GLIMEPIRIDE 4 MG OR TABS 1 tablet BID       Gymnema Sylvestris Man POWD daily       isosorbide mononitrate (IMDUR) 30 MG 24 hr tablet Take 0.5 tablets (15 mg) by mouth daily 90 tablet 3     lisinopril (PRINIVIL/ZESTRIL) 5 MG tablet Take 1 tablet (5 mg) by mouth daily 90 tablet 0     metFORMIN (GLUCOPHAGE) 1000 MG tablet Take 1,000 mg by mouth 2 times daily (with meals)       mirabegron (MYRBETRIQ) 50 MG 24 hr tablet Take 1 tablet (50 mg) by mouth daily Patient not taking daily 90 tablet 3     NITROFURANTOIN PO nitrofurantoin monohydrate/macrocrystals 100 mg capsule        Omega-3 Fatty Acids (OMEGA-3 FISH OIL PO) Take 2,400 mg by mouth 2 times daily (with meals)       SYNTHROID 125 MCG OR TABS 1 tablet daily       traMADol (ULTRAM) 50 MG tablet Take 1 tablet (50 mg) by mouth every 6 hours as needed for moderate to severe pain 30 tablet 3     TURMERIC PO Take by mouth 2 times daily       vitamin D3 (CHOLECALCIFEROL) 2000 units tablet Take 1 tablet by mouth 2 times daily       VITAMIN E PO Take by mouth daily       amoxicillin (AMOXIL) 500 MG capsule as needed Reported on 5/17/2017       co-enzyme Q-10 100 MG CAPS capsule Take 100 mg by mouth       evolocumab (REPATHA SURECLICK) 140 MG/ML prefilled autoinjector Inject 1 mL (140 mg) Subcutaneous every 14 days 2 mL 11     nitroglycerin (NITROSTAT) 0.4 MG SL tablet Place 1 tablet (0.4 mg) under the tongue every 5 minutes as needed for chest pain If symptoms persist after 3 doses (15 minutes) call 911. 25 tablet 3     ONETOUCH ULTRA test strip        STATIN NOT PRESCRIBED, INTENTIONAL, 1 each At Bedtime Statin not prescribed intentionally due to Allergy to statin 0 each 0       ALLERGIES     Allergies   Allergen Reactions     No Known Drug Allergies        PAST MEDICAL HISTORY:  Past Medical History:   Diagnosis Date     Arthritis      Coronary artery disease 04/28/2016    cardiac cath 2/2019: patent stents; PTCA with MAYA x 2 to OM1 and MAYA x 1 to p.LAD (4/21/2016 at [place]); PTCA with intracoronary stent placement of RCA June 2004; MAYA to OM1 11/2016     Cystocele, midline 09/29/2010     Depression      HYPERPLASTIC  POLYP      colonoscopy in 5-10 yrs.     Hypothyroidism     hypothyroid- Dr Majano     OAB (overactive bladder)     complex voiding dysfct, failed interstim     Osteoarthritis      Other and unspecified hyperlipidemia      Postmenopausal bleeding      Shoulder blade pain 5/31/2016     Type II or unspecified type diabetes mellitus without mention of complication, not stated as uncontrolled     Dr. Majano     Unspecified essential  hypertension      Urinary frequency 9/29/10    followed by urology. no benefit from detrol or sanctura. month trial of macrobid and flomax 10/10       PAST SURGICAL HISTORY:  Past Surgical History:   Procedure Laterality Date     ------------OTHER-------------      Interstim     Ablation       C CT ANGIOGRAPHY CORONARY  2005    mod soft plaque LAD and Cx, follow up with left heart cath     C LIGATE FALLOPIAN TUBE      endometrial ablation     CARDIAC SURGERY       CHOLECYSTECTOMY       COLONOSCOPY       CV HEART CATHETERIZATION WITH POSSIBLE INTERVENTION N/A 2019    Procedure: Coronary Angiogram;  Surgeon: Sudarshan Lee MD;  Location:  HEART CARDIAC CATH LAB; patent stents     EXCISE LESION UPPER EXTREMITY  2013    Procedure: EXCISE LESION UPPER EXTREMITY;  EXCISION RIGHT ARM ANTICUBITAL LIPOMA ;  Surgeon: Jayson Joe MD;  Location: [facility] REMOVAL OF TONSILS,12+ Y/O       HEART CATH LEFT HEART CATH  3/2/2016    No intervention - aggressive medical management     HEART CATH LEFT HEART CATH  2016     MAYA x 2 to OM1, MAYA to LAD, at [place]     HEART CATH LEFT HEART CATH  2004    MAYA to RCA     HEART CATH LEFT HEART CATH  3/28/2002    Mild to moderate 3 vessel CAD. Medical management recommended.      HEART CATH LEFT HEART CATH  2016    MAYA to OM1     hypothyroid       KNEE SURGERY  4/20/10    right knee replacement     ORTHOPEDIC SURGERY      total knee      REMOVE STIMULATOR SACRAL NERVE N/A 2015    Procedure: REMOVE STIMULATOR SACRAL NERVE;  Surgeon: Gertrudis Frausto MD;  Location: [facility]     SURGICAL HISTORY OF -       Uterine endometrial ablation       FAMILY HISTORY:  Family History   Problem Relation Age of Onset     C.A.D. Father         MI , age 83, had high lipids     Hyperlipidemia Father      Hypertension Father      Coronary Artery Disease Father      Hypertension Mother      Genitourinary Problems Mother         renal failure      Fractures Mother         hip     Osteoporosis Mother      Cerebrovascular Disease Maternal Grandfather         cerebral hemorrhage     Diabetes Maternal Uncle      Colon Cancer Maternal Aunt      Diabetes Maternal Grandmother        SOCIAL HISTORY:  Social History     Socioeconomic History     Marital status:      Spouse name: Kwadwo     Number of children: 3     Years of education: None     Highest education level: None   Occupational History     Occupation: PT Aide     Employer: NONE      Employer: RETIRED   Social Needs     Financial resource strain: None     Food insecurity:     Worry: None     Inability: None     Transportation needs:     Medical: None     Non-medical: None   Tobacco Use     Smoking status: Never Smoker     Smokeless tobacco: Never Used   Substance and Sexual Activity     Alcohol use: No     Alcohol/week: 0.0 oz     Drug use: No     Sexual activity: Never     Partners: Male     Birth control/protection: Post-menopausal   Lifestyle     Physical activity:     Days per week: None     Minutes per session: None     Stress: None   Relationships     Social connections:     Talks on phone: None     Gets together: None     Attends Jehovah's witness service: None     Active member of club or organization: None     Attends meetings of clubs or organizations: None     Relationship status: None     Intimate partner violence:     Fear of current or ex partner: None     Emotionally abused: None     Physically abused: None     Forced sexual activity: None   Other Topics Concern      Service Not Asked     Blood Transfusions Not Asked     Caffeine Concern Yes     Comment: 6-7 cups caffeine per day     Occupational Exposure Not Asked     Hobby Hazards Not Asked     Sleep Concern No     Stress Concern Yes     Weight Concern No     Special Diet No     Comment: eats healthy      Back Care Not Asked     Exercise Yes     Comment:  walking & active life style      Bike Helmet Not Asked     Seat Belt Not Asked      "Self-Exams Not Asked     Parent/sibling w/ CABG, MI or angioplasty before 65F 55M? No   Social History Narrative     None       Review of Systems:  Skin:  Negative       Eyes:  Positive for glasses cataract surgery 2014  ENT:  Negative      Respiratory:  Positive for dyspnea on exertion occ   Cardiovascular:  Negative for;chest pain;palpitations   occ  Gastroenterology: Negative      Genitourinary:  Negative urinary frequency not new  Musculoskeletal:  Positive for back pain;arthritis pain everywhere  Neurologic:  Negative numbness or tingling of feet hands will turn white occ.  Psychiatric:  Positive for anxiety    Heme/Lymph/Imm:  Negative allergies    Endocrine:  Positive for thyroid disorder;diabetes      Physical Exam:  Vitals: /60   Pulse 69   Ht 1.702 m (5' 7.01\")   Wt 74.8 kg (165 lb)   BMI 25.84 kg/m       Constitutional:  cooperative, alert and oriented, well developed, well nourished, in no acute distress        Skin:  warm and dry to the touch, no apparent skin lesions or masses noted          Head:  normocephalic, no masses or lesions        Eyes:  pupils equal and round, conjunctivae and lids unremarkable, sclera white, no xanthalasma, EOMS intact, no nystagmus        Lymph:      ENT:  no pallor or cyanosis, dentition good        Neck:  carotid pulses are full and equal bilaterally, JVP normal, no carotid bruit        Respiratory:  normal breath sounds, clear to auscultation, normal A-P diameter, normal symmetry, normal respiratory excursion, no use of accessory muscles         Cardiac: regular rhythm       systolic murmur;grade 1;RUSB        pulses full and equal, no bruits auscultated                                        GI:  abdomen soft;BS normoactive        Extremities and Muscular Skeletal:  no deformities, clubbing, cyanosis, erythema observed;no edema              Neurological:  no gross motor deficits;affect appropriate        Psych:  Alert and Oriented x 3        CC  Karen Stephani " Melissa Alexander PA-C  [address]  [place] [id number]                Thank you for allowing me to participate in the care of your patient.      Sincerely,     GUNNAR LEA MD     [facility]    cc:   Karen Alexander PA-C  [address]  [place] [address]

## 2019-07-24 ENCOUNTER — CARE COORDINATION (OUTPATIENT)
Dept: CARDIOLOGY | Facility: CLINIC | Age: 74
End: 2019-07-24

## 2019-07-24 DIAGNOSIS — I25.10 CORONARY ARTERY DISEASE INVOLVING NATIVE CORONARY ARTERY OF NATIVE HEART WITHOUT ANGINA PECTORIS: Primary | ICD-10-CM

## 2019-07-24 NOTE — PROGRESS NOTES
Pt called and said she is concerned because her creatinine has trended up on last several BMP labs. Most recent creatinines were 0.87 and 0.90. I told pt that her renal function and creatinine are still within normal limits. I told pt that her creatinine is not going to be the exact same every time and will fluctuate, and can be influenced by fluid intake or medications. Pt said she does take ibuprofen occasionally, and always stays hydrated. She would like another BMP lab when she sees Karen in 6 months. Patrick STOVER July 24, 2019, 1:58 PM

## 2019-08-30 ENCOUNTER — APPOINTMENT (OUTPATIENT)
Age: 74
Setting detail: DERMATOLOGY
End: 2019-08-30

## 2019-08-30 VITALS — WEIGHT: 169 LBS | HEIGHT: 68 IN

## 2019-08-30 DIAGNOSIS — L57.8 OTHER SKIN CHANGES DUE TO CHRONIC EXPOSURE TO NONIONIZING RADIATION: ICD-10-CM

## 2019-08-30 DIAGNOSIS — L91.8 OTHER HYPERTROPHIC DISORDERS OF THE SKIN: ICD-10-CM

## 2019-08-30 DIAGNOSIS — L82.0 INFLAMED SEBORRHEIC KERATOSIS: ICD-10-CM

## 2019-08-30 DIAGNOSIS — L81.4 OTHER MELANIN HYPERPIGMENTATION: ICD-10-CM

## 2019-08-30 DIAGNOSIS — D18.0 HEMANGIOMA: ICD-10-CM

## 2019-08-30 DIAGNOSIS — B35.1 TINEA UNGUIUM: ICD-10-CM

## 2019-08-30 DIAGNOSIS — L21.8 OTHER SEBORRHEIC DERMATITIS: ICD-10-CM

## 2019-08-30 DIAGNOSIS — L72.8 OTHER FOLLICULAR CYSTS OF THE SKIN AND SUBCUTANEOUS TISSUE: ICD-10-CM

## 2019-08-30 DIAGNOSIS — D22 MELANOCYTIC NEVI: ICD-10-CM

## 2019-08-30 PROBLEM — D23.122 OTHER BENIGN NEOPLASM OF SKIN OF LEFT LOWER EYELID, INCLUDING CANTHUS: Status: ACTIVE | Noted: 2019-08-30

## 2019-08-30 PROBLEM — D23.112 OTHER BENIGN NEOPLASM OF SKIN OF RIGHT LOWER EYELID, INCLUDING CANTHUS: Status: ACTIVE | Noted: 2019-08-30

## 2019-08-30 PROBLEM — D22.5 MELANOCYTIC NEVI OF TRUNK: Status: ACTIVE | Noted: 2019-08-30

## 2019-08-30 PROBLEM — D18.01 HEMANGIOMA OF SKIN AND SUBCUTANEOUS TISSUE: Status: ACTIVE | Noted: 2019-08-30

## 2019-08-30 PROCEDURE — 99214 OFFICE O/P EST MOD 30 MIN: CPT | Mod: 25

## 2019-08-30 PROCEDURE — 17110 DESTRUCT B9 LESION 1-14: CPT

## 2019-08-30 PROCEDURE — OTHER COUNSELING: OTHER

## 2019-08-30 PROCEDURE — 11200 RMVL SKIN TAGS UP TO&INC 15: CPT | Mod: 59

## 2019-08-30 PROCEDURE — OTHER SKIN TAG REMOVAL: OTHER

## 2019-08-30 PROCEDURE — OTHER LIQUID NITROGEN: OTHER

## 2019-08-30 ASSESSMENT — LOCATION ZONE DERM
LOCATION ZONE: FACE
LOCATION ZONE: EAR
LOCATION ZONE: ARM
LOCATION ZONE: TOE
LOCATION ZONE: NECK
LOCATION ZONE: TRUNK

## 2019-08-30 ASSESSMENT — LOCATION DETAILED DESCRIPTION DERM
LOCATION DETAILED: RIGHT SUPERIOR LATERAL BUCCAL CHEEK
LOCATION DETAILED: LEFT MEDIAL UPPER BACK
LOCATION DETAILED: RIGHT INFERIOR LATERAL MALAR CHEEK
LOCATION DETAILED: RIGHT SUPERIOR UPPER BACK
LOCATION DETAILED: RIGHT DISTAL PLANTAR GREAT TOE
LOCATION DETAILED: RIGHT MEDIAL MALAR CHEEK
LOCATION DETAILED: LEFT MEDIAL MALAR CHEEK
LOCATION DETAILED: LEFT ANTERIOR EARLOBE
LOCATION DETAILED: SUBXIPHOID
LOCATION DETAILED: RIGHT INFERIOR LATERAL NECK
LOCATION DETAILED: LEFT ANTERIOR SHOULDER
LOCATION DETAILED: INFERIOR THORACIC SPINE
LOCATION DETAILED: LEFT DISTAL PLANTAR GREAT TOE
LOCATION DETAILED: UPPER STERNUM
LOCATION DETAILED: LEFT RIB CAGE

## 2019-08-30 ASSESSMENT — LOCATION SIMPLE DESCRIPTION DERM
LOCATION SIMPLE: LEFT UPPER BACK
LOCATION SIMPLE: CHEST
LOCATION SIMPLE: RIGHT ANTERIOR NECK
LOCATION SIMPLE: LEFT CHEEK
LOCATION SIMPLE: LEFT EAR
LOCATION SIMPLE: ABDOMEN
LOCATION SIMPLE: RIGHT UPPER BACK
LOCATION SIMPLE: LEFT GREAT TOE
LOCATION SIMPLE: LEFT SHOULDER
LOCATION SIMPLE: RIGHT GREAT TOE
LOCATION SIMPLE: RIGHT CHEEK
LOCATION SIMPLE: UPPER BACK

## 2019-08-30 NOTE — PROCEDURE: LIQUID NITROGEN
Include Z78.9 (Other Specified Conditions Influencing Health Status) As An Associated Diagnosis?: No
Post-Care Instructions: Avoid picking at any of the treated lesions.
Consent: Verbal consent was obtained, and risks were reviewed prior to procedure today. Risks discussed include but are not limited to pain, crusting, scabbing, blistering, scarring, temporary or permanent darker or lighter pigmentary change, recurrence, incomplete resolution, and infection.
Render Post-Care Instructions In Note?: yes
Number Of Freeze-Thaw Cycles: 3 freeze-thaw cycles
Medical Necessity Clause: This procedure was medically necessary because the lesions that were treated were:
Detail Level: Detailed
Medical Necessity Information: It is in your best interest to select a reason for this procedure from the list below. All of these items fulfill various CMS LCD requirements except the new and changing color options.

## 2019-08-30 NOTE — PROCEDURE: SKIN TAG REMOVAL
Medical Necessity Information: It is in your best interest to select a reason for this procedure from the list below. All of these items fulfill various CMS LCD requirements except the new and changing color options.
Anesthesia Volume In Cc: 3
Hemostasis: Drysol
Include Z78.9 (Other Specified Conditions Influencing Health Status) As An Associated Diagnosis?: No
Medical Necessity Clause: This procedure was medically necessary because the lesions that were treated were:
Detail Level: Detailed
Consent: Written consent obtained and the risks of skin tag removal was reviewed with the patient including but not limited to bleeding, pigmentary change, infection, pain, and remote possibility of scarring.

## 2019-08-30 NOTE — PROCEDURE: COUNSELING
Detail Level: Zone
Detail Level: Generalized
Patient Specific Counseling (Will Not Stick From Patient To Patient): PSC extracted blackhead with comedone extractor.
Patient Specific Counseling (Will Not Stick From Patient To Patient): No treatment at this time. 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
Detail Level: Detailed
Detail Level: Simple

## 2019-10-02 DIAGNOSIS — I20.89 STABLE ANGINA (H): ICD-10-CM

## 2019-10-02 RX ORDER — LISINOPRIL 5 MG/1
5 TABLET ORAL DAILY
Qty: 90 TABLET | Refills: 3 | Status: SHIPPED | OUTPATIENT
Start: 2019-10-02 | End: 2020-09-18

## 2019-10-03 DIAGNOSIS — I25.10 CORONARY ARTERY DISEASE INVOLVING NATIVE CORONARY ARTERY OF NATIVE HEART WITHOUT ANGINA PECTORIS: ICD-10-CM

## 2019-10-03 RX ORDER — ISOSORBIDE MONONITRATE 30 MG/1
15 TABLET, EXTENDED RELEASE ORAL DAILY
Qty: 45 TABLET | Refills: 0 | Status: SHIPPED | OUTPATIENT
Start: 2019-10-03 | End: 2019-12-23

## 2019-10-22 ENCOUNTER — TRANSFERRED RECORDS (OUTPATIENT)
Dept: HEALTH INFORMATION MANAGEMENT | Facility: CLINIC | Age: 74
End: 2019-10-22

## 2019-10-23 LAB
ALBUMIN SERPL-MCNC: 4.3 G/DL (ref 3.6–5.1)
ALP SERPL-CCNC: 37 U/L (ref 33–130)
ALT SERPL-CCNC: 30 U/L (ref 6–29)
AST SERPL-CCNC: 17 U/L (ref 10–35)
BILIRUB SERPL-MCNC: 0.3 MG/DL (ref 0.2–1.2)
BILIRUBIN DIRECT: 0.1 MG/DL
BUN SERPL-MCNC: 21 MG/DL (ref 7–25)
CALCIUM SERPL-MCNC: 9.6 MG/DL (ref 8.6–10.4)
CHLORIDE SERPLBLD-SCNC: 105 MMOL/L (ref 98–110)
CO2 SERPL-SCNC: 26 MMOL/L (ref 20–32)
CREAT SERPL-MCNC: 0.82 MG/DL (ref 0.6–0.93)
GFR SERPL CREATININE-BSD FRML MDRD: 71 ML/MIN/1.73M2
GLUCOSE SERPL-MCNC: 169 MG/DL (ref 70–99)
HBA1C MFR BLD: 7.7 % (ref 4–6)
HEMOGLOBIN: 11.5 G/DL (ref 11.7–15.7)
POTASSIUM SERPL-SCNC: 4.1 MMOL/L (ref 3.5–5.3)
PROT SERPL-MCNC: 6.1 G/DL (ref 6.1–8.1)
SODIUM SERPL-SCNC: 140 MMOL/L (ref 135–146)
TSH SERPL-ACNC: 1.02 MCU/ML (ref 0.3–5)

## 2019-11-21 ENCOUNTER — DOCUMENTATION ONLY (OUTPATIENT)
Dept: CARDIOLOGY | Facility: CLINIC | Age: 74
End: 2019-11-21

## 2019-11-26 ENCOUNTER — CARE COORDINATION (OUTPATIENT)
Dept: CARDIOLOGY | Facility: CLINIC | Age: 74
End: 2019-11-26

## 2019-11-26 NOTE — PROGRESS NOTES
Pt called to say that she is scheduled for a back ablation next week. It's a two part procedure on 12/3 and 12/6. She was originally told to hold her ASA for it, but said she preferred not to because of her CAD. Pt said she is waiting to hear back if they absolutely need her off of it. She will update us if that is the case. Patrick STOVER November 26, 2019, 5:03 PM

## 2019-12-11 ENCOUNTER — OFFICE VISIT (OUTPATIENT)
Dept: CARDIOLOGY | Facility: CLINIC | Age: 74
End: 2019-12-11
Attending: INTERNAL MEDICINE
Payer: COMMERCIAL

## 2019-12-11 VITALS
DIASTOLIC BLOOD PRESSURE: 52 MMHG | BODY MASS INDEX: 26.37 KG/M2 | HEIGHT: 67 IN | HEART RATE: 68 BPM | SYSTOLIC BLOOD PRESSURE: 118 MMHG | WEIGHT: 168 LBS

## 2019-12-11 DIAGNOSIS — I25.10 CORONARY ARTERY DISEASE INVOLVING NATIVE CORONARY ARTERY OF NATIVE HEART WITHOUT ANGINA PECTORIS: ICD-10-CM

## 2019-12-11 DIAGNOSIS — D50.8 OTHER IRON DEFICIENCY ANEMIA: Primary | ICD-10-CM

## 2019-12-11 LAB
ANION GAP SERPL CALCULATED.3IONS-SCNC: 13.4 MMOL/L (ref 6–17)
BUN SERPL-MCNC: 16 MG/DL (ref 7–30)
CALCIUM SERPL-MCNC: 10.7 MG/DL (ref 8.5–10.5)
CHLORIDE SERPL-SCNC: 104 MMOL/L (ref 98–107)
CO2 SERPL-SCNC: 27 MMOL/L (ref 23–29)
CREAT SERPL-MCNC: 0.93 MG/DL (ref 0.7–1.3)
GFR SERPL CREATININE-BSD FRML MDRD: 59 ML/MIN/{1.73_M2}
GLUCOSE SERPL-MCNC: 200 MG/DL (ref 70–105)
POTASSIUM SERPL-SCNC: 4.4 MMOL/L (ref 3.5–5.1)
SODIUM SERPL-SCNC: 140 MMOL/L (ref 136–145)

## 2019-12-11 PROCEDURE — 36415 COLL VENOUS BLD VENIPUNCTURE: CPT | Performed by: PHYSICIAN ASSISTANT

## 2019-12-11 PROCEDURE — 99214 OFFICE O/P EST MOD 30 MIN: CPT | Performed by: PHYSICIAN ASSISTANT

## 2019-12-11 PROCEDURE — 80048 BASIC METABOLIC PNL TOTAL CA: CPT | Performed by: PHYSICIAN ASSISTANT

## 2019-12-11 RX ORDER — CYANOCOBALAMIN 1000 UG/ML
2 INJECTION, SOLUTION INTRAMUSCULAR; SUBCUTANEOUS
COMMUNITY

## 2019-12-11 RX ORDER — TIZANIDINE HYDROCHLORIDE 4 MG/1
4 CAPSULE, GELATIN COATED ORAL 3 TIMES DAILY
COMMUNITY
End: 2020-07-06

## 2019-12-11 RX ORDER — HYDROCODONE BITARTRATE AND ACETAMINOPHEN 5; 325 MG/1; MG/1
1 TABLET ORAL EVERY 6 HOURS PRN
COMMUNITY
End: 2020-07-06

## 2019-12-11 ASSESSMENT — MIFFLIN-ST. JEOR: SCORE: 1299.67

## 2019-12-11 NOTE — LETTER
12/11/2019    Gaye Zimmer  [facility] [address] [place] [address]    RE: Sarah Longo       Dear Colleague,    I had the pleasure of seeing Sarah Longo in the [facility].    [id number]  HPI and Plan:   See dictation    Orders this Visit:  Orders Placed This Encounter   Procedures     Hemoglobin     Follow-Up with Cardiac Advanced Practice Provider     Echocardiogram Complete     Orders Placed This Encounter   Medications     HYDROcodone-acetaminophen (NORCO) 5-325 MG tablet     Sig: Take 1 tablet by mouth every 6 hours as needed for severe pain     tiZANidine (ZANAFLEX) 4 MG capsule     Sig: Take 4 mg by mouth 3 times daily     ferrous fumarate 65 mg, [place]. FE,-Vitamin C 125 mg (VITRON C)  MG TABS tablet     Sig: Take 1 tablet by mouth daily     cyanocobalamin (CYANOCOBALAMIN) 1000 MCG/ML injection     Sig: Inject 2 mLs into the muscle every 30 days     linagliptin (TRADJENTA) 5 MG TABS tablet     Sig: Take 5 mg by mouth daily     Medications Discontinued During This Encounter   Medication Reason     CICLOPIROX & LACQUER REMOVAL EX Therapy completed         Encounter Diagnoses   Name Primary?     Coronary artery disease involving native coronary artery of native heart without angina pectoris      Other iron deficiency anemia Yes       CURRENT MEDICATIONS:  Current Outpatient Medications   Medication Sig Dispense Refill     ACETAMINOPHEN PO        amoxicillin (AMOXIL) 500 MG capsule as needed Reported on 5/17/2017       ASPIRIN 81 MG OR TABS 1 tab po QD (Once per day)       CINNAMON PO Take 2 capsules by mouth 2 times daily       co-enzyme Q-10 100 MG CAPS capsule Take 100 mg by mouth       cyanocobalamin (CYANOCOBALAMIN) 1000 MCG/ML injection Inject 2 mLs into the muscle every 30 days       evolocumab (REPATHA SURECLICK) 140 MG/ML prefilled autoinjector Inject 1 mL (140 mg) Subcutaneous every 14 days 2 mL 11     ferrous fumarate 65 mg, [place].  FE,-Vitamin C 125 mg (VITRON C)  MG TABS tablet Take 1 tablet by mouth daily       GLIMEPIRIDE 4 MG OR TABS 1 tablet BID       Gymnema Sylvestris Leaf POWD daily       HYDROcodone-acetaminophen (NORCO) 5-325 MG tablet Take 1 tablet by mouth every 6 hours as needed for severe pain       isosorbide mononitrate (IMDUR) 30 MG 24 hr tablet Take 0.5 tablets (15 mg) by mouth daily 45 tablet 0     linagliptin (TRADJENTA) 5 MG TABS tablet Take 5 mg by mouth daily       lisinopril (PRINIVIL/ZESTRIL) 5 MG tablet Take 1 tablet (5 mg) by mouth daily 90 tablet 3     metFORMIN (GLUCOPHAGE) 1000 MG tablet Take 1,000 mg by mouth 2 times daily (with meals)       mirabegron (MYRBETRIQ) 50 MG 24 hr tablet Take 1 tablet (50 mg) by mouth daily Patient not taking daily 90 tablet 3     nitroglycerin (NITROSTAT) 0.4 MG SL tablet Place 1 tablet (0.4 mg) under the tongue every 5 minutes as needed for chest pain If symptoms persist after 3 doses (15 minutes) call 911. 25 tablet 3     Omega-3 Fatty Acids (OMEGA-3 FISH OIL PO) Take 2,400 mg by mouth 2 times daily (with meals)       ONETOUCH ULTRA test strip        STATIN NOT PRESCRIBED, INTENTIONAL, 1 each At Bedtime Statin not prescribed intentionally due to Allergy to statin 0 each 0     SYNTHROID 125 MCG OR TABS 1 tablet daily       tiZANidine (ZANAFLEX) 4 MG capsule Take 4 mg by mouth 3 times daily       traMADol (ULTRAM) 50 MG tablet Take 1 tablet (50 mg) by mouth every 6 hours as needed for moderate to severe pain 30 tablet 3     TURMERIC PO Take by mouth 2 times daily       vitamin D3 (CHOLECALCIFEROL) 2000 units tablet Take 1 tablet by mouth 2 times daily       VITAMIN E PO Take by mouth daily       empagliflozin (JARDIANCE) 25 MG TABS tablet Take 12.5 mg by mouth every other day       NITROFURANTOIN PO nitrofurantoin monohydrate/macrocrystals 100 mg capsule         ALLERGIES     Allergies   Allergen Reactions     No Known Drug Allergies        PAST MEDICAL HISTORY:  Past Medical  History:   Diagnosis Date     Arthritis      Coronary artery disease 04/28/2016    cardiac cath 2/2019: patent stents; PTCA with MAYA x 2 to OM1 and MAYA x 1 to p.LAD (4/21/2016 at [place]); PTCA with intracoronary stent placement of RCA June 2004; MAYA to OM1 11/2016     Cystocele, midline 09/29/2010     Depression      HYPERPLASTIC  POLYP      colonoscopy in 5-10 yrs.     Hypothyroidism     hypothyroid- Dr Majano     OAB (overactive bladder)     complex voiding dysfct, failed interstim     Osteoarthritis      Other and unspecified hyperlipidemia      Postmenopausal bleeding      Shoulder blade pain 5/31/2016     Type II or unspecified type diabetes mellitus without mention of complication, not stated as uncontrolled     Dr. Majano     Unspecified essential hypertension      Urinary frequency 9/29/10    followed by urology. no benefit from detrol or sanctura. month trial of macrobid and flomax 10/10       PAST SURGICAL HISTORY:  Past Surgical History:   Procedure Laterality Date     ------------OTHER-------------      Interstim     Ablation       C CT ANGIOGRAPHY CORONARY  1/2005    mod soft plaque LAD and Cx, follow up with left heart cath     C LIGATE FALLOPIAN TUBE      endometrial ablation     CARDIAC SURGERY       CHOLECYSTECTOMY       COLONOSCOPY       CV HEART CATHETERIZATION WITH POSSIBLE INTERVENTION N/A 2/14/2019    Procedure: Coronary Angiogram;  Surgeon: Sudarshan Lee MD;  Location: [facility] CARDIAC CATH LAB; patent stents     EXCISE LESION UPPER EXTREMITY  6/5/2013    Procedure: EXCISE LESION UPPER EXTREMITY;  EXCISION RIGHT ARM ANTICUBITAL LIPOMA ;  Surgeon: Jayson Joe MD;  Location: [facility] REMOVAL OF TONSILS,12+ Y/O       HEART CATH LEFT HEART CATH  3/2/2016    No intervention - aggressive medical management     HEART CATH LEFT HEART CATH  4/21/2016     MAYA x 2 to OM1, MAYA to LAD, at [place]     HEART CATH LEFT HEART CATH  6/29/2004    MAYA to RCA     HEART CATH LEFT HEART CATH   3/28/2002    Mild to moderate 3 vessel CAD. Medical management recommended.      HEART CATH LEFT HEART CATH  2016    MAYA to OM1     hypothyroid       KNEE SURGERY  4/20/10    right knee replacement     ORTHOPEDIC SURGERY      total knee      REMOVE STIMULATOR SACRAL NERVE N/A 2015    Procedure: REMOVE STIMULATOR SACRAL NERVE;  Surgeon: Gertrudis Frausto MD;  Location: [facility]     SURGICAL HISTORY OF -       Uterine endometrial ablation       FAMILY HISTORY:  Family History   Problem Relation Age of Onset     C.A.D. Father         MI , age 83, had high lipids     Hyperlipidemia Father      Hypertension Father      Coronary Artery Disease Father      Hypertension Mother      Genitourinary Problems Mother         renal failure     Fractures Mother         hip     Osteoporosis Mother      Cerebrovascular Disease Maternal Grandfather         cerebral hemorrhage     Diabetes Maternal Uncle      Colon Cancer Maternal Aunt      Diabetes Maternal Grandmother        SOCIAL HISTORY:  Social History     Socioeconomic History     Marital status:      Spouse name: Kwadwo     Number of children: 3     Years of education: None     Highest education level: None   Occupational History     Occupation: PT Aide     Employer: NONE      Employer: RETIRED   Social Needs     Financial resource strain: None     Food insecurity:     Worry: None     Inability: None     Transportation needs:     Medical: None     Non-medical: None   Tobacco Use     Smoking status: Never Smoker     Smokeless tobacco: Never Used   Substance and Sexual Activity     Alcohol use: No     Alcohol/week: 0.0 standard drinks     Drug use: No     Sexual activity: Never     Partners: Male     Birth control/protection: Post-menopausal   Lifestyle     Physical activity:     Days per week: None     Minutes per session: None     Stress: None   Relationships     Social connections:     Talks on phone: None     Gets together: None     Attends  "Uatsdin service: None     Active member of club or organization: None     Attends meetings of clubs or organizations: None     Relationship status: None     Intimate partner violence:     Fear of current or ex partner: None     Emotionally abused: None     Physically abused: None     Forced sexual activity: None   Other Topics Concern      Service Not Asked     Blood Transfusions Not Asked     Caffeine Concern Yes     Comment: 6-7 cups caffeine per day     Occupational Exposure Not Asked     Hobby Hazards Not Asked     Sleep Concern No     Stress Concern Yes     Weight Concern No     Special Diet No     Comment: eats healthy      Back Care Not Asked     Exercise Yes     Comment:  walking & active life style      Bike Helmet Not Asked     Seat Belt Not Asked     Self-Exams Not Asked     Parent/sibling w/ CABG, MI or angioplasty before 65F 55M? No   Social History Narrative     None       Review of Systems:  Skin:  Negative     Eyes:  Positive for glasses  ENT:  Negative    Respiratory:  Positive for dyspnea on exertion  Cardiovascular:  chest pain Positive for;chest pain(discomfort)  Gastroenterology: Negative    Genitourinary:  Negative urinary frequency  Musculoskeletal:  Positive for back pain;arthritis  Neurologic:  Negative numbness or tingling of feet  Psychiatric:  Positive for anxiety  Heme/Lymph/Imm:  Negative allergies  Endocrine:  Positive for thyroid disorder;diabetes    Physical Exam:  Vitals: /52   Pulse 68   Ht 1.702 m (5' 7\")   Wt 76.2 kg (168 lb)   BMI 26.31 kg/m      Please refer to dictation for physical exam    Recent Lab Results:  LIPID RESULTS:  Lab Results   Component Value Date    CHOL 129 07/11/2019    HDL 61 07/11/2019    LDL 50 07/11/2019    TRIG 90 07/11/2019    CHOLHDLRATIO 2.0 02/09/2017    CHOLHDLRATIO 2.6 03/23/2015       LIVER ENZYME RESULTS:  Lab Results   Component Value Date    AST 17 10/23/2019    ALT 30 (A) 10/23/2019       CBC RESULTS:  Lab Results "   Component Value Date    WBC 4.2 02/14/2019    RBC 4.21 02/14/2019    HGB 11.5 (A) 10/23/2019    HCT 38.0 02/14/2019    MCV 90 02/14/2019    MCH 29.9 02/14/2019    MCHC 33.2 02/14/2019    RDW 12.8 02/14/2019     02/14/2019       BMP RESULTS:  Lab Results   Component Value Date     12/11/2019    POTASSIUM 4.4 12/11/2019    CHLORIDE 104 12/11/2019    CO2 27 12/11/2019    ANIONGAP 13.4 12/11/2019     (H) 12/11/2019    BUN 16 12/11/2019    CR 0.93 12/11/2019    GFRESTIMATED 59 (L) 12/11/2019    GFRESTBLACK 72 12/11/2019    LUCIUS 10.7 (H) 12/11/2019        A1C RESULTS:  Lab Results   Component Value Date    A1C 7.7 (A) 10/23/2019       INR RESULTS:  Lab Results   Component Value Date    INR 0.88 02/14/2019    INR 0.94 02/24/2017           CC  Feroz Burgos MD  [address]  [place] [address]        Thank you for allowing me to participate in the care of your patient.      Sincerely,     Karen Alexander PARazC     [facility]    cc:   Feroz Burgos MD  [address]  [place] [address]

## 2019-12-11 NOTE — PROGRESS NOTES
[id number]  HPI and Plan:   See dictation    Orders this Visit:  Orders Placed This Encounter   Procedures     Hemoglobin     Follow-Up with Cardiac Advanced Practice Provider     Echocardiogram Complete     Orders Placed This Encounter   Medications     HYDROcodone-acetaminophen (NORCO) 5-325 MG tablet     Sig: Take 1 tablet by mouth every 6 hours as needed for severe pain     tiZANidine (ZANAFLEX) 4 MG capsule     Sig: Take 4 mg by mouth 3 times daily     ferrous fumarate 65 mg, [place]. FE,-Vitamin C 125 mg (VITRON C)  MG TABS tablet     Sig: Take 1 tablet by mouth daily     cyanocobalamin (CYANOCOBALAMIN) 1000 MCG/ML injection     Sig: Inject 2 mLs into the muscle every 30 days     linagliptin (TRADJENTA) 5 MG TABS tablet     Sig: Take 5 mg by mouth daily     Medications Discontinued During This Encounter   Medication Reason     CICLOPIROX & LACQUER REMOVAL EX Therapy completed         Encounter Diagnoses   Name Primary?     Coronary artery disease involving native coronary artery of native heart without angina pectoris      Other iron deficiency anemia Yes       CURRENT MEDICATIONS:  Current Outpatient Medications   Medication Sig Dispense Refill     ACETAMINOPHEN PO        amoxicillin (AMOXIL) 500 MG capsule as needed Reported on 5/17/2017       ASPIRIN 81 MG OR TABS 1 tab po QD (Once per day)       CINNAMON PO Take 2 capsules by mouth 2 times daily       co-enzyme Q-10 100 MG CAPS capsule Take 100 mg by mouth       cyanocobalamin (CYANOCOBALAMIN) 1000 MCG/ML injection Inject 2 mLs into the muscle every 30 days       evolocumab (REPATHA SURECLICK) 140 MG/ML prefilled autoinjector Inject 1 mL (140 mg) Subcutaneous every 14 days 2 mL 11     ferrous fumarate 65 mg, [place]. FE,-Vitamin C 125 mg (VITRON C)  MG TABS tablet Take 1 tablet by mouth daily       GLIMEPIRIDE 4 MG OR TABS 1 tablet BID       Gymnema Sylvestris Leaf POWD daily       HYDROcodone-acetaminophen (NORCO) 5-325 MG tablet Take 1 tablet by mouth  every 6 hours as needed for severe pain       isosorbide mononitrate (IMDUR) 30 MG 24 hr tablet Take 0.5 tablets (15 mg) by mouth daily 45 tablet 0     linagliptin (TRADJENTA) 5 MG TABS tablet Take 5 mg by mouth daily       lisinopril (PRINIVIL/ZESTRIL) 5 MG tablet Take 1 tablet (5 mg) by mouth daily 90 tablet 3     metFORMIN (GLUCOPHAGE) 1000 MG tablet Take 1,000 mg by mouth 2 times daily (with meals)       mirabegron (MYRBETRIQ) 50 MG 24 hr tablet Take 1 tablet (50 mg) by mouth daily Patient not taking daily 90 tablet 3     nitroglycerin (NITROSTAT) 0.4 MG SL tablet Place 1 tablet (0.4 mg) under the tongue every 5 minutes as needed for chest pain If symptoms persist after 3 doses (15 minutes) call 911. 25 tablet 3     Omega-3 Fatty Acids (OMEGA-3 FISH OIL PO) Take 2,400 mg by mouth 2 times daily (with meals)       ONETOUCH ULTRA test strip        STATIN NOT PRESCRIBED, INTENTIONAL, 1 each At Bedtime Statin not prescribed intentionally due to Allergy to statin 0 each 0     SYNTHROID 125 MCG OR TABS 1 tablet daily       tiZANidine (ZANAFLEX) 4 MG capsule Take 4 mg by mouth 3 times daily       traMADol (ULTRAM) 50 MG tablet Take 1 tablet (50 mg) by mouth every 6 hours as needed for moderate to severe pain 30 tablet 3     TURMERIC PO Take by mouth 2 times daily       vitamin D3 (CHOLECALCIFEROL) 2000 units tablet Take 1 tablet by mouth 2 times daily       VITAMIN E PO Take by mouth daily       empagliflozin (JARDIANCE) 25 MG TABS tablet Take 12.5 mg by mouth every other day       NITROFURANTOIN PO nitrofurantoin monohydrate/macrocrystals 100 mg capsule         ALLERGIES     Allergies   Allergen Reactions     No Known Drug Allergies        PAST MEDICAL HISTORY:  Past Medical History:   Diagnosis Date     Arthritis      Coronary artery disease 04/28/2016    cardiac cath 2/2019: patent stents; PTCA with MAYA x 2 to OM1 and MAYA x 1 to p.LAD (4/21/2016 at [place]); PTCA with intracoronary stent placement of RCA June 2004; MAYA  to OM1 11/2016     Cystocele, midline 09/29/2010     Depression      HYPERPLASTIC  POLYP      colonoscopy in 5-10 yrs.     Hypothyroidism     hypothyroid- Dr Majano     OAB (overactive bladder)     complex voiding dysfct, failed interstim     Osteoarthritis      Other and unspecified hyperlipidemia      Postmenopausal bleeding      Shoulder blade pain 5/31/2016     Type II or unspecified type diabetes mellitus without mention of complication, not stated as uncontrolled     Dr. Majano     Unspecified essential hypertension      Urinary frequency 9/29/10    followed by urology. no benefit from detrol or sanctura. month trial of macrobid and flomax 10/10       PAST SURGICAL HISTORY:  Past Surgical History:   Procedure Laterality Date     ------------OTHER-------------      Interstim     Ablation       C CT ANGIOGRAPHY CORONARY  1/2005    mod soft plaque LAD and Cx, follow up with left heart cath     C LIGATE FALLOPIAN TUBE      endometrial ablation     CARDIAC SURGERY       CHOLECYSTECTOMY       COLONOSCOPY       CV HEART CATHETERIZATION WITH POSSIBLE INTERVENTION N/A 2/14/2019    Procedure: Coronary Angiogram;  Surgeon: Sudarshan Lee MD;  Location:  HEART CARDIAC CATH LAB; patent stents     EXCISE LESION UPPER EXTREMITY  6/5/2013    Procedure: EXCISE LESION UPPER EXTREMITY;  EXCISION RIGHT ARM ANTICUBITAL LIPOMA ;  Surgeon: Jayson Joe MD;  Location: [facility] REMOVAL OF TONSILS,12+ Y/O       HEART CATH LEFT HEART CATH  3/2/2016    No intervention - aggressive medical management     HEART CATH LEFT HEART CATH  4/21/2016     MAYA x 2 to OM1, MAYA to LAD, at [place]     HEART CATH LEFT HEART CATH  6/29/2004    MAYA to RCA     HEART CATH LEFT HEART CATH  3/28/2002    Mild to moderate 3 vessel CAD. Medical management recommended.      HEART CATH LEFT HEART CATH  11/22/2016    MAYA to OM1     hypothyroid       KNEE SURGERY  4/20/10    right knee replacement     ORTHOPEDIC SURGERY      total knee 2010      REMOVE STIMULATOR SACRAL NERVE N/A 2015    Procedure: REMOVE STIMULATOR SACRAL NERVE;  Surgeon: Gertrudis Frausto MD;  Location: [facility]     SURGICAL HISTORY OF -       Uterine endometrial ablation       FAMILY HISTORY:  Family History   Problem Relation Age of Onset     C.A.D. Father         MI , age 83, had high lipids     Hyperlipidemia Father      Hypertension Father      Coronary Artery Disease Father      Hypertension Mother      Genitourinary Problems Mother         renal failure     Fractures Mother         hip     Osteoporosis Mother      Cerebrovascular Disease Maternal Grandfather         cerebral hemorrhage     Diabetes Maternal Uncle      Colon Cancer Maternal Aunt      Diabetes Maternal Grandmother        SOCIAL HISTORY:  Social History     Socioeconomic History     Marital status:      Spouse name: Kwadwo     Number of children: 3     Years of education: None     Highest education level: None   Occupational History     Occupation: PT Aide     Employer: NONE      Employer: RETIRED   Social Needs     Financial resource strain: None     Food insecurity:     Worry: None     Inability: None     Transportation needs:     Medical: None     Non-medical: None   Tobacco Use     Smoking status: Never Smoker     Smokeless tobacco: Never Used   Substance and Sexual Activity     Alcohol use: No     Alcohol/week: 0.0 standard drinks     Drug use: No     Sexual activity: Never     Partners: Male     Birth control/protection: Post-menopausal   Lifestyle     Physical activity:     Days per week: None     Minutes per session: None     Stress: None   Relationships     Social connections:     Talks on phone: None     Gets together: None     Attends Yazidi service: None     Active member of club or organization: None     Attends meetings of clubs or organizations: None     Relationship status: None     Intimate partner violence:     Fear of current or ex partner: None     Emotionally abused: None      "Physically abused: None     Forced sexual activity: None   Other Topics Concern      Service Not Asked     Blood Transfusions Not Asked     Caffeine Concern Yes     Comment: 6-7 cups caffeine per day     Occupational Exposure Not Asked     Hobby Hazards Not Asked     Sleep Concern No     Stress Concern Yes     Weight Concern No     Special Diet No     Comment: eats healthy      Back Care Not Asked     Exercise Yes     Comment:  walking & active life style      Bike Helmet Not Asked     Seat Belt Not Asked     Self-Exams Not Asked     Parent/sibling w/ CABG, MI or angioplasty before 65F 55M? No   Social History Narrative     None       Review of Systems:  Skin:  Negative     Eyes:  Positive for glasses  ENT:  Negative    Respiratory:  Positive for dyspnea on exertion  Cardiovascular:  chest pain Positive for;chest pain(discomfort)  Gastroenterology: Negative    Genitourinary:  Negative urinary frequency  Musculoskeletal:  Positive for back pain;arthritis  Neurologic:  Negative numbness or tingling of feet  Psychiatric:  Positive for anxiety  Heme/Lymph/Imm:  Negative allergies  Endocrine:  Positive for thyroid disorder;diabetes    Physical Exam:  Vitals: /52   Pulse 68   Ht 1.702 m (5' 7\")   Wt 76.2 kg (168 lb)   BMI 26.31 kg/m     Please refer to dictation for physical exam    Recent Lab Results:  LIPID RESULTS:  Lab Results   Component Value Date    CHOL 129 07/11/2019    HDL 61 07/11/2019    LDL 50 07/11/2019    TRIG 90 07/11/2019    CHOLHDLRATIO 2.0 02/09/2017    CHOLHDLRATIO 2.6 03/23/2015       LIVER ENZYME RESULTS:  Lab Results   Component Value Date    AST 17 10/23/2019    ALT 30 (A) 10/23/2019       CBC RESULTS:  Lab Results   Component Value Date    WBC 4.2 02/14/2019    RBC 4.21 02/14/2019    HGB 11.5 (A) 10/23/2019    HCT 38.0 02/14/2019    MCV 90 02/14/2019    MCH 29.9 02/14/2019    MCHC 33.2 02/14/2019    RDW 12.8 02/14/2019     02/14/2019       BMP RESULTS:  Lab Results "   Component Value Date     12/11/2019    POTASSIUM 4.4 12/11/2019    CHLORIDE 104 12/11/2019    CO2 27 12/11/2019    ANIONGAP 13.4 12/11/2019     (H) 12/11/2019    BUN 16 12/11/2019    CR 0.93 12/11/2019    GFRESTIMATED 59 (L) 12/11/2019    GFRESTBLACK 72 12/11/2019    LUCIUS 10.7 (H) 12/11/2019        A1C RESULTS:  Lab Results   Component Value Date    A1C 7.7 (A) 10/23/2019       INR RESULTS:  Lab Results   Component Value Date    INR 0.88 02/14/2019    INR 0.94 02/24/2017           CC  Feroz Burgos MD  [address]  [place] [id number]

## 2019-12-11 NOTE — RESULT ENCOUNTER NOTE
Reviewed during clinic visit.  Please see progress note for plan.  Karen Alexander PA-C 12/11/2019 12:59 PM

## 2019-12-11 NOTE — PROGRESS NOTES
Service Date: 2019      PRIMARY CARDIOLOGIST:  Dr. Burgos.      REASON FOR VISIT:  Coronary artery disease followup.      HISTORY OF PRESENT ILLNESS:  Ms. Longo is a 74-year-old woman with past medical history significant for the followin.  Coronary artery disease with stenting to the RCA in .  She redeveloped symptoms in spring of 2016, had abnormal stress test and was found to have a complex trifurcation lesion of the OM2 and distal LAD.  This was managed medically initially due to the complexity of the intervention, but she ended up going to [place] [facility] and had trifurcation stents to the superior and main branch of her OM and dilatation of the inferior branch.  She also had an LAD stent placed.  Her last angiogram was in 2019 that showed an apical 80% LAD lesion that was stable and patent trifurcation stents in the OM and minimal disease in the RCA and left main.   2.  Dyslipidemia, on Repatha, intolerant of statins.   3.  Hypertension.   4.  Type 2 diabetes, on Jardiance.   5.  Type 1 Brugada pattern provoked by fever and possible Lamictal use (please refer to Dr. Nation' consult 2016).      She is here today for routine followup and she continues to have a variety of concerns.  Primarily, she is limited by her back pain.  She underwent a radiofrequency ablation of her nerve roots last week and feels that this is severely painful, much more so than she was expecting it to be.  She feels like the hydrocodone and muscle relaxers she is on knocks her out and then she cannot function.  She is unhappy with how this is for now and she is not sure how long the pain is going to last.  She has not had fevers or chills or bleeding with this.  She also complains of intermittent shortness of breath and pain between her shoulder blades and discomfort in her back.  This has been her typical angina.  She is fairly vague about when this started, and says it has been off and on the whole year.  She also gets  short of breath climbing up stairs and it is perhaps a little bit worse than usual.  She denies orthopnea or PND.  She has not had any syncope or presyncope.      SOCIAL HISTORY:  Lifelong nonsmoker, no alcohol use.  She has 7 children.  She works weekends at [employer].  She freely admits to being an intense person.      PHYSICAL EXAMINATION:   GENERAL:  Well-developed, well-nourished, talkative woman in no acute distress.   HEENT:  Normocephalic, atraumatic.   HEART:  Regular in rate and rhythm with 2/6 systolic murmur heard best lower left sternal border.   LUNGS:  Clear bilaterally.   EXTREMITIES:  Without peripheral edema.      ASSESSMENT AND PLAN:   1.  Coronary artery disease with patient concerned of progression of her disease and complaining of some back discomfort that is potentially anginal in nature but even atypical for her.  She very clearly states she wants a stress test because she is worried about changes.  We talked about the fact that stress tests then, if abnormal, lead to have angiograms, as well as the fact that with each nuclear stress test she would get a radiation dose.  Stress echo is not a good choice for her, as she cannot walk on a treadmill at this point.  After a long discussion, we just stated we are going to start with a plain echocardiogram to assess for any wall motion at rest, knowing that this is not particularly specific but that if there is some major issue we would see it on here.  At this point, it is difficult to discern her upper back pain as it is mixed with severe lower back pain after her radiofrequency ablation.  At this point, I told her I would like to get that settled down before we consider doing a stress test.   2.  Hypertension, well controlled.   3.  Dyslipidemia, on Repatha with excellent success.   4.  Type 2 diabetes, reasonable control on Jardiance, which has a great cardiac benefit.      Thank you for allowing me to participate in this  patient's care.  We will have her follow up in 3 months, if not sooner due to a severe echo abnormality.      SHYANN Elizabeth PA-C             D: 2019   T: 2019   MT: LD      Name:     KATHY PERRY   MRN:      [id number]        Account:      [id number]   :      1945           Service Date: 2019      Document: [id number]

## 2019-12-11 NOTE — PATIENT INSTRUCTIONS
Thank you for coming into [facility] today.    We discussed: we'll do an echocardiogram to check in on your heart function.  If this is very abnormal the next step would be an angiogram.      Medication changes:  Continue current medications.      Results:   Component      Latest Ref Rng & Units 12/11/2019   Sodium      136 - 145 mmol/L 140   Potassium      3.5 - 5.1 mmol/L 4.4   Chloride      98 - 107 mmol/L 104   Carbon Dioxide      23 - 29 mmol/L 27   Anion Gap      6 - 17 mmol/L 13.4   Glucose      70 - 105 mg/dL 200 (H)   Urea Nitrogen      7 - 30 mg/dL 16   Creatinine      0.70 - 1.30 mg/dL 0.93   GFR Estimate      >60 mL/min/1.73:m2 59 (L)   GFR Estimate If Black      >60 mL/min/1.73:m2 72   Calcium      8.5 - 10.5 mg/dL 10.7 (H)     Follow up: with me in about 3 months.        Please call my nurse at [phone] with any questions or concerns.    Scheduling phone number: [phone]  Reminder: Please bring in all current medications, over the counter supplements and vitamin bottles to your next appointment.

## 2019-12-11 NOTE — LETTER
2019      Gaye Zimmer  [facility] [address]  [facility] [address]      RE: Sarah EMELIA Longo       Dear Colleague,    I had the pleasure of seeing Sarah KAPOOR Donta in the [facility].    Service Date: 2019      PRIMARY CARDIOLOGIST:  Dr. Burgos.      REASON FOR VISIT:  Coronary artery disease followup.      HISTORY OF PRESENT ILLNESS:  Ms. Longo is a 74-year-old woman with past medical history significant for the followin.  Coronary artery disease with stenting to the RCA in .  She redeveloped symptoms in spring of 2016, had abnormal stress test and was found to have a complex trifurcation lesion of the OM2 and distal LAD.  This was managed medically initially due to the complexity of the intervention, but she ended up going to [place] and had trifurcation stents to the superior and main branch of her OM and dilatation of the inferior branch.  She also had an LAD stent placed.  Her last angiogram was in 2019 that showed an apical 80% LAD lesion that was stable and patent trifurcation stents in the OM and minimal disease in the RCA and left main.   2.  Dyslipidemia, on Repatha, intolerant of statins.   3.  Hypertension.   4.  Type 2 diabetes, on Jardiance.   5.  Type 1 Brugada pattern provoked by fever and possible Lamictal use (please refer to Dr. Nation' consult 2016).      She is here today for routine followup and she continues to have a variety of concerns.  Primarily, she is limited by her back pain.  She underwent a radiofrequency ablation of her nerve roots last week and feels that this is severely painful, much more so than she was expecting it to be.  She feels like the hydrocodone and muscle relaxers she is on knocks her out and then she cannot function.  She is unhappy with how this is for now and she is not sure how long the pain is going to last.  She has not had fevers or chills or bleeding with this.  She also complains of  intermittent shortness of breath and pain between her shoulder blades and discomfort in her back.  This has been her typical angina.  She is fairly vague about when this started, and says it has been off and on the whole year.  She also gets short of breath climbing up stairs and it is perhaps a little bit worse than usual.  She denies orthopnea or PND.  She has not had any syncope or presyncope.      SOCIAL HISTORY:  Lifelong nonsmoker, no alcohol use.  She has 7 children.  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  She freely admits to being an intense person.      PHYSICAL EXAMINATION:   GENERAL:  Well-developed, well-nourished, talkative woman in no acute distress.   HEENT:  Normocephalic, atraumatic.   HEART:  Regular in rate and rhythm with 2/6 systolic murmur heard best lower left sternal border.   LUNGS:  Clear bilaterally.   EXTREMITIES:  Without peripheral edema.      ASSESSMENT AND PLAN:   1.  Coronary artery disease with patient concerned of progression of her disease and complaining of some back discomfort that is potentially anginal in nature but even atypical for her.  She very clearly states she wants a stress test because she is worried about changes.  We talked about the fact that stress tests then, if abnormal, lead to have angiograms, as well as the fact that with each nuclear stress test she would get a radiation dose.  Stress echo is not a good choice for her, as she cannot walk on a treadmill at this point.  After a long discussion, we just stated we are going to start with a plain echocardiogram to assess for any wall motion at rest, knowing that this is not particularly specific but that if there is some major issue we would see it on here.  At this point, it is difficult to discern her upper back pain as it is mixed with severe lower back pain after her radiofrequency ablation.  At this point, I told her I would like to get that settled down before we consider doing a stress test.    2.  Hypertension, well controlled.   3.  Dyslipidemia, on Repatha with excellent success.   4.  Type 2 diabetes, reasonable control on Jardiance, which has a great cardiac benefit.      Thank you for allowing me to participate in this patient's care.  We will have her follow up in 3 months, if not sooner due to a severe echo abnormality.      SHYANN Elizabeth PA-C             D: 2019   T: 2019   MT: LD      Name:     KATHY PERRY   MRN:      [id number]        Account:      [id number]   :      1945           Service Date: 2019      Document: [id number]         Outpatient Encounter Medications as of 2019   Medication Sig Dispense Refill     ACETAMINOPHEN PO        amoxicillin (AMOXIL) 500 MG capsule as needed Reported on 2017       ASPIRIN 81 MG OR TABS 1 tab po QD (Once per day)       CINNAMON PO Take 2 capsules by mouth 2 times daily       co-enzyme Q-10 100 MG CAPS capsule Take 100 mg by mouth       cyanocobalamin (CYANOCOBALAMIN) 1000 MCG/ML injection Inject 2 mLs into the muscle every 30 days       evolocumab (REPATHA SURECLICK) 140 MG/ML prefilled autoinjector Inject 1 mL (140 mg) Subcutaneous every 14 days 2 mL 11     ferrous fumarate 65 mg, [place]. FE,-Vitamin C 125 mg (VITRON C)  MG TABS tablet Take 1 tablet by mouth daily       GLIMEPIRIDE 4 MG OR TABS 1 tablet BID       Gymnema Sylvestris Leaf POWD daily       HYDROcodone-acetaminophen (NORCO) 5-325 MG tablet Take 1 tablet by mouth every 6 hours as needed for severe pain       isosorbide mononitrate (IMDUR) 30 MG 24 hr tablet Take 0.5 tablets (15 mg) by mouth daily 45 tablet 0     linagliptin (TRADJENTA) 5 MG TABS tablet Take 5 mg by mouth daily       lisinopril (PRINIVIL/ZESTRIL) 5 MG tablet Take 1 tablet (5 mg) by mouth daily 90 tablet 3     metFORMIN (GLUCOPHAGE) 1000 MG tablet Take 1,000 mg by mouth 2 times daily (with meals)       mirabegron (MYRBETRIQ) 50 MG 24 hr tablet  Take 1 tablet (50 mg) by mouth daily Patient not taking daily 90 tablet 3     nitroglycerin (NITROSTAT) 0.4 MG SL tablet Place 1 tablet (0.4 mg) under the tongue every 5 minutes as needed for chest pain If symptoms persist after 3 doses (15 minutes) call 911. 25 tablet 3     Omega-3 Fatty Acids (OMEGA-3 FISH OIL PO) Take 2,400 mg by mouth 2 times daily (with meals)       ONETOUCH ULTRA test strip        STATIN NOT PRESCRIBED, INTENTIONAL, 1 each At Bedtime Statin not prescribed intentionally due to Allergy to statin 0 each 0     SYNTHROID 125 MCG OR TABS 1 tablet daily       tiZANidine (ZANAFLEX) 4 MG capsule Take 4 mg by mouth 3 times daily       traMADol (ULTRAM) 50 MG tablet Take 1 tablet (50 mg) by mouth every 6 hours as needed for moderate to severe pain 30 tablet 3     TURMERIC PO Take by mouth 2 times daily       vitamin D3 (CHOLECALCIFEROL) 2000 units tablet Take 1 tablet by mouth 2 times daily       VITAMIN E PO Take by mouth daily       empagliflozin (JARDIANCE) 25 MG TABS tablet Take 12.5 mg by mouth every other day       NITROFURANTOIN PO nitrofurantoin monohydrate/macrocrystals 100 mg capsule       [DISCONTINUED] CICLOPIROX & LACQUER REMOVAL EX Externally apply topically daily       No facility-administered encounter medications on file as of 12/11/2019.        Again, thank you for allowing me to participate in the care of your patient.      Sincerely,    Karen Alexander PA-C     [facility]

## 2019-12-12 ENCOUNTER — DOCUMENTATION ONLY (OUTPATIENT)
Dept: CARDIOLOGY | Facility: CLINIC | Age: 74
End: 2019-12-12

## 2019-12-12 NOTE — PROGRESS NOTES
Late Entry:  Met with pt after her OV with SHYANN Wang on 12/11/19.  Provided [employer] re-application and advised pt to complete pages 1-3 and mail back to [employer].  Had SHYANN Wang sign page 4 of the [employer] application and submitted to the specialty pharmacy clinic liaison to send to [employer].     JOLANTA Gallardo 4:24 PM 12/12/2019

## 2019-12-13 NOTE — PROGRESS NOTES
Incomplete ASNF gera signed by provider - faxed to program for 2020 renewal evaluation. Pt advised to submit her portion separately.    [email or web address]

## 2019-12-18 NOTE — PROGRESS NOTES
Pt called and reports that she does not feel comfortable mailing her portion of the completed [employer] form.  She would rather have her portion sent to [employer] by "[employer]view and will either mail her portion or drop it off at the clinic.      JOLANTA Gallardo 9:03 AM 12/18/2019

## 2019-12-19 NOTE — PROGRESS NOTES
Called pt and updated her that Am[employer] form has been submitted and will call once response received from [employer]- she agrees with this plan.    JOLANTA Gallardo 1:46 PM 12/19/2019

## 2019-12-19 NOTE — PROGRESS NOTES
Pt dropped off her portion of completed [employer] forms.  Sent forms to Specialty Pharmacy Clinical Liaison, Rosio, to submit to [employer].    JOLANTA Gallardo 11:09 AM 12/19/2019

## 2019-12-23 DIAGNOSIS — I25.10 CORONARY ARTERY DISEASE INVOLVING NATIVE CORONARY ARTERY OF NATIVE HEART WITHOUT ANGINA PECTORIS: ICD-10-CM

## 2019-12-23 RX ORDER — ISOSORBIDE MONONITRATE 30 MG/1
15 TABLET, EXTENDED RELEASE ORAL DAILY
Qty: 45 TABLET | Refills: 3 | Status: SHIPPED | OUTPATIENT
Start: 2019-12-23 | End: 2020-04-10

## 2019-12-26 NOTE — PROGRESS NOTES
Free Drug Application Approved  Effective Dates Thru 12/31/2020  Patient notified? Yes  Additional Information

## 2019-12-31 ENCOUNTER — TELEPHONE (OUTPATIENT)
Dept: CARDIOLOGY | Facility: CLINIC | Age: 74
End: 2019-12-31

## 2019-12-31 ENCOUNTER — HOSPITAL ENCOUNTER (OUTPATIENT)
Dept: CARDIOLOGY | Facility: CLINIC | Age: 74
Discharge: HOME | End: 2019-12-31
Attending: PHYSICIAN ASSISTANT | Admitting: PHYSICIAN ASSISTANT
Payer: COMMERCIAL

## 2019-12-31 DIAGNOSIS — I25.10 CORONARY ARTERY DISEASE INVOLVING NATIVE CORONARY ARTERY OF NATIVE HEART WITHOUT ANGINA PECTORIS: ICD-10-CM

## 2019-12-31 PROCEDURE — 93306 TTE W/DOPPLER COMPLETE: CPT | Mod: 26 | Performed by: INTERNAL MEDICINE

## 2019-12-31 PROCEDURE — 93306 TTE W/DOPPLER COMPLETE: CPT

## 2019-12-31 NOTE — TELEPHONE ENCOUNTER
Patient called and advised RN that she received a letter from [employer] advising that they are missing paperwork or have incomplete paperwork. RN called patient and advised her that unfortunately it is not uncommon for us to receive these types of messages and when we call to clarify more often than not the paperwork is actually there and correct. RN advised patient that when our PCSK9 RN returns later this week we will look into this further. Patient verbalized understanding and has no further questions.

## 2020-01-02 ENCOUNTER — TELEPHONE (OUTPATIENT)
Dept: CARDIOLOGY | Facility: CLINIC | Age: 75
End: 2020-01-02

## 2020-01-02 NOTE — TELEPHONE ENCOUNTER
"2 voice messages received requesting results of her ECHO which was done on 12/31/19.     Echo was ordered by Karen Alexander looking for any wall motion abnormalities at OV on 12/11/19. Patient continuing to complain of back discomfort, and requesting a test to rule out potential angina back pain. After long discussion, agreed to plain echocardiogram to assess for any wall motion at rest, that would show any major issue. Had just had a radiofrequency ablation to her back.    Patient called and updated that no wall abnormalities were noted on the Echo.   States she is seriously contemplating having major back surgery. \"Will need Dr. Burgos's blessing to have surgery\".      "

## 2020-01-03 ENCOUNTER — CARE COORDINATION (OUTPATIENT)
Dept: CARDIOLOGY | Facility: CLINIC | Age: 75
End: 2020-01-03

## 2020-01-03 DIAGNOSIS — I25.10 CORONARY ARTERY DISEASE INVOLVING NATIVE CORONARY ARTERY OF NATIVE HEART WITHOUT ANGINA PECTORIS: Primary | ICD-10-CM

## 2020-01-03 NOTE — TELEPHONE ENCOUNTER
Called and spoke with pt and confirmed that she has been approved to receive Repatha at no cost through [employer] 1/1/20-12/3120 based on approval letter received from [employer] dated 12/26/19.      She verbalized understanding and agrees with this plan.    JOLANTA Gallardo 5:11 PM 1/3/2020

## 2020-01-03 NOTE — PROGRESS NOTES
Pt called and said she had a visit with her ortho MD today,and it has been recommended she have back surgery. Pt has been having a lot of back pain recently and thinks it is time she proceed with the surgery.    She needs cardiac clearance, and would like to see  in January to discuss. Pt's surgeon is  of [facility]. He would like to do the surgery at [facility], but there is no cath lab there per pt. She said the surgeon would like to know if  thinks pt should have her surgery at a facility with a cath lab. Pt will also need to hold her ASA for an upcoming epidural, and back surgery. Pt would like to know how many days  feels comfortable with her holding ASA. Pt has hx of CAD with multiple stent placements.  Her last angiogram was in 02/2019 that showed an apical 80% LAD lesion that was stable and patent trifurcation stents in the OM and minimal disease in the RCA and left main. Pt had an echo 12/31/19 that showed EF 55-60%, no regional wall motion abnormalities. Will see if there is a day  can see pt to discuss surgery clearance further, and if he wants her to have any other testing prior. Patrick STOVER January 3, 2020, 3:57 PM

## 2020-01-05 NOTE — TELEPHONE ENCOUNTER
Echo looks beautiful, no wall motion.  I'm sorry her back pain isn't better after the ablation.  I'll defer to Dr. Burgos, but he may want a lexiscan nuc for preop.  Karen Alexander PA-C 1/5/2020 2:59 PM

## 2020-01-06 NOTE — PROGRESS NOTES
I left message for pt to call back to discuss 's recommendations regarding surgery clearance and holding ASA. Patrick STOVER January 6, 2020, 4:28 PM

## 2020-01-06 NOTE — TELEPHONE ENCOUNTER
I agree that we would want to repeat a Lexiscan nuclear study prior to major back surgery, but these symptoms seem to be mostly related to her back rather than her heart. EE

## 2020-01-06 NOTE — PROGRESS NOTES
I called pt back and reviewed 's recommendations. Scheduling will call pt in the morning to set up stress test. PT wants to see  so she has been scheduled for 1/28/20 in [place]. Patrick STOVER January 6, 2020, 5:12 PM

## 2020-01-06 NOTE — TELEPHONE ENCOUNTER
She will need a Gabbi nuc stress. If that is ok, she would need to hold her aspirin for at least 5 days. She can get the stress test done whenever it is convenient. If it looks ok, she would not need a hospital with a cath lab. That would be very unlikely to be necessary. FREEMAN

## 2020-01-13 ENCOUNTER — HOSPITAL ENCOUNTER (OUTPATIENT)
Dept: CARDIOLOGY | Facility: CLINIC | Age: 75
Discharge: HOME | End: 2020-01-13
Attending: INTERNAL MEDICINE | Admitting: INTERNAL MEDICINE
Payer: COMMERCIAL

## 2020-01-13 ENCOUNTER — CARE COORDINATION (OUTPATIENT)
Dept: CARDIOLOGY | Facility: CLINIC | Age: 75
End: 2020-01-13

## 2020-01-13 VITALS
BODY MASS INDEX: 25.21 KG/M2 | OXYGEN SATURATION: 99 % | DIASTOLIC BLOOD PRESSURE: 68 MMHG | SYSTOLIC BLOOD PRESSURE: 116 MMHG | WEIGHT: 160.6 LBS | HEART RATE: 65 BPM | HEIGHT: 67 IN

## 2020-01-13 DIAGNOSIS — I25.10 CORONARY ARTERY DISEASE INVOLVING NATIVE CORONARY ARTERY OF NATIVE HEART WITHOUT ANGINA PECTORIS: ICD-10-CM

## 2020-01-13 LAB
CV BLOOD PRESSURE: 50 %
CV STRESS MAX HR HE: 86
RATE PRESSURE PRODUCT: NORMAL
STRESS ECHO BASELINE DIASTOLIC HE: 68
STRESS ECHO BASELINE HR: 67
STRESS ECHO BASELINE SYSTOLIC BP: 116
STRESS ECHO CALCULATED PERCENT HR: 59 %
STRESS ECHO LAST STRESS DIASTOLIC BP: 60
STRESS ECHO LAST STRESS SYSTOLIC BP: 120
STRESS ECHO TARGET HR: 147
STRESS/REST PERFUSION RATIO: 1.15

## 2020-01-13 PROCEDURE — A9502 TC99M TETROFOSMIN: HCPCS | Performed by: INTERNAL MEDICINE

## 2020-01-13 PROCEDURE — 78452 HT MUSCLE IMAGE SPECT MULT: CPT | Mod: 26 | Performed by: INTERNAL MEDICINE

## 2020-01-13 PROCEDURE — 25000128 H RX IP 250 OP 636: Performed by: INTERNAL MEDICINE

## 2020-01-13 PROCEDURE — 78452 HT MUSCLE IMAGE SPECT MULT: CPT

## 2020-01-13 PROCEDURE — 93016 CV STRESS TEST SUPVJ ONLY: CPT | Performed by: INTERNAL MEDICINE

## 2020-01-13 PROCEDURE — 93018 CV STRESS TEST I&R ONLY: CPT | Performed by: INTERNAL MEDICINE

## 2020-01-13 PROCEDURE — 34300033 ZZH RX 343: Performed by: INTERNAL MEDICINE

## 2020-01-13 RX ORDER — ACYCLOVIR 200 MG/1
0-1 CAPSULE ORAL
Status: DISCONTINUED | OUTPATIENT
Start: 2020-01-13 | End: 2020-01-14 | Stop reason: HOSPADM

## 2020-01-13 RX ORDER — AMINOPHYLLINE 25 MG/ML
50-100 INJECTION, SOLUTION INTRAVENOUS
Status: DISCONTINUED | OUTPATIENT
Start: 2020-01-13 | End: 2020-01-14 | Stop reason: HOSPADM

## 2020-01-13 RX ORDER — ALBUTEROL SULFATE 90 UG/1
2 AEROSOL, METERED RESPIRATORY (INHALATION) EVERY 5 MIN PRN
Status: DISCONTINUED | OUTPATIENT
Start: 2020-01-13 | End: 2020-01-14 | Stop reason: HOSPADM

## 2020-01-13 RX ORDER — REGADENOSON 0.08 MG/ML
0.4 INJECTION, SOLUTION INTRAVENOUS ONCE
Status: COMPLETED | OUTPATIENT
Start: 2020-01-13 | End: 2020-01-13

## 2020-01-13 RX ADMIN — TETROFOSMIN 3.6 MCI.: 1.38 INJECTION, POWDER, LYOPHILIZED, FOR SOLUTION INTRAVENOUS at 12:13

## 2020-01-13 RX ADMIN — REGADENOSON 0.4 MG: 0.08 INJECTION, SOLUTION INTRAVENOUS at 14:04

## 2020-01-13 RX ADMIN — TETROFOSMIN 8.65 MCI.: 1.38 INJECTION, POWDER, LYOPHILIZED, FOR SOLUTION INTRAVENOUS at 14:07

## 2020-01-13 ASSESSMENT — MIFFLIN-ST. JEOR: SCORE: 1266.11

## 2020-01-13 NOTE — PROGRESS NOTES
Nuclear stress test results from 1/13/20 noted. I left message for pt to call back to review. Patrick STOVER January 13, 2020, 5:01 PM

## 2020-01-14 ENCOUNTER — CARE COORDINATION (OUTPATIENT)
Dept: CARDIOLOGY | Facility: CLINIC | Age: 75
End: 2020-01-14

## 2020-01-14 NOTE — PROGRESS NOTES
Pt called to report that her daughter bought her the [employer] mobile device to check her EKG routinely. Pt upset because there is a monthly fee. She wanted to know if this is something that would benefit her. I told pt that I have seen providers recommend the apple watch or [employer] device before, but typically for pt's who are having potential rhythm issues that have not been captured on event monitor, ziopatch, etc. or for pt's that have occasional afib. I told pt she has not had these issues so it would be only for her own peace of mind at this point for her to know her EKG daily. I told pt it makes some pts more anxious to know what their rhythm/HR is doing at all times. Pt said she thinks she would likely be one of those pts so she may have her daughter return it. Patrick STOVER January 14, 2020, 4:11 PM

## 2020-01-28 ENCOUNTER — OFFICE VISIT (OUTPATIENT)
Dept: CARDIOLOGY | Facility: CLINIC | Age: 75
End: 2020-01-28
Payer: COMMERCIAL

## 2020-01-28 VITALS
BODY MASS INDEX: 25.9 KG/M2 | HEIGHT: 67 IN | DIASTOLIC BLOOD PRESSURE: 82 MMHG | SYSTOLIC BLOOD PRESSURE: 134 MMHG | HEART RATE: 73 BPM | WEIGHT: 165 LBS

## 2020-01-28 DIAGNOSIS — I25.10 CORONARY ARTERY DISEASE INVOLVING NATIVE CORONARY ARTERY OF NATIVE HEART WITHOUT ANGINA PECTORIS: Primary | ICD-10-CM

## 2020-01-28 DIAGNOSIS — I10 ESSENTIAL HYPERTENSION: ICD-10-CM

## 2020-01-28 DIAGNOSIS — M48.00 SPINAL STENOSIS, UNSPECIFIED SPINAL REGION: ICD-10-CM

## 2020-01-28 DIAGNOSIS — E78.5 HYPERLIPIDEMIA LDL GOAL <100: ICD-10-CM

## 2020-01-28 PROCEDURE — 99214 OFFICE O/P EST MOD 30 MIN: CPT | Performed by: INTERNAL MEDICINE

## 2020-01-28 RX ORDER — NEBIVOLOL 5 MG/1
5 TABLET ORAL DAILY
Qty: 90 TABLET | Refills: 0
Start: 2020-01-28 | End: 2020-03-25

## 2020-01-28 RX ORDER — LIRAGLUTIDE 6 MG/ML
0.6 INJECTION SUBCUTANEOUS
Status: ON HOLD | COMMUNITY
End: 2020-07-18

## 2020-01-28 ASSESSMENT — MIFFLIN-ST. JEOR: SCORE: 1286.07

## 2020-01-28 NOTE — LETTER
1/28/2020      Gaye Zimmer  [facility] [address]  [facility] [address]      RE: Sarah KAPOOR Donta       Dear Colleague,    I had the pleasure of seeing Sarah Longo in the [facility].    Service Date: 01/28/2020      CARDIOLOGY OFFICE PROGRESS NOTE       HISTORY OF PRESENT ILLNESS:  I had the opportunity to see Ms. Sarah Longo in Cardiology Clinic today at the [facility] in [place] for reevaluation of coronary artery disease in the setting of preoperative evaluation prior to low back fusion surgery.  She is a 74-year-old woman followed here by me long term in Cardiology Clinic for management of coronary artery disease and cardiac risk factors including hypertension, dyslipidemia, and type 2 diabetes.  She originally underwent coronary stenting of the right coronary artery in 2004.  In 2016, she had evidence of recurrent coronary artery disease by diagnostic angiography.  She elected to go to [facility] for stenting of her LAD and complex bifurcational stenting of her circumflex and obtuse marginal.  She returned in 11/2016 with restenosis of her obtuse marginal stent and that was restented with good results here at [facility].  We have been following her closely since then due to this concern about possible recurrent coronary artery disease and she has had coronary angiograms a couple of times due to some concerns about stress testing.  Her most recent angiogram was in 02/2019 showing no evidence of significant restenosis or recurrent coronary artery disease at that time.      In preparation for this low back fusion surgery, we repeated a stress test with Lexiscan and nuclear perfusion imaging on 01/13/2020.  I reviewed the images of that stress test and I agree with the report that there is no evidence of myocardial ischemia or infarction.  Her ejection fraction was estimated at 50% on that study.  Her echocardiogram  performed on 12/31/2019 was normal.  She has no significant valvular disease and normal left ventricular function on that study with an ejection fraction of 55%-60%.      She had previously been on metoprolol which caused intolerable fatigue.  She tried Bystolic in the past as well but had some issues with low heart rates and that was discontinued in favor of isosorbide mononitrate 15 mg daily in combination with a low dose of lisinopril 5 mg daily for blood pressure control and cardiovascular protection.  She has been doing well with that recently.      She was intolerant of statin therapy and has been able to get Repatha injectable PCSK9 inhibitor and has been using that consistently with excellent result.  Her LDL most recently was 50.  Her hemoglobin A1c was 7.7, slightly above target for type 2 diabetes control.      PHYSICAL EXAMINATION:    VITAL SIGNS:  Today, her blood pressure is 134/82, heart rate 73 and weight 165 pounds.  Her heart rates are typically running in the high 60s and low 70s without a beta blocker.   LUNGS:  Clear.     CARDIAC:  Heart rhythm is regular.  She has no cardiac murmurs or carotid bruits.      IMPRESSIONS:  Ms. Sarah Longo is a 74-year-old woman with a history of recurrent coronary artery disease with multiple angioplasty and stenting procedures as described above.  Her latest angiogram in 02/2019, less than a year ago, showed no recurrent occlusive coronary artery disease.  Her stress test was recently updated on 01/13/2020 and looked normal.  Her echocardiogram looks normal as well.  Her cholesterol numbers are under excellent control and her blood pressure is good.  She could use some additional improvement in her diabetes control.      Overall, as far as her cardiac risk for spine surgery goes, I would classify her in a low risk category.  Because of her history of coronary artery disease, I have recommended that she start Bystolic 5 mg daily 1 week prior to surgery and  continue that for at least 1 week after surgery.  I have given her samples so that she could continue that for 3 weeks after surgery if tolerated.  If her heart rates are below 50 or she has symptoms of lightheadedness, we can cut her Bystolic in half to 2.5 mg daily.  I think the beta blocker would be beneficial for cardiac protection in this setting.  I recommended that she stop her aspirin 7 days prior to surgery.  I suggested she stop her fish oil altogether.  I do not think that is providing her any benefit at this point.      I will have her follow up with us after surgery in about 3 months or sooner if she has any concerning cardiac issues.      Gunnar Lea MD [facility]       cc:   Gaye Zimmer MD    [facility]    [address] [id number]      Navid Hart MD    [facility]   [address] [place] [id number]         GUNNAR LEA MD, FACC             D: 2020   T: 2020   MT: DW      Name:     KATHY PERRY   MRN:      [id number]        Account:      [id number]   :      1945           Service Date: 2020      Document: [id number]         Outpatient Encounter Medications as of 2020   Medication Sig Dispense Refill     ASPIRIN 81 MG OR TABS 1 tab po QD (Once per day)       CINNAMON PO Take 2 capsules by mouth 2 times daily       co-enzyme Q-10 100 MG CAPS capsule Take 100 mg by mouth       cyanocobalamin (CYANOCOBALAMIN) 1000 MCG/ML injection Inject 2 mLs into the muscle every 30 days       evolocumab (REPATHA SURECLICK) 140 MG/ML prefilled autoinjector Inject 1 mL (140 mg) Subcutaneous every 14 days 2 mL 11     ferrous fumarate 65 mg, [place]. FE,-Vitamin C 125 mg (VITRON C)  MG TABS tablet Take 1 tablet by mouth daily       GLIMEPIRIDE 4 MG OR TABS 1 tablet BID       Gymnema Sylvestris Leaf POWD daily       HYDROcodone-acetaminophen (NORCO) 5-325 MG tablet Take 1 tablet by mouth every 6 hours as needed  for severe pain       isosorbide mononitrate (IMDUR) 30 MG 24 hr tablet Take 0.5 tablets (15 mg) by mouth daily 45 tablet 3     liraglutide (VICTOZA PEN) 18 MG/3ML solution Inject 0.6 mg Subcutaneous       lisinopril (PRINIVIL/ZESTRIL) 5 MG tablet Take 1 tablet (5 mg) by mouth daily 90 tablet 3     metFORMIN (GLUCOPHAGE) 1000 MG tablet Take 1,000 mg by mouth 2 times daily (with meals)       mirabegron (MYRBETRIQ) 50 MG 24 hr tablet Take 1 tablet (50 mg) by mouth daily Patient not taking daily 90 tablet 3     nebivolol (BYSTOLIC) 5 MG tablet Take 1 tablet (5 mg) by mouth daily 90 tablet 0     Omega-3 Fatty Acids (OMEGA-3 FISH OIL PO) Take 2,400 mg by mouth 2 times daily (with meals)       SYNTHROID 125 MCG OR TABS 1 tablet daily       TURMERIC PO Take by mouth 2 times daily       vitamin D3 (CHOLECALCIFEROL) 2000 units tablet Take 1 tablet by mouth 2 times daily       VITAMIN E PO Take by mouth daily       ACETAMINOPHEN PO        amoxicillin (AMOXIL) 500 MG capsule as needed Reported on 5/17/2017       empagliflozin (JARDIANCE) 25 MG TABS tablet Take 12.5 mg by mouth every other day       linagliptin (TRADJENTA) 5 MG TABS tablet Take 5 mg by mouth daily       NITROFURANTOIN PO nitrofurantoin monohydrate/macrocrystals 100 mg capsule       nitroglycerin (NITROSTAT) 0.4 MG SL tablet Place 1 tablet (0.4 mg) under the tongue every 5 minutes as needed for chest pain If symptoms persist after 3 doses (15 minutes) call 911. (Patient not taking: Reported on 1/28/2020) 25 tablet 3     ONETOUCH ULTRA test strip        STATIN NOT PRESCRIBED, INTENTIONAL, 1 each At Bedtime Statin not prescribed intentionally due to Allergy to statin (Patient not taking: Reported on 1/28/2020) 0 each 0     tiZANidine (ZANAFLEX) 4 MG capsule Take 4 mg by mouth 3 times daily       traMADol (ULTRAM) 50 MG tablet Take 1 tablet (50 mg) by mouth every 6 hours as needed for moderate to severe pain (Patient not taking: Reported on 1/28/2020) 30 tablet 3      No facility-administered encounter medications on file as of 1/28/2020.        Again, thank you for allowing me to participate in the care of your patient.      Sincerely,    GUNNAR LEA MD     [facility]

## 2020-01-28 NOTE — PATIENT INSTRUCTIONS
Stop isosorbide mononitrate 15 mg daily one week prior to surgery.     Start Bystolic (nebivolol) 5 mg daily one week prior to surgery and for one week after surgery. Then ok to go back to isosorbide 15 mg daily if you like. If you want to stay on Bystolic, that would be fine also.

## 2020-01-28 NOTE — LETTER
1/28/2020    Gaye Zimmer  [facility] [address] [place] [address]    RE: Sarah Longo       Dear Colleague,    I had the pleasure of seeing Sarah Longo in the [facility].    HPI and Plan:   See dictation    Orders Placed This Encounter   Procedures     Basic metabolic panel     Lipid Profile     ALT     Follow-Up with Cardiologist     Follow-Up with Cardiac Advanced Practice Provider       Orders Placed This Encounter   Medications     liraglutide (VICTOZA PEN) 18 MG/3ML solution     Sig: Inject 0.6 mg Subcutaneous     nebivolol (BYSTOLIC) 5 MG tablet     Sig: Take 1 tablet (5 mg) by mouth daily     Dispense:  90 tablet     Refill:  0       There are no discontinued medications.      Encounter Diagnoses   Name Primary?     Coronary artery disease involving native coronary artery of native heart without angina pectoris Yes     Hyperlipidemia LDL goal <100      Essential hypertension      Spinal stenosis, unspecified spinal region        CURRENT MEDICATIONS:  Current Outpatient Medications   Medication Sig Dispense Refill     ASPIRIN 81 MG OR TABS 1 tab po QD (Once per day)       CINNAMON PO Take 2 capsules by mouth 2 times daily       co-enzyme Q-10 100 MG CAPS capsule Take 100 mg by mouth       cyanocobalamin (CYANOCOBALAMIN) 1000 MCG/ML injection Inject 2 mLs into the muscle every 30 days       evolocumab (REPATHA SURECLICK) 140 MG/ML prefilled autoinjector Inject 1 mL (140 mg) Subcutaneous every 14 days 2 mL 11     ferrous fumarate 65 mg, [place]. FE,-Vitamin C 125 mg (VITRON C)  MG TABS tablet Take 1 tablet by mouth daily       GLIMEPIRIDE 4 MG OR TABS 1 tablet BID       Gymnema Sylvestris Leaf POWD daily       HYDROcodone-acetaminophen (NORCO) 5-325 MG tablet Take 1 tablet by mouth every 6 hours as needed for severe pain       isosorbide mononitrate (IMDUR) 30 MG 24 hr tablet Take 0.5 tablets (15 mg) by mouth daily 45 tablet 3     liraglutide  (VICTOZA PEN) 18 MG/3ML solution Inject 0.6 mg Subcutaneous       lisinopril (PRINIVIL/ZESTRIL) 5 MG tablet Take 1 tablet (5 mg) by mouth daily 90 tablet 3     metFORMIN (GLUCOPHAGE) 1000 MG tablet Take 1,000 mg by mouth 2 times daily (with meals)       mirabegron (MYRBETRIQ) 50 MG 24 hr tablet Take 1 tablet (50 mg) by mouth daily Patient not taking daily 90 tablet 3     nebivolol (BYSTOLIC) 5 MG tablet Take 1 tablet (5 mg) by mouth daily 90 tablet 0     Omega-3 Fatty Acids (OMEGA-3 FISH OIL PO) Take 2,400 mg by mouth 2 times daily (with meals)       SYNTHROID 125 MCG OR TABS 1 tablet daily       TURMERIC PO Take by mouth 2 times daily       vitamin D3 (CHOLECALCIFEROL) 2000 units tablet Take 1 tablet by mouth 2 times daily       VITAMIN E PO Take by mouth daily       ACETAMINOPHEN PO        amoxicillin (AMOXIL) 500 MG capsule as needed Reported on 5/17/2017       empagliflozin (JARDIANCE) 25 MG TABS tablet Take 12.5 mg by mouth every other day       linagliptin (TRADJENTA) 5 MG TABS tablet Take 5 mg by mouth daily       NITROFURANTOIN PO nitrofurantoin monohydrate/macrocrystals 100 mg capsule       nitroglycerin (NITROSTAT) 0.4 MG SL tablet Place 1 tablet (0.4 mg) under the tongue every 5 minutes as needed for chest pain If symptoms persist after 3 doses (15 minutes) call 911. (Patient not taking: Reported on 1/28/2020) 25 tablet 3     ONETOUCH ULTRA test strip        STATIN NOT PRESCRIBED, INTENTIONAL, 1 each At Bedtime Statin not prescribed intentionally due to Allergy to statin (Patient not taking: Reported on 1/28/2020) 0 each 0     tiZANidine (ZANAFLEX) 4 MG capsule Take 4 mg by mouth 3 times daily       traMADol (ULTRAM) 50 MG tablet Take 1 tablet (50 mg) by mouth every 6 hours as needed for moderate to severe pain (Patient not taking: Reported on 1/28/2020) 30 tablet 3       ALLERGIES     Allergies   Allergen Reactions     No Known Drug Allergies        PAST MEDICAL HISTORY:  Past Medical History:    Diagnosis Date     Arthritis      Coronary artery disease 04/28/2016    cardiac cath 2/2019: patent stents; PTCA with MAYA x 2 to OM1 and MAYA x 1 to p.LAD (4/21/2016 at [facility]); PTCA with intracoronary stent placement of RCA June 2004; MAYA to OM1 11/2016     Cystocele, midline 09/29/2010     Depression      HYPERPLASTIC  POLYP      colonoscopy in 5-10 yrs.     Hypothyroidism     hypothyroid- Dr Majano     OAB (overactive bladder)     complex voiding dysfct, failed interstim     Osteoarthritis      Other and unspecified hyperlipidemia      Postmenopausal bleeding      Shoulder blade pain 5/31/2016     Type II or unspecified type diabetes mellitus without mention of complication, not stated as uncontrolled     Dr. Majano     Unspecified essential hypertension      Urinary frequency 9/29/10    followed by urology. no benefit from detrol or sanctura. month trial of macrobid and flomax 10/10       PAST SURGICAL HISTORY:  Past Surgical History:   Procedure Laterality Date     ------------OTHER-------------      Interstim     Ablation       C CT ANGIOGRAPHY CORONARY  1/2005    mod soft plaque LAD and Cx, follow up with left heart cath     C LIGATE FALLOPIAN TUBE      endometrial ablation     CARDIAC SURGERY       CHOLECYSTECTOMY       COLONOSCOPY       CV HEART CATHETERIZATION WITH POSSIBLE INTERVENTION N/A 2/14/2019    Procedure: Coronary Angiogram;  Surgeon: Sudarshan Lee MD;  Location: [facility] CARDIAC CATH LAB; patent stents     EXCISE LESION UPPER EXTREMITY  6/5/2013    Procedure: EXCISE LESION UPPER EXTREMITY;  EXCISION RIGHT ARM ANTICUBITAL LIPOMA ;  Surgeon: Jayson Joe MD;  Location: [facility] REMOVAL OF TONSILS,12+ Y/O       HEART CATH LEFT HEART CATH  3/2/2016    No intervention - aggressive medical management     HEART CATH LEFT HEART CATH  4/21/2016     MAYA x 2 to OM1, MAYA to LAD, at [facility]     HEART CATH LEFT HEART CATH  6/29/2004    MAYA to RCA     HEART CATH LEFT HEART CATH  3/28/2002     Mild to moderate 3 vessel CAD. Medical management recommended.      HEART CATH LEFT HEART CATH  2016    MAYA to OM1     hypothyroid       KNEE SURGERY  4/20/10    right knee replacement     ORTHOPEDIC SURGERY      total knee      REMOVE STIMULATOR SACRAL NERVE N/A 2015    Procedure: REMOVE STIMULATOR SACRAL NERVE;  Surgeon: Gertrudis Frausto MD;  Location: [facility]     SURGICAL HISTORY OF -       Uterine endometrial ablation       FAMILY HISTORY:  Family History   Problem Relation Age of Onset     C.A.D. Father         MI , age 83, had high lipids     Hyperlipidemia Father      Hypertension Father      Coronary Artery Disease Father      Hypertension Mother      Genitourinary Problems Mother         renal failure     Fractures Mother         hip     Osteoporosis Mother      Cerebrovascular Disease Maternal Grandfather         cerebral hemorrhage     Diabetes Maternal Uncle      Colon Cancer Maternal Aunt      Diabetes Maternal Grandmother        SOCIAL HISTORY:  Social History     Socioeconomic History     Marital status:      Spouse name: Kwadwo     Number of children: 3     Years of education: None     Highest education level: None   Occupational History     Occupation: PT Aide     Employer: NONE      Employer: RETIRED   Social Needs     Financial resource strain: None     Food insecurity:     Worry: None     Inability: None     Transportation needs:     Medical: None     Non-medical: None   Tobacco Use     Smoking status: Never Smoker     Smokeless tobacco: Never Used   Substance and Sexual Activity     Alcohol use: No     Alcohol/week: 0.0 standard drinks     Drug use: No     Sexual activity: Never     Partners: Male     Birth control/protection: Post-menopausal   Lifestyle     Physical activity:     Days per week: None     Minutes per session: None     Stress: None   Relationships     Social connections:     Talks on phone: None     Gets together: None     Attends Confucianism service:  "None     Active member of club or organization: None     Attends meetings of clubs or organizations: None     Relationship status: None     Intimate partner violence:     Fear of current or ex partner: None     Emotionally abused: None     Physically abused: None     Forced sexual activity: None   Other Topics Concern      Service Not Asked     Blood Transfusions Not Asked     Caffeine Concern Yes     Comment: 6-7 cups caffeine per day     Occupational Exposure Not Asked     Hobby Hazards Not Asked     Sleep Concern No     Stress Concern Yes     Weight Concern No     Special Diet No     Comment: eats healthy      Back Care Not Asked     Exercise Yes     Comment:  walking & active life style      Bike Helmet Not Asked     Seat Belt Not Asked     Self-Exams Not Asked     Parent/sibling w/ CABG, MI or angioplasty before 65F 55M? No   Social History Narrative     None       Review of Systems:  Skin:  Negative       Eyes:  Positive for glasses cataract surgery 2014  ENT:  Negative      Respiratory:  Positive for dyspnea on exertion occ   Cardiovascular:  Negative      Gastroenterology: Negative      Genitourinary:  Positive for urinary frequency    Musculoskeletal:  Positive for back pain;arthritis pain everywhere  Neurologic:  Negative      Psychiatric:  Positive for anxiety    Heme/Lymph/Imm:  Negative allergies    Endocrine:  Positive for thyroid disorder;diabetes      Physical Exam:  Vitals: /82   Pulse 73   Ht 1.702 m (5' 7\")   Wt 74.8 kg (165 lb)   BMI 25.84 kg/m       Constitutional:  cooperative, alert and oriented, well developed, well nourished, in no acute distress        Skin:  warm and dry to the touch, no apparent skin lesions or masses noted          Head:  normocephalic, no masses or lesions        Eyes:  pupils equal and round, conjunctivae and lids unremarkable, sclera white, no xanthalasma, EOMS intact, no nystagmus        Lymph:      ENT:  no pallor or cyanosis, dentition good    "     Neck:  carotid pulses are full and equal bilaterally, JVP normal, no carotid bruit        Respiratory:  normal breath sounds, clear to auscultation, normal A-P diameter, normal symmetry, normal respiratory excursion, no use of accessory muscles         Cardiac: regular rhythm       systolic murmur;grade 1;RUSB        pulses full and equal, no bruits auscultated                                        GI:  abdomen soft;BS normoactive        Extremities and Muscular Skeletal:  no deformities, clubbing, cyanosis, erythema observed;no edema              Neurological:  no gross motor deficits;affect appropriate        Psych:  Alert and Oriented x 3        CC  No referring provider defined for this encounter.                Thank you for allowing me to participate in the care of your patient.      Sincerely,     GUNNAR LEA MD     [facility]    cc:   No referring provider defined for this encounter.

## 2020-01-28 NOTE — PROGRESS NOTES
HPI and Plan:   See dictation    Orders Placed This Encounter   Procedures     Basic metabolic panel     Lipid Profile     ALT     Follow-Up with Cardiologist     Follow-Up with Cardiac Advanced Practice Provider       Orders Placed This Encounter   Medications     liraglutide (VICTOZA PEN) 18 MG/3ML solution     Sig: Inject 0.6 mg Subcutaneous     nebivolol (BYSTOLIC) 5 MG tablet     Sig: Take 1 tablet (5 mg) by mouth daily     Dispense:  90 tablet     Refill:  0       There are no discontinued medications.      Encounter Diagnoses   Name Primary?     Coronary artery disease involving native coronary artery of native heart without angina pectoris Yes     Hyperlipidemia LDL goal <100      Essential hypertension      Spinal stenosis, unspecified spinal region        CURRENT MEDICATIONS:  Current Outpatient Medications   Medication Sig Dispense Refill     ASPIRIN 81 MG OR TABS 1 tab po QD (Once per day)       CINNAMON PO Take 2 capsules by mouth 2 times daily       co-enzyme Q-10 100 MG CAPS capsule Take 100 mg by mouth       cyanocobalamin (CYANOCOBALAMIN) 1000 MCG/ML injection Inject 2 mLs into the muscle every 30 days       evolocumab (REPATHA SURECLICK) 140 MG/ML prefilled autoinjector Inject 1 mL (140 mg) Subcutaneous every 14 days 2 mL 11     ferrous fumarate 65 mg, [place]. FE,-Vitamin C 125 mg (VITRON C)  MG TABS tablet Take 1 tablet by mouth daily       GLIMEPIRIDE 4 MG OR TABS 1 tablet BID       Gymnema Sylvestris Leaf POWD daily       HYDROcodone-acetaminophen (NORCO) 5-325 MG tablet Take 1 tablet by mouth every 6 hours as needed for severe pain       isosorbide mononitrate (IMDUR) 30 MG 24 hr tablet Take 0.5 tablets (15 mg) by mouth daily 45 tablet 3     liraglutide (VICTOZA PEN) 18 MG/3ML solution Inject 0.6 mg Subcutaneous       lisinopril (PRINIVIL/ZESTRIL) 5 MG tablet Take 1 tablet (5 mg) by mouth daily 90 tablet 3     metFORMIN (GLUCOPHAGE) 1000 MG tablet Take 1,000 mg by mouth 2 times daily (with  meals)       mirabegron (MYRBETRIQ) 50 MG 24 hr tablet Take 1 tablet (50 mg) by mouth daily Patient not taking daily 90 tablet 3     nebivolol (BYSTOLIC) 5 MG tablet Take 1 tablet (5 mg) by mouth daily 90 tablet 0     Omega-3 Fatty Acids (OMEGA-3 FISH OIL PO) Take 2,400 mg by mouth 2 times daily (with meals)       SYNTHROID 125 MCG OR TABS 1 tablet daily       TURMERIC PO Take by mouth 2 times daily       vitamin D3 (CHOLECALCIFEROL) 2000 units tablet Take 1 tablet by mouth 2 times daily       VITAMIN E PO Take by mouth daily       ACETAMINOPHEN PO        amoxicillin (AMOXIL) 500 MG capsule as needed Reported on 5/17/2017       empagliflozin (JARDIANCE) 25 MG TABS tablet Take 12.5 mg by mouth every other day       linagliptin (TRADJENTA) 5 MG TABS tablet Take 5 mg by mouth daily       NITROFURANTOIN PO nitrofurantoin monohydrate/macrocrystals 100 mg capsule       nitroglycerin (NITROSTAT) 0.4 MG SL tablet Place 1 tablet (0.4 mg) under the tongue every 5 minutes as needed for chest pain If symptoms persist after 3 doses (15 minutes) call 911. (Patient not taking: Reported on 1/28/2020) 25 tablet 3     ONETOUCH ULTRA test strip        STATIN NOT PRESCRIBED, INTENTIONAL, 1 each At Bedtime Statin not prescribed intentionally due to Allergy to statin (Patient not taking: Reported on 1/28/2020) 0 each 0     tiZANidine (ZANAFLEX) 4 MG capsule Take 4 mg by mouth 3 times daily       traMADol (ULTRAM) 50 MG tablet Take 1 tablet (50 mg) by mouth every 6 hours as needed for moderate to severe pain (Patient not taking: Reported on 1/28/2020) 30 tablet 3       ALLERGIES     Allergies   Allergen Reactions     No Known Drug Allergies        PAST MEDICAL HISTORY:  Past Medical History:   Diagnosis Date     Arthritis      Coronary artery disease 04/28/2016    cardiac cath 2/2019: patent stents; PTCA with MAYA x 2 to OM1 and MAYA x 1 to p.LAD (4/21/2016 at [place]); PTCA with intracoronary stent placement of RCA June 2004; MAYA to OM1  11/2016     Cystocele, midline 09/29/2010     Depression      HYPERPLASTIC  POLYP      colonoscopy in 5-10 yrs.     Hypothyroidism     hypothyroid- Dr Majano     OAB (overactive bladder)     complex voiding dysfct, failed interstim     Osteoarthritis      Other and unspecified hyperlipidemia      Postmenopausal bleeding      Shoulder blade pain 5/31/2016     Type II or unspecified type diabetes mellitus without mention of complication, not stated as uncontrolled     Dr. Majano     Unspecified essential hypertension      Urinary frequency 9/29/10    followed by urology. no benefit from detrol or sanctura. month trial of macrobid and flomax 10/10       PAST SURGICAL HISTORY:  Past Surgical History:   Procedure Laterality Date     ------------OTHER-------------      Interstim     Ablation       C CT ANGIOGRAPHY CORONARY  1/2005    mod soft plaque LAD and Cx, follow up with left heart cath     C LIGATE FALLOPIAN TUBE      endometrial ablation     CARDIAC SURGERY       CHOLECYSTECTOMY       COLONOSCOPY       CV HEART CATHETERIZATION WITH POSSIBLE INTERVENTION N/A 2/14/2019    Procedure: Coronary Angiogram;  Surgeon: Sudarshan Lee MD;  Location:  HEART CARDIAC CATH LAB; patent stents     EXCISE LESION UPPER EXTREMITY  6/5/2013    Procedure: EXCISE LESION UPPER EXTREMITY;  EXCISION RIGHT ARM ANTICUBITAL LIPOMA ;  Surgeon: Jayson Joe MD;  Location: [facility] REMOVAL OF TONSILS,12+ Y/O       HEART CATH LEFT HEART CATH  3/2/2016    No intervention - aggressive medical management     HEART CATH LEFT HEART CATH  4/21/2016     MAYA x 2 to OM1, MAYA to LAD, at [facility]     HEART CATH LEFT HEART CATH  6/29/2004    MAYA to RCA     HEART CATH LEFT HEART CATH  3/28/2002    Mild to moderate 3 vessel CAD. Medical management recommended.      HEART CATH LEFT HEART CATH  11/22/2016    MAYA to OM1     hypothyroid       KNEE SURGERY  4/20/10    right knee replacement     ORTHOPEDIC SURGERY      total knee 2010     REMOVE  STIMULATOR SACRAL NERVE N/A 2015    Procedure: REMOVE STIMULATOR SACRAL NERVE;  Surgeon: Gertrudis Frausto MD;  Location: [facility]     SURGICAL HISTORY OF -       Uterine endometrial ablation       FAMILY HISTORY:  Family History   Problem Relation Age of Onset     C.A.D. Father         MI , age 83, had high lipids     Hyperlipidemia Father      Hypertension Father      Coronary Artery Disease Father      Hypertension Mother      Genitourinary Problems Mother         renal failure     Fractures Mother         hip     Osteoporosis Mother      Cerebrovascular Disease Maternal Grandfather         cerebral hemorrhage     Diabetes Maternal Uncle      Colon Cancer Maternal Aunt      Diabetes Maternal Grandmother        SOCIAL HISTORY:  Social History     Socioeconomic History     Marital status:      Spouse name: Kwadwo     Number of children: 3     Years of education: None     Highest education level: None   Occupational History     Occupation: PT Aide     Employer: NONE      Employer: RETIRED   Social Needs     Financial resource strain: None     Food insecurity:     Worry: None     Inability: None     Transportation needs:     Medical: None     Non-medical: None   Tobacco Use     Smoking status: Never Smoker     Smokeless tobacco: Never Used   Substance and Sexual Activity     Alcohol use: No     Alcohol/week: 0.0 standard drinks     Drug use: No     Sexual activity: Never     Partners: Male     Birth control/protection: Post-menopausal   Lifestyle     Physical activity:     Days per week: None     Minutes per session: None     Stress: None   Relationships     Social connections:     Talks on phone: None     Gets together: None     Attends [employer] service: None     Active member of club or organization: None     Attends meetings of clubs or organizations: None     Relationship status: None     Intimate partner violence:     Fear of current or ex partner: None     Emotionally abused: None      "Physically abused: None     Forced sexual activity: None   Other Topics Concern      Service Not Asked     Blood Transfusions Not Asked     Caffeine Concern Yes     Comment: 6-7 cups caffeine per day     Occupational Exposure Not Asked     Hobby Hazards Not Asked     Sleep Concern No     Stress Concern Yes     Weight Concern No     Special Diet No     Comment: eats healthy      Back Care Not Asked     Exercise Yes     Comment:  walking & active life style      Bike Helmet Not Asked     Seat Belt Not Asked     Self-Exams Not Asked     Parent/sibling w/ CABG, MI or angioplasty before 65F 55M? No   Social History Narrative     None       Review of Systems:  Skin:  Negative       Eyes:  Positive for glasses cataract surgery 2014  ENT:  Negative      Respiratory:  Positive for dyspnea on exertion occ   Cardiovascular:  Negative      Gastroenterology: Negative      Genitourinary:  Positive for urinary frequency    Musculoskeletal:  Positive for back pain;arthritis pain everywhere  Neurologic:  Negative      Psychiatric:  Positive for anxiety    Heme/Lymph/Imm:  Negative allergies    Endocrine:  Positive for thyroid disorder;diabetes      Physical Exam:  Vitals: /82   Pulse 73   Ht 1.702 m (5' 7\")   Wt 74.8 kg (165 lb)   BMI 25.84 kg/m      Constitutional:  cooperative, alert and oriented, well developed, well nourished, in no acute distress        Skin:  warm and dry to the touch, no apparent skin lesions or masses noted          Head:  normocephalic, no masses or lesions        Eyes:  pupils equal and round, conjunctivae and lids unremarkable, sclera white, no xanthalasma, EOMS intact, no nystagmus        Lymph:      ENT:  no pallor or cyanosis, dentition good        Neck:  carotid pulses are full and equal bilaterally, JVP normal, no carotid bruit        Respiratory:  normal breath sounds, clear to auscultation, normal A-P diameter, normal symmetry, normal respiratory excursion, no use of accessory " muscles         Cardiac: regular rhythm       systolic murmur;grade 1;RUSB        pulses full and equal, no bruits auscultated                                        GI:  abdomen soft;BS normoactive        Extremities and Muscular Skeletal:  no deformities, clubbing, cyanosis, erythema observed;no edema              Neurological:  no gross motor deficits;affect appropriate        Psych:  Alert and Oriented x 3        CC  No referring provider defined for this encounter.

## 2020-01-28 NOTE — PROGRESS NOTES
Service Date: 01/28/2020      CARDIOLOGY OFFICE PROGRESS NOTE       HISTORY OF PRESENT ILLNESS:  I had the opportunity to see Ms. Sarah Longo in Cardiology Clinic today at the [facility] in [place] for reevaluation of coronary artery disease in the setting of preoperative evaluation prior to low back fusion surgery.  She is a 74-year-old woman followed here by me long term in Cardiology Clinic for management of coronary artery disease and cardiac risk factors including hypertension, dyslipidemia, and type 2 diabetes.  She originally underwent coronary stenting of the right coronary artery in 2004.  In 2016, she had evidence of recurrent coronary artery disease by diagnostic angiography.  She elected to go to [facility] for stenting of her LAD and complex bifurcational stenting of her circumflex and obtuse marginal.  She returned in 11/2016 with restenosis of her obtuse marginal stent and that was restented with good results here at [facility].  We have been following her closely since then due to this concern about possible recurrent coronary artery disease and she has had coronary angiograms a couple of times due to some concerns about stress testing.  Her most recent angiogram was in 02/2019 showing no evidence of significant restenosis or recurrent coronary artery disease at that time.      In preparation for this low back fusion surgery, we repeated a stress test with Lexiscan and nuclear perfusion imaging on 01/13/2020.  I reviewed the images of that stress test and I agree with the report that there is no evidence of myocardial ischemia or infarction.  Her ejection fraction was estimated at 50% on that study.  Her echocardiogram performed on 12/31/2019 was normal.  She has no significant valvular disease and normal left ventricular function on that study with an ejection fraction of 55%-60%.      She had previously been on metoprolol which caused intolerable  fatigue.  She tried Bystolic in the past as well but had some issues with low heart rates and that was discontinued in favor of isosorbide mononitrate 15 mg daily in combination with a low dose of lisinopril 5 mg daily for blood pressure control and cardiovascular protection.  She has been doing well with that recently.      She was intolerant of statin therapy and has been able to get Repatha injectable PCSK9 inhibitor and has been using that consistently with excellent result.  Her LDL most recently was 50.  Her hemoglobin A1c was 7.7, slightly above target for type 2 diabetes control.      PHYSICAL EXAMINATION:    VITAL SIGNS:  Today, her blood pressure is 134/82, heart rate 73 and weight 165 pounds.  Her heart rates are typically running in the high 60s and low 70s without a beta blocker.   LUNGS:  Clear.     CARDIAC:  Heart rhythm is regular.  She has no cardiac murmurs or carotid bruits.      IMPRESSIONS:  Ms. Sarah Longo is a 74-year-old woman with a history of recurrent coronary artery disease with multiple angioplasty and stenting procedures as described above.  Her latest angiogram in 02/2019, less than a year ago, showed no recurrent occlusive coronary artery disease.  Her stress test was recently updated on 01/13/2020 and looked normal.  Her echocardiogram looks normal as well.  Her cholesterol numbers are under excellent control and her blood pressure is good.  She could use some additional improvement in her diabetes control.      Overall, as far as her cardiac risk for spine surgery goes, I would classify her in a low risk category.  Because of her history of coronary artery disease, I have recommended that she start Bystolic 5 mg daily 1 week prior to surgery and continue that for at least 1 week after surgery.  I have given her samples so that she could continue that for 3 weeks after surgery if tolerated.  If her heart rates are below 50 or she has symptoms of lightheadedness, we can cut her  Bystolic in half to 2.5 mg daily.  I think the beta blocker would be beneficial for cardiac protection in this setting.  I recommended that she stop her aspirin 7 days prior to surgery.  I suggested she stop her fish oil altogether.  I do not think that is providing her any benefit at this point.      I will have her follow up with us after surgery in about 3 months or sooner if she has any concerning cardiac issues.      Gunnar Lea MD FACC       cc:   [place] [address]      Navid Hart MD    [facility]   [address] [place] [address]         GUNNAR LEA MD, FACC             D: 2020   T: 2020   MT: MACK      Name:     KATHY PERRY   MRN:      [id number]        Account:      [id number]   :      1945           Service Date: 2020      Document: [id number]

## 2020-03-02 ASSESSMENT — MIFFLIN-ST. JEOR: SCORE: 1253.39

## 2020-03-04 ENCOUNTER — ANESTHESIA - HEALTHEAST (OUTPATIENT)
Dept: SURGERY | Facility: CLINIC | Age: 75
End: 2020-03-04

## 2020-03-05 ENCOUNTER — SURGERY - HEALTHEAST (OUTPATIENT)
Dept: SURGERY | Facility: CLINIC | Age: 75
End: 2020-03-05

## 2020-03-05 ASSESSMENT — MIFFLIN-ST. JEOR: SCORE: 1248.85

## 2020-03-07 ENCOUNTER — AMBULATORY - HEALTHEAST (OUTPATIENT)
Dept: OTHER | Facility: CLINIC | Age: 75
End: 2020-03-07

## 2020-03-07 ASSESSMENT — MIFFLIN-ST. JEOR: SCORE: 1248.85

## 2020-03-12 ENCOUNTER — HOME CARE/HOSPICE - HEALTHEAST (OUTPATIENT)
Dept: HOME HEALTH SERVICES | Facility: HOME HEALTH | Age: 75
End: 2020-03-12

## 2020-03-25 ENCOUNTER — CARE COORDINATION (OUTPATIENT)
Dept: CARDIOLOGY | Facility: CLINIC | Age: 75
End: 2020-03-25

## 2020-03-25 DIAGNOSIS — I25.10 CORONARY ARTERY DISEASE INVOLVING NATIVE CORONARY ARTERY OF NATIVE HEART WITHOUT ANGINA PECTORIS: ICD-10-CM

## 2020-03-25 DIAGNOSIS — I10 ESSENTIAL HYPERTENSION: Primary | ICD-10-CM

## 2020-03-25 RX ORDER — NEBIVOLOL 5 MG/1
5 TABLET ORAL DAILY
Qty: 90 TABLET | Refills: 3 | Status: SHIPPED | OUTPATIENT
Start: 2020-03-25 | End: 2020-03-25

## 2020-03-25 RX ORDER — NEBIVOLOL 5 MG/1
5 TABLET ORAL DAILY
Qty: 90 TABLET | Refills: 3 | Status: SHIPPED | OUTPATIENT
Start: 2020-03-25 | End: 2020-04-10

## 2020-03-25 NOTE — LETTER
[facility]  [id number]  [facility] [address]  Phone: [phone]  Fax: [phone]           April 3, 2020         RE:      Kathy Longo   MRN:   [id number]   :   1945      To Whom It May Concern:      I have been asked to compose a letter for Mr. Kathy Longo ( 1945), appealing the decision not to cover Bystolic (nebivolol) under her insurance formulary.  I have been the managing cardiologist for Ms. Longo for quite a few years.  During that time, she has identified significant adverse effects associated with most beta blockers, including metoprolol, atenolol and propranolol, but has been able to tolerate nebivolol without significant adverse effect.  Other beta blockers have caused extreme levels of fatigue, lethargy, and depression, which she has not experienced with nebivolol.  She has hypertension and ischemic coronary artery disease and would benefit greatly from being treated with beta blocker therapy.  Unfortunately, because of formulary restrictions, she is no longer able to afford nebivolol.  I am writing this letter as an appeal to that decision and hoping that her insurance formulary would reverse that decision and provide her with that medication at an affordable cost.      If there is any further information that I can provide to help support this decision, please feel free to contact me.      Sincerely,               Feroz Lea MD, [facility]            FEROZ LEA MD, [facility]             D: 2020   T: 2020   MT: al      Name:     KATHY LONGO   MRN:      [id number]        Account:      [id number]   :      1945           Note Date: 2020      Document: [id number]

## 2020-03-25 NOTE — PROGRESS NOTES
I called pt to let her know that we will try to get a PA for her bystolic. Pt has a one week supply left. She will cut in half and take 2.5 mg daily to make it last longer. Patrick STOVER March 25, 2020, 4:09 PM

## 2020-03-25 NOTE — PROGRESS NOTES
I reviewed with  and he said we should try to get a PA on the bystolic. Pt can take 2.5 mg daily now so she doesn't run out of the bystolic. Will send script to her pharmacy and notify our PA team.

## 2020-03-25 NOTE — PROGRESS NOTES
Pt called to report that she needs bystolic samples as it is not on the [facility] formulary. I told her that we are no longer allowed to issue samples through our clinic. Pt said  promised she would always be able to get samples. I told her the policy has changed since she saw  last.    Pt had her spinal fusion surgery on 3/5 and is now recovering at home. I told her we likely need to get a PA on the bystolic. Pt wants to know if  wants her to stay on it long term. Her BP yesterday when home health RN checked was 108/62, HR 64. Pt reports she has had fatigue with metoprolol and coreg in the past. I told pt I would check with  to see if he wants her to remain on bystolic long term. If so, will need to send script to [facility], and get the PA process started. Pt has a one week supply left. Patrick STOVER March 25, 2020, 1:48 PM

## 2020-03-26 NOTE — TELEPHONE ENCOUNTER
PA Initiation    Medication: Bystolic 5MG -   Insurance Company: [employer] - Phone [phone] Fax [phone]  Pharmacy Filling the Rx: [id number] - [place], [place] - [address]  Filling Pharmacy Phone: [phone]  Filling Pharmacy Fax: [phone]  Start Date: 3/25/2020

## 2020-03-27 LAB
ERYTHROCYTE [DISTWIDTH] IN BLOOD BY AUTOMATED COUNT: 15.5 % (ref 10–15)
HCT VFR BLD AUTO: 35.7 % (ref 35–47)
HGB BLD-MCNC: 10.9 G/DL (ref 11.7–15.7)
MCH RBC QN AUTO: 29.7 PG (ref 26.5–33)
MCHC RBC AUTO-ENTMCNC: 30.5 G/DL (ref 31.5–36.5)
MCV RBC AUTO: 97 FL (ref 78–100)
PLATELET # BLD AUTO: 261 10E9/L (ref 150–450)
RBC # BLD AUTO: 3.67 10E12/L (ref 3.8–5.2)
WBC # BLD AUTO: 4.9 10E9/L (ref 4–11)

## 2020-03-27 PROCEDURE — 85027 COMPLETE CBC AUTOMATED: CPT | Performed by: INTERNAL MEDICINE

## 2020-03-27 NOTE — PROGRESS NOTES
Pt called and said she was contacted by her insurance today and told the bystolic approval has been denied. I have not heard from our PA team yet. I did update  and he said he can write an appeal letter for bystolic coverage. I called pt back and let her know. Patrick SOTVER March 27, 2020, 5:13 PM

## 2020-03-31 NOTE — TELEPHONE ENCOUNTER
"PRIOR AUTHORIZATION DENIED    Medication: Bystolic 5MG -     Denial Date: 3/27/2020    Denial Rational:     Awaiting Denial letter, will update once rcv'd.    Appeal Information:     **Please advise if appeal is necessary and place a letter of medical necessity with clinical rationale under the \"letters\" tab in patient's chart and route back to [facility] [[id number]]    "

## 2020-04-02 ENCOUNTER — TELEPHONE (OUTPATIENT)
Dept: CARDIOLOGY | Facility: CLINIC | Age: 75
End: 2020-04-02

## 2020-04-02 NOTE — PROGRESS NOTES
Pt left message wondering if  is going to do an appeal for her bystolic. Pt also requested a discussion about her upcoming visit w/Karen More.    I called pt back and let her know I updated , and he will hopefully dictate the letter this week. I also messaged Roslyn Abby to see if there is any way pt can get samples as she will run out before an appeal answer is received. Pt told me she doesn't want a telephone visit, and would like to reschedule to this summer. Patrick STOVER April 2, 2020, 12:04 PM

## 2020-04-03 NOTE — PROGRESS NOTES
Note Date: 2020      April 3, 2020         RE: Kathy Longo   MRN: [id number]   : 1945      To Whom It May Concern:      I have been asked to compose a letter for Mr. Kathy Longo ( 1945), appealing the decision not to cover Bystolic (nebivolol) under her insurance formulary.  I have been the managing cardiologist for Ms. Longo for quite a few years.  During that time, she has identified significant adverse effects associated with most beta blockers, including metoprolol, atenolol and propranolol, but has been able to tolerate nebivolol without significant adverse effect.  Other beta blockers have caused extreme levels of fatigue, lethargy, and depression, which she has not experienced with nebivolol.  She has hypertension and ischemic coronary artery disease and would benefit greatly from being treated with beta blocker therapy.  Unfortunately, because of formulary restrictions, she is no longer able to afford nebivolol.  I am writing this letter as an appeal to that decision and hoping that her insurance formulary would reverse that decision and provide her with that medication at an affordable cost.      If there is any further information that I can provide to help support this decision, please feel free to contact me.      Sincerely,         Ferzo Lea MD, [facility]            FEROZ LEA MD, [facility]             D: 2020   T: 2020   MT: al      Name:     KATHY LONGO   MRN:      [id number]        Account:      [id number]   :      1945           Note Date: 2020      Document: [id number]

## 2020-04-03 NOTE — PROGRESS NOTES
dictated an appeal letter in the chart. I had Lanette in HIM place it on my desk to sign. Will message Karen on PA team an update that there is a typed version of the letter in the chart as she was waiting on this. Patrick STOVER April 3, 2020, 5:41 PM

## 2020-04-06 ENCOUNTER — CARE COORDINATION (OUTPATIENT)
Dept: CARDIOLOGY | Facility: CLINIC | Age: 75
End: 2020-04-06

## 2020-04-06 NOTE — TELEPHONE ENCOUNTER
Medication Appeal Initiation    We have initiated an appeal for the requested medication:  Medication: Bystolic 5MG - Denied- Appeal -   Appeal Start Date:  4/6/2020  Insurance Company: [employer] - Phone [phone] Fax [phone]  Comments:  Sent in appeal with letter

## 2020-04-06 NOTE — PROGRESS NOTES
Pt called and LVM requesting update on Bystolic. I let pt know that Dr. Burgos wrote an appeal letter, and our PA Team initiated the appeal and sent Dr. Burgos's appeal letter to [facility] (see separate encounter in chart). I informed pt we should have an answer in 48-72 hrs but I'm not sure the exact turnaround time. Pt said if she has to she will buy a few extra tablets from her pharmacy until we hear back. Cherelle Gaston RN on 4/6/2020 at 3:03 PM

## 2020-04-07 NOTE — TELEPHONE ENCOUNTER
MEDICATION APPEAL DENIED    Medication: Bystolic 5MG - Denied- Appeal - DENIED    Denial Date: 4/6/2020    Denial Rational:     Bystolic is a non-formulary med which was approved for coverage however this prohibits pt from requesting a Tier Exception.        Second Level Appeal Information:     Second level appeals will be managed by the clinic staff and provider. Please contact the [employer] Prior Authorization Team if additional information about the denial is needed.

## 2020-04-07 NOTE — TELEPHONE ENCOUNTER
This appears to be futile. I would suggest that she try carvedilol 3.125 bid instead of Bystolic. This is probably the closest to Bystolic in terms of effect. We can slowly uptitrate as needed to control BP. If that doesn't work, she might need to pay for Bystolic. Has she tried GoodRx and other programs like those? EE

## 2020-04-07 NOTE — PROGRESS NOTES
Received update from PA team that initial appeal has been denied for bystolic.  can try to do a second level appeal. Will message  to see if he wants to do a second level appeal. Patrick STOVER April 7, 2020, 9:31 AM

## 2020-04-09 NOTE — PROGRESS NOTES
I called pt back to discuss bystolic denial and 's recommendations to start coreg or just get the bystolic at the current cost.  Pt said she did not tolerate coreg in the past d/t fatigue. Pt under the impression  only wanted her on the bystolic for a short time period after her back surgery. Pt willing to go on the bystolic right now if absolutely needed, but would like to know how long she will have to take it. Pt said she used to tolerate imdur 15 mg daily, but it was stopped by  when he put her on bystolic. She would like to know if she can just go back to taking the imdur. Will message her questions to . Patrick STOVER April 9, 2020, 11:43 AM

## 2020-04-09 NOTE — TELEPHONE ENCOUNTER
She is right. I do recall suggesting Bystolic for only a short time for back surgery. Since she tolerated it better than I expected, I thought it might be a good long-term medication. But I agree with trying Imdur instead. Start with 15 mg and see how she does. She might get headaches. EE

## 2020-04-10 RX ORDER — NEBIVOLOL 5 MG/1
5 TABLET ORAL DAILY
Qty: 30 TABLET | Refills: 11 | Status: SHIPPED | OUTPATIENT
Start: 2020-04-10 | End: 2020-08-27

## 2020-04-10 NOTE — PROGRESS NOTES
I called pt and reviewed that  said he decided to keep her on the bystolic long term since she was tolerating it well. Pt can go back on imdur 15 mg daily if bystolic is too expensive. Pt said her gut is telling her that  thinks the bystolic is best for her, and pt feels that too. She has decided she will stay on the bystolic for now, but requested the script be sent to [facility] as a 30 day supply. Pt said if she ever changes her mind and wants to go back on the imdur 15 mg daily she will call us. Patrick STOVER April 10, 2020, 11:16 AM

## 2020-04-23 ENCOUNTER — CARE COORDINATION (OUTPATIENT)
Dept: CARDIOLOGY | Facility: CLINIC | Age: 75
End: 2020-04-23

## 2020-04-23 NOTE — PROGRESS NOTES
Pt called reporting that she has misplaced the phone number for the [facility] pharmacy to re-order a shipment of Repatha. Provided pt with [facility] number that is listed on the [facility] approval letter for this year: [phone].      JOLANTA Gallardo 10:59 AM 4/23/2020

## 2020-05-06 ENCOUNTER — CARE COORDINATION (OUTPATIENT)
Dept: CARDIOLOGY | Facility: CLINIC | Age: 75
End: 2020-05-06

## 2020-05-06 NOTE — PROGRESS NOTES
Pt called and LVM stating she needs an MRI of her left hip per Ortho. Pt states she was told to ask Dr. Burgos if she can have this MRI w/cardiac stents. Forward to Dr. Burgos for confirmation ok to undergo left hip MRI w/her cardiac stents. Cherelle Gaston RN on 5/6/2020 at 5:03 PM      11:09 AM May 7, 2020  Addendum:    I called pt and reviewed this with her. Pt states she is very concerned and reports she had 3 overlapping  at [place] years ago. Pt states she now will need a lumbar MRI in addition to the left hip. She is s/p spinal surgery and experiencing a lot of pain. This concerns her greatly, but she is afraid to get the MRI of her spine. She reports she called several imaging centers to inquire about the safety of this and was told by several organizations in the area that this would be fine, but her surgeon recommended she ask Dr. Burgos. I reassured her it is very common for pt's with cardiac stents to undergo MRI's.      Pt said she called back to her imaging center and they are requesting a letter from Dr. Burgos stating that it is ok to have the MRI's done with her history of cardiac stents. Pt requesting I call AQUILES Casper at [phone] for further information. This is not yet scheduled. Cherelle Gaston RN on 5/7/2020 at 11:51 AM      Feroz Burgos MD  [facility] Heart Team 7 36 minutes ago (10:32 AM)          Yes, she can have an MRI with cardiac stents, especially if they are older than 6 weeks. EE         Documentation

## 2020-05-15 ENCOUNTER — TRANSFERRED RECORDS (OUTPATIENT)
Dept: HEALTH INFORMATION MANAGEMENT | Facility: CLINIC | Age: 75
End: 2020-05-15

## 2020-06-08 ENCOUNTER — TRANSFERRED RECORDS (OUTPATIENT)
Dept: HEALTH INFORMATION MANAGEMENT | Facility: CLINIC | Age: 75
End: 2020-06-08

## 2020-06-25 DIAGNOSIS — Z11.59 ENCOUNTER FOR SCREENING FOR OTHER VIRAL DISEASES: Primary | ICD-10-CM

## 2020-07-01 ENCOUNTER — CARE COORDINATION (OUTPATIENT)
Dept: CARDIOLOGY | Facility: CLINIC | Age: 75
End: 2020-07-01

## 2020-07-01 NOTE — PROGRESS NOTES
Pt called to report that she has been scheduled for a left hip replacement on 7/15 @ [facility] w/. Pt said she has still been having trouble walking and a lot of pain in her leg and hip while rehabbing from her back surgery. It was discovered that her left hip needs to be replaced. Pt said she has been experiencing a lot of pain so she moved up her surgery to 7/15/20.     Pt wants to know if  thinks she needs cardiac clearance. I told pt that typically the PCP or surgeon will specify whether cardiac clearance is needed. Pt has a PCP visit today. Pt had cardiac clearance for back surgery 1/2020. She had a stress test at that time which was negative for ischemia. Pt denied chest pain. She said she has occasional SOB, but thinks it's due to the heat/humidity, and being deconditioned from her back surgery.  recommended pt start bystolic 5 mg daily at the time of her back surgery, and said ok to hold ASA for 7 days prior. Pt has continued to take bystolic, but decreased it to 2.5 mg daily to make her script last longer. I will message  to see if he thinks pt needs a cardiology visit, or whether he is willing to just write any recommendations he has at this time for pt. Patrick STOVER July 1, 2020, 9:25 AM

## 2020-07-02 NOTE — PROGRESS NOTES
I called pt and reviewed 's recommendations for her hip surgery. Pt said she will start the bystolic 5 mg daily one week before her surgery and continue for 2 weeks after her surgery. She will find out how long she needs to hold her ASA prior from the surgeon.  was ok with a 7 day hold prior to her back surgery.  Pt said she wants to keep her 7/22 labs and visit with . Pt refused a video visit and said she will see  in clinic. It will only be one week post hip surgery. I told her if she doesn't feel strong enough to come in she needs to cancel and reschedule.   Patrick STOVER July 2, 2020, 10:14 AM

## 2020-07-02 NOTE — TELEPHONE ENCOUNTER
The fact that she made it through back surgery recently without any cardiac complications is the best predictor of a good outcome with hip surgery. In the absence of any new cardiac symptoms, I would suggest that she can proceed with hip surgery without any additional cardiac testing. She should increase the dose of bystolic back to 5 mg daily at the time of surgery to provide additional cardiac protection. EE

## 2020-07-06 RX ORDER — ESOMEPRAZOLE MAGNESIUM 40 MG/1
40 CAPSULE, DELAYED RELEASE ORAL
COMMUNITY
End: 2020-09-18

## 2020-07-12 DIAGNOSIS — Z11.59 ENCOUNTER FOR SCREENING FOR OTHER VIRAL DISEASES: ICD-10-CM

## 2020-07-12 PROCEDURE — U0003 INFECTIOUS AGENT DETECTION BY NUCLEIC ACID (DNA OR RNA); SEVERE ACUTE RESPIRATORY SYNDROME CORONAVIRUS 2 (SARS-COV-2) (CORONAVIRUS DISEASE [COVID-19]), AMPLIFIED PROBE TECHNIQUE, MAKING USE OF HIGH THROUGHPUT TECHNOLOGIES AS DESCRIBED BY CMS-2020-01-R: HCPCS | Performed by: ORTHOPAEDIC SURGERY

## 2020-07-13 LAB
SARS-COV-2 RNA SPEC QL NAA+PROBE: NOT DETECTED
SPECIMEN SOURCE: NORMAL

## 2020-07-13 ASSESSMENT — MIFFLIN-ST. JEOR: SCORE: 1281.07

## 2020-07-15 ENCOUNTER — HOSPITAL ENCOUNTER (INPATIENT)
Facility: CLINIC | Age: 75
LOS: 2 days | Discharge: HOME | DRG: 470 | End: 2020-07-18
Attending: ORTHOPAEDIC SURGERY | Admitting: ORTHOPAEDIC SURGERY
Payer: COMMERCIAL

## 2020-07-15 ENCOUNTER — APPOINTMENT (OUTPATIENT)
Dept: PHYSICAL THERAPY | Facility: CLINIC | Age: 75
DRG: 470 | End: 2020-07-15
Attending: ORTHOPAEDIC SURGERY
Payer: COMMERCIAL

## 2020-07-15 ENCOUNTER — ANESTHESIA (OUTPATIENT)
Dept: SURGERY | Facility: CLINIC | Age: 75
DRG: 470 | End: 2020-07-15
Payer: COMMERCIAL

## 2020-07-15 ENCOUNTER — ANESTHESIA EVENT (OUTPATIENT)
Dept: SURGERY | Facility: CLINIC | Age: 75
DRG: 470 | End: 2020-07-15
Payer: COMMERCIAL

## 2020-07-15 ENCOUNTER — APPOINTMENT (OUTPATIENT)
Dept: GENERAL RADIOLOGY | Facility: CLINIC | Age: 75
DRG: 470 | End: 2020-07-15
Attending: ORTHOPAEDIC SURGERY
Payer: COMMERCIAL

## 2020-07-15 DIAGNOSIS — E11.9 TYPE 2 DIABETES MELLITUS WITHOUT COMPLICATION, WITHOUT LONG-TERM CURRENT USE OF INSULIN (H): ICD-10-CM

## 2020-07-15 DIAGNOSIS — Z96.642 STATUS POST TOTAL REPLACEMENT OF LEFT HIP: Primary | ICD-10-CM

## 2020-07-15 DIAGNOSIS — N30.00 ACUTE CYSTITIS WITHOUT HEMATURIA: ICD-10-CM

## 2020-07-15 DIAGNOSIS — G47.9 SLEEPING DIFFICULTY: ICD-10-CM

## 2020-07-15 PROBLEM — Z96.649 S/P TOTAL HIP ARTHROPLASTY: Status: ACTIVE | Noted: 2020-07-15

## 2020-07-15 LAB
GLUCOSE BLDC GLUCOMTR-MCNC: 168 MG/DL (ref 70–99)
GLUCOSE BLDC GLUCOMTR-MCNC: 202 MG/DL (ref 70–99)
GLUCOSE BLDC GLUCOMTR-MCNC: 215 MG/DL (ref 70–99)
GLUCOSE BLDC GLUCOMTR-MCNC: 219 MG/DL (ref 70–99)
GLUCOSE BLDC GLUCOMTR-MCNC: 225 MG/DL (ref 70–99)

## 2020-07-15 PROCEDURE — 36000063 ZZH SURGERY LEVEL 4 EA 15 ADDTL MIN: Performed by: ORTHOPAEDIC SURGERY

## 2020-07-15 PROCEDURE — 97530 THERAPEUTIC ACTIVITIES: CPT | Mod: GP | Performed by: PHYSICAL THERAPIST

## 2020-07-15 PROCEDURE — 25000131 ZZH RX MED GY IP 250 OP 636 PS 637: Performed by: PHYSICIAN ASSISTANT

## 2020-07-15 PROCEDURE — 37000008 ZZH ANESTHESIA TECHNICAL FEE, 1ST 30 MIN: Performed by: ORTHOPAEDIC SURGERY

## 2020-07-15 PROCEDURE — C1776 JOINT DEVICE (IMPLANTABLE): HCPCS | Performed by: ORTHOPAEDIC SURGERY

## 2020-07-15 PROCEDURE — 25000128 H RX IP 250 OP 636: Performed by: NURSE ANESTHETIST, CERTIFIED REGISTERED

## 2020-07-15 PROCEDURE — 25000132 ZZH RX MED GY IP 250 OP 250 PS 637: Performed by: ORTHOPAEDIC SURGERY

## 2020-07-15 PROCEDURE — 0SRB06A REPLACEMENT OF LEFT HIP JOINT WITH OXIDIZED ZIRCONIUM ON POLYETHYLENE SYNTHETIC SUBSTITUTE, UNCEMENTED, OPEN APPROACH: ICD-10-PCS | Performed by: ORTHOPAEDIC SURGERY

## 2020-07-15 PROCEDURE — 40000986 XR PELVIS AND HIP PORTABLE LEFT 1 VIEW

## 2020-07-15 PROCEDURE — 25000125 ZZHC RX 250: Performed by: NURSE ANESTHETIST, CERTIFIED REGISTERED

## 2020-07-15 PROCEDURE — 71000013 ZZH RECOVERY PHASE 1 LEVEL 1 EA ADDTL HR: Performed by: ORTHOPAEDIC SURGERY

## 2020-07-15 PROCEDURE — 25800030 ZZH RX IP 258 OP 636: Performed by: ORTHOPAEDIC SURGERY

## 2020-07-15 PROCEDURE — 71000012 ZZH RECOVERY PHASE 1 LEVEL 1 FIRST HR: Performed by: ORTHOPAEDIC SURGERY

## 2020-07-15 PROCEDURE — 25000128 H RX IP 250 OP 636: Performed by: ORTHOPAEDIC SURGERY

## 2020-07-15 PROCEDURE — 25000128 H RX IP 250 OP 636: Performed by: ANESTHESIOLOGY

## 2020-07-15 PROCEDURE — 37000009 ZZH ANESTHESIA TECHNICAL FEE, EACH ADDTL 15 MIN: Performed by: ORTHOPAEDIC SURGERY

## 2020-07-15 PROCEDURE — 25800030 ZZH RX IP 258 OP 636: Performed by: NURSE ANESTHETIST, CERTIFIED REGISTERED

## 2020-07-15 PROCEDURE — 36000093 ZZH SURGERY LEVEL 4 1ST 30 MIN: Performed by: ORTHOPAEDIC SURGERY

## 2020-07-15 PROCEDURE — 25800025 ZZH RX 258: Performed by: ORTHOPAEDIC SURGERY

## 2020-07-15 PROCEDURE — 82962 GLUCOSE BLOOD TEST: CPT

## 2020-07-15 PROCEDURE — 97161 PT EVAL LOW COMPLEX 20 MIN: CPT | Mod: GP | Performed by: PHYSICAL THERAPIST

## 2020-07-15 PROCEDURE — 27210794 ZZH OR GENERAL SUPPLY STERILE: Performed by: ORTHOPAEDIC SURGERY

## 2020-07-15 PROCEDURE — 97110 THERAPEUTIC EXERCISES: CPT | Mod: GP | Performed by: PHYSICAL THERAPIST

## 2020-07-15 PROCEDURE — 40000306 ZZH STATISTIC PRE PROC ASSESS II: Performed by: ORTHOPAEDIC SURGERY

## 2020-07-15 DEVICE — R3 3 HOLE ACETABULAR SHELL 52MM
Type: IMPLANTABLE DEVICE | Site: HIP | Status: FUNCTIONAL
Brand: R3 ACETABULAR

## 2020-07-15 RX ORDER — METHOCARBAMOL 500 MG/1
500 TABLET, FILM COATED ORAL 4 TIMES DAILY PRN
Status: DISCONTINUED | OUTPATIENT
Start: 2020-07-15 | End: 2020-07-16

## 2020-07-15 RX ORDER — LIRAGLUTIDE 6 MG/ML
0.6 INJECTION SUBCUTANEOUS DAILY
Status: DISCONTINUED | OUTPATIENT
Start: 2020-07-15 | End: 2020-07-18 | Stop reason: HOSPADM

## 2020-07-15 RX ORDER — MIRABEGRON 50 MG/1
50 TABLET, EXTENDED RELEASE ORAL DAILY
Status: DISCONTINUED | OUTPATIENT
Start: 2020-07-15 | End: 2020-07-18 | Stop reason: HOSPADM

## 2020-07-15 RX ORDER — LISINOPRIL 5 MG/1
5 TABLET ORAL DAILY
Status: DISCONTINUED | OUTPATIENT
Start: 2020-07-15 | End: 2020-07-16

## 2020-07-15 RX ORDER — NALOXONE HYDROCHLORIDE 0.4 MG/ML
.1-.4 INJECTION, SOLUTION INTRAMUSCULAR; INTRAVENOUS; SUBCUTANEOUS
Status: DISCONTINUED | OUTPATIENT
Start: 2020-07-15 | End: 2020-07-18 | Stop reason: HOSPADM

## 2020-07-15 RX ORDER — HYDROMORPHONE HYDROCHLORIDE 1 MG/ML
.3-.5 INJECTION, SOLUTION INTRAMUSCULAR; INTRAVENOUS; SUBCUTANEOUS EVERY 5 MIN PRN
Status: DISCONTINUED | OUTPATIENT
Start: 2020-07-15 | End: 2020-07-15 | Stop reason: HOSPADM

## 2020-07-15 RX ORDER — GLIMEPIRIDE 1 MG/1
4 TABLET ORAL 2 TIMES DAILY
Status: DISCONTINUED | OUTPATIENT
Start: 2020-07-15 | End: 2020-07-15

## 2020-07-15 RX ORDER — NICOTINE POLACRILEX 4 MG
15-30 LOZENGE BUCCAL
Status: DISCONTINUED | OUTPATIENT
Start: 2020-07-15 | End: 2020-07-18 | Stop reason: HOSPADM

## 2020-07-15 RX ORDER — PANTOPRAZOLE SODIUM 20 MG/1
40 TABLET, DELAYED RELEASE ORAL
Status: DISCONTINUED | OUTPATIENT
Start: 2020-07-16 | End: 2020-07-18 | Stop reason: HOSPADM

## 2020-07-15 RX ORDER — ONDANSETRON 2 MG/ML
4 INJECTION INTRAMUSCULAR; INTRAVENOUS EVERY 6 HOURS PRN
Status: DISCONTINUED | OUTPATIENT
Start: 2020-07-15 | End: 2020-07-18 | Stop reason: HOSPADM

## 2020-07-15 RX ORDER — DEXTROSE MONOHYDRATE 25 G/50ML
25-50 INJECTION, SOLUTION INTRAVENOUS
Status: DISCONTINUED | OUTPATIENT
Start: 2020-07-15 | End: 2020-07-18 | Stop reason: HOSPADM

## 2020-07-15 RX ORDER — NEOSTIGMINE METHYLSULFATE 1 MG/ML
VIAL (ML) INJECTION PRN
Status: DISCONTINUED | OUTPATIENT
Start: 2020-07-15 | End: 2020-07-15

## 2020-07-15 RX ORDER — NALOXONE HYDROCHLORIDE 0.4 MG/ML
.1-.4 INJECTION, SOLUTION INTRAMUSCULAR; INTRAVENOUS; SUBCUTANEOUS
Status: ACTIVE | OUTPATIENT
Start: 2020-07-15 | End: 2020-07-16

## 2020-07-15 RX ORDER — GLIMEPIRIDE 4 MG/1
4 TABLET ORAL 2 TIMES DAILY
Status: DISCONTINUED | OUTPATIENT
Start: 2020-07-16 | End: 2020-07-18 | Stop reason: HOSPADM

## 2020-07-15 RX ORDER — SODIUM CHLORIDE, SODIUM LACTATE, POTASSIUM CHLORIDE, CALCIUM CHLORIDE 600; 310; 30; 20 MG/100ML; MG/100ML; MG/100ML; MG/100ML
INJECTION, SOLUTION INTRAVENOUS CONTINUOUS
Status: DISCONTINUED | OUTPATIENT
Start: 2020-07-15 | End: 2020-07-15 | Stop reason: HOSPADM

## 2020-07-15 RX ORDER — PROPOFOL 10 MG/ML
INJECTION, EMULSION INTRAVENOUS CONTINUOUS PRN
Status: DISCONTINUED | OUTPATIENT
Start: 2020-07-15 | End: 2020-07-15

## 2020-07-15 RX ORDER — ONDANSETRON 4 MG/1
4 TABLET, ORALLY DISINTEGRATING ORAL EVERY 6 HOURS PRN
Status: DISCONTINUED | OUTPATIENT
Start: 2020-07-15 | End: 2020-07-18 | Stop reason: HOSPADM

## 2020-07-15 RX ORDER — GABAPENTIN 100 MG/1
100 CAPSULE ORAL 3 TIMES DAILY
Status: DISCONTINUED | OUTPATIENT
Start: 2020-07-15 | End: 2020-07-16

## 2020-07-15 RX ORDER — ONDANSETRON 4 MG/1
4 TABLET, ORALLY DISINTEGRATING ORAL EVERY 30 MIN PRN
Status: DISCONTINUED | OUTPATIENT
Start: 2020-07-15 | End: 2020-07-15 | Stop reason: HOSPADM

## 2020-07-15 RX ORDER — LIDOCAINE 40 MG/G
CREAM TOPICAL
Status: DISCONTINUED | OUTPATIENT
Start: 2020-07-15 | End: 2020-07-15

## 2020-07-15 RX ORDER — POLYETHYLENE GLYCOL 3350 17 G/17G
17 POWDER, FOR SOLUTION ORAL DAILY
Status: DISCONTINUED | OUTPATIENT
Start: 2020-07-15 | End: 2020-07-18 | Stop reason: HOSPADM

## 2020-07-15 RX ORDER — ACETAMINOPHEN 325 MG/1
975 TABLET ORAL EVERY 8 HOURS
Status: DISCONTINUED | OUTPATIENT
Start: 2020-07-15 | End: 2020-07-16

## 2020-07-15 RX ORDER — BISACODYL 10 MG
10 SUPPOSITORY, RECTAL RECTAL DAILY PRN
Status: DISCONTINUED | OUTPATIENT
Start: 2020-07-15 | End: 2020-07-18 | Stop reason: HOSPADM

## 2020-07-15 RX ORDER — CEFAZOLIN SODIUM 1 G/3ML
1 INJECTION, POWDER, FOR SOLUTION INTRAMUSCULAR; INTRAVENOUS SEE ADMIN INSTRUCTIONS
Status: DISCONTINUED | OUTPATIENT
Start: 2020-07-15 | End: 2020-07-15 | Stop reason: HOSPADM

## 2020-07-15 RX ORDER — FENTANYL CITRATE 50 UG/ML
INJECTION, SOLUTION INTRAMUSCULAR; INTRAVENOUS PRN
Status: DISCONTINUED | OUTPATIENT
Start: 2020-07-15 | End: 2020-07-15

## 2020-07-15 RX ORDER — NEBIVOLOL 5 MG/1
5 TABLET ORAL DAILY
Status: DISCONTINUED | OUTPATIENT
Start: 2020-07-16 | End: 2020-07-18 | Stop reason: HOSPADM

## 2020-07-15 RX ORDER — ACETAMINOPHEN 325 MG/1
650 TABLET ORAL EVERY 4 HOURS PRN
Status: DISCONTINUED | OUTPATIENT
Start: 2020-07-18 | End: 2020-07-18 | Stop reason: HOSPADM

## 2020-07-15 RX ORDER — DEXAMETHASONE SODIUM PHOSPHATE 4 MG/ML
INJECTION, SOLUTION INTRA-ARTICULAR; INTRALESIONAL; INTRAMUSCULAR; INTRAVENOUS; SOFT TISSUE PRN
Status: DISCONTINUED | OUTPATIENT
Start: 2020-07-15 | End: 2020-07-15

## 2020-07-15 RX ORDER — CEFAZOLIN SODIUM 2 G/100ML
2 INJECTION, SOLUTION INTRAVENOUS
Status: COMPLETED | OUTPATIENT
Start: 2020-07-15 | End: 2020-07-15

## 2020-07-15 RX ORDER — ONDANSETRON 2 MG/ML
4 INJECTION INTRAMUSCULAR; INTRAVENOUS EVERY 30 MIN PRN
Status: DISCONTINUED | OUTPATIENT
Start: 2020-07-15 | End: 2020-07-15 | Stop reason: HOSPADM

## 2020-07-15 RX ORDER — LIDOCAINE HYDROCHLORIDE 10 MG/ML
INJECTION, SOLUTION INFILTRATION; PERINEURAL PRN
Status: DISCONTINUED | OUTPATIENT
Start: 2020-07-15 | End: 2020-07-15

## 2020-07-15 RX ORDER — HYDROMORPHONE HYDROCHLORIDE 1 MG/ML
0.2 INJECTION, SOLUTION INTRAMUSCULAR; INTRAVENOUS; SUBCUTANEOUS
Status: DISCONTINUED | OUTPATIENT
Start: 2020-07-15 | End: 2020-07-18 | Stop reason: HOSPADM

## 2020-07-15 RX ORDER — FENTANYL CITRATE 50 UG/ML
25-50 INJECTION, SOLUTION INTRAMUSCULAR; INTRAVENOUS
Status: DISCONTINUED | OUTPATIENT
Start: 2020-07-15 | End: 2020-07-15 | Stop reason: HOSPADM

## 2020-07-15 RX ORDER — CEFAZOLIN SODIUM 1 G/50ML
1 INJECTION, SOLUTION INTRAVENOUS EVERY 8 HOURS
Status: COMPLETED | OUTPATIENT
Start: 2020-07-15 | End: 2020-07-16

## 2020-07-15 RX ORDER — SODIUM CHLORIDE, SODIUM LACTATE, POTASSIUM CHLORIDE, CALCIUM CHLORIDE 600; 310; 30; 20 MG/100ML; MG/100ML; MG/100ML; MG/100ML
INJECTION, SOLUTION INTRAVENOUS CONTINUOUS PRN
Status: DISCONTINUED | OUTPATIENT
Start: 2020-07-15 | End: 2020-07-15

## 2020-07-15 RX ORDER — CARBOXYMETHYLCELLULOSE SODIUM 5 MG/ML
2 SOLUTION/ DROPS OPHTHALMIC
Status: DISCONTINUED | OUTPATIENT
Start: 2020-07-15 | End: 2020-07-18 | Stop reason: HOSPADM

## 2020-07-15 RX ORDER — NALOXONE HYDROCHLORIDE 0.4 MG/ML
.1-.4 INJECTION, SOLUTION INTRAMUSCULAR; INTRAVENOUS; SUBCUTANEOUS
Status: DISCONTINUED | OUTPATIENT
Start: 2020-07-15 | End: 2020-07-16

## 2020-07-15 RX ORDER — ONDANSETRON 2 MG/ML
INJECTION INTRAMUSCULAR; INTRAVENOUS PRN
Status: DISCONTINUED | OUTPATIENT
Start: 2020-07-15 | End: 2020-07-15

## 2020-07-15 RX ORDER — GLYCOPYRROLATE 0.2 MG/ML
INJECTION, SOLUTION INTRAMUSCULAR; INTRAVENOUS PRN
Status: DISCONTINUED | OUTPATIENT
Start: 2020-07-15 | End: 2020-07-15

## 2020-07-15 RX ORDER — SODIUM CHLORIDE 9 MG/ML
INJECTION, SOLUTION INTRAVENOUS CONTINUOUS
Status: DISCONTINUED | OUTPATIENT
Start: 2020-07-15 | End: 2020-07-16

## 2020-07-15 RX ORDER — AMOXICILLIN 250 MG
2 CAPSULE ORAL 2 TIMES DAILY
Status: DISCONTINUED | OUTPATIENT
Start: 2020-07-15 | End: 2020-07-18 | Stop reason: HOSPADM

## 2020-07-15 RX ORDER — PROPOFOL 10 MG/ML
INJECTION, EMULSION INTRAVENOUS PRN
Status: DISCONTINUED | OUTPATIENT
Start: 2020-07-15 | End: 2020-07-15

## 2020-07-15 RX ORDER — LIDOCAINE 40 MG/G
CREAM TOPICAL
Status: DISCONTINUED | OUTPATIENT
Start: 2020-07-15 | End: 2020-07-15 | Stop reason: HOSPADM

## 2020-07-15 RX ORDER — OXYCODONE HYDROCHLORIDE 5 MG/1
5-10 TABLET ORAL
Status: DISCONTINUED | OUTPATIENT
Start: 2020-07-15 | End: 2020-07-16

## 2020-07-15 RX ADMIN — PROPOFOL 150 MG: 10 INJECTION, EMULSION INTRAVENOUS at 07:38

## 2020-07-15 RX ADMIN — SODIUM CHLORIDE: 9 INJECTION, SOLUTION INTRAVENOUS at 21:53

## 2020-07-15 RX ADMIN — METHOCARBAMOL 500 MG: 500 TABLET ORAL at 21:25

## 2020-07-15 RX ADMIN — SODIUM CHLORIDE, POTASSIUM CHLORIDE, SODIUM LACTATE AND CALCIUM CHLORIDE: 600; 310; 30; 20 INJECTION, SOLUTION INTRAVENOUS at 08:54

## 2020-07-15 RX ADMIN — DEXAMETHASONE SODIUM PHOSPHATE 4 MG: 4 INJECTION, SOLUTION INTRA-ARTICULAR; INTRALESIONAL; INTRAMUSCULAR; INTRAVENOUS; SOFT TISSUE at 07:38

## 2020-07-15 RX ADMIN — CEFAZOLIN SODIUM 2 G: 2 INJECTION, SOLUTION INTRAVENOUS at 07:32

## 2020-07-15 RX ADMIN — Medication 2 MG: at 08:41

## 2020-07-15 RX ADMIN — ROCURONIUM BROMIDE 35 MG: 10 INJECTION INTRAVENOUS at 07:38

## 2020-07-15 RX ADMIN — HYDROMORPHONE HYDROCHLORIDE 0.5 MG: 1 INJECTION, SOLUTION INTRAMUSCULAR; INTRAVENOUS; SUBCUTANEOUS at 09:45

## 2020-07-15 RX ADMIN — FENTANYL CITRATE 100 MCG: 50 INJECTION, SOLUTION INTRAMUSCULAR; INTRAVENOUS at 07:38

## 2020-07-15 RX ADMIN — HYDROMORPHONE HYDROCHLORIDE 0.5 MG: 1 INJECTION, SOLUTION INTRAMUSCULAR; INTRAVENOUS; SUBCUTANEOUS at 09:35

## 2020-07-15 RX ADMIN — SODIUM CHLORIDE, POTASSIUM CHLORIDE, SODIUM LACTATE AND CALCIUM CHLORIDE: 600; 310; 30; 20 INJECTION, SOLUTION INTRAVENOUS at 07:32

## 2020-07-15 RX ADMIN — ONDANSETRON 4 MG: 2 INJECTION INTRAMUSCULAR; INTRAVENOUS at 09:32

## 2020-07-15 RX ADMIN — INSULIN ASPART 2 UNITS: 100 INJECTION, SOLUTION INTRAVENOUS; SUBCUTANEOUS at 17:07

## 2020-07-15 RX ADMIN — HYDROMORPHONE HYDROCHLORIDE 0.5 MG: 1 INJECTION, SOLUTION INTRAMUSCULAR; INTRAVENOUS; SUBCUTANEOUS at 10:28

## 2020-07-15 RX ADMIN — HYDROMORPHONE HYDROCHLORIDE 1 MG: 1 INJECTION, SOLUTION INTRAMUSCULAR; INTRAVENOUS; SUBCUTANEOUS at 07:58

## 2020-07-15 RX ADMIN — HYDROMORPHONE HYDROCHLORIDE 0.2 MG: 1 INJECTION, SOLUTION INTRAMUSCULAR; INTRAVENOUS; SUBCUTANEOUS at 12:47

## 2020-07-15 RX ADMIN — LIDOCAINE HYDROCHLORIDE 50 MG: 10 INJECTION, SOLUTION INFILTRATION; PERINEURAL at 07:38

## 2020-07-15 RX ADMIN — GLYCOPYRROLATE 0.1 MG: 0.2 INJECTION, SOLUTION INTRAMUSCULAR; INTRAVENOUS at 08:46

## 2020-07-15 RX ADMIN — FENTANYL CITRATE 50 MCG: 50 INJECTION, SOLUTION INTRAMUSCULAR; INTRAVENOUS at 09:59

## 2020-07-15 RX ADMIN — OXYCODONE HYDROCHLORIDE 10 MG: 5 TABLET ORAL at 16:24

## 2020-07-15 RX ADMIN — LISINOPRIL 5 MG: 5 TABLET ORAL at 21:26

## 2020-07-15 RX ADMIN — ONDANSETRON HYDROCHLORIDE 4 MG: 2 INJECTION, SOLUTION INTRAVENOUS at 08:36

## 2020-07-15 RX ADMIN — ACETAMINOPHEN 650 MG: 325 TABLET, FILM COATED ORAL at 21:26

## 2020-07-15 RX ADMIN — PROPOFOL 25 MCG/KG/MIN: 10 INJECTION, EMULSION INTRAVENOUS at 07:48

## 2020-07-15 RX ADMIN — HYDROMORPHONE HYDROCHLORIDE 0.5 MG: 1 INJECTION, SOLUTION INTRAMUSCULAR; INTRAVENOUS; SUBCUTANEOUS at 09:58

## 2020-07-15 RX ADMIN — SODIUM CHLORIDE: 9 INJECTION, SOLUTION INTRAVENOUS at 12:48

## 2020-07-15 RX ADMIN — CEFAZOLIN SODIUM 1 G: 1 INJECTION, SOLUTION INTRAVENOUS at 16:37

## 2020-07-15 RX ADMIN — GLYCOPYRROLATE 0.3 MG: 0.2 INJECTION, SOLUTION INTRAMUSCULAR; INTRAVENOUS at 08:41

## 2020-07-15 RX ADMIN — Medication 1 LOZENGE: at 21:33

## 2020-07-15 RX ADMIN — OXYCODONE HYDROCHLORIDE 10 MG: 5 TABLET ORAL at 21:25

## 2020-07-15 RX ADMIN — FENTANYL CITRATE 50 MCG: 50 INJECTION, SOLUTION INTRAMUSCULAR; INTRAVENOUS at 10:44

## 2020-07-15 ASSESSMENT — ENCOUNTER SYMPTOMS: DYSRHYTHMIAS: 0

## 2020-07-15 ASSESSMENT — LIFESTYLE VARIABLES: TOBACCO_USE: 0

## 2020-07-15 ASSESSMENT — MIFFLIN-ST. JEOR: SCORE: 1262.92

## 2020-07-15 NOTE — LETTER
Transition Communication Hand-off for Care Transitions to Next Level of Care Provider    Name: Sarah KAPOOR Donta  : 1945  MRN #: [id number]  Primary Care Provider: Gaye Zimmer  Primary Care MD Name: Dr. Zimmer  [facility] [place] [address]  [place] [address]  Primary Care Clinic Name: [facility]  Reason for Hospitalization:  DJD (degenerative joint disease) [M19.90]  Admit Date/Time: 7/15/2020  5:53 AM  Discharge Date: 2020  Payor Source: Payor: [facility] / Plan: ARE MEDICARE NON [email or web address] PART[employer]ERS / Product Type: HMO /     Readmission Assessment Measure (DANISHA) Risk Score/category: Low        Reason for Communication Hand-off Referral: Other Left Total Hip Arthroplasty    Discharge Plan: Home     Concern for non-adherence with plan of care: No     Discharge Needs Assessment:  Needs      Most Recent Value   Equipment Currently Used at Home  cane, straight   # of Referrals Placed by CTS  External Care Coordination, Homecare        Follow-up specialty is recommended: Yes. Follow up with Dr. Victoria in 7-10 days. Also, follow up with Gaye Zimmer in 7 days.       Follow-up plan:    Future Appointments   Date Time Provider Department Center   2020  9:00 AM [facility]   2020  9:45 AM Feroz Burgos MD [facility]   2020 11:45 AM [facility] [place]   2020 12:10 PM Shireen Neves MD [facility]     Any outstanding tests or procedures:  No. Recommend repeat CBC at PCP follow up appt.       Key Recommendations: Identified issues/concerns regarding health management:   CTS following for care coordination. Patient was admitted on 7/15/20 for total left hip arthroplasty. Pre-operative plan was to have patient discharge to home with [facility].  CTS met with patient at bedside to verify discharge plans.  Patient said she would not go to a TCU because of COVID and was grateful for to have HC.  She has used [facility] in the  past and was satisfied with care.  [facility] is aware of the referral. She has cane, walker at home.  Patient does appear overwhelmed.  Patient would benefit from on going pain management with a pain specialist and mental health support.    Tiffany Piña RN    Sent by Leona Green RN, BSN, CPHN, CM    AVS/Discharge Summary is the source of truth; this is a helpful guide for improved communication of patient story

## 2020-07-15 NOTE — PLAN OF CARE
PT: PT eval completed. Pt is status post L RAFAEL. Pt lives with spouse in 2 story home with 1 stairs to enter.   Discharge Planner PT   Patient plan for discharge: none stated  Current status: Pt educated on WBAT, PT role and POC. Pt was cued for supine to sit. Pt needing min A for performance. Pt was educated on how to perform sit<>Stand with hand placement and technique with FWW, min A. Cues for LE for better tolerance. Pt was educated on use of walker with ambulation, cues for step progression. Pt was educated on step through, posture and avoidance of pivoting with turning. CGA. 10 feet.   At end of session becoming slightly agitated about her care here, Pt did not have a phone in the room, PT was able to get a phone but becoming frustrated with therapist that there wasn't phone in there in the 1st place.   Anticipated status at discharge: Pt likely will need min A for bed mobility and stairs at discharge. PT concerned with tolerance to ambulation at this time 10 feet only this evening.  Will continue to update.

## 2020-07-15 NOTE — ANESTHESIA POSTPROCEDURE EVALUATION
Patient: Sarah Longo    Procedure(s):  Left total hip arthroplasty    Diagnosis:DJD (degenerative joint disease) [M19.90]  Diagnosis Additional Information: No value filed.    Anesthesia Type:  General    Note:  Anesthesia Post Evaluation    Patient location during evaluation: PACU  Patient participation: Able to fully participate in evaluation  Level of consciousness: awake  Pain management: adequate  Airway patency: patent  Cardiovascular status: acceptable  Respiratory status: acceptable  Hydration status: euvolemic  PONV: controlled     Anesthetic complications: None          Last vitals:  Vitals:    07/15/20 1115 07/15/20 1144 07/15/20 1246   BP: 123/53 (!) 154/64 136/50   Pulse:      Resp: 13 11 8   Temp: 98  F (36.7  C) 97.8  F (36.6  C)    SpO2: 100% 99% 99%         Electronically Signed By: Dariusz Woods MD  July 15, 2020  1:49 PM

## 2020-07-15 NOTE — OP NOTE
Procedure Date: 07/15/2020      PREOPERATIVE DIAGNOSIS:  Degenerative joint disease, left hip.      POSTOPERATIVE DIAGNOSIS:  Degenerative joint disease, left hip.      PROCEDURE:  Left total hip arthroplasty.      ANESTHESIA:  General.      FIRST ASSISTANT:  MELVIN Dawn.      INDICATIONS:  A 75-year-old woman who has had chronic increasing problems with left hip pain and has failed a nonoperative treatment program.  Appropriate risks and alternatives have been discussed at length, and she has elected to proceed with surgical intervention.      DESCRIPTION OF PROCEDURE:  The patient was brought to the operating room, given a general anesthetic, positioned right side down her left hip prepped and draped sterilely in the usual manner.  We made a straight lateral incision.  Dissection was carried down sharply to the fascia.  Fascia was split in line with its fibers.  The anterior one-half of the abductors dissected free off the greater trochanter, capsule opened and the hip dislocated anteriorly.  Inspection of joint revealed complete loss of articular cartilage.  We osteotomized the femoral neck at an appropriate level.      Attention was turned to the acetabulum.  Progressively larger reamers were used until we sized for a 52 mm cup, 52 mm R3 cup was driven into position, 45 degrees of inclination and 40 degrees of anteversion.  Excellent fixation was achieved.      Attention was turned back to the femur.  Progressively larger reamers and broaches were used until we sized for #12 component high offset.  Trial reduction was done.  We elected to use a dual mobility type due to her spinal fusion.  We placed the real dual mobility liner in the R3 cup.  This would accept a 40 mm outside diameter poly head and a 22 mm inside head, 0 neck length was used and this restored leg length with excellent stability and full range of motion.      The hip was redislocated and trial components were removed.  Bony surfaces  water picked on.  We drove the real #12 high offset femoral component with excellent fixation.  We retrialed the neck length and again chose a 0 neck length, 22 mm head.  This was snapped into the 40 mm poly head and placed on the C taper.  Final reduction again gave excellent stability, excellent range of motion and excellent restoration of leg lengths.      Wound was irrigated copiously.  Hemostasis was obtained by electrocautery.  Closure done in layers, closing the abductors back to greater trochanter with #2 Vicryl, fascia with #1 Vicryl, subcutaneous tissue with 2-0 Vicryl, staples on the skin.  Sterile compressive dressing applied.  The patient awakened and taken to the recovery room in good condition.  There were no intraoperative complications and minimal blood loss.         NITISH VAZ MD             D: 07/15/2020   T: 07/15/2020   MT:       Name:     KATHY PERRY   MRN:      [id number]        Account:        [id number]   :      1945           Procedure Date: 07/15/2020      Document: [id number]       cc: Nitish Vaz MD

## 2020-07-15 NOTE — PLAN OF CARE
Pt received from PACU, oriented to room.  IVF infusing.  A/O. Therapy this afternoon.  Pt c/o pain, pain consult to be ordered.  IV dilaudid x1.  Pt refusing tylenol, stated she is avoiding due to elevated liver enzyme.  Pharmacy to clarify with MD to confirm if pt needs BG checks, pt is diabetic.

## 2020-07-15 NOTE — OR NURSING
Pt said she didn't get enough pain meds, she had diaudiod 2 mg , fentanyl 100 mcg, she was turned on her right side to help,  She is angry about the charge nurse telling her the nurses would get her out of bed tonight, she argued that she would not get out of bed, she was told that her Dr wrote  that as an order, she later was blaming this nurse for saying that, she said that I didn't do anything to help her pain, she said I didn't care about taking care of the patient, said that I wouldn't give her ice chips when she asked for them, pt CPOT was 0 when she said her pain was 8.

## 2020-07-15 NOTE — PROGRESS NOTES
07/15/20 1500   Quick Adds   Type of Visit Initial PT Evaluation   Living Environment   Lives With spouse   Living Arrangements house   Home Accessibility stairs within home;stairs to enter home   Number of Stairs, Main Entrance 1   Number of Stairs, Within Home, Primary other (see comments)   Stair Railings, Within Home, Primary railings safe and in good condition   Self-Care   Equipment Currently Used at Home cane, straight;walker, rolling;raised toilet;shower chair   Activity/Exercise/Self-Care Comment Pt has a main floor bathroom and bedroom available   Functional Level Prior   Ambulation 1-->assistive equipment   Transferring 0-->independent   Toileting 0-->independent   Bathing 0-->independent   Communication 0-->understands/communicates without difficulty   Swallowing 0-->swallows foods/liquids without difficulty   Cognition 0 - no cognition issues reported   Fall history within last six months no   General Information   Onset of Illness/Injury or Date of Surgery - Date 07/15/20   Referring Physician Dr. Victoria   Patient/Family Goals Statement Pt hoping to DC home   Pertinent History of Current Problem (include personal factors and/or comorbidities that impact the POC) Pt is status post L RAFAEL   Precautions/Limitations no known precautions/limitations   Cognitive Status Examination   Orientation orientation to person, place and time   Level of Consciousness alert   Follows Commands and Answers Questions 100% of the time;able to follow multistep instructions   Personal Safety and Judgment intact   Memory intact   Integumentary/Edema   Integumentary/Edema Comments as expected   Posture    Posture Forward head position;Protracted shoulders   Range of Motion (ROM)   ROM Comment painful with hip flexion but able to perform 90 degrees of flexion   Strength   Strength Comments functional weakness   Bed Mobility   Bed Mobility Comments Min A   Transfer Skills   Transfer Comments min A    Gait   Gait Comments min  "A, limited ambulation, decreased step length   Balance   Balance Comments decreased balance, limited ambulation   Modality Interventions   Planned Modality Interventions Cryotherapy   General Therapy Interventions   Planned Therapy Interventions progressive activity/exercise;home program guidelines;risk factor education;gait training;bed mobility training;strengthening;transfer training   Clinical Impression   Criteria for Skilled Therapeutic Intervention yes, treatment indicated   PT Diagnosis decreased functional mobility status post L RAFAEL   Influenced by the following impairments pain, decreased strength, decreased ROM, anxiety   Functional limitations due to impairments decreased transfers, ambulation   Clinical Presentation Stable/Uncomplicated   Clinical Decision Making (Complexity) Low complexity   Therapy Frequency 2x/day   Predicted Duration of Therapy Intervention (days/wks) 2-3 days   Anticipated Discharge Disposition Other (see comments)  (defer to ortho)   Risk & Benefits of therapy have been explained Yes   Patient, Family & other staff in agreement with plan of care Yes   [facility] TM \"6 Clicks\"   2016, Trustees of [facility], under license to [employer].  All rights reserved.   6 Clicks Short Forms Basic Mobility Inpatient Short Form   [facility]  \"6 Clicks\" V.2 Basic Mobility Inpatient Short Form   1. Turning from your back to your side while in a flat bed without using bedrails? 3 - A Little   2. Moving from lying on your back to sitting on the side of a flat bed without using bedrails? 3 - A Little   3. Moving to and from a bed to a chair (including a wheelchair)? 3 - A Little   4. Standing up from a chair using your arms (e.g., wheelchair, or bedside chair)? 3 - A Little   5. To walk in hospital room? 2 - A Lot   6. Climbing 3-5 steps with a railing? 2 - A Lot   Basic Mobility Raw Score (Score out of 24.Lower scores equate to lower levels of function) 16 "   Total Evaluation Time   Total Evaluation Time (Minutes) 10

## 2020-07-15 NOTE — BRIEF OP NOTE
[facility]    Brief Operative Note    Pre-operative diagnosis: DJD (degenerative joint disease) [M19.90]  Post-operative diagnosis Same as pre-operative diagnosis    Procedure: Procedure(s):  Left total hip arthroplasty  Surgeon: Surgeon(s) and Role:     * Jayson Victoria MD - Primary  Anesthesia: General   Estimated blood loss: Less than 50 ml  Drains: None  Specimens: * No specimens in log *  Findings:   None.  Complications: None   .  Implants:   Implant Name Type Inv. Item Serial No.  Lot No. LRB No. Used Action   IMP HOLE COVER SNN ACETAB CUP REFLEC INTERFIT [id number] Metallic Hardware/[place] IMP HOLE COVER SNN ACETAB CUP REFLEC INTERFIT [id number]  TABARES  [id number] Left 1 Wasted   52mm OD R3 Three Hole Hemispherical Stiktite Coated Shell    [employer] [id number] Left 1 Implanted   [id number], 40mm ID, 52mm OD, Dual Mobility Liner    [employer]ES [employer] [id number] Left 1 Implanted   Size 12, High Offset Synergy Porous Plus HA Femoral Component     [id number] Left 1 Implanted   [id number], 22mm ID, 40mm OD Dual Mobility Insert XLPE    [employer] [id number] Left 1 Implanted   Oxinium 22mm OD, +0, 12/14 Taper Femoral Head    [employer] [id number] Left 1 Implanted

## 2020-07-15 NOTE — BRIEF OP NOTE
[facility]    Brief Operative Note    Pre-operative diagnosis: DJD (degenerative joint disease) [M19.90]  Post-operative diagnosis Same as pre-operative diagnosis    Procedure: Procedure(s):  Left total hip arthroplasty  Surgeon: Surgeon(s) and Role:     * Jayson Victoria MD - Primary  Anesthesia: General   Estimated blood loss: Less than 50 ml  Drains: None  Specimens: * No specimens in log *  Findings:   None.  Complications: None   .  Implants:   Implant Name Type Inv. Item Serial No.  Lot No. LRB No. Used Action   IMP HOLE COVER SNN ACETAB CUP REFLEC INTERFIT [id number] Metallic Hardware/[place] IMP HOLE COVER SNN ACETAB CUP REFLEC INTERFIT [id number]  TABARES  [id number] Left 1 Wasted   52mm OD R3 Three Hole Hemispherical Stiktite Coated Shell    [employer] [id number] Left 1 Implanted   [id number], 40mm ID, 52mm OD, Dual Mobility Liner    [employer] [id number] Left 1 Implanted   Size 12, High Offset Synergy Porous Plus HA Femoral Component     [id number] Left 1 Implanted   [id number], 22mm ID, 40mm OD Dual Mobility Insert XLPE    [employer] [id number] Left 1 Implanted   Oxinium 22mm OD, +0, 12/14 Taper Femoral Head    [employer] [id number] Left 1 Implanted

## 2020-07-15 NOTE — ANESTHESIA PREPROCEDURE EVALUATION
Anesthesia Pre-Procedure Evaluation    Patient: Sarah Longo   MRN: [id number] : 1945          Preoperative Diagnosis: DJD (degenerative joint disease) [M19.90]    Procedure(s):  Left total hip arthroplasty    Past Medical History:   Diagnosis Date     Anemia      Arthritis      Coronary artery disease 2016    cardiac cath 2019: patent stents; PTCA with MAYA x 2 to OM1 and MAYA x 1 to p.LAD (2016 at [place]); PTCA with intracoronary stent placement of RCA 2004; MAYA to OM1 2016     Cystocele, midline 2010     Depression      HYPERPLASTIC  POLYP      colonoscopy in 5-10 yrs.     Hypothyroidism     hypothyroid- Dr Majano     OAB (overactive bladder)     complex voiding dysfct, failed interstim     Osteoarthritis      Other and unspecified hyperlipidemia      Postmenopausal bleeding      Shoulder blade pain 2016     Type II or unspecified type diabetes mellitus without mention of complication, not stated as uncontrolled     Dr. Majano     Unspecified essential hypertension      Urinary frequency 9/29/10    followed by urology. no benefit from detrol or sanctura. month trial of macrobid and flomax 10/10     Past Surgical History:   Procedure Laterality Date     ------------OTHER-------------      Interstim     Ablation       BACK SURGERY  2020    spinal fusion     C CT ANGIOGRAPHY CORONARY  2005    mod soft plaque LAD and Cx, follow up with left heart cath     C LIGATE FALLOPIAN TUBE      endometrial ablation     CARDIAC SURGERY       CHOLECYSTECTOMY       COLONOSCOPY       CV HEART CATHETERIZATION WITH POSSIBLE INTERVENTION N/A 2019    Procedure: Coronary Angiogram;  Surgeon: Sudarshan Lee MD;  Location:  HEART CARDIAC CATH LAB; patent stents     EXCISE LESION UPPER EXTREMITY  2013    Procedure: EXCISE LESION UPPER EXTREMITY;  EXCISION RIGHT ARM ANTICUBITAL LIPOMA ;  Surgeon: Jayson Joe MD;  Location: [facility] REMOVAL OF TONSILS,12+ Y/O        HEART CATH LEFT HEART CATH  3/2/2016    No intervention - aggressive medical management     HEART CATH LEFT HEART CATH  2016     MAYA x 2 to OM1, MAYA to LAD, at [facility]     HEART CATH LEFT HEART CATH  2004    MAYA to RCA     HEART CATH LEFT HEART CATH  3/28/2002    Mild to moderate 3 vessel CAD. Medical management recommended.      HEART CATH LEFT HEART CATH  2016    MAYA to OM1     hypothyroid       KNEE SURGERY  4/20/10    right knee replacement     ORTHOPEDIC SURGERY      total knee      REMOVE STIMULATOR SACRAL NERVE N/A 2015    Procedure: REMOVE STIMULATOR SACRAL NERVE;  Surgeon: Gertrudis Frausto MD;  Location: [facility]     SURGICAL HISTORY OF -       Uterine endometrial ablation     Anesthesia Evaluation     . Pt has had prior anesthetic. Type: General    No history of anesthetic complications          ROS/MED HX    ENT/Pulmonary:      (-) tobacco use, asthma and sleep apnea   Neurologic:     (+)neuropathy - Lumbar Radiculopathy,    (-) Other neuro hx   Cardiovascular: Comment: Patient Information     Patient Name   Sarah Longo  MRN   [id number]  Sex   Female               Age   1945 (75 year old)   Reason for Exam   Priority: Routine   Prior revascularization (either PTCA or CABG); See the Clinical Information for Interpreting Provider; CAD eval, asymptomatic, prior PCI < 2yrs   Dx: Coronary artery disease involving native coronary artery of native heart without angina pectoris (I25.10 (ICD-10-CM))   Comments:    ECG Link      Stress Test Data - Scan on 20 3:14 PM by    Indications     Coronary artery disease involving native coronary artery of native heart without angina pectoris (I25.10 (ICD-10-CM))   Conclusion           The nuclear stress test is negative for inducible myocardial ischemia or infarction.     Stress to rest cavity ratio is 1.15.     Left ventricular function is low normal.     The left ventricular ejection fraction at rest is 50%.     A prior  study was conducted on 2019.  This study has changes noted when compared with the prior study.  The medium sized reversible perfusion defect in the inferior wall segments seen on the prior study, primarily affecting the basal to mid segments, is not seen on this present study. There is a small area of reduced radiotracer uptake in the inferior wall segment on the stress component of this present study, however this is most consistent with artifact from soft tissue attenuation. Study is challenging due to body habitus.    Narrative & Impression     [id number]  [id number]  [id number]  [id number]^NAIMA^EDWIN^DAYNE EPPERSON        [facility]  [facility]  Echocardiography Laboratory  [address]  [address]  [place] [address]  Phone [phone]  Fax [phone]        Name: KATHY PERRY  MRN: [id number]  : 1945  Study Date: 2019 12:47 PM  Age: 74 yrs  Gender: Female  Patient Location: [facility]  Reason For Study: Coronary artery disease  Ordering Physician: EDWIN MCNAMARA  Referring Physician: Gaye Zimmer  Performed By: Alfonso Alcantara RDCS     BSA: 1.9 m2  Height: 68 in  Weight: 165 lb  HR: 72  BP: 134/82 mmHg  _____________________________________________________________________________  __     Procedure  Complete Echo Adult.     _____________________________________________________________________________  __        Interpretation Summary     Normal transthoracic echocardiogram.        (+) Dyslipidemia, hypertension--CAD, --stent,, ,   . : . . . :. .      (-) angina, CHF, pacemaker, arrhythmias, pulmonary hypertension, pacemaker and angina   METS/Exercise Tolerance:     Hematologic:        (-) anemia   Musculoskeletal:   (+) arthritis,  -       GI/Hepatic:        (-) GERD and hepatitis   Renal/Genitourinary:      (-) renal disease   Endo:     (+) type II DM thyroid problem .   (-) chronic steroid usage, other endocrine disorder and obesity    Psychiatric:     (+) psychiatric history depression      Infectious Disease:  - neg infectious disease ROS       Malignancy:         Other:    - neg other ROS                      Physical Exam      Airway   Mallampati: II  TM distance: >3 FB  Neck ROM: full    Dental     Cardiovascular   Rhythm and rate: regular and normal  (-) no murmur    Pulmonary    breath sounds clear to auscultation    Other findings: Lab Test        03/27/20     10/23/19     02/14/19      --          02/24/17 11/22/16                       1300                            0812           --           0855          0738          WBC          4.9           --          4.2           --          4.4          4.1           HGB          10.9*        11.5*        12.6           < >        12.1         12.9          MCV          97            --          90            --          92           92            PLT          261           --          184           --          179          184           INR           --           --          0.88          --          0.94         0.90           < > = values in this interval not displayed.                  Lab Test        12/11/19     10/23/19     07/11/19     02/15/19                       0944                            1201          1251          NA           140          140          138          137           POTASSIUM    4.4          4.1          4.9          4.3           CHLORIDE     104          105          103          102           CO2          27           26           24           27            BUN          16           21           20           17            CR           0.93         0.82         0.90         0.87          ANIONGAP     13.4          --          15.9         12.3          LUCIUS          10.7*        9.6          10.5         10.0          GLC          200*         169*         159*         268*                Lab Results   Component Value Date    WBC 4.9 03/27/2020    HGB  "10.9 (L) 03/27/2020    HCT 35.7 03/27/2020     03/27/2020    CRP 4.2 01/12/2007    SED 18 01/12/2007     12/11/2019    POTASSIUM 4.4 12/11/2019    CHLORIDE 104 12/11/2019    CO2 27 12/11/2019    BUN 16 12/11/2019    CR 0.93 12/11/2019     (H) 12/11/2019    ULCIUS 10.7 (H) 12/11/2019    PHOS 3.4 02/22/2010    ALBUMIN 4.3 10/23/2019    PROTTOTAL 6.1 10/23/2019    ALT 30 (A) 10/23/2019    AST 17 10/23/2019    ALKPHOS 37 10/23/2019    BILITOTAL 0.3 10/23/2019    LIPASE 126 09/03/2004    PTT 23 02/14/2019    INR 0.88 02/14/2019    TSH 1.02 10/23/2019    T4 1.39 02/09/2017    T3 71 02/22/2010       Preop Vitals  BP Readings from Last 3 Encounters:   01/28/20 134/82   01/13/20 116/68   12/11/19 118/52    Pulse Readings from Last 3 Encounters:   01/28/20 73   01/13/20 65   12/11/19 68      Resp Readings from Last 3 Encounters:   02/14/19 16   12/12/17 18   02/24/17 16    SpO2 Readings from Last 3 Encounters:   01/13/20 99%   02/27/19 99%   02/14/19 98%      Temp Readings from Last 1 Encounters:   02/14/19 98  F (36.7  C) (Oral)    Ht Readings from Last 1 Encounters:   07/13/20 1.702 m (5' 7\")      Wt Readings from Last 1 Encounters:   07/13/20 74.8 kg (165 lb)    Estimated body mass index is 25.84 kg/m  as calculated from the following:    Height as of this encounter: 1.702 m (5' 7\").    Weight as of this encounter: 74.8 kg (165 lb).       Anesthesia Plan      History & Physical Review  History and physical reviewed and following examination; no interval change.    ASA Status:  3 .        Plan for General with Propofol induction. Maintenance will be Balanced.    PONV prophylaxis:  Ondansetron (or other 5HT-3)  Lumbar fusion March of this year with ongoing pain.    Propofol gtt + Sevo + 60% O2 please        Postoperative Care  Postoperative pain management:  IV analgesics and Oral pain medications.      Consents  Anesthetic plan, risks, benefits and alternatives discussed with:  Patient.  Use of blood " products discussed: Yes.   Use of blood products discussed with Patient.  Consented to blood products.  .                 Dariusz Woods MD                    .

## 2020-07-15 NOTE — LETTER
Key Recommendations:    Identified issues/concerns regarding health management:   CTS following for care coordination. Patient was admitted on 7/15/20 for total left hip arthroplasty. Pre-operative plan was to have patient discharge to home with [facility].  CTS met with patient at bedside to verify discharge plans.  Patient said she would not go to a TCU because of COVID and was grateful for to have HC.  She has used [facility] in the past and was satisfied with care.  [facility] is aware of the referral. She has cane, walker at home.  Patient does appear overwhelmed.  Patient would benefit from on going pain management with a pain specialist and mental health support.    Tiffany Piña RN    AVS/Discharge Summary is the source of truth; this is a helpful guide for improved communication of patient story

## 2020-07-15 NOTE — ANESTHESIA CARE TRANSFER NOTE
Patient: Sarah Longo    Procedure(s):  Left total hip arthroplasty    Diagnosis: DJD (degenerative joint disease) [M19.90]  Diagnosis Additional Information: No value filed.    Anesthesia Type:   General     Note:  Airway :Face Mask  Patient transferred to:PACU  Handoff Report: Identifed the Patient, Identified the Reponsible Provider, Reviewed the pertinent medical history, Discussed the surgical course, Reviewed Intra-OP anesthesia mangement and issues during anesthesia, Set expectations for post-procedure period and Allowed opportunity for questions and acknowledgement of understanding      Vitals: (Last set prior to Anesthesia Care Transfer)    CRNA VITALS  7/15/2020 0842 - 7/15/2020 0917      7/15/2020             EKG:  Sinus rhythm                Electronically Signed By: Dean Dennis Severson, APRN CRNA  July 15, 2020  9:17 AM

## 2020-07-16 ENCOUNTER — APPOINTMENT (OUTPATIENT)
Dept: PHYSICAL THERAPY | Facility: CLINIC | Age: 75
DRG: 470 | End: 2020-07-16
Attending: ORTHOPAEDIC SURGERY
Payer: COMMERCIAL

## 2020-07-16 PROBLEM — Z96.642 STATUS POST TOTAL HIP REPLACEMENT, LEFT: Status: ACTIVE | Noted: 2020-07-16

## 2020-07-16 LAB
GLUCOSE BLDC GLUCOMTR-MCNC: 212 MG/DL (ref 70–99)
GLUCOSE BLDC GLUCOMTR-MCNC: 247 MG/DL (ref 70–99)
GLUCOSE BLDC GLUCOMTR-MCNC: 256 MG/DL (ref 70–99)
GLUCOSE BLDC GLUCOMTR-MCNC: 258 MG/DL (ref 70–99)
GLUCOSE BLDC GLUCOMTR-MCNC: 290 MG/DL (ref 70–99)
HGB BLD-MCNC: 9.7 G/DL (ref 11.7–15.7)

## 2020-07-16 PROCEDURE — 99232 SBSQ HOSP IP/OBS MODERATE 35: CPT | Performed by: INTERNAL MEDICINE

## 2020-07-16 PROCEDURE — 97530 THERAPEUTIC ACTIVITIES: CPT | Mod: GP | Performed by: PHYSICAL THERAPIST

## 2020-07-16 PROCEDURE — 25000132 ZZH RX MED GY IP 250 OP 250 PS 637: Performed by: ORTHOPAEDIC SURGERY

## 2020-07-16 PROCEDURE — 36415 COLL VENOUS BLD VENIPUNCTURE: CPT | Performed by: ORTHOPAEDIC SURGERY

## 2020-07-16 PROCEDURE — 25000128 H RX IP 250 OP 636: Performed by: ORTHOPAEDIC SURGERY

## 2020-07-16 PROCEDURE — 25000132 ZZH RX MED GY IP 250 OP 250 PS 637: Performed by: PHYSICIAN ASSISTANT

## 2020-07-16 PROCEDURE — 00000146 ZZHCL STATISTIC GLUCOSE BY METER IP

## 2020-07-16 PROCEDURE — 25000132 ZZH RX MED GY IP 250 OP 250 PS 637: Performed by: NURSE PRACTITIONER

## 2020-07-16 PROCEDURE — 25800030 ZZH RX IP 258 OP 636: Performed by: INTERNAL MEDICINE

## 2020-07-16 PROCEDURE — 99207 ZZC CONSULT E&M CHANGED TO SUBSEQUENT LEVEL: CPT | Performed by: INTERNAL MEDICINE

## 2020-07-16 PROCEDURE — 12000000 ZZH R&B MED SURG/OB

## 2020-07-16 PROCEDURE — 97530 THERAPEUTIC ACTIVITIES: CPT | Mod: GP | Performed by: PHYSICAL THERAPY ASSISTANT

## 2020-07-16 PROCEDURE — 85018 HEMOGLOBIN: CPT | Performed by: ORTHOPAEDIC SURGERY

## 2020-07-16 RX ORDER — LANOLIN ALCOHOL/MO/W.PET/CERES
6 CREAM (GRAM) TOPICAL
Qty: 30 TABLET | Refills: 0 | Status: ON HOLD | OUTPATIENT
Start: 2020-07-16 | End: 2022-04-29

## 2020-07-16 RX ORDER — OXYCODONE HYDROCHLORIDE 5 MG/1
5-10 TABLET ORAL
Qty: 30 TABLET | Refills: 0 | Status: SHIPPED | OUTPATIENT
Start: 2020-07-16

## 2020-07-16 RX ORDER — ACETAMINOPHEN 325 MG/1
650 TABLET ORAL EVERY 8 HOURS
Status: DISCONTINUED | OUTPATIENT
Start: 2020-07-16 | End: 2020-07-18 | Stop reason: HOSPADM

## 2020-07-16 RX ORDER — LIDOCAINE 4 G/G
2 PATCH TOPICAL
Status: DISCONTINUED | OUTPATIENT
Start: 2020-07-16 | End: 2020-07-18 | Stop reason: HOSPADM

## 2020-07-16 RX ORDER — OXYCODONE HYDROCHLORIDE 5 MG/1
10-15 TABLET ORAL
Status: DISCONTINUED | OUTPATIENT
Start: 2020-07-16 | End: 2020-07-16

## 2020-07-16 RX ORDER — SODIUM CHLORIDE, SODIUM LACTATE, POTASSIUM CHLORIDE, CALCIUM CHLORIDE 600; 310; 30; 20 MG/100ML; MG/100ML; MG/100ML; MG/100ML
INJECTION, SOLUTION INTRAVENOUS CONTINUOUS
Status: DISCONTINUED | OUTPATIENT
Start: 2020-07-16 | End: 2020-07-18 | Stop reason: HOSPADM

## 2020-07-16 RX ORDER — HYDROXYZINE HYDROCHLORIDE 10 MG/1
10-20 TABLET, FILM COATED ORAL 3 TIMES DAILY PRN
Status: DISCONTINUED | OUTPATIENT
Start: 2020-07-16 | End: 2020-07-18 | Stop reason: HOSPADM

## 2020-07-16 RX ORDER — ASPIRIN 325 MG
325 TABLET, DELAYED RELEASE (ENTERIC COATED) ORAL DAILY
Qty: 21 TABLET | Refills: 0 | Status: SHIPPED | OUTPATIENT
Start: 2020-07-16 | End: 2020-08-06

## 2020-07-16 RX ORDER — METHOCARBAMOL 500 MG/1
250-500 TABLET ORAL 4 TIMES DAILY PRN
Status: DISCONTINUED | OUTPATIENT
Start: 2020-07-16 | End: 2020-07-18 | Stop reason: HOSPADM

## 2020-07-16 RX ORDER — PREGABALIN 75 MG/1
75 CAPSULE ORAL 2 TIMES DAILY
Status: DISCONTINUED | OUTPATIENT
Start: 2020-07-16 | End: 2020-07-18 | Stop reason: HOSPADM

## 2020-07-16 RX ORDER — PREGABALIN 75 MG/1
75 CAPSULE ORAL 3 TIMES DAILY
Status: DISCONTINUED | OUTPATIENT
Start: 2020-07-16 | End: 2020-07-16

## 2020-07-16 RX ORDER — LANOLIN ALCOHOL/MO/W.PET/CERES
6 CREAM (GRAM) TOPICAL
Status: DISCONTINUED | OUTPATIENT
Start: 2020-07-16 | End: 2020-07-18 | Stop reason: HOSPADM

## 2020-07-16 RX ORDER — OXYCODONE HYDROCHLORIDE 5 MG/1
5-10 TABLET ORAL
Status: DISCONTINUED | OUTPATIENT
Start: 2020-07-16 | End: 2020-07-18 | Stop reason: HOSPADM

## 2020-07-16 RX ORDER — LISINOPRIL 5 MG/1
5 TABLET ORAL DAILY
Status: DISCONTINUED | OUTPATIENT
Start: 2020-07-17 | End: 2020-07-18 | Stop reason: HOSPADM

## 2020-07-16 RX ORDER — HYDROXYZINE HYDROCHLORIDE 25 MG/1
25 TABLET, FILM COATED ORAL 3 TIMES DAILY PRN
Status: DISCONTINUED | OUTPATIENT
Start: 2020-07-16 | End: 2020-07-16

## 2020-07-16 RX ADMIN — GLIMEPIRIDE 4 MG: 4 TABLET ORAL at 20:08

## 2020-07-16 RX ADMIN — METHOCARBAMOL 500 MG: 500 TABLET ORAL at 04:30

## 2020-07-16 RX ADMIN — INSULIN ASPART 3 UNITS: 100 INJECTION, SOLUTION INTRAVENOUS; SUBCUTANEOUS at 09:51

## 2020-07-16 RX ADMIN — ASPIRIN 325 MG: 325 TABLET, COATED ORAL at 10:40

## 2020-07-16 RX ADMIN — OXYCODONE HYDROCHLORIDE 10 MG: 5 TABLET ORAL at 00:44

## 2020-07-16 RX ADMIN — SODIUM CHLORIDE, POTASSIUM CHLORIDE, SODIUM LACTATE AND CALCIUM CHLORIDE: 600; 310; 30; 20 INJECTION, SOLUTION INTRAVENOUS at 16:19

## 2020-07-16 RX ADMIN — PANTOPRAZOLE SODIUM 40 MG: 20 TABLET, DELAYED RELEASE ORAL at 08:12

## 2020-07-16 RX ADMIN — ACETAMINOPHEN 650 MG: 325 TABLET, FILM COATED ORAL at 15:36

## 2020-07-16 RX ADMIN — CEFAZOLIN SODIUM 1 G: 1 INJECTION, SOLUTION INTRAVENOUS at 00:45

## 2020-07-16 RX ADMIN — DOCUSATE SODIUM AND SENNOSIDES 1 TABLET: 8.6; 5 TABLET, FILM COATED ORAL at 10:42

## 2020-07-16 RX ADMIN — HYDROMORPHONE HYDROCHLORIDE 0.2 MG: 1 INJECTION, SOLUTION INTRAMUSCULAR; INTRAVENOUS; SUBCUTANEOUS at 02:44

## 2020-07-16 RX ADMIN — INSULIN ASPART 3 UNITS: 100 INJECTION, SOLUTION INTRAVENOUS; SUBCUTANEOUS at 18:51

## 2020-07-16 RX ADMIN — OXYCODONE HYDROCHLORIDE 5 MG: 5 TABLET ORAL at 20:08

## 2020-07-16 RX ADMIN — GLIMEPIRIDE 4 MG: 4 TABLET ORAL at 10:41

## 2020-07-16 RX ADMIN — DICLOFENAC SODIUM 4 G: 10 GEL TOPICAL at 22:19

## 2020-07-16 RX ADMIN — LIDOCAINE 2 PATCH: 560 PATCH PERCUTANEOUS; TOPICAL; TRANSDERMAL at 20:07

## 2020-07-16 RX ADMIN — NEBIVOLOL HYDROCHLORIDE 5 MG: 5 TABLET ORAL at 12:46

## 2020-07-16 RX ADMIN — LEVOTHYROXINE SODIUM 125 MCG: 0.1 TABLET ORAL at 08:07

## 2020-07-16 RX ADMIN — OXYCODONE HYDROCHLORIDE 10 MG: 5 TABLET ORAL at 04:30

## 2020-07-16 RX ADMIN — OXYCODONE HYDROCHLORIDE 10 MG: 5 TABLET ORAL at 07:41

## 2020-07-16 RX ADMIN — INSULIN ASPART 2 UNITS: 100 INJECTION, SOLUTION INTRAVENOUS; SUBCUTANEOUS at 14:59

## 2020-07-16 RX ADMIN — OXYCODONE HYDROCHLORIDE 5 MG: 5 TABLET ORAL at 12:43

## 2020-07-16 ASSESSMENT — ACTIVITIES OF DAILY LIVING (ADL)
ADLS_ACUITY_SCORE: 15
RETIRED_EATING: 0-->INDEPENDENT
DRESS: 0-->INDEPENDENT
ADLS_ACUITY_SCORE: 15

## 2020-07-16 NOTE — PROGRESS NOTES
[facility]  Home Care received a referral from [facility] for RN and PT prior to surgery. Will follow for final discharge disposition.

## 2020-07-16 NOTE — PROGRESS NOTES
"Pt agitated and anxious at times, questioning her care, complaining about the need to call for staff for ambulation and cares. Pt repeatedly claiming that is capable of ambulating and taking care of herself. Writer and staff reminded and educated pt about the safety concerns related to the various wires like the IV and the capnography. Pt refusing her tylenol and gabapentin as she claims that her liver enzymes are elevated and that gabapentin has been proven ineffective in many studies according to her research. Pt also questioning the use of insulin. Writer explained the need for one, reassured pt that her BG will be monitored routinely and as needed. BG around 2 hours after insulin administration at about the same level she was before meals which re-assured pt. Spouse renee had a long conversation with writer and staff as he wanted to: \"apologize ahead of time and reassure staff that his wife is a nurse by training, she is a wonderful person but, her personality changes after surgery\". Spouse claims that pt calls him at 0200 in the morning from the hospital claiming about differed mishaps, \"becomes mean and irrational to staff\" but has no recollection of the events a few days after surgery. Will keep monitoring.   "

## 2020-07-16 NOTE — PROGRESS NOTES
[facility] Progress Note  [facility]    Saw pt Sarah Longo per staff referral requesting emotional support for pt.  Her spouse Kwadwo was present.  Offered reflective conversation to facilitate the processing of thoughts and feelings.      Illness Narrative - Rayna reported that she had spinal surgery four months ago and then needed hip surgery, which she underwent yesterday.  She reported being in pain, about which her RN is aware.      Distress -   o Rayna processed how difficult it has been for her to cope with the chronic pain and to be limited in her mobility and independence.  She expressed hope that the hip surgery will relieve her pain and improve the quality of her life.  o Kwadwo acknowledged that it has been challenging for him to be Rayna's primary caregiver the last eight months and reflected that the hardest aspect has been seeing her unhappy and not able to enjoy life.      Coping -   o In addition to Kwadwo, Rayna named their three children and seven grandchildren as being core to her support network.  All of their children live in [place].  o Rayna's [employer] amparo is an important part of her life.  She listens to Jake Lares everyday and appreciates his positive message.  Rayna skips around to different churches to attend services (when public services were available), such as [facility] and [facility] [employer].  Rayna welcomed prayer and engaged in a few moments of guided contemplative breathing before praying.        Plan - Informed pt and her spouse how they can request further  support.  This author and other chaplains remain available per pt/family request.    Nigel Sykes M.Div., [facility]  Staff   Phone [phone]

## 2020-07-16 NOTE — CONSULTS
[facility]  Pain Service Consultation   Text Page    Date of Admission:  7/15/2020    Assessment & Plan   Sarah Longo is a 75 year old female who was admitted on 7/15/2020. I was asked by Dr. Jayson Victoria to see the patient for acute on chronic pain management.    1)  Acute left hip pain s/p total left hip arthroscopy.  History of chronic back and right hip pain due to lumbar spine degeneration and recent history of L2-4 fusion 2 months ago.    2)  Patient with chronic back and hip pain, on chronic opioid therapy managed by Dr. Navid Caicedo at [facility]  Baseline 5 mg Daily Morphine Equivalent as reported daily use.  Patient does not have expected opioid tolerance    Patient's opioid use in past 24 hours: 50 mg oxycodone, 3.4 mg IV dilaudid and 200 mcg Fentanyl (intraoperative and PACU dosing included)    3)  Risk factors for opioid related harms  -High opioid dose (>50 MME/day)  - Age > 65 years old    4)  Opioid induced side-effects:  -Constipation - none reported  -Nausea/Vomit - none reported  -Sedation - yes, patient reports sedation and feeling like she needs more rest than baseline.  -Urinary Retention - none reported.  Currently with hutchinson catheter.    5)  Other/Related:    -Depression/anxiety - anxiety/frustration over longer term pain issues.  -Deconditioning - s/p back surgery and now with hip surgery.   Has had function limiting pain since March of this year.  - Chronic pain of multiple etiologies including lumbar spine disease and chronic DJD of bilateral hips.  - sleep disturbance - patient reporting difficulty with sleep pre-operatively and now post op.    PLAN:   1)  Educate patient regarding acute pain component superimposed onto chronic pain.  Offer reassurance of time-limited acute pain and likely resolution of chronic pain issues as discussed with orthopedic specialists.      2) patient reporting sleep disturbance which impacts her ability to handle pain.  Takes  tramadol at home for pain and sleep aide, will avoid that while she is on oxycodone for post operative pain  - melatonin 6 mg daily at 2000    3)Non-opioid multimodal medication therapy  -Topical: Voltaren 1% Topical Gel to right hip, low back and around surgical dressing, lidocaine patch to low back and adjacent surgical dressing.  -N-SAIDS:Avoid due to new joint replacement.  Would recommend trying low dose once safe from medical/surgical perspective.  -Muscle Relaxants:Methocarbamol 500 mg every 6 hours prn  -Adjuvants:Acetaminophen 650 mg three times daily scheduled, Pregabalin 75 mg three times daily as per home use, Hydroxyzine 25 mg three times daily prn adjuvant pain  -Antidepresants/anxiolytics:Hydroxyzine as above prn.    4)  Non-medication interventions  Positioning, ICE, Relaxation, Distraction with activity, OOB with assist, Essential oils per nursing, Physical therapy as ordered.    5)  Opioids:  - oxycodone 5-10 mg every 3 hours prn  Opioids Treatment Goal:   -Improvement in function  -Participate in PT  -Management of acute pain during hsopitalization, expected 3-7 days needed at DC then return to chronic medication (Tramadol).    6)  Constipation Prophylaxis   - Senna-S 2 tablets two times daily scheduled.    7)  Pain Education  - Opioid safe use, storage and disposal information included in DC AVS  - Discussed psychological effects of pain, frustration and sleep disturbance as other components that impact effective pain management.      8)  DC Planning   Discussed goal of Opioid therapy as above with patient and spouse, bedside nurse.  Length of therapy is less than 10 days, post-operative dose of opioids may be stopped without taper. and chronic opioid dose to be managed and tapered as outpatient  Continued outpatient management of pain per surgical team at [facility].    Disposition: pending PT evaluations.  Support systems:   Outpatient Referrals: Would benefit from pain clinic referral as  outpatient at time of post-op clinic appointment.  Patient with multiple etiologies of pain and complex needs currently.  The following risk factors have been identified for unintentional overdose: patient is > 65 years old and patient is taking a high amount of opioids in 24 hour period. Discharge with intra-nasal naloxone if discharged to home with opioids  >40 mg MME/day.  Plan for education prior to discharge.    Time Spent on this Encounter   Total unit/floor time 75 minutes, time consisted of the following, examination of the patient, reviewing the record and completing documentation. >50% of time spent in counseling and coordination of care.  Time spend counseling with patient and family consisted of the following topics, care planning for discharge and symptom management, education about acute on chronic pain management and need for chronic management.  Time spent in coordination of care with Bedside Nurse Alpa Giles and Physical Therapist Kika.     Rain GUAN, CNP  Pain Management and Palliative Care  [facility]  Pgr: [phone]      Reason for Consult   Reason for consult: I was asked by Dr. Victoria to evaluate this patient for acute on chronic pain management.    Primary Care Physician   Primary Care Physician:Gaye Zimmer  Pain Specialist: n/a    Chief Complaint   Acute on chronic pain s/p Left hip total arthroscopy.    History is obtained from the patient and her .    History of Present Illness   Sarah Longo is a 75 year old female with history of diabetes, lumbar degenerative disease s/p L2-4 fusion 2 months ago, left hip DJD who presented for scheduled Left hip total arthroscopy on 7/15.  She states she has had pain in both hips and low back since November 2019 and has dealt with limited functional status at home secondary to pain.  She was worked up for back pain shortly after onset and underwent L2-4 fusion at outside hospital.  She  "stated that initially she was recovering well and able to do physical therapy without issue, however she then started to have limited success with progression due to left hip pain and need to compensate with body positioning to minimize left hip pain.  She states she is very frustrated that her left hip pain became an issue and that she felt she had no choice but to undergo this hip surgery to get past all of her pain.  She states that she had the understanding that following surgery she would have \"no issues\" and that she would be without the same hip pain.  She states frustration at the fact that she has not been able to just \"get up and go\".      CURRENT PAIN:  Her pain is located in the left hip  It is described as Aching, Miserable and Throbbing  She rates it as ranging between 4/10 and 9/10  The average is 6/10 on a scale of 0-10  Currently it is rated as 6/10  It improves by medications, ice  It worsens by movement  She has been compliant with the recommendations while in the hospital.      PAIN HISTORY:  The pain is mainly located in the low back, bilateral hips  It is described as Aching, Exhausting, Miserable, Shooting and Throbbing  Rates it as ranging between 2/10 and 10/10      PAST PAIN TREATMENT:   Medications: oxycodone following back surgery, tramadol (currently taking at home), gabapentin (did not help), lyrica (currently taking at home)  Non-phamacologic modalities: ice, rest, physical therapy  Previous interventions/surgeries: L2-4 fusion (March 2020)    [facility] database review: 5/26 oxycodone-acetaminophen  mg #30 tablets, 5/13 Lyrica 75mg #90 capsules, 4/28 oxycodone 5 mg #20 tablets. 4/1 Tramadol 50 mg #60 tablets           D.I.R.E Score: Patient Selection for Chronic Opioid Analgesia    For each factor, rate the patient's score from 1 - 3 based on the explanations on the right.       Diagnosis             2         1 = Benign chronic condition with minimal objective findings or no " definite medical diagnosis.  Examples:  fibromyalgia, migraine, headaches, non-specific back pain.  2 = Slowly progressive condition concordant with moderate pain, or fixed condition with moderate objective findings.  Examples: failed back surgery syndrome, back pain with moderate degenerative changes, neuropathic pain.  3 = Advanced condition concordant with severe pain with objective findings.  Examples: severe ischemic vascular disease, advanced neuropathy, severe spinal stenosis.    Intractability             2         1 = Few therapies have been tried and the patient takes a passive role in his/her pain management process.   2 = Most costomary treatments have been tried but the patient is not fully engaged in the pain management process, or barriers prevent (insurance, transportation, medical illness)  3 = Patient fully engaged in a spectrum of appropriate treatments but with inadequate response.    Risk   (Risk = Total of P+C+R+S below)       Psychological             3         1 = Serious personality dysfunction or mental illness interfering with care.  Examples: personality disorder, severe affective disorder, significant personality issues.  2 = Personality or mental health interferes moderately.  Example: depression or anxiety disorder.  3 = Good communication with the clinic.  No significant personality dysfunction or mental illness.       Chemical      Health             3         1 = Active or very recent use of illicit drugs, excessive alcohol, or prescription drug abuse.  2 = Chemical coper (uses medications to cope with stress) or history of chemical dependency in remission.  3 = No CD history.  Not drug-focused or chemically reliant       Reliability             3         1 = History of numerous problems: medication misuse, missed appointments, rarely follows through.  2 = Occasional difficulties with compliance, but generally reliable.  3 = Highly reliable patient with medications, appointments and  treatment.       Social      Support             3         1= Life in chaos.  Little family support and few close relationships.  Loss of most normal life roles.  2 = Reduction in some relationships and life roles.  3 = Supportive family/close relationships.  Involved in work or school and no social isolation.    Efficacy score             2         1 = Poor function or minimal pain relief despite moderate to high doses.  2 = Moderate benefit with function improved in a number of ways (or insufficient info - hasn't tried opioid yet or very low doses or too short a trial.  3 = Good improvement in pain and function and quality of life with stable doses over time.                                    18    Total score = D + I + R + E    Score 7-13: Not a suitable candidate for long-term opioid analgesia  Score 14-21: May be a good candidate for long-term opioid analgesia    Copyright 2013 Toñito Saldana MD, The DIRE Score: Predicting Outcomes of Opioid Prescribing for Chronic Pain. The Journal of Pain. 7(9) (September), 2006:671-681    Past Medical History   I have reviewed this patient's medical history and updated it with pertinent information if needed.   Past Medical History:   Diagnosis Date     Anemia      Arthritis      Coronary artery disease 04/28/2016    cardiac cath 2/2019: patent stents; PTCA with MAYA x 2 to OM1 and MAYA x 1 to p.LAD (4/21/2016 at [place]); PTCA with intracoronary stent placement of RCA June 2004; MAYA to OM1 11/2016     Cystocele, midline 09/29/2010     Depression      HYPERPLASTIC  POLYP      colonoscopy in 5-10 yrs.     Hypothyroidism     hypothyroid- Dr Majano     OAB (overactive bladder)     complex voiding dysfct, failed interstim     Osteoarthritis      Other and unspecified hyperlipidemia      Postmenopausal bleeding      Shoulder blade pain 5/31/2016     Type II or unspecified type diabetes mellitus without mention of complication, not stated as uncontrolled     Dr. Majano      Unspecified essential hypertension      Urinary frequency 9/29/10    followed by urology. no benefit from detrol or sanctura. month trial of macrobid and flomax 10/10       Past Surgical History   I have reviewed this patient's surgical history and updated it with pertinent information if needed.  Past Surgical History:   Procedure Laterality Date     ------------OTHER-------------      Interstim     Ablation       BACK SURGERY  03/05/2020    spinal fusion     C CT ANGIOGRAPHY CORONARY  1/2005    mod soft plaque LAD and Cx, follow up with left heart cath     C LIGATE FALLOPIAN TUBE      endometrial ablation     CARDIAC SURGERY       CHOLECYSTECTOMY       COLONOSCOPY       CV HEART CATHETERIZATION WITH POSSIBLE INTERVENTION N/A 2/14/2019    Procedure: Coronary Angiogram;  Surgeon: Sudarshan Lee MD;  Location: [facility] CARDIAC CATH LAB; patent stents     EXCISE LESION UPPER EXTREMITY  6/5/2013    Procedure: EXCISE LESION UPPER EXTREMITY;  EXCISION RIGHT ARM ANTICUBITAL LIPOMA ;  Surgeon: Jayson Joe MD;  Location: [facility] REMOVAL OF TONSILS,12+ Y/O       HEART CATH LEFT HEART CATH  3/2/2016    No intervention - aggressive medical management     HEART CATH LEFT HEART CATH  4/21/2016     MAYA x 2 to OM1, MAYA to LAD, at [place]     HEART CATH LEFT HEART CATH  6/29/2004    MAYA to RCA     HEART CATH LEFT HEART CATH  3/28/2002    Mild to moderate 3 vessel CAD. Medical management recommended.      HEART CATH LEFT HEART CATH  11/22/2016    MAYA to OM1     hypothyroid       KNEE SURGERY  4/20/10    right knee replacement     ORTHOPEDIC SURGERY      total knee 2010     REMOVE STIMULATOR SACRAL NERVE N/A 11/2/2015    Procedure: REMOVE STIMULATOR SACRAL NERVE;  Surgeon: Gertrudis Frausto MD;  Location: [facility]     SURGICAL HISTORY OF -       Uterine endometrial ablation         Prior to Admission Medications   Prior to Admission Medications   Prescriptions Last Dose Informant Patient Reported? Taking?    ASPIRIN 81 MG OR TABS 2020  No Yes   Si tab po QD (Once per day)   CINNAMON PO 2020  Yes Yes   Sig: Take 2 capsules by mouth 2 times daily   GLIMEPIRIDE 4 MG OR TABS 2020 at 2100  Yes Yes   Si tablet BID   Gymnema Hectoris [place] POWD 2020  Yes Yes   Sig: daily   NITROFURANTOIN PO 2020 at Unknown time  Yes Yes   Sig: nitrofurantoin monohydrate/macrocrystals 100 mg capsule   ONETOUCH ULTRA test strip   Yes No   Omega-3 Fatty Acids (OMEGA-3 FISH OIL PO) 2020  Yes Yes   Sig: Take 2,400 mg by mouth 2 times daily (with meals)   STATIN NOT PRESCRIBED, INTENTIONAL,   No No   Si each At Bedtime Statin not prescribed intentionally due to Allergy to statin   Patient not taking: Reported on 2020   SYNTHROID 125 MCG OR TABS 7/15/2020 at Unknown time  Yes Yes   Si tablet daily   TURMERIC PO 2020  Yes Yes   Sig: Take by mouth 2 times daily   amoxicillin (AMOXIL) 500 MG capsule   Yes No   Sig: as needed Reported on 2017   cyanocobalamin (CYANOCOBALAMIN) 1000 MCG/ML injection 2020  Yes Yes   Sig: Inject 2 mLs into the muscle every 30 days   esomeprazole (NEXIUM) 40 MG DR capsule 7/15/2020 at Unknown time  Yes Yes   Sig: Take 40 mg by mouth every morning (before breakfast) Take 30-60 minutes before eating.   evolocumab (REPATHA SURECLICK) 140 MG/ML prefilled autoinjector 2020  No Yes   Sig: Inject 1 mL (140 mg) Subcutaneous every 14 days   ferrous fumarate 65 mg, [place]. FE,-Vitamin C 125 mg (VITRON C)  MG TABS tablet 2020 at Unknown time  Yes Yes   Sig: Take 1 tablet by mouth daily   liraglutide (VICTOZA PEN) 18 MG/3ML solution 2020 at 1230  Yes Yes   Sig: Inject 0.6 mg Subcutaneous   lisinopril (PRINIVIL/ZESTRIL) 5 MG tablet 2020 at Unknown time  No Yes   Sig: Take 1 tablet (5 mg) by mouth daily   metFORMIN (GLUCOPHAGE) 1000 MG tablet 2020  Yes No   Sig: Take 1,000 mg by mouth 2 times daily (with meals)   mirabegron (MYRBETRIQ) 50 MG 24 hr  tablet 2020  No Yes   Sig: Take 1 tablet (50 mg) by mouth daily Patient not taking daily   nebivolol (BYSTOLIC) 5 MG tablet 7/15/2020 at Unknown time  No Yes   Sig: Take 1 tablet (5 mg) by mouth daily   nitroglycerin (NITROSTAT) 0.4 MG SL tablet   No No   Sig: Place 1 tablet (0.4 mg) under the tongue every 5 minutes as needed for chest pain If symptoms persist after 3 doses (15 minutes) call 911.   Patient not taking: Reported on 2020   traMADol (ULTRAM) 50 MG tablet 2020 at Unknown time  No Yes   Sig: Take 1 tablet (50 mg) by mouth every 6 hours as needed for moderate to severe pain   vitamin D3 (CHOLECALCIFEROL) 2000 units tablet 2020 at Unknown time  Yes Yes   Sig: Take 1 tablet by mouth 2 times daily      Facility-Administered Medications: None     Allergies   Allergies   Allergen Reactions     No Known Drug Allergies        Social History   I have reviewed this patient's social history and updated it with pertinent information if needed. Sarah EMELIA Longo  reports that she has never smoked. She has never used smokeless tobacco. She reports that she does not drink alcohol or use drugs.    Family History   I have reviewed this patient's family history and updated it with pertinent information if needed.   Family History   Problem Relation Age of Onset     C.A.D. Father         MI , age 83, had high lipids     Hyperlipidemia Father      Hypertension Father      Coronary Artery Disease Father      Hypertension Mother      Genitourinary Problems Mother         renal failure     Fractures Mother         hip     Osteoporosis Mother      Cerebrovascular Disease Maternal Grandfather         cerebral hemorrhage     Diabetes Maternal Uncle      Colon Cancer Maternal Aunt      Diabetes Maternal Grandmother      Family history of addiction - none    Review of Systems   The 10 point Review of Systems is negative other than noted in the HPI or here.    Denies Bowel or bladder  dysfunction    Physical Exam   Temp:  [97.8  F (36.6  C)-98.6  F (37  C)] 98  F (36.7  C)  Pulse:  [59-76] 76  Heart Rate:  [57-88] 88  Resp:  [6-84] 16  BP: (115-196)/() 132/39  SpO2:  [92 %-100 %] 97 %  161 lbs 0 oz  GEN:  Alert, oriented x 3, appears comfortable, No apparent distress.  HEENT:  Normocephalic/atraumatic, no scleral icterus, no nasal discharge, mouth moist.  CV:  RRR, S1, S2; no murmurs or other irregularities noted.  +3 DP/PT pulses bilaterally; no edema bilateral lower extremeties.  RESP:  Clear to auscultation bilaterally without rales/rhonchi/wheezing/retractions.  Symmetric chest rise on inhalation noted.  Normal respiratory effort.  ABD:  Rounded, soft, non-tender/non-distended.  +BS  EXT:  Edema & pulses as noted above.  Color, moisture and sensation intact x 4.     M/S:   Tender to palpation over left hip.    SKIN:  Dry to touch, no exanthems noted in the visualized areas.    NEURO: Alert and oriented, full strength exam deferred due to recent surgery left hip, plantar/dorsiflexion 5/5 bilaterally.  No focal cranial nerve deficits.  PAIN BEHAVIOR: Cooperative  Psych:  Mildly anxious affect.  Calm, cooperative, conversant appropriately.       Data   Most Recent 3 CBC's:  Recent Labs   Lab Test 07/16/20  0620 03/27/20  1300 10/23/19 02/14/19  0812  02/24/17  0855   WBC  --  4.9  --  4.2  --  4.4   HGB 9.7* 10.9* 11.5* 12.6   < > 12.1   MCV  --  97  --  90  --  92   PLT  --  261  --  184  --  179    < > = values in this interval not displayed.     Most Recent 3 BMP's:  Recent Labs   Lab Test 12/11/19  0944 10/23/19 07/11/19  1201 02/15/19  1251    140 138 137   POTASSIUM 4.4 4.1 4.9 4.3   CHLORIDE 104 105 103 102   CO2 27 26 24 27   BUN 16 21 20 17   CR 0.93 0.82 0.90 0.87   ANIONGAP 13.4  --  15.9 12.3   LUCIUS 10.7* 9.6 10.5 10.0   * 169* 159* 268*     Most Recent 2 LFT's:  Recent Labs   Lab Test 10/23/19 07/11/19  1201  10/08/15   AST 17  --   --  17   ALT 30* 41*   < > 22    ALKPHOS 37  --   --  46   BILITOTAL 0.3  --   --  0.3    < > = values in this interval not displayed.

## 2020-07-16 NOTE — PLAN OF CARE
"BP (!) 119/37 (BP Location: Left arm)   Pulse 76   Temp 98.6  F (37  C) (Temporal)   Resp 16   Ht 1.702 m (5' 7\")   Wt 73 kg (161 lb)   SpO2 97%   BMI 25.22 kg/m       Alert and oriented. Dressing is intact. Pain managed with IV and oral pain meds.   "

## 2020-07-16 NOTE — PROVIDER NOTIFICATION
Pt lethargic this afternoon, but easily aroused. Temp of 102.2. Has not had much PO intake since breakfast. Hospitalist paged-CBC to be drawn in AM and order for IV fluids to be restarted. Will continue to monitor.  Tylenol given and cool cloths applied to forehead/neck.  Incentive spirometer offered/encouraged.

## 2020-07-16 NOTE — PROGRESS NOTES
"    [facility]  Pain Management Progress Note  Text Page     Text page by Physical Therapy  Chyna Guerrero patient too lethargic to participate in Physical Therapy    Has not received Lyrica 75 mg (ordered TID)  Only sedating medications given were methocarbamol ( Robaxin)  500 mg at 4:30 am, Gabapentin 100 mg ( in am) Oxycodone 5 mg 12:43 pm, BUN/Creatinine ration on 7/1 23,mild increase in ALT at 62 on same day. Nurse reports capnography on  History  Negative for ORIANA, mildly overweight    Assessment:  Sedation unknown etiology  in the setting of Left total hip arthroplasty,low blood loss of 50 ml,  POD 1    BP (!) 146/46 (BP Location: Left arm)   Pulse 76   Temp 98.2  F (36.8  C) (Temporal)   Resp 18   Ht 1.702 m (5' 7\")   Wt 73 kg (161 lb)   SpO2 99%   BMI 25.22 kg/m      Plan:  Range dose Hydroxyzine to 10 -20 mg , methocarbamol ( Robaxin) 250 -500 mg, reduce Lyrica to 75 mg bid, first dose tonight  Continue Oxycodone 5-10 mg every 3 hrs prn, dilaudid 0.2 mg every 2 hrs prn'  Continuous capnography   Continue to monitor  Narcan prn      Time spent 10 minute  0 time face to face     Karen Coleman-Hong GUAN CNP  Pain Management and Palliative Care  [facility]  Pgr: [phone]      "

## 2020-07-16 NOTE — CONSULTS
[facility]    Hospitalist Consultation    Date of Admission:  7/15/2020    Assessment & Plan     This is a 75-year-old lady who was admitted by orthopedic team for an elective left total knee arthroplasty.  Medicine team was consulted for postoperative medical management. I evaluated the patient postoperatively.  The surgery was done under general anesthesia.  Estimated blood loss was documented to be less than 50 cc.    Patient has a past medical history significant for coronary artery disease-coronary stenting of RCA in 2004.  In 2016, stenting of her LAD and complex bifurcational stenting of her circumflex and obtuse marginal.  She returned in 11/2016 with restenosis of her obtuse marginal stent and that was restented.  Last angiogram in February 2019 with no evidence of significant restenosis.  Last stress test in January 2020, Lexiscan, negative for ischemia.  Last echo in December 2019 with EF of 55 to 60% with no significant valvular abnormality.    Other past medical history is significant for diabetes, hypertension, hypothyroidism, chronic back pain.    Currently, patient appears very sleepy.  Arousable but drifts off to sleep.  Denies any pain.  Unable to give me a good history.    Assessment, plan and recommendations:    Left total hip arthroplasty.  -Management per orthopedic team.  - DVT prophylaxis per Ortho.  -Incentive spirometry and early ambulation.    Coronary artery disease.  - Continue aspirin.  - Intolerant to statins.  - At baseline, she is not on a beta-blocker Because developed intolerable fatigue with metoprolol.  - Her cardiologist and primary care physician recommended starting Bystolic 5 mg a week before the surgery and continuing it for 1 to 3 weeks postoperatively.  Continue diet.  - If develops side effects such as low heart rate then can decrease the Bystolic to 2.5 mg dose.  -Continue lisinopril and Imdur.     Diabetes  - Currently on Victoza and Glimepiride  as  before.  - Blood glucose numbers are elevated.  If remain persistently elevated then  consider adding low-dose Lantus.  Also on sliding scale insulin.  -Per primary care physician's note on 7/1/2020, not on metformin.  Hold metformin for now.  Patient was too sleepy to tell me if she takes metformin or not.    Hypothyroidism  - Continue levothyroxine.  Normal TSH in October.    Anemia  - Preop hemoglobin of 9.7 this morning.  - Recheck in the morning.      DVT Prophylaxis: Defer to primary service  Code Status: Full Code    Yariel Harrell MD    Reason for Consult   Reason for consult: Postoperative medical management.    Primary Care Physician   Gaye Zimmer    Chief Complaint     Admitted for left total hip arthroplasty.    History of Present Illness   Sarah Longo is a 75 year old female who was admitted by orthopedic service for an elective left total hip arthroplasty.    Past Medical History    I have reviewed this patient's medical history and updated it with pertinent information if needed.   Past Medical History:   Diagnosis Date     Anemia      Arthritis      Coronary artery disease 04/28/2016    cardiac cath 2/2019: patent stents; PTCA with MAYA x 2 to OM1 and MAYA x 1 to p.LAD (4/21/2016 at [place]); PTCA with intracoronary stent placement of RCA June 2004; MAYA to OM1 11/2016     Cystocele, midline 09/29/2010     Depression      HYPERPLASTIC  POLYP      colonoscopy in 5-10 yrs.     Hypothyroidism     hypothyroid- Dr Majano     OAB (overactive bladder)     complex voiding dysfct, failed interstim     Osteoarthritis      Other and unspecified hyperlipidemia      Postmenopausal bleeding      Shoulder blade pain 5/31/2016     Type II or unspecified type diabetes mellitus without mention of complication, not stated as uncontrolled     Dr. Majano     Unspecified essential hypertension      Urinary frequency 9/29/10    followed by urology. no benefit from detrol or sanctura. month trial of macrobid and flomax  10/10       Past Surgical History   I have reviewed this patient's surgical history and updated it with pertinent information if needed.  Past Surgical History:   Procedure Laterality Date     ------------OTHER-------------      Interstim     Ablation       BACK SURGERY  2020    spinal fusion     C CT ANGIOGRAPHY CORONARY  2005    mod soft plaque LAD and Cx, follow up with left heart cath     C LIGATE FALLOPIAN TUBE      endometrial ablation     CARDIAC SURGERY       CHOLECYSTECTOMY       COLONOSCOPY       CV HEART CATHETERIZATION WITH POSSIBLE INTERVENTION N/A 2019    Procedure: Coronary Angiogram;  Surgeon: Sudarshan Lee MD;  Location:  HEART CARDIAC CATH LAB; patent stents     EXCISE LESION UPPER EXTREMITY  2013    Procedure: EXCISE LESION UPPER EXTREMITY;  EXCISION RIGHT ARM ANTICUBITAL LIPOMA ;  Surgeon: Jayson Joe MD;  Location: [facility] REMOVAL OF TONSILS,12+ Y/O       HEART CATH LEFT HEART CATH  3/2/2016    No intervention - aggressive medical management     HEART CATH LEFT HEART CATH  2016     MAYA x 2 to OM1, MAYA to LAD, at [place]     HEART CATH LEFT HEART CATH  2004    MAYA to RCA     HEART CATH LEFT HEART CATH  3/28/2002    Mild to moderate 3 vessel CAD. Medical management recommended.      HEART CATH LEFT HEART CATH  2016    MAYA to OM1     hypothyroid       KNEE SURGERY  4/20/10    right knee replacement     ORTHOPEDIC SURGERY      total knee      REMOVE STIMULATOR SACRAL NERVE N/A 2015    Procedure: REMOVE STIMULATOR SACRAL NERVE;  Surgeon: Gertrudis Frausto MD;  Location: [facility]     SURGICAL HISTORY OF -       Uterine endometrial ablation       Prior to Admission Medications   Prior to Admission Medications   Prescriptions Last Dose Informant Patient Reported? Taking?   ASPIRIN 81 MG OR TABS 2020  No Yes   Si tab po QD (Once per day)   CINNAMON PO 2020  Yes Yes   Sig: Take 2 capsules by mouth 2 times daily   GLIMEPIRIDE 4  MG OR TABS 2020 at 2100  Yes Yes   Si tablet BID   Gymnema Hectoris [place] POWD 2020  Yes Yes   Sig: daily   NITROFURANTOIN PO 2020 at Unknown time  Yes Yes   Sig: nitrofurantoin monohydrate/macrocrystals 100 mg capsule   ONETOUCH ULTRA test strip   Yes No   Omega-3 Fatty Acids (OMEGA-3 FISH OIL PO) 2020  Yes Yes   Sig: Take 2,400 mg by mouth 2 times daily (with meals)   STATIN NOT PRESCRIBED, INTENTIONAL,   No No   Si each At Bedtime Statin not prescribed intentionally due to Allergy to statin   Patient not taking: Reported on 2020   SYNTHROID 125 MCG OR TABS 7/15/2020 at Unknown time  Yes Yes   Si tablet daily   TURMERIC PO 2020  Yes Yes   Sig: Take by mouth 2 times daily   amoxicillin (AMOXIL) 500 MG capsule   Yes No   Sig: as needed Reported on 2017   cyanocobalamin (CYANOCOBALAMIN) 1000 MCG/ML injection 2020  Yes Yes   Sig: Inject 2 mLs into the muscle every 30 days   esomeprazole (NEXIUM) 40 MG DR capsule 7/15/2020 at Unknown time  Yes Yes   Sig: Take 40 mg by mouth every morning (before breakfast) Take 30-60 minutes before eating.   evolocumab (REPATHA SURECLICK) 140 MG/ML prefilled autoinjector 2020  No Yes   Sig: Inject 1 mL (140 mg) Subcutaneous every 14 days   ferrous fumarate 65 mg, [place]. FE,-Vitamin C 125 mg (VITRON C)  MG TABS tablet 2020 at Unknown time  Yes Yes   Sig: Take 1 tablet by mouth daily   liraglutide (VICTOZA PEN) 18 MG/3ML solution 2020 at 1230  Yes Yes   Sig: Inject 0.6 mg Subcutaneous   lisinopril (PRINIVIL/ZESTRIL) 5 MG tablet 2020 at Unknown time  No Yes   Sig: Take 1 tablet (5 mg) by mouth daily   metFORMIN (GLUCOPHAGE) 1000 MG tablet 2020  Yes No   Sig: Take 1,000 mg by mouth 2 times daily (with meals)   mirabegron (MYRBETRIQ) 50 MG 24 hr tablet 2020  No Yes   Sig: Take 1 tablet (50 mg) by mouth daily Patient not taking daily   nebivolol (BYSTOLIC) 5 MG tablet 7/15/2020 at Unknown time  No Yes   Sig:  Take 1 tablet (5 mg) by mouth daily   nitroglycerin (NITROSTAT) 0.4 MG SL tablet   No No   Sig: Place 1 tablet (0.4 mg) under the tongue every 5 minutes as needed for chest pain If symptoms persist after 3 doses (15 minutes) call 911.   Patient not taking: Reported on 2020   traMADol (ULTRAM) 50 MG tablet 2020 at Unknown time  No Yes   Sig: Take 1 tablet (50 mg) by mouth every 6 hours as needed for moderate to severe pain   vitamin D3 (CHOLECALCIFEROL) 2000 units tablet 2020 at Unknown time  Yes Yes   Sig: Take 1 tablet by mouth 2 times daily      Facility-Administered Medications: None     Allergies   Allergies   Allergen Reactions     No Known Drug Allergies        Social History   I have reviewed this patient's social history and updated it with pertinent information if needed. Sarah Longo  reports that she has never smoked. She has never used smokeless tobacco. She reports that she does not drink alcohol or use drugs.    Family History   I have reviewed this patient's family history and updated it with pertinent information if needed.   Family History   Problem Relation Age of Onset     C.A.D. Father         MI , age 83, had high lipids     Hyperlipidemia Father      Hypertension Father      Coronary Artery Disease Father      Hypertension Mother      Genitourinary Problems Mother         renal failure     Fractures Mother         hip     Osteoporosis Mother      Cerebrovascular Disease Maternal Grandfather         cerebral hemorrhage     Diabetes Maternal Uncle      Colon Cancer Maternal Aunt      Diabetes Maternal Grandmother        Review of Systems   The 10 point Review of Systems is negative other than noted in the HPI or here.     Physical Exam   Temp: 98.2  F (36.8  C) Temp src: Temporal BP: (!) 146/46 Pulse: 76 Heart Rate: 83 Resp: 18 SpO2: 99 % O2 Device: None (Room air) Oxygen Delivery: 2 LPM  Vital Signs with Ranges  Temp:  [98  F (36.7  C)-98.6  F (37  C)] 98.2  F (36.8   C)  Pulse:  [76] 76  Heart Rate:  [77-88] 83  Resp:  [13-20] 18  BP: (119-147)/(37-50) 146/46  SpO2:  [92 %-99 %] 99 %  161 lbs 0 oz    Constitutional:  sleepy, arousable, maintaining airway  Eyes: Conjunctiva and pupils examined and normal.  HEENT: Moist mucous membranes, normal dentition.  Respiratory: Clear to auscultation bilaterally, no crackles or wheezing.  Cardiovascular: Regular rate and rhythm, normal S1 and S2, and no murmur noted.  GI: Soft, non-distended, non-tender, normal bowel sounds.  Skin: No rashes, no cyanosis, no edema.  Musculoskeletal: No joint swelling, erythema or tenderness.  Psychiatric: Sleepy, arousable, no anxiety    Data   -Data reviewed today: All pertinent laboratory and imaging results from this encounter were reviewed.     Recent Labs   Lab 07/16/20  0620   HGB 9.7*       No results found for this or any previous visit (from the past 24 hour(s)).

## 2020-07-16 NOTE — PROGRESS NOTES
Patient vital signs are at baseline: Yes  Patient able to ambulate as they were prior to admission or with assist devices provided by therapies during their stay:  Yes  Patient MUST void prior to discharge:  No,  Reason:  [place] in place  Patient able to tolerate oral intake:  Yes  Pain has adequate pain control using Oral analgesics:  Yes.

## 2020-07-16 NOTE — PROGRESS NOTES
Patient vital signs are at baseline: Yes  Patient able to ambulate as they were prior to admission or with assist devices provided by therapies during their stay:  Yes  Patient MUST void prior to discharge:  No,  Reason: due to void AM.  Patient able to tolerate oral intake:  Yes  Pain has adequate pain control using Oral analgesics:  No,  Reason:  IV dilaudid for breakthrough pain.

## 2020-07-16 NOTE — PLAN OF CARE
"OT eval attempted - OT introduced self, pt stating \"no\" prior to explaining reason for eval/role of OT. Pt reporting pain, declines assessment at this time. Noted to avert gaze and/or ignore/choose to not answer questions regarding pain from this writer. Will reschedule.   "

## 2020-07-16 NOTE — PROGRESS NOTES
Sarah Longo  7/16/2020  POD # 1  Subjective:     Doing well.  No immediate surgical complications identified.  Objective:    Exam:  CMS: intact  alert, stable, wound ok        PLAN: Start physical therapy  Discharge plan: Home likely tomorrow. Patient is a chronic pain patient who was on narcotics prior to surgery, As expected she is having issues with pain control. I told her she needs to get moving even if it hurts.

## 2020-07-16 NOTE — PROGRESS NOTES
Patient vital signs are at baseline: Yes  Patient able to ambulate as they were prior to admission or with assist devices provided by therapies during their stay:  Yes  Patient MUST void prior to discharge:  No,  Reason:  Due to void.  Patient able to tolerate oral intake:  Yes  Pain has adequate pain control using Oral analgesics:  Yes    Declined hutchinson catheter removal this morning.

## 2020-07-16 NOTE — PHARMACY-ADMISSION MEDICATION HISTORY
Admission medication history interview status for this patient is complete.     Medication history interview done per pre-admitting RN.      Prior to Admission medications    Medication Sig Last Dose Taking? Auth Provider   amoxicillin (AMOXIL) 500 MG capsule 2,000 mg once as needed (Before dental procedures)   Yes Reported, Patient   aspirin (ASA) 325 MG EC tablet Take 1 tablet (325 mg) by mouth daily for 21 days  Yes Jayson Victoria MD   ASPIRIN 81 MG OR TABS 1 tab po QD (Once per day) 7/8/2020 Yes    CINNAMON PO Take 2 capsules by mouth 2 times daily 7/13/2020 Yes Reported, Patient   cyanocobalamin (CYANOCOBALAMIN) 1000 MCG/ML injection Inject 2 mLs into the muscle every 30 days 7/1/2020 Yes Reported, Patient   diclofenac (VOLTAREN) 1 % topical gel Place 4 g onto the skin 4 times daily  Yes Rain Chowdhury APRN CNP   esomeprazole (NEXIUM) 40 MG DR capsule Take 40 mg by mouth every morning (before breakfast) Take 30-60 minutes before eating. 7/15/2020 at Unknown time Yes Reported, Patient   evolocumab (REPATHA SURECLICK) 140 MG/ML prefilled autoinjector Inject 1 mL (140 mg) Subcutaneous every 14 days 7/6/2020 Yes eFroz Burgos MD   ferrous fumarate 65 mg, [place]. FE,-Vitamin C 125 mg (VITRON C)  MG TABS tablet Take 1 tablet by mouth daily 7/14/2020 at Unknown time Yes Reported, Patient   GLIMEPIRIDE 4 MG OR TABS Take 4 mg by mouth 2 times daily  7/14/2020 at 2100 Yes Reported, Patient   Gymnema [place] POWD daily 7/13/2020 Yes Reported, Patient   liraglutide (VICTOZA PEN) 18 MG/3ML solution Inject 0.6 mg Subcutaneous 7/14/2020 at 1230 Yes Reported, Patient   lisinopril (PRINIVIL/ZESTRIL) 5 MG tablet Take 1 tablet (5 mg) by mouth daily 7/14/2020 at Unknown time Yes Feroz Burgos MD   melatonin 3 MG tablet Take 2 tablets (6 mg) by mouth nightly as needed for sleep  Yes Rain Chowdhury APRN CNP   mirabegron (MYRBETRIQ) 50 MG 24 hr tablet Take 1 tablet (50 mg) by mouth daily  Patient not taking daily 7/13/2020 Yes Shireen Neves MD   nebivolol (BYSTOLIC) 5 MG tablet Take 1 tablet (5 mg) by mouth daily 7/15/2020 at Unknown time Yes Feroz Burgos MD   NITROFURANTOIN PO nitrofurantoin monohydrate/macrocrystals 100 mg capsule 7/14/2020 at Unknown time Yes Reported, Patient   Omega-3 Fatty Acids (OMEGA-3 FISH OIL PO) Take 2,400 mg by mouth 2 times daily (with meals) 7/8/2020 Yes Reported, Patient   oxyCODONE (ROXICODONE) 5 MG tablet Take 1-2 tablets (5-10 mg) by mouth every 3 hours as needed for severe pain  Yes Jayson Victoria MD   SYNTHROID 125 MCG OR TABS 1 tablet daily 7/15/2020 at Unknown time Yes Reported, Patient   traMADol (ULTRAM) 50 MG tablet Take 1 tablet (50 mg) by mouth every 6 hours as needed for moderate to severe pain 7/14/2020 at Unknown time Yes More, Karen Torres PA-C   TURMERIC PO Take by mouth 2 times daily 7/13/2020 Yes Reported, Patient   vitamin D3 (CHOLECALCIFEROL) 2000 units tablet Take 1 tablet by mouth 2 times daily 7/14/2020 at Unknown time Yes Reported, Patient   metFORMIN (GLUCOPHAGE) 1000 MG tablet Take 1,000 mg by mouth 2 times daily (with meals) 7/1/2020  Reported, Patient   nitroglycerin (NITROSTAT) 0.4 MG SL tablet Place 1 tablet (0.4 mg) under the tongue every 5 minutes as needed for chest pain If symptoms persist after 3 doses (15 minutes) call 911.  Patient not taking: Reported on 1/28/2020   Feroz Burgos MD   ONETOUCH ULTRA test strip    Reported, Patient   STATIN NOT PRESCRIBED, INTENTIONAL, 1 each At Bedtime Statin not prescribed intentionally due to Allergy to statin  Patient not taking: Reported on 1/28/2020   Feroz Burgos MD

## 2020-07-16 NOTE — PLAN OF CARE
Discharge Planner PT   Patient plan for discharge: none stated  Current status:   Very limited session in terms of progress, but lengthy due to pt's anxiety preoccupation of heat vs. Col, needing heat pack on before moving with therapist. Pt was up on side of bed upon entry. Pt was educated on PT goals today. Attempting to ambulate to chair at very least. Pt was cued multiple times for sit<>stand with hand position and technique. Pt limited ability to unwt bottom this am, attempting with mod A x 1, but continued to be unable to perform. Max A x 2 provided, able to perform a squat position, but unable to completely stand/pt in heavy flexion at knees/hips and lumbar spine. Pt cued visually/verbally/manually for full stand. Pt LEs bearing wt so did also attempt to wt shift despite her poor posture. Pt unable to even take side steps this am. Pt reporting increased pain, overheated, dizziness with this BP taken and WFL. Pt was cued for foreign steady standing able to perform but again poor posture. Pt needing cues for return to supine as pt is feeling too tired to tolerate up in chair. Pt needing extensive time with return to supine and positioning, needing max A x 2 as pt minimally participating in attempting to reposition.   Pt anxiety/personality playing a large factor in pain control/mobility as well.    Anticipated status at discharge: Currently pt is very limited with mobility, needing A x 2 for mobility this am and Foreign steady.    PM: PTA reporting poor progress with pm session. Placed a call to Dr. Victoria's office to update slow progress.

## 2020-07-16 NOTE — UTILIZATION REVIEW
[facility] Utilization Review  Admission Status; Secondary Review Determination     Admission Date: 7/15/2020  5:53 AM      Under the authority of the Utilization Management Committee, the utilization review process indicated a secondary review on the above patient.  The review outcome is based on review of the medical records, discussions with staff, and applying clinical experience noted on the date of the review.        (X)      Inpatient Status Appropriate - This patient's medical care is consistent with medical management for inpatient care and reasonable inpatient medical practice.          RATIONALE FOR DETERMINATION   75-year-old female with chronic back pain with recent L3-4 fusion, now with increasing left hip pain, failed nonoperative treatment, status post left total hip arthroplasty on 7/15.  Patient has had issues with pain control, has not work with therapy due to pain issues, seen by pain management team, patient is on chronic morphine and has used decreasing doses of oxycodone, IV Dilaudid and IV fentanyl.  Now started on scheduled Tylenol, Lyrica and hydroxyzine, ongoing oxycodone and needs to participate with physical therapy.  Complex patient who was outpatient status but then was changed to inpatient because of poor pain control after surgery, anticipate more than 2 midnight hospital stay and has to work with therapy once adequate pain control is achieved, agree with change to inpatient status      The severity of illness, intensity of service provided, expected LOS and risk for adverse outcome make the care complex, high risk and appropriate for hospital admission.The patient requires hospital based medical care which is anticipated to require a stay of 2 or more midnights; according to CMS guidelines the patient should be admitted as inpatient        The information on this document is developed by the utilization review team in order for the business office to ensure compliance.   This only denotes the appropriateness of proper admission status and does not reflect the quality of care rendered.         The definitions of Inpatient Status and Observation Status used in making the determination above are those provided in the CMS Coverage Manual, Chapter 1 and Chapter 6, section 70.4.      Sincerely,       Augusto Milligan MD  Physician Advisor  Utilization Review-[place]    Phone: [phone]

## 2020-07-16 NOTE — PROGRESS NOTES
Patient vital signs are at baseline: Yes  Patient able to ambulate as they were prior to admission or with assist devices provided by therapies during their stay:  Yes  Patient MUST void prior to discharge:  Yes  Patient able to tolerate oral intake:  Yes  Pain has adequate pain control using Oral analgesics:  Yes.  Pt ambulating very slowly and she needs lots of cues. Will keep monitoring.

## 2020-07-16 NOTE — CONSULTS
"Care Transition Initial Assessment - RN      Met with: Patient.  DATA   Active Problems:    S/P total hip arthroplasty    Status post total hip replacement, left       Cognitive Status: awake, alert and oriented.Patient would drift in the midst of thought and sentences.  Her mood was low and said,\" I am worthless\".  Primary Care Clinic Name: [facility]  Primary Care MD Name: Dr. Zimmer  Contact information and PCP information verified: Yes  Lives With: spouse   Living Arrangements: house  Quality of Family Relationships: involved, supportive           Insurance concerns: No Insurance issues identified  ASSESSMENT  Patient currently receives the following services:  none        Identified issues/concerns regarding health management:   CTS following for care coordination. Patient was admitted on 7/15/20 for total left hip arthroplasty. Pre-operative plan was to have patient discharge to home with [facility].  CTS met with patient at bedside to verify discharge plans.  Patient said she would not go to a TCU because of COVID and was grateful for to have HC.  She has used [facility] in the past and was satisfied with care.  [facility] is aware of the referral. She has cane, walker at home.  Patient does appear overwhelmed.  PLAN  Financial costs for the patient were not addressed .  Patient given options and choices for discharge yes .  Patient/family is agreeable to the plan?  Yes:   Patient anticipates discharging to home with services .        Patient anticipates needs for home equipment: No  Transportation/person available to transport on day of discharge  is .  Plan/Disposition: Home   Appointments: Patient declined appointment support.       Care  (CTS) will continue to follow as needed.    Tiffany Piña RN, BSN, PHN, CTS  Care Coordinator  [facility]  [phone]          "

## 2020-07-17 ENCOUNTER — APPOINTMENT (OUTPATIENT)
Dept: OCCUPATIONAL THERAPY | Facility: CLINIC | Age: 75
DRG: 470 | End: 2020-07-17
Attending: ORTHOPAEDIC SURGERY
Payer: COMMERCIAL

## 2020-07-17 ENCOUNTER — APPOINTMENT (OUTPATIENT)
Dept: PHYSICAL THERAPY | Facility: CLINIC | Age: 75
DRG: 470 | End: 2020-07-17
Attending: ORTHOPAEDIC SURGERY
Payer: COMMERCIAL

## 2020-07-17 LAB
ERYTHROCYTE [DISTWIDTH] IN BLOOD BY AUTOMATED COUNT: 11.9 % (ref 10–15)
GLUCOSE BLDC GLUCOMTR-MCNC: 189 MG/DL (ref 70–99)
GLUCOSE BLDC GLUCOMTR-MCNC: 211 MG/DL (ref 70–99)
GLUCOSE BLDC GLUCOMTR-MCNC: 215 MG/DL (ref 70–99)
GLUCOSE BLDC GLUCOMTR-MCNC: 222 MG/DL (ref 70–99)
GLUCOSE BLDC GLUCOMTR-MCNC: 332 MG/DL (ref 70–99)
GLUCOSE BLDC GLUCOMTR-MCNC: 347 MG/DL (ref 70–99)
HCT VFR BLD AUTO: 27.1 % (ref 35–47)
HGB BLD-MCNC: 8.6 G/DL (ref 11.7–15.7)
MCH RBC QN AUTO: 29.4 PG (ref 26.5–33)
MCHC RBC AUTO-ENTMCNC: 31.7 G/DL (ref 31.5–36.5)
MCV RBC AUTO: 93 FL (ref 78–100)
PLATELET # BLD AUTO: 131 10E9/L (ref 150–450)
RBC # BLD AUTO: 2.93 10E12/L (ref 3.8–5.2)
WBC # BLD AUTO: 5.9 10E9/L (ref 4–11)

## 2020-07-17 PROCEDURE — 97165 OT EVAL LOW COMPLEX 30 MIN: CPT | Mod: GO | Performed by: REHABILITATION PRACTITIONER

## 2020-07-17 PROCEDURE — 85027 COMPLETE CBC AUTOMATED: CPT | Performed by: INTERNAL MEDICINE

## 2020-07-17 PROCEDURE — 25000132 ZZH RX MED GY IP 250 OP 250 PS 637: Performed by: NURSE PRACTITIONER

## 2020-07-17 PROCEDURE — 97530 THERAPEUTIC ACTIVITIES: CPT | Mod: GP

## 2020-07-17 PROCEDURE — 25800030 ZZH RX IP 258 OP 636: Performed by: INTERNAL MEDICINE

## 2020-07-17 PROCEDURE — 99233 SBSQ HOSP IP/OBS HIGH 50: CPT | Performed by: NURSE PRACTITIONER

## 2020-07-17 PROCEDURE — 00000146 ZZHCL STATISTIC GLUCOSE BY METER IP

## 2020-07-17 PROCEDURE — 97116 GAIT TRAINING THERAPY: CPT | Mod: GP

## 2020-07-17 PROCEDURE — 25000132 ZZH RX MED GY IP 250 OP 250 PS 637: Performed by: ORTHOPAEDIC SURGERY

## 2020-07-17 PROCEDURE — 97116 GAIT TRAINING THERAPY: CPT | Mod: GP | Performed by: PHYSICAL THERAPY ASSISTANT

## 2020-07-17 PROCEDURE — 25000132 ZZH RX MED GY IP 250 OP 250 PS 637: Performed by: PHYSICIAN ASSISTANT

## 2020-07-17 PROCEDURE — 97530 THERAPEUTIC ACTIVITIES: CPT | Mod: GP | Performed by: PHYSICAL THERAPY ASSISTANT

## 2020-07-17 PROCEDURE — 12000000 ZZH R&B MED SURG/OB

## 2020-07-17 PROCEDURE — 36415 COLL VENOUS BLD VENIPUNCTURE: CPT | Performed by: INTERNAL MEDICINE

## 2020-07-17 PROCEDURE — 99232 SBSQ HOSP IP/OBS MODERATE 35: CPT | Performed by: INTERNAL MEDICINE

## 2020-07-17 PROCEDURE — 25000132 ZZH RX MED GY IP 250 OP 250 PS 637: Performed by: INTERNAL MEDICINE

## 2020-07-17 PROCEDURE — 25000125 ZZHC RX 250: Performed by: PHYSICIAN ASSISTANT

## 2020-07-17 PROCEDURE — 97535 SELF CARE MNGMENT TRAINING: CPT | Mod: GO | Performed by: REHABILITATION PRACTITIONER

## 2020-07-17 RX ADMIN — PREGABALIN 75 MG: 75 CAPSULE ORAL at 08:07

## 2020-07-17 RX ADMIN — LIRAGLUTIDE 0.6 MG: 6 INJECTION SUBCUTANEOUS at 21:28

## 2020-07-17 RX ADMIN — LEVOTHYROXINE SODIUM 125 MCG: 0.1 TABLET ORAL at 08:05

## 2020-07-17 RX ADMIN — OXYCODONE HYDROCHLORIDE 5 MG: 5 TABLET ORAL at 01:14

## 2020-07-17 RX ADMIN — OXYCODONE HYDROCHLORIDE 5 MG: 5 TABLET ORAL at 16:30

## 2020-07-17 RX ADMIN — DOCUSATE SODIUM AND SENNOSIDES 2 TABLET: 8.6; 5 TABLET, FILM COATED ORAL at 20:34

## 2020-07-17 RX ADMIN — DICLOFENAC SODIUM 4 G: 10 GEL TOPICAL at 12:14

## 2020-07-17 RX ADMIN — PANTOPRAZOLE SODIUM 40 MG: 20 TABLET, DELAYED RELEASE ORAL at 08:02

## 2020-07-17 RX ADMIN — GLIMEPIRIDE 4 MG: 4 TABLET ORAL at 20:34

## 2020-07-17 RX ADMIN — Medication 1 LOZENGE: at 01:37

## 2020-07-17 RX ADMIN — GLIMEPIRIDE 4 MG: 4 TABLET ORAL at 08:02

## 2020-07-17 RX ADMIN — DICLOFENAC SODIUM 4 G: 10 GEL TOPICAL at 08:36

## 2020-07-17 RX ADMIN — OXYCODONE HYDROCHLORIDE 5 MG: 5 TABLET ORAL at 12:32

## 2020-07-17 RX ADMIN — DICLOFENAC SODIUM 4 G: 10 GEL TOPICAL at 16:30

## 2020-07-17 RX ADMIN — PREGABALIN 75 MG: 75 CAPSULE ORAL at 20:34

## 2020-07-17 RX ADMIN — INSULIN ASPART 2 UNITS: 100 INJECTION, SOLUTION INTRAVENOUS; SUBCUTANEOUS at 12:14

## 2020-07-17 RX ADMIN — ASPIRIN 325 MG: 325 TABLET, COATED ORAL at 08:06

## 2020-07-17 RX ADMIN — DOCUSATE SODIUM AND SENNOSIDES 2 TABLET: 8.6; 5 TABLET, FILM COATED ORAL at 08:06

## 2020-07-17 RX ADMIN — NEBIVOLOL HYDROCHLORIDE 5 MG: 5 TABLET ORAL at 08:02

## 2020-07-17 RX ADMIN — LIDOCAINE 2 PATCH: 560 PATCH PERCUTANEOUS; TOPICAL; TRANSDERMAL at 20:34

## 2020-07-17 RX ADMIN — INSULIN ASPART 5 UNITS: 100 INJECTION, SOLUTION INTRAVENOUS; SUBCUTANEOUS at 18:29

## 2020-07-17 RX ADMIN — INSULIN ASPART 2 UNITS: 100 INJECTION, SOLUTION INTRAVENOUS; SUBCUTANEOUS at 08:37

## 2020-07-17 RX ADMIN — MIRABEGRON 50 MG: 50 TABLET, FILM COATED, EXTENDED RELEASE ORAL at 08:06

## 2020-07-17 RX ADMIN — OXYCODONE HYDROCHLORIDE 10 MG: 5 TABLET ORAL at 04:50

## 2020-07-17 RX ADMIN — ACETAMINOPHEN 650 MG: 325 TABLET, FILM COATED ORAL at 16:30

## 2020-07-17 RX ADMIN — SODIUM CHLORIDE, POTASSIUM CHLORIDE, SODIUM LACTATE AND CALCIUM CHLORIDE: 600; 310; 30; 20 INJECTION, SOLUTION INTRAVENOUS at 01:16

## 2020-07-17 RX ADMIN — OXYCODONE HYDROCHLORIDE 10 MG: 5 TABLET ORAL at 08:46

## 2020-07-17 ASSESSMENT — ACTIVITIES OF DAILY LIVING (ADL)
ADLS_ACUITY_SCORE: 16
ADLS_ACUITY_SCORE: 14
ADLS_ACUITY_SCORE: 15
ADLS_ACUITY_SCORE: 16
ADLS_ACUITY_SCORE: 14
ADLS_ACUITY_SCORE: 15

## 2020-07-17 NOTE — PROGRESS NOTES
[facility]  Referral and orders for RN and PT received  from [facility] prior to surgery.  Spoke with pt to discuss plans for HC.  Discharge date pending. Patient care support center processing referral. Pt has 24 hour phone number for [facility] for any questions or concerns provided on AVS.  [facility] will follow for final discharge disposition.

## 2020-07-17 NOTE — PROGRESS NOTES
[facility]  Pain Management Progress Note  Text Page     Assessment & Plan   Sarah Longo is a 75 year old female who was admitted on 7/15/2020. I was asked by Dr. Jayson Victoria to see the patient for acute on chronic pain management.     1)  Acute left hip pain s/p total left hip arthroscopy.  History of chronic back and right hip pain due to lumbar spine degeneration and recent history of L2-4 fusion 2 months ago.     2)  Patient with chronic back and hip pain, on chronic opioid therapy managed by Dr. Navid Caicedo at [facility]  Baseline 5 mg Daily Morphine Equivalent as reported daily use.  Patient does not have expected opioid tolerance     Patient's opioid use in past 24 hours: 35 mg oxycodone = 52.5 mg oral morphine equivalents     3)  Risk factors for opioid related harms  - High opioid dose (>50 MME/day)  - Age > 65 years old     4)  Opioid induced side-effects:  -Constipation - none reported  -Nausea/Vomit - none reported  -Sedation - yes, patient reports sedation and feeling like she needs more rest than baseline.  -Urinary Retention - none reported.  Currently with hutchinson catheter.     5)  Other/Related:    -Depression/anxiety - anxiety/frustration over longer term pain issues.  -Deconditioning - s/p back surgery and now with hip surgery.   Has had function limiting pain since March of this year.  - Chronic pain of multiple etiologies including lumbar spine disease and chronic DJD of bilateral hips.  - sleep disturbance - patient reporting difficulty with sleep pre-operatively and now post op.     PLAN:   1)  Educate patient regarding acute pain component superimposed onto chronic pain.  Offer reassurance of time-limited acute pain and likely resolution of chronic pain issues as discussed with orthopedic specialists.     2) patient reporting sleep disturbance which impacts her ability to handle pain.  Takes tramadol at home for pain and sleep aide, will avoid that while she  is on oxycodone for post operative pain  - melatonin 6 mg daily at 2000     3)Non-opioid multimodal medication therapy  -Topical: Voltaren 1% Topical Gel to right hip, low back and around surgical dressing, lidocaine patch to low back and adjacent surgical dressing.  -N-SAIDS:Avoid due to new joint replacement.  Would recommend trying low dose once safe from medical/surgical perspective.  -Muscle Relaxants:Methocarbamol 500 mg every 6 hours prn  -Adjuvants:Acetaminophen 650 mg three times daily scheduled, Pregabalin 75 mg three times daily as per home use, Hydroxyzine 25 mg three times daily prn adjuvant pain  -Antidepresants/anxiolytics:Hydroxyzine as above prn.     4)  Non-medication interventions  Positioning, ICE, Relaxation, Distraction with activity, OOB with assist, Essential oils per nursing, Physical therapy as ordered.     5)  Opioids:  - oxycodone 5-10 mg every 3 hours prn  Opioids Treatment Goal:   -Improvement in function  -Participate in PT  -Management of acute pain during hsopitalization, expected 3-7 days needed at DC then return to chronic medication (Tramadol).     6)  Constipation Prophylaxis   - Senna-S 2 tablets two times daily scheduled.     7)  Pain Education  - Opioid safe use, storage and disposal information included in DC AVS  - Discussed psychological effects of pain, frustration and sleep disturbance as other components that impact effective pain management.       8)  DC Planning   Discussed goal of Opioid therapy as above with patient and spouse, bedside nurse.  Length of therapy is less than 10 days, post-operative dose of opioids may be stopped without taper. and chronic opioid dose to be managed and tapered as outpatient  Continued outpatient management of pain per surgical team at [facility].    Disposition: pending PT evaluations.  Support systems:   Outpatient Referrals: Would benefit from pain clinic referral as outpatient at time of post-op clinic appointment.  Patient with  multiple etiologies of pain and complex needs currently.  The following risk factors have been identified for unintentional overdose: patient is > 65 years old and patient is taking a high amount of opioids in 24 hour period. Discharge with intra-nasal naloxone if discharged to home with opioids  >40 mg MME/day.  Plan for education prior to discharge.       Rain GUAN, CNP  Pain Management and Palliative Care  [facility]  Pgr: [phone]    Time Spent on this Encounter   Total unit/floor time 45 minutes, time consisted of the following, examination of the patient, reviewing the record and completing documentation. >50% of time spent in counseling and coordination of care.  Time spend counseling with patient and family consisted of the following topics, goals of care and symptom management.  Time spent in coordination of care with Bedside Nurse Marylin Siu.       Interval History   Patient frustrated and upset overnight and this morning.  Discussed medications that were ordered differently than how she took them at home.  Also discussed upset feelings at her  during morning assessment.  Patient stated that pain was tolerable for the most part, but that she again had concerns for tylenol dosing given ALT.  Accepted education regarding acute post operative pain plan.  Ruminating on frustrations and concerns about medications.    Review of Systems    ROS otherwise negative    Physical Exam   Temp:  [98.6  F (37  C)-102.9  F (39.4  C)] 98.6  F (37  C)  Pulse:  [75] 75  Heart Rate:  [72-86] 72  Resp:  [11-20] 11  BP: (128-149)/(45-55) 132/55  SpO2:  [92 %-99 %] 97 %  161 lbs 0 oz  GEN:  Alert, oriented x 3, appears comfortable, No apparent distress, sitting up in wheelchair.  HEENT:  Normocephalic/atraumatic, no scleral icterus, no nasal discharge, mouth moist.  CV:  RRR, S1, S2; no murmurs or other irregularities noted.  +3 DP/PT pulses bilaterally; no edema bilateral lower  extremeties.  RESP:  Clear to auscultation bilaterally without rales/rhonchi/wheezing/retractions.  Symmetric chest rise on inhalation noted.  Normal respiratory effort.  ABD:  Rounded, soft, non-tender/non-distended.  +BS  EXT:  Edema & pulses as noted above.  Color, moisture and sensation intact x 4.     M/S:   Tender to palpation over left hip.    SKIN:  Dry to touch, no exanthems noted in the visualized areas.    NEURO: Alert and oriented, full strength exam deferred due to recent surgery left hip, plantar/dorsiflexion 5/5 bilaterally.  No focal cranial nerve deficits.  PAIN BEHAVIOR: Cooperative  Psych:  anxious affect.  Calm, cooperative, conversant appropriately.      Medications     lactated ringers 75 mL/hr at 07/17/20 0116       acetaminophen  650 mg Oral Q8H     aspirin  325 mg Oral Daily     diclofenac  4 g Transdermal 4x Daily     glimepiride  4 mg Oral BID     insulin aspart  1-7 Units Subcutaneous TID AC     insulin aspart  1-5 Units Subcutaneous At Bedtime     isosorbide mononitrate  15 mg Oral Daily     levothyroxine  125 mcg Oral Daily     lidocaine  2 patch Transdermal Q24h    And     lidocaine   Transdermal Q8H     liraglutide  0.6 mg Subcutaneous Daily     lisinopril  5 mg Oral Daily     melatonin  6 mg Oral Daily at 8 pm     mirabegron  50 mg Oral Daily     nebivolol  5 mg Oral Daily     pantoprazole  40 mg Oral QAM AC     polyethylene glycol  17 g Oral Daily     pregabalin  75 mg Oral BID     senna-docusate  2 tablet Oral BID       Data   Recent Labs   Lab 07/17/20  0559 07/16/20  0620   WBC 5.9  --    HGB 8.6* 9.7*   MCV 93  --    *  --

## 2020-07-17 NOTE — PLAN OF CARE
OT- eval completed and treatment initiated. Patient POD #2 for L RAFAEL, no precautions. Patient resides with spouse and able to stay on main level if needed. Patient has 1/2 bath on main level and owns a RTS and shower chair to use on walk in shower.  Patient plan: home  Current status: Patient can become easily arguementive with OT when she feel she is not being listened to or not receiving the care she deserves. Patient responds well to direct, thorough answers to questions and verbalizing of POC prior to activity. After instruction, patient was mod I with donning undergarments over feet with use of reacher. CGA, fww to stand and fully don over hips. CGA, fww to complete transfer to bedside commode and toileting cares, including clothing management and pericares. Patient moving slowly, however was steady. Increased time spent addressing Ae recommendations and how to obtain in community- reacher and long handle sponge. Patient expressed being unhappy that AE wasn't available at hospital to be given to her. Patient provided with explanation as to why and various community option.   Anticipated status at discharge:  anticipate patient will be CGA, fww for short distances and basic ADL's with spouse support for ADL's; household chores, driving, errands, cooking and bathing.  Will continue with OT for ADL's and AE recommendations.

## 2020-07-17 NOTE — PROGRESS NOTES
Afeb, VSS    I entered the room to see her and she was asleep. When I woke her are she proceeded to tell me what severe pain she is in. Talking with her however she is breathing normally, not agitated or anxious and does not appear to be in pain.    I told her that tomorrow she has reached day 3 and has to go either home or to rehab.  F/U to my office next week.

## 2020-07-17 NOTE — PROGRESS NOTES
"SPIRITUAL HEALTH SERVICES Progress Note  [facility]    Saw pt Rayna per staff referral requesting emotional support for pt.  Offered reflective conversation to facilitate the processing of thoughts and feelings.   reported feeling more optimistic about her recovery after walking this morning.  She witness to her amparo that \"during the most difficult times God walks with us.\"  Offered reflective listening, validation of feelings, and affirmation of her amparo.  Rayna welcomed prayer and engaged in a few moments of contemplative breathing before praying.  Her lunch arrived immediately after we prayed.    Plan: Weekend on-call chaplains remain available per pt's request.    Nigel Sykes M.Div., [facility]  Staff   Phone [phone]    "

## 2020-07-17 NOTE — PLAN OF CARE
Patient plan: Prefers home   Current status: Greeted patient finishing up in bathroom with nursing at beside and took over. Engaged patient in mulitple sit <> stand with FWW at [place] with patient initially experiencing minor LOB backwards with cues for anterior weight shift, improved technique with successive trials. Engaged patient in ambulation for 10ft + 5ft + 5ft +10ft with seated rest breaks in between - verbal cues for technique to improve step length & weight acceptance on L LE. Wheeled patient room <> rehab gym. Educated patient on stair negotiation & demonstrated technique. Engaged patient in ascending/descending 4 steps with bilateral railing AND in ascending/descending platform step with FWW in order to simulate home environment for entering home & negotiating stairs within the home. Concluded session with patient sitting up in wheelchair, all needs in reach and alarm engaged.   Anticipated status at discharge: Ax1 for bed mobility, transfers, ambulation & stairs

## 2020-07-17 NOTE — PROGRESS NOTES
[facility]    Hospitalist Progress Note      Assessment & Plan   Sarah Longo is a 75 year old female who was admitted on 7/15/2020.    Summary of Stay:   This is a 75-year-old lady who was admitted by orthopedic team for an elective left total knee arthroplasty.  Medicine team was consulted for postoperative medical management. I evaluated the patient postoperatively.  The surgery was done under general anesthesia.  Estimated blood loss was documented to be less than 50 cc.    Patient has a past medical history significant for coronary artery disease-coronary stenting of RCA in 2004.  In 2016, stenting of her LAD and complex bifurcational stenting of her circumflex and obtuse marginal.  She returned in 11/2016 with restenosis of her obtuse marginal stent and that was restented.  Last angiogram in February 2019 with no evidence of significant restenosis.  Last stress test in January 2020, Lexiscan, negative for ischemia.  Last echo in December 2019 with EF of 55 to 60% with no significant valvular abnormality.     Other past medical history is significant for diabetes, hypertension, hypothyroidism, chronic back pain.    Today, patient feels well.  Pain is adequately controlled.    Plan:    Left total hip arthroplasty.  -Management per orthopedic team.  - DVT prophylaxis per Ortho.  -Incentive spirometry and early ambulation    Coronary artery disease.  - Continue aspirin.  - Intolerant to statins.  - At baseline, she is not on a beta-blocker Because developed intolerable fatigue with metoprolol.  - Her cardiologist and primary care physician recommended starting Bystolic 5 mg a week before the surgery and continuing it for 1 to 3 weeks postoperatively.   - If develops side effects such as low heart rate then can decrease the Bystolic to 2.5 mg dose.  -Continue lisinopril and Imdur.     Diabetes  - Currently on Victoza and Glimepiride  as before.  - Blood glucose numbers are elevated.  If remain  persistently elevated then  consider adding low-dose Lantus.  Also on sliding scale insulin.  - According to the patient, she takes the metformin episodically.  Every now and then it causes her to have diarrhea and then she would stop it for a few days.  Does not want to take the metformin while in the hospital.  - Also on sliding scale insulin.  -Blood glucose is running greater than 200.  She did receive dexamethasone in the OR.     Hypothyroidism  - Continue levothyroxine.  Normal TSH in October.     Anemia  - Preop hemoglobin of 9.7  -  Repeat hemoglobin of 8.6.  - No evidence of ongoing blood loss from the operating site.  -Recommended outpatient follow-up with primary care physician regarding her preop anemia (hemoglobin of 9.7 on 7/16/2020)      DVT Prophylaxis: Defer to primary service  Code Status: Full Code  Expected discharge: Probably tomorrow.    Yariel Harrell MD  Text Page (7am - 6pm, M-F)    Interval History   Patient was evaluated with nursing staff. Overnight issues discussed.    Review of systems:  No nausea or vomiting.  No abdominal pain.  No diarrhea.  No chest pain/palpitations.  No new cough/shortness of breath.  No headache/visual disturbance/new weakness.    -Data reviewed today: Labs and medications.    Physical Exam   Temp: 98.8  F (37.1  C) Temp src: Temporal BP: 109/49 Pulse: 67 Heart Rate: 72 Resp: 12 SpO2: 100 % O2 Device: None (Room air)    Vitals:    07/13/20 1700 07/15/20 0617   Weight: 74.8 kg (165 lb) 73 kg (161 lb)     Vital Signs with Ranges  Temp:  [98.6  F (37  C)-102.9  F (39.4  C)] 98.8  F (37.1  C)  Pulse:  [67-75] 67  Heart Rate:  [72-86] 72  Resp:  [11-20] 12  BP: (109-149)/(45-55) 109/49  SpO2:  [93 %-100 %] 100 %  I/O last 3 completed shifts:  In: 1146 [P.O.:240; I.V.:906]  Out: 1675 [Urine:1675]    Constitutional: Awake, alert, cooperative, no apparent distress  HEENT: Trachea midline, sclera is clear   Respiratory: Clear to auscultation bilaterally, no crackles or  wheezing  Cardiovascular: Regular rate and rhythm, normal S1 and S2, and no murmur noted  GI: Normal bowel sounds, soft, non-distended, non-tender    Medications     lactated ringers Stopped (07/17/20 1257)       acetaminophen  650 mg Oral Q8H     aspirin  325 mg Oral Daily     diclofenac  4 g Transdermal 4x Daily     glimepiride  4 mg Oral BID     insulin aspart  1-7 Units Subcutaneous TID AC     insulin aspart  1-5 Units Subcutaneous At Bedtime     isosorbide mononitrate  15 mg Oral Daily     levothyroxine  125 mcg Oral Daily     lidocaine  2 patch Transdermal Q24h    And     lidocaine   Transdermal Q8H     liraglutide  0.6 mg Subcutaneous Daily     lisinopril  5 mg Oral Daily     melatonin  6 mg Oral Daily at 8 pm     mirabegron  50 mg Oral Daily     nebivolol  5 mg Oral Daily     pantoprazole  40 mg Oral QAM AC     polyethylene glycol  17 g Oral Daily     pregabalin  75 mg Oral BID     senna-docusate  2 tablet Oral BID       Data   Recent Labs   Lab 07/17/20  0559 07/16/20  0620   WBC 5.9  --    HGB 8.6* 9.7*   MCV 93  --    *  --        No results found for this or any previous visit (from the past 24 hour(s)).

## 2020-07-17 NOTE — PLAN OF CARE
Evening RN  Pt A/O x3 - confused, anxious and frusterated at 1900 but was able to be redirected and became more oriented later in evening.  VSS.  Pain managed adequately with 5 mg PO Oxycodone PRN and scheduled lidocaine patches and voltaren gel.  CMS intact.  Dressing CDI.  Up A2 with walker and gait belt.  Voided x1 with minimal bladder scan residual found - purewick in place but has not used yet.  Tolerating mod carb diet - minimal appetite this evening.  BG was 290.  Insulin coverage and Amaryl at 2000.  Will continue to monitor.

## 2020-07-17 NOTE — PLAN OF CARE
VS-stable, afebrile,   Lung Sounds-clear, no cough, on room air, using IS upto 1500,   O2-on room air.   GI-+bs, +flatus, lbm was Tuesday per pt. Tolerating reg diet, denies nausea, bs was 211 and 215 at lunch.   -voiding adequately  IVF-sl iv at 1300. Was at 75cc.   Dressings-aquacel drsg is c/d/I. Ice to L hip.   CMS-denies numbness and tingling, +pp, strong dorsi/planter flexion. Scds on while in bed.   Drain-none.   Activity- up with one assist, gb, and walker, walking into bathroom x 2 this shift, up with PT and OT, up in chair for bkst and lunch. Needs reminders to keep legs straight and to stand up straight  Pain-rates pain level a 5-6. Goal 5, taking oxycodone for pain. Ice to hip.   D/C Plan-discharge tomorrow. Home with home care.  vs tcu. Sws started following to help with discontinue planning.

## 2020-07-17 NOTE — PLAN OF CARE
"Pt anxious/upset this AM about pain. Became very fixated on certain things and hard to discuss interventions with pt. Pt became more lethargic throughout the day, max temp of 102.2, down to 100.1. Would fall asleep during conversations.  Refused lunch and supper after multiple offers. Later this evening became more alert and even requested to stand at edge of bed. Stood with Ax2 et walker, but unable to take any steps. Refused to have catheter taken out this AM, but agreed to it later this evening after explanation of the use of a purewick.  At 1900 pt became very upset about her catheter being removed, states \"I have to have the catheter to empty my bladder.\" Again educated pt on the importance of having the catheter removed to decrease the risk of infection, especially given her recent UTI.  Pt remains upset and on the phone with a friend.  Also upset that her  was not here all day. Explained that her  was here this morning, and now had to be at home due to visiting restrictions due to COVID-19. Pt remains argumentative, but will offer Oxycodone and ice for her hip pain.  Dressing D/I.  CMS intact.  Pt wants home health at discharge.  Will continue to monitor.    "

## 2020-07-17 NOTE — PROGRESS NOTES
07/17/20 1019   Quick Adds   Type of Visit Initial Occupational Therapy Evaluation   Living Environment   Lives With spouse   Living Arrangements house   Transportation Anticipated family or friend will provide   Living Environment Comment patient reports she can stay on main level of home, has a 1/2 bath on main level. Spouse reports he can complet household chores and IADls   Self-Care   Usual Activity Tolerance moderate   Current Activity Tolerance fair   Equipment Currently Used at Home cane, straight;raised toilet;shower chair;walker, rolling   Functional Level   Ambulation 0-->independent   Transferring 0-->independent   Toileting 0-->independent   Bathing 0-->independent   Dressing 0-->independent   Eating 0-->independent   Communication 0-->understands/communicates without difficulty   Swallowing 0-->swallows foods/liquids without difficulty   Cognition 0 - no cognition issues reported   Fall history within last six months no   Which of the above functional risks had a recent onset or change? ambulation;transferring;toileting;bathing;dressing   General Information   Onset of Illness/Injury or Date of Surgery - Date 07/15/20   Referring Physician Dr. Victoria   Patient/Family Goals Statement to return home   Additional Occupational Profile Info/Pertinent History of Current Problem Patient is POD #2 for L RAFAEL   Precautions/Limitations fall precautions  (no hip precautions per orders)   Weight-Bearing Status - LLE weight-bearing as tolerated   General Observations patient seated in wc and agreeable to OT session   Cognitive Status Examination   Orientation orientation to person, place and time   Level of Consciousness alert;other (see comments)  (at times because agitated and arguementive, able to calm bello)   Follows Commands (Cognition) WNL   Memory intact   Attention Quiet environment required   Cognitive Comment patient can become easily arguementive with OT when she feel she is not being listened to or  not receiving the care she deserves. Patient responds wiell to direct, thorough answers to questions and verbalized POC.    Pain Assessment   Patient Currently in Pain Yes, see Vital Sign flowsheet  (rating not provided)   Posture   Posture Comments stooped position   Range of Motion (ROM)   ROM Comment limited LE ROM   Strength   Strength Comments decreased L LE strength   Hand Strength   Hand Strength Comments intant   Muscle Tone Assessment   Muscle Tone Quick Adds No deficits were identified   Coordination   Upper Extremity Coordination No deficits were identified   Mobility   Bed Mobility Comments not completed   Transfer Skill: Sit to Stand   Level of [place]: Sit/Stand contact guard   Physical Assist/Nonphysical Assist: Sit/Stand supervision   Transfer Skill: Sit to Stand weight-bearing as tolerated   Assistive Device for Transfer: Sit/Stand rolling walker   Toilet Transfer   Toilet Transfer Comments treatment initiated-defer to OT daily note for details   Balance   Balance Comments LOB was not noted-general unsteadiness to be expected   Lower Body Dressing   Level of [place]: Dress Lower Body   (treatment initiated-defer to OT daily note for details)   Eating/Self Feeding   Level of [place]: Eating independent   Instrumental Activities of Daily Living (IADL)   IADL Comments spouse reports he can A as needed with IADLs   Activities of Daily Living Analysis   Impairments Contributing to Impaired Activities of Daily Living balance impaired;pain;ROM decreased;strength decreased   General Therapy Interventions   Planned Therapy Interventions ADL retraining;progressive activity/exercise;transfer training   Clinical Impression   Criteria for Skilled Therapeutic Interventions Met yes, treatment indicated   OT Diagnosis decreased ADLs/IADLs   Influenced by the following impairments balance impaired;pain;ROM decreased;strength decreased   Assessment of Occupational Performance 5 or more Performance  "Deficits   Identified Performance Deficits decreased ADLs/IADLs- dsg, toileting, bathing, functional/community mobility, driving, household chores   Clinical Decision Making (Complexity) Low complexity   Therapy Frequency Daily   Predicted Duration of Therapy Intervention (days/wks) 2 days   Anticipated Equipment Needs at Discharge bath sponge;reacher   Anticipated Discharge Disposition   (defer to ortho)   Risks and Benefits of Treatment have been explained. Yes   Patient, Family & other staff in agreement with plan of care Yes   [facility] TM \"6 Clicks\"   2016, Trustees of [facility], under license to [employer].  All rights reserved.   6 Clicks Short Forms Daily Activity Inpatient Short Form   [facility]  \"6 Clicks\" Daily Activity Inpatient Short Form   1. Putting on and taking off regular lower body clothing? 3 - A Little   2. Bathing (including washing, rinsing, drying)? 3 - A Little   3. Toileting, which includes using toilet, bedpan or urinal? 3 - A Little   4. Putting on and taking off regular upper body clothing? 4 - None   5. Taking care of personal grooming such as brushing teeth? 4 - None   6. Eating meals? 4 - None   Daily Activity Raw Score (Score out of 24.Lower scores equate to lower levels of function) 21   Total Evaluation Time   Total Evaluation Time (Minutes) 10     "

## 2020-07-17 NOTE — PLAN OF CARE
Patient plan:home  Current status: was able to walk with RW and 1 assist to 45 feet total distance very slow pattern and pacing noted did not feel ready to do stairs this AM will try in PM  Anticipated status at discharge: 1 assist for bed mobility and assist  for stairs.

## 2020-07-17 NOTE — PLAN OF CARE
Pt A/O x 3, VSS. Tolerating mod carb diet. Transfers with Heavy A/2 or foreign steady. Aquacel dressing CDI, CMS intact. Voiding in adequate amounts in bedside commode. Pain managed with Oxy 10. Discharge plans pending PT.

## 2020-07-17 NOTE — PROGRESS NOTES
Care coordination:  Discharge Planner   Discharge Plans in progress: YES  Barriers to discharge plan:  No HC orders on discharge orders, CTS called [phone] to discuss Dr. Victoria's intent for HC at discharge.  I spoke with Palma his admin who said she had contact [facility] ahead of surgery to order the HC for skilled nursing and PT.  I spoke with Libia [facility] liaison [phone] and she is researching if need additional orders at discharge.  Follow up plan: will continue to follow for care coordination.  Tiffany Piña RN, BSN, PHN, CTS  Care Coordinator  [facility]  [phone]          Entered by: Tiffany Piña 07/17/2020 9:33 AM

## 2020-07-18 ENCOUNTER — APPOINTMENT (OUTPATIENT)
Dept: PHYSICAL THERAPY | Facility: CLINIC | Age: 75
DRG: 470 | End: 2020-07-18
Attending: ORTHOPAEDIC SURGERY
Payer: COMMERCIAL

## 2020-07-18 ENCOUNTER — HOSPITAL ENCOUNTER (OUTPATIENT)
Facility: CLINIC | Age: 75
Setting detail: OBSERVATION
Discharge: HOME | DRG: 470 | End: 2020-07-21
Attending: EMERGENCY MEDICINE | Admitting: INTERNAL MEDICINE
Payer: COMMERCIAL

## 2020-07-18 ENCOUNTER — APPOINTMENT (OUTPATIENT)
Dept: OCCUPATIONAL THERAPY | Facility: CLINIC | Age: 75
DRG: 470 | End: 2020-07-18
Attending: ORTHOPAEDIC SURGERY
Payer: COMMERCIAL

## 2020-07-18 ENCOUNTER — APPOINTMENT (OUTPATIENT)
Dept: ULTRASOUND IMAGING | Facility: CLINIC | Age: 75
DRG: 470 | End: 2020-07-18
Attending: EMERGENCY MEDICINE
Payer: COMMERCIAL

## 2020-07-18 ENCOUNTER — APPOINTMENT (OUTPATIENT)
Dept: GENERAL RADIOLOGY | Facility: CLINIC | Age: 75
DRG: 470 | End: 2020-07-18
Attending: EMERGENCY MEDICINE
Payer: COMMERCIAL

## 2020-07-18 VITALS
SYSTOLIC BLOOD PRESSURE: 115 MMHG | HEIGHT: 67 IN | DIASTOLIC BLOOD PRESSURE: 53 MMHG | HEART RATE: 63 BPM | OXYGEN SATURATION: 96 % | TEMPERATURE: 98 F | WEIGHT: 161 LBS | BODY MASS INDEX: 25.27 KG/M2 | RESPIRATION RATE: 16 BRPM

## 2020-07-18 DIAGNOSIS — D62 ANEMIA DUE TO BLOOD LOSS, ACUTE: ICD-10-CM

## 2020-07-18 DIAGNOSIS — B37.31 CANDIDAL VULVOVAGINITIS: ICD-10-CM

## 2020-07-18 DIAGNOSIS — Z96.642 STATUS POST TOTAL REPLACEMENT OF LEFT HIP: ICD-10-CM

## 2020-07-18 DIAGNOSIS — K59.03 DRUG-INDUCED CONSTIPATION: Primary | ICD-10-CM

## 2020-07-18 DIAGNOSIS — A41.9 SEPSIS WITHOUT ACUTE ORGAN DYSFUNCTION, DUE TO UNSPECIFIED ORGANISM (H): ICD-10-CM

## 2020-07-18 DIAGNOSIS — R50.82 FEVER POSTOP: ICD-10-CM

## 2020-07-18 DIAGNOSIS — N12 PYELONEPHRITIS: ICD-10-CM

## 2020-07-18 LAB
ALBUMIN SERPL-MCNC: 2.7 G/DL (ref 3.4–5)
ALBUMIN UR-MCNC: 100 MG/DL
ALBUMIN UR-MCNC: 50 MG/DL
ALP SERPL-CCNC: 48 U/L (ref 40–150)
ALT SERPL W P-5'-P-CCNC: 33 U/L (ref 0–50)
ANION GAP SERPL CALCULATED.3IONS-SCNC: 6 MMOL/L (ref 3–14)
APPEARANCE UR: ABNORMAL
APPEARANCE UR: ABNORMAL
AST SERPL W P-5'-P-CCNC: 21 U/L (ref 0–45)
BACTERIA #/AREA URNS HPF: ABNORMAL /HPF
BASOPHILS # BLD AUTO: 0 10E9/L (ref 0–0.2)
BASOPHILS NFR BLD AUTO: 0.2 %
BILIRUB SERPL-MCNC: 0.5 MG/DL (ref 0.2–1.3)
BILIRUB UR QL STRIP: NEGATIVE
BILIRUB UR QL STRIP: NEGATIVE
BUN SERPL-MCNC: 16 MG/DL (ref 7–30)
CALCIUM SERPL-MCNC: 8.9 MG/DL (ref 8.5–10.1)
CHLORIDE SERPL-SCNC: 103 MMOL/L (ref 94–109)
CO2 SERPL-SCNC: 25 MMOL/L (ref 20–32)
COLOR UR AUTO: YELLOW
COLOR UR AUTO: YELLOW
CREAT SERPL-MCNC: 0.72 MG/DL (ref 0.52–1.04)
DIFFERENTIAL METHOD BLD: ABNORMAL
EOSINOPHIL # BLD AUTO: 0 10E9/L (ref 0–0.7)
EOSINOPHIL NFR BLD AUTO: 0.7 %
ERYTHROCYTE [DISTWIDTH] IN BLOOD BY AUTOMATED COUNT: 11.9 % (ref 10–15)
GFR SERPL CREATININE-BSD FRML MDRD: 82 ML/MIN/{1.73_M2}
GLUCOSE BLDC GLUCOMTR-MCNC: 185 MG/DL (ref 70–99)
GLUCOSE BLDC GLUCOMTR-MCNC: 221 MG/DL (ref 70–99)
GLUCOSE BLDC GLUCOMTR-MCNC: 291 MG/DL (ref 70–99)
GLUCOSE SERPL-MCNC: 265 MG/DL (ref 70–99)
GLUCOSE UR STRIP-MCNC: >1000 MG/DL
GLUCOSE UR STRIP-MCNC: >1000 MG/DL
HCT VFR BLD AUTO: 26.3 % (ref 35–47)
HGB BLD-MCNC: 8.4 G/DL (ref 11.7–15.7)
HGB UR QL STRIP: ABNORMAL
HGB UR QL STRIP: ABNORMAL
IMM GRANULOCYTES # BLD: 0 10E9/L (ref 0–0.4)
IMM GRANULOCYTES NFR BLD: 0.3 %
KETONES UR STRIP-MCNC: 10 MG/DL
KETONES UR STRIP-MCNC: NEGATIVE MG/DL
LACTATE BLD-SCNC: 1.3 MMOL/L (ref 0.7–2)
LEUKOCYTE ESTERASE UR QL STRIP: ABNORMAL
LEUKOCYTE ESTERASE UR QL STRIP: ABNORMAL
LIPASE SERPL-CCNC: 85 U/L (ref 73–393)
LYMPHOCYTES # BLD AUTO: 0.4 10E9/L (ref 0.8–5.3)
LYMPHOCYTES NFR BLD AUTO: 6.6 %
MCH RBC QN AUTO: 29.5 PG (ref 26.5–33)
MCHC RBC AUTO-ENTMCNC: 31.9 G/DL (ref 31.5–36.5)
MCV RBC AUTO: 92 FL (ref 78–100)
MONOCYTES # BLD AUTO: 0.4 10E9/L (ref 0–1.3)
MONOCYTES NFR BLD AUTO: 7.7 %
MUCOUS THREADS #/AREA URNS LPF: PRESENT /LPF
MUCOUS THREADS #/AREA URNS LPF: PRESENT /LPF
NEUTROPHILS # BLD AUTO: 4.8 10E9/L (ref 1.6–8.3)
NEUTROPHILS NFR BLD AUTO: 84.5 %
NITRATE UR QL: NEGATIVE
NITRATE UR QL: NEGATIVE
NRBC # BLD AUTO: 0 10*3/UL
NRBC BLD AUTO-RTO: 0 /100
PH UR STRIP: 6 PH (ref 5–7)
PH UR STRIP: 6 PH (ref 5–7)
PLATELET # BLD AUTO: 164 10E9/L (ref 150–450)
POTASSIUM SERPL-SCNC: 4.2 MMOL/L (ref 3.4–5.3)
PROT SERPL-MCNC: 6.4 G/DL (ref 6.8–8.8)
RBC # BLD AUTO: 2.85 10E12/L (ref 3.8–5.2)
RBC #/AREA URNS AUTO: 52 /HPF (ref 0–2)
RBC #/AREA URNS AUTO: 66 /HPF (ref 0–2)
SODIUM SERPL-SCNC: 134 MMOL/L (ref 133–144)
SOURCE: ABNORMAL
SOURCE: ABNORMAL
SP GR UR STRIP: 1.02 (ref 1–1.03)
SP GR UR STRIP: 1.02 (ref 1–1.03)
TRANS CELLS #/AREA URNS HPF: 5 /HPF (ref 0–1)
UROBILINOGEN UR STRIP-MCNC: NORMAL MG/DL (ref 0–2)
UROBILINOGEN UR STRIP-MCNC: NORMAL MG/DL (ref 0–2)
WBC # BLD AUTO: 5.7 10E9/L (ref 4–11)
WBC #/AREA URNS AUTO: >182 /HPF (ref 0–5)
WBC #/AREA URNS AUTO: >182 /HPF (ref 0–5)
WBC CLUMPS #/AREA URNS HPF: PRESENT /HPF
WBC CLUMPS #/AREA URNS HPF: PRESENT /HPF
YEAST #/AREA URNS HPF: ABNORMAL /HPF
YEAST #/AREA URNS HPF: ABNORMAL /HPF

## 2020-07-18 PROCEDURE — 25800030 ZZH RX IP 258 OP 636: Performed by: EMERGENCY MEDICINE

## 2020-07-18 PROCEDURE — 83690 ASSAY OF LIPASE: CPT | Performed by: EMERGENCY MEDICINE

## 2020-07-18 PROCEDURE — 97535 SELF CARE MNGMENT TRAINING: CPT | Mod: GO | Performed by: OCCUPATIONAL THERAPIST

## 2020-07-18 PROCEDURE — 97116 GAIT TRAINING THERAPY: CPT | Mod: GP | Performed by: PHYSICAL THERAPIST

## 2020-07-18 PROCEDURE — 25000132 ZZH RX MED GY IP 250 OP 250 PS 637: Performed by: ORTHOPAEDIC SURGERY

## 2020-07-18 PROCEDURE — 87086 URINE CULTURE/COLONY COUNT: CPT | Performed by: ORTHOPAEDIC SURGERY

## 2020-07-18 PROCEDURE — 87086 URINE CULTURE/COLONY COUNT: CPT | Performed by: EMERGENCY MEDICINE

## 2020-07-18 PROCEDURE — 81001 URINALYSIS AUTO W/SCOPE: CPT | Performed by: ORTHOPAEDIC SURGERY

## 2020-07-18 PROCEDURE — 97530 THERAPEUTIC ACTIVITIES: CPT | Mod: GP | Performed by: PHYSICAL THERAPIST

## 2020-07-18 PROCEDURE — C9803 HOPD COVID-19 SPEC COLLECT: HCPCS

## 2020-07-18 PROCEDURE — 25000128 H RX IP 250 OP 636: Performed by: EMERGENCY MEDICINE

## 2020-07-18 PROCEDURE — 83605 ASSAY OF LACTIC ACID: CPT | Performed by: EMERGENCY MEDICINE

## 2020-07-18 PROCEDURE — 99232 SBSQ HOSP IP/OBS MODERATE 35: CPT | Performed by: INTERNAL MEDICINE

## 2020-07-18 PROCEDURE — 99285 EMERGENCY DEPT VISIT HI MDM: CPT | Mod: 25

## 2020-07-18 PROCEDURE — 25000132 ZZH RX MED GY IP 250 OP 250 PS 637: Performed by: NURSE PRACTITIONER

## 2020-07-18 PROCEDURE — 87106 FUNGI IDENTIFICATION YEAST: CPT | Performed by: ORTHOPAEDIC SURGERY

## 2020-07-18 PROCEDURE — 71045 X-RAY EXAM CHEST 1 VIEW: CPT

## 2020-07-18 PROCEDURE — 00000146 ZZHCL STATISTIC GLUCOSE BY METER IP

## 2020-07-18 PROCEDURE — 25000132 ZZH RX MED GY IP 250 OP 250 PS 637: Performed by: PHYSICIAN ASSISTANT

## 2020-07-18 PROCEDURE — 81001 URINALYSIS AUTO W/SCOPE: CPT | Performed by: EMERGENCY MEDICINE

## 2020-07-18 PROCEDURE — 36415 COLL VENOUS BLD VENIPUNCTURE: CPT

## 2020-07-18 PROCEDURE — 76770 US EXAM ABDO BACK WALL COMP: CPT

## 2020-07-18 PROCEDURE — 80053 COMPREHEN METABOLIC PANEL: CPT | Performed by: EMERGENCY MEDICINE

## 2020-07-18 PROCEDURE — 99207 ZZC CDG-MDM COMPONENT: MEETS LOW - DOWN CODED: CPT | Performed by: INTERNAL MEDICINE

## 2020-07-18 PROCEDURE — 85025 COMPLETE CBC W/AUTO DIFF WBC: CPT | Performed by: EMERGENCY MEDICINE

## 2020-07-18 PROCEDURE — 87040 BLOOD CULTURE FOR BACTERIA: CPT | Performed by: EMERGENCY MEDICINE

## 2020-07-18 PROCEDURE — 96365 THER/PROPH/DIAG IV INF INIT: CPT

## 2020-07-18 RX ORDER — LIRAGLUTIDE 6 MG/ML
1.2 INJECTION SUBCUTANEOUS
COMMUNITY
Start: 2020-07-18 | End: 2020-07-18

## 2020-07-18 RX ORDER — CIPROFLOXACIN 500 MG/1
500 TABLET, FILM COATED ORAL 2 TIMES DAILY
Qty: 14 TABLET | Refills: 0 | Status: ON HOLD | OUTPATIENT
Start: 2020-07-18 | End: 2020-07-19

## 2020-07-18 RX ORDER — LIRAGLUTIDE 6 MG/ML
1.2 INJECTION SUBCUTANEOUS DAILY
Qty: 3 ML | Refills: 0 | Status: ON HOLD | OUTPATIENT
Start: 2020-07-18 | End: 2022-08-31

## 2020-07-18 RX ORDER — CEFTRIAXONE 2 G/1
2 INJECTION, POWDER, FOR SOLUTION INTRAMUSCULAR; INTRAVENOUS ONCE
Status: COMPLETED | OUTPATIENT
Start: 2020-07-18 | End: 2020-07-19

## 2020-07-18 RX ORDER — LIRAGLUTIDE 6 MG/ML
1.2 INJECTION SUBCUTANEOUS DAILY
Qty: 3 ML | Refills: 0 | Status: SHIPPED | OUTPATIENT
Start: 2020-07-18 | End: 2020-07-18

## 2020-07-18 RX ADMIN — DICLOFENAC SODIUM 4 G: 10 GEL TOPICAL at 12:21

## 2020-07-18 RX ADMIN — CEFTRIAXONE 2 G: 2 INJECTION, POWDER, FOR SOLUTION INTRAMUSCULAR; INTRAVENOUS at 23:42

## 2020-07-18 RX ADMIN — PANTOPRAZOLE SODIUM 40 MG: 20 TABLET, DELAYED RELEASE ORAL at 08:31

## 2020-07-18 RX ADMIN — SODIUM CHLORIDE 1000 ML: 9 INJECTION, SOLUTION INTRAVENOUS at 23:21

## 2020-07-18 RX ADMIN — INSULIN ASPART 4 UNITS: 100 INJECTION, SOLUTION INTRAVENOUS; SUBCUTANEOUS at 12:23

## 2020-07-18 RX ADMIN — GLIMEPIRIDE 4 MG: 4 TABLET ORAL at 08:31

## 2020-07-18 RX ADMIN — ACETAMINOPHEN 650 MG: 325 TABLET, FILM COATED ORAL at 01:22

## 2020-07-18 RX ADMIN — OXYCODONE HYDROCHLORIDE 5 MG: 5 TABLET ORAL at 08:30

## 2020-07-18 RX ADMIN — INSULIN ASPART 1 UNITS: 100 INJECTION, SOLUTION INTRAVENOUS; SUBCUTANEOUS at 08:42

## 2020-07-18 RX ADMIN — LIRAGLUTIDE 0.6 MG: 6 INJECTION SUBCUTANEOUS at 12:25

## 2020-07-18 RX ADMIN — ASPIRIN 325 MG: 325 TABLET, COATED ORAL at 08:32

## 2020-07-18 RX ADMIN — DOCUSATE SODIUM AND SENNOSIDES 2 TABLET: 8.6; 5 TABLET, FILM COATED ORAL at 08:31

## 2020-07-18 RX ADMIN — PREGABALIN 75 MG: 75 CAPSULE ORAL at 08:32

## 2020-07-18 RX ADMIN — NEBIVOLOL HYDROCHLORIDE 5 MG: 5 TABLET ORAL at 08:31

## 2020-07-18 RX ADMIN — OXYCODONE HYDROCHLORIDE 10 MG: 5 TABLET ORAL at 01:23

## 2020-07-18 RX ADMIN — MIRABEGRON 50 MG: 50 TABLET, FILM COATED, EXTENDED RELEASE ORAL at 08:31

## 2020-07-18 RX ADMIN — ACETAMINOPHEN 650 MG: 325 TABLET, FILM COATED ORAL at 08:32

## 2020-07-18 RX ADMIN — DICLOFENAC SODIUM 4 G: 10 GEL TOPICAL at 08:32

## 2020-07-18 RX ADMIN — LEVOTHYROXINE SODIUM 125 MCG: 0.1 TABLET ORAL at 08:32

## 2020-07-18 RX ADMIN — OXYCODONE HYDROCHLORIDE 10 MG: 5 TABLET ORAL at 13:20

## 2020-07-18 ASSESSMENT — ACTIVITIES OF DAILY LIVING (ADL)
ADLS_ACUITY_SCORE: 14

## 2020-07-18 NOTE — PLAN OF CARE
Pt A/O x 4, VSS, elevated temp. 100.0 temporal. Scheduled tylenol administered. Tolerating Mod Cho diet. Transfers with Ax1, gait belt, and walker to bedside commode, up sitting in chair. Aquacel dressing CDI, CMS intact. Voiding in adequate amounts. Pain rated 8/10, managed with Oxy 10. Discharge plans pending.

## 2020-07-18 NOTE — DISCHARGE INSTRUCTIONS
Return to clinic in10-14 days.    See general discharge instruction sheet for hips.  1. Do exercises as instructed by therapist.  3. Walk daily increasing distance and time each day by a little.  4. Ice hip for swelling and discomfort.  5. May shower, change dressing daily and examine incision when uncovered for problems.  6. Carry implant card with you at all times.  7. Notify your dentist of your implant so you can get antibiotics before any dental work or for any other invasive procedure.    Call your physician if:   1. Fever greater than 100 degrees with body chills or excessive sweating.  2. Increased redness, localized warmth, tenderness, drainage or swelling at incision site.  Opening or pulling apart of incision site.   3. Pain not controlled with oral pain medications, ice and rest.   4. No bowel movement in 3 days (may use Milk of Magnesia or other over the counter remedy).  5. Chest pain, shortness of breath, and/or calf pain with excessive swelling.  6. Generalized feeling of illness.  7. Any other questions or concerns related to your surgery/recovery.      Thank you for allowing [facility] to participate in your cares!!

## 2020-07-18 NOTE — PLAN OF CARE
Occupational Therapy  Patient plan: home with spouse assistance    Current status: The patient was sitting up in the bedside chair and c/o L hip stiffness, but minimal c/o pain. Pt completed functional transfers with SBA and cues for safe hand and walker placement. She ambulated in her room and bathroom with FWW with SBA and no LOB, she was able to recall strategies from P.T. session for safe ambulation. The patient was instructed with shower transfers and completed with SBA using walker, bench and grab bars. She does have AE in shower at home and addressed that she will need family to assist with bathing. The patient's questions regarding home management and AE were addressed and with redirections, the patient was able to stay on task.  The patient reports her daughter obtained a reacher for home use, but they did not obtain a long handle sponge yet.  The patient has community resources for AE to obtain.    Anticipated status at discharge:  Anticipate patient will be SBA for functional mobility with fww for short distances and basic ADL's with spouse assistance.  Recommending assistance for ADLs, home management, meal prep, driving, shopping. Recommended the patient obtain long handle sponge for home use. No further OT needs at discharge.     Occupational Therapy Discharge Summary    Reason for therapy discharge:    Discharged to home with home therapy.    Progress towards therapy goal(s). See goals on Care Plan in [facility] electronic health record for goal details.  Goals partially met.  Barriers to achieving goals:   limited tolerance for therapy.    Therapy recommendation(s):    No further skilled OT needs identified. The patient will have family assistance with ADL management and plans for HH PT.

## 2020-07-18 NOTE — PLAN OF CARE
Patient plan: to return home with  assist  Current status: Up and down 4 stairs with one rail and cane x 2 reps,  B hands on rail x 1 rep with CGA,  sit to and from stand with SBA and cues for proper set up and sequencing.   Gait with FWW with cues for longer step length R LE , upright posture/loading onto the L LE as tolerated.   Anticipated status at discharge: CGA with stairs, SBA to CGA with transfers , SBA to modified Ind with gait.

## 2020-07-18 NOTE — PLAN OF CARE
VS-afebrile, stable. Pt refused Imdur this am. MD encouraged pt to obtain BP monitor at home.   Lung Sounds-clear, but diminished bases, no cough, on room air, using IS upto 1250.   O2-on room air.   GI-+Bs. +flatus, lbm was Tuesday. Pt on senna. Refusing miralax. Tolerating reg diet, denies nausea.   -voiding, pt c/o urgency, will obtain UA. --discharge home with cipro. Pt will pick this up from [facility] in [place] on the way home.   IVF-sl iv. Dc'd sl.   Dressings-aquacel drsg is c/d/I. No drainage.   CMS-denies numbness and tingling, +pp, strong dorsi/planter flexion. Abebe hose applied prior to discharge. Ice to L hip. Sent home with ice packs  Drain-none.   Activity-up in chair, up walking in room with one assist, walker, belt. Steady. Able to shower this am.   Pain-rates pain a 5-6, taking oxycodone for pain relief. Throbbing at times. Using ice, voltarin gel. Lidocaine patch.   D/C Plan-home after PT/OT, home with home care,  here during therapies.     12:30 paged out to Dr. Harrell  bs is 291, gave 4units ss insulin. Victoza is also ordered for this time. ok to give together? 0.6 ordered, do we need to increase upto 1.2? pt wondering....pt has not had Victoza in the hospital  Paged MD about UA--abx ordered for home. Filled at [facility]. Saw  prior to discharge. Has follow up appts. Reviewed all discharge instructions with pt.

## 2020-07-18 NOTE — PROGRESS NOTES
Physical Therapy Discharge Summary    Reason for therapy discharge:    Discharged to home with home therapy.    Progress towards therapy goal(s). See goals on Care Plan in [facility] electronic health record for goal details.  Goals met    Therapy recommendation(s):    Continued therapy is recommended.  Rationale/Recommendations:  pt below baseline level of her function post hip surgery. Needs further rehab to optimize strength, ROM, gait and funciton. .

## 2020-07-18 NOTE — PROGRESS NOTES
SPIRITUAL HEALTH SERVICES  [phone]  ON-CALL VISIT     DATA:    SH request; hip replacement     INTERVENTION:    Intro SHS; life review, reflective conversation, prayer. Marriage counseling. Contacted SW       ASSESSMENT:    Pt expressed good progress after surgery; very concerned about care from spouse when she gets home; Pt expressed spouse not helping. Spouse was present and disputed accusations from Pt.        PLAN:    SH remains available for request/referral  Seven Ryan  Chaplain Resident

## 2020-07-18 NOTE — CONSULTS
Care Transition Initial Assessment - SW     Met with: spoke with pt's      Active Problems:    S/P total hip arthroplasty    Status post total hip replacement, left       DATA  Lives With: spouse   Living Arrangements: house  Quality of Family Relationships: helpful, involved, supportive   Who is your support system?:   Quality of Family Relationships: helpful, involved, supportive  Transportation Anticipated: family or friend will provide    ASSESSMENT  Spoke with pt's  via phone.  He reports they live in a house.  He is concerned that pt won't be able to climb the steps once back home, however, they don't want pt going TCU and neither does pt.  They are agreeable to [facility].  Pt uses a can, and has not had any falls.     PLAN  Financial costs for the patient includes: unknown  Patient given options and choices for discharge: yes  Patient/family is agreeable to the plan? yes  Transportation/person available to transport on day of discharge  is   Patient Goals and Preferences: home with [facility]  Patient anticipates discharging to:  Home      Merlene DHALIWAL, [facility]  Inpatient Care Coordination   [facility]   [phone]      Addendum:  Received a VM from Kian Aviles asking SW to return call.  I attempted to call back, however, was only able to leave a VM.

## 2020-07-18 NOTE — PLAN OF CARE
Pt A&Ox4. Up with A1, walker. Pt was anxious and agitated few times. Writer provided reassurance. Surgical incision CDI. CMS intact. Pain managed with oxycodone,  tylenol. Voiding. , and 222. Mod CHO diet. VSS, RA. Possible discharge tomorrow. Home vs rehab

## 2020-07-18 NOTE — PROGRESS NOTES
[facility]    Hospitalist Progress Note      Assessment & Plan   Sarah Longo is a 75 year old female who was admitted on 7/15/2020.    Summary of Stay:   This is a 75-year-old lady who was admitted by orthopedic team for an elective left total knee arthroplasty.  Medicine team was consulted for postoperative medical management. I evaluated the patient postoperatively.  The surgery was done under general anesthesia.  Estimated blood loss was documented to be less than 50 cc.     Patient has a past medical history significant for coronary artery disease-coronary stenting of RCA in 2004.  In 2016, stenting of her LAD and complex bifurcational stenting of her circumflex and obtuse marginal.  She returned in 11/2016 with restenosis of her obtuse marginal stent and that was restented.  Last angiogram in February 2019 with no evidence of significant restenosis.  Last stress test in January 2020, Lexiscan, negative for ischemia.  Last echo in December 2019 with EF of 55 to 60% with no significant valvular abnormality.     Other past medical history is significant for diabetes, hypertension, hypothyroidism, chronic back pain.    Patient continues to improve.  Eager to go home today.    Plan:    Left total hip arthroplasty.  -Management per orthopedic team.  - DVT prophylaxis per Ortho.  -Incentive spirometry and early ambulation     Coronary artery disease.  - Continue aspirin.  - Intolerant to statins.  - Her cardiologist and primary care physician recommended starting Bystolic 5 mg a week before the surgery and continuing it for 1 to 3 weeks postoperatively.   - Also on lisinopril.  Patient refused Imdur because according to her she does not take it.  -Have strongly advised her to discuss her medication list with her primary care physician.  Based on the primary care physician's note, there appears to be inconsistencies in her medications.  - I have advised to continue her home cardiac medications as  before, without any change.     Diabetes  - Currently on Victoza and Glimepiride  as before.  But patient has been refusing Victoza for the last couple of days.  That is probably the reason why her glucose is elevated.  Follows up with an endocrinologist regarding her diabetes.  Recommended to call her endocrinologist if glucose is persistently elevated at home.  - According to the patient, she takes the metformin episodically.  Every now and then it causes her to have diarrhea and then she would stop it for a few days.  Does not want to take the metformin while in the hospital.  - She did receive dexamethasone in the OR.     Hypothyroidism  - Continue levothyroxine.  Normal TSH in October.     Anemia  - Preop hemoglobin of 9.7  -  Repeat hemoglobin of 8.6.  - No evidence of ongoing blood loss from the operating site.  -Recommended outpatient follow-up with primary care physician regarding her preop anemia (hemoglobin of 9.7 on 7/16/2020)  -According to the patient, she has received IV iron in the past for low hemoglobin.    Possible UTI  -Patient complained of urgency.  Urinalysis shows pyuria.  -She was recently given oral Keflex for urine culture positive for enterococcus.  It was also susceptible to fluoroquinolones.  -Discharged on 7 days of oral ciprofloxacin.  Recommended to follow-up with her primary care physician regarding this.  Advised that her urine culture report is not available yet and will have to be followed by her primary care physician, in case she needs a different antibiotic depending on the culture.        DVT Prophylaxis: Defer to primary service  Code Status: Full Code  Expected discharge: Today.    Yariel Harrell MD  Text Page (7am - 6pm, M-F)    Interval History   Patient was evaluated with nursing staff. Overnight issues discussed.    Review of systems:  No nausea or vomiting.  No abdominal pain.  No diarrhea.  No chest pain/palpitations.  No new cough/shortness of breath.  No  headache/visual disturbance/new weakness.    -Data reviewed today: Labs and medications.    Physical Exam   Temp: 98  F (36.7  C) Temp src: Temporal BP: 115/53 Pulse: 63 Heart Rate: 61 Resp: 16 SpO2: 96 % O2 Device: None (Room air)    Vitals:    07/13/20 1700 07/15/20 0617   Weight: 74.8 kg (165 lb) 73 kg (161 lb)     Vital Signs with Ranges  Temp:  [98  F (36.7  C)-100  F (37.8  C)] 98  F (36.7  C)  Pulse:  [63] 63  Heart Rate:  [61-81] 61  Resp:  [14-16] 16  BP: (104-150)/(31-55) 115/53  SpO2:  [96 %-99 %] 96 %  I/O last 3 completed shifts:  In: 240 [P.O.:240]  Out: 900 [Urine:900]    Constitutional: Awake, alert, cooperative, no apparent distress  HEENT: Trachea midline, sclera is clear   Respiratory: Clear to auscultation bilaterally, no crackles or wheezing  Cardiovascular: Regular rate and rhythm, normal S1 and S2, and no murmur noted  GI: Normal bowel sounds, soft, non-distended, non-tender    Medications     lactated ringers Stopped (07/17/20 1257)       aspirin  325 mg Oral Daily     diclofenac  4 g Transdermal 4x Daily     glimepiride  4 mg Oral BID     insulin aspart  1-7 Units Subcutaneous TID AC     insulin aspart  1-5 Units Subcutaneous At Bedtime     isosorbide mononitrate  15 mg Oral Daily     levothyroxine  125 mcg Oral Daily     lidocaine  2 patch Transdermal Q24h    And     lidocaine   Transdermal Q8H     liraglutide  0.6 mg Subcutaneous Daily     lisinopril  5 mg Oral Daily     melatonin  6 mg Oral Daily at 8 pm     mirabegron  50 mg Oral Daily     nebivolol  5 mg Oral Daily     pantoprazole  40 mg Oral QAM AC     polyethylene glycol  17 g Oral Daily     pregabalin  75 mg Oral BID     senna-docusate  2 tablet Oral BID       Data   Recent Labs   Lab 07/17/20  0559 07/16/20  0620   WBC 5.9  --    HGB 8.6* 9.7*   MCV 93  --    *  --        No results found for this or any previous visit (from the past 24 hour(s)).

## 2020-07-19 ENCOUNTER — APPOINTMENT (OUTPATIENT)
Dept: CT IMAGING | Facility: CLINIC | Age: 75
DRG: 470 | End: 2020-07-19
Attending: INTERNAL MEDICINE
Payer: COMMERCIAL

## 2020-07-19 ENCOUNTER — APPOINTMENT (OUTPATIENT)
Dept: ULTRASOUND IMAGING | Facility: CLINIC | Age: 75
DRG: 470 | End: 2020-07-19
Attending: INTERNAL MEDICINE
Payer: COMMERCIAL

## 2020-07-19 PROBLEM — R50.82 FEVER POSTOP: Status: ACTIVE | Noted: 2020-07-19

## 2020-07-19 LAB
ANION GAP SERPL CALCULATED.3IONS-SCNC: 2 MMOL/L (ref 3–14)
BUN SERPL-MCNC: 14 MG/DL (ref 7–30)
CALCIUM SERPL-MCNC: 8.5 MG/DL (ref 8.5–10.1)
CHLORIDE SERPL-SCNC: 107 MMOL/L (ref 94–109)
CO2 SERPL-SCNC: 27 MMOL/L (ref 20–32)
CREAT SERPL-MCNC: 0.71 MG/DL (ref 0.52–1.04)
ERYTHROCYTE [DISTWIDTH] IN BLOOD BY AUTOMATED COUNT: 12.1 % (ref 10–15)
GFR SERPL CREATININE-BSD FRML MDRD: 84 ML/MIN/{1.73_M2}
GLUCOSE BLDC GLUCOMTR-MCNC: 183 MG/DL (ref 70–99)
GLUCOSE BLDC GLUCOMTR-MCNC: 208 MG/DL (ref 70–99)
GLUCOSE BLDC GLUCOMTR-MCNC: 217 MG/DL (ref 70–99)
GLUCOSE BLDC GLUCOMTR-MCNC: 230 MG/DL (ref 70–99)
GLUCOSE SERPL-MCNC: 204 MG/DL (ref 70–99)
HCT VFR BLD AUTO: 23.8 % (ref 35–47)
HGB BLD-MCNC: 7.7 G/DL (ref 11.7–15.7)
MCH RBC QN AUTO: 29.6 PG (ref 26.5–33)
MCHC RBC AUTO-ENTMCNC: 32.4 G/DL (ref 31.5–36.5)
MCV RBC AUTO: 92 FL (ref 78–100)
PLATELET # BLD AUTO: 157 10E9/L (ref 150–450)
POTASSIUM SERPL-SCNC: 3.7 MMOL/L (ref 3.4–5.3)
RBC # BLD AUTO: 2.6 10E12/L (ref 3.8–5.2)
SODIUM SERPL-SCNC: 136 MMOL/L (ref 133–144)
WBC # BLD AUTO: 3.9 10E9/L (ref 4–11)

## 2020-07-19 PROCEDURE — 74176 CT ABD & PELVIS W/O CONTRAST: CPT

## 2020-07-19 PROCEDURE — 25000128 H RX IP 250 OP 636: Performed by: INTERNAL MEDICINE

## 2020-07-19 PROCEDURE — 83036 HEMOGLOBIN GLYCOSYLATED A1C: CPT | Performed by: INTERNAL MEDICINE

## 2020-07-19 PROCEDURE — 25000131 ZZH RX MED GY IP 250 OP 636 PS 637: Performed by: INTERNAL MEDICINE

## 2020-07-19 PROCEDURE — G0378 HOSPITAL OBSERVATION PER HR: HCPCS

## 2020-07-19 PROCEDURE — 96366 THER/PROPH/DIAG IV INF ADDON: CPT

## 2020-07-19 PROCEDURE — 00000146 ZZHCL STATISTIC GLUCOSE BY METER IP

## 2020-07-19 PROCEDURE — 25000132 ZZH RX MED GY IP 250 OP 250 PS 637: Performed by: INTERNAL MEDICINE

## 2020-07-19 PROCEDURE — U0003 INFECTIOUS AGENT DETECTION BY NUCLEIC ACID (DNA OR RNA); SEVERE ACUTE RESPIRATORY SYNDROME CORONAVIRUS 2 (SARS-COV-2) (CORONAVIRUS DISEASE [COVID-19]), AMPLIFIED PROBE TECHNIQUE, MAKING USE OF HIGH THROUGHPUT TECHNOLOGIES AS DESCRIBED BY CMS-2020-01-R: HCPCS | Performed by: INTERNAL MEDICINE

## 2020-07-19 PROCEDURE — 36415 COLL VENOUS BLD VENIPUNCTURE: CPT | Performed by: INTERNAL MEDICINE

## 2020-07-19 PROCEDURE — 85027 COMPLETE CBC AUTOMATED: CPT | Performed by: INTERNAL MEDICINE

## 2020-07-19 PROCEDURE — 40000893 ZZH STATISTIC PT IP EVAL DEFER: Performed by: PHYSICAL THERAPIST

## 2020-07-19 PROCEDURE — 25000132 ZZH RX MED GY IP 250 OP 250 PS 637: Performed by: PHYSICIAN ASSISTANT

## 2020-07-19 PROCEDURE — 80048 BASIC METABOLIC PNL TOTAL CA: CPT | Performed by: INTERNAL MEDICINE

## 2020-07-19 PROCEDURE — 25000125 ZZHC RX 250: Performed by: INTERNAL MEDICINE

## 2020-07-19 PROCEDURE — 96372 THER/PROPH/DIAG INJ SC/IM: CPT

## 2020-07-19 PROCEDURE — 93970 EXTREMITY STUDY: CPT

## 2020-07-19 PROCEDURE — 96367 TX/PROPH/DG ADDL SEQ IV INF: CPT

## 2020-07-19 PROCEDURE — 99219 ZZC INITIAL OBSERVATION CARE,LEVL II: CPT | Performed by: INTERNAL MEDICINE

## 2020-07-19 RX ORDER — DEXTROSE MONOHYDRATE 25 G/50ML
25-50 INJECTION, SOLUTION INTRAVENOUS
Status: DISCONTINUED | OUTPATIENT
Start: 2020-07-19 | End: 2020-07-21 | Stop reason: HOSPADM

## 2020-07-19 RX ORDER — NALOXONE HYDROCHLORIDE 0.4 MG/ML
.1-.4 INJECTION, SOLUTION INTRAMUSCULAR; INTRAVENOUS; SUBCUTANEOUS
Status: DISCONTINUED | OUTPATIENT
Start: 2020-07-19 | End: 2020-07-21 | Stop reason: HOSPADM

## 2020-07-19 RX ORDER — PANTOPRAZOLE SODIUM 40 MG/1
40 TABLET, DELAYED RELEASE ORAL
Status: DISCONTINUED | OUTPATIENT
Start: 2020-07-19 | End: 2020-07-21 | Stop reason: HOSPADM

## 2020-07-19 RX ORDER — MIRABEGRON 50 MG/1
50 TABLET, EXTENDED RELEASE ORAL DAILY
Status: DISCONTINUED | OUTPATIENT
Start: 2020-07-19 | End: 2020-07-21 | Stop reason: HOSPADM

## 2020-07-19 RX ORDER — GLIMEPIRIDE 4 MG/1
4 TABLET ORAL 2 TIMES DAILY WITH MEALS
Status: DISCONTINUED | OUTPATIENT
Start: 2020-07-19 | End: 2020-07-21 | Stop reason: HOSPADM

## 2020-07-19 RX ORDER — ONDANSETRON 4 MG/1
4 TABLET, ORALLY DISINTEGRATING ORAL EVERY 6 HOURS PRN
Status: DISCONTINUED | OUTPATIENT
Start: 2020-07-19 | End: 2020-07-21 | Stop reason: HOSPADM

## 2020-07-19 RX ORDER — NICOTINE POLACRILEX 4 MG
15-30 LOZENGE BUCCAL
Status: DISCONTINUED | OUTPATIENT
Start: 2020-07-19 | End: 2020-07-19

## 2020-07-19 RX ORDER — LIDOCAINE 40 MG/G
CREAM TOPICAL
Status: DISCONTINUED | OUTPATIENT
Start: 2020-07-19 | End: 2020-07-21 | Stop reason: HOSPADM

## 2020-07-19 RX ORDER — LISINOPRIL 5 MG/1
5 TABLET ORAL DAILY
Status: DISCONTINUED | OUTPATIENT
Start: 2020-07-19 | End: 2020-07-21 | Stop reason: HOSPADM

## 2020-07-19 RX ORDER — AMOXICILLIN 250 MG
1 CAPSULE ORAL 2 TIMES DAILY PRN
Status: DISCONTINUED | OUTPATIENT
Start: 2020-07-19 | End: 2020-07-21 | Stop reason: HOSPADM

## 2020-07-19 RX ORDER — OXYCODONE HYDROCHLORIDE 5 MG/1
5-10 TABLET ORAL
Status: DISCONTINUED | OUTPATIENT
Start: 2020-07-19 | End: 2020-07-21 | Stop reason: HOSPADM

## 2020-07-19 RX ORDER — NICOTINE POLACRILEX 4 MG
15-30 LOZENGE BUCCAL
Status: DISCONTINUED | OUTPATIENT
Start: 2020-07-19 | End: 2020-07-21 | Stop reason: HOSPADM

## 2020-07-19 RX ORDER — PROCHLORPERAZINE 25 MG
12.5 SUPPOSITORY, RECTAL RECTAL EVERY 12 HOURS PRN
Status: DISCONTINUED | OUTPATIENT
Start: 2020-07-19 | End: 2020-07-21 | Stop reason: HOSPADM

## 2020-07-19 RX ORDER — AMPICILLIN AND SULBACTAM 2; 1 G/1; G/1
3 INJECTION, POWDER, FOR SOLUTION INTRAMUSCULAR; INTRAVENOUS EVERY 6 HOURS
Status: DISCONTINUED | OUTPATIENT
Start: 2020-07-19 | End: 2020-07-20

## 2020-07-19 RX ORDER — CIPROFLOXACIN 500 MG/1
500 TABLET, FILM COATED ORAL 2 TIMES DAILY
Status: ON HOLD | COMMUNITY
Start: 2020-07-07 | End: 2020-07-21

## 2020-07-19 RX ORDER — PROCHLORPERAZINE MALEATE 5 MG
5 TABLET ORAL EVERY 6 HOURS PRN
Status: DISCONTINUED | OUTPATIENT
Start: 2020-07-19 | End: 2020-07-21 | Stop reason: HOSPADM

## 2020-07-19 RX ORDER — ACETAMINOPHEN 325 MG/1
650 TABLET ORAL EVERY 4 HOURS PRN
Status: DISCONTINUED | OUTPATIENT
Start: 2020-07-19 | End: 2020-07-21 | Stop reason: HOSPADM

## 2020-07-19 RX ORDER — ESOMEPRAZOLE MAGNESIUM 40 MG/1
40 CAPSULE, DELAYED RELEASE ORAL
Status: DISCONTINUED | OUTPATIENT
Start: 2020-07-19 | End: 2020-07-19 | Stop reason: CLARIF

## 2020-07-19 RX ORDER — AMOXICILLIN 250 MG
2 CAPSULE ORAL 2 TIMES DAILY PRN
Status: DISCONTINUED | OUTPATIENT
Start: 2020-07-19 | End: 2020-07-21 | Stop reason: HOSPADM

## 2020-07-19 RX ORDER — POLYETHYLENE GLYCOL 3350 17 G/17G
17 POWDER, FOR SOLUTION ORAL DAILY PRN
Status: DISCONTINUED | OUTPATIENT
Start: 2020-07-19 | End: 2020-07-21 | Stop reason: HOSPADM

## 2020-07-19 RX ORDER — ASPIRIN 81 MG/1
81 TABLET ORAL DAILY
COMMUNITY

## 2020-07-19 RX ORDER — DEXTROSE MONOHYDRATE 25 G/50ML
25-50 INJECTION, SOLUTION INTRAVENOUS
Status: DISCONTINUED | OUTPATIENT
Start: 2020-07-19 | End: 2020-07-19

## 2020-07-19 RX ORDER — NEBIVOLOL 5 MG/1
5 TABLET ORAL DAILY
Status: DISCONTINUED | OUTPATIENT
Start: 2020-07-19 | End: 2020-07-21 | Stop reason: HOSPADM

## 2020-07-19 RX ORDER — LEVOTHYROXINE SODIUM 125 UG/1
125 TABLET ORAL
Status: ON HOLD | COMMUNITY
End: 2022-04-29

## 2020-07-19 RX ORDER — LIRAGLUTIDE 6 MG/ML
1.2 INJECTION SUBCUTANEOUS DAILY
Status: DISCONTINUED | OUTPATIENT
Start: 2020-07-19 | End: 2020-07-21 | Stop reason: HOSPADM

## 2020-07-19 RX ORDER — ONDANSETRON 2 MG/ML
4 INJECTION INTRAMUSCULAR; INTRAVENOUS EVERY 6 HOURS PRN
Status: DISCONTINUED | OUTPATIENT
Start: 2020-07-19 | End: 2020-07-21 | Stop reason: HOSPADM

## 2020-07-19 RX ADMIN — AMPICILLIN SODIUM AND SULBACTAM SODIUM 3 G: 2; 1 INJECTION, POWDER, FOR SOLUTION INTRAMUSCULAR; INTRAVENOUS at 07:36

## 2020-07-19 RX ADMIN — POLYETHYLENE GLYCOL 3350 17 G: 17 POWDER, FOR SOLUTION ORAL at 18:17

## 2020-07-19 RX ADMIN — AMPICILLIN SODIUM AND SULBACTAM SODIUM 3 G: 2; 1 INJECTION, POWDER, FOR SOLUTION INTRAMUSCULAR; INTRAVENOUS at 12:22

## 2020-07-19 RX ADMIN — LIRAGLUTIDE 1.2 MG: 6 INJECTION SUBCUTANEOUS at 09:08

## 2020-07-19 RX ADMIN — LISINOPRIL 5 MG: 5 TABLET ORAL at 20:42

## 2020-07-19 RX ADMIN — Medication 1 LOZENGE: at 21:01

## 2020-07-19 RX ADMIN — GLIMEPIRIDE 4 MG: 4 TABLET ORAL at 09:08

## 2020-07-19 RX ADMIN — DICLOFENAC SODIUM 2 G: 10 GEL TOPICAL at 16:26

## 2020-07-19 RX ADMIN — ACETAMINOPHEN 650 MG: 325 TABLET, FILM COATED ORAL at 02:17

## 2020-07-19 RX ADMIN — NEBIVOLOL HYDROCHLORIDE 5 MG: 10 TABLET ORAL at 09:08

## 2020-07-19 RX ADMIN — LEVOTHYROXINE SODIUM 125 MCG: 0.1 TABLET ORAL at 08:57

## 2020-07-19 RX ADMIN — GLIMEPIRIDE 4 MG: 4 TABLET ORAL at 16:23

## 2020-07-19 RX ADMIN — OXYCODONE HYDROCHLORIDE 10 MG: 5 TABLET ORAL at 02:17

## 2020-07-19 RX ADMIN — DICLOFENAC SODIUM 2 G: 10 GEL TOPICAL at 20:43

## 2020-07-19 RX ADMIN — ASPIRIN 325 MG: 325 TABLET, COATED ORAL at 08:58

## 2020-07-19 RX ADMIN — INSULIN ASPART 2 UNITS: 100 INJECTION, SOLUTION INTRAVENOUS; SUBCUTANEOUS at 16:23

## 2020-07-19 RX ADMIN — OXYCODONE HYDROCHLORIDE 10 MG: 5 TABLET ORAL at 20:42

## 2020-07-19 RX ADMIN — INSULIN ASPART 1 UNITS: 100 INJECTION, SOLUTION INTRAVENOUS; SUBCUTANEOUS at 09:11

## 2020-07-19 RX ADMIN — OXYCODONE HYDROCHLORIDE 10 MG: 5 TABLET ORAL at 12:31

## 2020-07-19 RX ADMIN — AMPICILLIN SODIUM AND SULBACTAM SODIUM 3 G: 2; 1 INJECTION, POWDER, FOR SOLUTION INTRAMUSCULAR; INTRAVENOUS at 18:07

## 2020-07-19 RX ADMIN — MIRABEGRON 50 MG: 50 TABLET, FILM COATED, EXTENDED RELEASE ORAL at 09:08

## 2020-07-19 RX ADMIN — PANTOPRAZOLE SODIUM 40 MG: 40 TABLET, DELAYED RELEASE ORAL at 08:58

## 2020-07-19 ASSESSMENT — MIFFLIN-ST. JEOR: SCORE: 1342.75

## 2020-07-19 NOTE — PROGRESS NOTES
SPIRITUAL HEALTH SERVICES  Progress Note  FR  203    Visit per [facility] consult request from staff member.  Initially visited with pt's  who expressed some worry about his wife's condition.  Wife was getting some tests done.  I returned later and spoke with Rayna, the patient.  She is angry and frustrated about her surgeries because she was just released yesterday and ended up back in hospital.   I let her vent and then we prayed for patience, strength and having the ability to let go of what one cannot control.  She was very tired so we ended the visit after the prayer.     [facility] may want to follow up with her in the next few days.          Kathleen Ramirez    Pager [phone]

## 2020-07-19 NOTE — PLAN OF CARE
PRIMARY DIAGNOSIS: GENERALIZED WEAKNESS/uti    OUTPATIENT/OBSERVATION GOALS TO BE MET BEFORE DISCHARGE  1. Orthostatic performed: No    2. Tolerating PO medications: Yes    3. Return to near baseline physical activity: Yes, hip surgery 7/15    4. Cleared for discharge by consultants (if involved): No    Discharge Planner Nurse   Safe discharge environment identified: Yes  Barriers to discharge: No       Entered by: Yanira Abbott 07/19/2020 12:14 PM     Please review provider order for any additional goals.   Nurse to notify provider when observation goals have been met and patient is ready for discharge.

## 2020-07-19 NOTE — ED PROVIDER NOTES
History     Chief Complaint:    Post-op Problem      HPI   Sarah Longo is a 75 year old female who presents with postoperative fever.  Patient was discharged from [facility] earlier today after elective left total hip arthroplasty.  Prior to her surgery on July 7 she had a urinalysis that appeared to have UTI and culture grew out enterococcus and was placed on amoxicillin.  Postoperatively she continued to have urinary frequency and urgency a UA was done concerning for continued pyuria but culture is not resulted yet.  She is given a dose of Keflex and then sent home with ciprofloxacin.  Since being home she has noted some pain in the left hip which is been present since her operation.  She had generalized weakness and difficulty getting around home due to weakness in her legs and found to be febrile.  She is continued to have frequency and urgency of urination at home.  She denies any chest pain, shortness of breath, abdominal pain but does report some lower back pain.  She had no diarrhea.    Allergies:  Hmg-Coa-R Inhibitors    Medications:    Lyrica  Aspirin  Roxicodone  Bystolic  Prinivil   Myrbetriq  Ultram  Amoxil  Cyanocobalamin  Nexium  Glimepiride  Synthroid  Nitrofurantoin  Cipro    Past Medical History:    Anemia  Arthritis  CAD  Cystocele  Depression  Hyperplastic polyp  Hypothyroidism  OAB  Osteoarthritis  Diabetes  Dyslipidemia  Lumbar radiculopathy    Past Surgical History:    Ablation  Hip arthroplasty  Spinal fusion  Coronary angiogram  Endometrial ablation  Cholecystectomy  Heart catheterization x5  Excise lesion upper extremity  Tonsillectomy  Right knee replacement  Remove sacral nerve    Family History:    Father: CAD, MI, Hyperlipidemia, Hypertension  Mother: Renal failure, hip fracture, osteoporosis    Social History:  Smoking Status: Never Smoker  Smokeless Tobacco: Never Used  Alcohol Use: Negative  Drug Use: Negative  PCP: Gaye Zimmer    Marital Status:         Review of Systems   ROS: 10 point ROS neg other than the symptoms noted above in the HPI.    Physical Exam     Patient Vitals for the past 24 hrs:   BP Temp Temp src Pulse Heart Rate Resp SpO2   07/18/20 2345 (!) 141/53 98.5  F (36.9  C) Oral 89 -- -- 100 %   07/18/20 2146 114/47 101.3  F (38.5  C) Oral -- 87 16 97 %       Physical Exam  Gen: well appearing, in no acute distress  HEENT:  mmm, no rhinorrhea  Neck: supple, no abnormal swelling  Lungs:  CTAB,  no resp distress  CV: rrr, no m/r/g, ppi  Abd: soft, no significant localizing abdominal tenderness palpation no CVA tenderness to percussion, nondistended, no rebound/masses/guarding/hsm  Ext: no peripheral edema  Skin: warm, dry, well perfused, no rashes/bruising/lesions on exposed skin  Neuro: alert, MAEE, no gross motor or sensory deficits, gait stable  Psych: Normal mood, normal affect      Emergency Department Course     Imaging:  Radiology findings were communicated with the patient and Admitting MD who voiced understanding of the findings.    XR Chest, Portable, G/E 1 view:   Negative portable chest. As per radiology.    US Renal Complete:  Scheduled.    Laboratory:  Laboratory findings were communicated with the patient and Admitting MD who voiced understanding of the findings.    CBC: WBC: 5.7, HGB: 8.4 (L), PLT: 164    CMP: Glucose 265 (H), Albumin: 2.7 (L), Protein: 6.4 (L) o/w WNL (Creatinine: 0.72)    Lipase: 85    2303 Lactic Acid: 1.3    UA with Microscopic: Glucose: >1000 (A), Ketones: 10 (A), Blood: Moderate (A), Protein Albumin: 100 (A), Leukocyte Esterase: Large (A), WBC: >182 (H), RBC: 66 (H), WBC Clumps: Present (A), Yeast: Many (A), Transitional Epi: 5 (H), Mucous: Present (A) o/w Negative    Blood Culture x2: Pending    Urine Culture: Pending     Interventions:  2321 NS 1L IV  2342 Rocephin 2 g IV    Emergency Department Course:  Past medical records, nursing notes, and vitals reviewed.    2201 I performed an exam of the patient as  documented above.     2219 Patient's spouse updated    IV was inserted and blood was drawn for laboratory testing, results above.    The patient provided a urine sample here in the emergency department. This was sent for laboratory testing, findings above.    The patient was sent for a Chest X-ray and Renal Ultrasound while in the emergency department, results above.     2340 I rechecked the patient and discussed the results of her workup thus far.     Findings and plan explained to the Patient who consents to admission. Discussed the patient with Dr. Irby, who will admit the patient to a Observation bed for further monitoring, evaluation, and treatment.    I personally reviewed the laboratory and imaging results with the Patient and answered all related questions prior to admission.     Impression & Plan     Medical Decision Makin-year-old female postop fever urinalysis concerning for urinary source.  We will do a portable chest x-ray, blood cultures, urine culture from earlier today is not viewable yet we will give her Rocephin IV for the time being.  She is not hypotensive her lactate is normal.  She has mild acute blood loss anemia postsurgical.  No indication for transfusion at this time.  Her surgical site dressing shows no concerns for wound infection.  She does have a palpable hematoma underlying the left hip surgical wound.    Will admit for continued IV antibiotics until urine culture and susceptibilities are back to ensure we are not dealing with a resistant organism.  It is atypical that she would have had a urine culture showing enterococcus pansensitive and been treated with amoxicillin and now Keflex and ciprofloxacin but still having significant pyuria fevers etc.  We will do an ultrasound to make sure we do not see perinephric abscess or asymmetric renal pelvis dilation suggesting a concomitant obstructing stone.    Diagnosis:    ICD-10-CM    1. Sepsis without acute organ dysfunction, due to  unspecified organism (H)  A41.9 Comprehensive metabolic panel     Lipase     Blood culture   2. Pyelonephritis  N12    3. Anemia due to blood loss, acute  D62    4. Fever postop  R50.82        Disposition:  Admitted to Dr. Irby.    Scribe Disclosure:  I, Yoel Rosario, am serving as a scribe at 10:08 PM on 7/18/2020 to document services personally performed by Levar Norris MD based on my observations and the provider's statements to me.        Levar Norris MD  07/19/20 0687

## 2020-07-19 NOTE — PROGRESS NOTES
Transition Communication Hand-off for Care Transitions to Next Level of Care Provider    Name: Sarah KAPOOR Donta  : 1945  MRN #: [id number]  Primary Care Provider: Gaye Zimmer  Primary Care MD Name: Dr. Zimmer  [facility] [place] [address]  [place] [address]  Primary Care Clinic Name: [facility]  Reason for Hospitalization:  DJD (degenerative joint disease) [M19.90]  Admit Date/Time: 7/15/2020  5:53 AM  Discharge Date: 2020  Payor Source: Payor: [facility] / Plan: ARE MEDICARE NON [employer] / Product Type: HMO /     Readmission Assessment Measure (DANISHA) Risk Score/category: Low        Reason for Communication Hand-off Referral: Other Left Total Hip Arthroplasty    Discharge Plan: Home     Concern for non-adherence with plan of care: No     Discharge Needs Assessment:  Needs      Most Recent Value   Equipment Currently Used at Home  cane, straight   # of Referrals Placed by CTS  External Care Coordination, Homecare        Follow-up specialty is recommended: Yes. Follow up with Dr. Victoria in 7-10 days. Also, follow up with Gaye Zimmer in 7 days.       Follow-up plan:    Future Appointments   Date Time Provider Department Center   2020  9:00 AM LIRA LAB SUL[facility]   2020  9:45 AM Feroz Burgos MD [facility]   2020 11:45 AM [facility]   2020 12:10 PM Shireen Neves MD [facility]     Any outstanding tests or procedures:  No. Recommend repeat CBC at PCP follow up appt.       Key Recommendations: Identified issues/concerns regarding health management:   CTS following for care coordination. Patient was admitted on 7/15/20 for total left hip arthroplasty. Pre-operative plan was to have patient discharge to home with [facility].  CTS met with patient at bedside to verify discharge plans.  Patient said she would not go to a TCU because of COVID and was grateful for to have HC.  She has used [facility] in the  past and was satisfied with care.  [facility] is aware of the referral. She has cane, walker at home.  Patient does appear overwhelmed.  Patient would benefit from on going pain management with a pain specialist and mental health support.    Tiffany Piña RN    Sent by Leona Green RN, BSN, CPHN, CM    AVS/Discharge Summary is the source of truth; this is a helpful guide for improved communication of patient story

## 2020-07-19 NOTE — ED NOTES
[facility]  ED Nurse Handoff Report    Sarah Longo is a 75 year old female   ED Chief complaint: Post-op Problem  . ED Diagnosis:   Final diagnoses:   Sepsis without acute organ dysfunction, due to unspecified organism (H)   Pyelonephritis   Anemia due to blood loss, acute   Fever postop     Allergies:   Allergies   Allergen Reactions     Hmg-Coa-R Inhibitors Muscle Pain (Myalgia)     Oral statins         Code Status: not on file  Activity level - Baseline/Home:  Independent. Activity Level - Current:   Assist X 1. Lift room needed: No. Bariatric: No   Needed: No   Isolation: No. Infection: Not Applicable.     Vital Signs:   Vitals:    07/18/20 2146   BP: 114/47   Resp: 16   Temp: 101.3  F (38.5  C)   TempSrc: Oral   SpO2: 97%       Cardiac Rhythm:  ,      Pain level:    Patient confused: No. Patient Falls Risk: Yes.   Elimination Status: Has voided   Patient Report - Initial Complaint: Post op problem. Focused Assessment: Sarah Longo is a 75 year old female who presents with postoperative fever.  Patient was discharged from [facility] earlier today after elective left total hip arthroplasty.  Prior to her surgery on July 7 she had a urinalysis that appeared to have UTI and culture grew out enterococcus and was placed on amoxicillin.  Postoperatively she continued to have urinary frequency and urgency a UA was done concerning for continued pyuria but culture is not resulted yet.  She is given a dose of Keflex and then sent home with ciprofloxacin.  Since being home she has noted some pain in the left hip which is been present since her operation.  She had generalized weakness and difficulty getting around home due to weakness in her legs and found to be febrile.  She is continued to have frequency and urgency of urination at home.  She denies any chest pain, shortness of breath, abdominal pain but does report some lower back pain.  She had no diarrhea.  Tests Performed:  Labs, US, xray. Abnormal Results:   Labs Ordered and Resulted from Time of ED Arrival Up to the Time of Departure from the ED   CBC WITH PLATELETS DIFFERENTIAL - Abnormal; Notable for the following components:       Result Value    RBC Count 2.85 (*)     Hemoglobin 8.4 (*)     Hematocrit 26.3 (*)     Absolute Lymphocytes 0.4 (*)     All other components within normal limits   ROUTINE UA WITH MICROSCOPIC - Abnormal; Notable for the following components:    Glucose Urine >1000 (*)     Ketones Urine 10 (*)     Blood Urine Moderate (*)     Protein Albumin Urine 100 (*)     Leukocyte Esterase Urine Large (*)     WBC Urine >182 (*)     RBC Urine 66 (*)     WBC Clumps Present (*)     Yeast Urine Many (*)     Transitional Epi 5 (*)     Mucous Urine Present (*)     All other components within normal limits   COMPREHENSIVE METABOLIC PANEL   LIPASE   LACTIC ACID WHOLE BLOOD   URINE CULTURE AEROBIC BACTERIAL   BLOOD CULTURE   BLOOD CULTURE     XR Chest Port 1 View    (Results Pending)   US Renal Complete    (Results Pending)      Treatments provided: see MAR  Family Comments:  updated by MD  OBS brochure/video discussed/provided to patient:  Yes  ED Medications:   Medications   0.9% sodium chloride BOLUS (1,000 mLs Intravenous New Bag 7/18/20 5091)   cefTRIAXone (ROCEPHIN) 2 g vial to attach to  ml bag for ADULTS or NS 50 ml bag for PEDS (has no administration in time range)     Drips infusing:  No  For the majority of the shift, the patient's behavior Green. Interventions performed were N/A.    Sepsis treatment initiated: No       ED Nurse Name/Phone Number: Jessa Vásquez RN,   11:33 PM    RECEIVING UNIT ED HANDOFF REVIEW    Above ED Nurse Handoff Report was reviewed: Yes  Reviewed by: Yesenia Batista RN on July 19, 2020 at 12:53 AM

## 2020-07-19 NOTE — PLAN OF CARE
PRIMARY DIAGNOSIS: Postop fever  OUTPATIENT/OBSERVATION GOALS TO BE MET BEFORE DISCHARGE:  1. ADLs back to baseline: No    2. Activity and level of assistance: Patient gets up to commode and chair on her own, refuses help.    3. Pain status: Improved-controlled with oral pain medications.    4. Return to near baseline physical activity: No     Discharge Planner Nurse   Safe discharge environment identified: Yes  Barriers to discharge: No       Entered by: Gayle Henderson 07/19/2020 5:22 PM     Please review provider order for any additional goals.   Nurse to notify provider when observation goals have been met and patient is ready for discharge.    VSS ex htn. Voltaren given for LLE pain. IV unasyn given. , sliding scale insulin and glimepiride given, patient eating bagel sandwich that  brought. Voiding in commode. Patient upset that outpatient obs information sheet was dropped off in her room while she was sleeping, concerned about insurance. Encouraged patient to call insurance to verify, spouse stated this was already done. Patient raises her voice at staff and spouse. Patient states she does not feel she is the priority and needs to know what the plan is. Informed patient of her test results and that spine surgery is consulted. Patient relations phone number provided to patient.

## 2020-07-19 NOTE — PLAN OF CARE
Discharge Planner PT   Patient plan for discharge: Home with home care  Current status: Orders received, chart reviewed. Pt worked with IP PT/OT yesterday and was able to demonstrate adequate mobility for discharge home. Pt SBA wit all mobility with use of walker, and was able to complete stairs. No skilled IP PT needs identified at this time.   Barriers to return to prior living situation: None anticipated  Recommendations for discharge: Home with HHPT  Rationale for recommendations: Anticipate as pt is medically managed she should be able to return home with HHPT as previously planned.        Entered by: Evelyn Alonso 07/19/2020 10:38 AM

## 2020-07-19 NOTE — PLAN OF CARE
PRIMARY DIAGNOSIS: uti/fever  OUTPATIENT/OBSERVATION GOALS TO BE MET BEFORE DISCHARGE:  ADLs back to baseline: Yes    Activity and level of assistance: Up with standby assistance.    Pain status: Improved-controlled with oral pain medications. Hip surgery 7/15    Return to near baseline physical activity: Yes, PT at home     Discharge Planner Nurse   Safe discharge environment identified: Yes  Barriers to discharge: No       Entered by: Yanira Abbott 07/19/2020 9:34 AM     Please review provider order for any additional goals.   Nurse to notify provider when observation goals have been met and patient is ready for discharge.

## 2020-07-19 NOTE — PROGRESS NOTES
"[facility]    Medicine Progress Note - Hospitalist Service       Date of Admission:  7/18/2020  Assessment & Plan      Bilateral LE weakness and neuropathic pain, resolved  L2-3 and L3-4 posterior spinal fusion with left lateral recess decompression L4-5 and bilateral laminectomy and decompression L2-3, L3-4, 3/2020     --reported this is reason for readmission (unable to get off toilet and floor, felt numbness and weakness in bilateral LE).  Strength intact at this time, no ongoing numbness.   --mobilize with nursing  --[facility] spine consult - ?imaging necessary at this juncture.     Probable UTI  Fever, resolved  --no evidence of abscess or pyelo on CT scan.  History of Enterococcus, continue unasyn until further culture data is available.    Left hip arthroplasty: on aspirin for DVT prophylaxis.  Passed PT last admission.  Encourage up with nursing staff.   --PRN acetaminophen, oxycodone    Acute on chronic anemia: no acute indication for transfusion    Hypertension: lisinopril  Hypothyroid: levothyroxine   CAD: aspirin, bystolic  Diabetes mellitus type 2: glimepiride , sliding scale insulin  GERD: PPI       Diet: Moderate Consistent CHO Diet    DVT Prophylaxis: aspirin  Centeno Catheter: not present  Code Status: Full Code           Disposition Plan   Expected discharge: likely 7/20, recommended to prior living arrangement once ambulating independently, culture result returned, spine consult completed.  Entered: Nerissa Lin PA-C 07/19/2020, 1:54 PM           Nerissa Lin PA-C  Hospitalist Service  [facility]    ______________________________________________________________________    Interval History   Frustrated that she is still having leg pain intermittently, thought hip replacement and spine surgery this year would have fixed her problems.  Fixated on \"getting a plan\" for her transient weakness and numbness which occurred upon return home 7/18.  Denies current paresthesias " or weakness.  No bowel or bladder incontinence.  Prefers to return home but worried about the difficulty she had getting up from the toilet and subsequently the floor yesterday.     Data reviewed today: I reviewed all medications, new labs and imaging results over the last 24 hours. I personally reviewed no images or EKG's today.    Physical Exam   Vital Signs: Temp: 98.8  F (37.1  C) Temp src: Oral BP: 123/43 Pulse: 88 Heart Rate: 81 Resp: 19 SpO2: 95 % O2 Device: None (Room air)    Weight: 175 lbs 1.6 oz    Constitutional: nontoxic appearing woman, appears comfortable.   Eyes: no icterus.   HEENT: mucous membranes moist  Respiratory: clear bilaterally  Cardiovascular: regular  GI: normoactive  Bowel sounds, soft, nontender.   Lymph/Hematologic: no bruising  Genitourinary: no catheter  Skin: warm and dry.  Left hip dressed.   Musculoskeletal: normal muscle bulk and tone.  Normal strength throughout bilateral LE.   Neurologic: no weakness in either LE.  Strength 5/5 throughout.   Psychiatric: alert, oriented, irritable, frustrated. Re-directable.         Data   Recent Labs   Lab 07/19/20  0511 07/18/20  2303 07/17/20  0559   WBC 3.9* 5.7 5.9   HGB 7.7* 8.4* 8.6*   MCV 92 92 93    164 131*    134  --    POTASSIUM 3.7 4.2  --    CHLORIDE 107 103  --    CO2 27 25  --    BUN 14 16  --    CR 0.71 0.72  --    ANIONGAP 2* 6  --    LUCIUS 8.5 8.9  --    * 265*  --    ALBUMIN  --  2.7*  --    PROTTOTAL  --  6.4*  --    BILITOTAL  --  0.5  --    ALKPHOS  --  48  --    ALT  --  33  --    AST  --  21  --    LIPASE  --  85  --      Recent Results (from the past 24 hour(s))   XR Chest Port 1 View    Narrative    EXAM: XR CHEST PORT 1 VW  LOCATION: [facility] [employer] [facility]  DATE/TIME: 7/18/2020 11:51 PM    INDICATION: Fever. Postop.  COMPARISON: 03/06/2020      Impression    IMPRESSION: Negative portable chest.   US Renal Complete    Narrative    EXAM: US RENAL COMPLETE  LOCATION: [facility]  SERVICES  DATE/TIME: 7/19/2020 12:11 AM    INDICATION: Persistent pyuria and dysuria.  COMPARISON: None.  TECHNIQUE: Routine Bilateral Renal and Bladder Ultrasound.    FINDINGS:    RIGHT KIDNEY: 12.1 x 4.8 x 4.2 cm. No hydronephrosis. Normal renal cortical echogenicity and thickness. 3.8 x 4.0 x 3.9 cm simple cyst right kidney for which no further follow-up is necessary.     LEFT KIDNEY: 11.4 x 5.6 x 5.2 cm. Minimal hydronephrosis. Normal renal cortical echogenicity and thickness. No evidence for exophytic cystic lesion.     BLADDER: Normal.      Impression    IMPRESSION:  1.  Minimal left-sided hydronephrosis. No right-sided hydronephrosis. Recommend correlation for left-sided flank pain.    2.  Normal renal cortical echogenicity and thickness.    3.  Exophytic simple cyst involving the right kidney for which no further follow-up is necessary.    4.  Bladder unremarkable.   CT Abdomen Pelvis w/o Contrast    Narrative    CT ABDOMEN PELVIS W/O CONTRAST 7/19/2020 8:23 AM    CLINICAL HISTORY: left hydro, eval for stone, pyelo  TECHNIQUE: CT scan of the abdomen and pelvis was performed without IV  contrast. Multiplanar reformats were obtained. Dose reduction  techniques were used.  CONTRAST: None.    COMPARISON: Renal ultrasound 7/19/2020    FINDINGS:   LOWER CHEST: Normal.    HEPATOBILIARY: Diffuse hepatic steatosis. Cholecystectomy.    PANCREAS: Diffuse pancreatic parenchymal atrophy. No acute  peripancreatic inflammatory changes.    SPLEEN: Normal size.    ADRENAL GLANDS: 10 mm left adrenal nodule with noncontrast attenuation  characteristics suggestive of a lipid rich adenoma. Normal right  adrenal gland.    KIDNEYS/BLADDER: 4 mm nonobstructing right renal calculus. No left  intrarenal calculi. No ureteral calculi, although the distal aspect of  the left ureter is obscured by streak artifact from a left hip  arthroplasty. No hydronephrosis. There are small left parapelvic renal  cysts, which likely accounts for the  sonographic abnormality and do  not require further imaging evaluation. Right renal cyst which does  not require further imaging evaluation.    BOWEL: No bowel obstruction. Normal appendix. Moderate amount of stool  in the colon. Small ventral abdominal wall hernia containing a short  segment of nonobstructed small bowel.    LYMPH NODES: Normal.    VASCULATURE: Normal caliber abdominal aorta with scattered  atheromatous disease.    PELVIC ORGANS: No significant pelvic mass, although evaluation is  limited by streak artifact.    OTHER: No significant free fluid. No intraperitoneal free air.    MUSCULOSKELETAL: Left hip arthroplasty. Lumbar fusion hardware.  Skeletal degenerative changes.      Impression    IMPRESSION:   1.  Nonobstructing 4 mm right renal calculus. No left intrarenal  calculi.  2.  Parapelvic left renal cysts, which likely accounts for the  sonographic abnormality. No hydronephrosis.    ELLA STANLEY MD   US Lower Extremity Venous Duplex Bilateral    Narrative    VENOUS ULTRASOUND BILATERAL LOWER EXTREMITY  7/19/2020 10:19 AM     HISTORY: Leg swelling, recent surgery, rule out deep vein thrombosis.       COMPARISON: None.    FINDINGS:  Examination of the deep veins with graded compression and  color flow Doppler with spectral wave form analysis shows no evidence  of thrombus in the common femoral vein, femoral vein, popliteal vein  or calf veins. There is some bilateral superficial thrombophlebitis in  the calf involving the small saphenous veins.      Impression    IMPRESSION:  1. No evidence of deep venous thrombosis.    2. Bilateral superficial calf thrombophlebitis involving the small  saphenous veins, left greater than right.    NUVIA BROWER MD

## 2020-07-19 NOTE — PROVIDER NOTIFICATION
"Text page sent to admitting hospitalist:    \"Pt requesting victoza and prior insulins reordered. Please assist.\"    Call back number provided, awaiting new orders/response. Continue to monitor.   "

## 2020-07-19 NOTE — H&P
[facility]    History and Physical  Hospitalist    Name: Sarah Longo    MRN: [id number]  YOB: 1945    Age: 75 year old  Primary care provider: Gaye Zimmer       Date of Admission:  7/18/2020  Date of Service (when I saw the patient): 07/19/20    Assessment & Plan   Sarah Longo is a 75 year old female with a past medical history significant for chronic artery disease status post previous stenting, diabetes mellitus type 2, hypertension, hypothyroidism, chronic back pain who presents with postop fever.  Patient was recently admitted for left total hip arthroplasty and was discharged home earlier today in the afternoon.  During this admission she had postop fever and was diagnosed with a UTI.  She was discharged home on oral ciprofloxacin.    Patient reports that she was feeling well when she left the hospital.  She was able to ambulate.  However after going home she again had episode of fever and felt very weak.  She could hardly walk.  She had ongoing troubles with her postoperative pain.  She took 1 dose of antibiotics but did not feel better.  Denies any chest pain, shortness of breath.  She has noted some swelling in her left leg.  Given this concerns she was brought back to the ER and observation was requested for IV antibiotics for her UTI.    #Urinary tract infection.  Notably, patient had a UTI prior to her surgery she was found to have enterococcus in her urine cultures.  She was treated with a course of Keflex.  It was sensitive to fluoroquinolones as well.  Patient had postop fever during her hospital stay, urine this time again showed pyuria, urine cultures are pending from this recent admission.  However, she was discharged on oral ciprofloxacin based on the previous sensitivities.  - Continue on IV ampicillin/sulbactam for now this might be due to pyonephritis.    -Await results of repeat urine cultures  -Ultrasound of the kidneys shows mild left-sided  hydronephrosis. Will obtain a CT of the abdomen without contrast to rule out nephrolithiasis  -Otherwise hemodynamically stable without any signs of sepsis.  Normal white count.  Tachycardia.    #Postoperative fever.  This is likely due to the UTI.  Her chest x-ray was negative.  She does have some swelling in her legs, left greater than right.  Will obtain a lower extremity ultrasound to rule out DVT    #Acute on chronic anemia.  Monitor hemoglobin.  No acute indication for transfusion.    #Hypothyroidism.  Continue levothyroxine    #Diabetes mellitus type 2.  Had issues with blood sugar control during the postop stay.  We will resume her glimepiride.  Sliding scale if the sugars stay elevated    #Coronary artery disease.  Continue her aspirin and Bystolic.  No active issues    #S/P left total hip arthroplasty.  Patient is on aspirin for DVT prophylaxis per orthopedics.  Request  and PT consultation    COVID-19 Status. Tested negative during recent admission     Patient's baseline home medications will need to be resumed once the prior to admission medication list has been verified by pharmacy     DVT Prophylaxis: aspirin per ortho  Code Status: Full Code    Disposition: Expected discharge in 1-2 days once afebrile for 24 hours and urinary symptoms better.    Plan of care was discussed with the patient in great detail. All of the questions were answered extensively. Patient is comfortable with the plan and agrees with it.     Covid-19  Sarah Longo was evaluated during a global COVID-19 pandemic, and applicable protocols for evaluation were followed during the patient's care.      Yosi Irby    Chief Complaint   fever    History is obtained from the patient     Case discussed with ER provider Dr. Norris    History of Present Illness   Sarah Longo is a 75 year old female who presents with fever. Details of HPI as above.     Past Medical History    I have reviewed this patient's  medical history and updated it with pertinent information if needed.   Past Medical History:   Diagnosis Date     Anemia      Arthritis      Coronary artery disease 04/28/2016    cardiac cath 2/2019: patent stents; PTCA with MAYA x 2 to OM1 and MAYA x 1 to p.LAD (4/21/2016 at [place]); PTCA with intracoronary stent placement of RCA June 2004; MAYA to OM1 11/2016     Cystocele, midline 09/29/2010     Depression      HYPERPLASTIC  POLYP      colonoscopy in 5-10 yrs.     Hypothyroidism     hypothyroid- Dr Majano     OAB (overactive bladder)     complex voiding dysfct, failed interstim     Osteoarthritis      Other and unspecified hyperlipidemia      Postmenopausal bleeding      Shoulder blade pain 5/31/2016     Type II or unspecified type diabetes mellitus without mention of complication, not stated as uncontrolled     Dr. Majano     Unspecified essential hypertension      Urinary frequency 9/29/10    followed by urology. no benefit from detrol or sanctura. month trial of macrobid and flomax 10/10       Past Surgical History   I have reviewed this patient's surgical history and updated it with pertinent information if needed.  Past Surgical History:   Procedure Laterality Date     ------------OTHER-------------      Interstim     Ablation       ARTHROPLASTY HIP Left 7/15/2020    Procedure: Left total hip arthroplasty;  Surgeon: Jayson Victoria MD;  Location: [facility] OR     BACK SURGERY  03/05/2020    spinal fusion     C CT ANGIOGRAPHY CORONARY  1/2005    mod soft plaque LAD and Cx, follow up with left heart cath     C LIGATE FALLOPIAN TUBE      endometrial ablation     CARDIAC SURGERY       CHOLECYSTECTOMY       COLONOSCOPY       CV HEART CATHETERIZATION WITH POSSIBLE INTERVENTION N/A 2/14/2019    Procedure: Coronary Angiogram;  Surgeon: Sudarshan Lee MD;  Location: [facility] CARDIAC CATH LAB; patent stents     EXCISE LESION UPPER EXTREMITY  6/5/2013    Procedure: EXCISE LESION UPPER EXTREMITY;  EXCISION RIGHT ARM  ANTICUBITAL LIPOMA ;  Surgeon: Jayson Joe MD;  Location: [facility]     HC REMOVAL OF TONSILS,12+ Y/O       HEART CATH LEFT HEART CATH  3/2/2016    No intervention - aggressive medical management     HEART CATH LEFT HEART CATH  2016     MAYA x 2 to OM1, MAYA to LAD, at [place]     HEART CATH LEFT HEART CATH  2004    MAYA to RCA     HEART CATH LEFT HEART CATH  3/28/2002    Mild to moderate 3 vessel CAD. Medical management recommended.      HEART CATH LEFT HEART CATH  2016    MAYA to OM1     hypothyroid       KNEE SURGERY  4/20/10    right knee replacement     ORTHOPEDIC SURGERY      total knee      REMOVE STIMULATOR SACRAL NERVE N/A 2015    Procedure: REMOVE STIMULATOR SACRAL NERVE;  Surgeon: Gertrudis Frausto MD;  Location: [facility]     SURGICAL HISTORY OF -       Uterine endometrial ablation       Prior to Admission Medications   Prior to Admission Medications   Prescriptions Last Dose Informant Patient Reported? Taking?   ASPIRIN 81 MG OR TABS   No No   Si tab po QD (Once per day)   CINNAMON PO   Yes No   Sig: Take 2 capsules by mouth 2 times daily   GLIMEPIRIDE 4 MG OR TABS   Yes No   Sig: Take 4 mg by mouth 2 times daily    Gymnema Sylvestris [place] POWD   Yes No   Sig: daily   NITROFURANTOIN PO   Yes No   Sig: nitrofurantoin monohydrate/macrocrystals 100 mg capsule   ONETOUCH ULTRA test strip   Yes No   Omega-3 Fatty Acids (OMEGA-3 FISH OIL PO)   Yes No   Sig: Take 2,400 mg by mouth 2 times daily (with meals)   STATIN NOT PRESCRIBED, INTENTIONAL,   No No   Si each At Bedtime Statin not prescribed intentionally due to Allergy to statin   Patient not taking: Reported on 2020   SYNTHROID 125 MCG OR TABS   Yes No   Si tablet daily   TURMERIC PO   Yes No   Sig: Take by mouth 2 times daily   amoxicillin (AMOXIL) 500 MG capsule   Yes No   Si,000 mg once as needed (Before dental procedures)    aspirin (ASA) 325 MG EC tablet   No No   Sig: Take 1 tablet (325 mg) by mouth  daily for 21 days   ciprofloxacin (CIPRO) 500 MG tablet   No No   Sig: Take 1 tablet (500 mg) by mouth 2 times daily for 7 days   cyanocobalamin (CYANOCOBALAMIN) 1000 MCG/ML injection   Yes No   Sig: Inject 2 mLs into the muscle every 30 days   diclofenac (VOLTAREN) 1 % topical gel   No No   Sig: Place 4 g onto the skin 4 times daily   esomeprazole (NEXIUM) 40 MG DR capsule   Yes No   Sig: Take 40 mg by mouth every morning (before breakfast) Take 30-60 minutes before eating.   evolocumab (REPATHA SURECLICK) 140 MG/ML prefilled autoinjector   No No   Sig: Inject 1 mL (140 mg) Subcutaneous every 14 days   ferrous fumarate 65 mg, [place]. FE,-Vitamin C 125 mg (VITRON C)  MG TABS tablet   Yes No   Sig: Take 1 tablet by mouth daily   liraglutide (VICTOZA PEN) 18 MG/3ML solution   No No   Sig: Inject 1.2 mg Subcutaneous daily   lisinopril (PRINIVIL/ZESTRIL) 5 MG tablet   No No   Sig: Take 1 tablet (5 mg) by mouth daily   melatonin 3 MG tablet   No No   Sig: Take 2 tablets (6 mg) by mouth nightly as needed for sleep   mirabegron (MYRBETRIQ) 50 MG 24 hr tablet   No No   Sig: Take 1 tablet (50 mg) by mouth daily Patient not taking daily   nebivolol (BYSTOLIC) 5 MG tablet   No No   Sig: Take 1 tablet (5 mg) by mouth daily   nitroglycerin (NITROSTAT) 0.4 MG SL tablet   No No   Sig: Place 1 tablet (0.4 mg) under the tongue every 5 minutes as needed for chest pain If symptoms persist after 3 doses (15 minutes) call 911.   Patient not taking: Reported on 1/28/2020   oxyCODONE (ROXICODONE) 5 MG tablet   No No   Sig: Take 1-2 tablets (5-10 mg) by mouth every 3 hours as needed for severe pain   traMADol (ULTRAM) 50 MG tablet   No No   Sig: Take 1 tablet (50 mg) by mouth every 6 hours as needed for moderate to severe pain   vitamin D3 (CHOLECALCIFEROL) 2000 units tablet   Yes No   Sig: Take 1 tablet by mouth 2 times daily      Facility-Administered Medications: None     Allergies   Allergies   Allergen Reactions     Hmg-Coa-R  Inhibitors Muscle Pain (Myalgia)     Oral statins         Social History   I have reviewed this patient's social history and updated it with pertinent information if needed.   Social History     Socioeconomic History     Marital status:      Spouse name: Kwadwo     Number of children: 3     Years of education: Not on file     Highest education level: Not on file   Occupational History     Occupation: PT Aide     Employer: NONE      Employer: RETIRED   Social Needs     Financial resource strain: Not on file     Food insecurity     Worry: Not on file     Inability: Not on file     Transportation needs     Medical: Not on file     Non-medical: Not on file   Tobacco Use     Smoking status: Never Smoker     Smokeless tobacco: Never Used   Substance and Sexual Activity     Alcohol use: No     Alcohol/week: 0.0 standard drinks     Drug use: No     Sexual activity: Never     Partners: Male     Birth control/protection: Post-menopausal   Lifestyle     Physical activity     Days per week: Not on file     Minutes per session: Not on file     Stress: Not on file   Relationships     Social connections     Talks on phone: Not on file     Gets together: Not on file     Attends Cheondoism service: Not on file     Active member of club or organization: Not on file     Attends meetings of clubs or organizations: Not on file     Relationship status: Not on file     Intimate partner violence     Fear of current or ex partner: Not on file     Emotionally abused: Not on file     Physically abused: Not on file     Forced sexual activity: Not on file   Other Topics Concern      Service Not Asked     Blood Transfusions Not Asked     Caffeine Concern Yes     Comment: 6-7 cups caffeine per day     Occupational Exposure Not Asked     Hobby Hazards Not Asked     Sleep Concern No     Stress Concern Yes     Weight Concern No     Special Diet No     Comment: eats healthy      Back Care Not Asked     Exercise Yes     Comment:  walking &  active life style      Bike Helmet Not Asked     Seat Belt Not Asked     Self-Exams Not Asked     Parent/sibling w/ CABG, MI or angioplasty before 65F 55M? No   Social History Narrative     Not on file         Family History   I have reviewed this patient's family history and updated it with pertinent information if needed.   Family History   Problem Relation Age of Onset     C.A.D. Father         MI , age 83, had high lipids     Hyperlipidemia Father      Hypertension Father      Coronary Artery Disease Father      Hypertension Mother      Genitourinary Problems Mother         renal failure     Fractures Mother         hip     Osteoporosis Mother      Cerebrovascular Disease Maternal Grandfather         cerebral hemorrhage     Diabetes Maternal Uncle      Colon Cancer Maternal Aunt      Diabetes Maternal Grandmother        Review of Systems   The 10 point Review of Systems is negative other than noted in the HPI or here.     Physical Exam   Temp: 98.5  F (36.9  C) Temp src: Oral BP: (!) 141/53 Pulse: 89 Heart Rate: 87 Resp: 16 SpO2: 100 %      Vital Signs with Ranges  Temp:  [98  F (36.7  C)-101.3  F (38.5  C)] 98.5  F (36.9  C)  Pulse:  [63-89] 89  Heart Rate:  [61-87] 87  Resp:  [14-16] 16  BP: (114-141)/(47-53) 141/53  SpO2:  [96 %-100 %] 100 %  0 lbs 0 oz    GENERAL:  Awake and alert, Comfortable appearing, No acute distress.  PSYCH: no hallucinations or agitation, frustrated by repeat hospitalization   EYES: EOMI, conjunctiva clear  HEENT:  Head is normocephalic, atraumatic, Neck is Supple, trachea is midline   CARDIOVASCULAR: Regular rate and rhythm, Normal S1, S2, early systolic murmur   PULMONARY:  Clear to auscultation bilaterally with good entry on both sides  CHEST: Good inspiratory effort bilaterally   GI: Abdomen is soft, non tender, non-distended, no masses palpated, normal bowel sounds. No rebound or guarding. No CVA tenderness   SKIN:  No obvious exanthems on exposed areas, no cyanosis or  clubbing   EXTREMITIES:  Good capillary refill with signs of adequate peripheral perfusion.  Neuro: Awake and oriented x 3. No focal weakness or numbness is noted. .   MSK: recent left RAFAEL, swelling in left leg > right, no calf tenderness    Data   Data reviewed today:  I personally reviewed .    All lab data and imaging results from today have been reviewed.     Recent Labs   Lab 07/18/20  2303 07/17/20  0559 07/16/20  0620   WBC 5.7 5.9  --    HGB 8.4* 8.6* 9.7*   MCV 92 93  --     131*  --      --   --    POTASSIUM 4.2  --   --    CHLORIDE 103  --   --    CO2 25  --   --    BUN 16  --   --    CR 0.72  --   --    ANIONGAP 6  --   --    LUCIUS 8.9  --   --    *  --   --    ALBUMIN 2.7*  --   --    PROTTOTAL 6.4*  --   --    BILITOTAL 0.5  --   --    ALKPHOS 48  --   --    ALT 33  --   --    AST 21  --   --    LIPASE 85  --   --        Recent Results (from the past 24 hour(s))   XR Chest Port 1 View    Narrative    EXAM: XR CHEST PORT 1 VW  LOCATION: [facility]  DATE/TIME: 7/18/2020 11:51 PM    INDICATION: Fever. Postop.  COMPARISON: 03/06/2020      Impression    IMPRESSION: Negative portable chest.   US Renal Complete    Narrative    EXAM: US RENAL COMPLETE  LOCATION: [facility]  DATE/TIME: 7/19/2020 12:11 AM    INDICATION: Persistent pyuria and dysuria.  COMPARISON: None.  TECHNIQUE: Routine Bilateral Renal and Bladder Ultrasound.    FINDINGS:    RIGHT KIDNEY: 12.1 x 4.8 x 4.2 cm. No hydronephrosis. Normal renal cortical echogenicity and thickness. 3.8 x 4.0 x 3.9 cm simple cyst right kidney for which no further follow-up is necessary.     LEFT KIDNEY: 11.4 x 5.6 x 5.2 cm. Minimal hydronephrosis. Normal renal cortical echogenicity and thickness. No evidence for exophytic cystic lesion.     BLADDER: Normal.      Impression    IMPRESSION:  1.  Minimal left-sided hydronephrosis. No right-sided hydronephrosis. Recommend correlation for left-sided flank pain.    2.  Normal  renal cortical echogenicity and thickness.    3.  Exophytic simple cyst involving the right kidney for which no further follow-up is necessary.    4.  Bladder unremarkable.

## 2020-07-19 NOTE — PHARMACY-ADMISSION MEDICATION HISTORY
Admission medication history interview status for this patient is complete. See [facility] admission navigator for allergy information, prior to admission medications and immunization status.     Medication history interview done via telephone during Covid-19 pandemic, indicate source(s): Patient  Medication history resources (including written lists, pill bottles, clinic record): care everywhere,  from 7/16    Changes made to PTA medication list:  Added: nothing  Deleted: omega 3, nitrofurantoin  Changed: added directions for gymnema, synthroid    Actions taken by pharmacist (provider contacted, etc):None     Additional medication history information: Patient was recently discharged from [facility] on [facility]/17 and then returned; as a result, the patient was unsure if she took a medication while she was in the hospital yesterday, or if the last time she took it was when she was briefly home (victoza, lisinopril, oxycodone). There were a few supplements that she did not wish to receive here (cinnamon, gymnema sylvestris, turmeric). The patient had been taking 81 mg aspirin daily, but stopped taking it on 7/14 - she then began taking aspirin 325 mg daily since 7/15.     The patient said she started taking cipro 500 mg BID for 7 days starting 7/7 for a UTI and took it last on 7/13 - when she left for the hospital, she said she had a couple tabs left, and believed she was on day 6 when she was home last. The patient said she last had her repatha 13 days ago, and is due for her next injection tomorrow. The patient has not yet started taking the melatonin. The patient said she needs her nebivolol this morning ASAP.     Medication reconciliation/reorder completed by provider prior to medication history?  Y   (Y/N)     For patients on insulin therapy: N  (Y/N)    Prior to Admission medications    Medication Sig Last Dose Taking? Auth Provider   aspirin (ASA) 325 MG EC tablet Take 1 tablet (325 mg) by mouth daily for 21 days 7/18/2020 at  Unknown time Yes Jayson Victoria MD   CINNAMON PO Take 2 capsules by mouth 2 times daily Past Month at Unknown time Yes Reported, Patient   ciprofloxacin (CIPRO) 500 MG tablet Take 500 mg by mouth 2 times daily For 7 days 7/13/2020 Yes Unknown, Entered By History   cyanocobalamin (CYANOCOBALAMIN) 1000 MCG/ML injection Inject 2 mLs into the muscle every 30 days Past Month at Unknown time Yes Reported, Patient   diclofenac (VOLTAREN) 1 % topical gel Place 4 g onto the skin 4 times daily 7/18/2020 at Unknown time Yes Rain Chowdhury APRN CNP   esomeprazole (NEXIUM) 40 MG DR capsule Take 40 mg by mouth every morning (before breakfast) Take 30-60 minutes before eating. 7/18/2020 at am Yes Reported, Patient   evolocumab (REPATHA SURECLICK) 140 MG/ML prefilled autoinjector Inject 1 mL (140 mg) Subcutaneous every 14 days 7/6/2020 Yes Feroz Burgos MD   ferrous fumarate 65 mg, [place]. FE,-Vitamin C 125 mg (VITRON C)  MG TABS tablet Take 1 tablet by mouth daily 7/14/2020 Yes Reported, Patient   GLIMEPIRIDE 4 MG OR TABS Take 4 mg by mouth 2 times daily  7/18/2020 at Unknown time Yes Reported, Patient   levothyroxine (SYNTHROID/LEVOTHROID) 125 MCG tablet Take 125 mcg by mouth daily before breakfast 7/18/2020 at am Yes Unknown, Entered By History   liraglutide (VICTOZA PEN) 18 MG/3ML solution Inject 1.2 mg Subcutaneous daily 7/18/2020 at Unknown time Yes Yariel Harrell MD   lisinopril (PRINIVIL/ZESTRIL) 5 MG tablet Take 1 tablet (5 mg) by mouth daily 7/18/2020 at Unknown time Yes Feroz Burgos MD   mirabegron (MYRBETRIQ) 50 MG 24 hr tablet Take 1 tablet (50 mg) by mouth daily Patient not taking daily 7/18/2020 at Unknown time Yes Shireen Neves MD   nebivolol (BYSTOLIC) 5 MG tablet Take 1 tablet (5 mg) by mouth daily 7/18/2020 at am Yes Feroz Burgos MD   NONFORMULARY Take 1-2 capsules by mouth daily as needed Gymnema Sylvestris Leaf capsule Past Month at Unknown time Yes Unknown, Entered By History    oxyCODONE (ROXICODONE) 5 MG tablet Take 1-2 tablets (5-10 mg) by mouth every 3 hours as needed for severe pain 7/18/2020 at pm Yes Jayson Victoria MD   traMADol (ULTRAM) 50 MG tablet Take 1 tablet (50 mg) by mouth every 6 hours as needed for moderate to severe pain 7/13/2020 Yes More, Karen Torres PA-C   TURMERIC PO Take by mouth 2 times daily Past Month at Unknown time Yes Reported, Patient   vitamin D3 (CHOLECALCIFEROL) 2000 units tablet Take 1 tablet by mouth 2 times daily 7/18/2020 at Unknown time Yes Reported, Patient   amoxicillin (AMOXIL) 500 MG capsule 2,000 mg once as needed (Before dental procedures)    Reported, Patient   aspirin 81 MG EC tablet Take 81 mg by mouth daily 7/14/2020  Unknown, Entered By History   melatonin 3 MG tablet Take 2 tablets (6 mg) by mouth nightly as needed for sleep   Rain Chowdhury APRN CNP   nitroglycerin (NITROSTAT) 0.4 MG SL tablet Place 1 tablet (0.4 mg) under the tongue every 5 minutes as needed for chest pain If symptoms persist after 3 doses (15 minutes) call 911.   Feroz Burgos MD   ONETOUCH ULTRA test strip    Reported, Patient   STATIN NOT PRESCRIBED, INTENTIONAL, 1 each At Bedtime Statin not prescribed intentionally due to Allergy to statin   Feroz Burgos MD

## 2020-07-19 NOTE — PROGRESS NOTES
"ROOM #      Living Situation (if not independent, order SW consult): At home independent; currently receiving HHC/Home PT  Facility name:   : Kwadwo (spouse) [phone]    Activity level at baseline: \"Independent\"  Activity level on admit: A1 w/walker and GB      Patient registered to observation; given Patient Bill of Rights; given the opportunity to ask questions about observation status and their plan of care.  Patient has been oriented to the observation room, bathroom and call light is in place.    Discussed discharge goals and expectations with patient/family.         "

## 2020-07-19 NOTE — ED TRIAGE NOTES
Pt states fever today, states was discharged earlier today from inpatient s/p total hip replacement. ABCs intact GCS 15

## 2020-07-19 NOTE — CONSULTS
Care Transition Initial Assessment - SW     Met with: spoke with pt's  Kwadwo    Active Problems:    Fever postop       DATA  Lives With: spouse    Quality of Family Relationships: helpful, involved, supportive  Quality of Family Relationships: helpful, involved, supportive  Transportation Anticipated: family or friend will provide    ASSESSMENT  Pt discharge yesterday and now in observation.  Pt's  reports that pt was doing ok once she left the hosptial, however, once she returned home and went to use the bathroom she was not able to get herself of the toilet therefore  had to call their son and he had to come over to help get pt off the toilet.  Pt was discharge yesterday with a referral to [facility].  Pt uses a cane and walker, no falls in the last 6 months.      PLAN  Financial costs for the patient includes: unknown   Patient given options and choices for discharge: discharge plans uncertain   Patient/family is agreeable to the plan? NA at this time  Transportation/person available to transport on day of discharge  is pt's   Patient Goals and Preferences: home  Patient anticipates discharging to:  Waiting on PT consults.        Merlene DHALIWAL, [facility]  Inpatient Care Coordination   [facility]   [phone]      Addendum:  Chart reviewed.  PT rec's HC PT.  Referral sent again today to [facility] for RN/PT. MD will need to write HC orders in discharge orders.

## 2020-07-20 ENCOUNTER — APPOINTMENT (OUTPATIENT)
Dept: GENERAL RADIOLOGY | Facility: CLINIC | Age: 75
DRG: 470 | End: 2020-07-20
Attending: PHYSICIAN ASSISTANT
Payer: COMMERCIAL

## 2020-07-20 ENCOUNTER — APPOINTMENT (OUTPATIENT)
Dept: PHYSICAL THERAPY | Facility: CLINIC | Age: 75
DRG: 470 | End: 2020-07-20
Attending: PHYSICIAN ASSISTANT
Payer: COMMERCIAL

## 2020-07-20 ENCOUNTER — CARE COORDINATION (OUTPATIENT)
Dept: CARDIOLOGY | Facility: CLINIC | Age: 75
End: 2020-07-20

## 2020-07-20 LAB
BACTERIA SPEC CULT: NORMAL
BACTERIA SPEC CULT: NORMAL
GLUCOSE BLDC GLUCOMTR-MCNC: 174 MG/DL (ref 70–99)
GLUCOSE BLDC GLUCOMTR-MCNC: 197 MG/DL (ref 70–99)
GLUCOSE BLDC GLUCOMTR-MCNC: 223 MG/DL (ref 70–99)
GLUCOSE BLDC GLUCOMTR-MCNC: 265 MG/DL (ref 70–99)
HBA1C MFR BLD: 8.5 % (ref 0–5.6)
Lab: NORMAL
SARS-COV-2 RNA SPEC QL NAA+PROBE: NOT DETECTED
SPECIMEN SOURCE: NORMAL
SPECIMEN SOURCE: NORMAL

## 2020-07-20 PROCEDURE — 97116 GAIT TRAINING THERAPY: CPT | Mod: GP | Performed by: PHYSICAL THERAPIST

## 2020-07-20 PROCEDURE — 00000146 ZZHCL STATISTIC GLUCOSE BY METER IP

## 2020-07-20 PROCEDURE — 73502 X-RAY EXAM HIP UNI 2-3 VIEWS: CPT

## 2020-07-20 PROCEDURE — G0378 HOSPITAL OBSERVATION PER HR: HCPCS

## 2020-07-20 PROCEDURE — 99226 ZZC SUBSEQUENT OBSERVATION CARE,LEVEL III: CPT | Performed by: PHYSICIAN ASSISTANT

## 2020-07-20 PROCEDURE — 97530 THERAPEUTIC ACTIVITIES: CPT | Mod: GP | Performed by: PHYSICAL THERAPIST

## 2020-07-20 PROCEDURE — 96372 THER/PROPH/DIAG INJ SC/IM: CPT

## 2020-07-20 PROCEDURE — 25000128 H RX IP 250 OP 636: Performed by: INTERNAL MEDICINE

## 2020-07-20 PROCEDURE — 25000132 ZZH RX MED GY IP 250 OP 250 PS 637: Performed by: PHYSICIAN ASSISTANT

## 2020-07-20 PROCEDURE — 97161 PT EVAL LOW COMPLEX 20 MIN: CPT | Mod: GP | Performed by: PHYSICAL THERAPIST

## 2020-07-20 PROCEDURE — 96366 THER/PROPH/DIAG IV INF ADDON: CPT

## 2020-07-20 PROCEDURE — 25000132 ZZH RX MED GY IP 250 OP 250 PS 637: Performed by: INTERNAL MEDICINE

## 2020-07-20 RX ORDER — ACETAMINOPHEN 500 MG
1000 TABLET ORAL 3 TIMES DAILY
Status: DISCONTINUED | OUTPATIENT
Start: 2020-07-20 | End: 2020-07-21 | Stop reason: HOSPADM

## 2020-07-20 RX ORDER — AMOXICILLIN 250 MG/1
250 CAPSULE ORAL EVERY 8 HOURS SCHEDULED
Status: DISCONTINUED | OUTPATIENT
Start: 2020-07-20 | End: 2020-07-21 | Stop reason: HOSPADM

## 2020-07-20 RX ORDER — VITAMIN B COMPLEX
50 TABLET ORAL 2 TIMES DAILY
Status: DISCONTINUED | OUTPATIENT
Start: 2020-07-20 | End: 2020-07-21 | Stop reason: HOSPADM

## 2020-07-20 RX ADMIN — ACETAMINOPHEN 650 MG: 325 TABLET, FILM COATED ORAL at 16:49

## 2020-07-20 RX ADMIN — DICLOFENAC SODIUM 2 G: 10 GEL TOPICAL at 12:14

## 2020-07-20 RX ADMIN — DICLOFENAC SODIUM 2 G: 10 GEL TOPICAL at 18:33

## 2020-07-20 RX ADMIN — VITAMIN D, TAB 1000IU (100/BT) 50 MCG: 25 TAB at 20:27

## 2020-07-20 RX ADMIN — Medication 1 LOZENGE: at 20:39

## 2020-07-20 RX ADMIN — LISINOPRIL 5 MG: 5 TABLET ORAL at 20:27

## 2020-07-20 RX ADMIN — AMPICILLIN SODIUM AND SULBACTAM SODIUM 3 G: 2; 1 INJECTION, POWDER, FOR SOLUTION INTRAMUSCULAR; INTRAVENOUS at 12:13

## 2020-07-20 RX ADMIN — DICLOFENAC SODIUM 2 G: 10 GEL TOPICAL at 08:26

## 2020-07-20 RX ADMIN — NEBIVOLOL HYDROCHLORIDE 5 MG: 10 TABLET ORAL at 08:24

## 2020-07-20 RX ADMIN — MIRABEGRON 50 MG: 50 TABLET, FILM COATED, EXTENDED RELEASE ORAL at 08:24

## 2020-07-20 RX ADMIN — ASPIRIN 325 MG: 325 TABLET, COATED ORAL at 08:25

## 2020-07-20 RX ADMIN — OXYCODONE HYDROCHLORIDE 10 MG: 5 TABLET ORAL at 23:25

## 2020-07-20 RX ADMIN — Medication 1 TABLET: at 18:31

## 2020-07-20 RX ADMIN — GLIMEPIRIDE 4 MG: 4 TABLET ORAL at 18:32

## 2020-07-20 RX ADMIN — ACETAMINOPHEN 650 MG: 325 TABLET, FILM COATED ORAL at 00:34

## 2020-07-20 RX ADMIN — LIRAGLUTIDE 1.2 MG: 6 INJECTION SUBCUTANEOUS at 08:26

## 2020-07-20 RX ADMIN — GLIMEPIRIDE 4 MG: 4 TABLET ORAL at 08:24

## 2020-07-20 RX ADMIN — AMPICILLIN SODIUM AND SULBACTAM SODIUM 3 G: 2; 1 INJECTION, POWDER, FOR SOLUTION INTRAMUSCULAR; INTRAVENOUS at 06:47

## 2020-07-20 RX ADMIN — LEVOTHYROXINE SODIUM 125 MCG: 0.1 TABLET ORAL at 06:48

## 2020-07-20 RX ADMIN — INSULIN ASPART 3 UNITS: 100 INJECTION, SOLUTION INTRAVENOUS; SUBCUTANEOUS at 18:31

## 2020-07-20 RX ADMIN — ACETAMINOPHEN 1000 MG: 500 TABLET, FILM COATED ORAL at 20:27

## 2020-07-20 RX ADMIN — OXYCODONE HYDROCHLORIDE 5 MG: 5 TABLET ORAL at 16:49

## 2020-07-20 RX ADMIN — AMOXICILLIN 250 MG: 250 CAPSULE ORAL at 22:31

## 2020-07-20 RX ADMIN — OXYCODONE HYDROCHLORIDE 5 MG: 5 TABLET ORAL at 20:27

## 2020-07-20 RX ADMIN — INSULIN ASPART 2 UNITS: 100 INJECTION, SOLUTION INTRAVENOUS; SUBCUTANEOUS at 08:26

## 2020-07-20 RX ADMIN — PANTOPRAZOLE SODIUM 40 MG: 40 TABLET, DELAYED RELEASE ORAL at 06:47

## 2020-07-20 RX ADMIN — INSULIN ASPART 2 UNITS: 100 INJECTION, SOLUTION INTRAVENOUS; SUBCUTANEOUS at 14:37

## 2020-07-20 RX ADMIN — AMPICILLIN SODIUM AND SULBACTAM SODIUM 3 G: 2; 1 INJECTION, POWDER, FOR SOLUTION INTRAMUSCULAR; INTRAVENOUS at 00:30

## 2020-07-20 NOTE — PROGRESS NOTES
Pt is scheduled to see  on 7/22/20. Pt has currently been re-admitted after her hip replacement for leg weakness and UTI. I called pt to discuss canceling her 7/22 visit. Pt said she will likely still be in the hospital. I told pt 's next opening is 9/11/20. Pt said she doesn't want to wait that long to see . I told pt it doesn't sound like she has any cardiac concerns at that this time, and that if anything comes up visit can be moved up if needed.     I spoke to  and he said September would be fine, and pt should call if she has questions/concerns before that. Patrick STOVER July 20, 2020, 3:14 PM

## 2020-07-20 NOTE — PLAN OF CARE
PRIMARY DIAGNOSIS:Post OP FEVER   OUTPATIENT/OBSERVATION GOALS TO BE MET BEFORE DISCHARGE:  ADLs back to baseline: Yes    Activity and level of assistance: Up with 1 and walker    Pain status: Improved-controlled with oral pain medications, ice pack, Voltaten gel     Return to near baseline physical activity: Yes     Discharge Planner Nurse   Safe discharge environment identified: Yes  Barriers to discharge: Yes- pt had 100 F temp overnight, Spine surgery consult.        Entered by: Vladimir Baez 07/20/2020 4:30 AM     Please review provider order for any additional goals.   Nurse to notify provider when observation goals have been met and patient is ready for discharge.

## 2020-07-20 NOTE — PROGRESS NOTES
Discharge Planner   Discharge Plans in progress: Yes. PT recommending HHPT. Patient previously referred to [facility], however, [facility] was unable to begin start of care assessment due to readmission. [facility] will need new orders for home care services when patient ready for discharge.       Vitaly Ann RN BSN PHN CCM   Inpatient Care Coordination   [facility]   [phone]

## 2020-07-20 NOTE — PROGRESS NOTES
"[facility]  Medicine Progress Note - Hospitalist Service       Date of Admission:  7/18/2020  Assessment & Plan   Sarah Longo is a 75 year old female with ASCVD s/p PCI, DM2, hypertension, hypothyroidism, chronic back pain, and recent hip replacement with Dr Victoria who presented to [facility] ED on 7/18/2020, 4 hours after discharge home from hip replacement, with weakness and fever to 101.7.  Patient was recently at [facility] (7/15-7/18) for an elective left RAFAEL.  Hospital course notable for UTI (discharged on Cipro) and acute on chronic blood loss anemia with Hgb 9.7 >> 8.6.  Patient was demanding to stay because she didn't feel \"ready to go\" but had been working well with therapies (even doing stairs) and was discharged by Orthopedics home with home care (patient refused TCU).  She got home and within a few hours felt too weak, flank pain, and leg pain and swelling so her  brought her back.      In the ED, CMP within normal limits save for glucose 265.  CBC within normal limits save for hgb 8.4.  UA repeated and appeared dirty.  CXR negative.  Renal U/S without acute process. CT Abd/Pelv also without acute process.  BLE Duplex U/S showed no DVT but did show bilateral superficial thrombophlebitis in the calves (L>R).  Patient adamant she is A) will not go to TCU and b) she wants to stay until her hip pain is gone.  She and her  are very upset that she was discharged \"too soon\" and \"do not trust what [we] have to say because [we] said she was good enough to go home last time.\"      Leg pain and weakness  Ongoing issue, predates admission.  In March, had lumbar decompression/fusion with Dr Hart ([facility]) and was told ongoing pain in hip would get better after hip replacement.  Now a few days post-op from RAFAEL and is upset that leg is swollen and pain is not gone.  Demanding to stay \"until it is gone or [we] figure out what's going on\".  Is argumentative and difficult to reason with.  Personally " ambulated patient and she is a SBA with walker; a bit unsteady when getting on and off the commode but did not require assistance.  XR Pelvis showed obvious fracture or complication.    PLAN:  - No identifiable acute, modifiable issue that can be rectified during this Observation period.  Discussed with patient and her  that she is medically stable to discharge however they are refusing for patient home and also refusing to send to TCU.    - PT consult to see to help with recs regarding going home, mainly to help with stairs and confirm patient stable to go home with [facility].    - Schedule APAP for pain and keep PRN oxycodone  - Ortho following and have no new recommendations; will need to follow-up with Jason Hart and Esme as an outpatient.       Urinary tract infection  UA from 7/18 Appeared abnormal but cultures NGTD.  Previous culture growing pansensitive Enterococcus.  Was on cipro at home, changed to Unasyn on admission.  Afebrile, VSS.  IV blew so no longer has IV access.  Appropriate to change to PO.  - Start oral amoxicllin     Acute on chronic anemia  Preop Hgb 9.6 >> 7.7 yesterday.  No s/sx bleeding; hemodynamically stable.  Patient demanding IV iron, however, it is not indicated given patient's normal renal function and ability to tolerate PO.   - Repeat Hgb in AM to ensure stability (no e/o bleed).  Discussed with patient this will take weeks-to-months to improve.   - Resume oral iron (home med)     Diabetes mellitus  HgbA1c 8.5.  PTA Amaryl, Victoza resumed.  Sounds as though was on metformin previously but this was discontinued.  ISS.      ASCVD s/p PCI   Hypertension  Continue , nebivolol, and lisinopril.  Allergy to statins.     Hyperthyroidism   Continue levothyroxine.      Diet: Moderate Consistent CHO Diet    DVT Prophylaxis: ASA per Ortho  Code Status: Full Code      DISPO: Medically stable to discharge.  Worked with therapy this evening and will need another session tomorrow.  If  feels she still needs therapy before going home, will need to go to TCU.  ABx changed to oral.  Pain meds are oral.  Patient is moving as a SBA.  She already has a toilet riser, shower chair, and other assistive devices at home.  Already has [facility] set up but came back so quickly they did not establish care; will need to be resumed at [facility].    HARVEY Edmonds  Hospitalist Service  [facility]    ______________________________________________________________________    Interval History   Multiple demands, difficult personality.  Argumentative and requires quite a bit of motivation for her to walk.  Afebrile, VSS.  Fixated on prior hospitalization and what her Ortho doctors told her about her pain getting better after her hip surgery.      Data reviewed today: I reviewed all medications, new labs and imaging results over the last 24 hours. I personally reviewed the Pelvic XR image(s) showing no fracture or dislocation.    Physical Exam   Vital Signs: Temp: 98.9  F (37.2  C) Temp src: Oral BP: 122/42 Pulse: 71 Heart Rate: 72 Resp: 16 SpO2: 97 % O2 Device: None (Room air)    Weight: 175 lbs 1.6 oz    GENERAL:  Alert, tangential, at times unpleasant.  Well developed, well nourished.  No distress.    HEENT: Normocephalic, atraumatic.  Extra occular mm intact.  Sclera clear. PERRL.  Mucous membranes moist.    PULMONOLOGY:  Unlabored  MUSCULOSKELETAL:  Moving x 4 spontaneously with CMS intact x4.  Mild swelling in legs.    NEURO: Alert and oriented x3.  No focal deficits.  Ambulates with a slightly stooped gait.  Appears to lack some confidence.  Antalgic gait, short step left.  Able to bear full weight on both legs.   SKIN: warm, pink, dry       Data   Recent Labs   Lab 07/19/20  0511 07/18/20  2303 07/17/20  0559   WBC 3.9* 5.7 5.9   HGB 7.7* 8.4* 8.6*   MCV 92 92 93    164 131*    134  --    POTASSIUM 3.7 4.2  --    CHLORIDE 107 103  --    CO2 27 25  --    BUN 14 16  --    CR 0.71 0.72  --     ANIONGAP 2* 6  --    LUCIUS 8.5 8.9  --    * 265*  --    ALBUMIN  --  2.7*  --    PROTTOTAL  --  6.4*  --    BILITOTAL  --  0.5  --    ALKPHOS  --  48  --    ALT  --  33  --    AST  --  21  --    LIPASE  --  85  --      Recent Results (from the past 24 hour(s))   XR Pelvis w Hip Left 1 View    Narrative    XR PELVIS AND LEFT HIP ONE VIEW   7/20/2020 2:12 PM     HISTORY: Left total hip arthroplasty, controlled fall.    COMPARISON: None.      Impression    IMPRESSION: Left total hip arthroplasty in place. No evidence of  fracture or complication. Previous lumbar posterior fusion with  pedicle screws.     Medications       acetaminophen  1,000 mg Oral TID     amoxicillin  250 mg Oral Q8H WALESKA     aspirin  325 mg Oral Daily     diclofenac  2 g Transdermal 4x Daily     ferrous fumarate-vitamin C ER  1 tablet Oral Daily     glimepiride  4 mg Oral BID w/meals     insulin aspart  1-7 Units Subcutaneous TID AC     insulin aspart  1-5 Units Subcutaneous At Bedtime     levothyroxine  125 mcg Oral QAM AC     liraglutide  1.2 mg Subcutaneous Daily     lisinopril  5 mg Oral Daily     mirabegron  50 mg Oral Daily     nebivolol  5 mg Oral Daily     pantoprazole  40 mg Oral QAM AC     sodium chloride (PF)  3 mL Intracatheter Q8H     vitamin D3  50 mcg Oral BID

## 2020-07-20 NOTE — PLAN OF CARE
"PRIMARY DIAGNOSIS:Post OP FEVER/BLE weakness  OUTPATIENT/OBSERVATION GOALS TO BE MET BEFORE DISCHARGE:  1. ADLs back to baseline: Yes    2. Activity and level of assistance: Up with SBA and walker    3. Pain status: Improved-controlled with oral pain medications, ice pack, Voltaten gel     4. Return to near baseline physical activity: Yes     Discharge Planner Nurse   Safe discharge environment identified: Yes  Barriers to discharge: Ortho surg consult      Entered by: Yeni Snow 07/20/2020     Please review provider order for any additional goals.   Nurse to notify provider when observation goals have been met and patient is ready for discharge.    AOx4. Demanding and angry this am stating \"the doctor better see me first.\"  Pain in L hip and back-relieved with Voltaren gel this am.  Up SBA w/walker.  at bedside. - 2 units sliding scale given in addition to victoza. Continue IV unasyn for UTI.  Orhto surg to see. /42 (BP Location: Left arm)   Pulse 71   Temp 98.9  F (37.2  C) (Oral)   Resp 16   Ht 1.727 m (5' 8\")   Wt 79.4 kg (175 lb 1.6 oz)   SpO2 97%   BMI 26.62 kg/m    "

## 2020-07-20 NOTE — CONSULTS
[facility]    Orthopedic Consultation    Sarah Longo MRN# [id number]   Age: 75 year old YOB: 1945     Date of Admission:  7/18/2020    Reason for consult: Bilateral LE weakness       Requesting physician: Roslyn PALACIO       Level of consult: One-time consult to assist in determining a diagnosis and to recommend an appropriate treatment plan           Assessment and Plan:   Assessment:   Recent left RAFAEL, 5/20/2020  Lumbar spinal fusion March 2020      Plan:   Long discussion had with patient and her  Kwadwo at bedside  WBAT with walker  XR ordered of left hip   Elevate LE when in bed  Compression stockings  Pain medication as needed, minimize narcotics as able  PT   discharge home this evening or tomorrow AM based on PT session, ability to ambulate and pain control           Chief Complaint:   Bilateral LE         History of Present Illness:   This patient is a 75 year old female who presents with the following condition requiring a hospital admission:    Patient underwent left hip total arthroplasty with Dr. Victoria on 7/15. She was discharged from [facility] to home with [facility] and [facility]. She was home for about 4 hours when she fell forward on the toilet and was unable to get up on her own or with her husbands assist from her knees. Her son came and was ultimately able to get up to standing and sitting. No increased pain per patient but was very scared due to weakness in legs which did not allow her to get up on her own. Her  called the on-call providers who did not think it was the hip but the spine provider was concerned that she was reporting bilateral LE weakness with history of recent lumbar fusion and stenosis. She was brought to the ED for further evaluation and was ultimately put into observation for further evaluation and due to both patient and  being uncomfortably due to the event described above.  Patient denies any increased pain in her back or hip.  Notes her left hip still hurts and she has residual low back pain from her previous spine issues. No loss in bowel or bladder. No radiculopathy. Denies any fever or chills.  Of note she was also started on abx due to a possible UTI. She did have a hutchinson cath post operatively.           Past Medical History:     Past Medical History:   Diagnosis Date     Anemia      Arthritis      Coronary artery disease 04/28/2016    cardiac cath 2/2019: patent stents; PTCA with MAYA x 2 to OM1 and MAYA x 1 to p.LAD (4/21/2016 at [place]); PTCA with intracoronary stent placement of RCA June 2004; MAYA to OM1 11/2016     Cystocele, midline 09/29/2010     Depression      HYPERPLASTIC  POLYP      colonoscopy in 5-10 yrs.     Hypothyroidism     hypothyroid- Dr Majano     OAB (overactive bladder)     complex voiding dysfct, failed interstim     Osteoarthritis      Other and unspecified hyperlipidemia      Postmenopausal bleeding      Shoulder blade pain 5/31/2016     Type II or unspecified type diabetes mellitus without mention of complication, not stated as uncontrolled     Dr. Majano     Unspecified essential hypertension      Urinary frequency 9/29/10    followed by urology. no benefit from detrol or sanctura. month trial of macrobid and flomax 10/10             Past Surgical History:     Past Surgical History:   Procedure Laterality Date     ------------OTHER-------------      Interstim     Ablation       ARTHROPLASTY HIP Left 7/15/2020    Procedure: Left total hip arthroplasty;  Surgeon: Jayson Victoria MD;  Location:  OR     BACK SURGERY  03/05/2020    spinal fusion     C CT ANGIOGRAPHY CORONARY  1/2005    mod soft plaque LAD and Cx, follow up with left heart cath     C LIGATE FALLOPIAN TUBE      endometrial ablation     CARDIAC SURGERY       CHOLECYSTECTOMY       COLONOSCOPY       CV HEART CATHETERIZATION WITH POSSIBLE INTERVENTION N/A 2/14/2019    Procedure: Coronary Angiogram;  Surgeon: Sudarshan Lee MD;  Location:   HEART CARDIAC CATH LAB; patent stents     EXCISE LESION UPPER EXTREMITY  2013    Procedure: EXCISE LESION UPPER EXTREMITY;  EXCISION RIGHT ARM ANTICUBITAL LIPOMA ;  Surgeon: Jayson Joe MD;  Location: [facility] REMOVAL OF TONSILS,12+ Y/O       HEART CATH LEFT HEART CATH  3/2/2016    No intervention - aggressive medical management     HEART CATH LEFT HEART CATH  2016     MAYA x 2 to OM1, MAYA to LAD, at [place]     HEART CATH LEFT HEART CATH  2004    MAYA to RCA     HEART CATH LEFT HEART CATH  3/28/2002    Mild to moderate 3 vessel CAD. Medical management recommended.      HEART CATH LEFT HEART CATH  2016    MAYA to OM1     hypothyroid       KNEE SURGERY  4/20/10    right knee replacement     ORTHOPEDIC SURGERY      total knee      REMOVE STIMULATOR SACRAL NERVE N/A 2015    Procedure: REMOVE STIMULATOR SACRAL NERVE;  Surgeon: Gertrudis Frausto MD;  Location: [facility]     SURGICAL HISTORY OF -       Uterine endometrial ablation             Social History:     Social History     Tobacco Use     Smoking status: Never Smoker     Smokeless tobacco: Never Used   Substance Use Topics     Alcohol use: No     Alcohol/week: 0.0 standard drinks             Family History:     Family History   Problem Relation Age of Onset     C.A.D. Father         MI , age 83, had high lipids     Hyperlipidemia Father      Hypertension Father      Coronary Artery Disease Father      Hypertension Mother      Genitourinary Problems Mother         renal failure     Fractures Mother         hip     Osteoporosis Mother      Cerebrovascular Disease Maternal Grandfather         cerebral hemorrhage     Diabetes Maternal Uncle      Colon Cancer Maternal Aunt      Diabetes Maternal Grandmother              Immunizations:     VACCINE/DOSE   Diptheria   DPT   DTAP   HBIG   Hepatitis A   Hepatitis B   HIB   Influenza   Measles   Meningococcal   MMR   Mumps   Pneumococcal   Polio   Rubella   Small Pox   TDAP    Varicella   Zoster             Allergies:     Allergies   Allergen Reactions     Hmg-Coa-R Inhibitors Muscle Pain (Myalgia)     Oral statins               Medications:     Current Facility-Administered Medications   Medication     acetaminophen (TYLENOL) tablet 650 mg     ampicillin-sulbactam (UNASYN) 3 g vial to attach to  mL bag     aspirin (ASA) EC tablet 325 mg     benzocaine-menthol (CHLORASEPTIC) 6-10 MG lozenge 1 lozenge     glucose gel 15-30 g    Or     dextrose 50 % injection 25-50 mL    Or     glucagon injection 1 mg     diclofenac (VOLTAREN) 1 % topical gel 2 g     glimepiride (AMARYL) tablet 4 mg     insulin aspart (NovoLOG) injection (RAPID ACTING)     insulin aspart (NovoLOG) injection (RAPID ACTING)     levothyroxine (SYNTHROID/LEVOTHROID) tablet 125 mcg     lidocaine (LMX4) cream     lidocaine 1 % 0.1-1 mL     liraglutide (VICTOZA) injection 1.2 mg     lisinopril (ZESTRIL) tablet 5 mg     melatonin tablet 1 mg     mirabegron (MYRBETRIQ) 24 hr tablet 50 mg     naloxone (NARCAN) injection 0.1-0.4 mg     nebivolol (BYSTOLIC) tablet 5 mg     ondansetron (ZOFRAN-ODT) ODT tab 4 mg    Or     ondansetron (ZOFRAN) injection 4 mg     oxyCODONE (ROXICODONE) tablet 5-10 mg     pantoprazole (PROTONIX) EC tablet 40 mg     polyethylene glycol (MIRALAX) Packet 17 g     prochlorperazine (COMPAZINE) injection 5 mg    Or     prochlorperazine (COMPAZINE) tablet 5 mg    Or     prochlorperazine (COMPAZINE) Suppository 12.5 mg     senna-docusate (SENOKOT-S/PERICOLACE) 8.6-50 MG per tablet 1 tablet    Or     senna-docusate (SENOKOT-S/PERICOLACE) 8.6-50 MG per tablet 2 tablet     sodium chloride (PF) 0.9% PF flush 3 mL     sodium chloride (PF) 0.9% PF flush 3 mL             Review of Systems:   CV: NEGATIVE for chest pain, palpitations or peripheral edema  C: NEGATIVE for fever, chills, change in weight  E/M: NEGATIVE for ear, mouth and throat problems  R: NEGATIVE for significant cough or SOB          Physical Exam:    All vitals have been reviewed  Patient Vitals for the past 24 hrs:   BP Temp Temp src Pulse Heart Rate Resp SpO2   07/20/20 0727 122/42 98.9  F (37.2  C) Oral 71 -- 16 97 %   07/20/20 0558 (!) 151/55 98.9  F (37.2  C) Oral 78 -- 16 97 %   07/20/20 0024 (!) 165/54 100  F (37.8  C) Oral 85 -- 16 98 %   07/19/20 2300 -- -- Oral -- -- -- --   07/19/20 2042 139/51 -- -- -- -- -- --   07/19/20 1900 (!) 134/32 99  F (37.2  C) Oral -- 72 20 100 %   07/19/20 1516 (!) 164/62 98.8  F (37.1  C) Oral -- 84 19 100 %     No intake or output data in the 24 hours ending 07/20/20 1214    Patient sitting comfortably  Dressing clean, dry and intact  No ecchymosis or erythema over entirety of the left leg  No notably swelling or edema  Full ROM of left knee - 0 to 120 degrees  Stable to varus and valgus stress  Hip flexes to 100 degrees  Internal rotation 30 degrees, External rotation 15 degrees  No pain with internal/external rotation while in flexion  No TTP over great trochanter  Bilateral calves are soft, non-tender.  Bilateral lower extremity is NVI.  Sensation intact bilateral lower extremities to light touch  5/5 motor with resisted dorsi and plantar flexion bilaterally  5/5 hip flexors bilaterally  5/5 quad bilaterally  5/5 EHL bilaterally  +Dp pulse          Data:   All laboratory data reviewed  Results for orders placed or performed during the hospital encounter of 07/18/20   XR Chest Port 1 View     Status: None    Narrative    EXAM: XR CHEST PORT 1 VW  LOCATION: [facility]  DATE/TIME: 7/18/2020 11:51 PM    INDICATION: Fever. Postop.  COMPARISON: 03/06/2020      Impression    IMPRESSION: Negative portable chest.   US Renal Complete     Status: None    Narrative    EXAM: US RENAL COMPLETE  LOCATION: [facility]  DATE/TIME: 7/19/2020 12:11 AM    INDICATION: Persistent pyuria and dysuria.  COMPARISON: None.  TECHNIQUE: Routine Bilateral Renal and Bladder Ultrasound.    FINDINGS:    RIGHT KIDNEY: 12.1  x 4.8 x 4.2 cm. No hydronephrosis. Normal renal cortical echogenicity and thickness. 3.8 x 4.0 x 3.9 cm simple cyst right kidney for which no further follow-up is necessary.     LEFT KIDNEY: 11.4 x 5.6 x 5.2 cm. Minimal hydronephrosis. Normal renal cortical echogenicity and thickness. No evidence for exophytic cystic lesion.     BLADDER: Normal.      Impression    IMPRESSION:  1.  Minimal left-sided hydronephrosis. No right-sided hydronephrosis. Recommend correlation for left-sided flank pain.    2.  Normal renal cortical echogenicity and thickness.    3.  Exophytic simple cyst involving the right kidney for which no further follow-up is necessary.    4.  Bladder unremarkable.   US Lower Extremity Venous Duplex Bilateral     Status: None    Narrative    VENOUS ULTRASOUND BILATERAL LOWER EXTREMITY  7/19/2020 10:19 AM     HISTORY: Leg swelling, recent surgery, rule out deep vein thrombosis.       COMPARISON: None.    FINDINGS:  Examination of the deep veins with graded compression and  color flow Doppler with spectral wave form analysis shows no evidence  of thrombus in the common femoral vein, femoral vein, popliteal vein  or calf veins. There is some bilateral superficial thrombophlebitis in  the calf involving the small saphenous veins.      Impression    IMPRESSION:  1. No evidence of deep venous thrombosis.    2. Bilateral superficial calf thrombophlebitis involving the small  saphenous veins, left greater than right.    NUVIA BROWER MD   CT Abdomen Pelvis w/o Contrast     Status: None    Narrative    CT ABDOMEN PELVIS W/O CONTRAST 7/19/2020 8:23 AM    CLINICAL HISTORY: left hydro, eval for stone, pyelo  TECHNIQUE: CT scan of the abdomen and pelvis was performed without IV  contrast. Multiplanar reformats were obtained. Dose reduction  techniques were used.  CONTRAST: None.    COMPARISON: Renal ultrasound 7/19/2020    FINDINGS:   LOWER CHEST: Normal.    HEPATOBILIARY: Diffuse hepatic steatosis.  Cholecystectomy.    PANCREAS: Diffuse pancreatic parenchymal atrophy. No acute  peripancreatic inflammatory changes.    SPLEEN: Normal size.    ADRENAL GLANDS: 10 mm left adrenal nodule with noncontrast attenuation  characteristics suggestive of a lipid rich adenoma. Normal right  adrenal gland.    KIDNEYS/BLADDER: 4 mm nonobstructing right renal calculus. No left  intrarenal calculi. No ureteral calculi, although the distal aspect of  the left ureter is obscured by streak artifact from a left hip  arthroplasty. No hydronephrosis. There are small left parapelvic renal  cysts, which likely accounts for the sonographic abnormality and do  not require further imaging evaluation. Right renal cyst which does  not require further imaging evaluation.    BOWEL: No bowel obstruction. Normal appendix. Moderate amount of stool  in the colon. Small ventral abdominal wall hernia containing a short  segment of nonobstructed small bowel.    LYMPH NODES: Normal.    VASCULATURE: Normal caliber abdominal aorta with scattered  atheromatous disease.    PELVIC ORGANS: No significant pelvic mass, although evaluation is  limited by streak artifact.    OTHER: No significant free fluid. No intraperitoneal free air.    MUSCULOSKELETAL: Left hip arthroplasty. Lumbar fusion hardware.  Skeletal degenerative changes.      Impression    IMPRESSION:   1.  Nonobstructing 4 mm right renal calculus. No left intrarenal  calculi.  2.  Parapelvic left renal cysts, which likely accounts for the  sonographic abnormality. No hydronephrosis.    ELLA STANLEY MD   CBC with platelets differential     Status: Abnormal   Result Value Ref Range    WBC 5.7 4.0 - 11.0 10e9/L    RBC Count 2.85 (L) 3.8 - 5.2 10e12/L    Hemoglobin 8.4 (L) 11.7 - 15.7 g/dL    Hematocrit 26.3 (L) 35.0 - 47.0 %    MCV 92 78 - 100 fl    MCH 29.5 26.5 - 33.0 pg    MCHC 31.9 31.5 - 36.5 g/dL    RDW 11.9 10.0 - 15.0 %    Platelet Count 164 150 - 450 10e9/L    Diff Method Automated Method      % Neutrophils 84.5 %    % Lymphocytes 6.6 %    % Monocytes 7.7 %    % Eosinophils 0.7 %    % Basophils 0.2 %    % Immature Granulocytes 0.3 %    Nucleated RBCs 0 0 /100    Absolute Neutrophil 4.8 1.6 - 8.3 10e9/L    Absolute Lymphocytes 0.4 (L) 0.8 - 5.3 10e9/L    Absolute Monocytes 0.4 0.0 - 1.3 10e9/L    Absolute Eosinophils 0.0 0.0 - 0.7 10e9/L    Absolute Basophils 0.0 0.0 - 0.2 10e9/L    Abs Immature Granulocytes 0.0 0 - 0.4 10e9/L    Absolute Nucleated RBC 0.0    Comprehensive metabolic panel     Status: Abnormal   Result Value Ref Range    Sodium 134 133 - 144 mmol/L    Potassium 4.2 3.4 - 5.3 mmol/L    Chloride 103 94 - 109 mmol/L    Carbon Dioxide 25 20 - 32 mmol/L    Anion Gap 6 3 - 14 mmol/L    Glucose 265 (H) 70 - 99 mg/dL    Urea Nitrogen 16 7 - 30 mg/dL    Creatinine 0.72 0.52 - 1.04 mg/dL    GFR Estimate 82 >60 mL/min/[1.73_m2]    GFR Estimate If Black >90 >60 mL/min/[1.73_m2]    Calcium 8.9 8.5 - 10.1 mg/dL    Bilirubin Total 0.5 0.2 - 1.3 mg/dL    Albumin 2.7 (L) 3.4 - 5.0 g/dL    Protein Total 6.4 (L) 6.8 - 8.8 g/dL    Alkaline Phosphatase 48 40 - 150 U/L    ALT 33 0 - 50 U/L    AST 21 0 - 45 U/L   Lipase     Status: None   Result Value Ref Range    Lipase 85 73 - 393 U/L   Lactic acid whole blood     Status: None   Result Value Ref Range    Lactic Acid 1.3 0.7 - 2.0 mmol/L   UA with Microscopic     Status: Abnormal   Result Value Ref Range    Color Urine Yellow     Appearance Urine Cloudy     Glucose Urine >1000 (A) NEG^Negative mg/dL    Bilirubin Urine Negative NEG^Negative    Ketones Urine 10 (A) NEG^Negative mg/dL    Specific Gravity Urine 1.024 1.003 - 1.035    Blood Urine Moderate (A) NEG^Negative    pH Urine 6.0 5.0 - 7.0 pH    Protein Albumin Urine 100 (A) NEG^Negative mg/dL    Urobilinogen mg/dL Normal 0.0 - 2.0 mg/dL    Nitrite Urine Negative NEG^Negative    Leukocyte Esterase Urine Large (A) NEG^Negative    Source Midstream Urine     WBC Urine >182 (H) 0 - 5 /HPF    RBC Urine 66 (H) 0  - 2 /HPF    WBC Clumps Present (A) NEG^Negative /HPF    Yeast Urine Many (A) NEG^Negative /HPF    Transitional Epi 5 (H) 0 - 1 /HPF    Mucous Urine Present (A) NEG^Negative /LPF   Basic metabolic panel     Status: Abnormal   Result Value Ref Range    Sodium 136 133 - 144 mmol/L    Potassium 3.7 3.4 - 5.3 mmol/L    Chloride 107 94 - 109 mmol/L    Carbon Dioxide 27 20 - 32 mmol/L    Anion Gap 2 (L) 3 - 14 mmol/L    Glucose 204 (H) 70 - 99 mg/dL    Urea Nitrogen 14 7 - 30 mg/dL    Creatinine 0.71 0.52 - 1.04 mg/dL    GFR Estimate 84 >60 mL/min/[1.73_m2]    GFR Estimate If Black >90 >60 mL/min/[1.73_m2]    Calcium 8.5 8.5 - 10.1 mg/dL   CBC with platelets     Status: Abnormal   Result Value Ref Range    WBC 3.9 (L) 4.0 - 11.0 10e9/L    RBC Count 2.60 (L) 3.8 - 5.2 10e12/L    Hemoglobin 7.7 (L) 11.7 - 15.7 g/dL    Hematocrit 23.8 (L) 35.0 - 47.0 %    MCV 92 78 - 100 fl    MCH 29.6 26.5 - 33.0 pg    MCHC 32.4 31.5 - 36.5 g/dL    RDW 12.1 10.0 - 15.0 %    Platelet Count 157 150 - 450 10e9/L   Glucose by meter     Status: Abnormal   Result Value Ref Range    Glucose 230 (H) 70 - 99 mg/dL   Glucose by meter     Status: Abnormal   Result Value Ref Range    Glucose 183 (H) 70 - 99 mg/dL   Glucose by meter     Status: Abnormal   Result Value Ref Range    Glucose 217 (H) 70 - 99 mg/dL   Glucose by meter     Status: Abnormal   Result Value Ref Range    Glucose 208 (H) 70 - 99 mg/dL   Glucose by meter     Status: Abnormal   Result Value Ref Range    Glucose 197 (H) 70 - 99 mg/dL   Hemoglobin A1c     Status: Abnormal   Result Value Ref Range    Hemoglobin A1C 8.5 (H) 0 - 5.6 %   Social Work IP Consult     Status: None ()    Merlene Musa     7/19/2020 11:57 AM  Care Transition Initial Assessment - SW     Met with: spoke with pt's  Kwadwo    Active Problems:    Fever postop       DATA  Lives With: spouse    Quality of Family Relationships: helpful, involved, supportive  Quality of Family Relationships: helpful,  involved, supportive  Transportation Anticipated: family or friend will provide    ASSESSMENT  Pt discharge yesterday and now in observation.  Pt's    reports that pt was doing ok once she left the hosptial, however,   once she returned home and went to use the bathroom she was not   able to get herself of the toilet therefore  had to call   their son and he had to come over to help get pt off the toilet.    Pt was discharge yesterday with a referral to [facility].  Pt uses a   cane and walker, no falls in the last 6 months.      PLAN  Financial costs for the patient includes: unknown   Patient given options and choices for discharge: discharge plans   uncertain   Patient/family is agreeable to the plan? NA at this time  Transportation/person available to transport on day of discharge    is pt's   Patient Goals and Preferences: home  Patient anticipates discharging to:  Waiting on PT consults.        Merlene DHALIWAL, [facility]  Inpatient Care Coordination   [facility]   [phone]      Addendum:  Chart reviewed.  PT rec's HC PT.  Referral sent again   today to [facility] for RN/PT. MD will need to write HC orders in   discharge orders.        Urine Culture     Status: None (Preliminary result)    Specimen: Midstream Urine   Result Value Ref Range    Specimen Description Midstream Urine     Special Requests Specimen received in preservative     Culture Micro Culture in progress    Blood culture     Status: None (Preliminary result)    Specimen: Blood    Right Arm   Result Value Ref Range    Specimen Description Blood Right Arm     Culture Micro No growth after 1 day    Blood culture     Status: None (Preliminary result)    Specimen: Blood    Left Hand   Result Value Ref Range    Specimen Description Blood Left Hand     Culture Micro No growth after 1 day           Attestation:  I have reviewed today's vital signs, notes, medications, labs and imaging with Dr. Arvizu and   Esme.  Amount of time performed on this consult: 90 minutes.    Adina Amin PA-C

## 2020-07-20 NOTE — CONSULTS
Consult Date:  2020      ORTHOPEDIC CONSULTATION      I was asked to provide orthopedic consultation for this woman, who is a patient of Dr. Victoria and Dr. Caicedo.  She recently had a total hip arthroplasty.  She is complaining of bilateral lower extremity weakness.        The case was discussed with Dr. Victoria, and will see the patient back in clinic.  I reviewed the consult from HARVEY Arrieta, and I agree with her assessment and plan.         SHANELL LAWLER MD             D: 2020   T: 2020   MT: KEESHA      Name:     KATHY PERRY   MRN:      [id number]        Account:       [id number]   :      1945           Consult Date:  2020      Document: [id number]       cc: Jayson Victoria MD

## 2020-07-20 NOTE — PLAN OF CARE
PRIMARY DIAGNOSIS:Post Op FEVER  OUTPATIENT/OBSERVATION GOALS TO BE MET BEFORE DISCHARGE:  ADLs back to baseline: No    Activity and level of assistance: pt transfers self to commode, refused staff assistance.     Pain status: Improved-controlled with oral pain medications.    Return to near baseline physical activity: No     Discharge Planner Nurse   Safe discharge environment identified: Yes  Barriers to discharge: Yes       Entered by: Vladimir Baez 07/19/2020 9:56 PM     Please review provider order for any additional goals.   Nurse to notify provider when observation goals have been met and patient is ready for discharge.

## 2020-07-20 NOTE — PLAN OF CARE
"PT: order received and evaluation completed. Per chart, pt is a 75 year old female with ASCVD s/p PCI, DM2, hypertension, hypothyroidism, chronic back pain, and recent elective L RAFAEL with no precautions. Pt presented to ED on 7/18/2020 with weakness and fever 4 hours after discharging home s/p L RAFAEL.    When prompted with specifics about her living environment, pt states \"you should have all that in the computer, I was just here in the hospital\". Pt's  later states that pt has a flight of stairs to manuever with unilateral railing on R side to get up to bedroom/bathroom. 1 ANGELLA with railing. Per chart review, pt does have bedroom and 1/2 bath on main level that during past hospitalization stated she would be able to stay.    Discharge Planner PT   Patient plan for discharge: home with   Current status: Instructed pt to perform sit to stand transfer with SBA and assist with toilet transfer SBA with FWW. Safe technique demonstrated throughout with good hand placement. Time spent at end of session with  who is worried that patient will fall again and that he would like continued PT for strengthening. He is also worried that if patient has episode of weakness again, he would like to have some tips about getting her safely up. Talked through HHPT recommendation to assess home environment. Instructed patient to ambulate with FWW ~20 feet with SBA. Pt with antalgic patterning but no LOB or instability. Instructed pt to ambulate full flight of stairs with unilateral R handrail (ascending) and cane in contralateral UE. Pt able to perform CGA/SBA with step to patterning without LOB. Pt and  reporting that they feel navigating stairs went well.     Barriers to return to prior living situation: none anticipated  Recommendations for discharge: home with HHPT  Rationale for recommendations: Pt presents below baseline and would benefit from skilled HHPT to address functional impairments and home safety " as it would be an extraordinary effort for patient to leave home.       Entered by: Gerard Quevedo 07/20/2020 6:57 PM

## 2020-07-20 NOTE — PLAN OF CARE
PRIMARY DIAGNOSIS: POST OP FEVER  OUTPATIENT/OBSERVATION GOALS TO BE MET BEFORE DISCHARGE:  ADLs back to baseline: Yes    Activity and level of assistance: Up with 1, pt refusing help with transfers    Pain status: Improved-controlled with oral pain medications.    Return to near baseline physical activity: Yes     Discharge Planner Nurse   Safe discharge environment identified: Yes  Barriers to discharge: Yes       Entered by: Vladimir Baez 07/20/2020 12:07 AM     Please review provider order for any additional goals.   Nurse to notify provider when observation goals have been met and patient is ready for discharge.

## 2020-07-20 NOTE — PLAN OF CARE
"PRIMARY DIAGNOSIS:Post OP FEVER/BLE weakness  OUTPATIENT/OBSERVATION GOALS TO BE MET BEFORE DISCHARGE:  1. ADLs back to baseline: Yes    2. Activity and level of assistance: Up with SBA and walker    3. Pain status: Improved-controlled with oral pain medications, ice pack, Voltaten gel     4. Return to near baseline physical activity: Yes     Discharge Planner Nurse   Safe discharge environment identified: Yes  Barriers to discharge: Ortho surg consult      Entered by: Yeni Snow 07/20/2020     Please review provider order for any additional goals.   Nurse to notify provider when observation goals have been met and patient is ready for discharge.      Up SBA w/walker.  at bedside. Continue IV unasyn for UTI.  Orhto surg to see. /51 (BP Location: Left arm)   Pulse 71   Temp 99.1  F (37.3  C) (Oral)   Resp 16   Ht 1.727 m (5' 8\")   Wt 79.4 kg (175 lb 1.6 oz)   SpO2 99%   BMI 26.62 kg/m    "

## 2020-07-21 ENCOUNTER — APPOINTMENT (OUTPATIENT)
Dept: PHYSICAL THERAPY | Facility: CLINIC | Age: 75
DRG: 470 | End: 2020-07-21
Attending: PHYSICIAN ASSISTANT
Payer: COMMERCIAL

## 2020-07-21 VITALS
HEIGHT: 68 IN | HEART RATE: 71 BPM | BODY MASS INDEX: 26.54 KG/M2 | DIASTOLIC BLOOD PRESSURE: 50 MMHG | OXYGEN SATURATION: 98 % | WEIGHT: 175.1 LBS | TEMPERATURE: 98.3 F | SYSTOLIC BLOOD PRESSURE: 130 MMHG | RESPIRATION RATE: 16 BRPM

## 2020-07-21 LAB
GLUCOSE BLDC GLUCOMTR-MCNC: 137 MG/DL (ref 70–99)
GLUCOSE BLDC GLUCOMTR-MCNC: 198 MG/DL (ref 70–99)
GLUCOSE BLDC GLUCOMTR-MCNC: 245 MG/DL (ref 70–99)
HGB BLD-MCNC: 8.2 G/DL (ref 11.7–15.7)

## 2020-07-21 PROCEDURE — 00000146 ZZHCL STATISTIC GLUCOSE BY METER IP

## 2020-07-21 PROCEDURE — 97116 GAIT TRAINING THERAPY: CPT | Mod: GP,CQ | Performed by: PHYSICAL THERAPY ASSISTANT

## 2020-07-21 PROCEDURE — 36415 COLL VENOUS BLD VENIPUNCTURE: CPT | Performed by: PHYSICIAN ASSISTANT

## 2020-07-21 PROCEDURE — 96372 THER/PROPH/DIAG INJ SC/IM: CPT

## 2020-07-21 PROCEDURE — 25000132 ZZH RX MED GY IP 250 OP 250 PS 637: Performed by: INTERNAL MEDICINE

## 2020-07-21 PROCEDURE — 25000132 ZZH RX MED GY IP 250 OP 250 PS 637: Performed by: PHYSICIAN ASSISTANT

## 2020-07-21 PROCEDURE — 85018 HEMOGLOBIN: CPT | Performed by: PHYSICIAN ASSISTANT

## 2020-07-21 PROCEDURE — 99217 ZZC OBSERVATION CARE DISCHARGE: CPT | Performed by: HOSPITALIST

## 2020-07-21 PROCEDURE — G0378 HOSPITAL OBSERVATION PER HR: HCPCS

## 2020-07-21 RX ORDER — OXYCODONE HYDROCHLORIDE 5 MG/1
5 TABLET ORAL
Qty: 10 TABLET | Refills: 0 | Status: SHIPPED | OUTPATIENT
Start: 2020-07-21 | End: 2021-08-12

## 2020-07-21 RX ORDER — POLYETHYLENE GLYCOL 3350 17 G/17G
17 POWDER, FOR SOLUTION ORAL DAILY PRN
COMMUNITY
Start: 2020-07-21 | End: 2021-05-03

## 2020-07-21 RX ORDER — ACETAMINOPHEN 325 MG/1
650 TABLET ORAL EVERY 4 HOURS PRN
COMMUNITY
Start: 2020-07-21 | End: 2021-05-03

## 2020-07-21 RX ADMIN — PANTOPRAZOLE SODIUM 40 MG: 40 TABLET, DELAYED RELEASE ORAL at 06:02

## 2020-07-21 RX ADMIN — DICLOFENAC SODIUM 2 G: 10 GEL TOPICAL at 11:08

## 2020-07-21 RX ADMIN — ACETAMINOPHEN 650 MG: 325 TABLET, FILM COATED ORAL at 06:02

## 2020-07-21 RX ADMIN — OXYCODONE HYDROCHLORIDE 5 MG: 5 TABLET ORAL at 08:54

## 2020-07-21 RX ADMIN — GLIMEPIRIDE 4 MG: 4 TABLET ORAL at 08:27

## 2020-07-21 RX ADMIN — ACETAMINOPHEN 1000 MG: 500 TABLET, FILM COATED ORAL at 08:53

## 2020-07-21 RX ADMIN — ASPIRIN 325 MG: 325 TABLET, COATED ORAL at 08:27

## 2020-07-21 RX ADMIN — INSULIN ASPART 3 UNITS: 100 INJECTION, SOLUTION INTRAVENOUS; SUBCUTANEOUS at 11:07

## 2020-07-21 RX ADMIN — NEBIVOLOL HYDROCHLORIDE 5 MG: 10 TABLET ORAL at 08:27

## 2020-07-21 RX ADMIN — LEVOTHYROXINE SODIUM 125 MCG: 0.1 TABLET ORAL at 06:01

## 2020-07-21 RX ADMIN — LIRAGLUTIDE 1.2 MG: 6 INJECTION SUBCUTANEOUS at 09:02

## 2020-07-21 RX ADMIN — AMOXICILLIN 250 MG: 250 CAPSULE ORAL at 06:02

## 2020-07-21 RX ADMIN — VITAMIN D, TAB 1000IU (100/BT) 50 MCG: 25 TAB at 08:27

## 2020-07-21 RX ADMIN — INSULIN ASPART 2 UNITS: 100 INJECTION, SOLUTION INTRAVENOUS; SUBCUTANEOUS at 09:05

## 2020-07-21 RX ADMIN — OXYCODONE HYDROCHLORIDE 5 MG: 5 TABLET ORAL at 06:02

## 2020-07-21 RX ADMIN — DICLOFENAC SODIUM 2 G: 10 GEL TOPICAL at 06:06

## 2020-07-21 NOTE — PLAN OF CARE
"PRIMARY DIAGNOSIS: ACUTE PAIN  OUTPATIENT/OBSERVATION GOALS TO BE MET BEFORE DISCHARGE:  1. Pain Status: Improved-controlled with oral pain medications.    2. Return to near baseline physical activity: Yes    3. Cleared for discharge by consultants (if involved): N/A    Discharge Planner Nurse   Safe discharge environment identified: Yes  Barriers to discharge: Yes       Entered by: Kamilla Poe 07/21/2020 4:57 AM     Please review provider order for any additional goals.   Nurse to notify provider when observation goals have been met and patient is ready for discharge.    Refusing 4 am vitals stating she is \"not critical\". Pt allowing ice to area at this time but resistant to recommendations. Frustrated and flat with staff regarding hospitalization and care Occasionally sarcastic with answering questions.. Endorses tingling but states this is not new. Pt complaining of \"significant\" swelling of left leg. 1+ edema notes to hip, leg, and ankle. Urinary frequency and urgency. Incision dressing with some shadowing no change from prior. Refusing assistance and noted to be in bathroom by NST. Refusing alarms. Up to chair overnight on own. Will continue to monitor.   "

## 2020-07-21 NOTE — PLAN OF CARE
"Patient's After Visit Summary was reviewed with patient and/or spouse.   Patient verbalized understanding of After Visit Summary, recommended follow up and was given an opportunity to ask questions.   Discharge medications sent home with patient/family: Patient will  Oxycodone, Tylenol, Miconazole, and Miralax at [facility].   Discharged with spouse and nurse with all belongings. Patient medically cleared to discharge.   BS at 1100 was 245 and 3 units given prior to discharge. Patient continues to rate her (L) hip/back pain a 7-8/10 and stated she will take pain medication when she gets home.  will transport home.   /50 (BP Location: Right arm)   Pulse 71   Temp 98.3  F (36.8  C) (Oral)   Resp 16   Ht 1.727 m (5' 8\")   Wt 79.4 kg (175 lb 1.6 oz)   SpO2 98%   BMI 26.62 kg/m        "

## 2020-07-21 NOTE — PROGRESS NOTES
"   07/20/20 6109   Quick Adds   Type of Visit Initial PT Evaluation   Living Environment   Living Environment Comment When prompted about living environment, pt states \"you should have all that in the computer, I was just here\". Pt's  later states that pt has a flight of stairs to manuever with unilateral railing on R side to get up to bedroom/bathroom. 1 ANGELLA with railing. Per chart review, pt does have bedroom and 1/2 bath on main level.   Self-Care   Usual Activity Tolerance moderate   Current Activity Tolerance moderate   Equipment Currently Used at Home cane, straight;walker, rolling   Functional Level Prior   Ambulation 0-->independent   Transferring 0-->independent   Toileting 0-->independent   Bathing 0-->independent   Fall history within last six months yes   Number of times patient has fallen within last six months 1   General Information   Onset of Illness/Injury or Date of Surgery - Date 07/18/20   Referring Physician Adina Amin, SHYANN    Patient/Family Goals Statement To not fall   Pertinent History of Current Problem (include personal factors and/or comorbidities that impact the POC) Per chart, pt is a 75 year old female with ASCVD s/p PCI, DM2, hypertension, hypothyroidism, chronic back pain, and recent hip replacement with Dr Victoria who presented to [facility] ED on 7/18/2020, 4 hours after discharge home from hip replacement, with weakness and fever to 101.7.  Patient was recently at [facility] (7/15-7/18) for an elective left RAFAEL.   Precautions/Limitations no known precautions/limitations   Weight-Bearing Status - LLE weight-bearing as tolerated   Weight-Bearing Status - RLE full weight-bearing   Cognitive Status Examination   Orientation orientation to person, place and time   Level of Consciousness alert   Follows Commands and Answers Questions able to follow multistep instructions;100% of the time   Personal Safety and Judgment intact   Memory intact   Pain Assessment   Patient Currently in " "Pain Yes, see Vital Sign flowsheet  (8/10 L hip)   Range of Motion (ROM)   ROM Comment B LE WFL   Strength   Strength Comments Pt functionally demonstrated >3/5 B LE strength   Bed Mobility   Bed Mobility Comments not assessed   Transfer Skills   Transfer Comments SBA sit to stand with FWW   Gait   Gait Comments SBA with FWW   Balance   Balance Comments good unsupported sitting; good standing with FWW   Sensory Examination   Sensory Perception Comments No c/o B/N/T   General Therapy Interventions   Planned Therapy Interventions balance training;bed mobility training;gait training;neuromuscular re-education;strengthening;transfer training;home program guidelines;progressive activity/exercise   Clinical Impression   Criteria for Skilled Therapeutic Intervention yes, treatment indicated   PT Diagnosis impaired functional mobility   Influenced by the following impairments pain; decreased activity tolerance   Functional limitations due to impairments impaired ambulation, stairs   Clinical Presentation Stable/Uncomplicated   Clinical Presentation Rationale Pt is medically stable   Clinical Decision Making (Complexity) Low complexity   Therapy Frequency Daily   Predicted Duration of Therapy Intervention (days/wks) 2 days   Anticipated Equipment Needs at Discharge walker   Anticipated Discharge Disposition Home with Assist   Risk & Benefits of therapy have been explained Yes   Patient, Family & other staff in agreement with plan of care Yes   [facility] AM-PAC  \"6 Clicks\" V.2 Basic Mobility Inpatient Short Form   1. Turning from your back to your side while in a flat bed without using bedrails? 3 - A Little   2. Moving from lying on your back to sitting on the side of a flat bed without using bedrails? 3 - A Little   3. Moving to and from a bed to a chair (including a wheelchair)? 4 - None   4. Standing up from a chair using your arms (e.g., wheelchair, or bedside chair)? 4 - None   5. To walk in hospital room? 4 - " None   6. Climbing 3-5 steps with a railing? 3 - A Little   Basic Mobility Raw Score (Score out of 24.Lower scores equate to lower levels of function) 21   Total Evaluation Time   Total Evaluation Time (Minutes) 3

## 2020-07-21 NOTE — DISCHARGE SUMMARY
[facility]  Hospitalist Discharge Summary      Date of Admission:  7/18/2020  Date of Discharge:  7/21/2020  Discharging Provider: Tan Carbone MD      Discharge Diagnoses   Post-operative pain and weakness after L total hip arthroplasty. Post-operative fever. Candidal UTI    Follow-ups Needed After Discharge   Follow-up Appointments     Follow-up and recommended labs and tests       Follow up with primary care provider, Gaye Zimmer, within 7 days for   hospital follow- up.  The following labs/tests are recommended: hemoglobin   re-check. Follow-up with Izaiah Victoria and Tanner.             Unresulted Labs Ordered in the Past 30 Days of this Admission     Date and Time Order Name Status Description    7/18/2020 2201 Blood culture Preliminary     7/18/2020 2201 Blood culture Preliminary       These results will be followed up by hospitalist group    Discharge Disposition   Discharged to home  Condition at discharge: Stable      Hospital Course   Sarah Longo is a 75 year old female with a past medical history significant for chronic artery disease status post previous stenting, diabetes mellitus type 2, hypertension, hypothyroidism, chronic back pain who presents with postop fever.  Patient was recently admitted for left total hip arthroplasty and was discharged home earlier today in the afternoon.  During this admission she had postop fever and was diagnosed with a UTI.  She was discharged home on oral ciprofloxacin.     Patient reports that she was feeling well when she left the hospital.  She was able to ambulate.  However after going home she again had episode of fever and felt very weak.  She could hardly walk.  She had ongoing troubles with her postoperative pain.  She took 1 dose of antibiotics but did not feel better.  Denies any chest pain, shortness of breath.  She has noted some swelling in her left leg.  Given this concerns she was brought back to the ER and observation was  requested for IV antibiotics for her UTI.    Patient was admitted to the Obs Unit. She was seen and cleared by ortho and neurosurgery. Xrays were negative. She was able to work with PT and felt was safe to discharge home. Today she has been insisting on receiving IV iron. She hammond snot have an indication for IV iron. I explained this to her. She denies chest pain, sob, abdo pain, n/v/d. She will discharge home with services.    Consultations This Hospital Stay   SOCIAL WORK IP CONSULT  PHYSICAL THERAPY ADULT IP CONSULT  [facility] IP CONSULT  SPINE SURGERY ADULT IP CONSULT  SPINE SURGERY ADULT IP CONSULT  ORTHOPEDIC SURGERY IP CONSULT  PHYSICAL THERAPY ADULT IP CONSULT    Code Status   Full Code    Time Spent on this Encounter   I, Tan Carbone MD, personally saw the patient today and spent greater than 30 minutes discharging this patient.       Tan Carbone MD  [facility]  ______________________________________________________________________    Physical Exam   Vital Signs: Temp: 97.7  F (36.5  C) Temp src: Oral BP: 135/52   Heart Rate: 64 Resp: 18 SpO2: 98 % O2 Device: None (Room air)    Weight: 175 lbs 1.6 oz  Constitutional: awake, alert, cooperative, no apparent distress, and appears stated age  Eyes: Lids and lashes normal, pupils equal, round and reactive to light, extra ocular muscles intact, sclera clear, conjunctiva normal  ENT: Normocephalic, without obvious abnormality, atraumatic, sinuses nontender on palpation, external ears without lesions, oral pharynx with moist mucous membranes, tonsils without erythema or exudates, gums normal and good dentition.  Respiratory: No increased work of breathing, good air exchange, clear to auscultation bilaterally, no crackles or wheezing  Cardiovascular: Normal apical impulse, regular rate and rhythm, normal S1 and S2, no S3 or S4, and no murmur noted  GI: No scars, normal bowel sounds, soft, non-distended, non-tender, no masses  palpated, no hepatosplenomegally  Skin: no bruising or bleeding  Musculoskeletal: +a edema on left       Primary Care Physician   Gaye Zimmer    Discharge Orders      Home care nursing referral      Home Care PT Referral for Hospital Discharge      Reason for your hospital stay    Weakness, post-operative pain     Follow-up and recommended labs and tests     Follow up with primary care provider, Gaye Zimmer, within 7 days for hospital follow- up.  The following labs/tests are recommended: hemoglobin re-check. Follow-up with Izaiah Victoria and Tanner.     Activity    Your activity upon discharge: activity as tolerated     MD face to face encounter    Documentation of Face to Face and Certification for Home Health Services    I certify that patient: Sarah Longo is under my care and that I, or a nurse practitioner or physician's assistant working with me, had a face-to-face encounter that meets the physician face-to-face encounter requirements with this patient on: 7/21/2020.    This encounter with the patient was in whole, or in part, for the following medical condition, which is the primary reason for home health care: recent hip arthroplasty.    I certify that, based on my findings, the following services are medically necessary home health services: Nursing and Physical Therapy.    My clinical findings support the need for the above services because: Nurse is needed: For complex aftercare of surgical procedures because the patient needs instruction and cannot perform care on their own due to: pain, decreased mobility.. and Physical Therapy Services are needed to assess and treat the following functional impairments: weakness, pain after hip arthroplasty    Further, I certify that my clinical findings support that this patient is homebound (i.e. absences from home require considerable and taxing effort and are for medical reasons or Orthodoxy services or infrequently or of short duration when for  other reasons) because: Requires assistance of another person or specialized equipment to access medical services because patient: Requires supervision of another for safe transfer...    Based on the above findings. I certify that this patient is confined to the home and needs intermittent skilled nursing care, physical therapy and/or speech therapy.  The patient is under my care, and I have initiated the establishment of the plan of care.  This patient will be followed by a physician who will periodically review the plan of care.  Physician/Provider to provide follow up care: Gaye Zimmer    Attending hospital physician (the Medicare certified [place] provider): Tan Carbone MD  Physician Signature: See electronic signature associated with these discharge orders.  Date: 7/21/2020     Diet    Follow this diet upon discharge: Orders Placed This Encounter      Moderate Consistent CHO Diet       Significant Results and Procedures   Most Recent 3 CBC's:  Recent Labs   Lab Test 07/21/20  0523 07/19/20  0511 07/18/20 2303 07/17/20  0559   WBC  --  3.9* 5.7 5.9   HGB 8.2* 7.7* 8.4* 8.6*   MCV  --  92 92 93   PLT  --  157 164 131*     Most Recent 3 BMP's:  Recent Labs   Lab Test 07/19/20  0511 07/18/20  2303 12/11/19  0944    134 140   POTASSIUM 3.7 4.2 4.4   CHLORIDE 107 103 104   CO2 27 25 27   BUN 14 16 16   CR 0.71 0.72 0.93   ANIONGAP 2* 6 13.4   LUCIUS 8.5 8.9 10.7*   * 265* 200*     Most Recent 2 LFT's:  Recent Labs   Lab Test 07/18/20  2303 10/23/19   AST 21 17   ALT 33 30*   ALKPHOS 48 37   BILITOTAL 0.5 0.3     Most Recent Urinalysis:  Recent Labs   Lab Test 07/18/20 2222   COLOR Yellow   APPEARANCE Cloudy   URINEGLC >1000*   URINEBILI Negative   URINEKETONE 10*   SG 1.024   UBLD Moderate*   URINEPH 6.0   PROTEIN 100*   NITRITE Negative   LEUKEST Large*   RBCU 66*   WBCU >182*   ,   Results for orders placed or performed during the hospital encounter of 07/18/20   XR Chest Port 1 View     Narrative    EXAM: XR CHEST PORT 1 VW  LOCATION: [facility]  DATE/TIME: 7/18/2020 11:51 PM    INDICATION: Fever. Postop.  COMPARISON: 03/06/2020      Impression    IMPRESSION: Negative portable chest.   US Renal Complete    Narrative    EXAM: US RENAL COMPLETE  LOCATION: [facility]  DATE/TIME: 7/19/2020 12:11 AM    INDICATION: Persistent pyuria and dysuria.  COMPARISON: None.  TECHNIQUE: Routine Bilateral Renal and Bladder Ultrasound.    FINDINGS:    RIGHT KIDNEY: 12.1 x 4.8 x 4.2 cm. No hydronephrosis. Normal renal cortical echogenicity and thickness. 3.8 x 4.0 x 3.9 cm simple cyst right kidney for which no further follow-up is necessary.     LEFT KIDNEY: 11.4 x 5.6 x 5.2 cm. Minimal hydronephrosis. Normal renal cortical echogenicity and thickness. No evidence for exophytic cystic lesion.     BLADDER: Normal.      Impression    IMPRESSION:  1.  Minimal left-sided hydronephrosis. No right-sided hydronephrosis. Recommend correlation for left-sided flank pain.    2.  Normal renal cortical echogenicity and thickness.    3.  Exophytic simple cyst involving the right kidney for which no further follow-up is necessary.    4.  Bladder unremarkable.   US Lower Extremity Venous Duplex Bilateral    Narrative    VENOUS ULTRASOUND BILATERAL LOWER EXTREMITY  7/19/2020 10:19 AM     HISTORY: Leg swelling, recent surgery, rule out deep vein thrombosis.       COMPARISON: None.    FINDINGS:  Examination of the deep veins with graded compression and  color flow Doppler with spectral wave form analysis shows no evidence  of thrombus in the common femoral vein, femoral vein, popliteal vein  or calf veins. There is some bilateral superficial thrombophlebitis in  the calf involving the small saphenous veins.      Impression    IMPRESSION:  1. No evidence of deep venous thrombosis.    2. Bilateral superficial calf thrombophlebitis involving the small  saphenous veins, left greater than right.    NUVIA BROWER MD    CT Abdomen Pelvis w/o Contrast    Narrative    CT ABDOMEN PELVIS W/O CONTRAST 7/19/2020 8:23 AM    CLINICAL HISTORY: left hydro, eval for stone, pyelo  TECHNIQUE: CT scan of the abdomen and pelvis was performed without IV  contrast. Multiplanar reformats were obtained. Dose reduction  techniques were used.  CONTRAST: None.    COMPARISON: Renal ultrasound 7/19/2020    FINDINGS:   LOWER CHEST: Normal.    HEPATOBILIARY: Diffuse hepatic steatosis. Cholecystectomy.    PANCREAS: Diffuse pancreatic parenchymal atrophy. No acute  peripancreatic inflammatory changes.    SPLEEN: Normal size.    ADRENAL GLANDS: 10 mm left adrenal nodule with noncontrast attenuation  characteristics suggestive of a lipid rich adenoma. Normal right  adrenal gland.    KIDNEYS/BLADDER: 4 mm nonobstructing right renal calculus. No left  intrarenal calculi. No ureteral calculi, although the distal aspect of  the left ureter is obscured by streak artifact from a left hip  arthroplasty. No hydronephrosis. There are small left parapelvic renal  cysts, which likely accounts for the sonographic abnormality and do  not require further imaging evaluation. Right renal cyst which does  not require further imaging evaluation.    BOWEL: No bowel obstruction. Normal appendix. Moderate amount of stool  in the colon. Small ventral abdominal wall hernia containing a short  segment of nonobstructed small bowel.    LYMPH NODES: Normal.    VASCULATURE: Normal caliber abdominal aorta with scattered  atheromatous disease.    PELVIC ORGANS: No significant pelvic mass, although evaluation is  limited by streak artifact.    OTHER: No significant free fluid. No intraperitoneal free air.    MUSCULOSKELETAL: Left hip arthroplasty. Lumbar fusion hardware.  Skeletal degenerative changes.      Impression    IMPRESSION:   1.  Nonobstructing 4 mm right renal calculus. No left intrarenal  calculi.  2.  Parapelvic left renal cysts, which likely accounts for the  sonographic  abnormality. No hydronephrosis.    ELLA STANLEY MD   XR Pelvis w Hip Left 1 View    Narrative    XR PELVIS AND LEFT HIP ONE VIEW   7/20/2020 2:12 PM     HISTORY: Left total hip arthroplasty, controlled fall.    COMPARISON: None.      Impression    IMPRESSION: Left total hip arthroplasty in place. No evidence of  fracture or complication. Previous lumbar posterior fusion with  pedicle screws.       Discharge Medications   Current Discharge Medication List      START taking these medications    Details   acetaminophen (TYLENOL) 325 MG tablet Take 2 tablets (650 mg) by mouth every 4 hours as needed for mild pain  Qty:      Associated Diagnoses: Status post total replacement of left hip      miconazole (MONISTAT 1 DAY OR NIGHT) 1200 & 2 MG & % kit Use as directed on box  Qty:      Associated Diagnoses: Candidal vulvovaginitis      !! oxyCODONE (ROXICODONE) 5 MG tablet Take 1 tablet (5 mg) by mouth every 4 minutes as needed for breakthrough pain  Qty: 10 tablet, Refills: 0    Associated Diagnoses: Status post total replacement of left hip      polyethylene glycol (MIRALAX) 17 g packet Take 17 g by mouth daily as needed for constipation  Qty:      Associated Diagnoses: Drug-induced constipation       !! - Potential duplicate medications found. Please discuss with provider.      CONTINUE these medications which have NOT CHANGED    Details   !! aspirin (ASA) 325 MG EC tablet Take 1 tablet (325 mg) by mouth daily for 21 days  Qty: 21 tablet, Refills: 0    Associated Diagnoses: Status post total replacement of left hip      CINNAMON PO Take 2 capsules by mouth 2 times daily      cyanocobalamin (CYANOCOBALAMIN) 1000 MCG/ML injection Inject 2 mLs into the muscle every 30 days      diclofenac (VOLTAREN) 1 % topical gel Place 4 g onto the skin 4 times daily  Qty: 100 g, Refills: 0    Comments: Please send patient home with inpatient medication, re-label for discharge medication.  Associated Diagnoses: Status post total  replacement of left hip      esomeprazole (NEXIUM) 40 MG DR capsule Take 40 mg by mouth every morning (before breakfast) Take 30-60 minutes before eating.      evolocumab (REPATHA SURECLICK) 140 MG/ML prefilled autoinjector Inject 1 mL (140 mg) Subcutaneous every 14 days  Qty: 2 mL, Refills: 11    Comments: [employer] safety net foundation renewal  Associated Diagnoses: Hyperlipidemia LDL goal <100      ferrous fumarate 65 mg, [place]. FE,-Vitamin C 125 mg (VITRON C)  MG TABS tablet Take 1 tablet by mouth daily      GLIMEPIRIDE 4 MG OR TABS Take 4 mg by mouth 2 times daily       levothyroxine (SYNTHROID/LEVOTHROID) 125 MCG tablet Take 125 mcg by mouth daily before breakfast      liraglutide (VICTOZA PEN) 18 MG/3ML solution Inject 1.2 mg Subcutaneous daily  Qty: 3 mL, Refills: 0    Comments: Send patient with inpatient med, please relabel.  Associated Diagnoses: Type 2 diabetes mellitus without complication, without long-term current use of insulin (H)      lisinopril (PRINIVIL/ZESTRIL) 5 MG tablet Take 1 tablet (5 mg) by mouth daily  Qty: 90 tablet, Refills: 3    Associated Diagnoses: Stable angina (H)      mirabegron (MYRBETRIQ) 50 MG 24 hr tablet Take 1 tablet (50 mg) by mouth daily Patient not taking daily  Qty: 90 tablet, Refills: 3    Associated Diagnoses: Urinary incontinence, urge      nebivolol (BYSTOLIC) 5 MG tablet Take 1 tablet (5 mg) by mouth daily  Qty: 30 tablet, Refills: 11    Associated Diagnoses: Coronary artery disease involving native coronary artery of native heart without angina pectoris; Essential hypertension      NONFORMULARY Take 1-2 capsules by mouth daily as needed Gymnema Sylvestris Leaf capsule      !! oxyCODONE (ROXICODONE) 5 MG tablet Take 1-2 tablets (5-10 mg) by mouth every 3 hours as needed for severe pain  Qty: 30 tablet, Refills: 0    Associated Diagnoses: Status post total replacement of left hip      traMADol (ULTRAM) 50 MG tablet Take 1 tablet (50 mg) by mouth every 6 hours as  needed for moderate to severe pain  Qty: 30 tablet, Refills: 3    Associated Diagnoses: Spinal stenosis, unspecified spinal region      TURMERIC PO Take 500 mg by mouth 2 times daily       vitamin D3 (CHOLECALCIFEROL) 2000 units tablet Take 1 tablet by mouth 2 times daily      amoxicillin (AMOXIL) 500 MG capsule 2,000 mg once as needed (Before dental procedures)       !! aspirin 81 MG EC tablet Take 81 mg by mouth daily      melatonin 3 MG tablet Take 2 tablets (6 mg) by mouth nightly as needed for sleep  Qty: 30 tablet, Refills: 0    Associated Diagnoses: Sleeping difficulty      nitroglycerin (NITROSTAT) 0.4 MG SL tablet Place 1 tablet (0.4 mg) under the tongue every 5 minutes as needed for chest pain If symptoms persist after 3 doses (15 minutes) call 911.  Qty: 25 tablet, Refills: 3    Associated Diagnoses: Coronary artery disease involving native coronary artery of native heart without angina pectoris      ONETOUCH ULTRA test strip       STATIN NOT PRESCRIBED, INTENTIONAL, 1 each At Bedtime Statin not prescribed intentionally due to Allergy to statin  Qty: 0 each, Refills: 0    Associated Diagnoses: Coronary artery disease involving native coronary artery of native heart without angina pectoris       !! - Potential duplicate medications found. Please discuss with provider.      STOP taking these medications       ciprofloxacin (CIPRO) 500 MG tablet Comments:   Reason for Stopping:             Allergies   Allergies   Allergen Reactions     Hmg-Coa-R Inhibitors Muscle Pain (Myalgia)     Oral statins

## 2020-07-21 NOTE — PLAN OF CARE
PRIMARY DIAGNOSIS: ACUTE PAIN / BLE WEAKNESS  OUTPATIENT/OBSERVATION GOALS TO BE MET BEFORE DISCHARGE:  1. Pain Status: Improved-controlled with oral pain medications.    2. Return to near baseline physical activity: Yes    3. Cleared for discharge by consultants (if involved): Yes    Discharge Planner Nurse   Safe discharge environment identified: Yes  Barriers to discharge: Yes - PT to see again tomorrow       Entered by: Brandee Maciel 07/20/2020        Please review provider order for any additional goals.   Nurse to notify provider when observation goals have been met and patient is ready for discharge.

## 2020-07-21 NOTE — PROGRESS NOTES
"PRIMARY DIAGNOSIS: ACUTE PAIN  OUTPATIENT/OBSERVATION GOALS TO BE MET BEFORE DISCHARGE:  1. Pain Status: Improved-controlled with oral pain medications.    2. Return to near baseline physical activity: Yes    3. Cleared for discharge by consultants (if involved): No    Discharge Planner Nurse   Safe discharge environment identified: Yes  Barriers to discharge: Yes       Entered by: Shaista Houser 07/21/2020 9:52 AM     Please review provider order for any additional goals.   Nurse to notify provider when observation goals have been met and patient is ready for discharge.    Patient is alert and oriented x4. VS WNL and documented on the FS. Lung sounds clear and patient is on RA. Denies SOB. Active bowel sounds with LBM yesterday. Patient denies any pain, urgency, and frequency when voiding. Patient will continue on amoxicillin for a UTI. Patient rating (L) hip/back pain a 10/10 and getting 5mg of Oxycodone and Tylenol (ice pack utilized). Patient states she will not take 10mg of Oxycodone because it will just make her sleep more. (L) hip incision surgical dressing is dry and intact. +1 edema noted in (L) hip and +2 pitting edema noted in bilateral feet/ankles (encouraged patient to elevate feet). Patient states she has tingling in LE's (chronic). Pulses present and capillary refill <3 seconds. Strength equal bilaterally in feet. Patient refuses to ask for help and just gets up independently to go to the bathroom/ambuate in room. Mod carb diet and patient ate 100% of breakfast.  this morning and corrected.  at bedside.     Patient very frustrated this morning and states, \"My hemoglobin is low and I need Iron IV. I just don't have any energy and I need it.\" Tried to educate patient on hip/back surgery and the process of the body needing to heal. Dr. Carbone came in and talked to patient.  Plan: PT to see patient and hopefully discharge later today.    /52 (BP Location: Right arm)   Pulse 71   Temp 97.7 " " F (36.5  C) (Oral)   Resp 18   Ht 1.727 m (5' 8\")   Wt 79.4 kg (175 lb 1.6 oz)   SpO2 98%   BMI 26.62 kg/m      "

## 2020-07-21 NOTE — PLAN OF CARE
"PRIMARY DIAGNOSIS: ACUTE PAIN / BLE WEAKNESS  OUTPATIENT/OBSERVATION GOALS TO BE MET BEFORE DISCHARGE:  1. Pain Status: Improved-controlled with oral pain medications.    2. Return to near baseline physical activity: Yes    3. Cleared for discharge by consultants (if involved): Yes    Discharge Planner Nurse   Safe discharge environment identified: Yes  Barriers to discharge: Yes - PT to see again tomorrow       Entered by: Brandee Maciel 07/20/2020          VSS. Hip pain controlled with oxy, tylenol, Voltaren gel, ice. Patient verbalized frustration with hospital course. RN thoroughly discussed plan of care. Showered with assistance from NST. Open wound on coccyx dressed with mepilex. Surgical dressing to hip C/D/I - some shadowing noted, has not extended past outline. No IV access - provider aware. Tolerating mod cho diet. Instructed patient to call for assistance when out of chair/bed, patient adamantly refuses. Gait steady. Patient states legs feel \"a little bit stronger, but still a bit wobbly\". Plan: PT to see again tomorrow, Amoxicillin for UTI    Please review provider order for any additional goals.   Nurse to notify provider when observation goals have been met and patient is ready for discharge.  "

## 2020-07-21 NOTE — PLAN OF CARE
"PRIMARY DIAGNOSIS: ACUTE PAIN  OUTPATIENT/OBSERVATION GOALS TO BE MET BEFORE DISCHARGE:  1. Pain Status: Improved-controlled with oral pain medications.    2. Return to near baseline physical activity: Yes    3. Cleared for discharge by consultants (if involved): N/A    Discharge Planner Nurse   Safe discharge environment identified: Yes  Barriers to discharge: Yes       Entered by: Kamilla Poe 07/21/2020 1:04 AM     Please review provider order for any additional goals.   Nurse to notify provider when observation goals have been met and patient is ready for discharge.    VSS. Afebrile. 6/10 left hip pain. Pt allowing ice to area at this time but resistant to recommendations. Frustrated and flat with staff regarding hospitalization and care Occasionally sarcastic with answering questions.. Endorses tingling but states this is not new. Pt complaining of \"significant\" swelling of left leg. 1+ edema notes to hip, leg, and ankle. Urinary frequency and urgency. Incision dressing with some shadowing no change from prior. Will continue to monitor.   "

## 2020-07-21 NOTE — PLAN OF CARE
Discharge Planner PT   Patient plan for discharge: home  Current status: Ind room mobility and S for longer distances to 100 plus feet up an down 4 steps 1 rail and SEC. Recommend use of spinal corset from recent surgery to support lower body with mobility and spouse assist on stairs. Education on floor recovery give to both pt and spouse also.  Barriers to return to prior living situation: none anticipated  Recommendations for discharge: home with HHPT per plan established by the PT.   Rationale for recommendations: Pt presents below baseline and would benefit from skilled HHPT to address functional impairments and home safety as it would be an extraordinary effort for patient to leave home.       Entered by: Chyna Guerrero 07/21/2020 10:20 AM

## 2020-07-23 LAB
BACTERIA SPEC CULT: ABNORMAL
BACTERIA SPEC CULT: ABNORMAL
Lab: ABNORMAL
SPECIMEN SOURCE: ABNORMAL

## 2020-07-23 NOTE — PROGRESS NOTES
Pt had to cancel her visit with  this week because she was readmitted after her hip replacement.  said it's ok for pt to wait until September for follow up. I called pt and scheduled her for 9/18@ 11:15. PT wants to only be seen in clinic. Patrick STOVER July 23, 2020, 3:04 PM

## 2020-07-25 LAB
BACTERIA SPEC CULT: NO GROWTH
BACTERIA SPEC CULT: NO GROWTH
SPECIMEN SOURCE: NORMAL
SPECIMEN SOURCE: NORMAL

## 2020-08-04 ENCOUNTER — CARE COORDINATION (OUTPATIENT)
Dept: CARDIOLOGY | Facility: CLINIC | Age: 75
End: 2020-08-04

## 2020-08-04 DIAGNOSIS — I25.10 CORONARY ARTERY DISEASE INVOLVING NATIVE CORONARY ARTERY OF NATIVE HEART WITHOUT ANGINA PECTORIS: Primary | ICD-10-CM

## 2020-08-04 NOTE — PROGRESS NOTES
Pt called to report that she has been extremely fatigued. She is 3 weeks post hip replacement. Pt's most recent hgb is 9.7 (7/27 in Care everywhere), and is slowly trending up from her discharge. She is currently taking oral iron, and said her PCP told her she does not need an iron infusion at this time. Pt has had both back surgery and hip replacement within the last several months. I told pt that it can take the body a long time to recover from one major surgery, let alone two major surgeries. I also told her she is likely feeling some fatigue from her low hgb. Pt also told me she is not sleeping well d/t pain, and she will be going to a pain clinic tomorrow.    Pt said she called to find out if she needs another stress test as she is worried cardiac issues may be contributing to her fatigue. She has been getting intermittent upper back pain with exercise. She denied having any chest pain or SOB. Pt did have LE edema post operatively but reports that has resolved. Pt stated that she had upper back pain prior to her most recent stents. Pt did have a nuclear stress test and echo 1/2020, and 12/2019 prior to her back surgery. I told pt her stress test at that time showed no evidence of ischemia. Pt said she would like  to be updated to get his thoughts on whether cardiac issues could be contributing to her fatigue. Patrick STOVER August 4, 2020, 4:51 PM

## 2020-08-06 NOTE — DISCHARGE SUMMARY
[facility] Discharge Summary    Sarah Longo MRN# [id number]   Age: 75 year old YOB: 1945     Date of Admission:  7/15/2020  Date of Discharge::  7/18/2020  3:00 PM  Admitting Physician:  Jayson Victoria MD  Discharge Physician:  Jayson Victoria MD     [facility]          Admission Diagnoses:   DJD (degenerative joint disease) [M19.90]          Discharge Diagnosis:   DJD (degenerative joint disease) [M19.90]          Procedures:   Procedure(s): Total hip arthoplasty (Left)       No other procedures performed during this admission           Medications Prior to Admission:     No medications prior to admission.             Discharge Medications:     Discharge Medication List as of 7/18/2020  2:31 PM      START taking these medications    Details   aspirin (ASA) 325 MG EC tablet Take 1 tablet (325 mg) by mouth daily for 21 days, Disp-21 tablet,R-0, E-Prescribe      diclofenac (VOLTAREN) 1 % topical gel Place 4 g onto the skin 4 times daily, Disp-100 g,R-0, E-PrescribePlease send patient home with inpatient medication, re-label for discharge medication.      oxyCODONE (ROXICODONE) 5 MG tablet Take 1-2 tablets (5-10 mg) by mouth every 3 hours as needed for severe pain, Disp-30 tablet,R-0, E-Prescribe      ciprofloxacin (CIPRO) 500 MG tablet Take 1 tablet (500 mg) by mouth 2 times daily for 7 days, Disp-14 tablet,R-0, E-Prescribe         CONTINUE these medications which have CHANGED    Details   liraglutide (VICTOZA PEN) 18 MG/3ML solution Inject 1.2 mg Subcutaneous daily, Disp-3 mL,R-0, Local PrintSend patient with inpatient med, please relabel.         CONTINUE these medications which have NOT CHANGED    Details   amoxicillin (AMOXIL) 500 MG capsule 2,000 mg once as needed (Before dental procedures) , Historical      CINNAMON PO Take 2 capsules by mouth 2 times daily, Historical      cyanocobalamin (CYANOCOBALAMIN) 1000 MCG/ML injection Inject 2 mLs into  the muscle every 30 days, Historical      esomeprazole (NEXIUM) 40 MG DR capsule Take 40 mg by mouth every morning (before breakfast) Take 30-60 minutes before eating., Historical      evolocumab (REPATHA SURECLICK) 140 MG/ML prefilled autoinjector Inject 1 mL (140 mg) Subcutaneous every 14 days, Disp-2 mL, R-11, E-Prescribegen safety [facility] renewal      ferrous fumarate 65 mg, [place]. FE,-Vitamin C 125 mg (VITRON C)  MG TABS tablet Take 1 tablet by mouth daily, Historical      GLIMEPIRIDE 4 MG OR TABS Take 4 mg by mouth 2 times daily , Historical      lisinopril (PRINIVIL/ZESTRIL) 5 MG tablet Take 1 tablet (5 mg) by mouth daily, Disp-90 tablet, R-3, E-Prescribe      melatonin 3 MG tablet Take 2 tablets (6 mg) by mouth nightly as needed for sleep, Disp-30 tablet,R-0, E-Prescribe      mirabegron (MYRBETRIQ) 50 MG 24 hr tablet Take 1 tablet (50 mg) by mouth daily Patient not taking daily, Disp-90 tablet, R-3, Local Print      nebivolol (BYSTOLIC) 5 MG tablet Take 1 tablet (5 mg) by mouth daily, Disp-30 tablet,R-11, E-Prescribe      nitroglycerin (NITROSTAT) 0.4 MG SL tablet Place 1 tablet (0.4 mg) under the tongue every 5 minutes as needed for chest pain If symptoms persist after 3 doses (15 minutes) call 911., Disp-25 tablet, R-3, E-Prescribe      ONETOUCH ULTRA test strip DAWHistorical      STATIN NOT PRESCRIBED, INTENTIONAL, 1 each At Bedtime Statin not prescribed intentionally due to Allergy to statin, Disp-0 each, R-0, No Print Out      traMADol (ULTRAM) 50 MG tablet Take 1 tablet (50 mg) by mouth every 6 hours as needed for moderate to severe pain, Disp-30 tablet, R-3, Local Print      TURMERIC PO Take by mouth 2 times daily, Historical      vitamin D3 (CHOLECALCIFEROL) 2000 units tablet Take 1 tablet by mouth 2 times daily, Historical      ASPIRIN 81 MG OR TABS 1 tab po QD (Once per day), Historical      Gymnema Sylvestris [place] POWD daily, Historical      NITROFURANTOIN PO nitrofurantoin  monohydrate/macrocrystals 100 mg capsule, Historical      Omega-3 Fatty Acids (OMEGA-3 FISH OIL PO) Take 2,400 mg by mouth 2 times daily (with meals), Historical      SYNTHROID 125 MCG OR TABS 1 tablet daily, Historical         STOP taking these medications       metFORMIN (GLUCOPHAGE) 1000 MG tablet Comments:   Reason for Stopping:                     Consultations:   No consultations were requested during this admission          Brief History of Illness:   This patient was a 75 year old female with a known history of osteoarthritis.  She underwent a period of non-operative treatment which only provided mild and intermittent relief.  After a lengthy discussion and consultation with Dr. Victoria, the patient chose to undergo a left side hip arthroplasty.  She was explained the risks,complications, and benefits of the procedure and elected to have the surgical procedure.  Patient was cleared by her primary care physician for joint replacement.           Hospital Course:   The patient tolerated the procedure well and was taken to postop recovery in stable condition.  Please refer to the full operative note for complete details.   In recovery, she was noted to be motor and neurovascular intact.  Post-operative films show components in excellent position.     On post-operative day 1, patient's wound was checked and noted to be healing well.  Patient had adequate pain control and was prescribed physical therapy.  She was given 24 hrs of perioperative antibiotics.  Patient had motor strength of 5/5 on the left side.  Patient was neurovascularly intact in the left side lower extremity. There were no complications throughout the hospital course as the patient passed physical therapy/occupational therapy on post-operative day #1.  Her discharge hemoglobin was 8.4 and the patient did not require a blood transfusion.  She was followed by the hospitalist during this hospital visit to manage her medical problems.     Upon  discharge, the patient was seen by myself and all questions were answered.  She was discharged on home medications as outlined in medication reconciliation list outlined below.  The patient has instructions that if she has increased pain, fever, erythema, swelling or drainage to immediately call.          Discharge Instructions and Follow-Up:   Discharge diet: Regular   Discharge activity: Activity as tolerated   Discharge follow-up: Follow up with me in 7-10 days   Wound care: Apply bandage daily  Keep wound clean and dry  May get incision wet in shower but do not soak or scrub           Discharge Disposition:   Discharged to home      Attestation:  I have reviewed today's vital signs, notes, medications, labs and imaging.    Jayson Victoria MD

## 2020-08-07 NOTE — TELEPHONE ENCOUNTER
The likelihood that her fatigue is heart related seems low. She has been very stable from a heart standpoint for quite a while and she has many other reasons to be fatigued and have back pain. She has pain and presumably is taking pain meds that cause tiredness, she is anemic, she is not sleeping well, and she is doing rehab that is putting a physical strain on her back and body. If she would feel better after doing another stress test, I would be agreeable to set that up for her to provide her some reassurance. Lexiscan nuclear study. But I think the risk of heart disease causing these symptoms is low. EE

## 2020-08-10 NOTE — PROGRESS NOTES
I called pt and reviewed 's recommendation. Pt said she understands there are likely multiple factors that are contributing to her fatigue. She did go to the pain clinic for the first time last week and is trying a butran skin patch. Pt said she will wait a couple of more weeks to see if her symptoms improve. If not she will call with an update and a lexiscan stress test can be ordered. Pt has a 9/18 visit with . Patrick STOVER August 10, 2020, 11:53 AM

## 2020-08-11 ENCOUNTER — OFFICE VISIT (OUTPATIENT)
Dept: OBGYN | Facility: CLINIC | Age: 75
End: 2020-08-11
Payer: COMMERCIAL

## 2020-08-11 ENCOUNTER — ANCILLARY PROCEDURE (OUTPATIENT)
Dept: MAMMOGRAPHY | Facility: CLINIC | Age: 75
End: 2020-08-11
Payer: COMMERCIAL

## 2020-08-11 VITALS
WEIGHT: 158 LBS | BODY MASS INDEX: 24.8 KG/M2 | HEIGHT: 67 IN | DIASTOLIC BLOOD PRESSURE: 78 MMHG | SYSTOLIC BLOOD PRESSURE: 128 MMHG | HEART RATE: 77 BPM

## 2020-08-11 DIAGNOSIS — Z01.419 ENCOUNTER FOR GYNECOLOGICAL EXAMINATION WITHOUT ABNORMAL FINDING: Primary | ICD-10-CM

## 2020-08-11 DIAGNOSIS — Z12.31 VISIT FOR SCREENING MAMMOGRAM: ICD-10-CM

## 2020-08-11 DIAGNOSIS — N81.2 CYSTOCELE WITH INCOMPLETE UTEROVAGINAL PROLAPSE: ICD-10-CM

## 2020-08-11 DIAGNOSIS — N89.8 VAGINAL DRYNESS: ICD-10-CM

## 2020-08-11 PROCEDURE — 77063 BREAST TOMOSYNTHESIS BI: CPT | Mod: TC

## 2020-08-11 PROCEDURE — 99397 PER PM REEVAL EST PAT 65+ YR: CPT | Performed by: OBSTETRICS & GYNECOLOGY

## 2020-08-11 PROCEDURE — 77067 SCR MAMMO BI INCL CAD: CPT | Mod: TC

## 2020-08-11 RX ORDER — ESTRADIOL 0.1 MG/G
CREAM VAGINAL
Qty: 42.5 G | Refills: 3 | Status: SHIPPED | OUTPATIENT
Start: 2020-08-11 | End: 2021-08-12

## 2020-08-11 SDOH — HEALTH STABILITY: MENTAL HEALTH: HOW OFTEN DO YOU HAVE 6 OR MORE DRINKS ON ONE OCCASION?: NEVER

## 2020-08-11 ASSESSMENT — MIFFLIN-ST. JEOR: SCORE: 1249.31

## 2020-08-11 NOTE — PROGRESS NOTES
Sarah is a 75 year old  female who presents for annual exam.          Do you have a Health Care Directive?: No, advance care planning information given to patient to review.  Patient declined advance care planning discussion at this time.        HPI:  The patient's PCP is  Gaye Zimmer.    Has diabetes type 2, HTN, coronary artery disease with stents, hypothyroid, arthritis, had left hip replacement, hyperlipidemia, chronic back pain and chronic urinary urgency.  Complications after her surgeries this years. Has decubitus ulcer.     Chronic long standing problems with OVERACTIVE BLADDER and chronic pain from it  Has tried meds, interstim  Declined botox   Has worked with Dr Nayak  Thinking about getting second opinion  Can call us for refill on myrbetriq        GYNECOLOGIC HISTORY:  No LMP recorded. Patient is postmenopausal..   reports that she has never smoked. She has never used smokeless tobacco.    Patient is not sexually active.  STD testing offered?  Declined  Last PHQ-9 score on record=   PHQ-9 SCORE 6/3/2019   PHQ-9 Total Score 0     Last GAD7 score on record=   DIANNA-7 SCORE 2016 2018 6/3/2019   Total Score 6 5 6     Alcohol Score = 0    HEALTH MAINTENANCE:  Cholesterol:   Recent Labs   Lab Test 19  1201 18  0854  02/09/17  10/08/15   CHOL 129 130   < > 136   < > 225*   HDL 61 64   < > 67   < > 53   LDL 50 51   < > 56   < > 154   TRIG 90 73   < > 64   < > 90   CHOLHDLRATIO  --   --   --  2.0  --  4.2    < > = values in this interval not displayed.      Last Mammo: One year ago, Result: Normal, Next Mammo: Today   Pap: 2019 nil   DEXA: 2011  Colonoscopy:  2015 Result:  Normal, Next Colonoscopy:  years.    Health maintenance updated:  yes    HISTORY:  OB History    Para Term  AB Living   3 3 3 0 0 3   SAB TAB Ectopic Multiple Live Births   0 0 0 0 3      # Outcome Date GA Lbr [place]/2nd Weight Sex Delivery Anes PTL Lv   3 Term     F     DEE   2 Term     F    DEE   1 Term     M    DEE     Patient Active Problem List   Diagnosis     Benign essential hypertension     Dyslipidemia, goal LDL below 70     Osteoarthrosis, unspecified whether generalized or localized, involving lower leg     Diabetes mellitus, type 2 (H)     Other specified gastritis without mention of hemorrhage     Low back pain     Lumbar radiculopathy     Coronary artery disease involving native coronary artery of native heart without angina pectoris     Status post coronary angiogram     Shoulder blade pain     Abnormal cardiovascular stress test     Hypothyroidism     Osteoarthritis     S/P total hip arthroplasty     Status post total hip replacement, left     Fever postop     Past Surgical History:   Procedure Laterality Date     ------------OTHER-------------      Interstim     Ablation       ARTHROPLASTY HIP Left 7/15/2020    Procedure: Left total hip arthroplasty;  Surgeon: Jayson Victoria MD;  Location: [facility] OR     BACK SURGERY  03/05/2020    spinal fusion     C CT ANGIOGRAPHY CORONARY  1/2005    mod soft plaque LAD and Cx, follow up with left heart cath     C LIGATE FALLOPIAN TUBE      endometrial ablation     CARDIAC SURGERY       CHOLECYSTECTOMY       COLONOSCOPY       CV HEART CATHETERIZATION WITH POSSIBLE INTERVENTION N/A 2/14/2019    Procedure: Coronary Angiogram;  Surgeon: Sudarshan Lee MD;  Location:  HEART CARDIAC CATH LAB; patent stents     EXCISE LESION UPPER EXTREMITY  6/5/2013    Procedure: EXCISE LESION UPPER EXTREMITY;  EXCISION RIGHT ARM ANTICUBITAL LIPOMA ;  Surgeon: Jayson Joe MD;  Location: [facility] REMOVAL OF TONSILS,12+ Y/O       HEART CATH LEFT HEART CATH  3/2/2016    No intervention - aggressive medical management     HEART CATH LEFT HEART CATH  4/21/2016     MAYA x 2 to OM1, MAYA to LAD, at [place]     HEART CATH LEFT HEART CATH  6/29/2004    MAYA to RCA     HEART CATH LEFT HEART CATH  3/28/2002    Mild to moderate 3 vessel CAD.  Medical management recommended.      HEART CATH LEFT HEART CATH  2016    MAYA to OM1     hypothyroid       KNEE SURGERY  4/20/10    right knee replacement     ORTHOPEDIC SURGERY      total knee      REMOVE STIMULATOR SACRAL NERVE N/A 2015    Procedure: REMOVE STIMULATOR SACRAL NERVE;  Surgeon: Gertrudis Frausto MD;  Location: [facility]     SURGICAL HISTORY OF -       Uterine endometrial ablation      Social History     Tobacco Use     Smoking status: Never Smoker     Smokeless tobacco: Never Used   Substance Use Topics     Alcohol use: No     Alcohol/week: 0.0 standard drinks      Problem (# of Occurrences) Relation (Name,Age of Onset)    C.A.D. (1) Father: MI , age 83, had high lipids    Cerebrovascular Disease (1) Maternal Grandfather: cerebral hemorrhage    Colon Cancer (1) Maternal Aunt    Coronary Artery Disease (1) Father    Diabetes (2) Maternal Uncle, Maternal Grandmother    Fractures (1) Mother: hip    Genitourinary Problems (1) Mother: renal failure    Hyperlipidemia (1) Father    Hypertension (2) Father, Mother    Osteoporosis (1) Mother            Current Outpatient Medications   Medication Sig     acetaminophen (TYLENOL) 325 MG tablet Take 2 tablets (650 mg) by mouth every 4 hours as needed for mild pain     amoxicillin (AMOXIL) 500 MG capsule 2,000 mg once as needed (Before dental procedures)      aspirin 81 MG EC tablet Take 81 mg by mouth daily     CINNAMON PO Take 2 capsules by mouth 2 times daily     cyanocobalamin (CYANOCOBALAMIN) 1000 MCG/ML injection Inject 2 mLs into the muscle every 30 days     diclofenac (VOLTAREN) 1 % topical gel Place 4 g onto the skin 4 times daily     esomeprazole (NEXIUM) 40 MG DR capsule Take 40 mg by mouth every morning (before breakfast) Take 30-60 minutes before eating.     evolocumab (REPATHA SURECLICK) 140 MG/ML prefilled autoinjector Inject 1 mL (140 mg) Subcutaneous every 14 days     ferrous fumarate 65 mg, [place]. FE,-Vitamin C 125 mg (VITRON  C)  MG TABS tablet Take 1 tablet by mouth daily     GLIMEPIRIDE 4 MG OR TABS Take 4 mg by mouth 2 times daily      levothyroxine (SYNTHROID/LEVOTHROID) 125 MCG tablet Take 125 mcg by mouth daily before breakfast     liraglutide (VICTOZA PEN) 18 MG/3ML solution Inject 1.2 mg Subcutaneous daily     lisinopril (PRINIVIL/ZESTRIL) 5 MG tablet Take 1 tablet (5 mg) by mouth daily     melatonin 3 MG tablet Take 2 tablets (6 mg) by mouth nightly as needed for sleep     miconazole (MONISTAT 1 DAY OR NIGHT) 1200 & 2 MG & % kit Use as directed on box     mirabegron (MYRBETRIQ) 50 MG 24 hr tablet Take 1 tablet (50 mg) by mouth daily Patient not taking daily     nebivolol (BYSTOLIC) 5 MG tablet Take 1 tablet (5 mg) by mouth daily     nitroglycerin (NITROSTAT) 0.4 MG SL tablet Place 1 tablet (0.4 mg) under the tongue every 5 minutes as needed for chest pain If symptoms persist after 3 doses (15 minutes) call 911.     NONFORMULARY Take 1-2 capsules by mouth daily as needed Gymnema Sylvestris Leaf capsule     ONETOUCH ULTRA test strip      oxyCODONE (ROXICODONE) 5 MG tablet Take 1 tablet (5 mg) by mouth every 4 minutes as needed for breakthrough pain     oxyCODONE (ROXICODONE) 5 MG tablet Take 1-2 tablets (5-10 mg) by mouth every 3 hours as needed for severe pain     polyethylene glycol (MIRALAX) 17 g packet Take 17 g by mouth daily as needed for constipation     STATIN NOT PRESCRIBED, INTENTIONAL, 1 each At Bedtime Statin not prescribed intentionally due to Allergy to statin     traMADol (ULTRAM) 50 MG tablet Take 1 tablet (50 mg) by mouth every 6 hours as needed for moderate to severe pain     TURMERIC PO Take 500 mg by mouth 2 times daily      vitamin D3 (CHOLECALCIFEROL) 2000 units tablet Take 1 tablet by mouth 2 times daily     No current facility-administered medications for this visit.        Allergies   Allergen Reactions     Hmg-Coa-R Inhibitors Muscle Pain (Myalgia)     Oral statins         Past medical, surgical,  social and family history were reviewed and updated in EPIC.    ROS:   12 point review of systems negative other than symptoms noted below or in the HPI.  No urinary frequency or dysuria, bladder or kidney problems, urinary frequency, urgency    EXAM:  There were no vitals taken for this visit.   BMI: There is no height or weight on file to calculate BMI.    EXAM:  Constitutional: Appearance: Well nourished, well developed alert, in no acute distress  Neck:  Lymph Nodes:  No lymphadenopathy present    Thyroid:  Gland size normal, nontender, no nodules or masses present  on palpation  Chest:  Respiratory Effort:  Breathing unlabored  Cardiovascular:Heart    Auscultation:  Regular rate, normal rhythm, no murmurs present  Breasts: Inspection of Breasts:  No lymphadenopathy present., Palpation of Breasts and Axillae:  No masses present on palpation, no breast tenderness., Axillary Lymph Nodes:  No lymphadenopathy present. and No nodularity, asymmetry or nipple discharge bilaterally.  Gastrointestinal:  Abdominal Examination:  Abdomen nontender to palpation, tone normal without     rigidity or guarding, no masses present, umbilicus without lesions    Liver and speen:  No hepatomegaly present, liver nontender to palpation    Hernias:  No hernias present  Lymphatic: Lymph Nodes:  No other lymphadenopathy present  Skin:  General Inspection:  No rashes present, no lesions present, no areas of  discoloration.    Genitalia and Groin:  No rashes present, no lesions present, no areas of  discoloration, no masses present  Neurologic/Psychiatric:    Mental Status:  Oriented X3     Pelvic Exam:  External Genitalia:     Normal appearance for age, no discharge present, no tenderness present, no inflammatory lesions present, color normal  Vagina:     Moderate cystocele present, ATROPHIC  Bladder:     Nontender to palpation  Urethra:   Urethral Body:  Urethra palpation normal, urethra structural support normal   Urethral Meatus:  No  erythema or lesions present  Cervix:     Appearance healthy, no lesions present, nontender to palpation, no bleeding present  Uterus:     Nontender to palpation, no masses present, prolapse to introitus when pessary removed, not outside vagina  Adnexa:     No adnexal tenderness present, no adnexal masses present  Perineum:     Perineum within normal limits, no evidence of trauma, no rashes or skin lesions present  Inguinal Lymph Nodes:     No lymphadenopathy present      COUNSELING:   Reviewed preventive health counseling, as reflected in patient instructions    BMI:  There is no height or weight on file to calculate BMI.     reports that she has never smoked. She has never used smokeless tobacco.      ASSESSMENT:  75 year old female with satisfactory annual exam.    ICD-10-CM    1. Encounter for gynecological examination without abnormal finding  Z01.419        PLAN:  Do not recommend further surgery for patient  Her pessary is working well for her prolapse    Given names for urology and urogyn for second opinion  Reminded patient to be nice to the schedulers when she calls  Her problem is long standing and not emergent    Gave her prescription for use of tiny amount of vaginal estrogen cream on finger nightly just to see if might help some of the vaginal pain she feels which I suspect is referred pain from pain ful bladder as a component of her OVERACTIVE BLADDER syndrome.   Dose of this amount will have minimal systemic absorption.  Discussed risks with patient.     Strongly recommend not having hysterectomy since pessary working well.   Removed and cleaned for her today but she is able to clean herself.     Shireen Neves MD

## 2020-08-26 NOTE — PROGRESS NOTES
Returned call to pt. She left a VM stating she would like to discuss changing back from Bystolic to isosorbide. Pt said she is headed out the door and couldn't talk right now. She would like a call back in the morning. Will try pt tomorrow morning. Cherelle Gaston RN on 8/26/2020 at 3:26 PM

## 2020-08-27 RX ORDER — ISOSORBIDE MONONITRATE 30 MG/1
15 TABLET, EXTENDED RELEASE ORAL DAILY
Qty: 45 TABLET | Refills: 3 | Status: SHIPPED | OUTPATIENT
Start: 2020-08-27 | End: 2021-01-11

## 2020-08-27 NOTE — PROGRESS NOTES
Returned call to pt this morning re: pt wants to change back to isosorbide. She said the Bystolic is making her too fatigued. She said she knows that recovering from surgeries can make her more tired as well, but she said she experienced similar fatigue with other beta blockers. She relates her current ongoing fatigue to Bystolic.    She reports she still has isosorbide 30 mg tab on hand and her dose was 1/2 tab (15 mg) once daily. Pt states she is going to stop Bystolic and start the Imdur tomorrow. I reviewed that the Bystolic took quite a bit of work to get insurance coverage, and that she wanted to stay on it earlier this year because she reported feeling more energetic since starting it. Pt said she did not want me to review this with Dr. Burgos first, she said she pretty much has her mind made up and this is what she is doing. She said she will talk to him further at her upcoming appt on 9/18. If she has any concerns prior she will call us.     Routed update to Dr. Burgos. I also called [facility] to discontinue Bystolic and sent new Rx for Imdur. Cherelle Gaston RN on 8/27/2020 at 10:36 AM

## 2020-08-27 NOTE — PROGRESS NOTES
Feroz Burgos MD  [facility] 7 38 minutes ago (11:01 AM)          OK with me. Not much I can say at this point. EE         Documentation

## 2020-09-14 ENCOUNTER — CARE COORDINATION (OUTPATIENT)
Dept: CARDIOLOGY | Facility: CLINIC | Age: 75
End: 2020-09-14

## 2020-09-14 DIAGNOSIS — D50.8 OTHER IRON DEFICIENCY ANEMIA: Primary | ICD-10-CM

## 2020-09-14 DIAGNOSIS — E78.5 HYPERLIPIDEMIA LDL GOAL <100: ICD-10-CM

## 2020-09-14 NOTE — PROGRESS NOTES
Wellness Screening Tool    Symptom Screening:    Do you have one of the following NEW symptoms:      Fever (subjective or >100.0)?  No    New cough? No    Shortness of breath? No    Chills? No    New loss of taste or smell? No    Generalized body aches? No    New persistent headache? No    New sore throat? No    Nausea, vomiting or diarrhea? No    Within the past 2 weeks, have you been exposed to someone with a known positive illness below?      COVID - 19 (known or suspected) No    Chicken pox?  No    Measles? No    Pertussis? No    Have you had a positive COVID-19 diagnostic test (nasal swab test) in the last 14 days or are you currently   on self-quarantine restrictions (i.e.travel restriction, exposure, etc?) No          Patient informed to wear a mask: Yes    Patient's appointment status: Patient will be seen in clinic as scheduled on 9/15/20 @ 0800 for lab.

## 2020-09-14 NOTE — PROGRESS NOTES
Pt called to ask if she is due for labs prior to her visit with  9/18. I told pt she is due for a fasting lipid check. She has had issues with low hgb since her 2 recent surgeries so she will have that checked too. BMP up to date. Pt scheduled for labs on 9/15 @ 0800 @ our  clinic. Wellness screening has been completed. Patrick STOVER September 14, 2020, 11:17 AM

## 2020-09-15 ENCOUNTER — OFFICE VISIT (OUTPATIENT)
Dept: UROLOGY | Facility: CLINIC | Age: 75
End: 2020-09-15
Payer: COMMERCIAL

## 2020-09-15 VITALS
SYSTOLIC BLOOD PRESSURE: 120 MMHG | HEART RATE: 82 BPM | BODY MASS INDEX: 25.01 KG/M2 | WEIGHT: 165 LBS | DIASTOLIC BLOOD PRESSURE: 64 MMHG | HEIGHT: 68 IN

## 2020-09-15 DIAGNOSIS — D50.8 OTHER IRON DEFICIENCY ANEMIA: ICD-10-CM

## 2020-09-15 DIAGNOSIS — R35.1 NOCTURIA: ICD-10-CM

## 2020-09-15 DIAGNOSIS — N81.4 UTEROVAGINAL PROLAPSE: ICD-10-CM

## 2020-09-15 DIAGNOSIS — M54.16 LUMBAR RADICULOPATHY: ICD-10-CM

## 2020-09-15 DIAGNOSIS — E78.5 HYPERLIPIDEMIA LDL GOAL <100: ICD-10-CM

## 2020-09-15 DIAGNOSIS — N95.2 ATROPHIC VAGINITIS: ICD-10-CM

## 2020-09-15 DIAGNOSIS — Z96.642 STATUS POST TOTAL REPLACEMENT OF LEFT HIP: ICD-10-CM

## 2020-09-15 DIAGNOSIS — R35.0 URINARY FREQUENCY: Primary | ICD-10-CM

## 2020-09-15 LAB
ALBUMIN UR-MCNC: 100 MG/DL
ALT SERPL W P-5'-P-CCNC: 49 U/L (ref 0–50)
APPEARANCE UR: CLEAR
BILIRUB UR QL STRIP: NEGATIVE
CHOLEST SERPL-MCNC: 127 MG/DL
COLOR UR AUTO: YELLOW
GLUCOSE UR STRIP-MCNC: 500 MG/DL
HDLC SERPL-MCNC: 71 MG/DL
HGB BLD-MCNC: 12.1 G/DL (ref 11.7–15.7)
HGB UR QL STRIP: ABNORMAL
KETONES UR STRIP-MCNC: NEGATIVE MG/DL
LDLC SERPL CALC-MCNC: 41 MG/DL
LEUKOCYTE ESTERASE UR QL STRIP: ABNORMAL
NITRATE UR QL: NEGATIVE
NONHDLC SERPL-MCNC: 56 MG/DL
PH UR STRIP: 5.5 PH (ref 5–7)
RESIDUAL VOLUME (RV) (EXTERNAL): 65
SOURCE: ABNORMAL
SP GR UR STRIP: >1.03 (ref 1–1.03)
TRIGL SERPL-MCNC: 77 MG/DL
UROBILINOGEN UR STRIP-ACNC: 0.2 EU/DL (ref 0.2–1)

## 2020-09-15 PROCEDURE — 85018 HEMOGLOBIN: CPT | Performed by: INTERNAL MEDICINE

## 2020-09-15 PROCEDURE — 84460 ALANINE AMINO (ALT) (SGPT): CPT | Performed by: INTERNAL MEDICINE

## 2020-09-15 PROCEDURE — 36415 COLL VENOUS BLD VENIPUNCTURE: CPT | Performed by: INTERNAL MEDICINE

## 2020-09-15 PROCEDURE — 81003 URINALYSIS AUTO W/O SCOPE: CPT | Mod: QW | Performed by: UROLOGY

## 2020-09-15 PROCEDURE — 51798 US URINE CAPACITY MEASURE: CPT | Performed by: UROLOGY

## 2020-09-15 PROCEDURE — 99203 OFFICE O/P NEW LOW 30 MIN: CPT | Mod: 25 | Performed by: UROLOGY

## 2020-09-15 PROCEDURE — 80061 LIPID PANEL: CPT | Performed by: INTERNAL MEDICINE

## 2020-09-15 ASSESSMENT — MIFFLIN-ST. JEOR: SCORE: 1296.94

## 2020-09-15 ASSESSMENT — PAIN SCALES - GENERAL: PAINLEVEL: NO PAIN (0)

## 2020-09-15 NOTE — PROGRESS NOTES
September 15, 2020    Referring Provider: Shireen Neves MD  [address] [facility] [address]  [place] [address]    Primary Care Provider: Gaye Zimmer    CC: Pelvic floor issues    HPI:  Sarah Longo is a 75 year old female who presents for evaluation of her pelvic floor symptoms.  She has a long history of bladder issues with complex voiding dysfunction and urinary urgency frequency.  Has followed with Dr Frausto in the past, patient failed interstim.  Last saw her earlier this summer for urinary retention after her hip surgery.    She has tried multiple anticholinergics which did not help.  The mirabegron helps some but her biggest issues at night.  Nocturia x 5-drinks water or milk usually at bedtime, occasionally diet coke.  Denies snoring or ORIANA.    Tremendous back and hip pain secondary to her spinal fusion.  Recently also had a hip replacement and an epidural for pain, which did not help.  She recently has starting trying a TENS unit with her home PT    Has a pessary for her prolapse which works well    Has vaginal dryness, started on estrace last month by Dr Neves    Past Medical History:   Diagnosis Date     Anemia      Arthritis      Coronary artery disease 04/28/2016    cardiac cath 2/2019: patent stents; PTCA with MAYA x 2 to OM1 and MAYA x 1 to p.LAD (4/21/2016 at [place]); PTCA with intracoronary stent placement of RCA June 2004; MAYA to OM1 11/2016     Cystocele, midline 09/29/2010     Depression      HYPERPLASTIC  POLYP      colonoscopy in 5-10 yrs.     Hypothyroidism     hypothyroid- Dr Majano     Mumtej      OAB (overactive bladder)     complex voiding dysfct, failed interstim     Osteoarthritis      Other and unspecified hyperlipidemia      Palpitations      Postmenopausal bleeding      Shoulder blade pain 5/31/2016     Type II or unspecified type diabetes mellitus without mention of complication, not stated as uncontrolled     Dr. Majano     Unspecified essential hypertension      Urinary  frequency 9/29/10    followed by urology. no benefit from detrol or sanctura. month trial of macrobid and flomax 10/10     Past Surgical History:   Procedure Laterality Date     ------------OTHER-------------      Interstim     Ablation       ARTHROPLASTY HIP Left 7/15/2020    Procedure: Left total hip arthroplasty;  Surgeon: Jayson Victoria MD;  Location: [facility] OR     BACK SURGERY  03/05/2020    spinal fusion     C CT ANGIOGRAPHY CORONARY  1/2005    mod soft plaque LAD and Cx, follow up with left heart cath     C LIGATE FALLOPIAN TUBE      endometrial ablation     CARDIAC SURGERY       CHOLECYSTECTOMY       COLONOSCOPY       CV HEART CATHETERIZATION WITH POSSIBLE INTERVENTION N/A 2/14/2019    Procedure: Coronary Angiogram;  Surgeon: Sudarshan Lee MD;  Location:  HEART CARDIAC CATH LAB; patent stents     CYSTOSCOPY       EXCISE LESION UPPER EXTREMITY  6/5/2013    Procedure: EXCISE LESION UPPER EXTREMITY;  EXCISION RIGHT ARM ANTICUBITAL LIPOMA ;  Surgeon: Jayson Joe MD;  Location: [facility] REMOVAL OF TONSILS,12+ Y/O       HEART CATH LEFT HEART CATH  3/2/2016    No intervention - aggressive medical management     HEART CATH LEFT HEART CATH  4/21/2016     MAYA x 2 to OM1, MAYA to LAD, at [place]     HEART CATH LEFT HEART CATH  6/29/2004    MAYA to RCA     HEART CATH LEFT HEART CATH  3/28/2002    Mild to moderate 3 vessel CAD. Medical management recommended.      HEART CATH LEFT HEART CATH  11/22/2016    MAYA to OM1     hypothyroid       KNEE SURGERY  4/20/10    right knee replacement     ORTHOPEDIC SURGERY      total knee 2010     REMOVE STIMULATOR SACRAL NERVE N/A 11/2/2015    Procedure: REMOVE STIMULATOR SACRAL NERVE;  Surgeon: Gertrudis Frausto MD;  Location: [facility]     SURGICAL HISTORY OF -       Uterine endometrial ablation     Social History     Socioeconomic History     Marital status:      Spouse name: Kwadwo     Number of children: 3     Years of education: Not on file     Highest  education level: Not on file   Occupational History     Occupation: PT Aide     Employer: NONE      Employer: RETIRED   Social Needs     Financial resource strain: Not on file     Food insecurity     Worry: Not on file     Inability: Not on file     Transportation needs     Medical: Not on file     Non-medical: Not on file   Tobacco Use     Smoking status: Never Smoker     Smokeless tobacco: Never Used   Substance and Sexual Activity     Alcohol use: No     Alcohol/week: 0.0 standard drinks     Binge frequency: Never     Drug use: No     Sexual activity: Never     Partners: Male     Birth control/protection: Post-menopausal   Lifestyle     Physical activity     Days per week: Not on file     Minutes per session: Not on file     Stress: Not on file   Relationships     Social connections     Talks on phone: Not on file     Gets together: Not on file     Attends Hindu service: Not on file     Active member of club or organization: Not on file     Attends meetings of clubs or organizations: Not on file     Relationship status: Not on file     Intimate partner violence     Fear of current or ex partner: Not on file     Emotionally abused: Not on file     Physically abused: Not on file     Forced sexual activity: Not on file   Other Topics Concern      Service Not Asked     Blood Transfusions Not Asked     Caffeine Concern Yes     Comment: 6-7 cups caffeine per day     Occupational Exposure Not Asked     Hobby Hazards Not Asked     Sleep Concern No     Stress Concern Yes     Weight Concern No     Special Diet No     Comment: eats healthy      Back Care Not Asked     Exercise Yes     Comment:  walking & active life style      Bike Helmet Not Asked     Seat Belt Not Asked     Self-Exams Not Asked     Parent/sibling w/ CABG, MI or angioplasty before 65F 55M? No   Social History Narrative     Not on file     Family History   Problem Relation Age of Onset     C.A.D. Father         MI , age 83, had high  "lipids     Hyperlipidemia Father      Hypertension Father      Coronary Artery Disease Father      Hypertension Mother      Genitourinary Problems Mother         renal failure     Fractures Mother         hip     Osteoporosis Mother      Cerebrovascular Disease Maternal Grandfather         cerebral hemorrhage     Diabetes Maternal Uncle      Colon Cancer Maternal Aunt      Diabetes Maternal Grandmother      ROS    Allergies   Allergen Reactions     Hmg-Coa-R Inhibitors Muscle Pain (Myalgia)     Oral statins       Current Outpatient Medications   Medication     amoxicillin (AMOXIL) 500 MG capsule     aspirin 81 MG EC tablet     CINNAMON PO     cyanocobalamin (CYANOCOBALAMIN) 1000 MCG/ML injection     diclofenac (VOLTAREN) 1 % topical gel     estradiol (ESTRACE) 0.1 MG/GM vaginal cream     evolocumab (REPATHA SURECLICK) 140 MG/ML prefilled autoinjector     ferrous fumarate 65 mg, [place]. FE,-Vitamin C 125 mg (VITRON C)  MG TABS tablet     GLIMEPIRIDE 4 MG OR TABS     isosorbide mononitrate (IMDUR) 30 MG 24 hr tablet     levothyroxine (SYNTHROID/LEVOTHROID) 125 MCG tablet     liraglutide (VICTOZA PEN) 18 MG/3ML solution     lisinopril (PRINIVIL/ZESTRIL) 5 MG tablet     melatonin 3 MG tablet     miconazole (MONISTAT 1 DAY OR NIGHT) 1200 & 2 MG & % kit     mirabegron (MYRBETRIQ) 50 MG 24 hr tablet     nitroglycerin (NITROSTAT) 0.4 MG SL tablet     NONFORMULARY     ONETOUCH ULTRA test strip     oxyCODONE (ROXICODONE) 5 MG tablet     STATIN NOT PRESCRIBED, INTENTIONAL,     traMADol (ULTRAM) 50 MG tablet     TURMERIC PO     vitamin D3 (CHOLECALCIFEROL) 2000 units tablet     acetaminophen (TYLENOL) 325 MG tablet     esomeprazole (NEXIUM) 40 MG DR capsule     oxyCODONE (ROXICODONE) 5 MG tablet     polyethylene glycol (MIRALAX) 17 g packet     No current facility-administered medications for this visit.      /64   Pulse 82   Ht 1.727 m (5' 8\")   Wt 74.8 kg (165 lb)   BMI 25.09 kg/m   No LMP recorded. Patient is " postmenopausal. Body mass index is 25.09 kg/m .  She is alert and oriented.  She is well groomed, comfortable in no acute distress.  Eyes have anicteric sclerae, moist conjunctivae.  Normal mood and affect.   Normocephalic, atraumatic without masses, lesions, obvious abnormalities.  Non-labored breathing.  Abdomen is soft, non-tender, non-distended, no CVAT, no organomegaly.  Normal external female genitalia.  Negative ESST.  Speculum and bimanual exam are remarkable for atrophic tissues, some areas of irritation but not friable or bleeding.   Her pessary was removed cleaned and replaced. Skin dry, warm to touch. No lower extremity edema.  Full ROM in extremities with normal gait.      Urine dip +glucose, moderate blood, protein and large leuks (has pessary in)    PVR 65 mL by bladder scan    A/P: Saarh Longo is a 75 year old F with history of lumbar radiculopathy, lefthip replacement with urinary frequency, uterovaginal prolapse managed with a pessary, atrophic vaginitis, nocturia, constipation    Encouraged her to use the estrogen cream, explained to her how that would help her symptoms of atrophy.  She will leave the pessary out on the nights that she uses it    Miralax for the constipation    Intake modification for the nocturia and frequency    Follow up in about 3 months, sooner if needed    40 minutes were spent with the patient today, > 50% in counseling and coordination of care    Laura Quinones MD MPH    Urology    CC  Patient Care Team:  Gaye Zimmer as PCP - General (Internal Medicine)  Dennys Majano MD as MD (INTERNAL MEDICINE - ENDOCRINOLOGY, DIABETES & METABOLISM)  Laura Quinones MD as MD (Urology)  Troy Neves MD as Referring Physician (OB/Gyn)  TROY NEVES

## 2020-09-15 NOTE — LETTER
9/15/2020       RE: Sarah Longo  [address] [facility] [address]     Dear Colleague,    Thank you for referring your patient, Sarah Longo, to the [facility] [place] [facility]. Please see a copy of my visit note below.    September 15, 2020    Referring Provider: Shireen Neves MD  [address] [facility] [address]  [place] [address]    Primary Care Provider: Gaye Zimmer    CC: Pelvic floor issues    HPI:  Sarah Longo is a 75 year old female who presents for evaluation of her pelvic floor symptoms.  She has a long history of bladder issues with complex voiding dysfunction and urinary urgency frequency.  Has followed with Dr Frausto in the past, patient failed interstim.  Last saw her earlier this summer for urinary retention after her hip surgery.    She has tried multiple anticholinergics which did not help.  The mirabegron helps some but her biggest issues at night.  Nocturia x 5-drinks water or milk usually at bedtime, occasionally diet coke.  Denies snoring or ORIANA.    Tremendous back and hip pain secondary to her spinal fusion.  Recently also had a hip replacement and an epidural for pain, which did not help.  She recently has starting trying a TENS unit with her home PT    Has a pessary for her prolapse which works well    Has vaginal dryness, started on estrace last month by Dr Neves    Past Medical History:   Diagnosis Date     Anemia      Arthritis      Coronary artery disease 04/28/2016    cardiac cath 2/2019: patent stents; PTCA with MAYA x 2 to OM1 and MAYA x 1 to p.LAD (4/21/2016 at [place]); PTCA with intracoronary stent placement of RCA June 2004; MAYA to OM1 11/2016     Cystocele, midline 09/29/2010     Depression      HYPERPLASTIC  POLYP      colonoscopy in 5-10 yrs.     Hypothyroidism     hypothyroid- Dr Melecio Weston      OAB (overactive bladder)     complex voiding dysfct, failed  interstim     Osteoarthritis      Other and unspecified hyperlipidemia      Palpitations      Postmenopausal bleeding      Shoulder blade pain 5/31/2016     Type II or unspecified type diabetes mellitus without mention of complication, not stated as uncontrolled     Dr. Majano     Unspecified essential hypertension      Urinary frequency 9/29/10    followed by urology. no benefit from detrol or sanctura. month trial of macrobid and flomax 10/10     Past Surgical History:   Procedure Laterality Date     ------------OTHER-------------      Interstim     Ablation       ARTHROPLASTY HIP Left 7/15/2020    Procedure: Left total hip arthroplasty;  Surgeon: Jayson Victoria MD;  Location: [facility] OR     BACK SURGERY  03/05/2020    spinal fusion     C CT ANGIOGRAPHY CORONARY  1/2005    mod soft plaque LAD and Cx, follow up with left heart cath     C LIGATE FALLOPIAN TUBE      endometrial ablation     CARDIAC SURGERY       CHOLECYSTECTOMY       COLONOSCOPY       CV HEART CATHETERIZATION WITH POSSIBLE INTERVENTION N/A 2/14/2019    Procedure: Coronary Angiogram;  Surgeon: Sudarshan Lee MD;  Location:  HEART CARDIAC CATH LAB; patent stents     CYSTOSCOPY       EXCISE LESION UPPER EXTREMITY  6/5/2013    Procedure: EXCISE LESION UPPER EXTREMITY;  EXCISION RIGHT ARM ANTICUBITAL LIPOMA ;  Surgeon: Jayson Joe MD;  Location: [facility]     HC REMOVAL OF TONSILS,12+ Y/O       HEART CATH LEFT HEART CATH  3/2/2016    No intervention - aggressive medical management     HEART CATH LEFT HEART CATH  4/21/2016     MAYA x 2 to OM1, MAYA to LAD, at [place]     HEART CATH LEFT HEART CATH  6/29/2004    MAYA to RCA     HEART CATH LEFT HEART CATH  3/28/2002    Mild to moderate 3 vessel CAD. Medical management recommended.      HEART CATH LEFT HEART CATH  11/22/2016    MAYA to OM1     hypothyroid       KNEE SURGERY  4/20/10    right knee replacement     ORTHOPEDIC SURGERY      total knee 2010     REMOVE STIMULATOR SACRAL NERVE N/A 11/2/2015     Procedure: REMOVE STIMULATOR SACRAL NERVE;  Surgeon: Gertrudis Frausto MD;  Location: [facility]     SURGICAL HISTORY OF -       Uterine endometrial ablation     Social History     Socioeconomic History     Marital status:      Spouse name: Kwadwo     Number of children: 3     Years of education: Not on file     Highest education level: Not on file   Occupational History     Occupation: PT Aide     Employer: NONE      Employer: RETIRED   Social Needs     Financial resource strain: Not on file     Food insecurity     Worry: Not on file     Inability: Not on file     Transportation needs     Medical: Not on file     Non-medical: Not on file   Tobacco Use     Smoking status: Never Smoker     Smokeless tobacco: Never Used   Substance and Sexual Activity     Alcohol use: No     Alcohol/week: 0.0 standard drinks     Binge frequency: Never     Drug use: No     Sexual activity: Never     Partners: Male     Birth control/protection: Post-menopausal   Lifestyle     Physical activity     Days per week: Not on file     Minutes per session: Not on file     Stress: Not on file   Relationships     Social connections     Talks on phone: Not on file     Gets together: Not on file     Attends Pentecostalism service: Not on file     Active member of club or organization: Not on file     Attends meetings of clubs or organizations: Not on file     Relationship status: Not on file     Intimate partner violence     Fear of current or ex partner: Not on file     Emotionally abused: Not on file     Physically abused: Not on file     Forced sexual activity: Not on file   Other Topics Concern      Service Not Asked     Blood Transfusions Not Asked     Caffeine Concern Yes     Comment: 6-7 cups caffeine per day     Occupational Exposure Not Asked     Hobby Hazards Not Asked     Sleep Concern No     Stress Concern Yes     Weight Concern No     Special Diet No     Comment: eats healthy      Back Care Not Asked     Exercise Yes      Comment:  walking & active life style      Bike Helmet Not Asked     Seat Belt Not Asked     Self-Exams Not Asked     Parent/sibling w/ CABG, MI or angioplasty before 65F 55M? No   Social History Narrative     Not on file     Family History   Problem Relation Age of Onset     C.A.D. Father         MI , age 83, had high lipids     Hyperlipidemia Father      Hypertension Father      Coronary Artery Disease Father      Hypertension Mother      Genitourinary Problems Mother         renal failure     Fractures Mother         hip     Osteoporosis Mother      Cerebrovascular Disease Maternal Grandfather         cerebral hemorrhage     Diabetes Maternal Uncle      Colon Cancer Maternal Aunt      Diabetes Maternal Grandmother      ROS    Allergies   Allergen Reactions     Hmg-Coa-R Inhibitors Muscle Pain (Myalgia)     Oral statins       Current Outpatient Medications   Medication     amoxicillin (AMOXIL) 500 MG capsule     aspirin 81 MG EC tablet     CINNAMON PO     cyanocobalamin (CYANOCOBALAMIN) 1000 MCG/ML injection     diclofenac (VOLTAREN) 1 % topical gel     estradiol (ESTRACE) 0.1 MG/GM vaginal cream     evolocumab (REPATHA SURECLICK) 140 MG/ML prefilled autoinjector     ferrous fumarate 65 mg, [place]. FE,-Vitamin C 125 mg (VITRON C)  MG TABS tablet     GLIMEPIRIDE 4 MG OR TABS     isosorbide mononitrate (IMDUR) 30 MG 24 hr tablet     levothyroxine (SYNTHROID/LEVOTHROID) 125 MCG tablet     liraglutide (VICTOZA PEN) 18 MG/3ML solution     lisinopril (PRINIVIL/ZESTRIL) 5 MG tablet     melatonin 3 MG tablet     miconazole (MONISTAT 1 DAY OR NIGHT) 1200 & 2 MG & % kit     mirabegron (MYRBETRIQ) 50 MG 24 hr tablet     nitroglycerin (NITROSTAT) 0.4 MG SL tablet     NONFORMULARY     ONETOUCH ULTRA test strip     oxyCODONE (ROXICODONE) 5 MG tablet     STATIN NOT PRESCRIBED, INTENTIONAL,     traMADol (ULTRAM) 50 MG tablet     TURMERIC PO     vitamin D3 (CHOLECALCIFEROL) 2000 units tablet     acetaminophen (TYLENOL)  "325 MG tablet     esomeprazole (NEXIUM) 40 MG DR capsule     oxyCODONE (ROXICODONE) 5 MG tablet     polyethylene glycol (MIRALAX) 17 g packet     No current facility-administered medications for this visit.      /64   Pulse 82   Ht 1.727 m (5' 8\")   Wt 74.8 kg (165 lb)   BMI 25.09 kg/m   No LMP recorded. Patient is postmenopausal. Body mass index is 25.09 kg/m .  She is alert and oriented.  She is well groomed, comfortable in no acute distress.  Eyes have anicteric sclerae, moist conjunctivae.  Normal mood and affect.   Normocephalic, atraumatic without masses, lesions, obvious abnormalities.  Non-labored breathing.  Abdomen is soft, non-tender, non-distended, no CVAT, no organomegaly.  Normal external female genitalia.  Negative ESST.  Speculum and bimanual exam are remarkable for atrophic tissues, some areas of irritation but not friable or bleeding.   Her pessary was removed cleaned and replaced. Skin dry, warm to touch. No lower extremity edema.  Full ROM in extremities with normal gait.      Urine dip +glucose, moderate blood, protein and large leuks (has pessary in)    PVR 65 mL by bladder scan    A/P: Sarah Longo is a 75 year old F with history of lumbar radiculopathy, lefthip replacement with urinary frequency, uterovaginal prolapse managed with a pessary, atrophic vaginitis, nocturia, constipation    Encouraged her to use the estrogen cream, explained to her how that would help her symptoms of atrophy.  She will leave the pessary out on the nights that she uses it    Miralax for the constipation    Intake modification for the nocturia and frequency    Follow up in about 3 months, sooner if needed    40 minutes were spent with the patient today, > 50% in counseling and coordination of care    Laura Quinones MD MPH    Urology    CC  Patient Care Team:  Gaye Zimmer as PCP - General (Internal Medicine)  Dennys Majano MD as MD (INTERNAL MEDICINE - ENDOCRINOLOGY, " DIABETES & METABOLISM)  Laura Quinones MD as MD (Urology)  Troy Neves MD as Referring Physician (OB/Gyn)  TROY NEVES

## 2020-09-15 NOTE — NURSING NOTE
Has frequency and urgency. Has vaginal burning . Has a pessary in place for years  PVR by bladder scan was 65 ml ..Loida Malik LPN

## 2020-09-15 NOTE — PATIENT INSTRUCTIONS
Websites with free information:    [employer] patient website: www.voicesforpfd.org    Total Control Program: www.totalcontrolprogram.com    Lubricants-vaseline, aquaphor, uber lube    Avoid fluids after dinner time    Use the estogren cream nightly for two weeks and then 3x a week thereafter, please take out pessary and leave out overnight those weeks    Use miralax daily    It was a pleasure meeting with you today.  Thank you for allowing me and my team the privilege of caring for you today.  YOU are the reason we are here, and I truly hope we provided you with the excellent service you deserve.  Please let us know if there is anything else we can do for you so that we can be sure you are leaving completely satisfied with your care experience.

## 2020-09-18 ENCOUNTER — OFFICE VISIT (OUTPATIENT)
Dept: CARDIOLOGY | Facility: CLINIC | Age: 75
End: 2020-09-18
Payer: COMMERCIAL

## 2020-09-18 VITALS
HEART RATE: 79 BPM | DIASTOLIC BLOOD PRESSURE: 68 MMHG | SYSTOLIC BLOOD PRESSURE: 131 MMHG | HEIGHT: 68 IN | BODY MASS INDEX: 23.89 KG/M2 | WEIGHT: 157.6 LBS

## 2020-09-18 DIAGNOSIS — I10 ESSENTIAL HYPERTENSION: ICD-10-CM

## 2020-09-18 DIAGNOSIS — I25.10 CORONARY ARTERY DISEASE INVOLVING NATIVE CORONARY ARTERY OF NATIVE HEART WITHOUT ANGINA PECTORIS: Primary | ICD-10-CM

## 2020-09-18 DIAGNOSIS — E11.9 TYPE 2 DIABETES MELLITUS WITHOUT COMPLICATION, WITHOUT LONG-TERM CURRENT USE OF INSULIN (H): ICD-10-CM

## 2020-09-18 DIAGNOSIS — E78.5 HYPERLIPIDEMIA LDL GOAL <100: ICD-10-CM

## 2020-09-18 PROCEDURE — 99214 OFFICE O/P EST MOD 30 MIN: CPT | Performed by: INTERNAL MEDICINE

## 2020-09-18 RX ORDER — LISINOPRIL 10 MG/1
10 TABLET ORAL DAILY
Qty: 90 TABLET | Refills: 3 | Status: SHIPPED | OUTPATIENT
Start: 2020-09-18 | End: 2021-05-03

## 2020-09-18 ASSESSMENT — MIFFLIN-ST. JEOR: SCORE: 1263.37

## 2020-09-18 NOTE — PROGRESS NOTES
HPI and Plan:   See dictation    Orders Placed This Encounter   Procedures     Follow-Up with Cardiologist       Orders Placed This Encounter   Medications     lisinopril (ZESTRIL) 10 MG tablet     Sig: Take 1 tablet (10 mg) by mouth daily     Dispense:  90 tablet     Refill:  3       Medications Discontinued During This Encounter   Medication Reason     esomeprazole (NEXIUM) 40 MG DR capsule Medication Reconciliation Clean Up     lisinopril (PRINIVIL/ZESTRIL) 5 MG tablet          Encounter Diagnoses   Name Primary?     Coronary artery disease involving native coronary artery of native heart without angina pectoris Yes     Essential hypertension      Hyperlipidemia LDL goal <100      Type 2 diabetes mellitus without complication, without long-term current use of insulin (H)        CURRENT MEDICATIONS:  Current Outpatient Medications   Medication Sig Dispense Refill     acetaminophen (TYLENOL) 325 MG tablet Take 2 tablets (650 mg) by mouth every 4 hours as needed for mild pain       amoxicillin (AMOXIL) 500 MG capsule 2,000 mg once as needed (Before dental procedures)        aspirin 81 MG EC tablet Take 81 mg by mouth daily       CINNAMON PO Take 2 capsules by mouth 2 times daily       cyanocobalamin (CYANOCOBALAMIN) 1000 MCG/ML injection Inject 2 mLs into the muscle every 30 days       diclofenac (VOLTAREN) 1 % topical gel Place 4 g onto the skin 4 times daily 100 g 0     estradiol (ESTRACE) 0.1 MG/GM vaginal cream Use pea sized amount and apply with finger to vaginal skin just inside opening once a day before bed as needed for vaginal dryness. 42.5 g 3     evolocumab (REPATHA SURECLICK) 140 MG/ML prefilled autoinjector Inject 1 mL (140 mg) Subcutaneous every 14 days 2 mL 11     ferrous fumarate 65 mg, [place]. FE,-Vitamin C 125 mg (VITRON C)  MG TABS tablet Take 1 tablet by mouth daily       GLIMEPIRIDE 4 MG OR TABS Take 4 mg by mouth 2 times daily        isosorbide mononitrate (IMDUR) 30 MG 24 hr tablet Take  0.5 tablets (15 mg) by mouth daily 45 tablet 3     levothyroxine (SYNTHROID/LEVOTHROID) 125 MCG tablet Take 125 mcg by mouth daily before breakfast       liraglutide (VICTOZA PEN) 18 MG/3ML solution Inject 1.2 mg Subcutaneous daily 3 mL 0     lisinopril (ZESTRIL) 10 MG tablet Take 1 tablet (10 mg) by mouth daily 90 tablet 3     melatonin 3 MG tablet Take 2 tablets (6 mg) by mouth nightly as needed for sleep 30 tablet 0     miconazole (MONISTAT 1 DAY OR NIGHT) 1200 & 2 MG & % kit Use as directed on box       mirabegron (MYRBETRIQ) 50 MG 24 hr tablet Take 1 tablet (50 mg) by mouth daily Patient not taking daily (Patient taking differently: Take 50 mg by mouth daily ) 90 tablet 3     nitroglycerin (NITROSTAT) 0.4 MG SL tablet Place 1 tablet (0.4 mg) under the tongue every 5 minutes as needed for chest pain If symptoms persist after 3 doses (15 minutes) call 911. 25 tablet 3     NONFORMULARY Take 1-2 capsules by mouth daily as needed Gymnema Sylvestris Leaf capsule       oxyCODONE (ROXICODONE) 5 MG tablet Take 1 tablet (5 mg) by mouth every 4 minutes as needed for breakthrough pain 10 tablet 0     oxyCODONE (ROXICODONE) 5 MG tablet Take 1-2 tablets (5-10 mg) by mouth every 3 hours as needed for severe pain 30 tablet 0     polyethylene glycol (MIRALAX) 17 g packet Take 17 g by mouth daily as needed for constipation       traMADol (ULTRAM) 50 MG tablet Take 1 tablet (50 mg) by mouth every 6 hours as needed for moderate to severe pain 30 tablet 3     TURMERIC PO Take 500 mg by mouth 2 times daily        vitamin D3 (CHOLECALCIFEROL) 2000 units tablet Take 1 tablet by mouth 2 times daily       ONETOUCH ULTRA test strip        STATIN NOT PRESCRIBED, INTENTIONAL, 1 each At Bedtime Statin not prescribed intentionally due to Allergy to statin 0 each 0       ALLERGIES     Allergies   Allergen Reactions     Hmg-Coa-R Inhibitors Muscle Pain (Myalgia)     Oral statins         PAST MEDICAL HISTORY:  Past Medical History:   Diagnosis  Date     Anemia      Arthritis      Coronary artery disease 04/28/2016    cardiac cath 2/2019: patent stents; PTCA with MAYA x 2 to OM1 and MAYA x 1 to p.LAD (4/21/2016 at [facility]); PTCA with intracoronary stent placement of RCA June 2004; MAYA to OM1 11/2016     Cystocele, midline 09/29/2010     Depression      HYPERPLASTIC  POLYP      colonoscopy in 5-10 yrs.     Hypothyroidism     hypothyroid- Dr Majano     Mumtej      OAB (overactive bladder)     complex voiding dysfct, failed interstim     Osteoarthritis      Other and unspecified hyperlipidemia      Palpitations      Postmenopausal bleeding      Shoulder blade pain 5/31/2016     Type II or unspecified type diabetes mellitus without mention of complication, not stated as uncontrolled     Dr. Majano     Unspecified essential hypertension      Urinary frequency 9/29/10    followed by urology. no benefit from detrol or sanctura. month trial of macrobid and flomax 10/10       PAST SURGICAL HISTORY:  Past Surgical History:   Procedure Laterality Date     ------------OTHER-------------      Interstim     Ablation       ARTHROPLASTY HIP Left 7/15/2020    Procedure: Left total hip arthroplasty;  Surgeon: Jayson Victoria MD;  Location: [facility] OR     BACK SURGERY  03/05/2020    spinal fusion     C CT ANGIOGRAPHY CORONARY  1/2005    mod soft plaque LAD and Cx, follow up with left heart cath     C LIGATE FALLOPIAN TUBE      endometrial ablation     CARDIAC SURGERY       CHOLECYSTECTOMY       COLONOSCOPY       CV HEART CATHETERIZATION WITH POSSIBLE INTERVENTION N/A 2/14/2019    Procedure: Coronary Angiogram;  Surgeon: Sudarshan Lee MD;  Location:  HEART CARDIAC CATH LAB; patent stents     CYSTOSCOPY       EXCISE LESION UPPER EXTREMITY  6/5/2013    Procedure: EXCISE LESION UPPER EXTREMITY;  EXCISION RIGHT ARM ANTICUBITAL LIPOMA ;  Surgeon: Jayson Joe MD;  Location: [facility] REMOVAL OF TONSILS,13+ Y/O       HEART CATH LEFT HEART CATH  3/2/2016    No  intervention - aggressive medical management     HEART CATH LEFT HEART CATH  2016     MAYA x 2 to OM1, MAYA to LAD, at [place]     HEART CATH LEFT HEART CATH  2004    MAYA to RCA     HEART CATH LEFT HEART CATH  3/28/2002    Mild to moderate 3 vessel CAD. Medical management recommended.      HEART CATH LEFT HEART CATH  2016    MAYA to OM1     hypothyroid       KNEE SURGERY  4/20/10    right knee replacement     ORTHOPEDIC SURGERY      total knee      REMOVE STIMULATOR SACRAL NERVE N/A 2015    Procedure: REMOVE STIMULATOR SACRAL NERVE;  Surgeon: Gertrudis Frausto MD;  Location: [facility]     SURGICAL HISTORY OF -       Uterine endometrial ablation       FAMILY HISTORY:  Family History   Problem Relation Age of Onset     C.A.D. Father         MI , age 83, had high lipids     Hyperlipidemia Father      Hypertension Father      Coronary Artery Disease Father      Hypertension Mother      Genitourinary Problems Mother         renal failure     Fractures Mother         hip     Osteoporosis Mother      Cerebrovascular Disease Maternal Grandfather         cerebral hemorrhage     Diabetes Maternal Uncle      Colon Cancer Maternal Aunt      Diabetes Maternal Grandmother        SOCIAL HISTORY:  Social History     Socioeconomic History     Marital status:      Spouse name: Kwadwo     Number of children: 3     Years of education: None     Highest education level: None   Occupational History     Occupation: PT Aide     Employer: NONE      Employer: RETIRED   Social Needs     Financial resource strain: None     Food insecurity     Worry: None     Inability: None     Transportation needs     Medical: None     Non-medical: None   Tobacco Use     Smoking status: Never Smoker     Smokeless tobacco: Never Used   Substance and Sexual Activity     Alcohol use: No     Alcohol/week: 0.0 standard drinks     Binge frequency: Never     Drug use: No     Sexual activity: Never     Partners: Male     Birth  "control/protection: Post-menopausal   Lifestyle     Physical activity     Days per week: None     Minutes per session: None     Stress: None   Relationships     Social connections     Talks on phone: None     Gets together: None     Attends [employer] service: None     Active member of club or organization: None     Attends meetings of clubs or organizations: None     Relationship status: None     Intimate partner violence     Fear of current or ex partner: None     Emotionally abused: None     Physically abused: None     Forced sexual activity: None   Other Topics Concern      Service Not Asked     Blood Transfusions Not Asked     Caffeine Concern Yes     Comment: 6-7 cups caffeine per day     Occupational Exposure Not Asked     Hobby Hazards Not Asked     Sleep Concern No     Stress Concern Yes     Weight Concern No     Special Diet No     Comment: eats healthy      Back Care Not Asked     Exercise Yes     Comment:  walking & active life style      Bike Helmet Not Asked     Seat Belt Not Asked     Self-Exams Not Asked     Parent/sibling w/ CABG, MI or angioplasty before 65F 55M? No   Social History Narrative     None       Review of Systems:  Skin:  Negative       Eyes:  Positive for glasses cataract surgery 2014  ENT:  Negative      Respiratory:  Positive for dyspnea on exertion occ   Cardiovascular:    Positive for;palpitations(discomfort)    Gastroenterology: Negative      Genitourinary:  Positive for urinary frequency not new  Musculoskeletal:  Positive for back pain;arthritis pain everywhere  Neurologic:  Negative      Psychiatric:  Positive for anxiety    Heme/Lymph/Imm:  Positive for allergies    Endocrine:  Positive for thyroid disorder;diabetes      Physical Exam:  Vitals: /68   Pulse 79   Ht 1.727 m (5' 8\")   Wt 71.5 kg (157 lb 9.6 oz)   BMI 23.96 kg/m      Constitutional:  cooperative, alert and oriented, well developed, well nourished, in no acute distress        Skin:  warm and dry to " the touch, no apparent skin lesions or masses noted          Head:  normocephalic, no masses or lesions        Eyes:  pupils equal and round, conjunctivae and lids unremarkable, sclera white, no xanthalasma, EOMS intact, no nystagmus        Lymph:      ENT:  no pallor or cyanosis, dentition good        Neck:  carotid pulses are full and equal bilaterally, JVP normal, no carotid bruit        Respiratory:  normal breath sounds, clear to auscultation, normal A-P diameter, normal symmetry, normal respiratory excursion, no use of accessory muscles         Cardiac: regular rhythm       systolic murmur;grade 1;RUSB        pulses full and equal, no bruits auscultated                                        GI:  abdomen soft;BS normoactive        Extremities and Muscular Skeletal:  no deformities, clubbing, cyanosis, erythema observed;no edema              Neurological:  no gross motor deficits;affect appropriate        Psych:  Alert and Oriented x 3        CC  No referring provider defined for this encounter.

## 2020-09-18 NOTE — LETTER
9/18/2020      Gaye Zimmer  [facility] [address] [facility] [address]      RE: Sarah Longo       Dear Colleague,    I had the pleasure of seeing Sarah Longo in the [facility].    Service Date: 09/18/2020      HISTORY OF PRESENT ILLNESS:  I had the opportunity to see Ms. Sarah Longo in Cardiology Clinic today at the [facility] in [place] for reevaluation of coronary artery disease.  She recently has gone to low back fusion and hip replacement surgery and did well without any cardiac complications.  Unfortunately, she continues to have ongoing severe low back pain problems and difficulty with mobility.  She is quite frustrated by this.  Her coronary artery disease issues seem to be stable.  She is not having any concerning cardiac symptoms such as chest discomfort, shortness of breath, lightheadedness or syncope.  Her cardiac risk factors include hypertension, dyslipidemia and type 2 diabetes.  She has undergone multiple angioplasty and stenting procedures in the past which are well detailed in my record.      She did not tolerate metoprolol due to fatigue and was on Bystolic for cardiac protection during her surgeries.  She has since discontinued that medication because of concerns that it was causing fatigue as well.  She has been using a low dose of isosorbide mononitrate 15 mg daily.  She continues on a low dose of lisinopril 5 mg nightly as well.      Fortunately, her cholesterol numbers are excellent on her PCSK9 inhibitor and her diabetes is being managed by Endocrinology.      PHYSICAL EXAMINATION:  Today, her blood pressure is 131/68, heart rate 79 and weight 157 pounds.  Her lungs are clear.  Heart rhythm is regular.  She has no cardiac murmurs or carotid bruits.      IMPRESSIONS:  Ms. Sarah Longo is a 75-year-old woman with hypertension, dyslipidemia, type 2 diabetes with a history of coronary artery disease and  previous revascularization procedures.  She has normal left ventricular function.  She was able to get through a couple of major surgeries this year without any cardiac complications.  I agreed that it would be reasonable for her to discontinue her Bystolic since that seems to be causing her side effects of fatigue.  I also suggested she could stop the isosorbide mononitrate and increase the dose of her lisinopril to 10 mg per day.  She will follow up with Endocrinology for continuing management of her diabetes.        I hope that she is able to eventually get pain relief from her low back issues, but I am happy that her cardiac issues are stable.      Thank you for allowing me to participate in her care.      cc:      Gaye Zimmer MD    [facility]   [address] [place] [address]         GUNNAR LEA MD, [facility]             D: 2020   T: 2020   MT: KENDALL      Name:     KATHY PERRY   MRN:      [id number]        Account:      [id number]   :      1945           Service Date: 2020      Document: [id number]         Outpatient Encounter Medications as of 2020   Medication Sig Dispense Refill     acetaminophen (TYLENOL) 325 MG tablet Take 2 tablets (650 mg) by mouth every 4 hours as needed for mild pain       amoxicillin (AMOXIL) 500 MG capsule 2,000 mg once as needed (Before dental procedures)        aspirin 81 MG EC tablet Take 81 mg by mouth daily       CINNAMON PO Take 2 capsules by mouth 2 times daily       cyanocobalamin (CYANOCOBALAMIN) 1000 MCG/ML injection Inject 2 mLs into the muscle every 30 days       diclofenac (VOLTAREN) 1 % topical gel Place 4 g onto the skin 4 times daily 100 g 0     estradiol (ESTRACE) 0.1 MG/GM vaginal cream Use pea sized amount and apply with finger to vaginal skin just inside opening once a day before bed as needed for vaginal dryness. 42.5 g 3     evolocumab (REPATHA SURECLICK) 140 MG/ML prefilled autoinjector Inject 1 mL (140 mg)  Subcutaneous every 14 days 2 mL 11     ferrous fumarate 65 mg, [place]. FE,-Vitamin C 125 mg (VITRON C)  MG TABS tablet Take 1 tablet by mouth daily       GLIMEPIRIDE 4 MG OR TABS Take 4 mg by mouth 2 times daily        isosorbide mononitrate (IMDUR) 30 MG 24 hr tablet Take 0.5 tablets (15 mg) by mouth daily 45 tablet 3     levothyroxine (SYNTHROID/LEVOTHROID) 125 MCG tablet Take 125 mcg by mouth daily before breakfast       liraglutide (VICTOZA PEN) 18 MG/3ML solution Inject 1.2 mg Subcutaneous daily 3 mL 0     lisinopril (ZESTRIL) 10 MG tablet Take 1 tablet (10 mg) by mouth daily 90 tablet 3     melatonin 3 MG tablet Take 2 tablets (6 mg) by mouth nightly as needed for sleep 30 tablet 0     miconazole (MONISTAT 1 DAY OR NIGHT) 1200 & 2 MG & % kit Use as directed on box       mirabegron (MYRBETRIQ) 50 MG 24 hr tablet Take 1 tablet (50 mg) by mouth daily Patient not taking daily (Patient taking differently: Take 50 mg by mouth daily ) 90 tablet 3     nitroglycerin (NITROSTAT) 0.4 MG SL tablet Place 1 tablet (0.4 mg) under the tongue every 5 minutes as needed for chest pain If symptoms persist after 3 doses (15 minutes) call 911. 25 tablet 3     NONFORMULARY Take 1-2 capsules by mouth daily as needed Gymnema Sylvestris Leaf capsule       oxyCODONE (ROXICODONE) 5 MG tablet Take 1 tablet (5 mg) by mouth every 4 minutes as needed for breakthrough pain 10 tablet 0     oxyCODONE (ROXICODONE) 5 MG tablet Take 1-2 tablets (5-10 mg) by mouth every 3 hours as needed for severe pain 30 tablet 0     polyethylene glycol (MIRALAX) 17 g packet Take 17 g by mouth daily as needed for constipation       traMADol (ULTRAM) 50 MG tablet Take 1 tablet (50 mg) by mouth every 6 hours as needed for moderate to severe pain 30 tablet 3     TURMERIC PO Take 500 mg by mouth 2 times daily        vitamin D3 (CHOLECALCIFEROL) 2000 units tablet Take 1 tablet by mouth 2 times daily       ONETOUCH ULTRA test strip        STATIN NOT PRESCRIBED,  INTENTIONAL, 1 each At Bedtime Statin not prescribed intentionally due to Allergy to statin 0 each 0     [DISCONTINUED] esomeprazole (NEXIUM) 40 MG DR capsule Take 40 mg by mouth every morning (before breakfast) Take 30-60 minutes before eating.       [DISCONTINUED] lisinopril (PRINIVIL/ZESTRIL) 5 MG tablet Take 1 tablet (5 mg) by mouth daily 90 tablet 3     No facility-administered encounter medications on file as of 9/18/2020.        Again, thank you for allowing me to participate in the care of your patient.      Sincerely,    GUNNAR LEA MD     [facility]

## 2020-09-18 NOTE — LETTER
9/18/2020    Gaye Zimmer  [facility] [address] [facility] [id number]    RE: Sarah Longo       Dear Colleague,    I had the pleasure of seeing Sarah Longo in the [facility].    HPI and Plan:   See dictation    Orders Placed This Encounter   Procedures     Follow-Up with Cardiologist       Orders Placed This Encounter   Medications     lisinopril (ZESTRIL) 10 MG tablet     Sig: Take 1 tablet (10 mg) by mouth daily     Dispense:  90 tablet     Refill:  3       Medications Discontinued During This Encounter   Medication Reason     esomeprazole (NEXIUM) 40 MG DR capsule Medication Reconciliation Clean Up     lisinopril (PRINIVIL/ZESTRIL) 5 MG tablet          Encounter Diagnoses   Name Primary?     Coronary artery disease involving native coronary artery of native heart without angina pectoris Yes     Essential hypertension      Hyperlipidemia LDL goal <100      Type 2 diabetes mellitus without complication, without long-term current use of insulin (H)        CURRENT MEDICATIONS:  Current Outpatient Medications   Medication Sig Dispense Refill     acetaminophen (TYLENOL) 325 MG tablet Take 2 tablets (650 mg) by mouth every 4 hours as needed for mild pain       amoxicillin (AMOXIL) 500 MG capsule 2,000 mg once as needed (Before dental procedures)        aspirin 81 MG EC tablet Take 81 mg by mouth daily       CINNAMON PO Take 2 capsules by mouth 2 times daily       cyanocobalamin (CYANOCOBALAMIN) 1000 MCG/ML injection Inject 2 mLs into the muscle every 30 days       diclofenac (VOLTAREN) 1 % topical gel Place 4 g onto the skin 4 times daily 100 g 0     estradiol (ESTRACE) 0.1 MG/GM vaginal cream Use pea sized amount and apply with finger to vaginal skin just inside opening once a day before bed as needed for vaginal dryness. 42.5 g 3     evolocumab (REPATHA SURECLICK) 140 MG/ML prefilled autoinjector Inject 1 mL (140 mg) Subcutaneous every 14 days 2 mL 11      ferrous fumarate 65 mg, [place]. FE,-Vitamin C 125 mg (VITRON C)  MG TABS tablet Take 1 tablet by mouth daily       GLIMEPIRIDE 4 MG OR TABS Take 4 mg by mouth 2 times daily        isosorbide mononitrate (IMDUR) 30 MG 24 hr tablet Take 0.5 tablets (15 mg) by mouth daily 45 tablet 3     levothyroxine (SYNTHROID/LEVOTHROID) 125 MCG tablet Take 125 mcg by mouth daily before breakfast       liraglutide (VICTOZA PEN) 18 MG/3ML solution Inject 1.2 mg Subcutaneous daily 3 mL 0     lisinopril (ZESTRIL) 10 MG tablet Take 1 tablet (10 mg) by mouth daily 90 tablet 3     melatonin 3 MG tablet Take 2 tablets (6 mg) by mouth nightly as needed for sleep 30 tablet 0     miconazole (MONISTAT 1 DAY OR NIGHT) 1200 & 2 MG & % kit Use as directed on box       mirabegron (MYRBETRIQ) 50 MG 24 hr tablet Take 1 tablet (50 mg) by mouth daily Patient not taking daily (Patient taking differently: Take 50 mg by mouth daily ) 90 tablet 3     nitroglycerin (NITROSTAT) 0.4 MG SL tablet Place 1 tablet (0.4 mg) under the tongue every 5 minutes as needed for chest pain If symptoms persist after 3 doses (15 minutes) call 911. 25 tablet 3     NONFORMULARY Take 1-2 capsules by mouth daily as needed Gymnema Sylvestris Leaf capsule       oxyCODONE (ROXICODONE) 5 MG tablet Take 1 tablet (5 mg) by mouth every 4 minutes as needed for breakthrough pain 10 tablet 0     oxyCODONE (ROXICODONE) 5 MG tablet Take 1-2 tablets (5-10 mg) by mouth every 3 hours as needed for severe pain 30 tablet 0     polyethylene glycol (MIRALAX) 17 g packet Take 17 g by mouth daily as needed for constipation       traMADol (ULTRAM) 50 MG tablet Take 1 tablet (50 mg) by mouth every 6 hours as needed for moderate to severe pain 30 tablet 3     TURMERIC PO Take 500 mg by mouth 2 times daily        vitamin D3 (CHOLECALCIFEROL) 2000 units tablet Take 1 tablet by mouth 2 times daily       ONETOUCH ULTRA test strip        STATIN NOT PRESCRIBED, INTENTIONAL, 1 each At Bedtime Statin not  prescribed intentionally due to Allergy to statin 0 each 0       ALLERGIES     Allergies   Allergen Reactions     Hmg-Coa-R Inhibitors Muscle Pain (Myalgia)     Oral statins         PAST MEDICAL HISTORY:  Past Medical History:   Diagnosis Date     Anemia      Arthritis      Coronary artery disease 04/28/2016    cardiac cath 2/2019: patent stents; PTCA with MAYA x 2 to OM1 and MAYA x 1 to p.LAD (4/21/2016 at [place]); PTCA with intracoronary stent placement of RCA June 2004; MAYA to OM1 11/2016     Cystocele, midline 09/29/2010     Depression      HYPERPLASTIC  POLYP      colonoscopy in 5-10 yrs.     Hypothyroidism     hypothyroid- Dr Melecio Weston      OAB (overactive bladder)     complex voiding dysfct, failed interstim     Osteoarthritis      Other and unspecified hyperlipidemia      Palpitations      Postmenopausal bleeding      Shoulder blade pain 5/31/2016     Type II or unspecified type diabetes mellitus without mention of complication, not stated as uncontrolled     Dr. Majano     Unspecified essential hypertension      Urinary frequency 9/29/10    followed by urology. no benefit from detrol or sanctura. month trial of macrobid and flomax 10/10       PAST SURGICAL HISTORY:  Past Surgical History:   Procedure Laterality Date     ------------OTHER-------------      Interstim     Ablation       ARTHROPLASTY HIP Left 7/15/2020    Procedure: Left total hip arthroplasty;  Surgeon: Jayson Victoria MD;  Location: [facility] OR     BACK SURGERY  03/05/2020    spinal fusion     C CT ANGIOGRAPHY CORONARY  1/2005    mod soft plaque LAD and Cx, follow up with left heart cath     C LIGATE FALLOPIAN TUBE      endometrial ablation     CARDIAC SURGERY       CHOLECYSTECTOMY       COLONOSCOPY       CV HEART CATHETERIZATION WITH POSSIBLE INTERVENTION N/A 2/14/2019    Procedure: Coronary Angiogram;  Surgeon: Sudarshan Lee MD;  Location: [facility] CARDIAC CATH LAB; patent stents     CYSTOSCOPY       EXCISE LESION UPPER EXTREMITY   2013    Procedure: EXCISE LESION UPPER EXTREMITY;  EXCISION RIGHT ARM ANTICUBITAL LIPOMA ;  Surgeon: Jayson Joe MD;  Location: [facility] REMOVAL OF TONSILS,12+ Y/O       HEART CATH LEFT HEART CATH  3/2/2016    No intervention - aggressive medical management     HEART CATH LEFT HEART CATH  2016     MAYA x 2 to OM1, MAYA to LAD, at [place]     HEART CATH LEFT HEART CATH  2004    MAYA to RCA     HEART CATH LEFT HEART CATH  3/28/2002    Mild to moderate 3 vessel CAD. Medical management recommended.      HEART CATH LEFT HEART CATH  2016    MAYA to OM1     hypothyroid       KNEE SURGERY  4/20/10    right knee replacement     ORTHOPEDIC SURGERY      total knee      REMOVE STIMULATOR SACRAL NERVE N/A 2015    Procedure: REMOVE STIMULATOR SACRAL NERVE;  Surgeon: Gertrudis Frausto MD;  Location: [facility]     SURGICAL HISTORY OF -       Uterine endometrial ablation       FAMILY HISTORY:  Family History   Problem Relation Age of Onset     C.A.D. Father         MI , age 83, had high lipids     Hyperlipidemia Father      Hypertension Father      Coronary Artery Disease Father      Hypertension Mother      Genitourinary Problems Mother         renal failure     Fractures Mother         hip     Osteoporosis Mother      Cerebrovascular Disease Maternal Grandfather         cerebral hemorrhage     Diabetes Maternal Uncle      Colon Cancer Maternal Aunt      Diabetes Maternal Grandmother        SOCIAL HISTORY:  Social History     Socioeconomic History     Marital status:      Spouse name: Kwadwo     Number of children: 3     Years of education: None     Highest education level: None   Occupational History     Occupation: PT Aide     Employer: NONE      Employer: RETIRED   Social Needs     Financial resource strain: None     Food insecurity     Worry: None     Inability: None     Transportation needs     Medical: None     Non-medical: None   Tobacco Use     Smoking status: Never Smoker      Smokeless tobacco: Never Used   Substance and Sexual Activity     Alcohol use: No     Alcohol/week: 0.0 standard drinks     Binge frequency: Never     Drug use: No     Sexual activity: Never     Partners: Male     Birth control/protection: Post-menopausal   Lifestyle     Physical activity     Days per week: None     Minutes per session: None     Stress: None   Relationships     Social connections     Talks on phone: None     Gets together: None     Attends Restorationism service: None     Active member of club or organization: None     Attends meetings of clubs or organizations: None     Relationship status: None     Intimate partner violence     Fear of current or ex partner: None     Emotionally abused: None     Physically abused: None     Forced sexual activity: None   Other Topics Concern      Service Not Asked     Blood Transfusions Not Asked     Caffeine Concern Yes     Comment: 6-7 cups caffeine per day     Occupational Exposure Not Asked     Hobby Hazards Not Asked     Sleep Concern No     Stress Concern Yes     Weight Concern No     Special Diet No     Comment: eats healthy      Back Care Not Asked     Exercise Yes     Comment:  walking & active life style      Bike Helmet Not Asked     Seat Belt Not Asked     Self-Exams Not Asked     Parent/sibling w/ CABG, MI or angioplasty before 65F 55M? No   Social History Narrative     None       Review of Systems:  Skin:  Negative       Eyes:  Positive for glasses cataract surgery 2014  ENT:  Negative      Respiratory:  Positive for dyspnea on exertion occ   Cardiovascular:    Positive for;palpitations(discomfort)    Gastroenterology: Negative      Genitourinary:  Positive for urinary frequency not new  Musculoskeletal:  Positive for back pain;arthritis pain everywhere  Neurologic:  Negative      Psychiatric:  Positive for anxiety    Heme/Lymph/Imm:  Positive for allergies    Endocrine:  Positive for thyroid disorder;diabetes      Physical Exam:  Vitals: BP  "131/68   Pulse 79   Ht 1.727 m (5' 8\")   Wt 71.5 kg (157 lb 9.6 oz)   BMI 23.96 kg/m      Constitutional:  cooperative, alert and oriented, well developed, well nourished, in no acute distress        Skin:  warm and dry to the touch, no apparent skin lesions or masses noted          Head:  normocephalic, no masses or lesions        Eyes:  pupils equal and round, conjunctivae and lids unremarkable, sclera white, no xanthalasma, EOMS intact, no nystagmus        Lymph:      ENT:  no pallor or cyanosis, dentition good        Neck:  carotid pulses are full and equal bilaterally, JVP normal, no carotid bruit        Respiratory:  normal breath sounds, clear to auscultation, normal A-P diameter, normal symmetry, normal respiratory excursion, no use of accessory muscles         Cardiac: regular rhythm       systolic murmur;grade 1;RUSB        pulses full and equal, no bruits auscultated                                        GI:  abdomen soft;BS normoactive        Extremities and Muscular Skeletal:  no deformities, clubbing, cyanosis, erythema observed;no edema              Neurological:  no gross motor deficits;affect appropriate        Psych:  Alert and Oriented x 3        CC  No referring provider defined for this encounter.                Thank you for allowing me to participate in the care of your patient.      Sincerely,     GUNNAR LEA MD     [facility]    cc:   No referring provider defined for this encounter.        "

## 2020-09-18 NOTE — PROGRESS NOTES
Service Date: 09/18/2020      HISTORY OF PRESENT ILLNESS:  I had the opportunity to see Ms. Sarah Longo in Cardiology Clinic today at the [facility] in [place] for reevaluation of coronary artery disease.  She recently has gone to low back fusion and hip replacement surgery and did well without any cardiac complications.  Unfortunately, she continues to have ongoing severe low back pain problems and difficulty with mobility.  She is quite frustrated by this.  Her coronary artery disease issues seem to be stable.  She is not having any concerning cardiac symptoms such as chest discomfort, shortness of breath, lightheadedness or syncope.  Her cardiac risk factors include hypertension, dyslipidemia and type 2 diabetes.  She has undergone multiple angioplasty and stenting procedures in the past which are well detailed in my record.      She did not tolerate metoprolol due to fatigue and was on Bystolic for cardiac protection during her surgeries.  She has since discontinued that medication because of concerns that it was causing fatigue as well.  She has been using a low dose of isosorbide mononitrate 15 mg daily.  She continues on a low dose of lisinopril 5 mg nightly as well.      Fortunately, her cholesterol numbers are excellent on her PCSK9 inhibitor and her diabetes is being managed by Endocrinology.      PHYSICAL EXAMINATION:  Today, her blood pressure is 131/68, heart rate 79 and weight 157 pounds.  Her lungs are clear.  Heart rhythm is regular.  She has no cardiac murmurs or carotid bruits.      IMPRESSIONS:  Ms. Sarah Longo is a 75-year-old woman with hypertension, dyslipidemia, type 2 diabetes with a history of coronary artery disease and previous revascularization procedures.  She has normal left ventricular function.  She was able to get through a couple of major surgeries this year without any cardiac complications.  I agreed that it would be reasonable for her to  discontinue her Bystolic since that seems to be causing her side effects of fatigue.  I also suggested she could stop the isosorbide mononitrate and increase the dose of her lisinopril to 10 mg per day.  She will follow up with Endocrinology for continuing management of her diabetes.        I hope that she is able to eventually get pain relief from her low back issues, but I am happy that her cardiac issues are stable.      Thank you for allowing me to participate in her care.      cc:      Gaye Zimmer MD    [address] [place] [address]         GUN[facility]AR LEA MD, [facility]             D: 2020   T: 2020   MT: KENDALL      Name:     KATHY PERRY   MRN:      [id number]        Account:      [id number]   :      1945           Service Date: 2020      Document: [id number]

## 2020-11-12 ENCOUNTER — RECORDS - HEALTHEAST (OUTPATIENT)
Dept: ADMINISTRATIVE | Facility: OTHER | Age: 75
End: 2020-11-12

## 2020-11-24 ENCOUNTER — CARE COORDINATION (OUTPATIENT)
Dept: CARDIOLOGY | Facility: CLINIC | Age: 75
End: 2020-11-24

## 2020-11-24 DIAGNOSIS — E78.5 HYPERLIPIDEMIA LDL GOAL <100: ICD-10-CM

## 2020-11-24 RX ORDER — EVOLOCUMAB 140 MG/ML
140 INJECTION, SOLUTION SUBCUTANEOUS
Qty: 6 ML | Refills: 0 | Status: SHIPPED | OUTPATIENT
Start: 2020-11-24 | End: 2020-12-17

## 2020-11-24 NOTE — PROGRESS NOTES
Received voicemail from pt requesting to know if she should complete the [employer] application for 2021 and then send to the clinic for Dr. Burgos to sign.     Will route to [place] specialty pharmacy liaison to call pt and discuss Repatha coverage options for 2021.    Updated Repatha Rx sent to [facility] pharmacy, [facility], so that pt can get another refill of Repatha from [employer] before the end of the year.     JOLANTA Gallardo 12:50 PM 11/24/2020

## 2020-11-25 NOTE — TELEPHONE ENCOUNTER
Spoke with patient about re-enrolling in [employer] Repatha free drug. I also went over the [employer] option, but she'd like to go forward with enrolling in [employer]. She will be dropping off the paperwork at the clinic to have faxed to me at [phone] to completed and signed by Dr. Burgos to fax to [employer] for review.     Thank you,    Nerissa Knutson [facility]  Specialty Pharmacy Clinic [facility] [address] [address]  Ph: [phone] Fax: [phone]  [email or web address]

## 2020-11-29 ENCOUNTER — TELEPHONE (OUTPATIENT)
Dept: SURGERY | Facility: CLINIC | Age: 75
End: 2020-11-29

## 2020-11-29 DIAGNOSIS — N39.0 URINARY TRACT INFECTION WITHOUT HEMATURIA, SITE UNSPECIFIED: Primary | ICD-10-CM

## 2020-11-30 ENCOUNTER — HOSPITAL ENCOUNTER (OUTPATIENT)
Dept: LAB | Facility: CLINIC | Age: 75
Discharge: HOME | End: 2020-11-30
Attending: INTERNAL MEDICINE | Admitting: STUDENT IN AN ORGANIZED HEALTH CARE EDUCATION/TRAINING PROGRAM
Payer: COMMERCIAL

## 2020-11-30 DIAGNOSIS — N39.0 URINARY TRACT INFECTION WITHOUT HEMATURIA, SITE UNSPECIFIED: ICD-10-CM

## 2020-11-30 LAB
ALBUMIN UR-MCNC: 30 MG/DL
APPEARANCE UR: ABNORMAL
BACTERIA #/AREA URNS HPF: ABNORMAL /HPF
BILIRUB UR QL STRIP: NEGATIVE
COLOR UR AUTO: ABNORMAL
GLUCOSE UR STRIP-MCNC: 500 MG/DL
HGB UR QL STRIP: NEGATIVE
HYALINE CASTS #/AREA URNS LPF: 2 /LPF (ref 0–2)
KETONES UR STRIP-MCNC: NEGATIVE MG/DL
LEUKOCYTE ESTERASE UR QL STRIP: ABNORMAL
MUCOUS THREADS #/AREA URNS LPF: PRESENT /LPF
NITRATE UR QL: POSITIVE
PH UR STRIP: 6 PH (ref 5–7)
RBC #/AREA URNS AUTO: 20 /HPF (ref 0–2)
SOURCE: ABNORMAL
SP GR UR STRIP: 1.02 (ref 1–1.03)
SQUAMOUS #/AREA URNS AUTO: 1 /HPF (ref 0–1)
UROBILINOGEN UR STRIP-MCNC: NORMAL MG/DL (ref 0–2)
WBC #/AREA URNS AUTO: >182 /HPF (ref 0–5)

## 2020-11-30 PROCEDURE — 87102 FUNGUS ISOLATION CULTURE: CPT | Performed by: STUDENT IN AN ORGANIZED HEALTH CARE EDUCATION/TRAINING PROGRAM

## 2020-11-30 PROCEDURE — 87086 URINE CULTURE/COLONY COUNT: CPT | Performed by: STUDENT IN AN ORGANIZED HEALTH CARE EDUCATION/TRAINING PROGRAM

## 2020-11-30 PROCEDURE — 81001 URINALYSIS AUTO W/SCOPE: CPT | Performed by: INTERNAL MEDICINE

## 2020-11-30 NOTE — TELEPHONE ENCOUNTER
Telephone Encounter    Date: 6:54 PM; 11/29/2020  Patient Name: Sarah Longo  MRN: [id number]    Sarah Longo is 75 year old year old female with a long history of pelvic floor symptoms who called reporting UTI symptoms. She reports having urinary frequency, urgency, dysuria, frequency, urgency, and she states she is miserable. She denies fevers. Denies chills though states she does feel cold tonight. She took azo and this is not helping. She is specifically asking for empiric antibiotics. This has been worsening over the last few days.     Plans:  - Discussed that I do not feel comfortable prescribing antibiotics empirically without a culture given the she has grown different organisms in the past including yeast. I would prefer to have a UA/UC to help guide therapy before starting an empiric antibiotics as starting antibiotics may make it more difficult for us to interpret the UC  - I offered to write for UA/UC/fungal culture to be dropped off and she will do this in the morning  - She will then call clinic in the afternoon to discuss starting empiric abx based on UA while culture pends  - Discussed that if she starts to feel worse - fevers, chills, nausea, weakness/fatigue, she should seek more urgent medical care  - She has an appointment scheduled with Dr. Quinones this week - she will keep this appointment and hopefully UC results will be available by that time  - Encouraged her to stay well-hydrated in the meantime  - Patient advised that because this is an encounter performed over the telephone, I am not able to perform a physical exam and thus any advice given is limited in nature and may not be completely informed.  The patient accepts this risk and if they have concerns with it they should be seen and evaluated in person by a medical professional.   - Discussed strict return precautions, including but not limited to: fevers > 101.4, chills, an inability to keep food or fluids down, increasing  hematuria, and an inability to urinate.  - Patient expressed understanding and was in agreement with plan.    --    Kelvin Mar MD  Urology PGY3

## 2020-12-01 ENCOUNTER — OFFICE VISIT (OUTPATIENT)
Dept: UROLOGY | Facility: CLINIC | Age: 75
End: 2020-12-01
Payer: COMMERCIAL

## 2020-12-01 VITALS
BODY MASS INDEX: 24.25 KG/M2 | DIASTOLIC BLOOD PRESSURE: 68 MMHG | WEIGHT: 160 LBS | SYSTOLIC BLOOD PRESSURE: 110 MMHG | HEIGHT: 68 IN | HEART RATE: 76 BPM

## 2020-12-01 DIAGNOSIS — N30.00 ACUTE CYSTITIS WITHOUT HEMATURIA: Primary | ICD-10-CM

## 2020-12-01 DIAGNOSIS — K59.00 CONSTIPATION, UNSPECIFIED CONSTIPATION TYPE: ICD-10-CM

## 2020-12-01 DIAGNOSIS — N95.2 ATROPHIC VAGINITIS: ICD-10-CM

## 2020-12-01 DIAGNOSIS — N81.4 UTEROVAGINAL PROLAPSE: ICD-10-CM

## 2020-12-01 LAB
BACTERIA SPEC CULT: NORMAL
Lab: NORMAL
RESIDUAL VOLUME (RV) (EXTERNAL): 35
SPECIMEN SOURCE: NORMAL

## 2020-12-01 PROCEDURE — 51798 US URINE CAPACITY MEASURE: CPT | Performed by: UROLOGY

## 2020-12-01 PROCEDURE — 99214 OFFICE O/P EST MOD 30 MIN: CPT | Mod: 25 | Performed by: UROLOGY

## 2020-12-01 RX ORDER — SULFAMETHOXAZOLE/TRIMETHOPRIM 800-160 MG
1 TABLET ORAL 2 TIMES DAILY
Qty: 10 TABLET | Refills: 0 | Status: SHIPPED | OUTPATIENT
Start: 2020-12-01 | End: 2021-05-03

## 2020-12-01 ASSESSMENT — MIFFLIN-ST. JEOR: SCORE: 1274.26

## 2020-12-01 ASSESSMENT — PAIN SCALES - GENERAL: PAINLEVEL: MILD PAIN (2)

## 2020-12-01 NOTE — LETTER
"12/1/2020       RE: Sarah Longo  [address] [place] [id number]     Dear Colleague,    Thank you for referring your patient, Sarah Longo, to the [facility] [place] [facility]. Please see a copy of my visit note below.    December 1, 2020    Return visit    Patient returns today for follow up.  Saturday had a temp of 101 at home which prompted her to call the on call resident for antibiotics.  He asked her to come in for evaluation and drop off a urine specimen    She states that over the past couple of weeks she states she has had vaginal pain, vaginal dryness, urgency frequency, abdominal cramps.  Having constipation.  Denies gross hematuria.  Denies flank pain but has chronic back pain.    She used the estrogen cream a couple weeks and then was concerned about its risks    She denies any changes in her health since last visit.    /68   Pulse 76   Ht 1.727 m (5' 8\")   Wt 72.6 kg (160 lb)   BMI 24.33 kg/m    She is comfortable, in no distress, non-labored breathing.  Abdomen is soft, non-tender, non-distended.  Normal external female genitalia.  Negative CST.  Speculum and bimanual exam are remarkable for some area of irritation near her apex.      PVR 45 mL by bladder scan    A/P: 75 year old F with history of Alexsander, atrophic vaginitis, uterovaginal prolapse managed with pessary, constipaiton    Discussed with patient the risk profile of vaginal estrogen is different then systemic HRT.  Discussed that it will help the vaginal dryness, prevent pessary erosion and help prevent UTI.  She is amenable to restarting a 3x a week    Daily fiber for the constipation    Empric bactrim awaiting urine culture results.  If culture shows contaminants and continues to have symptoms will recommend she come in for a nurse visit for CATHETERIZED urine specimens    RTC 3 months for repeat pelvic exam    25 minutes were spent with " the patient today, > 50% in counseling and coordination of care    Laura Quinones MD MPH   of Urology    CC  Patient Care Team:  Gaye Zimmer as PCP - General (Internal Medicine)  Dennys Majano MD as MD (INTERNAL MEDICINE - ENDOCRINOLOGY, DIABETES & METABOLISM)  Laura Quinones MD as MD (Urology)  Shireen Neves MD as Referring Physician (OB/Gyn)  Shireen Neves MD as Assigned OBGYN Provider  Laura Quinones MD as Assigned Surgical Provider  Feroz Burgos MD as Assigned Heart and Vascular Provider

## 2020-12-01 NOTE — PATIENT INSTRUCTIONS
Daily fiber supplement that you mix in the water for constipation    Websites with free information:    American [employer]rogy[employer] Society patient website: www.voicesforpfd.org    Total Control Program: www.totalcontrolprogram.com    Supplements to prevent UTI to consider  -Probiotics  -Cranberry (for these products let them know a doctor is recommending them)   Ellura: www.myellura.[place] Coverage   Theracran HP by [employer] [facility] [address]  -d-mannose  -Vitamin C 500-1000mg twice a day    Use the estrogen 3x a week at night    It was a pleasure meeting with you today.  Thank you for allowing me and my team the privilege of caring for you today.  YOU are the reason we are here, and I truly hope we provided you with the excellent service you deserve.  Please let us know if there is anything else we can do for you so that we can be sure you are leaving completely satisfied with your care experience.

## 2020-12-01 NOTE — PROGRESS NOTES
"December 1, 2020    Return visit    Patient returns today for follow up.  Saturday had a temp of 101 at home which prompted her to call the on call resident for antibiotics.  He asked her to come in for evaluation and drop off a urine specimen    She states that over the past couple of weeks she states she has had vaginal pain, vaginal dryness, urgency frequency, abdominal cramps.  Having constipation.  Denies gross hematuria.  Denies flank pain but has chronic back pain.    She used the estrogen cream a couple weeks and then was concerned about its risks    She denies any changes in her health since last visit.    /68   Pulse 76   Ht 1.727 m (5' 8\")   Wt 72.6 kg (160 lb)   BMI 24.33 kg/m    She is comfortable, in no distress, non-labored breathing.  Abdomen is soft, non-tender, non-distended.  Normal external female genitalia.  Negative CST.  Speculum and bimanual exam are remarkable for some area of irritation near her apex.      PVR 45 mL by bladder scan    A/P: 75 year old F with history of Alexsander, atrophic vaginitis, uterovaginal prolapse managed with pessary, constipaiton    Discussed with patient the risk profile of vaginal estrogen is different then systemic HRT.  Discussed that it will help the vaginal dryness, prevent pessary erosion and help prevent UTI.  She is amenable to restarting a 3x a week    Daily fiber for the constipation    Empric bactrim awaiting urine culture results.  If culture shows contaminants and continues to have symptoms will recommend she come in for a nurse visit for CATHETERIZED urine specimens    RTC 3 months for repeat pelvic exam    25 minutes were spent with the patient today, > 50% in counseling and coordination of care    Laura Quinones MD MPH   of Urology    CC  Patient Care Team:  Gaye Zimmer as PCP - General (Internal Medicine)  Dennys Majano MD as MD (INTERNAL MEDICINE - ENDOCRINOLOGY, DIABETES & METABOLISM)  Laura Quinones MD as " MD (Urology)  Shireen Neves MD as Referring Physician (OB/Gyn)  Shireen Neves MD as Assigned OBGYN Provider  Laura Quinones MD as Assigned Surgical Provider  Feroz Burgos MD as Assigned Heart and Vascular Provider

## 2020-12-02 ENCOUNTER — MEDICAL CORRESPONDENCE (OUTPATIENT)
Dept: HEALTH INFORMATION MANAGEMENT | Facility: CLINIC | Age: 75
End: 2020-12-02

## 2020-12-02 NOTE — PROGRESS NOTES
Pt dropped of the completed pt portion of the [employer] application to the office. Dr. Burgos signed provider portion of the [employer] DBS application and forwarded everything to [place] specialty pharmacy liaison to finalize and submit to [employer].    JOLANTA Gallardo 11:46 AM 12/2/2020

## 2020-12-02 NOTE — TELEPHONE ENCOUNTER
Free Drug Application Initiated  Medication: Repatha  Sponsor: [employer]  Phone #: [phone]  Fax #: [phone]  Additional Information : Faxed completed enrollment forms to [employer] for review. Also scanned into media tab.     Thank you,    Nerissa Knutson CPh-T  Specialty Pharmacy Clinic [facility] [address] [address]  Ph: [phone] Fax: [phone]  [email or web address]

## 2020-12-03 LAB
Lab: ABNORMAL
SPECIMEN SOURCE: ABNORMAL
YEAST SPEC QL CULT: ABNORMAL

## 2020-12-04 ENCOUNTER — TELEPHONE (OUTPATIENT)
Dept: UROLOGY | Facility: CLINIC | Age: 75
End: 2020-12-04

## 2020-12-04 DIAGNOSIS — B37.31 YEAST INFECTION OF THE VAGINA: Primary | ICD-10-CM

## 2020-12-04 RX ORDER — FLUCONAZOLE 150 MG/1
150 TABLET ORAL ONCE
Qty: 1 TABLET | Refills: 0 | Status: SHIPPED | OUTPATIENT
Start: 2020-12-04 | End: 2020-12-04

## 2020-12-04 NOTE — TELEPHONE ENCOUNTER
Urology pt of Dr. Quinones last seen 12/1/2020. UA/UC and yeast culture results are now in and pt asks what next steps might be. She was prescribed Bactrim but was told this may change when results in. She had been in touch with the resident on call over the weekend. Please see note from 11/29. Sarah reports only slight improvement in symptoms. Temp denied but continues to have frequency, burning with urination, and vaginal discomfort. Dr. Quinones is away; will review with provider in clinic today.    Per Dr. Oswald, Rx for Diflucan sent to pt's pharmacy. She may stop the Bactrim as U/C was negative. Drink lots of water and avoid caffeine and alcohol. She expressed understanding.

## 2020-12-08 ENCOUNTER — CARE COORDINATION (OUTPATIENT)
Dept: CARDIOLOGY | Facility: CLINIC | Age: 75
End: 2020-12-08

## 2020-12-08 DIAGNOSIS — E78.5 HYPERLIPIDEMIA LDL GOAL <100: ICD-10-CM

## 2020-12-08 NOTE — TELEPHONE ENCOUNTER
Left message for Rayna: Patient was denied free drug through [employer], but there is a albert open thru the [email or web address].    The albert is income based, therefore requires the number of people in the household and the total annual household income to enroll. I'd be happy to help you with enrollment if provided the required information.    Thank you,    Nerissa Knutson [facility]  Specialty Pharmacy Clinic [facility] [place] [address]  Ph: [phone] Fax: [phone]  [email or web address]

## 2020-12-08 NOTE — PROGRESS NOTES
Received voicemail from pt who reports that she received an email from [employer] that her application for 2021 has been denied. She requested a return call to discuss other options.     Routed to [place] specialty pharmacy liaison who will reach out to pt to discuss other options.     JOLANTA Gallardo 9:29 AM 12/8/2020

## 2020-12-09 NOTE — PROGRESS NOTES
Received another voicemail from pt requesting a return call to discuss other options for Repatha coverage besides [employer] since she has been denied. Reviewed [facility] albert will provide $2,500 for a year to help cover any out of pocket costs for Repatha for those who qualify. 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 Discussed with pt that she should qualify for the albert and will get this information to Nerissa to apply for they albert and will have Nerissa call her with the details- she verbalized understanding and agrees with this plan.    JOLANTA Gallardo 1:10 PM 12/9/2020

## 2020-12-09 NOTE — TELEPHONE ENCOUNTER
Attempted to enroll patient in [facility], but was unable to proceed due to conflicting information. I reach out to [facility] to clarify and awaiting their response.        Thank you,    Nerissa Knutson CP-T  Specialty Pharmacy Clinic [address] [place] [address]  Ph: [phone] Fax: [phone]  [email or web address]

## 2020-12-11 ENCOUNTER — TELEPHONE (OUTPATIENT)
Dept: UROLOGY | Facility: CLINIC | Age: 75
End: 2020-12-11

## 2020-12-11 NOTE — TELEPHONE ENCOUNTER
[facility] Call Center    Phone Message    May a detailed message be left on voicemail: no     Reason for Call: Medication Refill Request    Has the patient contacted the pharmacy for the refill? Yes   Name of medication being requested: fluconazole (DIFLUCAN) 150 MG tablet  Provider who prescribed the medication: Dr. Quinones is provider, Dr. Oswald provided med  Pharmacy: [facility]  Date medication is needed: Pt needs another tablet for her yeast infection. Pt said the initial tablet wasn't fully effective. Pt may need more than 2 rounds. Please call Pt back.    Action Taken: Message routed to:  Other:  CLINICAL POOL    Travel Screening: Not Applicable

## 2020-12-12 DIAGNOSIS — B37.31 YEAST INFECTION OF THE VAGINA: ICD-10-CM

## 2020-12-14 NOTE — TELEPHONE ENCOUNTER
Returned phone call after MD responded. Patient states that she spoke with on call MD who prescribed more diflucan. This has helped with symptoms according to patient. She will call back if symptoms return.     Ellen Abbott LPN

## 2020-12-15 RX ORDER — FLUCONAZOLE 150 MG/1
TABLET ORAL
Qty: 1 TABLET | Refills: 0 | OUTPATIENT
Start: 2020-12-15

## 2020-12-17 RX ORDER — EVOLOCUMAB 140 MG/ML
140 INJECTION, SOLUTION SUBCUTANEOUS
Qty: 6 ML | Refills: 3 | Status: SHIPPED | OUTPATIENT
Start: 2020-12-17 | End: 2022-01-24

## 2020-12-17 NOTE — TELEPHONE ENCOUNTER
I left Rayna a message with the [facility] information and approval. RX released to [facility] with albert processing information.     Thank you,    Nerissa Knutson [facility]  Specialty Pharmacy Clinic [facility] [place] [address]  Ph: [phone] Fax: [phone]  [email or web address]

## 2020-12-17 NOTE — PROGRESS NOTES
Received update from [facility] pharmacy liaison that pt has been enrolled in the [facility] and an updated Repatha Rx has been sent to [facility] mail order pharmacy.    JOLANTA Gallardo 9:00 AM 12/17/2020

## 2020-12-17 NOTE — TELEPHONE ENCOUNTER
Enrolled patient in the [facility] albert. She can now fill with [facility]:

## 2020-12-30 ENCOUNTER — AMBULATORY - HEALTHEAST (OUTPATIENT)
Dept: SURGERY | Facility: CLINIC | Age: 75
End: 2020-12-30

## 2020-12-30 DIAGNOSIS — Z11.59 ENCOUNTER FOR SCREENING FOR OTHER VIRAL DISEASES: ICD-10-CM

## 2021-01-11 ENCOUNTER — CARE COORDINATION (OUTPATIENT)
Dept: CARDIOLOGY | Facility: CLINIC | Age: 76
End: 2021-01-11

## 2021-01-11 DIAGNOSIS — E11.9 TYPE 2 DIABETES MELLITUS WITHOUT COMPLICATION, WITHOUT LONG-TERM CURRENT USE OF INSULIN (H): ICD-10-CM

## 2021-01-11 DIAGNOSIS — I25.10 CORONARY ARTERY DISEASE INVOLVING NATIVE CORONARY ARTERY OF NATIVE HEART WITHOUT ANGINA PECTORIS: Primary | ICD-10-CM

## 2021-01-11 NOTE — PROGRESS NOTES
Pt overhead paged our clinic nurse to find out if  wants her to have a stress test prior to her surgery. Pt's last stress test was 1/2020 and was negative for ischemia. I will add her question to the previous update that was sent to . Patrick STOVER January 11, 2021, 12:14 PM

## 2021-01-11 NOTE — PROGRESS NOTES
"Pt called to report that she has to have a revision of her spinal fusion on 1/22/21. Pt wanted to know if she should follow the same instructions that  gave her at the time of her spinal fusion surgery in March. At the 1/2020 visit  said, \"cardiac risk for spine surgery goes, I would classify her in a low risk category.  Because of her history of coronary artery disease, I have recommended that she start Bystolic 5 mg daily 1 week prior to surgery and continue that for at least 1 week after surgery.  I have given her samples so that she could continue that for 3 weeks after surgery if tolerated.  If her heart rates are below 50 or she has symptoms of lightheadedness, we can cut her Bystolic in half to 2.5 mg daily.  I think the beta blocker would be beneficial for cardiac protection in this setting.  I recommended that she stop her aspirin 7 days prior to surgery.  I suggested she stop her fish oil altogether.    Pt currently takes lisinopril 10 mg daily. Pt has PCP visit on Friday. I told her she needs to find out if cardiac clearance note is needed. Pt said she has not had any issues with chest pain or shortness of breath. Pt also asked if she can get the COVID vaccine prior to her surgery. I told pt the COVID vaccine is not available to her at this time. She would need to have a discussion with her surgeon if she is feeling hesitant to have surgery prior to getting the COVID vaccine. I will update  to see if pt should follow the instructions given at the time of her previous spinal fusion surgery, and whether he will want to see her in clinic if cardiac clearance is needed. Patrick STOVER January 11, 2021, 11:50 AM                  "

## 2021-01-12 NOTE — PROGRESS NOTES
gave verbal recommendation for pt to hold her lisinopril while she takes the bystolic one week prior and one week after her surgery. Patrick STOVER January 12, 2021, 12:26 PM      I called pt and let her know  is ok with her stopping the lisinopril while she takes the bystolic 5 mg daily one week prior to back surgery and one week after. Pt will also stop her ASA one week prior to surgery as she was previously instructed to do by . Pt said she will meet with her surgeon on 1/15/21. She has her stress test scheduled for 1/13/21. Patrick STOVER January 12, 2021, 2:22 PM

## 2021-01-12 NOTE — PROGRESS NOTES
I called pt and reviewed 's recommendation for lexiscan nuclear stress test prior to her back surgery. Pt said she is glad she can have the stress test as she has been having a lot of upper back pain and fatigue, and she wants to make sure there is no cardiac cause for it. Pt will call scheduling to set up her stress test.    Pt said last time she took just the bystolic one week before and one week after her surgery, and stopped the imdur at that time. Pt is now on lisinopril 10 mg daily and no longer takes imdur. Her last clinic BP at PCP 12/1/20 was 110/68. I will update  to see if he wants pt to hold lisinopril while taking bystolic. Patrick STOVER January 12, 2021, 12:18 PM

## 2021-01-12 NOTE — TELEPHONE ENCOUNTER
Since she has not had a stress test in a year and she is not able to be as active as she should be because of her back, I think a repeat stress test is a good idea to make sure that she remains at low risk for back surgery. If her Lexiscan nuclear study shows no significant changes, she can proceed with surgery using the same instructions for bystolic that she used last time, since it seemed to work well to prevent cardiac problems with surgery. EE

## 2021-01-13 ENCOUNTER — HOSPITAL ENCOUNTER (OUTPATIENT)
Dept: CARDIOLOGY | Facility: CLINIC | Age: 76
End: 2021-01-13
Attending: INTERNAL MEDICINE
Payer: COMMERCIAL

## 2021-01-13 VITALS
DIASTOLIC BLOOD PRESSURE: 62 MMHG | HEART RATE: 69 BPM | HEIGHT: 68 IN | BODY MASS INDEX: 24.98 KG/M2 | SYSTOLIC BLOOD PRESSURE: 122 MMHG | OXYGEN SATURATION: 99 % | WEIGHT: 164.8 LBS | TEMPERATURE: 71 F

## 2021-01-13 DIAGNOSIS — I25.10 CORONARY ARTERY DISEASE INVOLVING NATIVE CORONARY ARTERY OF NATIVE HEART WITHOUT ANGINA PECTORIS: ICD-10-CM

## 2021-01-13 DIAGNOSIS — E11.9 TYPE 2 DIABETES MELLITUS WITHOUT COMPLICATION, WITHOUT LONG-TERM CURRENT USE OF INSULIN (H): ICD-10-CM

## 2021-01-13 LAB
CV STRESS MAX HR HE: 83
RATE PRESSURE PRODUCT: NORMAL
STRESS ECHO BASELINE DIASTOLIC HE: 62
STRESS ECHO BASELINE HR: 67
STRESS ECHO BASELINE SYSTOLIC BP: 122
STRESS ECHO CALCULATED PERCENT HR: 57 %
STRESS ECHO LAST STRESS DIASTOLIC BP: 68
STRESS ECHO LAST STRESS SYSTOLIC BP: 140
STRESS ECHO TARGET HR: 146

## 2021-01-13 PROCEDURE — 93016 CV STRESS TEST SUPVJ ONLY: CPT | Performed by: INTERNAL MEDICINE

## 2021-01-13 PROCEDURE — 78452 HT MUSCLE IMAGE SPECT MULT: CPT | Mod: 26 | Performed by: INTERNAL MEDICINE

## 2021-01-13 PROCEDURE — 78452 HT MUSCLE IMAGE SPECT MULT: CPT

## 2021-01-13 PROCEDURE — 250N000011 HC RX IP 250 OP 636: Performed by: INTERNAL MEDICINE

## 2021-01-13 PROCEDURE — 343N000001 HC RX 343: Performed by: INTERNAL MEDICINE

## 2021-01-13 PROCEDURE — A9502 TC99M TETROFOSMIN: HCPCS | Performed by: INTERNAL MEDICINE

## 2021-01-13 PROCEDURE — 93018 CV STRESS TEST I&R ONLY: CPT | Performed by: INTERNAL MEDICINE

## 2021-01-13 RX ORDER — AMINOPHYLLINE 25 MG/ML
50-100 INJECTION, SOLUTION INTRAVENOUS
Status: DISCONTINUED | OUTPATIENT
Start: 2021-01-13 | End: 2021-01-14 | Stop reason: HOSPADM

## 2021-01-13 RX ORDER — REGADENOSON 0.08 MG/ML
0.4 INJECTION, SOLUTION INTRAVENOUS ONCE
Status: COMPLETED | OUTPATIENT
Start: 2021-01-13 | End: 2021-01-13

## 2021-01-13 RX ORDER — CAFFEINE CITRATE 20 MG/ML
60 SOLUTION INTRAVENOUS
Status: DISCONTINUED | OUTPATIENT
Start: 2021-01-13 | End: 2021-01-14 | Stop reason: HOSPADM

## 2021-01-13 RX ORDER — ACYCLOVIR 200 MG/1
0-1 CAPSULE ORAL
Status: DISCONTINUED | OUTPATIENT
Start: 2021-01-13 | End: 2021-01-14 | Stop reason: HOSPADM

## 2021-01-13 RX ORDER — ALBUTEROL SULFATE 90 UG/1
2 AEROSOL, METERED RESPIRATORY (INHALATION) EVERY 5 MIN PRN
Status: DISCONTINUED | OUTPATIENT
Start: 2021-01-13 | End: 2021-01-14 | Stop reason: HOSPADM

## 2021-01-13 RX ADMIN — REGADENOSON 0.4 MG: 0.08 INJECTION, SOLUTION INTRAVENOUS at 14:55

## 2021-01-13 RX ADMIN — TETROFOSMIN 3.56 MCI.: 1.38 INJECTION, POWDER, LYOPHILIZED, FOR SOLUTION INTRAVENOUS at 13:30

## 2021-01-13 RX ADMIN — TETROFOSMIN 9.7 MCI.: 1.38 INJECTION, POWDER, LYOPHILIZED, FOR SOLUTION INTRAVENOUS at 14:59

## 2021-01-13 ASSESSMENT — MIFFLIN-ST. JEOR: SCORE: 1296.03

## 2021-01-13 NOTE — PROGRESS NOTES
I left message for pt to call back to review stress test results and recommendation from . Patrick STOVER January 13, 2021, 5:01 PM

## 2021-01-13 NOTE — TELEPHONE ENCOUNTER
"I reviewed the images of the nuclear stress test. The study looks normal to me. No ischemia or infarction identified. Normal LV function. The study reader indicated that \"small areas of ischemia could not be excluded\" due to bowel loop artifact affecting a very small portion of the basal inferolateral wall on both stress and rest images. She can proceed to surgery with a low risk of cardiac events. The bystolic should help protect the heart. EE  "

## 2021-01-13 NOTE — PROGRESS NOTES
Nuclear stress test results from 1/13/21 noted. Will send an update to . Pt is scheduled for revision of her spinal fusion on 1/22/21. She has a visit with the surgeon on 1/14/21.      Conclusion          The nuclear stress test is probably negative for inducible myocardial ischemia or infarction.     Left ventricular function is normal.     A prior study was conducted on 1/13/2020.  This study has changes noted when compared with the prior study.  Small fixed defect with normal motion noted in previous study likely consistent with diaphragmatic attenuation.  Substantial subdiaphragmatic radiotracer activity obscuring mid and distal inferolateral wall, not present in previous study.  Probably normal but cannot exclude small area of ischemia/infarction

## 2021-01-14 NOTE — PROGRESS NOTES
Pt called back and I reviewed stress test results, and 's response regarding the results and her surgery. Pt requested that his note be faxed to PCP and her surgeon, Dr.Christian Caicedo of [facility]. She will call me back with the fax numbers to both clinics. Patrick STOVER January 14, 2021, 10:08 AM

## 2021-01-14 NOTE — PROGRESS NOTES
Pt left message with fax info for her surgeon and PCP. 's care coordination clearance note from 1/13/21 faxed to both clinics.    1.  ([facility]), fax # [phone], ph # [phone]  2.  (PCP, [facility]), fax # [phone], ph # [phone]

## 2021-01-18 ENCOUNTER — AMBULATORY - HEALTHEAST (OUTPATIENT)
Dept: FAMILY MEDICINE | Facility: CLINIC | Age: 76
End: 2021-01-18

## 2021-01-18 DIAGNOSIS — Z11.59 ENCOUNTER FOR SCREENING FOR OTHER VIRAL DISEASES: ICD-10-CM

## 2021-01-19 ENCOUNTER — COMMUNICATION - HEALTHEAST (OUTPATIENT)
Dept: SCHEDULING | Facility: CLINIC | Age: 76
End: 2021-01-19

## 2021-01-20 ASSESSMENT — MIFFLIN-ST. JEOR: SCORE: 1291.94

## 2021-01-21 ENCOUNTER — ANESTHESIA - HEALTHEAST (OUTPATIENT)
Dept: SURGERY | Facility: CLINIC | Age: 76
End: 2021-01-21

## 2021-01-22 ENCOUNTER — RECORDS - HEALTHEAST (OUTPATIENT)
Dept: ADMINISTRATIVE | Facility: OTHER | Age: 76
End: 2021-01-22

## 2021-01-22 ENCOUNTER — SURGERY - HEALTHEAST (OUTPATIENT)
Dept: SURGERY | Facility: CLINIC | Age: 76
End: 2021-01-22

## 2021-01-22 ASSESSMENT — MIFFLIN-ST. JEOR
SCORE: 1287.4
SCORE: 1291.94

## 2021-01-23 ENCOUNTER — HEALTH MAINTENANCE LETTER (OUTPATIENT)
Age: 76
End: 2021-01-23

## 2021-01-23 ENCOUNTER — HOME CARE/HOSPICE - HEALTHEAST (OUTPATIENT)
Dept: HOME HEALTH SERVICES | Facility: HOME HEALTH | Age: 76
End: 2021-01-23

## 2021-01-28 ASSESSMENT — MIFFLIN-ST. JEOR: SCORE: 1389.46

## 2021-02-05 ENCOUNTER — CARE COORDINATION (OUTPATIENT)
Dept: CARDIOLOGY | Facility: CLINIC | Age: 76
End: 2021-02-05

## 2021-02-05 NOTE — PROGRESS NOTES
Pt called to state she had her back surgery and wants to know if she should go back to taking her lisinopril once she is done taking the bystolic. Pt was instructed to take bystolic the week before and week after surgery. I told pt she should go back to her previous medication routine after she is done with the bystolic, which was lisinopril 10 mg daily. Pt said she would like to push out her 6 month follow up with  as she is still recovering from back surgery and doesn't have any current cardiac concerns. Patrick STOVER February 5, 2021, 5:09 PM

## 2021-02-18 ENCOUNTER — ANCILLARY PROCEDURE (OUTPATIENT)
Dept: BONE DENSITY | Facility: CLINIC | Age: 76
End: 2021-02-18
Payer: COMMERCIAL

## 2021-02-18 ENCOUNTER — ANCILLARY PROCEDURE (OUTPATIENT)
Dept: BONE DENSITY | Facility: CLINIC | Age: 76
End: 2021-02-18
Attending: FAMILY MEDICINE
Payer: COMMERCIAL

## 2021-02-18 DIAGNOSIS — Z78.0 ASYMPTOMATIC MENOPAUSAL STATE: ICD-10-CM

## 2021-02-18 DIAGNOSIS — M81.0 OSTEOPOROSIS, POST-MENOPAUSAL: ICD-10-CM

## 2021-02-18 PROCEDURE — 77081 DXA BONE DENSITY APPENDICULR: CPT | Mod: 59 | Performed by: INTERNAL MEDICINE

## 2021-02-18 PROCEDURE — 77080 DXA BONE DENSITY AXIAL: CPT | Performed by: INTERNAL MEDICINE

## 2021-02-20 PROBLEM — Z96.642 STATUS POST TOTAL HIP REPLACEMENT, LEFT: Status: RESOLVED | Noted: 2020-07-16 | Resolved: 2021-02-20

## 2021-02-27 ENCOUNTER — IMMUNIZATION (OUTPATIENT)
Dept: NURSING | Facility: CLINIC | Age: 76
End: 2021-02-27
Payer: COMMERCIAL

## 2021-02-27 PROCEDURE — 0011A PR COVID VAC MODERNA 100 MCG/0.5 ML IM: CPT

## 2021-02-27 PROCEDURE — 91301 PR COVID VAC MODERNA 100 MCG/0.5 ML IM: CPT

## 2021-03-27 ENCOUNTER — IMMUNIZATION (OUTPATIENT)
Dept: NURSING | Facility: CLINIC | Age: 76
End: 2021-03-27
Attending: INTERNAL MEDICINE
Payer: COMMERCIAL

## 2021-03-27 PROCEDURE — 91301 PR COVID VAC MODERNA 100 MCG/0.5 ML IM: CPT

## 2021-03-27 PROCEDURE — 0012A PR COVID VAC MODERNA 100 MCG/0.5 ML IM: CPT

## 2021-04-28 ENCOUNTER — CARE COORDINATION (OUTPATIENT)
Dept: CARDIOLOGY | Facility: CLINIC | Age: 76
End: 2021-04-28

## 2021-04-28 NOTE — PROGRESS NOTES
Pt left message stating she had labs done at \[facility]\"" [facility] of Endocrinology and she wants to know if those labs are sufficient for her upcoming visit with . I left message for to let her know lab results will be requested from her endocrine clinic, but that it doesn't look like she is due for any labs from  right now. Patrick STOVER April 28, 2021, 3:49 PM

## 2021-05-03 ENCOUNTER — OFFICE VISIT (OUTPATIENT)
Dept: CARDIOLOGY | Facility: CLINIC | Age: 76
End: 2021-05-03
Attending: INTERNAL MEDICINE
Payer: COMMERCIAL

## 2021-05-03 VITALS
HEART RATE: 97 BPM | HEIGHT: 68 IN | BODY MASS INDEX: 25.16 KG/M2 | DIASTOLIC BLOOD PRESSURE: 70 MMHG | SYSTOLIC BLOOD PRESSURE: 147 MMHG | WEIGHT: 166 LBS

## 2021-05-03 DIAGNOSIS — E11.9 TYPE 2 DIABETES MELLITUS WITHOUT COMPLICATION, WITHOUT LONG-TERM CURRENT USE OF INSULIN (H): Primary | ICD-10-CM

## 2021-05-03 DIAGNOSIS — I10 ESSENTIAL HYPERTENSION: ICD-10-CM

## 2021-05-03 DIAGNOSIS — I25.10 CORONARY ARTERY DISEASE INVOLVING NATIVE CORONARY ARTERY OF NATIVE HEART WITHOUT ANGINA PECTORIS: ICD-10-CM

## 2021-05-03 DIAGNOSIS — E78.5 HYPERLIPIDEMIA LDL GOAL <100: ICD-10-CM

## 2021-05-03 PROCEDURE — 99214 OFFICE O/P EST MOD 30 MIN: CPT | Performed by: INTERNAL MEDICINE

## 2021-05-03 RX ORDER — LISINOPRIL 20 MG/1
20 TABLET ORAL DAILY
Qty: 90 TABLET | Refills: 3 | Status: SHIPPED | OUTPATIENT
Start: 2021-05-03 | End: 2022-06-01

## 2021-05-03 ASSESSMENT — MIFFLIN-ST. JEOR: SCORE: 1301.47

## 2021-05-03 NOTE — LETTER
5/3/2021    Gaye Zimmer  [facility] [address]  [facility] [address]    RE: Sarah Longo       Dear Colleague,    I had the pleasure of seeing Sarah Longo in the [facility].    HPI and Plan:   See dictation    Orders Placed This Encounter   Procedures     Follow-Up with Cardiologist       Orders Placed This Encounter   Medications     lisinopril (ZESTRIL) 20 MG tablet     Sig: Take 1 tablet (20 mg) by mouth daily     Dispense:  90 tablet     Refill:  3       Medications Discontinued During This Encounter   Medication Reason     acetaminophen (TYLENOL) 325 MG tablet      miconazole (MONISTAT 1 DAY OR NIGHT) 1200 & 2 MG & % kit      polyethylene glycol (MIRALAX) 17 g packet      sulfamethoxazole-trimethoprim (BACTRIM DS) 800-160 MG tablet      lisinopril (ZESTRIL) 10 MG tablet          Encounter Diagnoses   Name Primary?     Coronary artery disease involving native coronary artery of native heart without angina pectoris      Type 2 diabetes mellitus without complication, without long-term current use of insulin (H) Yes     Hyperlipidemia LDL goal <100      Essential hypertension        CURRENT MEDICATIONS:  Current Outpatient Medications   Medication Sig Dispense Refill     aspirin 81 MG EC tablet Take 81 mg by mouth daily       CINNAMON PO Take 2 capsules by mouth 2 times daily       cyanocobalamin (CYANOCOBALAMIN) 1000 MCG/ML injection Inject 2 mLs into the muscle every 30 days       diclofenac (VOLTAREN) 1 % topical gel Place 4 g onto the skin 4 times daily 100 g 0     estradiol (ESTRACE) 0.1 MG/GM vaginal cream Use pea sized amount and apply with finger to vaginal skin just inside opening once a day before bed as needed for vaginal dryness. 42.5 g 3     evolocumab (REPATHA SURECLICK) 140 MG/ML prefilled autoinjector Inject 1 mL (140 mg) Subcutaneous every 14 days 6 mL 3     GLIMEPIRIDE 4 MG OR TABS Take 4 mg by mouth 2 times  daily        levothyroxine (SYNTHROID/LEVOTHROID) 125 MCG tablet Take 125 mcg by mouth daily before breakfast       liraglutide (VICTOZA PEN) 18 MG/3ML solution Inject 1.2 mg Subcutaneous daily 3 mL 0     lisinopril (ZESTRIL) 20 MG tablet Take 1 tablet (20 mg) by mouth daily 90 tablet 3     melatonin 3 MG tablet Take 2 tablets (6 mg) by mouth nightly as needed for sleep 30 tablet 0     mirabegron (MYRBETRIQ) 50 MG 24 hr tablet Take 1 tablet (50 mg) by mouth daily Patient not taking daily (Patient taking differently: Take 50 mg by mouth daily ) 90 tablet 3     NONFORMULARY Take 1-2 capsules by mouth daily as needed Gymnema Sylvestris Leaf capsule       ONETOUCH ULTRA test strip        oxyCODONE (ROXICODONE) 5 MG tablet Take 1 tablet (5 mg) by mouth every 4 minutes as needed for breakthrough pain 10 tablet 0     oxyCODONE (ROXICODONE) 5 MG tablet Take 1-2 tablets (5-10 mg) by mouth every 3 hours as needed for severe pain 30 tablet 0     traMADol (ULTRAM) 50 MG tablet Take 1 tablet (50 mg) by mouth every 6 hours as needed for moderate to severe pain 30 tablet 3     TURMERIC PO Take 500 mg by mouth 2 times daily        vitamin D3 (CHOLECALCIFEROL) 2000 units tablet Take 1 tablet by mouth 2 times daily       amoxicillin (AMOXIL) 500 MG capsule 2,000 mg once as needed (Before dental procedures)        ferrous fumarate 65 mg, [place]. FE,-Vitamin C 125 mg (VITRON C)  MG TABS tablet Take 1 tablet by mouth daily       nitroglycerin (NITROSTAT) 0.4 MG SL tablet Place 1 tablet (0.4 mg) under the tongue every 5 minutes as needed for chest pain If symptoms persist after 3 doses (15 minutes) call 911. (Patient not taking: Reported on 5/3/2021) 25 tablet 3     STATIN NOT PRESCRIBED, INTENTIONAL, 1 each At Bedtime Statin not prescribed intentionally due to Allergy to statin (Patient not taking: Reported on 5/3/2021) 0 each 0       ALLERGIES     Allergies   Allergen Reactions     Hmg-Coa-R Inhibitors Muscle Pain (Myalgia)      Oral statins         PAST MEDICAL HISTORY:  Past Medical History:   Diagnosis Date     Anemia      Arthritis      Coronary artery disease 04/28/2016    cardiac cath 2/2019: patent stents; PTCA with MAYA x 2 to OM1 and MAYA x 1 to p.LAD (4/21/2016 at [place]); PTCA with intracoronary stent placement of RCA June 2004; MAYA to OM1 11/2016     Cystocele, midline 09/29/2010     Depression      HYPERPLASTIC  POLYP      colonoscopy in 5-10 yrs.     Hypothyroidism     hypothyroid- Dr Melecio Weston      OAB (overactive bladder)     complex voiding dysfct, failed interstim     Osteoarthritis      Other and unspecified hyperlipidemia      Palpitations      Postmenopausal bleeding      Shoulder blade pain 5/31/2016     Type II or unspecified type diabetes mellitus without mention of complication, not stated as uncontrolled     Dr. Majano     Unspecified essential hypertension      Urinary frequency 9/29/10    followed by urology. no benefit from detrol or sanctura. month trial of macrobid and flomax 10/10       PAST SURGICAL HISTORY:  Past Surgical History:   Procedure Laterality Date     ------------OTHER-------------      Interstim     Ablation       ARTHROPLASTY HIP Left 7/15/2020    Procedure: Left total hip arthroplasty;  Surgeon: Jayson Victoria MD;  Location: [facility] OR     BACK SURGERY  03/05/2020    spinal fusion     C CT ANGIOGRAPHY CORONARY  1/2005    mod soft plaque LAD and Cx, follow up with left heart cath     C LIGATE FALLOPIAN TUBE      endometrial ablation     CARDIAC SURGERY       CHOLECYSTECTOMY       COLONOSCOPY       CV HEART CATHETERIZATION WITH POSSIBLE INTERVENTION N/A 2/14/2019    Procedure: Coronary Angiogram;  Surgeon: Sudarshan Lee MD;  [facility]; patent stents     CYSTOSCOPY       EXCISE LESION UPPER EXTREMITY  6/5/2013    Procedure: EXCISE LESION UPPER EXTREMITY;  EXCISION RIGHT ARM ANTICUBITAL LIPOMA ;  Surgeon: Jayson Joe MD;  Location: [facility]  REMOVAL OF TONSILS,13+ Y/O       HEART CATH LEFT HEART CATH  3/2/2016    No intervention - aggressive medical management     HEART CATH LEFT HEART CATH  2016     MAYA x 2 to OM1, MAYA to LAD, at [place]     HEART CATH LEFT HEART CATH  2004    MAYA to RCA     HEART CATH LEFT HEART CATH  3/28/2002    Mild to moderate 3 vessel CAD. Medical management recommended.      HEART CATH LEFT HEART CATH  2016    MAYA to OM1     hypothyroid       KNEE SURGERY  4/20/10    right knee replacement     ORTHOPEDIC SURGERY      total knee      REMOVE STIMULATOR SACRAL NERVE N/A 2015    Procedure: REMOVE STIMULATOR SACRAL NERVE;  Surgeon: Gertrudis Frausto MD;  Location: [facility]     SURGICAL HISTORY OF -       Uterine endometrial ablation       FAMILY HISTORY:  Family History   Problem Relation Age of Onset     C.A.D. Father         MI , age 83, had high lipids     Hyperlipidemia Father      Hypertension Father      Coronary Artery Disease Father      Hypertension Mother      Genitourinary Problems Mother         renal failure     Fractures Mother         hip     Osteoporosis Mother      Cerebrovascular Disease Maternal Grandfather         cerebral hemorrhage     Diabetes Maternal Uncle      Colon Cancer Maternal Aunt      Diabetes Maternal Grandmother        SOCIAL HISTORY:  Social History     Socioeconomic History     Marital status:      Spouse name: Kwadwo     Number of children: 3     Years of education: None     Highest education level: None   Occupational History     Occupation: PT Aide     Employer: NONE      Employer: RETIRED   Social Needs     Financial resource strain: None     Food insecurity     Worry: None     Inability: None     Transportation needs     Medical: None     Non-medical: None   Tobacco Use     Smoking status: Never Smoker     Smokeless tobacco: Never Used   Substance and Sexual Activity     Alcohol use: No     Alcohol/week: 0.0 standard drinks     Binge frequency: Never      "Drug use: No     Sexual activity: Never     Partners: Male     Birth control/protection: Post-menopausal   Lifestyle     Physical activity     Days per week: None     Minutes per session: None     Stress: None   Relationships     Social connections     Talks on phone: None     Gets together: None     Attends Hindu service: None     Active member of club or organization: None     Attends meetings of clubs or organizations: None     Relationship status: None     Intimate partner violence     Fear of current or ex partner: None     Emotionally abused: None     Physically abused: None     Forced sexual activity: None   Other Topics Concern      Service Not Asked     Blood Transfusions Not Asked     Caffeine Concern Yes     Comment: 6-7 cups caffeine per day     Occupational Exposure Not Asked     Hobby Hazards Not Asked     Sleep Concern No     Stress Concern Yes     Weight Concern No     Special Diet No     Comment: eats healthy      Back Care Not Asked     Exercise Yes     Comment:  walking & active life style      Bike Helmet Not Asked     Seat Belt Not Asked     Self-Exams Not Asked     Parent/sibling w/ CABG, MI or angioplasty before 65F 55M? No   Social History Narrative     None       Review of Systems:  Skin:  Negative       Eyes:  Positive for glasses    ENT:  Negative      Respiratory:  Positive for dyspnea on exertion     Cardiovascular:  Negative      Gastroenterology: Negative      Genitourinary:  Negative      Musculoskeletal:  Positive for back pain spinal fusion 1/2021.  Several back and hip surgeries.  Neurologic:  Negative      Psychiatric:  Negative      Heme/Lymph/Imm:  Positive for allergies    Endocrine:  Positive for diabetes      Physical Exam:  Vitals: BP (!) 147/70 (BP Location: Right arm, Cuff Size: Adult Regular)   Pulse 97   Ht 1.727 m (5' 8\")   Wt 75.3 kg (166 lb)   BMI 25.24 kg/m      Constitutional:  cooperative, alert and oriented, well developed, well nourished, in no " acute distress        Skin:  warm and dry to the touch, no apparent skin lesions or masses noted          Head:  normocephalic, no masses or lesions        Eyes:  pupils equal and round, conjunctivae and lids unremarkable, sclera white, no xanthalasma, EOMS intact, no nystagmus        Lymph:      ENT:  no pallor or cyanosis, dentition good        Neck:  carotid pulses are full and equal bilaterally, JVP normal, no carotid bruit        Respiratory:  normal breath sounds, clear to auscultation, normal A-P diameter, normal symmetry, normal respiratory excursion, no use of accessory muscles         Cardiac: regular rhythm       systolic murmur;grade 1;RUSB        pulses full and equal, no bruits auscultated                                        GI:  abdomen soft;BS normoactive        Extremities and Muscular Skeletal:  no deformities, clubbing, cyanosis, erythema observed;no edema              Neurological:  no gross motor deficits;affect appropriate        Psych:  Alert and Oriented x 3      Thank you for allowing me to participate in the care of your patient.      Sincerely,     FEROZ BURGOS MD     [facility]    cc:   Feroz Burgos MD  [address] [address]

## 2021-05-03 NOTE — PROGRESS NOTES
Service Date: 05/03/2021    CARDIOLOGY OFFICE PROGRESS NOTE     HISTORY OF PRESENT ILLNESS:  I had the opportunity to see Ms. Rayna Longo in Cardiology Clinic today at [facility] in [place] for reevaluation of coronary artery disease and cardiac risk factors including hypertension, dyslipidemia, and type 2 diabetes.  I last saw Rayna in September.  She continued to have ongoing problems with severe low back pain and difficulty with mobility.  She had undergone back surgery and hip surgery but did not find relief from her chronic pain.  In 01/2021, she went back for a second back surgery but unfortunately, is now 14 weeks out from that surgery and continuing to have severe back pain and neuropathic pain.  She continues to be quite frustrated by this process.    She has had multiple coronary revascularization procedures in the past with right coronary artery stenting originally back in 2004 and again in 2016 when she underwent stenting of her LAD and a complex bifurcational stenosis of her left circumflex and obtuse marginal at the [facility].  Unfortunately, she returned in 11/2016 with recurrent cardiac symptoms and was found to have restenosis of the obtuse marginal stent which was restented again at that time with good results.  Her most recent angiogram was in 02/2019 showing no evidence of recurrent coronary artery disease or restenosis at that time.  Based on her normal-looking stress test in 01/2021, I suspect that there is no new coronary artery disease developing now.  She is not experiencing any chest discomfort symptoms, although she remains quite limited in her ability to exert herself by her chronic back pain issues.  She is able to walk around [employer] with a cart and does not have chest discomfort or shortness of breath doing that.  She is mainly limited by her back pain problems.    She had previously been on isosorbide mononitrate and we stopped that and increased her lisinopril to 10  mg a day.  She had been on Bystolic briefly during her back surgery episode for cardiac protection and she is no longer on that.  She does not tolerate beta blockers well because of severe fatigue.  She is also intolerant to statin drugs but is doing well with Repatha.    Her cholesterol numbers from September were excellent and she recently had a repeat cholesterol profile through her endocrinology office and that looks very good as well.  Her kidney function has been normal.  Her type 2 diabetes is not optimally controlled but is somewhat better than it had been.  Her hemoglobin A1c was most recently down to about 8.1.    PHYSICAL EXAMINATION:    VITAL SIGNS:  Here today, her blood pressure was 147/70, heart rate 97 and weight 166 pounds.    LUNGS: Clear.    CARDIAC:  Heart rhythm is regular.  She has no cardiac murmurs or carotid bruits.    IMPRESSION:  Ms. Rayna Longo is a 75-year-old woman with a history of coronary artery disease and multiple angioplasty and stenting procedures as described above.  She has cardiac risk factors including hypertension, dyslipidemia, and type 2 diabetes.  She is not having any concerning chest discomfort symptoms suggestive of recurrent coronary artery disease at this time.  Her last stress test in January looked good.    Her blood pressure is running a bit high and I suggested that she try increasing her lisinopril from 10 mg a day to 20 mg a day, taken at night.  She will continue to work with her endocrinology office on her diabetes.  Her cholesterol numbers are excellent.    She will be following up with her Orthopedic Surgery office to continue managing her back pain issues and I will plan to see her back again in 6 months for reevaluation of her cardiac status.    Feroz Burgos MD, FACC     cc:  Gaye Zimmer MD  [address] [address]    Feroz Burgos MD, FACC        D: 05/03/2021   T: 05/03/2021   MT: roberta    Name:      KATHY PERRY  MRN:      [id number]        Account:      [id number]   :      1945           Service Date: 2021       Document: [id number]

## 2021-05-03 NOTE — PROGRESS NOTES
HPI and Plan:   See dictation    Orders Placed This Encounter   Procedures     Follow-Up with Cardiologist       Orders Placed This Encounter   Medications     lisinopril (ZESTRIL) 20 MG tablet     Sig: Take 1 tablet (20 mg) by mouth daily     Dispense:  90 tablet     Refill:  3       Medications Discontinued During This Encounter   Medication Reason     acetaminophen (TYLENOL) 325 MG tablet      miconazole (MONISTAT 1 DAY OR NIGHT) 1200 & 2 MG & % kit      polyethylene glycol (MIRALAX) 17 g packet      sulfamethoxazole-trimethoprim (BACTRIM DS) 800-160 MG tablet      lisinopril (ZESTRIL) 10 MG tablet          Encounter Diagnoses   Name Primary?     Coronary artery disease involving native coronary artery of native heart without angina pectoris      Type 2 diabetes mellitus without complication, without long-term current use of insulin (H) Yes     Hyperlipidemia LDL goal <100      Essential hypertension        CURRENT MEDICATIONS:  Current Outpatient Medications   Medication Sig Dispense Refill     aspirin 81 MG EC tablet Take 81 mg by mouth daily       CINNAMON PO Take 2 capsules by mouth 2 times daily       cyanocobalamin (CYANOCOBALAMIN) 1000 MCG/ML injection Inject 2 mLs into the muscle every 30 days       diclofenac (VOLTAREN) 1 % topical gel Place 4 g onto the skin 4 times daily 100 g 0     estradiol (ESTRACE) 0.1 MG/GM vaginal cream Use pea sized amount and apply with finger to vaginal skin just inside opening once a day before bed as needed for vaginal dryness. 42.5 g 3     evolocumab (REPATHA SURECLICK) 140 MG/ML prefilled autoinjector Inject 1 mL (140 mg) Subcutaneous every 14 days 6 mL 3     GLIMEPIRIDE 4 MG OR TABS Take 4 mg by mouth 2 times daily        levothyroxine (SYNTHROID/LEVOTHROID) 125 MCG tablet Take 125 mcg by mouth daily before breakfast       liraglutide (VICTOZA PEN) 18 MG/3ML solution Inject 1.2 mg Subcutaneous daily 3 mL 0     lisinopril (ZESTRIL) 20 MG tablet Take 1 tablet (20 mg) by  mouth daily 90 tablet 3     melatonin 3 MG tablet Take 2 tablets (6 mg) by mouth nightly as needed for sleep 30 tablet 0     mirabegron (MYRBETRIQ) 50 MG 24 hr tablet Take 1 tablet (50 mg) by mouth daily Patient not taking daily (Patient taking differently: Take 50 mg by mouth daily ) 90 tablet 3     NONFORMULARY Take 1-2 capsules by mouth daily as needed Gymnema Sylvestris Leaf capsule       ONETOUCH ULTRA test strip        oxyCODONE (ROXICODONE) 5 MG tablet Take 1 tablet (5 mg) by mouth every 4 minutes as needed for breakthrough pain 10 tablet 0     oxyCODONE (ROXICODONE) 5 MG tablet Take 1-2 tablets (5-10 mg) by mouth every 3 hours as needed for severe pain 30 tablet 0     traMADol (ULTRAM) 50 MG tablet Take 1 tablet (50 mg) by mouth every 6 hours as needed for moderate to severe pain 30 tablet 3     TURMERIC PO Take 500 mg by mouth 2 times daily        vitamin D3 (CHOLECALCIFEROL) 2000 units tablet Take 1 tablet by mouth 2 times daily       amoxicillin (AMOXIL) 500 MG capsule 2,000 mg once as needed (Before dental procedures)        ferrous fumarate 65 mg, [place]. FE,-Vitamin C 125 mg (VITRON C)  MG TABS tablet Take 1 tablet by mouth daily       nitroglycerin (NITROSTAT) 0.4 MG SL tablet Place 1 tablet (0.4 mg) under the tongue every 5 minutes as needed for chest pain If symptoms persist after 3 doses (15 minutes) call 911. (Patient not taking: Reported on 5/3/2021) 25 tablet 3     STATIN NOT PRESCRIBED, INTENTIONAL, 1 each At Bedtime Statin not prescribed intentionally due to Allergy to statin (Patient not taking: Reported on 5/3/2021) 0 each 0       ALLERGIES     Allergies   Allergen Reactions     Hmg-Coa-R Inhibitors Muscle Pain (Myalgia)     Oral statins         PAST MEDICAL HISTORY:  Past Medical History:   Diagnosis Date     Anemia      Arthritis      Coronary artery disease 04/28/2016    cardiac cath 2/2019: patent stents; PTCA with MAYA x 2 to OM1 and MAYA x 1 to p.LAD (4/21/2016 at [place]); PTCA with  intracoronary stent placement of RCA June 2004; MAYA to OM1 11/2016     Cystocele, midline 09/29/2010     Depression      HYPERPLASTIC  POLYP      colonoscopy in 5-10 yrs.     Hypothyroidism     hypothyroid- Dr Melecio Weston      OAB (overactive bladder)     complex voiding dysfct, failed interstim     Osteoarthritis      Other and unspecified hyperlipidemia      Palpitations      Postmenopausal bleeding      Shoulder blade pain 5/31/2016     Type II or unspecified type diabetes mellitus without mention of complication, not stated as uncontrolled     Dr. Majano     Unspecified essential hypertension      Urinary frequency 9/29/10    followed by urology. no benefit from detrol or sanctura. month trial of macrobid and flomax 10/10       PAST SURGICAL HISTORY:  Past Surgical History:   Procedure Laterality Date     ------------OTHER-------------      Interstim     Ablation       ARTHROPLASTY HIP Left 7/15/2020    Procedure: Left total hip arthroplasty;  Surgeon: Jayson Victoria MD;  Location: [facility] OR     BACK SURGERY  03/05/2020    spinal fusion     C CT ANGIOGRAPHY CORONARY  1/2005    mod soft plaque LAD and Cx, follow up with left heart cath     C LIGATE FALLOPIAN TUBE      endometrial ablation     CARDIAC SURGERY       CHOLECYSTECTOMY       COLONOSCOPY       CV HEART CATHETERIZATION WITH POSSIBLE INTERVENTION N/A 2/14/2019    Procedure: Coronary Angiogram;  Surgeon: Sudarshan Lee MD;  Location: [facility] CARDIAC CATH LAB; patent stents     CYSTOSCOPY       EXCISE LESION UPPER EXTREMITY  6/5/2013    Procedure: EXCISE LESION UPPER EXTREMITY;  EXCISION RIGHT ARM ANTICUBITAL LIPOMA ;  Surgeon: Jayson Joe MD;  Location: [facility] REMOVAL OF TONSILS,12+ Y/O       HEART CATH LEFT HEART CATH  3/2/2016    No intervention - aggressive medical management     HEART CATH LEFT HEART CATH  4/21/2016     MAYA x 2 to OM1, MAYA to LAD, at [facility]     HEART CATH LEFT HEART CATH  6/29/2004    MAYA to RCA     HEART  CATH LEFT HEART CATH  3/28/2002    Mild to moderate 3 vessel CAD. Medical management recommended.      HEART CATH LEFT HEART CATH  2016    MAYA to OM1     hypothyroid       KNEE SURGERY  4/20/10    right knee replacement     ORTHOPEDIC SURGERY      total knee      REMOVE STIMULATOR SACRAL NERVE N/A 2015    Procedure: REMOVE STIMULATOR SACRAL NERVE;  Surgeon: Gertrudis Frausto MD;  [facility]: [facility]     SURGICAL HISTORY OF -       Uterine endometrial ablation       FAMILY HISTORY:  Family History   Problem Relation Age of Onset     C.A.D. Father         MI , age 83, had high lipids     Hyperlipidemia Father      Hypertension Father      Coronary Artery Disease Father      Hypertension Mother      Genitourinary Problems Mother         renal failure     Fractures Mother         hip     Osteoporosis Mother      Cerebrovascular Disease Maternal Grandfather         cerebral hemorrhage     Diabetes Maternal Uncle      Colon Cancer Maternal Aunt      Diabetes Maternal Grandmother        SOCIAL HISTORY:  Social History     Socioeconomic History     Marital status:      Spouse name: Kwadwo     Number of children: 3     Years of education: None     Highest education level: None   Occupational History     Occupation: PT Aide     Employer: NONE      Employer: RETIRED   Social Needs     Financial resource strain: None     Food insecurity     Worry: None     Inability: None     Transportation needs     Medical: None     Non-medical: None   Tobacco Use     Smoking status: Never Smoker     Smokeless tobacco: Never Used   Substance and Sexual Activity     Alcohol use: No     Alcohol/week: 0.0 standard drinks     Binge frequency: Never     Drug use: No     Sexual activity: Never     Partners: Male     Birth control/protection: Post-menopausal   Lifestyle     Physical activity     Days per week: None     Minutes per session: None     Stress: None   Relationships     Social connections     Talks on phone:  "None     Gets together: None     Attends Pentec[employer]ism service: None     Active member of club or organization: None     Attends meetings of clubs or organizations: None     Relationship status: None     Intimate partner violence     Fear of current or ex partner: None     Emotionally abused: None     Physically abused: None     Forced sexual activity: None   Other Topics Concern      Service Not Asked     Blood Transfusions Not Asked     Caffeine Concern Yes     Comment: 6-7 cups caffeine per day     Occupational Exposure Not Asked     Hobby Hazards Not Asked     Sleep Concern No     Stress Concern Yes     Weight Concern No     Special Diet No     Comment: eats healthy      Back Care Not Asked     Exercise Yes     Comment:  walking & active life style      Bike Helmet Not Asked     Seat Belt Not Asked     Self-Exams Not Asked     Parent/sibling w/ CABG, MI or angioplasty before 65F 55M? No   Social History Narrative     None       Review of Systems:  Skin:  Negative       Eyes:  Positive for glasses    ENT:  Negative      Respiratory:  Positive for dyspnea on exertion     Cardiovascular:  Negative      Gastroenterology: Negative      Genitourinary:  Negative      Musculoskeletal:  Positive for back pain spinal fusion 1/2021.  Several back and hip surgeries.  Neurologic:  Negative      Psychiatric:  Negative      Heme/Lymph/Imm:  Positive for allergies    Endocrine:  Positive for diabetes      Physical Exam:  Vitals: BP (!) 147/70 (BP Location: Right arm, Cuff Size: Adult Regular)   Pulse 97   Ht 1.727 m (5' 8\")   Wt 75.3 kg (166 lb)   BMI 25.24 kg/m      Constitutional:  cooperative, alert and oriented, well developed, well nourished, in no acute distress        Skin:  warm and dry to the touch, no apparent skin lesions or masses noted          Head:  normocephalic, no masses or lesions        Eyes:  pupils equal and round, conjunctivae and lids unremarkable, sclera white, no xanthalasma, EOMS intact, no " nystagmus        Lymph:      ENT:  no pallor or cyanosis, dentition good        Neck:  carotid pulses are full and equal bilaterally, JVP normal, no carotid bruit        Respiratory:  normal breath sounds, clear to auscultation, normal A-P diameter, normal symmetry, normal respiratory excursion, no use of accessory muscles         Cardiac: regular rhythm       systolic murmur;grade 1;RUSB        pulses full and equal, no bruits auscultated                                        GI:  abdomen soft;BS normoactive        Extremities and Muscular Skeletal:  no deformities, clubbing, cyanosis, erythema observed;no edema              Neurological:  no gross motor deficits;affect appropriate        Psych:  Alert and Oriented x 3        CC  Feroz Burgos MD  [id number]  [place] [id number]

## 2021-06-04 VITALS — HEIGHT: 67 IN | BODY MASS INDEX: 24.96 KG/M2 | WEIGHT: 159 LBS

## 2021-06-05 VITALS — WEIGHT: 183 LBS | BODY MASS INDEX: 27.11 KG/M2 | HEIGHT: 69 IN

## 2021-06-06 NOTE — ANESTHESIA CARE TRANSFER NOTE
Last vitals:   Vitals:    03/05/20 0637   BP: 127/61   Pulse: 65   Resp: 16   Temp: 37.3  C (99.2  F)   SpO2: 100%     Patient's level of consciousness is drowsy  Spontaneous respirations: yes  Maintains airway independently: yes  Dentition unchanged: yes  Oropharynx: oropharynx clear of all foreign objects    QCDR Measures:  ASA# 20 - Surgical Safety Checklist: WHO surgical safety checklist completed prior to induction    PQRS# 430 - Adult PONV Prevention: 4558F - Pt received => 2 anti-emetic agents (different classes) preop & intraop  ASA# 8 - Peds PONV Prevention: NA - Not pediatric patient, not GA or 2 or more risk factors NOT present  PQRS# 424 - Meghan-op Temp Management: 4559F - At least one body temp DOCUMENTED => 35.5C or 95.9F within required timeframe  PQRS# 426 - PACU Transfer Protocol: - Transfer of care checklist used  ASA# 14 - Acute Post-op Pain: ASA14B - Patient did NOT experience pain >= 7 out of 10

## 2021-06-06 NOTE — ANESTHESIA POSTPROCEDURE EVALUATION
Patient: Sarah Longo  Procedure(s):  BILATERAL LUMBAR 2-3 AND LUMBAR 3-4 POSTERIOR SPINAL FUSION WITH LEFT LATERAL RECESS DECOMPRESSION LUMBAR 4-5 AND BILATERAL LAMINECTOMY AND DECOMPRESSION LUMBAR 2-3 AND LUMBAR 3-4 WITH ALLOGRAFT AND AUTOGRAFT AND VIVIGEN  Anesthesia type: general    Patient location: PACU  Last vitals:   Vitals Value Taken Time   /55 3/5/2020  1:00 PM   Temp 36.2  C (97.1  F) 3/5/2020  1:00 PM   Pulse 64 3/5/2020  1:08 PM   Resp 12 3/5/2020  1:08 PM   SpO2 96 % 3/5/2020  1:08 PM   Vitals shown include unvalidated device data.  Post vital signs: stable  Level of consciousness: awake and responds to simple questions  Post-anesthesia pain: pain controlled  Post-anesthesia nausea and vomiting: no  Pulmonary: unassisted, return to baseline  Cardiovascular: stable and blood pressure at baseline  Hydration: adequate  Anesthetic events: no    QCDR Measures:  ASA# 11 - Meghan-op Cardiac Arrest: ASA11B - Patient did NOT experience unanticipated cardiac arrest  ASA# 12 - Meghan-op Mortality Rate: ASA12B - Patient did NOT die  ASA# 13 - PACU Re-Intubation Rate: ASA13B - Patient did NOT require a new airway mgmt  ASA# 10 - Composite Anes Safety: ASA10A - No serious adverse event    Additional Notes: Patient sleepy but doing well overall. POC glucose elevated, will recheck prior to treating.

## 2021-06-06 NOTE — ANESTHESIA PREPROCEDURE EVALUATION
Anesthesia Evaluation      Patient summary reviewed   History of anesthetic complications (PONV)     Airway   Mallampati: II  Neck ROM: full   Pulmonary     breath sounds clear to auscultation  (-) shortness of breath, recent URI                         Cardiovascular   (+) hypertension, CAD, ,   Received beta blockers in last 24 hours prior to incision.  ,  ECG reviewed (NSR)  Rhythm: regular  Rate: normal,      ROS comment: ECHO 2019:  Left Ventricle  The left ventricle is normal in size. There is normal left ventricular wall  thickness. The visual ejection fraction is estimated at 55-60%. Left  ventricular diastolic function is normal. No regional wall motion  abnormalities noted.    Right Ventricle  The right ventricle is normal in structure, function and size.    Atria  Normal left atrial size. Right atrial size is normal. There is no color  Doppler evidence of an atrial shunt.    Mitral Valve  The mitral valve is normal in structure and function. There is trace mitral  regurgitation.    Tricuspid Valve  The tricuspid valve is normal in structure and function. Right ventricle  systolic pressure estimate normal. There is trace tricuspid regurgitation.      Aortic Valve  The aortic valve is normal in structure and function.    Pulmonic Valve  The pulmonic valve is not well seen, but is grossly normal. The pulmonic valve  is normal in structure and function. Normal pulmonic valve.    Vessels  Normal size aorta. The inferior vena cava is normal.    Pericardium  The pericardium appears normal.    Rhythm  Sinus rhythm was noted.    *Previous PCI 2004, 2016 x2. Negative angio and negative stress echo since. Cleared by cardiology for surgery. Never uses NTG.      Neuro/Psych    (+) chronic pain (LBP)    Endo/Other    (+) diabetes mellitus type 2, hypothyroidism,      GI/Hepatic/Renal    (-) GERD          Dental                         Anesthesia Plan  Planned anesthetic: general endotracheal  Intra-op: precedex  ASA 2    Induction: intravenous   Anesthetic plan and risks discussed with: patient and spouse  Anesthesia plan special considerations: antiemetics, IV therapy two IVs, dexmedetomidine  Post-op plan: routine recovery

## 2021-06-06 NOTE — PROGRESS NOTES
Patient was seen at [facility] for the F/D of an [place] [employer] . Patient was fit with the assistance of her CNA while seated at the edge of the bed.  Patient tolerated LSO well and signed for delivery.  LSO is to be used sitting, standing, and walking.

## 2021-06-14 NOTE — ANESTHESIA PREPROCEDURE EVALUATION
Anesthesia Evaluation      Patient summary reviewed   History of anesthetic complications     Airway   Mallampati: II  Neck ROM: full   Pulmonary - normal exam   (+) a smoker                         Cardiovascular - normal exam  (+) hypertension, CAD, ,      Neuro/Psych    (+) neuromuscular disease,  depression, anxiety/panic attacks,     Endo/Other    (+) diabetes mellitus, hypothyroidism,      GI/Hepatic/Renal    (+) GERD,        Other findings: PONV      Dental    (+) lower dentures and upper dentures                       Anesthesia Plan  Planned anesthetic: general endotracheal  No Versed.  Decadron, Zofran.  Precedex load + infusion.  Toradol, Tylenol PRN.  Ketamine (0.5 mg/kg).  Magnesium.  ASA 3   Induction: intravenous   Anesthetic plan and risks discussed with: patient  Anesthesia plan special considerations: antiemetics,   Post-op plan: routine recovery

## 2021-06-14 NOTE — ANESTHESIA CARE TRANSFER NOTE
Last vitals:   Vitals:    01/22/21 1140   BP: 110/56   Pulse: 60   Resp: 8   Temp: (!) 35.6  C (96  F)   SpO2: 98%     Patient's level of consciousness is drowsy  Spontaneous respirations: yes  Maintains airway independently: no: ETT in place  Dentition unchanged: yes  Oropharynx: oral airway in place and endotracheal tube in place    QCDR Measures:  ASA# 20 - Surgical Safety Checklist: WHO surgical safety checklist completed prior to induction    PQRS# 430 - Adult PONV Prevention: 4558F - Pt received => 2 anti-emetic agents (different classes) preop & intraop  ASA# 8 - Peds PONV Prevention: NA - Not pediatric patient, not GA or 2 or more risk factors NOT present  PQRS# 424 - Meghan-op Temp Management: 4559F - At least one body temp DOCUMENTED => 35.5C or 95.9F within required timeframe  PQRS# 426 - PACU Transfer Protocol: - Transfer of care checklist used  ASA# 14 - Acute Post-op Pain: ASA14B - Patient did NOT experience pain >= 7 out of 10

## 2021-06-14 NOTE — ANESTHESIA POSTPROCEDURE EVALUATION
Patient: Sarah Longo  Procedure(s):  REVISION OF NONUNION BILATERAL LUMBAR 2-3 AND LUMBAR 3-4 WITH REVISION OF INSTRUMENTATION RIGHT LUMBAR 2 AND LEFT LUMBAR 4 WITH LEFT LUMBAR 4-5 LAMINOFORAMINOTOMY AND LUMBAR 1-2 DECOMPRESSION WITH ALLOGRAFT AND BONE MARROW ASPIRATE (Bilateral)  Anesthesia type: general    Patient location: PACU  Last vitals:   Vitals Value Taken Time   BP 93/50 01/22/21 1420   Temp 36.5  C (97.7  F) 01/22/21 1420   Pulse 62 01/22/21 1420   Resp 12 01/22/21 1420   SpO2 95 % 01/22/21 1420     Post vital signs: stable  Level of consciousness: responds to simple questions, responds to stimulation and resting comfortably  Post-anesthesia pain: pain controlled  Post-anesthesia nausea and vomiting: no  Pulmonary: unassisted, spontaneous ventilation, nasal cannula  Cardiovascular: stable and blood pressure at baseline  Hydration: adequate  Anesthetic events: no    QCDR Measures:  ASA# 11 - Meghan-op Cardiac Arrest: ASA11B - Patient did NOT experience unanticipated cardiac arrest  ASA# 12 - Meghan-op Mortality Rate: ASA12B - Patient did NOT die  ASA# 13 - PACU Re-Intubation Rate: ASA13B - Patient did NOT require a new airway mgmt  ASA# 10 - Composite Anes Safety: ASA10A - No serious adverse event    Additional Notes: doing well, waking up nicely after a long case; no nausea

## 2021-07-26 ENCOUNTER — TELEPHONE (OUTPATIENT)
Dept: CARDIOLOGY | Facility: CLINIC | Age: 76
End: 2021-07-26

## 2021-08-12 ENCOUNTER — OFFICE VISIT (OUTPATIENT)
Dept: OBGYN | Facility: CLINIC | Age: 76
End: 2021-08-12
Payer: COMMERCIAL

## 2021-08-12 VITALS
BODY MASS INDEX: 25.34 KG/M2 | WEIGHT: 167.2 LBS | HEIGHT: 68 IN | DIASTOLIC BLOOD PRESSURE: 58 MMHG | SYSTOLIC BLOOD PRESSURE: 118 MMHG

## 2021-08-12 DIAGNOSIS — Z01.419 ENCOUNTER FOR GYNECOLOGICAL EXAMINATION WITHOUT ABNORMAL FINDING: Primary | ICD-10-CM

## 2021-08-12 DIAGNOSIS — N89.8 VAGINAL DRYNESS: ICD-10-CM

## 2021-08-12 PROBLEM — G89.4 CHRONIC PAIN DISORDER: Status: ACTIVE | Noted: 2021-01-28

## 2021-08-12 PROBLEM — R50.9 FEVER, UNSPECIFIED FEVER CAUSE: Status: ACTIVE | Noted: 2021-08-12

## 2021-08-12 PROBLEM — M48.061 SPINAL STENOSIS OF LUMBAR REGION: Status: ACTIVE | Noted: 2020-01-17

## 2021-08-12 PROBLEM — M47.817 LUMBOSACRAL SPONDYLOSIS WITHOUT MYELOPATHY: Status: ACTIVE | Noted: 2018-04-27

## 2021-08-12 PROBLEM — M46.97 UNSPECIFIED INFLAMMATORY SPONDYLOPATHY, LUMBOSACRAL REGION (H): Status: ACTIVE | Noted: 2020-07-27

## 2021-08-12 PROBLEM — M43.16 SPONDYLOLISTHESIS AT L3-L4 LEVEL: Status: ACTIVE | Noted: 2020-03-05

## 2021-08-12 PROBLEM — F11.20 CONTINUOUS OPIOID DEPENDENCE (H): Status: ACTIVE | Noted: 2021-01-28

## 2021-08-12 PROBLEM — D50.9 IRON DEFICIENCY ANEMIA, UNSPECIFIED IRON DEFICIENCY ANEMIA TYPE: Status: ACTIVE | Noted: 2021-08-12

## 2021-08-12 PROBLEM — N32.81 OVERACTIVE BLADDER: Status: ACTIVE | Noted: 2020-03-05

## 2021-08-12 PROBLEM — S32.009K PSEUDOARTHROSIS OF LUMBAR SPINE: Status: ACTIVE | Noted: 2021-01-22

## 2021-08-12 PROBLEM — G89.18 ACUTE POST-OPERATIVE PAIN: Status: ACTIVE | Noted: 2021-01-28

## 2021-08-12 PROBLEM — K21.9 GASTROESOPHAGEAL REFLUX DISEASE WITHOUT ESOPHAGITIS: Status: ACTIVE | Noted: 2020-03-05

## 2021-08-12 PROBLEM — R33.9 RETENTION OF URINE: Status: ACTIVE | Noted: 2021-08-12

## 2021-08-12 PROCEDURE — 99397 PER PM REEVAL EST PAT 65+ YR: CPT | Performed by: OBSTETRICS & GYNECOLOGY

## 2021-08-12 RX ORDER — BUPRENORPHINE 10 UG/H
PATCH TRANSDERMAL
Status: ON HOLD | COMMUNITY
Start: 2020-08-28 | End: 2022-04-29

## 2021-08-12 RX ORDER — HYDROCODONE BITARTRATE AND ACETAMINOPHEN 10; 325 MG/1; MG/1
TABLET ORAL
Status: ON HOLD | COMMUNITY
Start: 2020-01-15 | End: 2022-04-29

## 2021-08-12 RX ORDER — DULOXETIN HYDROCHLORIDE 60 MG/1
CAPSULE, DELAYED RELEASE ORAL DAILY
COMMUNITY
Start: 2021-08-03

## 2021-08-12 RX ORDER — NEBIVOLOL 5 MG/1
5 TABLET ORAL
COMMUNITY
Start: 2020-03-12 | End: 2021-11-08

## 2021-08-12 RX ORDER — ESTRADIOL 0.1 MG/G
CREAM VAGINAL
Qty: 42.5 G | Refills: 3 | Status: SHIPPED | OUTPATIENT
Start: 2021-08-12

## 2021-08-12 RX ORDER — OFLOXACIN 3 MG/ML
SOLUTION/ DROPS OPHTHALMIC
COMMUNITY
Start: 2021-06-21 | End: 2021-08-12

## 2021-08-12 RX ORDER — FERROUS SULFATE 325(65) MG
325 TABLET ORAL
Status: ON HOLD | COMMUNITY
Start: 2020-03-12 | End: 2022-04-29

## 2021-08-12 RX ORDER — PREGABALIN 25 MG/1
CAPSULE ORAL
Status: ON HOLD | COMMUNITY
Start: 2021-06-28 | End: 2022-08-31

## 2021-08-12 RX ORDER — ACETAMINOPHEN, DEXTROMETHORPHAN HBR, DOXYLAMINE SUCCINATE 650; 30; 12.5 MG/30ML; MG/30ML; MG/30ML
LIQUID ORAL
COMMUNITY
Start: 2021-07-20 | End: 2021-08-12

## 2021-08-12 RX ORDER — OXYCODONE AND ACETAMINOPHEN 5; 325 MG/1; MG/1
TABLET ORAL
Status: ON HOLD | COMMUNITY
Start: 2021-05-07 | End: 2022-04-29

## 2021-08-12 RX ORDER — ESOMEPRAZOLE MAGNESIUM 40 MG/1
40 CAPSULE, DELAYED RELEASE ORAL
Status: ON HOLD | COMMUNITY
End: 2022-04-29

## 2021-08-12 ASSESSMENT — MIFFLIN-ST. JEOR: SCORE: 1301.91

## 2021-08-12 NOTE — PROGRESS NOTES
Sarah is a 76 year old  female who presents for annual exam.     Besides routine health maintenance, she has no other health concerns today .      HPI:  The patient's PCP is  Gaye Zimmer.    Patient has diabetes, OVERACTIVE BLADDER, CVD with multiple stents  Known cystocele  Has a pessary she can remove or clean herself, but thinks it causes yeast infections  Cardiology doesn't think she is a good candidate for surgery  Has seen Dr Nayak for her oab, treated with myrbetriq      GYNECOLOGIC HISTORY:  No LMP recorded. Patient is postmenopausal..   reports that she has never smoked. She has never used smokeless tobacco.    Patient is not sexually active.  STD testing offered?  Declined       Last PHQ-9 score on record=   PHQ-9 SCORE 6/3/2019   PHQ-9 Total Score 0     Last GAD7 score on record=   DIANNA-7 SCORE 2016 2018 6/3/2019   Total Score 6 5 6     Alcohol Score =     HEALTH MAINTENANCE:  Cholesterol:   Cholesterol   Date Value Ref Range Status   09/15/2020 127 <200 mg/dL Final   2019 129 <200 mg/dL Final      Last Mammo: One year ago, Result: Normal, Next Mammo: Due, scheduled in Sept at   Pap: (No results found for: PAP )  DEXA:   Colonoscopy:  05/05/15, Result:  Normal, Next Colonoscopy: 4 years.    Health maintenance updated:  yes    HISTORY:  OB History    Para Term  AB Living   3 3 3 0 0 3   SAB TAB Ectopic Multiple Live Births   0 0 0 0 3      # Outcome Date GA Lbr [place]/2nd Weight Sex Delivery Anes PTL Lv   3 Term     F    DEE   2 Term     F    DEE   1 Term     M    DEE     Patient Active Problem List   Diagnosis     Essential hypertension     Dyslipidemia, goal LDL below 70     Osteoarthrosis, unspecified whether generalized or localized, involving lower leg     Type 2 diabetes mellitus without complication, without long-term current use of insulin (H)     Other specified gastritis without mention of hemorrhage     Chronic bilateral low back pain  without sciatica     Lumbar radiculopathy     Coronary atherosclerosis     Status post coronary angiogram     Shoulder blade pain     Abnormal cardiovascular stress test     Acquired hypothyroidism     Osteoarthritis     S/P total hip arthroplasty     Fever postop     Urinary frequency     Uterovaginal prolapse     Atrophic vaginitis     Nocturia     Acute post-operative pain     Chronic pain disorder     Continuous opioid dependence (H)     Depression with anxiety     Fever, unspecified fever cause     Gastroesophageal reflux disease without esophagitis     Iron deficiency anemia, unspecified iron deficiency anemia type     Lipoma     Lumbosacral spondylosis without myelopathy     Overactive bladder     Pseudoarthrosis of lumbar spine     Retention of urine     Spinal stenosis of lumbar region     Spondylolisthesis at L3-L4 level     Unspecified inflammatory spondylopathy, lumbosacral region (H)     Vitreous detachment     Past Surgical History:   Procedure Laterality Date     ------------OTHER-------------      Interstim     Ablation       ARTHROPLASTY HIP Left 7/15/2020    Procedure: Left total hip arthroplasty;  Surgeon: Jayson Victoria MD;  Location:  OR     BACK SURGERY  03/05/2020    spinal fusion     BACK SURGERY       C CT ANGIOGRAPHY CORONARY  1/2005    mod soft plaque LAD and Cx, follow up with left heart cath     C LIGATE FALLOPIAN TUBE      endometrial ablation     CARDIAC SURGERY       CHOLECYSTECTOMY       CHOLECYSTECTOMY       COLONOSCOPY       CORONARY STENT PLACEMENT  2004     x 5 stents      CV HEART CATHETERIZATION WITH POSSIBLE INTERVENTION N/A 2/14/2019    Procedure: Coronary Angiogram;  Surgeon: Sudarshan Lee MD;  Location: [facility] CARDIAC CATH LAB; patent stents     CYSTOSCOPY       EXCISE LESION UPPER EXTREMITY  6/5/2013    Procedure: EXCISE LESION UPPER EXTREMITY;  EXCISION RIGHT ARM ANTICUBITAL LIPOMA ;  Surgeon: Jayson Joe MD;  Location: [facility] REMOVAL OF  TONSILS,12+ Y/O       HEART CATH LEFT HEART CATH  3/2/2016    No intervention - aggressive medical management     HEART CATH LEFT HEART CATH  2016     MAYA x 2 to OM1, MAYA to LAD, at [facility]     HEART CATH LEFT HEART CATH  2004    MAYA to RCA     HEART CATH LEFT HEART CATH  3/28/2002    Mild to moderate 3 vessel CAD. Medical management recommended.      HEART CATH LEFT HEART CATH  2016    MAYA to OM1     hypothyroid       JOINT REPLACEMENT       KNEE SURGERY  4/20/10    right knee replacement     LUMBAR FUSION N/A 3/5/2020    Procedure: BILATERAL LUMBAR 2-3 AND LUMBAR 3-4 POSTERIOR SPINAL FUSION WITH LEFT LATERAL RECESS DECOMPRESSION LUMBAR 4-5 AND BILATERAL LAMINECTOMY AND DECOMPRESSION LUMBAR 2-3 AND LUMBAR 3-4 WITH ALLOGRAFT AND AUTOGRAFT AND VIVIGEN;  Surgeon: Navid Caicedo MD;  Location: [facility];  Service: Spine     LUMBAR FUSION Bilateral 2021    Procedure: REVISION OF NONUNION BILATERAL LUMBAR 2-3 AND LUMBAR 3-4 WITH REVISION OF INSTRUMENTATION RIGHT LUMBAR 2 AND LEFT LUMBAR 4 WITH LEFT LUMBAR 4-5 LAMINOFORAMINOTOMY AND LUMBAR 1-2 DECOMPRESSION WITH ALLOGRAFT AND BONE MARROW ASPIRATE;  Surgeon: Navid Caicedo MD;  Location: [facility];  Service: Spine     ORTHOPEDIC SURGERY      total knee      REMOVE STIMULATOR SACRAL NERVE N/A 2015    Procedure: REMOVE STIMULATOR SACRAL NERVE;  Surgeon: Gertrudis Frausto MD;  Location:  [facility]     REPLACEMENT TOTAL KNEE Right 2010     SURGICAL HISTORY OF -       Uterine endometrial ablation      Social History     Tobacco Use     Smoking status: Never Smoker     Smokeless tobacco: Never Used   Substance Use Topics     Alcohol use: No     Alcohol/week: 0.0 standard drinks      Problem (# of Occurrences) Relation (Name,Age of Onset)    C.A.D. (1) Father: MI , age 83, had high lipids    Cerebrovascular Disease (1) Maternal Grandfather: cerebral hemorrhage    Colon Cancer (1) Maternal Aunt     Coronary Artery Disease (1) Father    Diabetes (2) Maternal Uncle, Maternal Grandmother    Fractures (1) Mother: hip    Genitourinary Problems (1) Mother: renal failure    Hyperlipidemia (1) Father    Hypertension (2) Father, Mother    Osteoporosis (1) Mother            Current Outpatient Medications   Medication Sig     ascorbic acid 1000 MG TABS tablet Take 1,000 mg by mouth     aspirin 81 MG EC tablet Take 81 mg by mouth daily     CINNAMON PO Take 2 capsules by mouth 2 times daily     Continuous Blood Gluc Sensor (FREESTYLE PERRI 2 SENSOR) MISC      Continuous Blood Gluc Sensor (FREESTYLE PERRI 2 SENSOR) MISC      cyanocobalamin (CYANOCOBALAMIN) 1000 MCG/ML injection Inject 2 mLs into the muscle every 30 days     DULoxetine (CYMBALTA) 60 MG capsule      estradiol (ESTRACE) 0.1 MG/GM vaginal cream Use pea sized amount and apply with finger to vaginal skin just inside opening once a day before bed as needed for vaginal dryness.     evolocumab (REPATHA SURECLICK) 140 MG/ML prefilled autoinjector Inject 1 mL (140 mg) Subcutaneous every 14 days     GLIMEPIRIDE 4 MG OR TABS Take 4 mg by mouth 2 times daily      HYDROcodone-acetaminophen (NORCO)  MG per tablet 1 tablet twice daily as needed for back and leg pain. Max acetaminophen 4000mg in 24 hours.     levothyroxine (SYNTHROID/LEVOTHROID) 125 MCG tablet Take 125 mcg by mouth daily before breakfast     liraglutide (VICTOZA PEN) 18 MG/3ML solution Inject 1.2 mg Subcutaneous daily     lisinopril (ZESTRIL) 20 MG tablet Take 1 tablet (20 mg) by mouth daily     mirabegron (MYRBETRIQ) 50 MG 24 hr tablet Take 1 tablet (50 mg) by mouth daily Patient not taking daily (Patient taking differently: Take 50 mg by mouth daily )     oxyCODONE (ROXICODONE) 5 MG tablet Take 1-2 tablets (5-10 mg) by mouth every 3 hours as needed for severe pain     oxyCODONE-acetaminophen (PERCOCET) 5-325 MG tablet      pregabalin (LYRICA) 25 MG capsule      TURMERIC PO Take 500 mg by mouth 2  "times daily      vitamin D3 (CHOLECALCIFEROL) 2000 units tablet Take 1 tablet by mouth 2 times daily     amoxicillin (AMOXIL) 500 MG capsule 2,000 mg once as needed (Before dental procedures)  (Patient not taking: Reported on 8/12/2021)     Aspirin-Calcium Carbonate  MG TABS 1 tab po QD (Once per day) (Patient not taking: Reported on 8/12/2021)     buprenorphine (BUTRANS) 10 MCG/HR WK patch From the pain clinic (Patient not taking: Reported on 8/12/2021)     diclofenac (VOLTAREN) 1 % topical gel Place 4 g onto the skin 4 times daily (Patient not taking: Reported on 8/12/2021)     esomeprazole (NEXIUM) 40 MG DR capsule Take 40 mg by mouth (Patient not taking: Reported on 8/12/2021)     ferrous fumarate 65 mg, [place]. FE,-Vitamin C 125 mg (VITRON C)  MG TABS tablet Take 1 tablet by mouth daily (Patient not taking: Reported on 8/12/2021)     ferrous sulfate (FEROSUL) 325 (65 Fe) MG tablet Take 325 mg by mouth (Patient not taking: Reported on 8/12/2021)     melatonin 3 MG tablet Take 2 tablets (6 mg) by mouth nightly as needed for sleep (Patient not taking: Reported on 8/12/2021)     nebivolol (BYSTOLIC) 5 MG tablet Take 5 mg by mouth (Patient not taking: Reported on 8/12/2021)     traMADol (ULTRAM) 50 MG tablet Take 1 tablet (50 mg) by mouth every 6 hours as needed for moderate to severe pain (Patient not taking: Reported on 8/12/2021)     No current facility-administered medications for this visit.       Allergies   Allergen Reactions     Hmg-Coa-R Inhibitors Muscle Pain (Myalgia)     Oral statins         Past medical, surgical, social and family history were reviewed and updated in EPIC.    ROS:   12 point review of systems negative other than symptoms noted below or in the HPI.  No urinary frequency or dysuria, bladder or kidney problems    EXAM:  /58 (BP Location: Right arm)   Ht 1.727 m (5' 8\")   Wt 75.8 kg (167 lb 3.2 oz)   Breastfeeding No   BMI 25.42 kg/m     BMI: Body mass index is 25.42 " kg/m .    EXAM:  Constitutional: Appearance: Well nourished, well developed alert, in no acute distress  Neck:  Lymph Nodes:  No lymphadenopathy present    Thyroid:  Gland size normal, nontender, no nodules or masses present  on palpation  Chest:  Respiratory Effort:  Breathing unlabored  Cardiovascular:Heart    Auscultation:  Regular rate, normal rhythm, no murmurs present  Breasts: Inspection of Breasts:  No lymphadenopathy present., Palpation of Breasts and Axillae:  No masses present on palpation, no breast tenderness., Axillary Lymph Nodes:  No lymphadenopathy present. and No nodularity, asymmetry or nipple discharge bilaterally.  Gastrointestinal:  Abdominal Examination:  Abdomen nontender to palpation, tone normal without     rigidity or guarding, no masses present, umbilicus without lesions    Liver and speen:  No hepatomegaly present, liver nontender to palpation    Hernias:  No hernias present  Lymphatic: Lymph Nodes:  No other lymphadenopathy present  Skin:  General Inspection:  No rashes present, no lesions present, no areas of  discoloration.    Genitalia and Groin:  No rashes present, no lesions present, no areas of  discoloration, no masses present  Neurologic/Psychiatric:    Mental Status:  Oriented X3     Pelvic Exam:  External Genitalia:     Normal appearance for age, no discharge present, no tenderness present, no inflammatory lesions present, color normal  Vagina:     Cystocele present, ATROPHIC  Bladder:     Nontender to palpation  Urethra:   Urethral Body:  Urethra palpation normal, urethra structural support normal   Urethral Meatus:  No erythema or lesions present  Cervix:     Appearance healthy, no lesions present, nontender to palpation, no bleeding present  Uterus:     Nontender to palpation, no masses present, position anteflexed, mobility: normal  Adnexa:     No adnexal tenderness present, no adnexal masses present  Perineum:     Perineum within normal limits, no evidence of trauma, no  rashes or skin lesions present  Inguinal Lymph Nodes:     No lymphadenopathy present          BMI:  Body mass index is 25.42 kg/m .   reports that she has never smoked. She has never used smokeless tobacco.      ASSESSMENT:  76 year old female with satisfactory annual exam.    ICD-10-CM    1. Encounter for gynecological examination without abnormal finding  Z01.419    2. Vaginal dryness  N89.8 estradiol (ESTRACE) 0.1 MG/GM vaginal cream       PLAN:  Discussed her cystocele looks stable in size  Refill vagifem  Discussed my retirment  Pessary is still her best option for the cystocele  Has seen Dr Quinones in urology    Shireen Neves MD

## 2021-09-04 ENCOUNTER — HEALTH MAINTENANCE LETTER (OUTPATIENT)
Age: 76
End: 2021-09-04

## 2021-09-08 ENCOUNTER — TRANSFERRED RECORDS (OUTPATIENT)
Dept: HEALTH INFORMATION MANAGEMENT | Facility: CLINIC | Age: 76
End: 2021-09-08

## 2021-10-11 ENCOUNTER — TELEPHONE (OUTPATIENT)
Dept: CARDIOLOGY | Facility: CLINIC | Age: 76
End: 2021-10-11

## 2021-10-11 NOTE — TELEPHONE ENCOUNTER
Prior Authorization Not Needed per Insurance    Medication: REPATHA  Insurance Company:    Expected CoPay:      Pharmacy Filling the Rx:    Pharmacy Notified:    Patient Notified:            PA Initiation    Medication: REPATHA  Insurance Company:  [facility]  Pharmacy Filling the Rx:    Filling Pharmacy Phone:    Filling Pharmacy Fax:    Start Date:  10/11/2021

## 2021-11-08 ENCOUNTER — OFFICE VISIT (OUTPATIENT)
Dept: CARDIOLOGY | Facility: CLINIC | Age: 76
End: 2021-11-08
Payer: COMMERCIAL

## 2021-11-08 VITALS
BODY MASS INDEX: 26.07 KG/M2 | DIASTOLIC BLOOD PRESSURE: 67 MMHG | WEIGHT: 172 LBS | SYSTOLIC BLOOD PRESSURE: 117 MMHG | HEART RATE: 69 BPM | HEIGHT: 68 IN

## 2021-11-08 DIAGNOSIS — I25.10 CORONARY ARTERY DISEASE INVOLVING NATIVE CORONARY ARTERY OF NATIVE HEART WITHOUT ANGINA PECTORIS: ICD-10-CM

## 2021-11-08 PROCEDURE — 99214 OFFICE O/P EST MOD 30 MIN: CPT | Performed by: PHYSICIAN ASSISTANT

## 2021-11-08 RX ORDER — INSULIN LISPRO 100 [IU]/ML
INJECTION, SOLUTION INTRAVENOUS; SUBCUTANEOUS
Status: ON HOLD | COMMUNITY
End: 2022-08-31

## 2021-11-08 RX ORDER — INSULIN HUMAN 100 [IU]/ML
INJECTION, SUSPENSION SUBCUTANEOUS DAILY
Status: ON HOLD | COMMUNITY
End: 2022-04-29

## 2021-11-08 ASSESSMENT — MIFFLIN-ST. JEOR: SCORE: 1323.56

## 2021-11-08 NOTE — LETTER
11/8/2021       RE: Sarah Longo  [address] [place] [id number]     Dear Colleague,    Thank you for referring your patient, Sarah Longo, to the [facility] [place] [facility]. Please see a copy of my visit note below.    [id number]      HPI and Plan:   See dictation    Orders this Visit:  No orders of the defined types were placed in this encounter.    Orders Placed This Encounter   Medications     insulin lispro (HUMALOG KWIKPEN) 100 UNIT/ML (1 unit dial) KWIKPEN     Sig: Inject Subcutaneous 3 times daily (before meals)     insulin NPH (HUMULIN N VIAL) 100 UNIT/ML vial     Sig: Inject Subcutaneous daily     Medications Discontinued During This Encounter   Medication Reason     nebivolol (BYSTOLIC) 5 MG tablet          Encounter Diagnosis   Name Primary?     Coronary artery disease involving native coronary artery of native heart without angina pectoris        CURRENT MEDICATIONS:  Current Outpatient Medications   Medication Sig Dispense Refill     ascorbic acid 1000 MG TABS tablet Take 1,000 mg by mouth       aspirin 81 MG EC tablet Take 81 mg by mouth daily       CINNAMON PO Take 2 capsules by mouth 2 times daily       cyanocobalamin (CYANOCOBALAMIN) 1000 MCG/ML injection Inject 2 mLs into the muscle every 30 days       DULoxetine (CYMBALTA) 60 MG capsule        evolocumab (REPATHA SURECLICK) 140 MG/ML prefilled autoinjector Inject 1 mL (140 mg) Subcutaneous every 14 days 6 mL 3     GLIMEPIRIDE 4 MG OR TABS Take 4 mg by mouth daily        HYDROcodone-acetaminophen (NORCO)  MG per tablet 1 tablet twice daily as needed for back and leg pain. Max acetaminophen 4000mg in 24 hours.       insulin lispro (HUMALOG KWIKPEN) 100 UNIT/ML (1 unit dial) KWIKPEN Inject Subcutaneous 3 times daily (before meals)       insulin NPH (HUMULIN N VIAL) 100 UNIT/ML vial Inject Subcutaneous daily       levothyroxine (SYNTHROID/LEVOTHROID)  125 MCG tablet Take 125 mcg by mouth daily before breakfast       liraglutide (VICTOZA PEN) 18 MG/3ML solution Inject 1.2 mg Subcutaneous daily 3 mL 0     lisinopril (ZESTRIL) 20 MG tablet Take 1 tablet (20 mg) by mouth daily 90 tablet 3     mirabegron (MYRBETRIQ) 50 MG 24 hr tablet Take 1 tablet (50 mg) by mouth daily Patient not taking daily (Patient taking differently: Take 50 mg by mouth daily ) 90 tablet 3     oxyCODONE (ROXICODONE) 5 MG tablet Take 1-2 tablets (5-10 mg) by mouth every 3 hours as needed for severe pain 30 tablet 0     vitamin D3 (CHOLECALCIFEROL) 2000 units tablet Take 1 tablet by mouth 2 times daily       amoxicillin (AMOXIL) 500 MG capsule 2,000 mg once as needed (Before dental procedures)  (Patient not taking: Reported on 8/12/2021)       Aspirin-Calcium Carbonate  MG TABS 1 tab po QD (Once per day) (Patient not taking: Reported on 8/12/2021)       buprenorphine (BUTRANS) 10 MCG/HR WK patch From the pain clinic (Patient not taking: Reported on 8/12/2021)       Continuous Blood Gluc Sensor (FREESTYLE PERRI 2 SENSOR) MISC        Continuous Blood Gluc Sensor (FREESTYLE PERRI 2 SENSOR) MISC        diclofenac (VOLTAREN) 1 % topical gel Place 4 g onto the skin 4 times daily (Patient not taking: Reported on 8/12/2021) 100 g 0     esomeprazole (NEXIUM) 40 MG DR capsule Take 40 mg by mouth (Patient not taking: Reported on 8/12/2021)       estradiol (ESTRACE) 0.1 MG/GM vaginal cream Use pea sized amount and apply with finger to vaginal skin just inside opening once a day before bed as needed for vaginal dryness. 42.5 g 3     ferrous fumarate 65 mg, [place]. FE,-Vitamin C 125 mg (VITRON C)  MG TABS tablet Take 1 tablet by mouth daily (Patient not taking: Reported on 8/12/2021)       ferrous sulfate (FEROSUL) 325 (65 Fe) MG tablet Take 325 mg by mouth (Patient not taking: Reported on 8/12/2021)       melatonin 3 MG tablet Take 2 tablets (6 mg) by mouth nightly as needed for sleep (Patient not  taking: Reported on 8/12/2021) 30 tablet 0     oxyCODONE-acetaminophen (PERCOCET) 5-325 MG tablet  (Patient not taking: Reported on 11/8/2021)       pregabalin (LYRICA) 25 MG capsule  (Patient not taking: Reported on 11/8/2021)       traMADol (ULTRAM) 50 MG tablet Take 1 tablet (50 mg) by mouth every 6 hours as needed for moderate to severe pain (Patient not taking: Reported on 8/12/2021) 30 tablet 3     TURMERIC PO Take 500 mg by mouth 2 times daily  (Patient not taking: Reported on 11/8/2021)         ALLERGIES     Allergies   Allergen Reactions     Jardiance [Empagliflozin] Other (See Comments)     Ongoing yeast infections     Hmg-Coa-R Inhibitors Muscle Pain (Myalgia)     Oral statins         PAST MEDICAL HISTORY:  Past Medical History:   Diagnosis Date     Anemia      Anxiety      Arthritis      CAD (coronary artery disease)      Chronic bilateral low back pain without sciatica      Coronary artery disease 04/28/2016    cardiac cath 2/2019: patent stents; PTCA with MAYA x 2 to OM1 and MAYA x 1 to p.LAD (4/21/2016 at [facility]); PTCA with intracoronary stent placement of RCA June 2004; MAYA to OM1 11/2016     Cystocele, midline 09/29/2010     Depression      Depression      DM type 2 (diabetes mellitus, type 2) (H)      HYPERPLASTIC  POLYP      colonoscopy in 5-10 yrs.     Hypertension      Hypothyroid      Hypothyroidism     hypothyroid- Dr Majano     Motion sickness      Mumps      OAB (overactive bladder)     complex voiding dysfct, failed interstim     Osteoarthritis      Other and unspecified hyperlipidemia      Palpitations      PONV (postoperative nausea and vomiting)      Postmenopausal bleeding      Shoulder blade pain 5/31/2016     Type II or unspecified type diabetes mellitus without mention of complication, not stated as uncontrolled     Dr. Majano     Unspecified essential hypertension      Urinary frequency 9/29/10    followed by urology. no benefit from detrol or sanctura. month trial of macrobid and  flomax 10/10       PAST SURGICAL HISTORY:  Past Surgical History:   Procedure Laterality Date     ------------OTHER-------------      Interstim     Ablation       ARTHROPLASTY HIP Left 7/15/2020    Procedure: Left total hip arthroplasty;  Surgeon: Jayson Victoria MD;  Location: [facility]     BACK SURGERY  03/05/2020    spinal fusion     BACK SURGERY       C CT ANGIOGRAPHY CORONARY  1/2005    mod soft plaque LAD and Cx, follow up with left heart cath     C LIGATE FALLOPIAN TUBE      endometrial ablation     CARDIAC SURGERY       CHOLECYSTECTOMY       CHOLECYSTECTOMY       COLONOSCOPY       CORONARY STENT PLACEMENT  2004     x 5 stents      CV HEART CATHETERIZATION WITH POSSIBLE INTERVENTION N/A 2/14/2019    Procedure: Coronary Angiogram;  Surgeon: Sudarshan Lee MD;  Location:  HEART CARDIAC CATH LAB; patent stents     CYSTOSCOPY       EXCISE LESION UPPER EXTREMITY  6/5/2013    Procedure: EXCISE LESION UPPER EXTREMITY;  EXCISION RIGHT ARM ANTICUBITAL LIPOMA ;  Surgeon: Jayson Joe MD;  Location: [facility] REMOVAL OF TONSILS,13+ Y/O       HEART CATH LEFT HEART CATH  3/2/2016    No intervention - aggressive medical management     HEART CATH LEFT HEART CATH  4/21/2016     MAYA x 2 to OM1, MAYA to LAD, at [place]     HEART CATH LEFT HEART CATH  6/29/2004    MAYA to RCA     HEART CATH LEFT HEART CATH  3/28/2002    Mild to moderate 3 vessel CAD. Medical management recommended.      HEART CATH LEFT HEART CATH  11/22/2016    MAYA to OM1     hypothyroid       JOINT REPLACEMENT       KNEE SURGERY  4/20/10    right knee replacement     LUMBAR FUSION N/A 3/5/2020    Procedure: BILATERAL LUMBAR 2-3 AND LUMBAR 3-4 POSTERIOR SPINAL FUSION WITH LEFT LATERAL RECESS DECOMPRESSION LUMBAR 4-5 AND BILATERAL LAMINECTOMY AND DECOMPRESSION LUMBAR 2-3 AND LUMBAR 3-4 WITH ALLOGRAFT AND AUTOGRAFT AND VIVIGEN;  Surgeon: Navid Caicedo MD;  Location: [facility];  Service: Spine     LUMBAR FUSION Bilateral  2021    Procedure: REVISION OF NONUNION BILATERAL LUMBAR 2-3 AND LUMBAR 3-4 WITH REVISION OF INSTRUMENTATION RIGHT LUMBAR 2 AND LEFT LUMBAR 4 WITH LEFT LUMBAR 4-5 LAMINOFORAMINOTOMY AND LUMBAR 1-2 DECOMPRESSION WITH ALLOGRAFT AND BONE MARROW ASPIRATE;  Surgeon: Navid Caicedo MD;  Location: [facility];  Service: Spine     ORTHOPEDIC SURGERY      total knee      REMOVE STIMULATOR SACRAL NERVE N/A 2015    Procedure: REMOVE STIMULATOR SACRAL NERVE;  Surgeon: Gertrudis Frausto MD;  Location:  [facility]     REPLACEMENT TOTAL KNEE Right 2010     SURGICAL HISTORY OF -       Uterine endometrial ablation       FAMILY HISTORY:  Family History   Problem Relation Age of Onset     C.A.D. Father         MI , age 83, had high lipids     Hyperlipidemia Father      Hypertension Father      Coronary Artery Disease Father      Hypertension Mother      Genitourinary Problems Mother         renal failure     Fractures Mother         hip     Osteoporosis Mother      Cerebrovascular Disease Maternal Grandfather         cerebral hemorrhage     Diabetes Maternal Uncle      Colon Cancer Maternal Aunt      Diabetes Maternal Grandmother        SOCIAL HISTORY:  Social History     Socioeconomic History     Marital status:      Spouse name: Kwadwo     Number of children: 3     Years of education: None     Highest education level: None   Occupational History     Occupation: PT Aide     Employer: NONE      Employer: RETIRED   Tobacco Use     Smoking status: Never Smoker     Smokeless tobacco: Never Used   Substance and Sexual Activity     Alcohol use: No     Alcohol/week: 0.0 standard drinks     Drug use: No     Sexual activity: Never     Partners: Male     Birth control/protection: Post-menopausal   Other Topics Concern      Service Not Asked     Blood Transfusions Not Asked     Caffeine Concern Yes     Comment: 6-7 cups caffeine per day     Occupational Exposure Not Asked     Hobby Hazards  "Not Asked     Sleep Concern No     Stress Concern Yes     Weight Concern No     Special Diet No     Comment: eats healthy      Back Care Not Asked     Exercise Yes     Comment:  walking & active life style      Bike Helmet Not Asked     Seat Belt Not Asked     Self-Exams Not Asked     Parent/sibling w/ CABG, MI or angioplasty before 65F 55M? No   Social History Narrative     None     Social Determinants of Health     Financial Resource Strain: Not on file   Food Insecurity: Not on file   Transportation Needs: Not on file   Physical Activity: Not on file   Stress: Not on file   Social Connections: Not on file   Intimate Partner Violence: Not on file   Housing Stability: Not on file       Review of Systems:  Skin:  Negative     Eyes:  Positive for glasses  ENT:  Negative    Respiratory:  Positive for dyspnea on exertion  Cardiovascular:  Negative    Gastroenterology: Negative    Genitourinary:  Negative    Musculoskeletal:  Positive for back pain  Neurologic:  Negative    Psychiatric:  Positive for sleep disturbances  Heme/Lymph/Imm:  Positive for allergies  Endocrine:  Positive for diabetes    Physical Exam:  Vitals: /67   Pulse 69   Ht 1.727 m (5' 7.99\")   Wt 78 kg (172 lb)   BMI 26.16 kg/m     Please refer to dictation for physical exam    Recent Lab Results: all reviewed today  CBC RESULTS:  Lab Results   Component Value Date    WBC 6.8 01/26/2021    WBC 3.9 (L) 07/19/2020    RBC 2.99 (L) 01/26/2021    RBC 2.60 (L) 07/19/2020    HGB 9.2 (L) 01/26/2021    HGB 12.1 09/15/2020    HCT 28.5 (L) 01/26/2021    HCT 23.8 (L) 07/19/2020    MCV 95 01/26/2021    MCV 92 07/19/2020    MCH 30.8 01/26/2021    MCH 29.6 07/19/2020    MCHC 32.3 01/26/2021    MCHC 32.4 07/19/2020    RDW 12.6 01/26/2021    RDW 12.1 07/19/2020     01/26/2021     07/19/2020       BMP RESULTS:  Lab Results   Component Value Date     (L) 01/24/2021     07/19/2020    POTASSIUM 4.1 01/24/2021    POTASSIUM 3.7 07/19/2020 "    CHLORIDE 105 2021    CHLORIDE 107 2020    CO2 19 (L) 2021    CO2 27 2020    ANIONGAP 9 2021    ANIONGAP 2 (L) 2020     (H) 2021     (H) 2021     (H) 2020    BUN 18 2021    BUN 14 2020    CR 0.87 2021    CR 0.71 2020    GFRESTIMATED >60 2021    GFRESTIMATED 84 2020    GFRESTBLACK >60 2021    GFRESTBLACK >90 2020    LUCIUS 8.7 2021    LUCIUS 8.5 2020        INR RESULTS:  Lab Results   Component Value Date    INR 0.88 2019    INR 0.94 2017           CC  Feroz Burgos MD  [address]  [place] [address]          Service Date: 2021    PRIMARY CARDIOLOGIST:  Dr. Burgos.    REASON FOR VISIT:  Hypertension, coronary artery disease followup.    HISTORY OF PRESENT ILLNESS:  Ms. Longo is a 76-year-old woman with past medical history significant for the followin.  Coronary artery disease with stenting to the RCA in .  She redeveloped symptoms in the spring of 2016, had abnormal stress test, was found to have a complex trifurcation lesion of the OM2 and distal LAD.  This was initially managed medically due to the complexity of the intervention, but she ended up with [facility] and had trifurcation stents to the superior and main branch of her OM and dilatation of the inferior branch.  She had an LAD stent placed at that time as well.  Last angiogram was in 2019 showing an apical 80% LAD lesion that was stable with patent trifurcation stents and minimal disease in the RCA and left main.  Last stress test, which I reviewed today, was done in 2021 and was normal.  2.  Dyslipidemia, on Repatha.  Intolerant of statins.  3.  Hypertension.  4.  Type 2 diabetes, now on insulin.  5.  Type I Brugada pattern, probably brought by fevers and possible Lamictal use (please refer to Dr. Nation consult 2016).  6.  Multiple orthopedic issues with spinal fusion, hip  replacements and multiple surgeries with severe chronic pain.    She comes in today and she is primarily still limited by back and hip pain.  She has undergone multiple procedures for this and has been unsuccessful in relieving the pain.  The next chance is a possible spinal cord stimulator.  She has not had much chest discomfort or shortness of breath, but she is primarily limited exertionally-wise by her back pain.  Fortunately, her heart did well through all of her surgeries without complications, and in spite of her chronic pain her blood pressure has been well managed.  She denies orthopnea or PND, no syncope or presyncope.    SOCIAL HISTORY:  Lifelong nonsmoker.  No alcohol use.  She has 7 children and multiple grandchildren, with the youngest being a 3-year-old granddaughter.    PHYSICAL EXAMINATION:    GENERAL:  Well-developed, well-nourished, talkative woman in no acute distress.  HEENT:  Normocephalic, atraumatic.  HEART:  Regular in rate and rhythm.  I do not appreciate murmur, rub or gallop.  LUNGS:  Clear, without wheezes, rales or rhonchi.  NECK:  Carotids are quiet.  EXTREMITIES:  Without peripheral edema.  2+ dorsalis pedis and posterior tibialis pulses bilaterally.    ASSESSMENT AND PLAN:    1.  Coronary artery disease with a stress test in 01/2021 showing no significant ischemia, without recurrent chest pain.  Fortunately, she has made it through 3 major surgeries without difficulty and is on appropriate medications.  2.  Dyslipidemia, on Repatha with outstanding results.  Last LDL 41, HDL 71, total cholesterol 127.  She is due for repeat lipid panel.  She believes she may have had things done at her endocrinologist and will get those sent to us.  If not, she should have a repeat lipid panel with ALT.  3.  Hypertension, excellent control.  Needs repeat BMP being on higher dose of lisinopril, also will get from her endocrinologist.  4.  Type 2 diabetes with adverse effects to Jardiance of severe  yeast infections, now on insulin for control.  5.  Multiple orthopedic complaints and chronic pain.  I am pleased that her blood pressure is controlled in spite of all of this.    Thank you for allowing me to participate in this delightful patient's care.  We will see her back in 6 months with Dr. Burgos, sooner with concerns.    Karen Alexander PA-C        D: 2021   T: 2021   MT: hoa    Name:     KATHY PERRYAnselmo  MRN:      [id number]        Account:      [id number]   :      1945           Service Date: 2021     Document: [id number]

## 2021-11-08 NOTE — LETTER
11/8/2021    Gaye Zimmer  [facility] [address] [place] [address]    RE: Sarah Longo       Dear Colleague,    I had the pleasure of seeing Sarah Longo in the [facility].    [id number]  {2021 E&M time:[id number]}    HPI and Plan:   See dictation    Orders this Visit:  No orders of the defined types were placed in this encounter.    Orders Placed This Encounter   Medications     insulin lispro (HUMALOG KWIKPEN) 100 UNIT/ML (1 unit dial) KWIKPEN     Sig: Inject Subcutaneous 3 times daily (before meals)     insulin NPH (HUMULIN N VIAL) 100 UNIT/ML vial     Sig: Inject Subcutaneous daily     Medications Discontinued During This Encounter   Medication Reason     nebivolol (BYSTOLIC) 5 MG tablet          Encounter Diagnosis   Name Primary?     Coronary artery disease involving native coronary artery of native heart without angina pectoris        CURRENT MEDICATIONS:  Current Outpatient Medications   Medication Sig Dispense Refill     ascorbic acid 1000 MG TABS tablet Take 1,000 mg by mouth       aspirin 81 MG EC tablet Take 81 mg by mouth daily       CINNAMON PO Take 2 capsules by mouth 2 times daily       cyanocobalamin (CYANOCOBALAMIN) 1000 MCG/ML injection Inject 2 mLs into the muscle every 30 days       DULoxetine (CYMBALTA) 60 MG capsule        evolocumab (REPATHA SURECLICK) 140 MG/ML prefilled autoinjector Inject 1 mL (140 mg) Subcutaneous every 14 days 6 mL 3     GLIMEPIRIDE 4 MG OR TABS Take 4 mg by mouth daily        HYDROcodone-acetaminophen (NORCO)  MG per tablet 1 tablet twice daily as needed for back and leg pain. Max acetaminophen 4000mg in 24 hours.       insulin lispro (HUMALOG KWIKPEN) 100 UNIT/ML (1 unit dial) KWIKPEN Inject Subcutaneous 3 times daily (before meals)       insulin NPH (HUMULIN N VIAL) 100 UNIT/ML vial Inject Subcutaneous daily       levothyroxine (SYNTHROID/LEVOTHROID) 125 MCG tablet Take 125 mcg by  mouth daily before breakfast       liraglutide (VICTOZA PEN) 18 MG/3ML solution Inject 1.2 mg Subcutaneous daily 3 mL 0     lisinopril (ZESTRIL) 20 MG tablet Take 1 tablet (20 mg) by mouth daily 90 tablet 3     mirabegron (MYRBETRIQ) 50 MG 24 hr tablet Take 1 tablet (50 mg) by mouth daily Patient not taking daily (Patient taking differently: Take 50 mg by mouth daily ) 90 tablet 3     oxyCODONE (ROXICODONE) 5 MG tablet Take 1-2 tablets (5-10 mg) by mouth every 3 hours as needed for severe pain 30 tablet 0     vitamin D3 (CHOLECALCIFEROL) 2000 units tablet Take 1 tablet by mouth 2 times daily       amoxicillin (AMOXIL) 500 MG capsule 2,000 mg once as needed (Before dental procedures)  (Patient not taking: Reported on 8/12/2021)       Aspirin-Calcium Carbonate  MG TABS 1 tab po QD (Once per day) (Patient not taking: Reported on 8/12/2021)       buprenorphine (BUTRANS) 10 MCG/HR WK patch From the pain clinic (Patient not taking: Reported on 8/12/2021)       Continuous Blood Gluc Sensor (FREESTYLE PERRI 2 SENSOR) MISC        Continuous Blood Gluc Sensor (FREESTYLE PERRI 2 SENSOR) MISC        diclofenac (VOLTAREN) 1 % topical gel Place 4 g onto the skin 4 times daily (Patient not taking: Reported on 8/12/2021) 100 g 0     esomeprazole (NEXIUM) 40 MG DR capsule Take 40 mg by mouth (Patient not taking: Reported on 8/12/2021)       estradiol (ESTRACE) 0.1 MG/GM vaginal cream Use pea sized amount and apply with finger to vaginal skin just inside opening once a day before bed as needed for vaginal dryness. 42.5 g 3     ferrous fumarate 65 mg, [employer] [place]. FE,-Vitamin C 125 mg (VITRON C)  MG TABS tablet Take 1 tablet by mouth daily (Patient not taking: Reported on 8/12/2021)       ferrous sulfate (FEROSUL) 325 (65 Fe) MG tablet Take 325 mg by mouth (Patient not taking: Reported on 8/12/2021)       melatonin 3 MG tablet Take 2 tablets (6 mg) by mouth nightly as needed for sleep (Patient not taking: Reported on 8/12/2021)  30 tablet 0     oxyCODONE-acetaminophen (PERCOCET) 5-325 MG tablet  (Patient not taking: Reported on 11/8/2021)       pregabalin (LYRICA) 25 MG capsule  (Patient not taking: Reported on 11/8/2021)       traMADol (ULTRAM) 50 MG tablet Take 1 tablet (50 mg) by mouth every 6 hours as needed for moderate to severe pain (Patient not taking: Reported on 8/12/2021) 30 tablet 3     TURMERIC PO Take 500 mg by mouth 2 times daily  (Patient not taking: Reported on 11/8/2021)         ALLERGIES     Allergies   Allergen Reactions     Jardiance [Empagliflozin] Other (See Comments)     Ongoing yeast infections     Hmg-Coa-R Inhibitors Muscle Pain (Myalgia)     Oral statins         PAST MEDICAL HISTORY:  Past Medical History:   Diagnosis Date     Anemia      Anxiety      Arthritis      CAD (coronary artery disease)      Chronic bilateral low back pain without sciatica      Coronary artery disease 04/28/2016    cardiac cath 2/2019: patent stents; PTCA with MAYA x 2 to OM1 and MAYA x 1 to p.LAD (4/21/2016 at [place]); PTCA with intracoronary stent placement of RCA June 2004; MAYA to OM1 11/2016     Cystocele, midline 09/29/2010     Depression      Depression      DM type 2 (diabetes mellitus, type 2) (H)      HYPERPLASTIC  POLYP      colonoscopy in 5-10 yrs.     Hypertension      Hypothyroid      Hypothyroidism     hypothyroid- Dr Majano     Motion sickness      Mumps      OAB (overactive bladder)     complex voiding dysfct, failed interstim     Osteoarthritis      Other and unspecified hyperlipidemia      Palpitations      PONV (postoperative nausea and vomiting)      Postmenopausal bleeding      Shoulder blade pain 5/31/2016     Type II or unspecified type diabetes mellitus without mention of complication, not stated as uncontrolled     Dr. Majano     Unspecified essential hypertension      Urinary frequency 9/29/10    followed by urology. no benefit from detrol or sanctura. month trial of macrobid and flomax 10/10       PAST SURGICAL  HISTORY:  Past Surgical History:   Procedure Laterality Date     ------------OTHER-------------      Interstim     Ablation       ARTHROPLASTY HIP Left 7/15/2020    Procedure: Left total hip arthroplasty;  Surgeon: Jayson Victoria MD;  Location: [facility]     BACK SURGERY  03/05/2020    spinal fusion     BACK SURGERY       C CT ANGIOGRAPHY CORONARY  1/2005    mod soft plaque LAD and Cx, follow up with left heart cath     C LIGATE FALLOPIAN TUBE      endometrial ablation     CARDIAC SURGERY       CHOLECYSTECTOMY       CHOLECYSTECTOMY       COLONOSCOPY       CORONARY STENT PLACEMENT  2004     x 5 stents      CV HEART CATHETERIZATION WITH POSSIBLE INTERVENTION N/A 2/14/2019    Procedure: Coronary Angiogram;  Surgeon: Sudarshan Lee MD;  Location:  HEART CARDIAC CATH LAB; patent stents     CYSTOSCOPY       EXCISE LESION UPPER EXTREMITY  6/5/2013    Procedure: EXCISE LESION UPPER EXTREMITY;  EXCISION RIGHT ARM ANTICUBITAL LIPOMA ;  Surgeon: Jayson Joe MD;  Location: [facility] REMOVAL OF TONSILS,12+ Y/O       HEART CATH LEFT HEART CATH  3/2/2016    No intervention - aggressive medical management     HEART CATH LEFT HEART CATH  4/21/2016     MAYA x 2 to OM1, MAYA to LAD, at [place]     HEART CATH LEFT HEART CATH  6/29/2004    MAYA to RCA     HEART CATH LEFT HEART CATH  3/28/2002    Mild to moderate 3 vessel CAD. Medical management recommended.      HEART CATH LEFT HEART CATH  11/22/2016    MAYA to OM1     hypothyroid       JOINT REPLACEMENT       KNEE SURGERY  4/20/10    right knee replacement     LUMBAR FUSION N/A 3/5/2020    Procedure: BILATERAL LUMBAR 2-3 AND LUMBAR 3-4 POSTERIOR SPINAL FUSION WITH LEFT LATERAL RECESS DECOMPRESSION LUMBAR 4-5 AND BILATERAL LAMINECTOMY AND DECOMPRESSION LUMBAR 2-3 AND LUMBAR 3-4 WITH ALLOGRAFT AND AUTOGRAFT AND VIVIGEN;  Surgeon: Navid Caicedo MD;  Location: [facility];  Service: Spine     LUMBAR FUSION Bilateral 1/22/2021    Procedure: REVISION OF  NONUNION BILATERAL LUMBAR 2-3 AND LUMBAR 3-4 WITH REVISION OF INSTRUMENTATION RIGHT LUMBAR 2 AND LEFT LUMBAR 4 WITH LEFT LUMBAR 4-5 LAMINOFORAMINOTOMY AND LUMBAR 1-2 DECOMPRESSION WITH ALLOGRAFT AND BONE MARROW ASPIRATE;  Surgeon: Navid Caicedo MD;  Location: [facility];  Service: Spine     ORTHOPEDIC SURGERY      total knee      REMOVE STIMULATOR SACRAL NERVE N/A 2015    Procedure: REMOVE STIMULATOR SACRAL NERVE;  Surgeon: Gertrudis Frausto MD;  Location:  [facility]     REPLACEMENT TOTAL KNEE Right 2010     SURGICAL HISTORY OF -       Uterine endometrial ablation       FAMILY HISTORY:  Family History   Problem Relation Age of Onset     C.A.D. Father         MI , age 83, had high lipids     Hyperlipidemia Father      Hypertension Father      Coronary Artery Disease Father      Hypertension Mother      Genitourinary Problems Mother         renal failure     Fractures Mother         hip     Osteoporosis Mother      Cerebrovascular Disease Maternal Grandfather         cerebral hemorrhage     Diabetes Maternal Uncle      Colon Cancer Maternal Aunt      Diabetes Maternal Grandmother        SOCIAL HISTORY:  Social History     Socioeconomic History     Marital status:      Spouse name: Kwadwo     Number of children: 3     Years of education: None     Highest education level: None   Occupational History     Occupation: PT Aide     Employer: NONE      Employer: RETIRED   Tobacco Use     Smoking status: Never Smoker     Smokeless tobacco: Never Used   Substance and Sexual Activity     Alcohol use: No     Alcohol/week: 0.0 standard drinks     Drug use: No     Sexual activity: Never     Partners: Male     Birth control/protection: Post-menopausal   Other Topics Concern      Service Not Asked     Blood Transfusions Not Asked     Caffeine Concern Yes     Comment: 6-7 cups caffeine per day     Occupational Exposure Not Asked     Hobby Hazards Not Asked     Sleep Concern No      "Stress Concern Yes     Weight Concern No     Special Diet No     Comment: eats healthy      Back Care Not Asked     Exercise Yes     Comment:  walking & active life style      Bike Helmet Not Asked     Seat Belt Not Asked     Self-Exams Not Asked     Parent/sibling w/ CABG, MI or angioplasty before 65F 55M? No   Social History Narrative     None     Social Determinants of Health     Financial Resource Strain: Not on file   Food Insecurity: Not on file   Transportation Needs: Not on file   Physical Activity: Not on file   Stress: Not on file   Social Connections: Not on file   Intimate Partner Violence: Not on file   Housing Stability: Not on file       Review of Systems:  Skin:  Negative     Eyes:  Positive for glasses  ENT:  Negative    Respiratory:  Positive for dyspnea on exertion  Cardiovascular:  Negative    Gastroenterology: Negative    Genitourinary:  Negative    Musculoskeletal:  Positive for back pain  Neurologic:  Negative    Psychiatric:  Positive for sleep disturbances  Heme/Lymph/Imm:  Positive for allergies  Endocrine:  Positive for diabetes    Physical Exam:  Vitals: /67   Pulse 69   Ht 1.727 m (5' 7.99\")   Wt 78 kg (172 lb)   BMI 26.16 kg/m     Please refer to dictation for physical exam    Recent Lab Results: all reviewed today  CBC RESULTS:  Lab Results   Component Value Date    WBC 6.8 01/26/2021    WBC 3.9 (L) 07/19/2020    RBC 2.99 (L) 01/26/2021    RBC 2.60 (L) 07/19/2020    HGB 9.2 (L) 01/26/2021    HGB 12.1 09/15/2020    HCT 28.5 (L) 01/26/2021    HCT 23.8 (L) 07/19/2020    MCV 95 01/26/2021    MCV 92 07/19/2020    MCH 30.8 01/26/2021    MCH 29.6 07/19/2020    MCHC 32.3 01/26/2021    MCHC 32.4 07/19/2020    RDW 12.6 01/26/2021    RDW 12.1 07/19/2020     01/26/2021     07/19/2020       BMP RESULTS:  Lab Results   Component Value Date     (L) 01/24/2021     07/19/2020    POTASSIUM 4.1 01/24/2021    POTASSIUM 3.7 07/19/2020    CHLORIDE 105 01/24/2021    " CHLORIDE 107 2020    CO2 19 (L) 2021    CO2 27 2020    ANIONGAP 9 2021    ANIONGAP 2 (L) 2020     (H) 2021     (H) 2021     (H) 2020    BUN 18 2021    BUN 14 2020    CR 0.87 2021    CR 0.71 2020    GFRESTIMATED >60 2021    GFRESTIMATED 84 2020    GFRESTBLACK >60 2021    GFRESTBLACK >90 2020    LUCIUS 8.7 2021    LUCIUS 8.5 2020        INR RESULTS:  Lab Results   Component Value Date    INR 0.88 2019    INR 0.94 2017           CC  Feroz Burgos MD  [address]  [place] [address]          Service Date: 2021    PRIMARY CARDIOLOGIST:  Dr. Burgos.    REASON FOR VISIT:  Hypertension, coronary artery disease followup.    HISTORY OF PRESENT ILLNESS:  Ms. Longo is a 76-year-old woman with past medical history significant for the followin.  Coronary artery disease with stenting to the RCA in .  She redeveloped symptoms in the spring of 2016, had abnormal stress test, was found to have a complex trifurcation lesion of the OM2 and distal LAD.  This was initially managed medically due to the complexity of the intervention, but she ended up with [facility] and had trifurcation stents to the superior and main branch of her OM and dilatation of the inferior branch.  She had an LAD stent placed at that time as well.  Last angiogram was in 2019 showing an apical 80% LAD lesion that was stable with patent trifurcation stents and minimal disease in the RCA and left main.  Last stress test, which I reviewed today, was done in 2021 and was normal.  2.  Dyslipidemia, on Repatha.  Intolerant of statins.  3.  Hypertension.  4.  Type 2 diabetes, now on insulin.  5.  Type I Brugada pattern, probably brought by fevers and possible Lamictal use (please refer to Dr. Nation consult 2016).  6.  Multiple orthopedic issues with spinal fusion, hip replacements and multiple surgeries  with severe chronic pain.    She comes in today and she is primarily still limited by back and hip pain.  She has undergone multiple procedures for this and has been unsuccessful in relieving the pain.  The next chance is a possible spinal cord stimulator.  She has not had much chest discomfort or shortness of breath, but she is primarily limited exertionally-wise by her back pain.  Fortunately, her heart did well through all of her surgeries without complications, and in spite of her chronic pain her blood pressure has been well managed.  She denies orthopnea or PND, no syncope or presyncope.    SOCIAL HISTORY:  Lifelong nonsmoker.  No alcohol use.  She has 7 children and multiple grandchildren, with the youngest being a 3-year-old granddaughter.    PHYSICAL EXAMINATION:    GENERAL:  Well-developed, well-nourished, talkative woman in no acute distress.  HEENT:  Normocephalic, atraumatic.  HEART:  Regular in rate and rhythm.  I do not appreciate murmur, rub or gallop.  LUNGS:  Clear, without wheezes, rales or rhonchi.  NECK:  Carotids are quiet.  EXTREMITIES:  Without peripheral edema.  2+ dorsalis pedis and posterior tibialis pulses bilaterally.    ASSESSMENT AND PLAN:    1.  Coronary artery disease with a stress test in 01/2021 showing no significant ischemia, without recurrent chest pain.  Fortunately, she has made it through 3 major surgeries without difficulty and is on appropriate medications.  2.  Dyslipidemia, on Repatha with outstanding results.  Last LDL 41, HDL 71, total cholesterol 127.  She is due for repeat lipid panel.  She believes she may have had things done at her endocrinologist and will get those sent to us.  If not, she should have a repeat lipid panel with ALT.  3.  Hypertension, excellent control.  Needs repeat BMP being on higher dose of lisinopril, also will get from her endocrinologist.  4.  Type 2 diabetes with adverse effects to Jardiance of severe yeast infections, now on insulin for  control.  5.  Multiple orthopedic complaints and chronic pain.  I am pleased that her blood pressure is controlled in spite of all of this.    Thank you for allowing me to participate in this delightful patient's care.  We will see her back in 6 months with Dr. Burgos, sooner with concerns.    Karen Alexander PA-C        D: 2021   T: 2021   MT:     Name:     KATHY PERRY  MRN:      [id number]        Account:      [id number]   :      1945           Service Date: 2021       Document: [id number]      Thank you for allowing me to participate in the care of your patient.      Sincerely,     Karen Alexander PA-C     [facility]  cc:   Feroz Burgos MD  [address]  [place] [address]

## 2021-11-08 NOTE — PROGRESS NOTES
[id number]      HPI and Plan:   See dictation    Orders this Visit:  No orders of the defined types were placed in this encounter.    Orders Placed This Encounter   Medications     insulin lispro (HUMALOG KWIKPEN) 100 UNIT/ML (1 unit dial) KWIKPEN     Sig: Inject Subcutaneous 3 times daily (before meals)     insulin NPH (HUMULIN N VIAL) 100 UNIT/ML vial     Sig: Inject Subcutaneous daily     Medications Discontinued During This Encounter   Medication Reason     nebivolol (BYSTOLIC) 5 MG tablet          Encounter Diagnosis   Name Primary?     Coronary artery disease involving native coronary artery of native heart without angina pectoris        CURRENT MEDICATIONS:  Current Outpatient Medications   Medication Sig Dispense Refill     ascorbic acid 1000 MG TABS tablet Take 1,000 mg by mouth       aspirin 81 MG EC tablet Take 81 mg by mouth daily       CINNAMON PO Take 2 capsules by mouth 2 times daily       cyanocobalamin (CYANOCOBALAMIN) 1000 MCG/ML injection Inject 2 mLs into the muscle every 30 days       DULoxetine (CYMBALTA) 60 MG capsule        evolocumab (REPATHA SURECLICK) 140 MG/ML prefilled autoinjector Inject 1 mL (140 mg) Subcutaneous every 14 days 6 mL 3     GLIMEPIRIDE 4 MG OR TABS Take 4 mg by mouth daily        HYDROcodone-acetaminophen (NORCO)  MG per tablet 1 tablet twice daily as needed for back and leg pain. Max acetaminophen 4000mg in 24 hours.       insulin lispro (HUMALOG KWIKPEN) 100 UNIT/ML (1 unit dial) KWIKPEN Inject Subcutaneous 3 times daily (before meals)       insulin NPH (HUMULIN N VIAL) 100 UNIT/ML vial Inject Subcutaneous daily       levothyroxine (SYNTHROID/LEVOTHROID) 125 MCG tablet Take 125 mcg by mouth daily before breakfast       liraglutide (VICTOZA PEN) 18 MG/3ML solution Inject 1.2 mg Subcutaneous daily 3 mL 0     lisinopril (ZESTRIL) 20 MG tablet Take 1 tablet (20 mg) by mouth daily 90 tablet 3     mirabegron (MYRBETRIQ) 50 MG 24 hr tablet Take 1 tablet (50 mg) by mouth  daily Patient not taking daily (Patient taking differently: Take 50 mg by mouth daily ) 90 tablet 3     oxyCODONE (ROXICODONE) 5 MG tablet Take 1-2 tablets (5-10 mg) by mouth every 3 hours as needed for severe pain 30 tablet 0     vitamin D3 (CHOLECALCIFEROL) 2000 units tablet Take 1 tablet by mouth 2 times daily       amoxicillin (AMOXIL) 500 MG capsule 2,000 mg once as needed (Before dental procedures)  (Patient not taking: Reported on 8/12/2021)       Aspirin-Calcium Carbonate  MG TABS 1 tab po QD (Once per day) (Patient not taking: Reported on 8/12/2021)       buprenorphine (BUTRANS) 10 MCG/HR WK patch From the pain clinic (Patient not taking: Reported on 8/12/2021)       Continuous Blood Gluc Sensor (FREESTYLE PERIR 2 SENSOR) MISC        Continuous Blood Gluc Sensor (FREESTYLE PERRI 2 SENSOR) MISC        diclofenac (VOLTAREN) 1 % topical gel Place 4 g onto the skin 4 times daily (Patient not taking: Reported on 8/12/2021) 100 g 0     esomeprazole (NEXIUM) 40 MG DR capsule Take 40 mg by mouth (Patient not taking: Reported on 8/12/2021)       estradiol (ESTRACE) 0.1 MG/GM vaginal cream Use pea sized amount and apply with finger to vaginal skin just inside opening once a day before bed as needed for vaginal dryness. 42.5 g 3     ferrous fumarate 65 mg, [place]. FE,-Vitamin C 125 mg (VITRON C)  MG TABS tablet Take 1 tablet by mouth daily (Patient not taking: Reported on 8/12/2021)       ferrous sulfate (FEROSUL) 325 (65 Fe) MG tablet Take 325 mg by mouth (Patient not taking: Reported on 8/12/2021)       melatonin 3 MG tablet Take 2 tablets (6 mg) by mouth nightly as needed for sleep (Patient not taking: Reported on 8/12/2021) 30 tablet 0     oxyCODONE-acetaminophen (PERCOCET) 5-325 MG tablet  (Patient not taking: Reported on 11/8/2021)       pregabalin (LYRICA) 25 MG capsule  (Patient not taking: Reported on 11/8/2021)       traMADol (ULTRAM) 50 MG tablet Take 1 tablet (50 mg) by mouth every 6 hours as  needed for moderate to severe pain (Patient not taking: Reported on 8/12/2021) 30 tablet 3     TURMERIC PO Take 500 mg by mouth 2 times daily  (Patient not taking: Reported on 11/8/2021)         ALLERGIES     Allergies   Allergen Reactions     Jardiance [Empagliflozin] Other (See Comments)     Ongoing yeast infections     Hmg-Coa-R Inhibitors Muscle Pain (Myalgia)     Oral statins         PAST MEDICAL HISTORY:  Past Medical History:   Diagnosis Date     Anemia      Anxiety      Arthritis      CAD (coronary artery disease)      Chronic bilateral low back pain without sciatica      Coronary artery disease 04/28/2016    cardiac cath 2/2019: patent stents; PTCA with MAYA x 2 to OM1 and MAYA x 1 to p.LAD (4/21/2016 at [place]); PTCA with intracoronary stent placement of RCA June 2004; MAYA to OM1 11/2016     Cystocele, midline 09/29/2010     Depression      Depression      DM type 2 (diabetes mellitus, type 2) (H)      HYPERPLASTIC  POLYP      colonoscopy in 5-10 yrs.     Hypertension      Hypothyroid      Hypothyroidism     hypothyroid- Dr Majano     Motion sickness      Mumps      OAB (overactive bladder)     complex voiding dysfct, failed interstim     Osteoarthritis      Other and unspecified hyperlipidemia      Palpitations      PONV (postoperative nausea and vomiting)      Postmenopausal bleeding      Shoulder blade pain 5/31/2016     Type II or unspecified type diabetes mellitus without mention of complication, not stated as uncontrolled     Dr. Majano     Unspecified essential hypertension      Urinary frequency 9/29/10    followed by urology. no benefit from detrol or sanctura. month trial of macrobid and flomax 10/10       PAST SURGICAL HISTORY:  Past Surgical History:   Procedure Laterality Date     ------------OTHER-------------      Interstim     Ablation       ARTHROPLASTY HIP Left 7/15/2020    Procedure: Left total hip arthroplasty;  Surgeon: Jayson Victoria MD;  Location: [facility] OR     BACK SURGERY  03/05/2020     spinal fusion     BACK SURGERY       C CT ANGIOGRAPHY CORONARY  1/2005    mod soft plaque LAD and Cx, follow up with left heart cath     C LIGATE FALLOPIAN TUBE      endometrial ablation     CARDIAC SURGERY       CHOLECYSTECTOMY       CHOLECYSTECTOMY       COLONOSCOPY       CORONARY STENT PLACEMENT  2004     x 5 stents      CV HEART CATHETERIZATION WITH POSSIBLE INTERVENTION N/A 2/14/2019    Procedure: Coronary Angiogram;  Surgeon: Sudarshan Lee MD;  Location:  HEART CARDIAC CATH LAB; patent stents     CYSTOSCOPY       EXCISE LESION UPPER EXTREMITY  6/5/2013    Procedure: EXCISE LESION UPPER EXTREMITY;  EXCISION RIGHT ARM ANTICUBITAL LIPOMA ;  Surgeon: Jayson Joe MD;  Location: [facility] REMOVAL OF TONSILS,12+ Y/O       HEART CATH LEFT HEART CATH  3/2/2016    No intervention - aggressive medical management     HEART CATH LEFT HEART CATH  4/21/2016     MAYA x 2 to OM1, MAYA to LAD, at [place]     HEART CATH LEFT HEART CATH  6/29/2004    MAYA to RCA     HEART CATH LEFT HEART CATH  3/28/2002    Mild to moderate 3 vessel CAD. Medical management recommended.      HEART CATH LEFT HEART CATH  11/22/2016    MAYA to OM1     hypothyroid       JOINT REPLACEMENT       KNEE SURGERY  4/20/10    right knee replacement     LUMBAR FUSION N/A 3/5/2020    Procedure: BILATERAL LUMBAR 2-3 AND LUMBAR 3-4 POSTERIOR SPINAL FUSION WITH LEFT LATERAL RECESS DECOMPRESSION LUMBAR 4-5 AND BILATERAL LAMINECTOMY AND DECOMPRESSION LUMBAR 2-3 AND LUMBAR 3-4 WITH ALLOGRAFT AND AUTOGRAFT AND VIVIGEN;  Surgeon: Navid Caicedo MD;  Location: [facility];  Service: Spine     LUMBAR FUSION Bilateral 1/22/2021    Procedure: REVISION OF NONUNION BILATERAL LUMBAR 2-3 AND LUMBAR 3-4 WITH REVISION OF INSTRUMENTATION RIGHT LUMBAR 2 AND LEFT LUMBAR 4 WITH LEFT LUMBAR 4-5 LAMINOFORAMINOTOMY AND LUMBAR 1-2 DECOMPRESSION WITH ALLOGRAFT AND BONE MARROW ASPIRATE;  Surgeon: Navid Caicedo MD;  Location: [facility]  OR;  Service: Spine     ORTHOPEDIC SURGERY      total knee      REMOVE STIMULATOR SACRAL NERVE N/A 2015    Procedure: REMOVE STIMULATOR SACRAL NERVE;  Surgeon: Gertrudis Frausto MD;  Location:  [facility]     REPLACEMENT TOTAL KNEE Right 2010     SURGICAL HISTORY OF -       Uterine endometrial ablation       FAMILY HISTORY:  Family History   Problem Relation Age of Onset     C.A.D. Father         MI , age 83, had high lipids     Hyperlipidemia Father      Hypertension Father      Coronary Artery Disease Father      Hypertension Mother      Genitourinary Problems Mother         renal failure     Fractures Mother         hip     Osteoporosis Mother      Cerebrovascular Disease Maternal Grandfather         cerebral hemorrhage     Diabetes Maternal Uncle      Colon Cancer Maternal Aunt      Diabetes Maternal Grandmother        SOCIAL HISTORY:  Social History     Socioeconomic History     Marital status:      Spouse name: Kwadwo     Number of children: 3     Years of education: None     Highest education level: None   Occupational History     Occupation: PT Aide     Employer: NONE      Employer: RETIRED   Tobacco Use     Smoking status: Never Smoker     Smokeless tobacco: Never Used   Substance and Sexual Activity     Alcohol use: No     Alcohol/week: 0.0 standard drinks     Drug use: No     Sexual activity: Never     Partners: Male     Birth control/protection: Post-menopausal   Other Topics Concern      Service Not Asked     Blood Transfusions Not Asked     Caffeine Concern Yes     Comment: 6-7 cups caffeine per day     Occupational Exposure Not Asked     Hobby Hazards Not Asked     Sleep Concern No     Stress Concern Yes     Weight Concern No     Special Diet No     Comment: eats healthy      Back Care Not Asked     Exercise Yes     Comment:  walking & active life style      Bike Helmet Not Asked     Seat Belt Not Asked     Self-Exams Not Asked     Parent/sibling w/ CABG, MI or  "angioplasty before 65F 55M? No   Social History Narrative     None     Social Determinants of Health     Financial Resource Strain: Not on file   Food Insecurity: Not on file   Transportation Needs: Not on file   Physical Activity: Not on file   Stress: Not on file   Social Connections: Not on file   Intimate Partner Violence: Not on file   Housing Stability: Not on file       Review of Systems:  Skin:  Negative     Eyes:  Positive for glasses  ENT:  Negative    Respiratory:  Positive for dyspnea on exertion  Cardiovascular:  Negative    Gastroenterology: Negative    Genitourinary:  Negative    Musculoskeletal:  Positive for back pain  Neurologic:  Negative    Psychiatric:  Positive for sleep disturbances  Heme/Lymph/Imm:  Positive for allergies  Endocrine:  Positive for diabetes    Physical Exam:  Vitals: /67   Pulse 69   Ht 1.727 m (5' 7.99\")   Wt 78 kg (172 lb)   BMI 26.16 kg/m     Please refer to dictation for physical exam    Recent Lab Results: all reviewed today  CBC RESULTS:  Lab Results   Component Value Date    WBC 6.8 01/26/2021    WBC 3.9 (L) 07/19/2020    RBC 2.99 (L) 01/26/2021    RBC 2.60 (L) 07/19/2020    HGB 9.2 (L) 01/26/2021    HGB 12.1 09/15/2020    HCT 28.5 (L) 01/26/2021    HCT 23.8 (L) 07/19/2020    MCV 95 01/26/2021    MCV 92 07/19/2020    MCH 30.8 01/26/2021    MCH 29.6 07/19/2020    MCHC 32.3 01/26/2021    MCHC 32.4 07/19/2020    RDW 12.6 01/26/2021    RDW 12.1 07/19/2020     01/26/2021     07/19/2020       BMP RESULTS:  Lab Results   Component Value Date     (L) 01/24/2021     07/19/2020    POTASSIUM 4.1 01/24/2021    POTASSIUM 3.7 07/19/2020    CHLORIDE 105 01/24/2021    CHLORIDE 107 07/19/2020    CO2 19 (L) 01/24/2021    CO2 27 07/19/2020    ANIONGAP 9 01/24/2021    ANIONGAP 2 (L) 07/19/2020     (H) 01/28/2021     (H) 01/24/2021     (H) 07/19/2020    BUN 18 01/24/2021    BUN 14 07/19/2020    CR 0.87 01/24/2021    CR 0.71 07/19/2020 "    GFRESTIMATED >60 01/24/2021    GFRESTIMATED 84 07/19/2020    GFRESTBLACK >60 01/24/2021    GFRESTBLACK >90 07/19/2020    LUCIUS 8.7 01/24/2021    LUCIUS 8.5 07/19/2020        INR RESULTS:  Lab Results   Component Value Date    INR 0.88 02/14/2019    INR 0.94 02/24/2017           CC  Feroz Burgos MD  [id number]  [place] [id number]

## 2021-11-08 NOTE — PATIENT INSTRUCTIONS
Thank you for visiting with me today.    We discussed: your heart did amazing well with surgery- congrats!    Medication changes:  Continue current medications.      Please send us a copy of your most recent labs.      Follow up: with Dr. Burgos in 6 months.         Please call my nurse, Monae, at [phone] with any questions or concerns.    Scheduling phone number: [phone]  Reminder: Please bring in all current medications, over the counter supplements and vitamin bottles to your next appointment.

## 2021-11-08 NOTE — PROGRESS NOTES
Service Date: 2021    PRIMARY CARDIOLOGIST:  Dr. Burgso.    REASON FOR VISIT:  Hypertension, coronary artery disease followup.    HISTORY OF PRESENT ILLNESS:  Ms. Longo is a 76-year-old woman with past medical history significant for the followin.  Coronary artery disease with stenting to the RCA in .  She redeveloped symptoms in the spring of 2016, had abnormal stress test, was found to have a complex trifurcation lesion of the OM2 and distal LAD.  This was initially managed medically due to the complexity of the intervention, but she ended up with [facility] and had trifurcation stents to the superior and main branch of her OM and dilatation of the inferior branch.  She had an LAD stent placed at that time as well.  Last angiogram was in 2019 showing an apical 80% LAD lesion that was stable with patent trifurcation stents and minimal disease in the RCA and left main.  Last stress test, which I reviewed today, was done in 2021 and was normal.  2.  Dyslipidemia, on Repatha.  Intolerant of statins.  3.  Hypertension.  4.  Type 2 diabetes, now on insulin.  5.  Type I Brugada pattern, probably brought by fevers and possible Lamictal use (please refer to Dr. Nation consult 2016).  6.  Multiple orthopedic issues with spinal fusion, hip replacements and multiple surgeries with severe chronic pain.    She comes in today and she is primarily still limited by back and hip pain.  She has undergone multiple procedures for this and has been unsuccessful in relieving the pain.  The next chance is a possible spinal cord stimulator.  She has not had much chest discomfort or shortness of breath, but she is primarily limited exertionally-wise by her back pain.  Fortunately, her heart did well through all of her surgeries without complications, and in spite of her chronic pain her blood pressure has been well managed.  She denies orthopnea or PND, no syncope or presyncope.    SOCIAL HISTORY:  Lifelong nonsmoker.   No alcohol use.  She has 7 children and multiple grandchildren, with the youngest being a 3-year-old granddaughter.    PHYSICAL EXAMINATION:    GENERAL:  Well-developed, well-nourished, talkative woman in no acute distress.  HEENT:  Normocephalic, atraumatic.  HEART:  Regular in rate and rhythm.  I do not appreciate murmur, rub or gallop.  LUNGS:  Clear, without wheezes, rales or rhonchi.  NECK:  Carotids are quiet.  EXTREMITIES:  Without peripheral edema.  2+ dorsalis pedis and posterior tibialis pulses bilaterally.    ASSESSMENT AND PLAN:    1.  Coronary artery disease with a stress test in 2021 showing no significant ischemia, without recurrent chest pain.  Fortunately, she has made it through 3 major surgeries without difficulty and is on appropriate medications.  2.  Dyslipidemia, on Repatha with outstanding results.  Last LDL 41, HDL 71, total cholesterol 127.  She is due for repeat lipid panel.  She believes she may have had things done at her endocrinologist and will get those sent to us.  If not, she should have a repeat lipid panel with ALT.  3.  Hypertension, excellent control.  Needs repeat BMP being on higher dose of lisinopril, also will get from her endocrinologist.  4.  Type 2 diabetes with adverse effects to Jardiance of severe yeast infections, now on insulin for control.  5.  Multiple orthopedic complaints and chronic pain.  I am pleased that her blood pressure is controlled in spite of all of this.    Thank you for allowing me to participate in this delightful patient's care.  We will see her back in 6 months with Dr. Burgos, sooner with concerns.    Karen Alexander PA-C        D: 2021   T: 2021   MT: hoa    Name:     KATHY PERRY  MRN:      [id number]        Account:      [id number]   :      1945           Service Date: 2021       Document: [id number]

## 2021-11-15 ENCOUNTER — CARE COORDINATION (OUTPATIENT)
Dept: CARDIOLOGY | Facility: CLINIC | Age: 76
End: 2021-11-15
Payer: COMMERCIAL

## 2021-11-15 DIAGNOSIS — I25.10 CORONARY ARTERY DISEASE INVOLVING NATIVE CORONARY ARTERY OF NATIVE HEART WITHOUT ANGINA PECTORIS: ICD-10-CM

## 2021-11-15 DIAGNOSIS — E78.5 HYPERLIPIDEMIA LDL GOAL <100: Primary | ICD-10-CM

## 2021-11-15 NOTE — PROGRESS NOTES
Received labs from [facility]. No FLP lab was done. Pt had 11/8/21 visit with Karen Catherine and it was recommended she have an FLP/alt lab if it was note done at her endocrine visit. I left message for pt with instructions for her to call scheduling to set up FLP lab. Labs sent to be scanned into [facility] Patrick STOVER November 15, 2021, 12:02 PM

## 2021-11-17 NOTE — PROGRESS NOTES
Patient called JOLANTA Balbuena, back and left  stating she has tried to make her lab appt but has been on hold for lengthy periods and hasn't gotten a person to answer her call. Rayna said we could schedule her tomorrow morning in our [facility] clinic and call her with an appointment. I scheduled Rayna for an appt tomorrow at 8:45 AM in [facility] and called patient. Rayna is able to make this appt time. I provided the clinic location for her, and it is also viewable in [email or web address]. Cherelle Gaston RN on 11/17/2021 at 3:18 PM

## 2021-11-18 ENCOUNTER — LAB (OUTPATIENT)
Dept: LAB | Facility: CLINIC | Age: 76
End: 2021-11-18
Payer: COMMERCIAL

## 2021-11-18 DIAGNOSIS — E78.5 HYPERLIPIDEMIA LDL GOAL <100: ICD-10-CM

## 2021-11-18 LAB
ALT SERPL W P-5'-P-CCNC: 41 U/L (ref 0–50)
CHOLEST SERPL-MCNC: 143 MG/DL
FASTING STATUS PATIENT QL REPORTED: YES
HDLC SERPL-MCNC: 74 MG/DL
LDLC SERPL CALC-MCNC: 56 MG/DL
NONHDLC SERPL-MCNC: 69 MG/DL
TRIGL SERPL-MCNC: 67 MG/DL

## 2021-11-18 PROCEDURE — 80061 LIPID PANEL: CPT | Performed by: PHYSICIAN ASSISTANT

## 2021-11-18 PROCEDURE — 36415 COLL VENOUS BLD VENIPUNCTURE: CPT | Performed by: PHYSICIAN ASSISTANT

## 2021-11-18 PROCEDURE — 84460 ALANINE AMINO (ALT) (SGPT): CPT | Performed by: PHYSICIAN ASSISTANT

## 2022-01-24 ENCOUNTER — TELEPHONE (OUTPATIENT)
Dept: CARDIOLOGY | Facility: CLINIC | Age: 77
End: 2022-01-24
Payer: COMMERCIAL

## 2022-01-24 DIAGNOSIS — E78.5 HYPERLIPIDEMIA LDL GOAL <100: ICD-10-CM

## 2022-01-24 RX ORDER — EVOLOCUMAB 140 MG/ML
140 INJECTION, SOLUTION SUBCUTANEOUS
Qty: 6 ML | Refills: 3 | Status: SHIPPED | OUTPATIENT
Start: 2022-01-24 | End: 2023-10-25

## 2022-02-17 ENCOUNTER — APPOINTMENT (OUTPATIENT)
Dept: URBAN - METROPOLITAN AREA CLINIC 256 | Age: 77
Setting detail: DERMATOLOGY
End: 2022-02-17

## 2022-02-17 VITALS — WEIGHT: 167 LBS | HEIGHT: 68 IN | RESPIRATION RATE: 16 BRPM

## 2022-02-17 DIAGNOSIS — L90.5 SCAR CONDITIONS AND FIBROSIS OF SKIN: ICD-10-CM

## 2022-02-17 DIAGNOSIS — D22 MELANOCYTIC NEVI: ICD-10-CM

## 2022-02-17 PROBLEM — D22.5 MELANOCYTIC NEVI OF TRUNK: Status: ACTIVE | Noted: 2022-02-17

## 2022-02-17 PROBLEM — D23.39 OTHER BENIGN NEOPLASM OF SKIN OF OTHER PARTS OF FACE: Status: ACTIVE | Noted: 2022-02-17

## 2022-02-17 PROCEDURE — OTHER COUNSELING: OTHER

## 2022-02-17 PROCEDURE — OTHER MIPS QUALITY: OTHER

## 2022-02-17 PROCEDURE — 99213 OFFICE O/P EST LOW 20 MIN: CPT

## 2022-02-17 ASSESSMENT — LOCATION DETAILED DESCRIPTION DERM
LOCATION DETAILED: RIGHT MID-UPPER BACK
LOCATION DETAILED: SUPERIOR LUMBAR SPINE

## 2022-02-17 ASSESSMENT — LOCATION SIMPLE DESCRIPTION DERM
LOCATION SIMPLE: LOWER BACK
LOCATION SIMPLE: RIGHT UPPER BACK

## 2022-02-17 ASSESSMENT — LOCATION ZONE DERM: LOCATION ZONE: TRUNK

## 2022-02-19 ENCOUNTER — HEALTH MAINTENANCE LETTER (OUTPATIENT)
Age: 77
End: 2022-02-19

## 2022-03-11 ENCOUNTER — CARE COORDINATION (OUTPATIENT)
Dept: CARDIOLOGY | Facility: CLINIC | Age: 77
End: 2022-03-11
Payer: COMMERCIAL

## 2022-03-11 NOTE — TELEPHONE ENCOUNTER
Central Prior Authorization Team   Phone: [phone]    PA Initiation    Medication: Repatha SureClick 140MG/ML auto-injectors    Insurance Company: [employer] - Phone [phone] Fax [phone]  Pharmacy Filling the Rx: [facility] - [place], [place] - [facility] [address]  Filling Pharmacy Phone: [phone]  Filling Pharmacy Fax:    Start Date: 3/11/2022

## 2022-03-11 NOTE — PROGRESS NOTES
3/11/2022 FAX FROM [facility] - patient needs prior authorization for repatha 140 mg SURECLICK.  Sent to PA team.  Monae Briseno RN 03/11/22 1:18 PM

## 2022-03-14 NOTE — TELEPHONE ENCOUNTER
Prior Authorization Approval    Authorization Effective Date: 2/12/2022  Authorization Expiration Date: 3/13/2025  Medication: Repatha SureClick 140MG/ML auto-injectors  Approved Dose/Quantity:   Reference #:     Insurance Company: [employer] - Phone [phone] Fax [phone]  Expected CoPay:       CoPay Card Available:      Foundation Assistance Needed:    Which Pharmacy is filling the prescription (Not needed for infusion/clinic administered): [facility] - [place] [address] [address]  Pharmacy Notified: Yes  Patient Notified: Yes

## 2022-03-15 ENCOUNTER — DOCUMENTATION ONLY (OUTPATIENT)
Dept: CARDIOLOGY | Facility: CLINIC | Age: 77
End: 2022-03-15
Payer: COMMERCIAL

## 2022-03-15 NOTE — PROGRESS NOTES
"Patient scheduled 3/15/22 with Karen More for \"annual follow up\".   Patient is not due for annual follow up until May 2022.  Called and spoke to Rayna who says she has been having symptoms of fatigue and shortness of breath and requested a visit with Karen as Dr. Burgos was not available for months.  Will update chart prep.  "

## 2022-03-16 ENCOUNTER — OFFICE VISIT (OUTPATIENT)
Dept: CARDIOLOGY | Facility: CLINIC | Age: 77
End: 2022-03-16
Payer: COMMERCIAL

## 2022-03-16 VITALS
HEART RATE: 79 BPM | DIASTOLIC BLOOD PRESSURE: 67 MMHG | WEIGHT: 177 LBS | BODY MASS INDEX: 26.83 KG/M2 | OXYGEN SATURATION: 99 % | HEIGHT: 68 IN | SYSTOLIC BLOOD PRESSURE: 120 MMHG

## 2022-03-16 DIAGNOSIS — R06.02 SHORTNESS OF BREATH: Primary | ICD-10-CM

## 2022-03-16 DIAGNOSIS — R53.83 OTHER FATIGUE: ICD-10-CM

## 2022-03-16 PROCEDURE — 93000 ELECTROCARDIOGRAM COMPLETE: CPT | Performed by: PHYSICIAN ASSISTANT

## 2022-03-16 PROCEDURE — 99214 OFFICE O/P EST MOD 30 MIN: CPT | Performed by: PHYSICIAN ASSISTANT

## 2022-03-16 RX ORDER — FLUCONAZOLE 150 MG/1
TABLET ORAL
COMMUNITY
Start: 2022-02-11

## 2022-03-16 RX ORDER — BUSPIRONE HYDROCHLORIDE 10 MG/1
1 TABLET ORAL EVERY 12 HOURS
Status: ON HOLD | COMMUNITY
Start: 2022-03-01 | End: 2022-08-31

## 2022-03-16 NOTE — LETTER
3/16/2022       RE: Sarah Longo  [address] [place] [id number]     Dear Colleague,    Thank you for referring your patient, Sarah Longo, to the [facility] at [facility]. Please see a copy of my visit note below.    [id number]      HPI and Plan:   See dictation    Orders this Visit:  Orders Placed This Encounter   Procedures     EKG 12-lead complete w/read - Clinics (performed today)     Echocardiogram Complete     Orders Placed This Encounter   Medications     busPIRone (BUSPAR) 10 MG tablet     Sig: Take 1 tablet by mouth every 12 hours     fluconazole (DIFLUCAN) 150 MG tablet     There are no discontinued medications.      Encounter Diagnoses   Name Primary?     Shortness of breath Yes     Other fatigue        CURRENT MEDICATIONS:  Current Outpatient Medications   Medication Sig Dispense Refill     ascorbic acid 1000 MG TABS tablet Take 1,000 mg by mouth       aspirin 81 MG EC tablet Take 81 mg by mouth daily       busPIRone (BUSPAR) 10 MG tablet Take 1 tablet by mouth every 12 hours       CINNAMON PO Take 2 capsules by mouth 2 times daily       Continuous Blood Gluc Sensor (FREESTYLE PERRI 2 SENSOR) MISC        Continuous Blood Gluc Sensor (FREESTYLE PERRI 2 SENSOR) MISC        cyanocobalamin (CYANOCOBALAMIN) 1000 MCG/ML injection Inject 2 mLs into the muscle every 30 days       DULoxetine (CYMBALTA) 60 MG capsule        estradiol (ESTRACE) 0.1 MG/GM vaginal cream Use pea sized amount and apply with finger to vaginal skin just inside opening once a day before bed as needed for vaginal dryness. 42.5 g 3     evolocumab (REPATHA SURECLICK) 140 MG/ML prefilled autoinjector Inject 1 mL (140 mg) Subcutaneous every 14 days 6 mL 3     fluconazole (DIFLUCAN) 150 MG tablet        GLIMEPIRIDE 4 MG OR TABS Take 4 mg by mouth daily        HYDROcodone-acetaminophen (NORCO)  MG per tablet 1 tablet twice daily as needed for  back and leg pain. Max acetaminophen 4000mg in 24 hours.       insulin lispro (HUMALOG KWIKPEN) 100 UNIT/ML (1 unit dial) KWIKPEN Inject Subcutaneous 3 times daily (before meals)       levothyroxine (SYNTHROID/LEVOTHROID) 125 MCG tablet Take 125 mcg by mouth daily before breakfast       liraglutide (VICTOZA PEN) 18 MG/3ML solution Inject 1.2 mg Subcutaneous daily 3 mL 0     lisinopril (ZESTRIL) 20 MG tablet Take 1 tablet (20 mg) by mouth daily 90 tablet 3     oxyCODONE (ROXICODONE) 5 MG tablet Take 1-2 tablets (5-10 mg) by mouth every 3 hours as needed for severe pain 30 tablet 0     vitamin D3 (CHOLECALCIFEROL) 2000 units tablet Take 1 tablet by mouth 2 times daily       amoxicillin (AMOXIL) 500 MG capsule 2,000 mg once as needed (Before dental procedures)  (Patient not taking: Reported on 8/12/2021)       Aspirin-Calcium Carbonate  MG TABS 1 tab po QD (Once per day) (Patient not taking: Reported on 8/12/2021)       buprenorphine (BUTRANS) 10 MCG/HR WK patch From the pain clinic (Patient not taking: Reported on 8/12/2021)       diclofenac (VOLTAREN) 1 % topical gel Place 4 g onto the skin 4 times daily (Patient not taking: Reported on 8/12/2021) 100 g 0     esomeprazole (NEXIUM) 40 MG DR capsule Take 40 mg by mouth (Patient not taking: Reported on 8/12/2021)       ferrous fumarate 65 mg, [place]. FE,-Vitamin C 125 mg (VITRON C)  MG TABS tablet Take 1 tablet by mouth daily (Patient not taking: Reported on 8/12/2021)       ferrous sulfate (FEROSUL) 325 (65 Fe) MG tablet Take 325 mg by mouth (Patient not taking: Reported on 8/12/2021)       insulin NPH (HUMULIN N VIAL) 100 UNIT/ML vial Inject Subcutaneous daily (Patient not taking: Reported on 3/16/2022)       melatonin 3 MG tablet Take 2 tablets (6 mg) by mouth nightly as needed for sleep (Patient not taking: Reported on 8/12/2021) 30 tablet 0     mirabegron (MYRBETRIQ) 50 MG 24 hr tablet Take 1 tablet (50 mg) by mouth daily Patient not taking daily (Patient  not taking: Reported on 3/16/2022) 90 tablet 3     oxyCODONE-acetaminophen (PERCOCET) 5-325 MG tablet  (Patient not taking: Reported on 11/8/2021)       pregabalin (LYRICA) 25 MG capsule  (Patient not taking: Reported on 11/8/2021)       traMADol (ULTRAM) 50 MG tablet Take 1 tablet (50 mg) by mouth every 6 hours as needed for moderate to severe pain (Patient not taking: Reported on 8/12/2021) 30 tablet 3     TURMERIC PO Take 500 mg by mouth 2 times daily  (Patient not taking: Reported on 11/8/2021)         ALLERGIES     Allergies   Allergen Reactions     Jardiance [Empagliflozin] Other (See Comments)     Ongoing yeast infections     Hmg-Coa-R Inhibitors Muscle Pain (Myalgia)     Oral statins         PAST MEDICAL HISTORY:  Past Medical History:   Diagnosis Date     Anemia      Anxiety      Arthritis      CAD (coronary artery disease)      Chronic bilateral low back pain without sciatica      Coronary artery disease 04/28/2016    cardiac cath 2/2019: patent stents; PTCA with MAYA x 2 to OM1 and MAYA x 1 to p.LAD (4/21/2016 at [place]); PTCA with intracoronary stent placement of RCA June 2004; MAYA to OM1 11/2016     Cystocele, midline 09/29/2010     Depression      Depression      DM type 2 (diabetes mellitus, type 2) (H)      HYPERPLASTIC  POLYP      colonoscopy in 5-10 yrs.     Hypertension      Hypothyroid      Hypothyroidism     hypothyroid- Dr Majano     Motion sickness      Mumps      OAB (overactive bladder)     complex voiding dysfct, failed interstim     Osteoarthritis      Other and unspecified hyperlipidemia      Palpitations      PONV (postoperative nausea and vomiting)      Postmenopausal bleeding      Shoulder blade pain 5/31/2016     Type II or unspecified type diabetes mellitus without mention of complication, not stated as uncontrolled     Dr. Majano     Unspecified essential hypertension      Urinary frequency 9/29/10    followed by urology. no benefit from detrol or sanctura. month trial of macrobid and  flomax 10/10       PAST SURGICAL HISTORY:  Past Surgical History:   Procedure Laterality Date     ------------OTHER-------------      Interstim     Ablation       ARTHROPLASTY HIP Left 7/15/2020    Procedure: Left total hip arthroplasty;  Surgeon: Jayson Victoria MD;  [facility]     BACK SURGERY  03/05/2020    spinal fusion     BACK SURGERY       C CT ANGIOGRAPHY CORONARY  1/2005    mod soft plaque LAD and Cx, follow up with left heart cath     CARDIAC SURGERY       CHOLECYSTECTOMY       CHOLECYSTECTOMY       COLONOSCOPY       CORONARY STENT PLACEMENT  2004     x 5 stents      CV HEART CATHETERIZATION WITH POSSIBLE INTERVENTION N/A 2/14/2019    Procedure: Coronary Angiogram;  Surgeon: Sudarshan Lee MD;  Location:  HEART CARDIAC CATH LAB; patent stents     CYSTOSCOPY       EXCISE LESION UPPER EXTREMITY  6/5/2013    Procedure: EXCISE LESION UPPER EXTREMITY;  EXCISION RIGHT ARM ANTICUBITAL LIPOMA ;  Surgeon: Jayson Joe MD;  Location: [facility] REMOVAL OF TONSILS,13+ Y/O       HEART CATH LEFT HEART CATH  3/2/2016    No intervention - aggressive medical management     HEART CATH LEFT HEART CATH  4/21/2016     MAYA x 2 to OM1, MAYA to LAD, at [place]     HEART CATH LEFT HEART CATH  6/29/2004    MAYA to RCA     HEART CATH LEFT HEART CATH  3/28/2002    Mild to moderate 3 vessel CAD. Medical management recommended.      HEART CATH LEFT HEART CATH  11/22/2016    MAYA to OM1     hypothyroid       JOINT REPLACEMENT       KNEE SURGERY  4/20/10    right knee replacement     LUMBAR FUSION N/A 3/5/2020    Procedure: BILATERAL LUMBAR 2-3 AND LUMBAR 3-4 POSTERIOR SPINAL FUSION WITH LEFT LATERAL RECESS DECOMPRESSION LUMBAR 4-5 AND BILATERAL LAMINECTOMY AND DECOMPRESSION LUMBAR 2-3 AND LUMBAR 3-4 WITH ALLOGRAFT AND AUTOGRAFT AND VIVIGEN;  Surgeon: Navid Caicedo MD;  Location: [facility];  Service: Spine     LUMBAR FUSION Bilateral 1/22/2021    Procedure: REVISION OF NONUNION BILATERAL  LUMBAR 2-3 AND LUMBAR 3-4 WITH REVISION OF INSTRUMENTATION RIGHT LUMBAR 2 AND LEFT LUMBAR 4 WITH LEFT LUMBAR 4-5 LAMINOFORAMINOTOMY AND LUMBAR 1-2 DECOMPRESSION WITH ALLOGRAFT AND BONE MARROW ASPIRATE;  Surgeon: Navid Caicedo MD;  Location: [facility];  Service: Spine     ORTHOPEDIC SURGERY      total knee      REMOVE STIMULATOR SACRAL NERVE N/A 2015    Procedure: REMOVE STIMULATOR SACRAL NERVE;  Surgeon: Gertrudis Frausto MD;  Location:  [facility]     REPLACEMENT TOTAL KNEE Right 2010     SURGICAL HISTORY OF -       Uterine endometrial ablation     ZZC LIGATE FALLOPIAN TUBE      endometrial ablation       FAMILY HISTORY:  Family History   Problem Relation Age of Onset     C.A.D. Father         MI , age 83, had high lipids     Hyperlipidemia Father      Hypertension Father      Coronary Artery Disease Father      Hypertension Mother      Genitourinary Problems Mother         renal failure     Fractures Mother         hip     Osteoporosis Mother      Cerebrovascular Disease Maternal Grandfather         cerebral hemorrhage     Diabetes Maternal Uncle      Colon Cancer Maternal Aunt      Diabetes Maternal Grandmother        SOCIAL HISTORY:  Social History     Socioeconomic History     Marital status:      Spouse name: Kwadwo     Number of children: 3     Years of education: Not on file     Highest education level: Not on file   Occupational History     Occupation: PT Aide     Employer: NONE      Employer: RETIRED   Tobacco Use     Smoking status: Never Smoker     Smokeless tobacco: Never Used   Substance and Sexual Activity     Alcohol use: No     Alcohol/week: 0.0 standard drinks     Drug use: No     Sexual activity: Never     Partners: Male     Birth control/protection: Post-menopausal   Other Topics Concern      Service Not Asked     Blood Transfusions Not Asked     Caffeine Concern Yes     Comment: 6-7 cups caffeine per day     Occupational Exposure Not Asked      "Hobby Hazards Not Asked     Sleep Concern No     Stress Concern Yes     Weight Concern No     Special Diet No     Comment: eats healthy      Back Care Not Asked     Exercise Yes     Comment:  walking & active life style      Bike Helmet Not Asked     Seat Belt Not Asked     Self-Exams Not Asked     Parent/sibling w/ CABG, MI or angioplasty before 65F 55M? No   Social History Narrative     Not on file     Social Determinants of Health     Financial Resource Strain: Not on file   Food Insecurity: Not on file   Transportation Needs: Not on file   Physical Activity: Not on file   Stress: Not on file   Social Connections: Not on file   Intimate Partner Violence: Not on file   Housing Stability: Not on file       Review of Systems:  Skin:  Negative     Eyes:  Positive for glasses  ENT:  Negative    Respiratory:  Positive for dyspnea on exertion;cough  Cardiovascular:  Negative Positive for;palpitations (discomfort)  Gastroenterology: Negative    Genitourinary:  Negative urinary frequency  Musculoskeletal:  Positive for back pain;neck pain  Neurologic:  Negative numbness or tingling of feet  Psychiatric:  Positive for sleep disturbances  Heme/Lymph/Imm:  Positive for allergies  Endocrine:  Positive for diabetes    Physical Exam:  Vitals: /67   Pulse 79   Ht 1.727 m (5' 8\")   Wt 80.3 kg (177 lb)   SpO2 99%   BMI 26.91 kg/m     Please refer to dictation for physical exam    Recent Lab Results: all reviewed today  CBC RESULTS:  Lab Results   Component Value Date    WBC 6.8 01/26/2021    WBC 3.9 (L) 07/19/2020    RBC 2.99 (L) 01/26/2021    RBC 2.60 (L) 07/19/2020    HGB 9.2 (L) 01/26/2021    HGB 12.1 09/15/2020    HCT 28.5 (L) 01/26/2021    HCT 23.8 (L) 07/19/2020    MCV 95 01/26/2021    MCV 92 07/19/2020    MCH 30.8 01/26/2021    MCH 29.6 07/19/2020    MCHC 32.3 01/26/2021    MCHC 32.4 07/19/2020    RDW 12.6 01/26/2021    RDW 12.1 07/19/2020     01/26/2021     07/19/2020       BMP RESULTS:  Lab " Results   Component Value Date     (L) 2021     2020    POTASSIUM 4.1 2021    POTASSIUM 3.7 2020    CHLORIDE 105 2021    CHLORIDE 107 2020    CO2 19 (L) 2021    CO2 27 2020    ANIONGAP 9 2021    ANIONGAP 2 (L) 2020     (H) 2021     (H) 2021     (H) 2020    BUN 18 2021    BUN 14 2020    CR 0.87 2021    CR 0.71 2020    GFRESTIMATED >60 2021    GFRESTIMATED 84 2020    GFRESTBLACK >60 2021    GFRESTBLACK >90 2020    LUCIUS 8.7 2021    LUCIUS 8.5 2020        INR RESULTS:  Lab Results   Component Value Date    INR 0.88 2019    INR 0.94 2017       Service Date: 2022    PRIMARY CARDIOLOGIST:  Feroz Burgos MD    REASON FOR VISIT:  Angina, shortness of breath.    HISTORY OF PRESENT ILLNESS:  Ms. Longo is a delightful 76-year-old woman with past medical history significant for the followin.  Coronary artery disease with stenting to the RCA in 2004.  She redeveloped symptoms in , had abnormal stress test and was found to have a complex trifurcation lesion of the OM and distal LAD.  This was initially managed medically due to the complexity of the intervention.  She was then sent to [place] and had trifurcation stents placed to the superior and main branch of her OM with dilatation of her inferior branch.  She had an LAD stent placed as well.  Her last angiogram was in 2019 showing an 80% apical lesion that was stable with patent trifurcation stents and minimal disease in the RCA and left main.  2.  Dyslipidemia, on Repatha.  Intolerant to statins.  3.  Hypertension.  4.  Type 2 diabetes, on insulin.  5.  Type 1 Brugada pattern, probably brought on by fevers, but possible Lamictal use (refer to Dr. Nation' consult in 2016).  6.  Severe chronic lower back pain with spinal fusion, hyper placements and multiple surgeries.    She comes in  today as she has been concerned with more upper back pain.  Upper back pain has been her anginal equivalent.  This has always been difficult to decipher between her lower back pain.  She is in the process of being considered for spinal cord stimulator.  She had tested one brand and was only lead to about 20% reduction in pain, so that was not enough to be effective.  She has now switched to a different Pain Clinic and is going to be tested hopefully on a [employer] device.  Essentially her back pain continues to be fairly severe.  What she has noticed now though is more upper back pain when she walks and the feeling of a little bit of chest heaviness and shortness of breath with exertion.  This is fairly reliable with reserved exertion and does not typically come on at rest.  She denies orthopnea or PND.  She has not had syncope or presyncope. Her activity is limited by her back pain at this time.    SOCIAL HISTORY:  Lifelong nonsmoker.  No alcohol use.  She has 7 children and multiple grandchildren, youngest being a 3-year-old granddaughter.  She has a granddaughter now who is going to graduate with her Ph.D. at the age of 23.    PHYSICAL EXAMINATION:    GENERAL:  Well-developed, well-nourished, talkative woman in no acute distress.  HEENT:  Normocephalic, atraumatic.  HEART:  Regular in rate and rhythm.  2/6 systolic murmur heard best at the right sternal border.  LUNGS:  Clear without wheezes, rales, or rhonchi.  EXTREMITIES:  Without peripheral edema.  SKIN:  Warm and dry.    EKG today shows sinus rhythm without significant ST changes or Q waves.  Heart rate of 74.    ASSESSMENT AND PLAN:    1.  Upper back pain, which is this patient's anginal equivalent and that seems to have recurred.  Last stress test was over a year ago and was normal.  She has had stents placed as far back as 2004.  Differential includes stenosis of a stent, new lesion, anemia or pulmonary cause versus radiation of her  musculoskeletal back pain.  She recently underwent labs at her primary care provider and had a normal hemoglobin, as well as TSH and BMP.  She has no signs or symptoms of heart failure.  Given her extensive disease, we are going to do a stress test and echocardiogram.  This will be a Lexiscan stress and she will remain on her current medications for it.  We could always consider adding Imdur down the road if need be, but I think first we will get the results of that.  She does understand that if this is abnormal we would send her for a possible coronary angiogram.  We will also get an echocardiogram. Today for the first time I am hearing a quiet murmur at the right sternal border and we will make sure there is no valvular disease.  2.  Dyslipidemia, on Repatha with excellent results.  Last lipid panel done in 2021 shows an LDL of 56, HDL 74, total cholesterol 143.  3.  Hypertension, well controlled.  4.  Type 2 diabetes with adverse effects of Jardiance, including several yeast infections, on insulin.  5.  Chronic back pain.  In the process of being worked up for this.  I am hoping for some relief of her symptoms.    Thank you for allowing me to participate in this patient's care.  She will get in with Dr. Burgos later this summer and we will follow up on her stress test.  Depending on results, we may need to see her sooner.    Karen Alexander PA-C        D: 2022   T: 2022   MT: rei    Name:     KATHY PERRY  MRN:      [id number]        Account:      [id number]   :      1945           Service Date: 2022     Document: [id number]

## 2022-03-16 NOTE — LETTER
3/16/2022    Gaye Zimmer  [facility] [address] [place] [address]    RE: Sarah Longo       Dear Colleague,     I had the pleasure of seeing Sarah Longo in the [facility].  [id number]      HPI and Plan:   See dictation    Orders this Visit:  Orders Placed This Encounter   Procedures     EKG 12-lead complete w/read - Clinics (performed today)     Echocardiogram Complete     Orders Placed This Encounter   Medications     busPIRone (BUSPAR) 10 MG tablet     Sig: Take 1 tablet by mouth every 12 hours     fluconazole (DIFLUCAN) 150 MG tablet     There are no discontinued medications.      Encounter Diagnoses   Name Primary?     Shortness of breath Yes     Other fatigue        CURRENT MEDICATIONS:  Current Outpatient Medications   Medication Sig Dispense Refill     ascorbic acid 1000 MG TABS tablet Take 1,000 mg by mouth       aspirin 81 MG EC tablet Take 81 mg by mouth daily       busPIRone (BUSPAR) 10 MG tablet Take 1 tablet by mouth every 12 hours       CINNAMON PO Take 2 capsules by mouth 2 times daily       Continuous Blood Gluc Sensor (FREESTYLE PERRI 2 SENSOR) MISC        Continuous Blood Gluc Sensor (FREESTYLE PERRI 2 SENSOR) MISC        cyanocobalamin (CYANOCOBALAMIN) 1000 MCG/ML injection Inject 2 mLs into the muscle every 30 days       DULoxetine (CYMBALTA) 60 MG capsule        estradiol (ESTRACE) 0.1 MG/GM vaginal cream Use pea sized amount and apply with finger to vaginal skin just inside opening once a day before bed as needed for vaginal dryness. 42.5 g 3     evolocumab (REPATHA SURECLICK) 140 MG/ML prefilled autoinjector Inject 1 mL (140 mg) Subcutaneous every 14 days 6 mL 3     fluconazole (DIFLUCAN) 150 MG tablet        GLIMEPIRIDE 4 MG OR TABS Take 4 mg by mouth daily        HYDROcodone-acetaminophen (NORCO)  MG per tablet 1 tablet twice daily as needed for back and leg pain. Max acetaminophen 4000mg in 24 hours.       insulin lispro (HUMALOG  KWIKPEN) 100 UNIT/ML (1 unit dial) KWIKPEN Inject Subcutaneous 3 times daily (before meals)       levothyroxine (SYNTHROID/LEVOTHROID) 125 MCG tablet Take 125 mcg by mouth daily before breakfast       liraglutide (VICTOZA PEN) 18 MG/3ML solution Inject 1.2 mg Subcutaneous daily 3 mL 0     lisinopril (ZESTRIL) 20 MG tablet Take 1 tablet (20 mg) by mouth daily 90 tablet 3     oxyCODONE (ROXICODONE) 5 MG tablet Take 1-2 tablets (5-10 mg) by mouth every 3 hours as needed for severe pain 30 tablet 0     vitamin D3 (CHOLECALCIFEROL) 2000 units tablet Take 1 tablet by mouth 2 times daily       amoxicillin (AMOXIL) 500 MG capsule 2,000 mg once as needed (Before dental procedures)  (Patient not taking: Reported on 8/12/2021)       Aspirin-Calcium Carbonate  MG TABS 1 tab po QD (Once per day) (Patient not taking: Reported on 8/12/2021)       buprenorphine (BUTRANS) 10 MCG/HR WK patch From the pain clinic (Patient not taking: Reported on 8/12/2021)       diclofenac (VOLTAREN) 1 % topical gel Place 4 g onto the skin 4 times daily (Patient not taking: Reported on 8/12/2021) 100 g 0     esomeprazole (NEXIUM) 40 MG DR capsule Take 40 mg by mouth (Patient not taking: Reported on 8/12/2021)       ferrous fumarate 65 mg, [place]. FE,-Vitamin C 125 mg (VITRON C)  MG TABS tablet Take 1 tablet by mouth daily (Patient not taking: Reported on 8/12/2021)       ferrous sulfate (FEROSUL) 325 (65 Fe) MG tablet Take 325 mg by mouth (Patient not taking: Reported on 8/12/2021)       insulin NPH (HUMULIN N VIAL) 100 UNIT/ML vial Inject Subcutaneous daily (Patient not taking: Reported on 3/16/2022)       melatonin 3 MG tablet Take 2 tablets (6 mg) by mouth nightly as needed for sleep (Patient not taking: Reported on 8/12/2021) 30 tablet 0     mirabegron (MYRBETRIQ) 50 MG 24 hr tablet Take 1 tablet (50 mg) by mouth daily Patient not taking daily (Patient not taking: Reported on 3/16/2022) 90 tablet 3     oxyCODONE-acetaminophen (PERCOCET)  5-325 MG tablet  (Patient not taking: Reported on 11/8/2021)       pregabalin (LYRICA) 25 MG capsule  (Patient not taking: Reported on 11/8/2021)       traMADol (ULTRAM) 50 MG tablet Take 1 tablet (50 mg) by mouth every 6 hours as needed for moderate to severe pain (Patient not taking: Reported on 8/12/2021) 30 tablet 3     TURMERIC PO Take 500 mg by mouth 2 times daily  (Patient not taking: Reported on 11/8/2021)         ALLERGIES     Allergies   Allergen Reactions     Jardiance [Empagliflozin] Other (See Comments)     Ongoing yeast infections     Hmg-Coa-R Inhibitors Muscle Pain (Myalgia)     Oral statins         PAST MEDICAL HISTORY:  Past Medical History:   Diagnosis Date     Anemia      Anxiety      Arthritis      CAD (coronary artery disease)      Chronic bilateral low back pain without sciatica      Coronary artery disease 04/28/2016    cardiac cath 2/2019: patent stents; PTCA with MAYA x 2 to OM1 and MAYA x 1 to p.LAD (4/21/2016 at [place]); PTCA with intracoronary stent placement of RCA June 2004; MAYA to OM1 11/2016     Cystocele, midline 09/29/2010     Depression      Depression      DM type 2 (diabetes mellitus, type 2) (H)      HYPERPLASTIC  POLYP      colonoscopy in 5-10 yrs.     Hypertension      Hypothyroid      Hypothyroidism     hypothyroid- Dr Majano     Motion sickness      Mumps      OAB (overactive bladder)     complex voiding dysfct, failed interstim     Osteoarthritis      Other and unspecified hyperlipidemia      Palpitations      PONV (postoperative nausea and vomiting)      Postmenopausal bleeding      Shoulder blade pain 5/31/2016     Type II or unspecified type diabetes mellitus without mention of complication, not stated as uncontrolled     Dr. Majano     Unspecified essential hypertension      Urinary frequency 9/29/10    followed by urology. no benefit from detrol or sanctura. month trial of macrobid and flomax 10/10       PAST SURGICAL HISTORY:  Past Surgical History:   Procedure  Laterality Date     ------------OTHER-------------      Interstim     Ablation       ARTHROPLASTY HIP Left 7/15/2020    Procedure: Left total hip arthroplasty;  Surgeon: Jayson Victoria MD;  Location: [facility]     BACK SURGERY  03/05/2020    spinal fusion     BACK SURGERY       C CT ANGIOGRAPHY CORONARY  1/2005    mod soft plaque LAD and Cx, follow up with left heart cath     CARDIAC SURGERY       CHOLECYSTECTOMY       CHOLECYSTECTOMY       COLONOSCOPY       CORONARY STENT PLACEMENT  2004     x 5 stents      CV HEART CATHETERIZATION WITH POSSIBLE INTERVENTION N/A 2/14/2019    Procedure: Coronary Angiogram;  Surgeon: Sudarshan Lee MD;  Location:  HEART CARDIAC CATH LAB; patent stents     CYSTOSCOPY       EXCISE LESION UPPER EXTREMITY  6/5/2013    Procedure: EXCISE LESION UPPER EXTREMITY;  EXCISION RIGHT ARM ANTICUBITAL LIPOMA ;  Surgeon: Jayson Joe MD;  Location: [facility] REMOVAL OF TONSILS,12+ Y/O       HEART CATH LEFT HEART CATH  3/2/2016    No intervention - aggressive medical management     HEART CATH LEFT HEART CATH  4/21/2016     MAYA x 2 to OM1, MAYA to LAD, at [place]     HEART CATH LEFT HEART CATH  6/29/2004    MAYA to RCA     HEART CATH LEFT HEART CATH  3/28/2002    Mild to moderate 3 vessel CAD. Medical management recommended.      HEART CATH LEFT HEART CATH  11/22/2016    MAYA to OM1     hypothyroid       JOINT REPLACEMENT       KNEE SURGERY  4/20/10    right knee replacement     LUMBAR FUSION N/A 3/5/2020    Procedure: BILATERAL LUMBAR 2-3 AND LUMBAR 3-4 POSTERIOR SPINAL FUSION WITH LEFT LATERAL RECESS DECOMPRESSION LUMBAR 4-5 AND BILATERAL LAMINECTOMY AND DECOMPRESSION LUMBAR 2-3 AND LUMBAR 3-4 WITH ALLOGRAFT AND AUTOGRAFT AND VIVIGEN;  Surgeon: Navid Caicedo MD;  Location: [facility];  Service: Spine     LUMBAR FUSION Bilateral 1/22/2021    Procedure: REVISION OF NONUNION BILATERAL LUMBAR 2-3 AND LUMBAR 3-4 WITH REVISION OF INSTRUMENTATION RIGHT LUMBAR 2 AND LEFT  LUMBAR 4 WITH LEFT LUMBAR 4-5 LAMINOFORAMINOTOMY AND LUMBAR 1-2 DECOMPRESSION WITH ALLOGRAFT AND BONE MARROW ASPIRATE;  Surgeon: Navid Caicedo MD;  Location: [facility];  Service: Spine     ORTHOPEDIC SURGERY      total knee      REMOVE STIMULATOR SACRAL NERVE N/A 2015    Procedure: REMOVE STIMULATOR SACRAL NERVE;  Surgeon: Gertrudis Frausto MD;  Location:  [facility]     REPLACEMENT TOTAL KNEE Right 2010     SURGICAL HISTORY OF -       Uterine endometrial ablation     ZZC LIGATE FALLOPIAN TUBE      endometrial ablation       FAMILY HISTORY:  Family History   Problem Relation Age of Onset     C.A.D. Father         MI , age 83, had high lipids     Hyperlipidemia Father      Hypertension Father      Coronary Artery Disease Father      Hypertension Mother      Genitourinary Problems Mother         renal failure     Fractures Mother         hip     Osteoporosis Mother      Cerebrovascular Disease Maternal Grandfather         cerebral hemorrhage     Diabetes Maternal Uncle      Colon Cancer Maternal Aunt      Diabetes Maternal Grandmother        SOCIAL HISTORY:  Social History     Socioeconomic History     Marital status:      Spouse name: Kwadwo     Number of children: 3     Years of education: Not on file     Highest education level: Not on file   Occupational History     Occupation: PT Aide     Employer: NONE      Employer: RETIRED   Tobacco Use     Smoking status: Never Smoker     Smokeless tobacco: Never Used   Substance and Sexual Activity     Alcohol use: No     Alcohol/week: 0.0 standard drinks     Drug use: No     Sexual activity: Never     Partners: Male     Birth control/protection: Post-menopausal   Other Topics Concern      Service Not Asked     Blood Transfusions Not Asked     Caffeine Concern Yes     Comment: 6-7 cups caffeine per day     Occupational Exposure Not Asked     Hobby Hazards Not Asked     Sleep Concern No     Stress Concern Yes     Weight  "Concern No     Special Diet No     Comment: eats healthy      Back Care Not Asked     Exercise Yes     Comment:  walking & active life style      Bike Helmet Not Asked     Seat Belt Not Asked     Self-Exams Not Asked     Parent/sibling w/ CABG, MI or angioplasty before 65F 55M? No   Social History Narrative     Not on file     Social Determinants of Health     Financial Resource Strain: Not on file   Food Insecurity: Not on file   Transportation Needs: Not on file   Physical Activity: Not on file   Stress: Not on file   Social Connections: Not on file   Intimate Partner Violence: Not on file   Housing Stability: Not on file       Review of Systems:  Skin:  Negative     Eyes:  Positive for glasses  ENT:  Negative    Respiratory:  Positive for dyspnea on exertion;cough  Cardiovascular:  Negative Positive for;palpitations (discomfort)  Gastroenterology: Negative    Genitourinary:  Negative urinary frequency  Musculoskeletal:  Positive for back pain;neck pain  Neurologic:  Negative numbness or tingling of feet  Psychiatric:  Positive for sleep disturbances  Heme/Lymph/Imm:  Positive for allergies  Endocrine:  Positive for diabetes    Physical Exam:  Vitals: /67   Pulse 79   Ht 1.727 m (5' 8\")   Wt 80.3 kg (177 lb)   SpO2 99%   BMI 26.91 kg/m     Please refer to dictation for physical exam    Recent Lab Results: all reviewed today  CBC RESULTS:  Lab Results   Component Value Date    WBC 6.8 01/26/2021    WBC 3.9 (L) 07/19/2020    RBC 2.99 (L) 01/26/2021    RBC 2.60 (L) 07/19/2020    HGB 9.2 (L) 01/26/2021    HGB 12.1 09/15/2020    HCT 28.5 (L) 01/26/2021    HCT 23.8 (L) 07/19/2020    MCV 95 01/26/2021    MCV 92 07/19/2020    MCH 30.8 01/26/2021    MCH 29.6 07/19/2020    MCHC 32.3 01/26/2021    MCHC 32.4 07/19/2020    RDW 12.6 01/26/2021    RDW 12.1 07/19/2020     01/26/2021     07/19/2020       BMP RESULTS:  Lab Results   Component Value Date     (L) 01/24/2021     07/19/2020    " POTASSIUM 4.1 2021    POTASSIUM 3.7 2020    CHLORIDE 105 2021    CHLORIDE 107 2020    CO2 19 (L) 2021    CO2 27 2020    ANIONGAP 9 2021    ANIONGAP 2 (L) 2020     (H) 2021     (H) 2021     (H) 2020    BUN 18 2021    BUN 14 2020    CR 0.87 2021    CR 0.71 2020    GFRESTIMATED >60 2021    GFRESTIMATED 84 2020    GFRESTBLACK >60 2021    GFRESTBLACK >90 2020    LUCIUS 8.7 2021    LUCIUS 8.5 2020        INR RESULTS:  Lab Results   Component Value Date    INR 0.88 2019    INR 0.94 2017           CC  Referred Self, MD  No address on file        Service Date: 2022    PRIMARY CARDIOLOGIST:  Feroz Burgos MD    REASON FOR VISIT:  Angina, shortness of breath.    HISTORY OF PRESENT ILLNESS:  Ms. Longo is a delightful 76-year-old woman with past medical history significant for the followin.  Coronary artery disease with stenting to the RCA in 2004.  She redeveloped symptoms in 2016, had abnormal stress test and was found to have a complex trifurcation lesion of the OM and distal LAD.  This was initially managed medically due to the complexity of the intervention.  She was then sent to [place] and had trifurcation stents placed to the superior and main branch of her OM with dilatation of her inferior branch.  She had an LAD stent placed as well.  Her last angiogram was in 2019 showing an 80% apical lesion that was stable with patent trifurcation stents and minimal disease in the RCA and left main.  2.  Dyslipidemia, on Repatha.  Intolerant to statins.  3.  Hypertension.  4.  Type 2 diabetes, on insulin.  5.  Type 1 Brugada pattern, probably brought on by fevers, but possible Lamictal use (refer to Dr. Nation' consult in 2016).  6.  Severe chronic lower back pain with spinal fusion, hyper placements and multiple surgeries.    She comes in today as she has been  concerned with more upper back pain.  Upper back pain has been her anginal equivalent.  This has always been difficult to decipher between her lower back pain.  She is in the process of being considered for spinal cord stimulator.  She had tested one brand and was only lead to about 20% reduction in pain, so that was not enough to be effective.  She has now switched to a different Pain Clinic and is going to be tested hopefully on a [employer] device.  Essentially her back pain continues to be fairly severe.  What she has noticed now though is more upper back pain when she walks and the feeling of a little bit of chest heaviness and shortness of breath with exertion.  This is fairly reliable with reserved exertion and does not typically come on at rest.  She denies orthopnea or PND.  She has not had syncope or presyncope. Her activity is limited by her back pain at this time.      SOCIAL HISTORY:  Lifelong nonsmoker.  No alcohol use.  She has 7 children and multiple grandchildren, youngest being a 3-year-old granddaughter.  She has a granddaughter now who is going to graduate with her Ph.D. at the age of 23.    PHYSICAL EXAMINATION:    GENERAL:  Well-developed, well-nourished, talkative woman in no acute distress.  HEENT:  Normocephalic, atraumatic.  HEART:  Regular in rate and rhythm.  2/6 systolic murmur heard best at the right sternal border.  LUNGS:  Clear without wheezes, rales, or rhonchi.  EXTREMITIES:  Without peripheral edema.  SKIN:  Warm and dry.    EKG today shows sinus rhythm without significant ST changes or Q waves.  Heart rate of 74.    ASSESSMENT AND PLAN:    1.  Upper back pain, which is this patient's anginal equivalent and that seems to have recurred.  Last stress test was over a year ago and was normal.  She has had stents placed as far back as 2004.  Differential includes stenosis of a stent, new lesion, anemia or pulmonary cause versus radiation of her musculoskeletal back pain.  She  recently underwent labs at her primary care provider and had a normal hemoglobin, as well as TSH and BMP.  She has no signs or symptoms of heart failure.  Given her extensive disease, we are going to do a stress test and echocardiogram.  This will be a Lexiscan stress and she will remain on her current medications for it.  We could always consider adding Imdur down the road if need be, but I think first we will get the results of that.  She does understand that if this is abnormal we would send her for a possible coronary angiogram.  We will also get an echocardiogram. Today for the first time I am hearing a quiet murmur at the right sternal border and we will make sure there is no valvular disease.  2.  Dyslipidemia, on Repatha with excellent results.  Last lipid panel done in 2021 shows an LDL of 56, HDL 74, total cholesterol 143.  3.  Hypertension, well controlled.  4.  Type 2 diabetes with adverse effects of Jardiance, including several yeast infections, on insulin.  5.  Chronic back pain.  In the process of being worked up for this.  I am hoping for some relief of her symptoms.    Thank you for allowing me to participate in this patient's care.  She will get in with Dr. Burgos later this summer and we will follow up on her stress test.  Depending on results, we may need to see her sooner.    Karen Alexander PA-C        D: 2022   T: 2022   MT: rei    Name:     KATHY PERRY  MRN:      [id number]        Account:      [id number]   :      1945           Service Date: 2022       Document: [id number]      Thank you for allowing me to participate in the care of your patient.      Sincerely,     Karen Alexander PA-C     [facility]  cc:   Referred Self, MD  No address on file

## 2022-03-16 NOTE — PROGRESS NOTES
[id number]      HPI and Plan:   See dictation    Orders this Visit:  Orders Placed This Encounter   Procedures     EKG 12-lead complete w/read - Clinics (performed today)     Echocardiogram Complete     Orders Placed This Encounter   Medications     busPIRone (BUSPAR) 10 MG tablet     Sig: Take 1 tablet by mouth every 12 hours     fluconazole (DIFLUCAN) 150 MG tablet     There are no discontinued medications.      Encounter Diagnoses   Name Primary?     Shortness of breath Yes     Other fatigue        CURRENT MEDICATIONS:  Current Outpatient Medications   Medication Sig Dispense Refill     ascorbic acid 1000 MG TABS tablet Take 1,000 mg by mouth       aspirin 81 MG EC tablet Take 81 mg by mouth daily       busPIRone (BUSPAR) 10 MG tablet Take 1 tablet by mouth every 12 hours       CINNAMON PO Take 2 capsules by mouth 2 times daily       Continuous Blood Gluc Sensor (FREESTYLE PERRI 2 SENSOR) MISC        Continuous Blood Gluc Sensor (FREESTYLE PERRI 2 SENSOR) MISC        cyanocobalamin (CYANOCOBALAMIN) 1000 MCG/ML injection Inject 2 mLs into the muscle every 30 days       DULoxetine (CYMBALTA) 60 MG capsule        estradiol (ESTRACE) 0.1 MG/GM vaginal cream Use pea sized amount and apply with finger to vaginal skin just inside opening once a day before bed as needed for vaginal dryness. 42.5 g 3     evolocumab (REPATHA SURECLICK) 140 MG/ML prefilled autoinjector Inject 1 mL (140 mg) Subcutaneous every 14 days 6 mL 3     fluconazole (DIFLUCAN) 150 MG tablet        GLIMEPIRIDE 4 MG OR TABS Take 4 mg by mouth daily        HYDROcodone-acetaminophen (NORCO)  MG per tablet 1 tablet twice daily as needed for back and leg pain. Max acetaminophen 4000mg in 24 hours.       insulin lispro (HUMALOG KWIKPEN) 100 UNIT/ML (1 unit dial) KWIKPEN Inject Subcutaneous 3 times daily (before meals)       levothyroxine (SYNTHROID/LEVOTHROID) 125 MCG tablet Take 125 mcg by mouth daily before breakfast       liraglutide (VICTOZA PEN) 18  MG/3ML solution Inject 1.2 mg Subcutaneous daily 3 mL 0     lisinopril (ZESTRIL) 20 MG tablet Take 1 tablet (20 mg) by mouth daily 90 tablet 3     oxyCODONE (ROXICODONE) 5 MG tablet Take 1-2 tablets (5-10 mg) by mouth every 3 hours as needed for severe pain 30 tablet 0     vitamin D3 (CHOLECALCIFEROL) 2000 units tablet Take 1 tablet by mouth 2 times daily       amoxicillin (AMOXIL) 500 MG capsule 2,000 mg once as needed (Before dental procedures)  (Patient not taking: Reported on 8/12/2021)       Aspirin-Calcium Carbonate  MG TABS 1 tab po QD (Once per day) (Patient not taking: Reported on 8/12/2021)       buprenorphine (BUTRANS) 10 MCG/HR WK patch From the pain clinic (Patient not taking: Reported on 8/12/2021)       diclofenac (VOLTAREN) 1 % topical gel Place 4 g onto the skin 4 times daily (Patient not taking: Reported on 8/12/2021) 100 g 0     esomeprazole (NEXIUM) 40 MG DR capsule Take 40 mg by mouth (Patient not taking: Reported on 8/12/2021)       ferrous fumarate 65 mg, Mcgrath. FE,-Vitamin C 125 mg (VITRON C)  MG TABS tablet Take 1 tablet by mouth daily (Patient not taking: Reported on 8/12/2021)       ferrous sulfate (FEROSUL) 325 (65 Fe) MG tablet Take 325 mg by mouth (Patient not taking: Reported on 8/12/2021)       insulin NPH (HUMULIN N VIAL) 100 UNIT/ML vial Inject Subcutaneous daily (Patient not taking: Reported on 3/16/2022)       melatonin 3 MG tablet Take 2 tablets (6 mg) by mouth nightly as needed for sleep (Patient not taking: Reported on 8/12/2021) 30 tablet 0     mirabegron (MYRBETRIQ) 50 MG 24 hr tablet Take 1 tablet (50 mg) by mouth daily Patient not taking daily (Patient not taking: Reported on 3/16/2022) 90 tablet 3     oxyCODONE-acetaminophen (PERCOCET) 5-325 MG tablet  (Patient not taking: Reported on 11/8/2021)       pregabalin (LYRICA) 25 MG capsule  (Patient not taking: Reported on 11/8/2021)       traMADol (ULTRAM) 50 MG tablet Take 1 tablet (50 mg) by mouth every 6 hours as  needed for moderate to severe pain (Patient not taking: Reported on 8/12/2021) 30 tablet 3     TURMERIC PO Take 500 mg by mouth 2 times daily  (Patient not taking: Reported on 11/8/2021)         ALLERGIES     Allergies   Allergen Reactions     Jardiance [Empagliflozin] Other (See Comments)     Ongoing yeast infections     Hmg-Coa-R Inhibitors Muscle Pain (Myalgia)     Oral statins         PAST MEDICAL HISTORY:  Past Medical History:   Diagnosis Date     Anemia      Anxiety      Arthritis      CAD (coronary artery disease)      Chronic bilateral low back pain without sciatica      Coronary artery disease 04/28/2016    cardiac cath 2/2019: patent stents; PTCA with MAYA x 2 to OM1 and MAYA x 1 to p.LAD (4/21/2016 at [facility]); PTCA with intracoronary stent placement of RCA June 2004; MAYA to OM1 11/2016     Cystocele, midline 09/29/2010     Depression      Depression      DM type 2 (diabetes mellitus, type 2) (H)      HYPERPLASTIC  POLYP      colonoscopy in 5-10 yrs.     Hypertension      Hypothyroid      Hypothyroidism     hypothyroid- Dr Majano     Motion sickness      Mumps      OAB (overactive bladder)     complex voiding dysfct, failed interstim     Osteoarthritis      Other and unspecified hyperlipidemia      Palpitations      PONV (postoperative nausea and vomiting)      Postmenopausal bleeding      Shoulder blade pain 5/31/2016     Type II or unspecified type diabetes mellitus without mention of complication, not stated as uncontrolled     Dr. Majano     Unspecified essential hypertension      Urinary frequency 9/29/10    followed by urology. no benefit from detrol or sanctura. month trial of macrobid and flomax 10/10       PAST SURGICAL HISTORY:  Past Surgical History:   Procedure Laterality Date     ------------OTHER-------------      Interstim     Ablation       ARTHROPLASTY HIP Left 7/15/2020    Procedure: Left total hip arthroplasty;  Surgeon: Jayson Victoria MD;  Location: [facility]     BACK SURGERY  03/05/2020     spinal fusion     BACK SURGERY       C CT ANGIOGRAPHY CORONARY  1/2005    mod soft plaque LAD and Cx, follow up with left heart cath     CARDIAC SURGERY       CHOLECYSTECTOMY       CHOLECYSTECTOMY       COLONOSCOPY       CORONARY STENT PLACEMENT  2004     x 5 stents      CV HEART CATHETERIZATION WITH POSSIBLE INTERVENTION N/A 2/14/2019    Procedure: Coronary Angiogram;  Surgeon: Sudarshan Lee MD;  Location:  HEART CARDIAC CATH LAB; patent stents     CYSTOSCOPY       EXCISE LESION UPPER EXTREMITY  6/5/2013    Procedure: EXCISE LESION UPPER EXTREMITY;  EXCISION RIGHT ARM ANTICUBITAL LIPOMA ;  Surgeon: Jayson Joe MD;  Location: [facility] REMOVAL OF TONSILS,12+ Y/O       HEART CATH LEFT HEART CATH  3/2/2016    No intervention - aggressive medical management     HEART CATH LEFT HEART CATH  4/21/2016     MAYA x 2 to OM1, MAYA to LAD, at [place]     HEART CATH LEFT HEART CATH  6/29/2004    MAYA to RCA     HEART CATH LEFT HEART CATH  3/28/2002    Mild to moderate 3 vessel CAD. Medical management recommended.      HEART CATH LEFT HEART CATH  11/22/2016    MAYA to OM1     hypothyroid       JOINT REPLACEMENT       KNEE SURGERY  4/20/10    right knee replacement     LUMBAR FUSION N/A 3/5/2020    Procedure: BILATERAL LUMBAR 2-3 AND LUMBAR 3-4 POSTERIOR SPINAL FUSION WITH LEFT LATERAL RECESS DECOMPRESSION LUMBAR 4-5 AND BILATERAL LAMINECTOMY AND DECOMPRESSION LUMBAR 2-3 AND LUMBAR 3-4 WITH ALLOGRAFT AND AUTOGRAFT AND VIVIGEN;  Surgeon: Navid Caicedo MD;  Location: [facility];  Service: Spine     LUMBAR FUSION Bilateral 1/22/2021    Procedure: REVISION OF NONUNION BILATERAL LUMBAR 2-3 AND LUMBAR 3-4 WITH REVISION OF INSTRUMENTATION RIGHT LUMBAR 2 AND LEFT LUMBAR 4 WITH LEFT LUMBAR 4-5 LAMINOFORAMINOTOMY AND LUMBAR 1-2 DECOMPRESSION WITH ALLOGRAFT AND BONE MARROW ASPIRATE;  Surgeon: Navid Caicedo MD;  Location: [facility];  Service: Spine     ORTHOPEDIC SURGERY      total  knee      REMOVE STIMULATOR SACRAL NERVE N/A 2015    Procedure: REMOVE STIMULATOR SACRAL NERVE;  Surgeon: Gertrudis Frausto MD;  Location: [facility]     REPLACEMENT TOTAL KNEE Right 2010     SURGICAL HISTORY OF -       Uterine endometrial ablation     ZZC LIGATE FALLOPIAN TUBE      endometrial ablation       FAMILY HISTORY:  Family History   Problem Relation Age of Onset     C.A.D. Father         MI , age 83, had high lipids     Hyperlipidemia Father      Hypertension Father      Coronary Artery Disease Father      Hypertension Mother      Genitourinary Problems Mother         renal failure     Fractures Mother         hip     Osteoporosis Mother      Cerebrovascular Disease Maternal Grandfather         cerebral hemorrhage     Diabetes Maternal Uncle      Colon Cancer Maternal Aunt      Diabetes Maternal Grandmother        SOCIAL HISTORY:  Social History     Socioeconomic History     Marital status:      Spouse name: Kwadwo     Number of children: 3     Years of education: Not on file     Highest education level: Not on file   Occupational History     Occupation: PT Aide     Employer: NONE      Employer: RETIRED   Tobacco Use     Smoking status: Never Smoker     Smokeless tobacco: Never Used   Substance and Sexual Activity     Alcohol use: No     Alcohol/week: 0.0 standard drinks     Drug use: No     Sexual activity: Never     Partners: Male     Birth control/protection: Post-menopausal   Other Topics Concern      Service Not Asked     Blood Transfusions Not Asked     Caffeine Concern Yes     Comment: 6-7 cups caffeine per day     Occupational Exposure Not Asked     Hobby Hazards Not Asked     Sleep Concern No     Stress Concern Yes     Weight Concern No     Special Diet No     Comment: eats healthy      Back Care Not Asked     Exercise Yes     Comment:  walking & active life style      Bike Helmet Not Asked     Seat Belt Not Asked     Self-Exams Not Asked     Parent/sibling w/ CABG,  "MI or angioplasty before 65F 55M? No   Social History Narrative     Not on file     Social Determinants of Health     Financial Resource Strain: Not on file   Food Insecurity: Not on file   Transportation Needs: Not on file   Physical Activity: Not on file   Stress: Not on file   Social Connections: Not on file   Intimate Partner Violence: Not on file   Housing Stability: Not on file       Review of Systems:  Skin:  Negative     Eyes:  Positive for glasses  ENT:  Negative    Respiratory:  Positive for dyspnea on exertion;cough  Cardiovascular:  Negative Positive for;palpitations (discomfort)  Gastroenterology: Negative    Genitourinary:  Negative urinary frequency  Musculoskeletal:  Positive for back pain;neck pain  Neurologic:  Negative numbness or tingling of feet  Psychiatric:  Positive for sleep disturbances  Heme/Lymph/Imm:  Positive for allergies  Endocrine:  Positive for diabetes    Physical Exam:  Vitals: /67   Pulse 79   Ht 1.727 m (5' 8\")   Wt 80.3 kg (177 lb)   SpO2 99%   BMI 26.91 kg/m     Please refer to dictation for physical exam    Recent Lab Results: all reviewed today  CBC RESULTS:  Lab Results   Component Value Date    WBC 6.8 01/26/2021    WBC 3.9 (L) 07/19/2020    RBC 2.99 (L) 01/26/2021    RBC 2.60 (L) 07/19/2020    HGB 9.2 (L) 01/26/2021    HGB 12.1 09/15/2020    HCT 28.5 (L) 01/26/2021    HCT 23.8 (L) 07/19/2020    MCV 95 01/26/2021    MCV 92 07/19/2020    MCH 30.8 01/26/2021    MCH 29.6 07/19/2020    MCHC 32.3 01/26/2021    MCHC 32.4 07/19/2020    RDW 12.6 01/26/2021    RDW 12.1 07/19/2020     01/26/2021     07/19/2020       BMP RESULTS:  Lab Results   Component Value Date     (L) 01/24/2021     07/19/2020    POTASSIUM 4.1 01/24/2021    POTASSIUM 3.7 07/19/2020    CHLORIDE 105 01/24/2021    CHLORIDE 107 07/19/2020    CO2 19 (L) 01/24/2021    CO2 27 07/19/2020    ANIONGAP 9 01/24/2021    ANIONGAP 2 (L) 07/19/2020     (H) 01/28/2021     (H) " 01/24/2021     (H) 07/19/2020    BUN 18 01/24/2021    BUN 14 07/19/2020    CR 0.87 01/24/2021    CR 0.71 07/19/2020    GFRESTIMATED >60 01/24/2021    GFRESTIMATED 84 07/19/2020    GFRESTBLACK >60 01/24/2021    GFRESTBLACK >90 07/19/2020    LUCIUS 8.7 01/24/2021    LUCIUS 8.5 07/19/2020        INR RESULTS:  Lab Results   Component Value Date    INR 0.88 02/14/2019    INR 0.94 02/24/2017           CC  Referred Self, MD  No address on file

## 2022-03-16 NOTE — PROGRESS NOTES
Service Date: 2022    PRIMARY CARDIOLOGIST:  Feroz Burgos MD    REASON FOR VISIT:  Angina, shortness of breath.    HISTORY OF PRESENT ILLNESS:  Ms. Longo is a delightful 76-year-old woman with past medical history significant for the followin.  Coronary artery disease with stenting to the RCA in 2004.  She redeveloped symptoms in 2016, had abnormal stress test and was found to have a complex trifurcation lesion of the OM and distal LAD.  This was initially managed medically due to the complexity of the intervention.  She was then sent to [facility] and had trifurcation stents placed to the superior and main branch of her OM with dilatation of her inferior branch.  She had an LAD stent placed as well.  Her last angiogram was in 2019 showing an 80% apical lesion that was stable with patent trifurcation stents and minimal disease in the RCA and left main.  2.  Dyslipidemia, on Repatha.  Intolerant to statins.  3.  Hypertension.  4.  Type 2 diabetes, on insulin.  5.  Type 1 Brugada pattern, probably brought on by fevers, but possible Lamictal use (refer to Dr. Nation' consult in 2016).  6.  Severe chronic lower back pain with spinal fusion, hyper placements and multiple surgeries.    She comes in today as she has been concerned with more upper back pain.  Upper back pain has been her anginal equivalent.  This has always been difficult to decipher between her lower back pain.  She is in the process of being considered for spinal cord stimulator.  She had tested one brand and was only lead to about 20% reduction in pain, so that was not enough to be effective.  She has now switched to a different Pain Clinic and is going to be tested hopefully on a Meizu device.  Essentially her back pain continues to be fairly severe.  What she has noticed now though is more upper back pain when she walks and the feeling of a little bit of chest heaviness and shortness of breath with exertion.  This is fairly  reliable with reserved exertion and does not typically come on at rest.  She denies orthopnea or PND.  She has not had syncope or presyncope. Her activity is limited by her back pain at this time.      SOCIAL HISTORY:  Lifelong nonsmoker.  No alcohol use.  She has 7 children and multiple grandchildren, youngest being a 3-year-old granddaughter.  She has a granddaughter now who is going to graduate with her Ph.D. at the age of 23.    PHYSICAL EXAMINATION:    GENERAL:  Well-developed, well-nourished, talkative woman in no acute distress.  HEENT:  Normocephalic, atraumatic.  HEART:  Regular in rate and rhythm.  2/6 systolic murmur heard best at the right sternal border.  LUNGS:  Clear without wheezes, rales, or rhonchi.  EXTREMITIES:  Without peripheral edema.  SKIN:  Warm and dry.    EKG today shows sinus rhythm without significant ST changes or Q waves.  Heart rate of 74.    ASSESSMENT AND PLAN:    1.  Upper back pain, which is this patient's anginal equivalent and that seems to have recurred.  Last stress test was over a year ago and was normal.  She has had stents placed as far back as 2004.  Differential includes stenosis of a stent, new lesion, anemia or pulmonary cause versus radiation of her musculoskeletal back pain.  She recently underwent labs at her primary care provider and had a normal hemoglobin, as well as TSH and BMP.  She has no signs or symptoms of heart failure.  Given her extensive disease, we are going to do a stress test and echocardiogram.  This will be a Lexiscan stress and she will remain on her current medications for it.  We could always consider adding Imdur down the road if need be, but I think first we will get the results of that.  She does understand that if this is abnormal we would send her for a possible coronary angiogram.  We will also get an echocardiogram. Today for the first time I am hearing a quiet murmur at the right sternal border and we will make sure there is no valvular  disease.  2.  Dyslipidemia, on Repatha with excellent results.  Last lipid panel done in 2021 shows an LDL of 56, HDL 74, total cholesterol 143.  3.  Hypertension, well controlled.  4.  Type 2 diabetes with adverse effects of Jardiance, including several yeast infections, on insulin.  5.  Chronic back pain.  In the process of being worked up for this.  I am hoping for some relief of her symptoms.    Thank you for allowing me to participate in this patient's care.  She will get in with Dr. Burgos later this summer and we will follow up on her stress test.  Depending on results, we may need to see her sooner.    Karen Alexander PA-C        D: 2022   T: 2022   MT: rei    Name:     KATHY PERRY  MRN:      [id number]        Account:      [id number]   :      1945           Service Date: 2022       Document: [id number]

## 2022-03-16 NOTE — PATIENT INSTRUCTIONS
Thank you for visiting with me today.    We discussed: we'll do an echocardiogram, stress test and labs to see if we can get to the bottom of your back discomfort and shortness of breath.      Medication changes:  Continue current medications.      Follow up: with Dr. Burgos this spring.        Please call my nurse, Monae, at [phone] with any questions or concerns.    Scheduling phone number: [phone]  Reminder: Please bring in all current medications, over the counter supplements and vitamin bottles to your next appointment.

## 2022-04-11 ENCOUNTER — HOSPITAL ENCOUNTER (OUTPATIENT)
Dept: CARDIOLOGY | Facility: CLINIC | Age: 77
Discharge: HOME | End: 2022-04-11
Attending: PHYSICIAN ASSISTANT
Payer: COMMERCIAL

## 2022-04-11 VITALS
DIASTOLIC BLOOD PRESSURE: 62 MMHG | WEIGHT: 180.6 LBS | SYSTOLIC BLOOD PRESSURE: 132 MMHG | BODY MASS INDEX: 27.37 KG/M2 | OXYGEN SATURATION: 98 % | HEIGHT: 68 IN | HEART RATE: 72 BPM

## 2022-04-11 DIAGNOSIS — R06.02 SHORTNESS OF BREATH: ICD-10-CM

## 2022-04-11 LAB
CV STRESS MAX HR HE: 77
LVEF ECHO: NORMAL
NUC STRESS EJECTION FRACTION: 54 %
RATE PRESSURE PRODUCT: NORMAL
STRESS ECHO BASELINE DIASTOLIC HE: 62
STRESS ECHO BASELINE HR: 72 BPM
STRESS ECHO BASELINE SYSTOLIC BP: 132
STRESS ECHO CALCULATED PERCENT HR: 53 %
STRESS ECHO LAST STRESS DIASTOLIC BP: 62
STRESS ECHO LAST STRESS SYSTOLIC BP: 134
STRESS ECHO TARGET HR: 145

## 2022-04-11 PROCEDURE — 343N000001 HC RX 343: Performed by: PHYSICIAN ASSISTANT

## 2022-04-11 PROCEDURE — 93018 CV STRESS TEST I&R ONLY: CPT | Performed by: INTERNAL MEDICINE

## 2022-04-11 PROCEDURE — 93306 TTE W/DOPPLER COMPLETE: CPT | Mod: 26 | Performed by: INTERNAL MEDICINE

## 2022-04-11 PROCEDURE — 93017 CV STRESS TEST TRACING ONLY: CPT

## 2022-04-11 PROCEDURE — 78452 HT MUSCLE IMAGE SPECT MULT: CPT | Mod: 26 | Performed by: INTERNAL MEDICINE

## 2022-04-11 PROCEDURE — 250N000011 HC RX IP 250 OP 636: Performed by: PHYSICIAN ASSISTANT

## 2022-04-11 PROCEDURE — 93306 TTE W/DOPPLER COMPLETE: CPT

## 2022-04-11 PROCEDURE — A9502 TC99M TETROFOSMIN: HCPCS | Performed by: PHYSICIAN ASSISTANT

## 2022-04-11 PROCEDURE — 93016 CV STRESS TEST SUPVJ ONLY: CPT | Performed by: INTERNAL MEDICINE

## 2022-04-11 RX ORDER — ALBUTEROL SULFATE 90 UG/1
2 AEROSOL, METERED RESPIRATORY (INHALATION) EVERY 5 MIN PRN
Status: DISCONTINUED | OUTPATIENT
Start: 2022-04-11 | End: 2022-04-12 | Stop reason: HOSPADM

## 2022-04-11 RX ORDER — REGADENOSON 0.08 MG/ML
0.4 INJECTION, SOLUTION INTRAVENOUS ONCE
Status: COMPLETED | OUTPATIENT
Start: 2022-04-11 | End: 2022-04-11

## 2022-04-11 RX ORDER — CAFFEINE CITRATE 20 MG/ML
60 SOLUTION INTRAVENOUS
Status: DISCONTINUED | OUTPATIENT
Start: 2022-04-11 | End: 2022-04-12 | Stop reason: HOSPADM

## 2022-04-11 RX ORDER — ACYCLOVIR 200 MG/1
0-1 CAPSULE ORAL
Status: DISCONTINUED | OUTPATIENT
Start: 2022-04-11 | End: 2022-04-12 | Stop reason: HOSPADM

## 2022-04-11 RX ORDER — AMINOPHYLLINE 25 MG/ML
50-100 INJECTION, SOLUTION INTRAVENOUS
Status: DISCONTINUED | OUTPATIENT
Start: 2022-04-11 | End: 2022-04-12 | Stop reason: HOSPADM

## 2022-04-11 RX ADMIN — TETROFOSMIN 9.34 MCI.: 1.38 INJECTION, POWDER, LYOPHILIZED, FOR SOLUTION INTRAVENOUS at 10:29

## 2022-04-11 RX ADMIN — REGADENOSON 0.4 MG: 0.08 INJECTION, SOLUTION INTRAVENOUS at 10:25

## 2022-04-11 RX ADMIN — TETROFOSMIN 3.55 MCI.: 1.38 INJECTION, POWDER, LYOPHILIZED, FOR SOLUTION INTRAVENOUS at 09:00

## 2022-04-12 ENCOUNTER — CARE COORDINATION (OUTPATIENT)
Dept: CARDIOLOGY | Facility: CLINIC | Age: 77
End: 2022-04-12
Payer: COMMERCIAL

## 2022-04-12 NOTE — PROGRESS NOTES
Catherine, SHYANN Parrish Bullis Mary, RN  Echo is normal, but stress test shows some ischemia.  Pt needs angiogram.  Pls call and let her know as well as arrange h&p.  I have a hold spot tomorrow at 2:30- I'd be willing to do a phone visit with her if that works for her.  Or if she prefers we can do in person.               NM Lexiscan stress test (nuc card)  Order: [id number]       Details    Reading Physician Reading Date Result Priority   Feroz Burgos MD  [phone] 4/11/2022 Routine   Feroz Burgos MD  [phone] 4/11/2022      Result Text        The nuclear stress test is abnormal.     There is a small area of infarction in the basal inferolateral segment(s) of the left ventricle associated with a moderate degree of barbara-infarct ischemia involving the entire inferolateral wall.     The left ventricular ejection fraction at stress is 54%. Left ventricular function is low normal.     A prior study was conducted on 1/13/2021.  This study has changes noted when compared with the prior study. Inferolateral ischemia is new finding.        ====================    4/12/22 Called to patient Rayna with results an Karen Alexander PA-C recommendations. Millie reports she  has a visit with wound care nurse at 1 pm in [place], but will be available for a phone visit with Karen tomorrow at 230 pm . Rayna reports understanding of test results and agreement to angiogram with H&P with Karen Alexander PA-C and to discuss risks and benefits of procedure.Rayna reports agreement for procedure, but requests DR Lee only and [place] location only.  Order placed for Angiogram with possible PCI. Scheduled phone visit with Karen.   Monae Briseno RN 04/12/22 12:53 PM

## 2022-04-13 ENCOUNTER — VIRTUAL VISIT (OUTPATIENT)
Dept: CARDIOLOGY | Facility: CLINIC | Age: 77
End: 2022-04-13
Payer: COMMERCIAL

## 2022-04-13 DIAGNOSIS — I25.10 ATHEROSCLEROSIS OF NATIVE CORONARY ARTERY OF NATIVE HEART WITHOUT ANGINA PECTORIS: Primary | ICD-10-CM

## 2022-04-13 PROCEDURE — 99442 PR PHYSICIAN TELEPHONE EVALUATION 11-20 MIN: CPT | Mod: 95 | Performed by: PHYSICIAN ASSISTANT

## 2022-04-13 RX ORDER — NITROGLYCERIN 0.4 MG/1
0.4 TABLET SUBLINGUAL EVERY 5 MIN PRN
Qty: 25 TABLET | Refills: 11 | Status: SHIPPED | OUTPATIENT
Start: 2022-04-13 | End: 2024-01-10

## 2022-04-13 NOTE — LETTER
"4/13/2022    Gaye Zimmer  [facility] [address]  [facility] [address]    RE: Sarah Longo       Dear Colleague,     I had the pleasure of seeing Sarah Longo in the [facility].  Rayna is a 76 year old who is being evaluated via a billable telephone visit.   Vitals - Patient Reported  Weight (Patient Reported): 80.3 kg (177 lb)  Height (Patient Reported): 172.7 cm (5' 8\")  BMI (Based on Pt Reported Ht/Wt): 26.91    Review Of Systems  Skin: NEGATIVE  Eyes:Ears/Nose/Throat: NEGATIVE  Respiratory: coughing from Lisinopril, SOB  Cardiovascular:  fatigue  Gastrointestinal: NEGATIVE  Genitourinary:NEGATIVE   Musculoskeletal: NEGATIVE  Neurologic: NEGATIVE  Psychiatric: NEGATIVE  Hematologic/Lymphatic/Immunologic: NEGATIVE  Endocrine:  NEGATIVE    Dalia Shields LPN    What phone number would you like to be contacted at?  [phone]  How would you like to obtain your AVS? LocalLuxt  Phone call duration: 18 minutes    [phone]      HPI and Plan:   See dictation    Orders this Visit:  No orders of the defined types were placed in this encounter.    Orders Placed This Encounter   Medications     nitroGLYcerin (NITROSTAT) 0.4 MG sublingual tablet     Sig: Place 1 tablet (0.4 mg) under the tongue every 5 minutes as needed for chest pain For chest pain place 1 tablet under the tongue every 5 minutes for 3 doses. If symptoms persist 5 minutes after 1st dose call 911.     Dispense:  25 tablet     Refill:  11     There are no discontinued medications.      Encounter Diagnosis   Name Primary?     Atherosclerosis of native coronary artery of native heart without angina pectoris Yes       CURRENT MEDICATIONS:  Current Outpatient Medications   Medication Sig Dispense Refill     amoxicillin (AMOXIL) 500 MG capsule 2,000 mg once as needed (Before dental procedures)       ascorbic acid 1000 MG TABS tablet Take 1,000 mg by mouth       aspirin 81 MG EC tablet Take 81 mg by mouth daily       " busPIRone (BUSPAR) 10 MG tablet Take 1 tablet by mouth every 12 hours       CINNAMON PO Take 2 capsules by mouth 2 times daily       Continuous Blood Gluc Sensor (FREESTYLE PERRI 2 SENSOR) MISC        Continuous Blood Gluc Sensor (FREESTYLE PERRI 2 SENSOR) MISC        cyanocobalamin (CYANOCOBALAMIN) 1000 MCG/ML injection Inject 2 mLs into the muscle every 30 days       DULoxetine (CYMBALTA) 60 MG capsule        estradiol (ESTRACE) 0.1 MG/GM vaginal cream Use pea sized amount and apply with finger to vaginal skin just inside opening once a day before bed as needed for vaginal dryness. 42.5 g 3     evolocumab (REPATHA SURECLICK) 140 MG/ML prefilled autoinjector Inject 1 mL (140 mg) Subcutaneous every 14 days 6 mL 3     GLIMEPIRIDE 4 MG OR TABS Take 4 mg by mouth daily as needed       insulin lispro (HUMALOG KWIKPEN) 100 UNIT/ML (1 unit dial) KWIKPEN Inject Subcutaneous 3 times daily (before meals)       levothyroxine (SYNTHROID/LEVOTHROID) 125 MCG tablet Take 125 mcg by mouth daily before breakfast       liraglutide (VICTOZA PEN) 18 MG/3ML solution Inject 1.2 mg Subcutaneous daily 3 mL 0     lisinopril (ZESTRIL) 20 MG tablet Take 1 tablet (20 mg) by mouth daily 90 tablet 3     mirabegron (MYRBETRIQ) 50 MG 24 hr tablet Take 1 tablet (50 mg) by mouth daily Patient not taking daily 90 tablet 3     nitroGLYcerin (NITROSTAT) 0.4 MG sublingual tablet Place 1 tablet (0.4 mg) under the tongue every 5 minutes as needed for chest pain For chest pain place 1 tablet under the tongue every 5 minutes for 3 doses. If symptoms persist 5 minutes after 1st dose call 911. 25 tablet 11     vitamin D3 (CHOLECALCIFEROL) 2000 units tablet Take 1 tablet by mouth 2 times daily       Aspirin-Calcium Carbonate  MG TABS 1 tab po QD (Once per day) (Patient not taking: No sig reported)       buprenorphine (BUTRANS) 10 MCG/HR WK patch From the pain clinic (Patient not taking: Reported on 8/12/2021)       diclofenac (VOLTAREN) 1 % topical gel  Place 4 g onto the skin 4 times daily (Patient not taking: No sig reported) 100 g 0     esomeprazole (NEXIUM) 40 MG DR capsule Take 40 mg by mouth (Patient not taking: No sig reported)       ferrous fumarate 65 mg, [place] FE,-Vitamin C 125 mg (VITRON C)  MG TABS tablet Take 1 tablet by mouth daily (Patient not taking: No sig reported)       ferrous sulfate (FEROSUL) 325 (65 Fe) MG tablet Take 325 mg by mouth (Patient not taking: No sig reported)       fluconazole (DIFLUCAN) 150 MG tablet  (Patient not taking: Reported on 4/13/2022)       HYDROcodone-acetaminophen (NORCO)  MG per tablet 1 tablet twice daily as needed for back and leg pain. Max acetaminophen 4000mg in 24 hours. (Patient not taking: Reported on 4/13/2022)       insulin NPH (HUMULIN N VIAL) 100 UNIT/ML vial Inject Subcutaneous daily (Patient not taking: No sig reported)       melatonin 3 MG tablet Take 2 tablets (6 mg) by mouth nightly as needed for sleep (Patient not taking: No sig reported) 30 tablet 0     oxyCODONE (ROXICODONE) 5 MG tablet Take 1-2 tablets (5-10 mg) by mouth every 3 hours as needed for severe pain (Patient not taking: Reported on 4/13/2022) 30 tablet 0     oxyCODONE-acetaminophen (PERCOCET) 5-325 MG tablet  (Patient not taking: No sig reported)       pregabalin (LYRICA) 25 MG capsule  (Patient not taking: No sig reported)       traMADol (ULTRAM) 50 MG tablet Take 1 tablet (50 mg) by mouth every 6 hours as needed for moderate to severe pain (Patient not taking: No sig reported) 30 tablet 3     TURMERIC PO Take 500 mg by mouth 2 times daily  (Patient not taking: No sig reported)         ALLERGIES     Allergies   Allergen Reactions     Jardiance [Empagliflozin] Other (See Comments)     Ongoing yeast infections     Hmg-Coa-R Inhibitors Muscle Pain (Myalgia)     Oral statins         PAST MEDICAL HISTORY:  Past Medical History:   Diagnosis Date     Anemia      Anxiety      Arthritis      CAD (coronary artery disease)       Chronic bilateral low back pain without sciatica      Coronary artery disease 04/28/2016    cardiac cath 2/2019: patent stents; PTCA with MAYA x 2 to OM1 and MAYA x 1 to p.LAD (4/21/2016 at [place]); PTCA with intracoronary stent placement of RCA June 2004; MAYA to OM1 11/2016     Cystocele, midline 09/29/2010     Depression      Depression      DM type 2 (diabetes mellitus, type 2) (H)      HYPERPLASTIC  POLYP      colonoscopy in 5-10 yrs.     Hypertension      Hypothyroid      Hypothyroidism     hypothyroid- Dr Majano     Motion sickness      Mumps      OAB (overactive bladder)     complex voiding dysfct, failed interstim     Osteoarthritis      Other and unspecified hyperlipidemia      Palpitations      PONV (postoperative nausea and vomiting)      Postmenopausal bleeding      Shoulder blade pain 5/31/2016     Type II or unspecified type diabetes mellitus without mention of complication, not stated as uncontrolled     Dr. Majano     Unspecified essential hypertension      Urinary frequency 9/29/10    followed by urology. no benefit from detrol or sanctura. month trial of macrobid and flomax 10/10       PAST SURGICAL HISTORY:  Past Surgical History:   Procedure Laterality Date     ------------OTHER-------------      Interstim     Ablation       ARTHROPLASTY HIP Left 7/15/2020    Procedure: Left total hip arthroplasty;  Surgeon: Jayson Victoria MD;  Location:  OR     BACK SURGERY  03/05/2020    spinal fusion     BACK SURGERY       C CT ANGIOGRAPHY CORONARY  1/2005    mod soft plaque LAD and Cx, follow up with left heart cath     CARDIAC SURGERY       CHOLECYSTECTOMY       CHOLECYSTECTOMY       COLONOSCOPY       CORONARY STENT PLACEMENT  2004     x 5 stents      CV HEART CATHETERIZATION WITH POSSIBLE INTERVENTION N/A 2/14/2019    Procedure: Coronary Angiogram;  Surgeon: Sudarshan Lee MD;  Location: [facility] CARDIAC CATH LAB; patent stents     CYSTOSCOPY       EXCISE LESION UPPER EXTREMITY  6/5/2013     Procedure: EXCISE LESION UPPER EXTREMITY;  EXCISION RIGHT ARM ANTICUBITAL LIPOMA ;  Surgeon: Jayson Joe MD;  Location: [facility]     HC REMOVAL OF TONSILS,13+ Y/O       HEART CATH LEFT HEART CATH  3/2/2016    No intervention - aggressive medical management     HEART CATH LEFT HEART CATH  2016     MAYA x 2 to OM1, MAYA to LAD, at [place]     HEART CATH LEFT HEART CATH  2004    MAYA to RCA     HEART CATH LEFT HEART CATH  3/28/2002    Mild to moderate 3 vessel CAD. Medical management recommended.      HEART CATH LEFT HEART CATH  2016    MAYA to OM1     hypothyroid       JOINT REPLACEMENT       KNEE SURGERY  4/20/10    right knee replacement     LUMBAR FUSION N/A 3/5/2020    Procedure: BILATERAL LUMBAR 2-3 AND LUMBAR 3-4 POSTERIOR SPINAL FUSION WITH LEFT LATERAL RECESS DECOMPRESSION LUMBAR 4-5 AND BILATERAL LAMINECTOMY AND DECOMPRESSION LUMBAR 2-3 AND LUMBAR 3-4 WITH ALLOGRAFT AND AUTOGRAFT AND VIVIGEN;  Surgeon: Navid Caicedo MD;  Location: [facility];  Service: Spine     LUMBAR FUSION Bilateral 2021    Procedure: REVISION OF NONUNION BILATERAL LUMBAR 2-3 AND LUMBAR 3-4 WITH REVISION OF INSTRUMENTATION RIGHT LUMBAR 2 AND LEFT LUMBAR 4 WITH LEFT LUMBAR 4-5 LAMINOFORAMINOTOMY AND LUMBAR 1-2 DECOMPRESSION WITH ALLOGRAFT AND BONE MARROW ASPIRATE;  Surgeon: Navid Caicedo MD;  Location: [facility];  Service: Spine     ORTHOPEDIC SURGERY      total knee      REMOVE STIMULATOR SACRAL NERVE N/A 2015    Procedure: REMOVE STIMULATOR SACRAL NERVE;  Surgeon: Gertrudis Frausto MD;  Location: [facility]     REPLACEMENT TOTAL KNEE Right 2010     SURGICAL HISTORY OF -       Uterine endometrial ablation     ZZC LIGATE FALLOPIAN TUBE      endometrial ablation       FAMILY HISTORY:  Family History   Problem Relation Age of Onset     C.A.D. Father         MI , age 83, had high lipids     Hyperlipidemia Father      Hypertension Father      Coronary Artery  Disease Father      Hypertension Mother      Genitourinary Problems Mother         renal failure     Fractures Mother         hip     Osteoporosis Mother      Cerebrovascular Disease Maternal Grandfather         cerebral hemorrhage     Diabetes Maternal Uncle      Colon Cancer Maternal Aunt      Diabetes Maternal Grandmother        SOCIAL HISTORY:  Social History     Socioeconomic History     Marital status:      Spouse name: Kwadwo     Number of children: 3   Occupational History     Occupation: PT Aide     Employer: NONE      Employer: RETIRED   Tobacco Use     Smoking status: Never Smoker     Smokeless tobacco: Never Used   Substance and Sexual Activity     Alcohol use: No     Alcohol/week: 0.0 standard drinks     Drug use: No     Sexual activity: Never     Partners: Male     Birth control/protection: Post-menopausal   Other Topics Concern     Caffeine Concern Yes     Comment: 6-7 cups caffeine per day     Sleep Concern No     Stress Concern Yes     Weight Concern No     Special Diet No     Comment: eats healthy      Exercise Yes     Comment:  walking & active life style      Parent/sibling w/ CABG, MI or angioplasty before 65F 55M? No       Review of Systems:  Skin:        Eyes:       ENT:       Respiratory:       Cardiovascular:       Gastroenterology:      Genitourinary:       Musculoskeletal:       Neurologic:       Psychiatric:       Heme/Lymph/Imm:       Endocrine:         Physical Exam:  Vitals: There were no vitals taken for this visit.   Please refer to dictation for physical exam    Recent Lab Results: all reviewed today  CBC RESULTS:  Lab Results   Component Value Date    WBC 6.8 01/26/2021    WBC 3.9 (L) 07/19/2020    RBC 2.99 (L) 01/26/2021    RBC 2.60 (L) 07/19/2020    HGB 9.2 (L) 01/26/2021    HGB 12.1 09/15/2020    HCT 28.5 (L) 01/26/2021    HCT 23.8 (L) 07/19/2020    MCV 95 01/26/2021    MCV 92 07/19/2020    MCH 30.8 01/26/2021    MCH 29.6 07/19/2020    MCHC 32.3 01/26/2021    MCHC 32.4  07/19/2020    RDW 12.6 01/26/2021    RDW 12.1 07/19/2020     01/26/2021     07/19/2020       BMP RESULTS:  Lab Results   Component Value Date     (L) 01/24/2021     07/19/2020    POTASSIUM 4.1 01/24/2021    POTASSIUM 3.7 07/19/2020    CHLORIDE 105 01/24/2021    CHLORIDE 107 07/19/2020    CO2 19 (L) 01/24/2021    CO2 27 07/19/2020    ANIONGAP 9 01/24/2021    ANIONGAP 2 (L) 07/19/2020     (H) 01/28/2021     (H) 01/24/2021     (H) 07/19/2020    BUN 18 01/24/2021    BUN 14 07/19/2020    CR 0.87 01/24/2021    CR 0.71 07/19/2020    GFRESTIMATED >60 01/24/2021    GFRESTIMATED 84 07/19/2020    GFRESTBLACK >60 01/24/2021    GFRESTBLACK >90 07/19/2020    LUCIUS 8.7 01/24/2021    LUCIUS 8.5 07/19/2020        INR RESULTS:  Lab Results   Component Value Date    INR 0.88 02/14/2019    INR 0.94 02/24/2017           Thank you for allowing me to participate in the care of your patient.    Sincerely,     Karen Alexander PA-C     [facility]

## 2022-04-13 NOTE — PROGRESS NOTES
Service Date: 2022    PRIMARY CARDIOLOGIST:  Feroz Burgos MD    REASON FOR VISIT:  Abnormal stress test.    HISTORY OF PRESENT ILLNESS:  Ms. Longo is a pleasant 76-year-old woman with past medical history significant for the followin.  Coronary artery disease with stenting to the RCA in .  She redeveloped symptoms in 2016, had an abnormal stress test and was found to have a complex trifurcation lesion of the OM and distal LAD.  This was initially managed medically due to the complexity of the intervention and then was sent to [place] for trifurcation stenting placed in the superior main branch of the OM and dilatation of her inferior branch as well as LAD stenting.  Her angiogram in 2019 showed an 80% apical lesion that was stable with patent trifurcation stents and minimal disease in the RCA and left main.  She recently complained of worsening angina, which for her is her upper back discomfort.  We sent her for a stress test.  This came back abnormal with a small area of infarct in the basal inferolateral segments of the LV with a moderate degree of periinfarct ischemia involving the entire inferolateral wall.  Echocardiogram was normal without wall motion.  2.  Dyslipidemia, on Repatha.  Intolerant to statins.  3.  Hypertension.  4.  Type 2 diabetes, on insulin.  5.  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  Refer to Dr. Nation's consult 2016.  6.  Severe chronic lower back pain with spinal fusion, hip replacements and multiple surgeries.  She is working on a trial for a different spinal cord stimulator for relief in pain.    I saw her back about a month ago when she complained of more upper back pain, which is her chronic angina as well as shortness of breath and worsening fatigue with activity.  She sometimes has a hard time differentiating from her chronic low back pain, but this felt different and out of her norm with heavier exertion, like carrying laundry,  walking further.  She develops more upper back pain.  This is exactly in the middle of her back that resolves with rest.  It is associated with shortness of breath and overall fatigue.  Given the stress test is positive, we are discussing angiogram.  Thankfully, echocardiogram is normal.  She had labs done, which were reviewed again today at her primary clinic 03/08/2022 that showed a normal creatinine of 0.9, potassium 4.7, chloride 107 and hemoglobin of 12.9.  Today, she tells me that her symptoms persist.  They have not necessarily worsened in severity or in quality.  After her stress test though, she has been taking it a little more easy than she had before.    SOCIAL HISTORY:  She is a lifelong nonsmoker.  No alcohol use.  She has 7 children and multiple grandchildren, the youngest being a 3-year-old granddaughter.  She has a granddaughter now who will graduate with her Ph.D. at the age of 23.    PHYSICAL EXAMINATION:  Exam is limited, as this is a phone visit.  She is speaking in complete sentences.  She does not sound short of breath.  She is alert and oriented.  Questions are appropriate.    Studies reviewed include the stress test as detailed above as well as echocardiogram.    ASSESSMENT AND PLAN:    1.  Progressive angina with a positive stress test with her anginal equivalent being upper back pain that occasionally comes on with chest heaviness associated with shortness of breath and fatigue.  She has had stents placed back to 2004 and last angiogram was 2019.  Given this is positive, I have discussed this with her primary cardiologist, Dr. Burgos, and we are sending her for coronary angiogram.  She strongly prefers that Dr. Lee do the angiogram.  She has no contraindications to it and knows that a possible spinal cord pain stimulator will be postponed with dual antiplatelet.  At this point, we are going to have her use p.r.n. nitro for worsening symptoms and just have her lay low.  If symptoms  worsen, I have asked her to call us or present to the ER if they are severe.  Risks and benefits were discussed today including bruising, bleeding, vascular damage, renal damage, stroke, heart attack, emergency open heart surgery and death.  The patient is agreeable to proceed.  2.  Dyslipidemia, on Repatha with excellent results.  3.  Hypertension, well controlled.  4.  Type 2 diabetes with adverse effects to Jardiance including several yeast infections on insulin.  5.  Chronic back pain with her angina differentiated from this with workup for pain implant.    Thank you for allowing me to participate in this delightful patient's care.  We will get her angiogram done and then follow up with her in clinic.  She will use p.r.n. nitro in the interim.    Karen Alexander PA-C        D: 2022   T: 2022   MT: HERMINIO    Name:     KATHY PERRY  MRN:      [id number]        Account:      [id number]   :      1945           Service Date: 2022       Document: [id number]

## 2022-04-13 NOTE — PROGRESS NOTES
"Rayna is a 76 year old who is being evaluated via a billable telephone visit.   Vitals - Patient Reported  Weight (Patient Reported): 80.3 kg (177 lb)  Height (Patient Reported): 172.7 cm (5' 8\")  BMI (Based on Pt Reported Ht/Wt): 26.91    Review Of Systems  Skin: NEGATIVE  Eyes:Ears/Nose/Throat: NEGATIVE  Respiratory: coughing from Lisinopril, SOB  Cardiovascular:  fatigue  Gastrointestinal: NEGATIVE  Genitourinary:NEGATIVE   Musculoskeletal: NEGATIVE  Neurologic: NEGATIVE  Psychiatric: NEGATIVE  Hematologic/Lymphatic/Immunologic: NEGATIVE  Endocrine:  NEGATIVE    Dalia Shields LPN    What phone number would you like to be contacted at?  [phone]  How would you like to obtain your AVS? [place]  Phone call duration: 18 minutes    [phone]      HPI and Plan:   See dictation    Orders this Visit:  No orders of the defined types were placed in this encounter.    Orders Placed This Encounter   Medications     nitroGLYcerin (NITROSTAT) 0.4 MG sublingual tablet     Sig: Place 1 tablet (0.4 mg) under the tongue every 5 minutes as needed for chest pain For chest pain place 1 tablet under the tongue every 5 minutes for 3 doses. If symptoms persist 5 minutes after 1st dose call 911.     Dispense:  25 tablet     Refill:  11     There are no discontinued medications.      Encounter Diagnosis   Name Primary?     Atherosclerosis of native coronary artery of native heart without angina pectoris Yes       CURRENT MEDICATIONS:  Current Outpatient Medications   Medication Sig Dispense Refill     amoxicillin (AMOXIL) 500 MG capsule 2,000 mg once as needed (Before dental procedures)       ascorbic acid 1000 MG TABS tablet Take 1,000 mg by mouth       aspirin 81 MG EC tablet Take 81 mg by mouth daily       busPIRone (BUSPAR) 10 MG tablet Take 1 tablet by mouth every 12 hours       CINNAMON PO Take 2 capsules by mouth 2 times daily       Continuous Blood Gluc Sensor (FREESTYLE PERRI 2 SENSOR) [facility]        Continuous Blood Gluc " Sensor (FREESTYLE PERRI 2 SENSOR) MISC        cyanocobalamin (CYANOCOBALAMIN) 1000 MCG/ML injection Inject 2 mLs into the muscle every 30 days       DULoxetine (CYMBALTA) 60 MG capsule        estradiol (ESTRACE) 0.1 MG/GM vaginal cream Use pea sized amount and apply with finger to vaginal skin just inside opening once a day before bed as needed for vaginal dryness. 42.5 g 3     evolocumab (REPATHA SURECLICK) 140 MG/ML prefilled autoinjector Inject 1 mL (140 mg) Subcutaneous every 14 days 6 mL 3     GLIMEPIRIDE 4 MG OR TABS Take 4 mg by mouth daily as needed       insulin lispro (HUMALOG KWIKPEN) 100 UNIT/ML (1 unit dial) KWIKPEN Inject Subcutaneous 3 times daily (before meals)       levothyroxine (SYNTHROID/LEVOTHROID) 125 MCG tablet Take 125 mcg by mouth daily before breakfast       liraglutide (VICTOZA PEN) 18 MG/3ML solution Inject 1.2 mg Subcutaneous daily 3 mL 0     lisinopril (ZESTRIL) 20 MG tablet Take 1 tablet (20 mg) by mouth daily 90 tablet 3     mirabegron (MYRBETRIQ) 50 MG 24 hr tablet Take 1 tablet (50 mg) by mouth daily Patient not taking daily 90 tablet 3     nitroGLYcerin (NITROSTAT) 0.4 MG sublingual tablet Place 1 tablet (0.4 mg) under the tongue every 5 minutes as needed for chest pain For chest pain place 1 tablet under the tongue every 5 minutes for 3 doses. If symptoms persist 5 minutes after 1st dose call 911. 25 tablet 11     vitamin D3 (CHOLECALCIFEROL) 2000 units tablet Take 1 tablet by mouth 2 times daily       Aspirin-Calcium Carbonate  MG TABS 1 tab po QD (Once per day) (Patient not taking: No sig reported)       buprenorphine (BUTRANS) 10 MCG/HR WK patch From the pain clinic (Patient not taking: Reported on 8/12/2021)       diclofenac (VOLTAREN) 1 % topical gel Place 4 g onto the skin 4 times daily (Patient not taking: No sig reported) 100 g 0     esomeprazole (NEXIUM) 40 MG DR capsule Take 40 mg by mouth (Patient not taking: No sig reported)       ferrous fumarate 65 mg, [place].  FE,-Vitamin C 125 mg (VITRON C)  MG TABS tablet Take 1 tablet by mouth daily (Patient not taking: No sig reported)       ferrous sulfate (FEROSUL) 325 (65 Fe) MG tablet Take 325 mg by mouth (Patient not taking: No sig reported)       fluconazole (DIFLUCAN) 150 MG tablet  (Patient not taking: Reported on 4/13/2022)       HYDROcodone-acetaminophen (NORCO)  MG per tablet 1 tablet twice daily as needed for back and leg pain. Max acetaminophen 4000mg in 24 hours. (Patient not taking: Reported on 4/13/2022)       insulin NPH (HUMULIN N VIAL) 100 UNIT/ML vial Inject Subcutaneous daily (Patient not taking: No sig reported)       melatonin 3 MG tablet Take 2 tablets (6 mg) by mouth nightly as needed for sleep (Patient not taking: No sig reported) 30 tablet 0     oxyCODONE (ROXICODONE) 5 MG tablet Take 1-2 tablets (5-10 mg) by mouth every 3 hours as needed for severe pain (Patient not taking: Reported on 4/13/2022) 30 tablet 0     oxyCODONE-acetaminophen (PERCOCET) 5-325 MG tablet  (Patient not taking: No sig reported)       pregabalin (LYRICA) 25 MG capsule  (Patient not taking: No sig reported)       traMADol (ULTRAM) 50 MG tablet Take 1 tablet (50 mg) by mouth every 6 hours as needed for moderate to severe pain (Patient not taking: No sig reported) 30 tablet 3     TURMERIC PO Take 500 mg by mouth 2 times daily  (Patient not taking: No sig reported)         ALLERGIES     Allergies   Allergen Reactions     Jardiance [Empagliflozin] Other (See Comments)     Ongoing yeast infections     Hmg-Coa-R Inhibitors Muscle Pain (Myalgia)     Oral statins         PAST MEDICAL HISTORY:  Past Medical History:   Diagnosis Date     Anemia      Anxiety      Arthritis      CAD (coronary artery disease)      Chronic bilateral low back pain without sciatica      Coronary artery disease 04/28/2016    cardiac cath 2/2019: patent stents; PTCA with MAYA x 2 to OM1 and MAYA x 1 to p.LAD (4/21/2016 at [place]); PTCA with intracoronary stent  placement of RCA June 2004; MAYA to OM1 11/2016     Cystocele, midline 09/29/2010     Depression      Depression      DM type 2 (diabetes mellitus, type 2) (H)      HYPERPLASTIC  POLYP      colonoscopy in 5-10 yrs.     Hypertension      Hypothyroid      Hypothyroidism     hypothyroid- Dr Majano     Motion sickness      Mumps      OAB (overactive bladder)     complex voiding dysfct, failed interstim     Osteoarthritis      Other and unspecified hyperlipidemia      Palpitations      PONV (postoperative nausea and vomiting)      Postmenopausal bleeding      Shoulder blade pain 5/31/2016     Type II or unspecified type diabetes mellitus without mention of complication, not stated as uncontrolled     Dr. Majano     Unspecified essential hypertension      Urinary frequency 9/29/10    followed by urology. no benefit from detrol or sanctura. month trial of macrobid and flomax 10/10       PAST SURGICAL HISTORY:  Past Surgical History:   Procedure Laterality Date     ------------OTHER-------------      Interstim     Ablation       ARTHROPLASTY HIP Left 7/15/2020    Procedure: Left total hip arthroplasty;  Surgeon: Jayson Victoria MD;  Location:  OR     BACK SURGERY  03/05/2020    spinal fusion     BACK SURGERY       C CT ANGIOGRAPHY CORONARY  1/2005    mod soft plaque LAD and Cx, follow up with left heart cath     CARDIAC SURGERY       CHOLECYSTECTOMY       CHOLECYSTECTOMY       COLONOSCOPY       CORONARY STENT PLACEMENT  2004     x 5 stents      CV HEART CATHETERIZATION WITH POSSIBLE INTERVENTION N/A 2/14/2019    Procedure: Coronary Angiogram;  Surgeon: Sudarshan Lee MD;  Location:  HEART CARDIAC CATH LAB; patent stents     CYSTOSCOPY       EXCISE LESION UPPER EXTREMITY  6/5/2013    Procedure: EXCISE LESION UPPER EXTREMITY;  EXCISION RIGHT ARM ANTICUBITAL LIPOMA ;  Surgeon: Jayson Joe MD;  Location: [facility] REMOVAL OF TONSILS,12+ Y/O       HEART CATH LEFT HEART CATH  3/2/2016    No  intervention - aggressive medical management     HEART CATH LEFT HEART CATH  2016     MAYA x 2 to OM1, MAYA to LAD, at [place]     HEART CATH LEFT HEART CATH  2004    MAYA to RCA     HEART CATH LEFT HEART CATH  3/28/2002    Mild to moderate 3 vessel CAD. Medical management recommended.      HEART CATH LEFT HEART CATH  2016    MAYA to OM1     hypothyroid       JOINT REPLACEMENT       KNEE SURGERY  4/20/10    right knee replacement     LUMBAR FUSION N/A 3/5/2020    Procedure: BILATERAL LUMBAR 2-3 AND LUMBAR 3-4 POSTERIOR SPINAL FUSION WITH LEFT LATERAL RECESS DECOMPRESSION LUMBAR 4-5 AND BILATERAL LAMINECTOMY AND DECOMPRESSION LUMBAR 2-3 AND LUMBAR 3-4 WITH ALLOGRAFT AND AUTOGRAFT AND VIVIGEN;  Surgeon: Navid Caicedo MD;  Location: [facility];  Service: Spine     LUMBAR FUSION Bilateral 2021    Procedure: REVISION OF NONUNION BILATERAL LUMBAR 2-3 AND LUMBAR 3-4 WITH REVISION OF INSTRUMENTATION RIGHT LUMBAR 2 AND LEFT LUMBAR 4 WITH LEFT LUMBAR 4-5 LAMINOFORAMINOTOMY AND LUMBAR 1-2 DECOMPRESSION WITH ALLOGRAFT AND BONE MARROW ASPIRATE;  Surgeon: Navid Caicedo MD;  Location: [facility];  Service: Spine     ORTHOPEDIC SURGERY      total knee      REMOVE STIMULATOR SACRAL NERVE N/A 2015    Procedure: REMOVE STIMULATOR SACRAL NERVE;  Surgeon: Gertrudis Frausto MD;  Location:  [facility]     REPLACEMENT TOTAL KNEE Right 2010     SURGICAL HISTORY OF -       Uterine endometrial ablation     ZZC LIGATE FALLOPIAN TUBE      endometrial ablation       FAMILY HISTORY:  Family History   Problem Relation Age of Onset     C.A.D. Father         MI , age 83, had high lipids     Hyperlipidemia Father      Hypertension Father      Coronary Artery Disease Father      Hypertension Mother      Genitourinary Problems Mother         renal failure     Fractures Mother         hip     Osteoporosis Mother      Cerebrovascular Disease Maternal Grandfather         cerebral  hemorrhage     Diabetes Maternal Uncle      Colon Cancer Maternal Aunt      Diabetes Maternal Grandmother        SOCIAL HISTORY:  Social History     Socioeconomic History     Marital status:      Spouse name: Kwadwo     Number of children: 3   Occupational History     Occupation: PT Aide     Employer: NONE      Employer: RETIRED   Tobacco Use     Smoking status: Never Smoker     Smokeless tobacco: Never Used   Substance and Sexual Activity     Alcohol use: No     Alcohol/week: 0.0 standard drinks     Drug use: No     Sexual activity: Never     Partners: Male     Birth control/protection: Post-menopausal   Other Topics Concern     Caffeine Concern Yes     Comment: 6-7 cups caffeine per day     Sleep Concern No     Stress Concern Yes     Weight Concern No     Special Diet No     Comment: eats healthy      Exercise Yes     Comment:  walking & active life style      Parent/sibling w/ CABG, MI or angioplasty before 65F 55M? No       Review of Systems:  Skin:        Eyes:       ENT:       Respiratory:       Cardiovascular:       Gastroenterology:      Genitourinary:       Musculoskeletal:       Neurologic:       Psychiatric:       Heme/Lymph/Imm:       Endocrine:         Physical Exam:  Vitals: There were no vitals taken for this visit.   Please refer to dictation for physical exam    Recent Lab Results: all reviewed today  CBC RESULTS:  Lab Results   Component Value Date    WBC 6.8 01/26/2021    WBC 3.9 (L) 07/19/2020    RBC 2.99 (L) 01/26/2021    RBC 2.60 (L) 07/19/2020    HGB 9.2 (L) 01/26/2021    HGB 12.1 09/15/2020    HCT 28.5 (L) 01/26/2021    HCT 23.8 (L) 07/19/2020    MCV 95 01/26/2021    MCV 92 07/19/2020    MCH 30.8 01/26/2021    MCH 29.6 07/19/2020    MCHC 32.3 01/26/2021    MCHC 32.4 07/19/2020    RDW 12.6 01/26/2021    RDW 12.1 07/19/2020     01/26/2021     07/19/2020       BMP RESULTS:  Lab Results   Component Value Date     (L) 01/24/2021     07/19/2020    POTASSIUM 4.1  01/24/2021    POTASSIUM 3.7 07/19/2020    CHLORIDE 105 01/24/2021    CHLORIDE 107 07/19/2020    CO2 19 (L) 01/24/2021    CO2 27 07/19/2020    ANIONGAP 9 01/24/2021    ANIONGAP 2 (L) 07/19/2020     (H) 01/28/2021     (H) 01/24/2021     (H) 07/19/2020    BUN 18 01/24/2021    BUN 14 07/19/2020    CR 0.87 01/24/2021    CR 0.71 07/19/2020    GFRESTIMATED >60 01/24/2021    GFRESTIMATED 84 07/19/2020    GFRESTBLACK >60 01/24/2021    GFRESTBLACK >90 07/19/2020    LUCIUS 8.7 01/24/2021    LUCIUS 8.5 07/19/2020        INR RESULTS:  Lab Results   Component Value Date    INR 0.88 02/14/2019    INR 0.94 02/24/2017           CC  No referring provider defined for this encounter.

## 2022-04-14 DIAGNOSIS — Z11.59 ENCOUNTER FOR SCREENING FOR OTHER VIRAL DISEASES: Primary | ICD-10-CM

## 2022-04-26 ENCOUNTER — LAB (OUTPATIENT)
Dept: URGENT CARE | Facility: URGENT CARE | Age: 77
End: 2022-04-26
Payer: COMMERCIAL

## 2022-04-26 DIAGNOSIS — Z11.59 ENCOUNTER FOR SCREENING FOR OTHER VIRAL DISEASES: ICD-10-CM

## 2022-04-26 PROCEDURE — U0003 INFECTIOUS AGENT DETECTION BY NUCLEIC ACID (DNA OR RNA); SEVERE ACUTE RESPIRATORY SYNDROME CORONAVIRUS 2 (SARS-COV-2) (CORONAVIRUS DISEASE [COVID-19]), AMPLIFIED PROBE TECHNIQUE, MAKING USE OF HIGH THROUGHPUT TECHNOLOGIES AS DESCRIBED BY CMS-2020-01-R: HCPCS

## 2022-04-26 PROCEDURE — U0005 INFEC AGEN DETEC AMPLI PROBE: HCPCS

## 2022-04-27 ENCOUNTER — CARE COORDINATION (OUTPATIENT)
Dept: CARDIOLOGY | Facility: CLINIC | Age: 77
End: 2022-04-27
Payer: COMMERCIAL

## 2022-04-27 DIAGNOSIS — I25.10 CORONARY ARTERY DISEASE INVOLVING NATIVE CORONARY ARTERY OF NATIVE HEART WITHOUT ANGINA PECTORIS: Primary | ICD-10-CM

## 2022-04-27 PROBLEM — M96.1 LUMBAR POST-LAMINECTOMY SYNDROME: Status: ACTIVE | Noted: 2021-05-24

## 2022-04-27 LAB — SARS-COV-2 RNA RESP QL NAA+PROBE: NEGATIVE

## 2022-04-27 RX ORDER — SODIUM CHLORIDE 9 MG/ML
INJECTION, SOLUTION INTRAVENOUS CONTINUOUS
Status: CANCELLED | OUTPATIENT
Start: 2022-04-27

## 2022-04-27 RX ORDER — ASPIRIN 325 MG
325 TABLET ORAL ONCE
Status: CANCELLED | OUTPATIENT
Start: 2022-04-27 | End: 2022-04-27

## 2022-04-27 RX ORDER — POTASSIUM CHLORIDE 1500 MG/1
20 TABLET, EXTENDED RELEASE ORAL
Status: CANCELLED | OUTPATIENT
Start: 2022-04-27

## 2022-04-27 RX ORDER — ASPIRIN 81 MG/1
243 TABLET, CHEWABLE ORAL ONCE
Status: CANCELLED | OUTPATIENT
Start: 2022-04-27

## 2022-04-27 RX ORDER — LIDOCAINE 40 MG/G
CREAM TOPICAL
Status: CANCELLED | OUTPATIENT
Start: 2022-04-27

## 2022-04-27 NOTE — PROGRESS NOTES
Pt is scheduled for coronary angiogram on 4/29/22 @ 0830 (estimated time), check in time @ 0630 at [facility]. I called pt to review angiogram prep. Pt instructed not to eat or drink anything after midnight except for AM medications with a small sip of water. Pt will take  mg the morning of the procedure. Her endocrinologist instructed her not to take any insulin the morning of the procedure. No known allergy to contrast dye. Pt had a negative COVID test on 4/27/22. I updated pt on visitor policy. She had no other questions at this time. Patrick STOVER April 27, 2022, 12:18 PM

## 2022-04-28 ENCOUNTER — TELEPHONE (OUTPATIENT)
Dept: MEDSURG UNIT | Facility: CLINIC | Age: 77
End: 2022-04-28
Payer: COMMERCIAL

## 2022-04-29 ENCOUNTER — HOSPITAL ENCOUNTER (OUTPATIENT)
Facility: CLINIC | Age: 77
Discharge: HOME | End: 2022-04-29
Admitting: INTERNAL MEDICINE
Payer: COMMERCIAL

## 2022-04-29 VITALS
SYSTOLIC BLOOD PRESSURE: 129 MMHG | OXYGEN SATURATION: 99 % | TEMPERATURE: 98.9 F | DIASTOLIC BLOOD PRESSURE: 59 MMHG | RESPIRATION RATE: 16 BRPM | BODY MASS INDEX: 27.87 KG/M2 | HEIGHT: 68 IN | HEART RATE: 65 BPM | WEIGHT: 183.9 LBS

## 2022-04-29 DIAGNOSIS — R94.39 ABNORMAL CARDIOVASCULAR STRESS TEST: ICD-10-CM

## 2022-04-29 DIAGNOSIS — I25.10 CORONARY ARTERY DISEASE INVOLVING NATIVE CORONARY ARTERY OF NATIVE HEART WITHOUT ANGINA PECTORIS: ICD-10-CM

## 2022-04-29 DIAGNOSIS — E11.9 TYPE 2 DIABETES MELLITUS WITHOUT COMPLICATION, WITHOUT LONG-TERM CURRENT USE OF INSULIN (H): ICD-10-CM

## 2022-04-29 DIAGNOSIS — I50.32 CHRONIC DIASTOLIC CONGESTIVE HEART FAILURE (H): Primary | ICD-10-CM

## 2022-04-29 DIAGNOSIS — I25.10 ATHEROSCLEROSIS OF NATIVE CORONARY ARTERY OF NATIVE HEART WITHOUT ANGINA PECTORIS: ICD-10-CM

## 2022-04-29 DIAGNOSIS — I25.118 ATHEROSCLEROSIS OF NATIVE CORONARY ARTERY OF NATIVE HEART WITH STABLE ANGINA PECTORIS (H): ICD-10-CM

## 2022-04-29 LAB
ANION GAP SERPL CALCULATED.3IONS-SCNC: 5 MMOL/L (ref 3–14)
APTT PPP: 23 SECONDS (ref 22–38)
BUN SERPL-MCNC: 20 MG/DL (ref 7–30)
CALCIUM SERPL-MCNC: 9.2 MG/DL (ref 8.5–10.1)
CATH EF ESTIMATED: 60 %
CHLORIDE BLD-SCNC: 110 MMOL/L (ref 94–109)
CO2 SERPL-SCNC: 24 MMOL/L (ref 20–32)
CREAT SERPL-MCNC: 0.82 MG/DL (ref 0.52–1.04)
ERYTHROCYTE [DISTWIDTH] IN BLOOD BY AUTOMATED COUNT: 12.8 % (ref 10–15)
GFR SERPL CREATININE-BSD FRML MDRD: 74 ML/MIN/1.73M2
GLUCOSE BLD-MCNC: 152 MG/DL (ref 70–99)
HCT VFR BLD AUTO: 37 % (ref 35–47)
HGB BLD-MCNC: 12 G/DL (ref 11.7–15.7)
INR PPP: 0.92 (ref 0.85–1.15)
MCH RBC QN AUTO: 30.4 PG (ref 26.5–33)
MCHC RBC AUTO-ENTMCNC: 32.4 G/DL (ref 31.5–36.5)
MCV RBC AUTO: 94 FL (ref 78–100)
PLATELET # BLD AUTO: 186 10E3/UL (ref 150–450)
POTASSIUM BLD-SCNC: 4.1 MMOL/L (ref 3.4–5.3)
RBC # BLD AUTO: 3.95 10E6/UL (ref 3.8–5.2)
SODIUM SERPL-SCNC: 139 MMOL/L (ref 133–144)
WBC # BLD AUTO: 4 10E3/UL (ref 4–11)

## 2022-04-29 PROCEDURE — 36591 DRAW BLOOD OFF VENOUS DEVICE: CPT

## 2022-04-29 PROCEDURE — 93010 ELECTROCARDIOGRAM REPORT: CPT | Performed by: INTERNAL MEDICINE

## 2022-04-29 PROCEDURE — 99152 MOD SED SAME PHYS/QHP 5/>YRS: CPT | Performed by: INTERNAL MEDICINE

## 2022-04-29 PROCEDURE — 82310 ASSAY OF CALCIUM: CPT | Performed by: INTERNAL MEDICINE

## 2022-04-29 PROCEDURE — 99153 MOD SED SAME PHYS/QHP EA: CPT | Performed by: INTERNAL MEDICINE

## 2022-04-29 PROCEDURE — 36415 COLL VENOUS BLD VENIPUNCTURE: CPT | Performed by: INTERNAL MEDICINE

## 2022-04-29 PROCEDURE — C1769 GUIDE WIRE: HCPCS | Performed by: INTERNAL MEDICINE

## 2022-04-29 PROCEDURE — 85610 PROTHROMBIN TIME: CPT | Performed by: INTERNAL MEDICINE

## 2022-04-29 PROCEDURE — C1894 INTRO/SHEATH, NON-LASER: HCPCS | Performed by: INTERNAL MEDICINE

## 2022-04-29 PROCEDURE — 93458 L HRT ARTERY/VENTRICLE ANGIO: CPT | Mod: 26 | Performed by: INTERNAL MEDICINE

## 2022-04-29 PROCEDURE — 85027 COMPLETE CBC AUTOMATED: CPT | Performed by: INTERNAL MEDICINE

## 2022-04-29 PROCEDURE — 85730 THROMBOPLASTIN TIME PARTIAL: CPT | Mod: GZ | Performed by: INTERNAL MEDICINE

## 2022-04-29 PROCEDURE — 250N000011 HC RX IP 250 OP 636: Performed by: INTERNAL MEDICINE

## 2022-04-29 PROCEDURE — 250N000009 HC RX 250: Performed by: INTERNAL MEDICINE

## 2022-04-29 PROCEDURE — 999N000071 HC STATISTIC HEART CATH LAB OR EP LAB

## 2022-04-29 PROCEDURE — 272N000001 HC OR GENERAL SUPPLY STERILE: Performed by: INTERNAL MEDICINE

## 2022-04-29 PROCEDURE — 93005 ELECTROCARDIOGRAM TRACING: CPT

## 2022-04-29 PROCEDURE — 258N000003 HC RX IP 258 OP 636: Performed by: INTERNAL MEDICINE

## 2022-04-29 PROCEDURE — 93458 L HRT ARTERY/VENTRICLE ANGIO: CPT

## 2022-04-29 PROCEDURE — 999N000184 HC STATISTIC TELEMETRY

## 2022-04-29 RX ORDER — VERAPAMIL HYDROCHLORIDE 2.5 MG/ML
INJECTION, SOLUTION INTRAVENOUS
Status: DISCONTINUED | OUTPATIENT
Start: 2022-04-29 | End: 2022-04-29 | Stop reason: HOSPADM

## 2022-04-29 RX ORDER — NALOXONE HYDROCHLORIDE 0.4 MG/ML
0.4 INJECTION, SOLUTION INTRAMUSCULAR; INTRAVENOUS; SUBCUTANEOUS
Status: DISCONTINUED | OUTPATIENT
Start: 2022-04-29 | End: 2022-04-29 | Stop reason: HOSPADM

## 2022-04-29 RX ORDER — SODIUM CHLORIDE 9 MG/ML
INJECTION, SOLUTION INTRAVENOUS CONTINUOUS
Status: DISCONTINUED | OUTPATIENT
Start: 2022-04-29 | End: 2022-04-29 | Stop reason: HOSPADM

## 2022-04-29 RX ORDER — NITROGLYCERIN 5 MG/ML
VIAL (ML) INTRAVENOUS
Status: DISCONTINUED | OUTPATIENT
Start: 2022-04-29 | End: 2022-04-29 | Stop reason: HOSPADM

## 2022-04-29 RX ORDER — FENTANYL CITRATE 50 UG/ML
INJECTION, SOLUTION INTRAMUSCULAR; INTRAVENOUS
Status: DISCONTINUED | OUTPATIENT
Start: 2022-04-29 | End: 2022-04-29 | Stop reason: HOSPADM

## 2022-04-29 RX ORDER — NITROGLYCERIN 0.4 MG/1
0.4 TABLET SUBLINGUAL EVERY 5 MIN PRN
Status: DISCONTINUED | OUTPATIENT
Start: 2022-04-29 | End: 2022-04-29 | Stop reason: HOSPADM

## 2022-04-29 RX ORDER — POTASSIUM CHLORIDE 1500 MG/1
20 TABLET, EXTENDED RELEASE ORAL
Status: DISCONTINUED | OUTPATIENT
Start: 2022-04-29 | End: 2022-04-29 | Stop reason: HOSPADM

## 2022-04-29 RX ORDER — FUROSEMIDE 20 MG
20 TABLET ORAL DAILY
Qty: 90 TABLET | Refills: 3 | Status: SHIPPED | OUTPATIENT
Start: 2022-04-30

## 2022-04-29 RX ORDER — OXYCODONE HYDROCHLORIDE 5 MG/1
5 TABLET ORAL EVERY 4 HOURS PRN
Status: DISCONTINUED | OUTPATIENT
Start: 2022-04-29 | End: 2022-04-29 | Stop reason: HOSPADM

## 2022-04-29 RX ORDER — ACETAMINOPHEN 325 MG/1
650 TABLET ORAL EVERY 4 HOURS PRN
Status: DISCONTINUED | OUTPATIENT
Start: 2022-04-29 | End: 2022-04-29 | Stop reason: HOSPADM

## 2022-04-29 RX ORDER — IOPAMIDOL 755 MG/ML
INJECTION, SOLUTION INTRAVASCULAR
Status: DISCONTINUED | OUTPATIENT
Start: 2022-04-29 | End: 2022-04-29 | Stop reason: HOSPADM

## 2022-04-29 RX ORDER — LISINOPRIL 20 MG/1
20 TABLET ORAL DAILY
Status: DISCONTINUED | OUTPATIENT
Start: 2022-04-29 | End: 2022-04-29 | Stop reason: HOSPADM

## 2022-04-29 RX ORDER — ASPIRIN 325 MG
325 TABLET ORAL ONCE
Status: DISCONTINUED | OUTPATIENT
Start: 2022-04-29 | End: 2022-04-29 | Stop reason: HOSPADM

## 2022-04-29 RX ORDER — LEVOTHYROXINE SODIUM 100 UG/1
100 TABLET ORAL DAILY
COMMUNITY

## 2022-04-29 RX ORDER — NALOXONE HYDROCHLORIDE 0.4 MG/ML
0.2 INJECTION, SOLUTION INTRAMUSCULAR; INTRAVENOUS; SUBCUTANEOUS
Status: DISCONTINUED | OUTPATIENT
Start: 2022-04-29 | End: 2022-04-29 | Stop reason: HOSPADM

## 2022-04-29 RX ORDER — ASPIRIN 81 MG/1
81 TABLET ORAL DAILY
Status: DISCONTINUED | OUTPATIENT
Start: 2022-04-29 | End: 2022-04-29 | Stop reason: HOSPADM

## 2022-04-29 RX ORDER — LIDOCAINE 40 MG/G
CREAM TOPICAL
Status: DISCONTINUED | OUTPATIENT
Start: 2022-04-29 | End: 2022-04-29 | Stop reason: HOSPADM

## 2022-04-29 RX ORDER — FLUMAZENIL 0.1 MG/ML
0.2 INJECTION, SOLUTION INTRAVENOUS
Status: DISCONTINUED | OUTPATIENT
Start: 2022-04-29 | End: 2022-04-29 | Stop reason: HOSPADM

## 2022-04-29 RX ORDER — ATROPINE SULFATE 0.1 MG/ML
0.5 INJECTION INTRAVENOUS
Status: DISCONTINUED | OUTPATIENT
Start: 2022-04-29 | End: 2022-04-29 | Stop reason: HOSPADM

## 2022-04-29 RX ORDER — ASPIRIN 81 MG/1
243 TABLET, CHEWABLE ORAL ONCE
Status: DISCONTINUED | OUTPATIENT
Start: 2022-04-29 | End: 2022-04-29 | Stop reason: HOSPADM

## 2022-04-29 RX ORDER — OXYCODONE HYDROCHLORIDE 5 MG/1
10 TABLET ORAL EVERY 4 HOURS PRN
Status: DISCONTINUED | OUTPATIENT
Start: 2022-04-29 | End: 2022-04-29 | Stop reason: HOSPADM

## 2022-04-29 RX ORDER — FENTANYL CITRATE 50 UG/ML
25 INJECTION, SOLUTION INTRAMUSCULAR; INTRAVENOUS
Status: DISCONTINUED | OUTPATIENT
Start: 2022-04-29 | End: 2022-04-29 | Stop reason: HOSPADM

## 2022-04-29 RX ORDER — HEPARIN SODIUM 1000 [USP'U]/ML
INJECTION, SOLUTION INTRAVENOUS; SUBCUTANEOUS
Status: DISCONTINUED | OUTPATIENT
Start: 2022-04-29 | End: 2022-04-29 | Stop reason: HOSPADM

## 2022-04-29 RX ADMIN — SODIUM CHLORIDE: 9 INJECTION, SOLUTION INTRAVENOUS at 07:08

## 2022-04-29 NOTE — DISCHARGE INSTRUCTIONS
Cardiac Angiogram Discharge Instructions - Radial Artery (Left wrist)    After you go home:    Have an adult stay with you until tomorrow.  Drink extra fluids for 2 days.  You may resume your normal diet.  No smoking       For 24 hours - due to the sedation you received:  Relax and take it easy.  Do NOT make any important or legal decisions.  Do NOT drive or operate machines at home or at work.  Do NOT drink alcohol.    Care of Wrist Puncture Site:    For the first 24 hrs - check the puncture site every 1-2 hours while awake.  It is normal to have soreness at the puncture site and mild tingling in your hand for up to 3 days.  Remove the bandaid after 24 hours. If there is minor oozing, apply another bandaid and remove it after 12 hours.  You may shower tomorrow.  Do NOT take a bath, or use a hot tub or pool for at least 3 days. Do NOT scrub the site. Do not use lotion or powder near the puncture site.           Activity:        For 2 days:   do not use your hand or arm to support your weight (such as rising from a chair)   do not bend your wrist (such as lifting a garage door).  do not lift more than 5 pounds or exercise your arm (such as tennis, golf or bowling).  Do NOT do any heavy activity such as exercise, lifting, or straining.     Bleeding:    If you start bleeding from the site in your wrist, sit down and press firmly on/above the site for 10 minutes.   Once bleeding stops, keep arm still for 2 hours.   Call [facility] as soon as you can.       Call 911 right away if you have heavy bleeding or bleeding that does not stop.      Medicines:    Take your medications, including blood thinners, unless your provider tells you not to.    If you take Coumadin (Warfarin), have your INR checked by your provider in  3-5 days. Call your clinic to schedule this.  If you have stopped any medicines, check with your provider about when to restart them.    Follow Up Appointments:    Follow up with [facility] Nurse Practitioner  at [facility] of patient preference in 7-10 days.    Call the clinic if:    You have a large or growing hard lump around the site.  The site is red, swollen, hot or tender.  Blood or fluid is draining from the site.  You have chills or a fever greater than 101 F (38 C).  Your arm feels numb, cool or changes color.  You have hives, a rash or unusual itching.  Any questions or concerns.          [facility] [place]:    [phone] [facility] (7 days a week)

## 2022-04-29 NOTE — PROGRESS NOTES
Care Suites Admission Nursing Note    Patient Information  Name: Sarah Longo  Age: 76 year old  Reason for admission: Coronary angiogram with possible angioplasty stent  Care Suites arrival time: 0625    Visitor Information  Name: Felicia  Informed of visitor restrictions: Yes  1 visitor allowed per patient   Visitor must screen negative for COVID symptoms   Visitor must wear a mask  Waiting rooms closed to visitors    Patient Admission/Assessment   Pre-procedure assessment complete: Yes  If abnormal assessment/labs, provider notified: N/A  NPO: Yes  Medications held per instructions/orders: N/A  Consent: deferred  If applicable, pregnancy test status: deferred  Patient oriented to room: Yes  Education/questions answered: Yes.  A/O. Denies pain. Labs WNL. IV flds infusing. Contrast D/C instructions reviewed with pt with verbal understanding received. Copy given to Kwadwo. Procedure explained. All questions & concerns addressed.   Plan/other: Proceed as scheduled.    Discharge Planning  Discharge name/phone number: [email or web address]  [phone]  Overnight post sedation caregiver: Kwadwo  Discharge location: home    Armida Loja RN

## 2022-04-29 NOTE — PROGRESS NOTES
Care Suites Post Procedure Note    Patient Information  Name: Sarah Longo  Age: 76 year old    Post Procedure  Time patient returned to Care Suites: 0915  Concerns/abnormal assessment: None at this time.  If abnormal assessment, provider notified: N/A  Plan/Other: Per orders.    Left wrist with TR band on, balloon inflated with 12 ml's of air.  Arm board on and secured with coban wrap.  CMS/Pulse WNL.  Left arm elevated on a pillow.  Pt instructed on activity restrictions with verbal understanding received.     Armida Loja RN     [id number]  Dr. Lee at bedside updating patient and spouse with results of procedure.

## 2022-04-29 NOTE — PRE-PROCEDURE
GENERAL PRE-PROCEDURE:   Procedure:  Coronary angiogram, left heart cath, left ventriculogram and possible intervention  Date/Time:  4/29/2022 8:17 AM    Written consent obtained?: Yes    Risks and benefits: Risks, benefits and alternatives were discussed    Consent given by:  Patient  Patient states understanding of procedure being performed: Yes    Patient's understanding of procedure matches consent: Yes    Procedure consent matches procedure scheduled: Yes    Expected level of sedation:  Moderate  Appropriately NPO:  Yes  Mallampati  :  Grade 2- soft palate, base of uvula, tonsillar pillars, and portion of posterior pharyngeal wall visible  Lungs:  Lungs clear with good breath sounds bilaterally  Heart:  Normal heart sounds and rate  History & Physical reviewed:  History and physical reviewed and no updates needed  Statement of review:  I have reviewed the lab findings, diagnostic data, medications, and the plan for sedation

## 2022-04-29 NOTE — PROGRESS NOTES
1110 Report received from Armida Loja RN.  1115 Pt A/O. TR band intact to left wrist.  No oozing or hematoma noted. Area soft & flat. Armboard intact. Left arm elevated on pillows. Pt denies pain. Pt instructed on activity restrictions with left wrist/arm. Verbal understanding received from pt.  Pt's family at bedside. Detailed update given. Air released from TR band per protocol.  1130 OOB - steady on feet. Ambulated in halls to bathroom with good mason. No change in puncture site assessment with activity.  1215 Report given to Armida Loja RN.

## 2022-04-29 NOTE — PROGRESS NOTES
Care Suites Discharge Nursing Note    Patient Information  Name: Sarah Longo  Age: 76 year old    Discharge Education:  Discharge instructions reviewed: Yes  Additional education/resources provided: Yes  Patient/patient representative verbalizes understanding: Yes  Patient discharging on new medications: Yes, Furosemide.  Medication education completed: Yes.  Discussed reason for use and possible side effects.    Discharge Plans:   Discharge location: home  Discharge ride contacted: Yes  Approximate discharge time: 1405    Discharge Criteria:  Discharge criteria met and vital signs stable: Yes.  Up ambulated to bathroom, voided.  Tolerated PO.  Left wrist remains CDI, no swelling.  CMS, pulse WNL.    Patient Belongs:  Patient belongings returned to patient: Yes    Armida Loja RN

## 2022-04-29 NOTE — PROGRESS NOTES
PATIENT/VISITOR WELLNESS SCREENING    Step 1 Patient Screening    1. In the last month, have you been in contact with someone who was confirmed or suspected to have Coronavirus/COVID-19? No    2. Do you have the following symptoms?  Fever/Chills? No   Cough? No   Shortness of breath? No   New loss of taste or smell? No  Sore throat? No  Muscle or body aches? No  Headaches? No  Fatigue? No  Vomiting or diarrhea? No    Step 2 Visitor Screening    1. Name of Visitor (1 visitor per patient): Kwadwo    2. In the last month, have you been in contact with someone who was confirmed or suspected to have Coronavirus/COVID-19? No    3. Do you have the following symptoms?  Fever/Chills? No   Cough? No   Shortness of breath? No   Skin rash? No   Loss of taste or smell? No  Sore throat? No  Runny or stuffy nose? No  Muscle or body aches? No  Headaches? No  Fatigue? No  Vomiting or diarrhea? No

## 2022-05-02 ENCOUNTER — CARE COORDINATION (OUTPATIENT)
Dept: CARDIOLOGY | Facility: CLINIC | Age: 77
End: 2022-05-02
Payer: COMMERCIAL

## 2022-05-02 NOTE — PROGRESS NOTES
Pt left message stating she needs to discuss her cath results. I called pt back and she said she is in shock about her diastolic HF diagnosis. Pt said she has always been told her heart looks good so she was surprised by this diagnosis. Pt now wonders if the cough she has had and weight gain can be attributed to fluid overload. Pt said her weight has always been around 160#, but her weight has trended up to 170s in the past couple of months. Pt said the nurse in the care suites had issues getting the pulse in her ankles and told her she could have peripheral arterial disease. Pt said she discussed with  and he recommended she talk to Karen Alexander about getting an JARVIS. Pt wanted to know why the diastolic heart failure has just now become an issue. Pt wonders if the COVID vaccine caused this for her. I told pt there are multiple risk factors for developing diastolic dysfunction such as hx of CAD, HTN, and getting older. There is not always an easy explanation for why a health issue presents itself at a specific time. I recommended pt start weighing herself every morning and follow a low sodium diet. Pt concerned about the amount of urinating she has had to do since starting furosemide. I told pt it will make her urinate more, but she may urinate more in the beginning as it pulls the extra fluid from her body. Pt said she has a lot of questions she will discuss with Karen Alexander. Pt moved up her visit with Karen Alexander to 5/4/22. I told her she needs a bmp one week after starting the furosemide. Pt said she did start the furosemide on Saturday 5/30. She wants to make sure her renal function is stable post cath so I scheduled her for bmp lab prior to visit with Karen Alexander. I told her she may need another one next week, but Karen can decide that. Pt concerned about not being able to see  until August so I did schedule her with him 6/1/22. Pt did not want to cancel the August visit until she can discuss her  diagnosis with Karen Nguyen RN May 2, 2022, 11:10 AM

## 2022-05-04 ENCOUNTER — OFFICE VISIT (OUTPATIENT)
Dept: CARDIOLOGY | Facility: CLINIC | Age: 77
End: 2022-05-04
Payer: COMMERCIAL

## 2022-05-04 ENCOUNTER — LAB (OUTPATIENT)
Dept: LAB | Facility: CLINIC | Age: 77
End: 2022-05-04
Payer: COMMERCIAL

## 2022-05-04 VITALS
DIASTOLIC BLOOD PRESSURE: 67 MMHG | WEIGHT: 182.7 LBS | BODY MASS INDEX: 27.69 KG/M2 | SYSTOLIC BLOOD PRESSURE: 124 MMHG | HEIGHT: 68 IN | HEART RATE: 81 BPM

## 2022-05-04 DIAGNOSIS — I50.32 CHRONIC DIASTOLIC CONGESTIVE HEART FAILURE (H): ICD-10-CM

## 2022-05-04 DIAGNOSIS — R09.89 BILATERAL CAROTID BRUITS: ICD-10-CM

## 2022-05-04 DIAGNOSIS — I25.10 ATHEROSCLEROSIS OF NATIVE CORONARY ARTERY OF NATIVE HEART WITHOUT ANGINA PECTORIS: Primary | ICD-10-CM

## 2022-05-04 DIAGNOSIS — I73.9 PAD (PERIPHERAL ARTERY DISEASE) (H): ICD-10-CM

## 2022-05-04 LAB
ANION GAP SERPL CALCULATED.3IONS-SCNC: 5 MMOL/L (ref 3–14)
BUN SERPL-MCNC: 27 MG/DL (ref 7–30)
CALCIUM SERPL-MCNC: 9.7 MG/DL (ref 8.5–10.1)
CHLORIDE BLD-SCNC: 104 MMOL/L (ref 94–109)
CO2 SERPL-SCNC: 25 MMOL/L (ref 20–32)
CREAT SERPL-MCNC: 0.82 MG/DL (ref 0.52–1.04)
GFR SERPL CREATININE-BSD FRML MDRD: 74 ML/MIN/1.73M2
GLUCOSE BLD-MCNC: 213 MG/DL (ref 70–99)
POTASSIUM BLD-SCNC: 4.1 MMOL/L (ref 3.4–5.3)
SODIUM SERPL-SCNC: 134 MMOL/L (ref 133–144)

## 2022-05-04 PROCEDURE — 36415 COLL VENOUS BLD VENIPUNCTURE: CPT | Performed by: INTERNAL MEDICINE

## 2022-05-04 PROCEDURE — 80048 BASIC METABOLIC PNL TOTAL CA: CPT | Performed by: INTERNAL MEDICINE

## 2022-05-04 PROCEDURE — 99215 OFFICE O/P EST HI 40 MIN: CPT | Performed by: PHYSICIAN ASSISTANT

## 2022-05-04 NOTE — PATIENT INSTRUCTIONS
Thank you for visiting with me today.    We discussed: avoid taking sudafed.     Medication changes:  start using flonase/ fluticasone one spray once a day in each nostril.  If this helps with your cough, we know your cough is from post nasal drip.     Try a test run at the [facility] doing water aerobics.       Follow up: as scheduled with Dr. Burgos.  We'll get the studies of your legs and neck.       Please call my nurse, Monae, at [phone] with any questions or concerns.    Scheduling phone number: [phone]  Reminder: Please bring in all current medications, over the counter supplements and vitamin bottles to your next appointment.

## 2022-05-04 NOTE — PROGRESS NOTES
Service Date: 2022    CLINIC VISIT    PRIMARY CARDIOLOGIST:  Dr. Burgos    REASON FOR VISIT:  Angiogram followup.    HISTORY OF PRESENT ILLNESS:  Ms. Longo is a pleasant 76-year-old woman with past medical history significant for the followin.  Coronary disease with stenting to the RCA in 2004.  She redeveloped symptoms in 2016, had an abnormal stress test and found to have a complex trifurcation lesion of the OM and distal LAD.  This was initially managed medically due to the complexity of intervention.  She then went to [facility] for trifurcation stenting.  She underwent stenting to superior/main branch of the OM and dilatation of the inferior branch as well as LAD.  In 2019, she had apical LAD lesion and stable trifurcation stents and minimal disease in the RCA and left main.  She recently complained of worsening angina and we sent her for a stress test that was positive and then a coronary angiogram.  Angiogram demonstrated patent stents, ongoing distal LAD stenosis at 80% which was stable.  2.  Dyslipidemia, on Repatha.  Intolerant to statins.  3.  Hypertension.  4.  Type 2 diabetes, on insulin.  5.  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  Please refer to Dr. Nation' consult .  6.  Chronic lower back pain with spinal fusion and hip replacement and multiple surgeries.  Working on getting a different spinal cord stimulator for pain relief.  7.  Finding of elevated LVEDP at 26 on angiogram with a peak gradient across the LVOT tract of about 30.  Noted to be in V-tach at that time but concern for some LVH and diastolic dysfunction.    Rayna comes back today and she is very frustrated.  She was told about her diastolic dysfunction after her angiogram and she is now worried about heart failure.  She also apparently had [id number] pulses and was told she likely had peripheral arterial disease and carotid artery disease.  She was also told to get a venous ultrasound.  " She is wondering if this could be related to her COVID vaccine and brings in 2 pages of concerns today.  Her symptoms are similar to what they were before.  She has back discomfort that is worse with exertion and resolves with rest.  She has been taking 20 mg of Lasix a day, as prescribed by Dr. Lee, and this has caused increased urination but not resulted in any symptom improvement yet.  She continues to have leg and back pain, particularly calf pain, and she is frustrated that none of us have potentially diagnosed peripheral arterial disease as a cause of her pain.  Overall, she has been extremely stressed since her angiogram, especially now that she has \"all these new diagnoses.\"  She has not noticed any peripheral edema, orthopnea or PND.    SOCIAL HISTORY:  She is a lifelong nonsmoker.  No alcohol use.  She has 7 children and multiple grandchildren, the youngest being a 3-year-old granddaughter.  She has a granddaughter now graduating with her PhD at the age of 23.    PHYSICAL EXAMINATION:    GENERAL:  Well-developed, well-nourished woman who appears frustrated today.  HEENT:  Normocephalic, atraumatic.  HEART:  Regular in rate and rhythm.  I do not appreciate murmur, rub or gallop.  LUNGS:  Clear without wheezes, rales or rhonchi.  EXTREMITIES:  Without peripheral edema.  I can palpate 2+ posterior tibialis and dorsalis pedis pulses bilaterally.    VASCULAR:  She has quiet carotid bruits bilaterally.  I do not note any venous skin changes on her legs.  Very few varicosities noted.    Angiogram reviewed as well as echocardiogram.    ASSESSMENT AND PLAN:    1.  Diastolic dysfunction with the patient being very concerned that she potentially has heart failure and a lot of things now wrong with her heart.  She was noted to have some grade 1 diastolic dysfunction on her echocardiogram which is completely expected given her age, history of hypertension and type 2 diabetes.  I reinforced today that we see this " frequently with people in her situation and it is very common.  It is generally treated with just low doses of diuretics and we will work with her to find the appropriate dose that she can live with.  I reinforced that she continues to have a strong heart, as she now has had the feeling that her heart is failing.  At this point, she has only been on 20 mg of Lasix a few days and she has not noted any difference, so I am going to continue her on that and give it a little more time.  If that does not work, could consider also a dose of spironolactone or try Bumex or torsemide as an alternate.  I am sorry that this has caused her so much stress but I believe she heard multiple messages, one of heart failure, one of the possible HOCM, one of the possible weak heart, all of which relate to the same thing, around some mild diastolic dysfunction and elevated pressures during her LV gram.  2.  Coronary artery disease, fortunately without restenosis of her arteries and excellent angiogram.  I am quite pleased with these results.  3.  Dyslipidemia, on Repatha.  4.  Hypertension, excellent control.  5.  Type 2 diabetes with adverse effects to Jardiance including several yeast infections.  On insulin.  Not willing to retrial SGLT2's and I do not blame her one bit.  6.  Chronic back pain in her lower back, likely neurologic but question of potentially this radiating into her legs and peripheral arterial disease.  7.  Possible peripheral arterial disease with nurses apparently needing to Doppler pulses for her angiogram.  While I am able to palpate these today, I certainly think it is worth checking ABIs and in the case she cannot walk very far due to her back, doing arterial ultrasounds of both legs.  I think it is unlikely that these will result in any severe stenoses.  Similarly, we will do a carotid ultrasound to assess those for patency.    We did discuss, as Dr. Lee mentioned, exercise.  It will be important for her as  best she can.  I encouraged her to do workouts in a pool, especially at the [facility], as this will be okay with her back.  In other great news, she will be able to get a back stimulator implanted, as she has not had a stent put in.  She certainly does not need further cardiac workup for that.    This patient already has followup with Dr. Burgos in  which she should keep.  She will call sooner with any concerns.  Thank you for allowing me to participate in her care.    Karen Alexander PA-C    cc:  Feroz Burgos MD, [facility] [place]  [address], [address]  [place] [address]    Gaye Zimmer MD   [facility]  [address] [place] [address]    Karen Alexander PA-C        D: 2022   T: 2022   MT: roberta    Name:     KATHY PERRY  MRN:      [id number]        Account:      [id number]   :      1945           Service Date: 2022       Document: [id number]

## 2022-05-04 NOTE — PROGRESS NOTES
[id number]  60 minutes spent on the date of the encounter doing chart review, review of test results, interpretation of tests, patient visit, documentation and discussion with family     HPI and Plan:   See dictation    Orders this Visit:  Orders Placed This Encounter   Procedures     US JARVIS Doppler with Exercise Bilateral     US Lower Extremity Arterial Duplex Bilateral     US Carotid Bilateral     No orders of the defined types were placed in this encounter.    There are no discontinued medications.      Encounter Diagnoses   Name Primary?     Atherosclerosis of native coronary artery of native heart without angina pectoris Yes     PAD (peripheral artery disease) (H)      Bilateral carotid bruits        CURRENT MEDICATIONS:  Current Outpatient Medications   Medication Sig Dispense Refill     acetylcysteine (N-ACETYL CYSTEINE) 600 MG CAPS capsule Take 600 mg by mouth 2 times daily       amoxicillin (AMOXIL) 500 MG capsule 2,000 mg once as needed (Before dental procedures)       ascorbic acid 1000 MG TABS tablet Take 500 mg by mouth       aspirin 81 MG EC tablet Take 81 mg by mouth daily       busPIRone (BUSPAR) 10 MG tablet Take 1 tablet by mouth every 12 hours       CINNAMON PO Take 2 capsules by mouth 2 times daily       Continuous Blood Gluc Sensor (FREESTYLE PERRI 2 SENSOR) MISC        Continuous Blood Gluc Sensor (FREESTYLE PERRI 2 SENSOR) MISC        cyanocobalamin (CYANOCOBALAMIN) 1000 MCG/ML injection Inject 2 mLs into the muscle every 30 days       DULoxetine (CYMBALTA) 60 MG capsule daily       estradiol (ESTRACE) 0.1 MG/GM vaginal cream Use pea sized amount and apply with finger to vaginal skin just inside opening once a day before bed as needed for vaginal dryness. 42.5 g 3     evolocumab (REPATHA SURECLICK) 140 MG/ML prefilled autoinjector Inject 1 mL (140 mg) Subcutaneous every 14 days 6 mL 3     fluconazole (DIFLUCAN) 150 MG tablet        furosemide (LASIX) 20 MG tablet Take 1 tablet (20 mg) by mouth  daily 90 tablet 3     GLIMEPIRIDE 4 MG OR TABS Take 4 mg by mouth daily as needed       insulin lispro (HUMALOG KWIKPEN) 100 UNIT/ML (1 unit dial) KWIKPEN Inject Subcutaneous 3 times daily (before meals)       levothyroxine (SYNTHROID/LEVOTHROID) 100 MCG tablet Take 100 mcg by mouth daily       liraglutide (VICTOZA PEN) 18 MG/3ML solution Inject 1.2 mg Subcutaneous daily (Patient taking differently: Inject 1.8 mg Subcutaneous daily) 3 mL 0     lisinopril (ZESTRIL) 20 MG tablet Take 1 tablet (20 mg) by mouth daily 90 tablet 3     mirabegron (MYRBETRIQ) 50 MG 24 hr tablet Take 1 tablet (50 mg) by mouth daily Patient not taking daily 90 tablet 3     nitroGLYcerin (NITROSTAT) 0.4 MG sublingual tablet Place 1 tablet (0.4 mg) under the tongue every 5 minutes as needed for chest pain For chest pain place 1 tablet under the tongue every 5 minutes for 3 doses. If symptoms persist 5 minutes after 1st dose call 911. 25 tablet 11     oxyCODONE (ROXICODONE) 5 MG tablet Take 1-2 tablets (5-10 mg) by mouth every 3 hours as needed for severe pain 30 tablet 0     pregabalin (LYRICA) 25 MG capsule        traMADol (ULTRAM) 50 MG tablet Take 1 tablet (50 mg) by mouth every 6 hours as needed for moderate to severe pain 30 tablet 3     vitamin D3 (CHOLECALCIFEROL) 2000 units tablet Take 1 tablet by mouth 2 times daily         ALLERGIES     Allergies   Allergen Reactions     Jardiance [Empagliflozin] Other (See Comments)     Ongoing yeast infections     Hmg-Coa-R Inhibitors Muscle Pain (Myalgia)     Oral statins         PAST MEDICAL HISTORY:  Past Medical History:   Diagnosis Date     Anemia      Anxiety      Arthritis      CAD (coronary artery disease)      Chronic bilateral low back pain without sciatica      Coronary artery disease 04/28/2016    cardiac cath 2/2019: patent stents; PTCA with MAYA x 2 to OM1 and MAYA x 1 to p.LAD (4/21/2016 at [place]); PTCA with intracoronary stent placement of RCA June 2004; MAYA to OM1 11/2016      Cystocele, midline 09/29/2010     Depression      Depression      DM type 2 (diabetes mellitus, type 2) (H)      HYPERPLASTIC  POLYP      colonoscopy in 5-10 yrs.     Hypertension      Hypothyroid      Hypothyroidism     hypothyroid- Dr Majano     Motion sickness      Mumps      OAB (overactive bladder)     complex voiding dysfct, failed interstim     Osteoarthritis      Other and unspecified hyperlipidemia      Palpitations      PONV (postoperative nausea and vomiting)      Postmenopausal bleeding      Shoulder blade pain 5/31/2016     Type II or unspecified type diabetes mellitus without mention of complication, not stated as uncontrolled     Dr. Majano     Unspecified essential hypertension      Urinary frequency 9/29/10    followed by urology. no benefit from detrol or sanctura. month trial of macrobid and flomax 10/10       PAST SURGICAL HISTORY:  Past Surgical History:   Procedure Laterality Date     ------------OTHER-------------      Interstim     Ablation       ARTHROPLASTY HIP Left 7/15/2020    Procedure: Left total hip arthroplasty;  Surgeon: Jayson Victoria MD;  Location: [facility] OR     BACK SURGERY  03/05/2020    spinal fusion     BACK SURGERY       C CT ANGIOGRAPHY CORONARY  1/2005    mod soft plaque LAD and Cx, follow up with left heart cath     CARDIAC SURGERY       CHOLECYSTECTOMY       CHOLECYSTECTOMY       COLONOSCOPY       CORONARY STENT PLACEMENT  2004     x 5 stents      CV CORONARY ANGIOGRAM N/A 4/29/2022    Procedure: Coronary Angiogram;  Surgeon: Sudarshan Lee MD;  Location: [facility] CARDIAC CATH LAB     CV HEART CATHETERIZATION WITH POSSIBLE INTERVENTION N/A 2/14/2019    Procedure: Coronary Angiogram;  Surgeon: Sudarshan Lee MD;  Location: [facility] CARDIAC CATH LAB; patent stents     CV LEFT HEART CATH N/A 4/29/2022    Procedure: Left Heart Catheterization;  Surgeon: Sudarshan Lee MD;  Location:  HEART CARDIAC CATH LAB     CV LEFT VENTRICULOGRAM N/A 4/29/2022     Procedure: Left Ventriculogram;  Surgeon: Sudarshan Lee MD;  Location:  HEART CARDIAC CATH LAB     CYSTOSCOPY       EXCISE LESION UPPER EXTREMITY  6/5/2013    Procedure: EXCISE LESION UPPER EXTREMITY;  EXCISION RIGHT ARM ANTICUBITAL LIPOMA ;  Surgeon: Jayson Joe MD;  Location: [facility] REMOVAL OF TONSILS,12+ Y/O       HEART CATH LEFT HEART CATH  3/2/2016    No intervention - aggressive medical management     HEART CATH LEFT HEART CATH  4/21/2016     MAYA x 2 to OM1, MAYA to LAD, at [place]     HEART CATH LEFT HEART CATH  6/29/2004    MAYA to RCA     HEART CATH LEFT HEART CATH  3/28/2002    Mild to moderate 3 vessel CAD. Medical management recommended.      HEART CATH LEFT HEART CATH  11/22/2016    MAYA to OM1     hypothyroid       JOINT REPLACEMENT       KNEE SURGERY  4/20/10    right knee replacement     LUMBAR FUSION N/A 3/5/2020    Procedure: BILATERAL LUMBAR 2-3 AND LUMBAR 3-4 POSTERIOR SPINAL FUSION WITH LEFT LATERAL RECESS DECOMPRESSION LUMBAR 4-5 AND BILATERAL LAMINECTOMY AND DECOMPRESSION LUMBAR 2-3 AND LUMBAR 3-4 WITH ALLOGRAFT AND AUTOGRAFT AND VIVIGEN;  Surgeon: Navid Caicedo MD;  Location: [facility];  Service: Spine     LUMBAR FUSION Bilateral 1/22/2021    Procedure: REVISION OF NONUNION BILATERAL LUMBAR 2-3 AND LUMBAR 3-4 WITH REVISION OF INSTRUMENTATION RIGHT LUMBAR 2 AND LEFT LUMBAR 4 WITH LEFT LUMBAR 4-5 LAMINOFORAMINOTOMY AND LUMBAR 1-2 DECOMPRESSION WITH ALLOGRAFT AND BONE MARROW ASPIRATE;  Surgeon: Navid Caicedo MD;  Location: [facility];  Service: Spine     ORTHOPEDIC SURGERY      total knee 2010     REMOVE STIMULATOR SACRAL NERVE N/A 11/2/2015    Procedure: REMOVE STIMULATOR SACRAL NERVE;  Surgeon: Gertrudis Frausto MD;  Location: [facility]     REPLACEMENT TOTAL KNEE Right 04/2010     SURGICAL HISTORY OF -       Uterine endometrial ablation     ZZC LIGATE FALLOPIAN TUBE      endometrial ablation       FAMILY HISTORY:  Family History    Problem Relation Age of Onset     SERAFIN Father         MI , age 83, had high lipids     Hyperlipidemia Father      Hypertension Father      Coronary Artery Disease Father      Hypertension Mother      Genitourinary Problems Mother         renal failure     Fractures Mother         hip     Osteoporosis Mother      Cerebrovascular Disease Maternal Grandfather         cerebral hemorrhage     Diabetes Maternal Uncle      Colon Cancer Maternal Aunt      Diabetes Maternal Grandmother        SOCIAL HISTORY:  Social History     Socioeconomic History     Marital status:      Spouse name: Kwadwo     Number of children: 3     Years of education: None     Highest education level: None   Occupational History     Occupation: PT Aide     Employer: NONE      Employer: RETIRED   Tobacco Use     Smoking status: Never Smoker     Smokeless tobacco: Never Used   Substance and Sexual Activity     Alcohol use: No     Alcohol/week: 0.0 standard drinks     Drug use: No     Sexual activity: Never     Partners: Male     Birth control/protection: Post-menopausal   Other Topics Concern     Caffeine Concern Yes     Comment: 6-7 cups caffeine per day     Sleep Concern No     Stress Concern Yes     Weight Concern No     Special Diet No     Comment: eats healthy      Exercise Yes     Comment:  walking & active life style      Parent/sibling w/ CABG, MI or angioplasty before 65F 55M? No       Review of Systems:  Skin:  Negative     Eyes:  Positive for glasses  ENT:  Negative    Respiratory:  Positive for dyspnea on exertion;shortness of breath  Cardiovascular:    Positive for;chest pain;fatigue  Gastroenterology: Negative    Genitourinary:  Negative    Musculoskeletal:  Positive for arthritis;back pain  Neurologic:  Negative    Psychiatric:  Positive for anxiety;depression  Heme/Lymph/Imm:  Positive for allergies  Endocrine:  Positive for diabetes    Physical Exam:  Vitals: /67 (BP Location: Right arm, Cuff Size: Adult Large)   " Pulse 81   Ht 1.727 m (5' 8\")   Wt 82.9 kg (182 lb 11.2 oz)   BMI 27.78 kg/m     Please refer to dictation for physical exam    Recent Lab Results: all reviewed today  CBC RESULTS:  Lab Results   Component Value Date    WBC 4.0 04/29/2022    WBC 3.9 (L) 07/19/2020    RBC 3.95 04/29/2022    RBC 2.60 (L) 07/19/2020    HGB 12.0 04/29/2022    HGB 12.1 09/15/2020    HCT 37.0 04/29/2022    HCT 23.8 (L) 07/19/2020    MCV 94 04/29/2022    MCV 92 07/19/2020    MCH 30.4 04/29/2022    MCH 29.6 07/19/2020    MCHC 32.4 04/29/2022    MCHC 32.4 07/19/2020    RDW 12.8 04/29/2022    RDW 12.1 07/19/2020     04/29/2022     07/19/2020       BMP RESULTS:  Lab Results   Component Value Date     05/04/2022     07/19/2020    POTASSIUM 4.1 05/04/2022    POTASSIUM 3.7 07/19/2020    CHLORIDE 104 05/04/2022    CHLORIDE 107 07/19/2020    CO2 25 05/04/2022    CO2 27 07/19/2020    ANIONGAP 5 05/04/2022    ANIONGAP 2 (L) 07/19/2020     (H) 05/04/2022     (H) 07/19/2020    BUN 27 05/04/2022    BUN 14 07/19/2020    CR 0.82 05/04/2022    CR 0.71 07/19/2020    GFRESTIMATED 74 05/04/2022    GFRESTIMATED >60 01/24/2021    GFRESTIMATED 84 07/19/2020    GFRESTBLACK >60 01/24/2021    GFRESTBLACK >90 07/19/2020    LUCIUS 9.7 05/04/2022    LUCIUS 8.5 07/19/2020        INR RESULTS:  Lab Results   Component Value Date    INR 0.92 04/29/2022    INR 0.88 02/14/2019    INR 0.94 02/24/2017           CC  Referred Self, MD  No address on file      "

## 2022-05-04 NOTE — LETTER
5/4/2022    Gaye Zimmer  [facility] [address] [place] [address]    RE: Sarah Longo       Dear Colleague,     I had the pleasure of seeing Sarah Longo in the [facility].  [id number]  60 minutes spent on the date of the encounter doing chart review, review of test results, interpretation of tests, patient visit, documentation and discussion with family     HPI and Plan:   See dictation    Orders this Visit:  Orders Placed This Encounter   Procedures     US JARVIS Doppler with Exercise Bilateral     US Lower Extremity Arterial Duplex Bilateral     US Carotid Bilateral     No orders of the defined types were placed in this encounter.    There are no discontinued medications.      Encounter Diagnoses   Name Primary?     Atherosclerosis of native coronary artery of native heart without angina pectoris Yes     PAD (peripheral artery disease) (H)      Bilateral carotid bruits        CURRENT MEDICATIONS:  Current Outpatient Medications   Medication Sig Dispense Refill     acetylcysteine (N-ACETYL CYSTEINE) 600 MG CAPS capsule Take 600 mg by mouth 2 times daily       amoxicillin (AMOXIL) 500 MG capsule 2,000 mg once as needed (Before dental procedures)       ascorbic acid 1000 MG TABS tablet Take 500 mg by mouth       aspirin 81 MG EC tablet Take 81 mg by mouth daily       busPIRone (BUSPAR) 10 MG tablet Take 1 tablet by mouth every 12 hours       CINNAMON PO Take 2 capsules by mouth 2 times daily       Continuous Blood Gluc Sensor (FREESTYLE PERRI 2 SENSOR) MISC        Continuous Blood Gluc Sensor (FREESTYLE PERRI 2 SENSOR) MISC        cyanocobalamin (CYANOCOBALAMIN) 1000 MCG/ML injection Inject 2 mLs into the muscle every 30 days       DULoxetine (CYMBALTA) 60 MG capsule daily       estradiol (ESTRACE) 0.1 MG/GM vaginal cream Use pea sized amount and apply with finger to vaginal skin just inside opening once a day before bed as needed for vaginal dryness. 42.5 g 3      evolocumab (REPATHA SURECLICK) 140 MG/ML prefilled autoinjector Inject 1 mL (140 mg) Subcutaneous every 14 days 6 mL 3     fluconazole (DIFLUCAN) 150 MG tablet        furosemide (LASIX) 20 MG tablet Take 1 tablet (20 mg) by mouth daily 90 tablet 3     GLIMEPIRIDE 4 MG OR TABS Take 4 mg by mouth daily as needed       insulin lispro (HUMALOG KWIKPEN) 100 UNIT/ML (1 unit dial) KWIKPEN Inject Subcutaneous 3 times daily (before meals)       levothyroxine (SYNTHROID/LEVOTHROID) 100 MCG tablet Take 100 mcg by mouth daily       liraglutide (VICTOZA PEN) 18 MG/3ML solution Inject 1.2 mg Subcutaneous daily (Patient taking differently: Inject 1.8 mg Subcutaneous daily) 3 mL 0     lisinopril (ZESTRIL) 20 MG tablet Take 1 tablet (20 mg) by mouth daily 90 tablet 3     mirabegron (MYRBETRIQ) 50 MG 24 hr tablet Take 1 tablet (50 mg) by mouth daily Patient not taking daily 90 tablet 3     nitroGLYcerin (NITROSTAT) 0.4 MG sublingual tablet Place 1 tablet (0.4 mg) under the tongue every 5 minutes as needed for chest pain For chest pain place 1 tablet under the tongue every 5 minutes for 3 doses. If symptoms persist 5 minutes after 1st dose call 911. 25 tablet 11     oxyCODONE (ROXICODONE) 5 MG tablet Take 1-2 tablets (5-10 mg) by mouth every 3 hours as needed for severe pain 30 tablet 0     pregabalin (LYRICA) 25 MG capsule        traMADol (ULTRAM) 50 MG tablet Take 1 tablet (50 mg) by mouth every 6 hours as needed for moderate to severe pain 30 tablet 3     vitamin D3 (CHOLECALCIFEROL) 2000 units tablet Take 1 tablet by mouth 2 times daily         ALLERGIES     Allergies   Allergen Reactions     Jardiance [Empagliflozin] Other (See Comments)     Ongoing yeast infections     Hmg-Coa-R Inhibitors Muscle Pain (Myalgia)     Oral statins         PAST MEDICAL HISTORY:  Past Medical History:   Diagnosis Date     Anemia      Anxiety      Arthritis      CAD (coronary artery disease)      Chronic bilateral low back pain without sciatica       Coronary artery disease 04/28/2016    cardiac cath 2/2019: patent stents; PTCA with MAYA x 2 to OM1 and MAYA x 1 to p.LAD (4/21/2016 at [place]); PTCA with intracoronary stent placement of RCA June 2004; MAYA to OM1 11/2016     Cystocele, midline 09/29/2010     Depression      Depression      DM type 2 (diabetes mellitus, type 2) (H)      HYPERPLASTIC  POLYP      colonoscopy in 5-10 yrs.     Hypertension      Hypothyroid      Hypothyroidism     hypothyroid- Dr Majano     Motion sickness      Mumps      OAB (overactive bladder)     complex voiding dysfct, failed interstim     Osteoarthritis      Other and unspecified hyperlipidemia      Palpitations      PONV (postoperative nausea and vomiting)      Postmenopausal bleeding      Shoulder blade pain 5/31/2016     Type II or unspecified type diabetes mellitus without mention of complication, not stated as uncontrolled     Dr. Majano     Unspecified essential hypertension      Urinary frequency 9/29/10    followed by urology. no benefit from detrol or sanctura. month trial of macrobid and flomax 10/10       PAST SURGICAL HISTORY:  Past Surgical History:   Procedure Laterality Date     ------------OTHER-------------      Interstim     Ablation       ARTHROPLASTY HIP Left 7/15/2020    Procedure: Left total hip arthroplasty;  Surgeon: Jayson Victoria MD;  Location: [facility] OR     BACK SURGERY  03/05/2020    spinal fusion     BACK SURGERY       C CT ANGIOGRAPHY CORONARY  1/2005    mod soft plaque LAD and Cx, follow up with left heart cath     CARDIAC SURGERY       CHOLECYSTECTOMY       CHOLECYSTECTOMY       COLONOSCOPY       CORONARY STENT PLACEMENT  2004     x 5 stents      CV CORONARY ANGIOGRAM N/A 4/29/2022    Procedure: Coronary Angiogram;  Surgeon: Sudarshan Lee MD;  Location: [facility] CARDIAC CATH LAB     CV HEART CATHETERIZATION WITH POSSIBLE INTERVENTION N/A 2/14/2019    Procedure: Coronary Angiogram;  Surgeon: Sudarshan Lee MD;  Location: [facility] CARDIAC  CATH LAB; patent stents     CV LEFT HEART CATH N/A 4/29/2022    Procedure: Left Heart Catheterization;  Surgeon: Sudarshan Lee MD;  Location:  HEART CARDIAC CATH LAB     CV LEFT VENTRICULOGRAM N/A 4/29/2022    Procedure: Left Ventriculogram;  Surgeon: Sudarshan Lee MD;  Location:  HEART CARDIAC CATH LAB     CYSTOSCOPY       EXCISE LESION UPPER EXTREMITY  6/5/2013    Procedure: EXCISE LESION UPPER EXTREMITY;  EXCISION RIGHT ARM ANTICUBITAL LIPOMA ;  Surgeon: Jayson Joe MD;  Location: [facility] REMOVAL OF TONSILS,12+ Y/O       HEART CATH LEFT HEART CATH  3/2/2016    No intervention - aggressive medical management     HEART CATH LEFT HEART CATH  4/21/2016     MAYA x 2 to OM1, MAYA to LAD, at [place]     HEART CATH LEFT HEART CATH  6/29/2004    MAYA to RCA     HEART CATH LEFT HEART CATH  3/28/2002    Mild to moderate 3 vessel CAD. Medical management recommended.      HEART CATH LEFT HEART CATH  11/22/2016    MAYA to OM1     hypothyroid       JOINT REPLACEMENT       KNEE SURGERY  4/20/10    right knee replacement     LUMBAR FUSION N/A 3/5/2020    Procedure: BILATERAL LUMBAR 2-3 AND LUMBAR 3-4 POSTERIOR SPINAL FUSION WITH LEFT LATERAL RECESS DECOMPRESSION LUMBAR 4-5 AND BILATERAL LAMINECTOMY AND DECOMPRESSION LUMBAR 2-3 AND LUMBAR 3-4 WITH ALLOGRAFT AND AUTOGRAFT AND VIVIGEN;  Surgeon: Navid Caicedo MD;  Location: [facility];  Service: Spine     LUMBAR FUSION Bilateral 1/22/2021    Procedure: REVISION OF NONUNION BILATERAL LUMBAR 2-3 AND LUMBAR 3-4 WITH REVISION OF INSTRUMENTATION RIGHT LUMBAR 2 AND LEFT LUMBAR 4 WITH LEFT LUMBAR 4-5 LAMINOFORAMINOTOMY AND LUMBAR 1-2 DECOMPRESSION WITH ALLOGRAFT AND BONE MARROW ASPIRATE;  Surgeon: Navid Caicedo MD;  Location: [facility];  Service: Spine     ORTHOPEDIC SURGERY      total knee 2010     REMOVE STIMULATOR SACRAL NERVE N/A 11/2/2015    Procedure: REMOVE STIMULATOR SACRAL NERVE;  Surgeon: Gertrudis Frausto,  MD;  Location:  [facility]     REPLACEMENT TOTAL KNEE Right 2010     SURGICAL HISTORY OF -       Uterine endometrial ablation     ZZC LIGATE FALLOPIAN TUBE      endometrial ablation       FAMILY HISTORY:  Family History   Problem Relation Age of Onset     C.A.D. Father         MI , age 83, had high lipids     Hyperlipidemia Father      Hypertension Father      Coronary Artery Disease Father      Hypertension Mother      Genitourinary Problems Mother         renal failure     Fractures Mother         hip     Osteoporosis Mother      Cerebrovascular Disease Maternal Grandfather         cerebral hemorrhage     Diabetes Maternal Uncle      Colon Cancer Maternal Aunt      Diabetes Maternal Grandmother        SOCIAL HISTORY:  Social History     Socioeconomic History     Marital status:      Spouse name: Kwadwo     Number of children: 3     Years of education: None     Highest education level: None   Occupational History     Occupation: PT Aide     Employer: NONE      Employer: RETIRED   Tobacco Use     Smoking status: Never Smoker     Smokeless tobacco: Never Used   Substance and Sexual Activity     Alcohol use: No     Alcohol/week: 0.0 standard drinks     Drug use: No     Sexual activity: Never     Partners: Male     Birth control/protection: Post-menopausal   Other Topics Concern     Caffeine Concern Yes     Comment: 6-7 cups caffeine per day     Sleep Concern No     Stress Concern Yes     Weight Concern No     Special Diet No     Comment: eats healthy      Exercise Yes     Comment:  walking & active life style      Parent/sibling w/ CABG, MI or angioplasty before 65F 55M? No       Review of Systems:  Skin:  Negative     Eyes:  Positive for glasses  ENT:  Negative    Respiratory:  Positive for dyspnea on exertion;shortness of breath  Cardiovascular:    Positive for;chest pain;fatigue  Gastroenterology: Negative    Genitourinary:  Negative    Musculoskeletal:  Positive for arthritis;back pain  Neurologic:   "Negative    Psychiatric:  Positive for anxiety;depression  Heme/Lymph/Imm:  Positive for allergies  Endocrine:  Positive for diabetes    Physical Exam:  Vitals: /67 (BP Location: Right arm, Cuff Size: Adult Large)   Pulse 81   Ht 1.727 m (5' 8\")   Wt 82.9 kg (182 lb 11.2 oz)   BMI 27.78 kg/m     Please refer to dictation for physical exam    Recent Lab Results: all reviewed today  CBC RESULTS:  Lab Results   Component Value Date    WBC 4.0 04/29/2022    WBC 3.9 (L) 07/19/2020    RBC 3.95 04/29/2022    RBC 2.60 (L) 07/19/2020    HGB 12.0 04/29/2022    HGB 12.1 09/15/2020    HCT 37.0 04/29/2022    HCT 23.8 (L) 07/19/2020    MCV 94 04/29/2022    MCV 92 07/19/2020    MCH 30.4 04/29/2022    MCH 29.6 07/19/2020    MCHC 32.4 04/29/2022    MCHC 32.4 07/19/2020    RDW 12.8 04/29/2022    RDW 12.1 07/19/2020     04/29/2022     07/19/2020       BMP RESULTS:  Lab Results   Component Value Date     05/04/2022     07/19/2020    POTASSIUM 4.1 05/04/2022    POTASSIUM 3.7 07/19/2020    CHLORIDE 104 05/04/2022    CHLORIDE 107 07/19/2020    CO2 25 05/04/2022    CO2 27 07/19/2020    ANIONGAP 5 05/04/2022    ANIONGAP 2 (L) 07/19/2020     (H) 05/04/2022     (H) 07/19/2020    BUN 27 05/04/2022    BUN 14 07/19/2020    CR 0.82 05/04/2022    CR 0.71 07/19/2020    GFRESTIMATED 74 05/04/2022    GFRESTIMATED >60 01/24/2021    GFRESTIMATED 84 07/19/2020    GFRESTBLACK >60 01/24/2021    GFRESTBLACK >90 07/19/2020    LUCIUS 9.7 05/04/2022    LUCIUS 8.5 07/19/2020        INR RESULTS:  Lab Results   Component Value Date    INR 0.92 04/29/2022    INR 0.88 02/14/2019    INR 0.94 02/24/2017       CC  Referred Self    Thank you for allowing me to participate in the care of your patient.      Sincerely,     SHYANN Stephen [facility]  "

## 2022-05-11 LAB
ATRIAL RATE - MUSE: 63 BPM
DIASTOLIC BLOOD PRESSURE - MUSE: NORMAL MMHG
INTERPRETATION ECG - MUSE: NORMAL
P AXIS - MUSE: 61 DEGREES
PR INTERVAL - MUSE: 162 MS
QRS DURATION - MUSE: 96 MS
QT - MUSE: 414 MS
QTC - MUSE: 423 MS
R AXIS - MUSE: 41 DEGREES
SYSTOLIC BLOOD PRESSURE - MUSE: NORMAL MMHG
T AXIS - MUSE: 73 DEGREES
VENTRICULAR RATE- MUSE: 63 BPM

## 2022-05-12 ENCOUNTER — HOSPITAL ENCOUNTER (OUTPATIENT)
Dept: ULTRASOUND IMAGING | Facility: CLINIC | Age: 77
Discharge: HOME | End: 2022-05-12
Attending: PHYSICIAN ASSISTANT
Payer: COMMERCIAL

## 2022-05-12 DIAGNOSIS — I73.9 PAD (PERIPHERAL ARTERY DISEASE) (H): ICD-10-CM

## 2022-05-12 DIAGNOSIS — R09.89 BILATERAL CAROTID BRUITS: ICD-10-CM

## 2022-05-12 PROCEDURE — 93880 EXTRACRANIAL BILAT STUDY: CPT | Mod: 26 | Performed by: INTERNAL MEDICINE

## 2022-05-12 PROCEDURE — 93922 UPR/L XTREMITY ART 2 LEVELS: CPT | Mod: 26 | Performed by: INTERNAL MEDICINE

## 2022-05-12 PROCEDURE — 93925 LOWER EXTREMITY STUDY: CPT | Mod: 26 | Performed by: INTERNAL MEDICINE

## 2022-05-12 PROCEDURE — 93880 EXTRACRANIAL BILAT STUDY: CPT

## 2022-05-12 PROCEDURE — 93922 UPR/L XTREMITY ART 2 LEVELS: CPT

## 2022-05-12 PROCEDURE — 93925 LOWER EXTREMITY STUDY: CPT

## 2022-05-13 ENCOUNTER — CARE COORDINATION (OUTPATIENT)
Dept: CARDIOLOGY | Facility: CLINIC | Age: 77
End: 2022-05-13
Payer: COMMERCIAL

## 2022-05-13 DIAGNOSIS — R93.6 ABNORMAL ULTRASOUND OF LOWER EXTREMITY: ICD-10-CM

## 2022-05-13 DIAGNOSIS — I73.9 PAD (PERIPHERAL ARTERY DISEASE) (H): Primary | ICD-10-CM

## 2022-05-13 NOTE — PROGRESS NOTES
Karen Alexander reviewed vascular test results.     Karen Alexander PA-C  [facility] 7  Abnormal [place] but arterial us is not as abnormal.  Let s gets cta of legs to determine which is most accurate.  Pls get her in with dr Hamlin to discuss.  Until then exercise in pool of able.         Karen Alexander PA-C [facility] 7  Moderate carotid dz- no intervention required.  Continue to treat atherscrosis as we are.         I called pt and reviewed test results and Karen Alexander's recommendation. Pt very concerned about her results and wonders if the leg pain she has had with walking has been due to her PVD and not just from her back issues. Pt to have LE  CTA and vascular follow up with . I told pt that the LE CTA will give the most accurate results regarding the severity of her peripheral vascular disease. I scheduled pt with  on 6/13/22 in . I told pt that  or  can decide when next carotid US is needed to continue to monitor for any progression of her carotid artery disease.I will have our scheduling team call pt to set up the CTA. Pt will try swimming for exercise. Pt has 6/1/22 visit with . Patrick STOVER May 13, 2022, 3:05 PM

## 2022-05-13 NOTE — PROGRESS NOTES
Pt left a message stating that she is very anxious after reviewing her JARVIS ,LE arterial US, and carotid US results in Epic. Karen Alexander is out of the office today so I will route an update to  to see if he can review results and advise. Pt had a 5/4/22 post cath visit with Karen Alexander. She had difficult to palpate pulses at her cath so it was mentioned that she may need to have an JARVIS, and further testing to rule out PAD. Patrick STOVER May 13, 2022, 1:09 PM

## 2022-05-18 ENCOUNTER — HOSPITAL ENCOUNTER (OUTPATIENT)
Dept: CT IMAGING | Facility: CLINIC | Age: 77
Discharge: HOME | End: 2022-05-18
Attending: PHYSICIAN ASSISTANT | Admitting: PHYSICIAN ASSISTANT
Payer: COMMERCIAL

## 2022-05-18 DIAGNOSIS — I73.9 PAD (PERIPHERAL ARTERY DISEASE) (H): ICD-10-CM

## 2022-05-18 DIAGNOSIS — R93.6 ABNORMAL ULTRASOUND OF LOWER EXTREMITY: ICD-10-CM

## 2022-05-18 PROCEDURE — 999N000128 HC STATISTIC PERIPHERAL IV START W/O US GUIDANCE

## 2022-05-18 PROCEDURE — 250N000011 HC RX IP 250 OP 636: Performed by: PHYSICIAN ASSISTANT

## 2022-05-18 PROCEDURE — 250N000009 HC RX 250: Performed by: PHYSICIAN ASSISTANT

## 2022-05-18 PROCEDURE — 75635 CT ANGIO ABDOMINAL ARTERIES: CPT

## 2022-05-18 RX ORDER — IOPAMIDOL 755 MG/ML
100 INJECTION, SOLUTION INTRAVASCULAR ONCE
Status: COMPLETED | OUTPATIENT
Start: 2022-05-18 | End: 2022-05-18

## 2022-05-18 RX ADMIN — IOPAMIDOL 100 ML: 755 INJECTION, SOLUTION INTRAVENOUS at 17:26

## 2022-05-18 RX ADMIN — SODIUM CHLORIDE 80 ML: 900 INJECTION INTRAVENOUS at 17:26

## 2022-05-19 ENCOUNTER — CARE COORDINATION (OUTPATIENT)
Dept: CARDIOLOGY | Facility: CLINIC | Age: 77
End: 2022-05-19
Payer: COMMERCIAL

## 2022-05-19 NOTE — PROGRESS NOTES
I called pt and reviewed CTA results and Karen Alexander's recommendations. Pt said she already reviewed the results on my chart. She feels anxious and a lack of control with her body right now. She is frustrated that she never previously had a PAD work up, and it was just assumed her back issues were causing her leg pain. She was expecting to just get a stent place at the time of her last cath, and has since been told she has diastolic dysfunction and peripheral arterial disease. Pt is not interested in cardiac rehab as she tried it in the past and did not like it. She is going to try to start swimming at the [facility], but is concerned about slipping and falling when she steps in to the pool. It is hard for her to walk right now due to both her back and and pain in her legs. She is going to discuss setting up a date for her spinal cord stimulator procedure as she is worried she may need stents in her legs and have to go on plavix, which would prevent her from being able to proceed with the spinal procedure for a year. Pt would like  to review her CTA results and let her know if he has any other recommendations prior to her 6/1 visit. Pt is aware  is out of town and can't review until next week. Patrick STOVER May 19, 2022, 4:16 PM

## 2022-05-19 NOTE — PROGRESS NOTES
Pt left message asking about her CTA results from 5/18/22. Pt has 6/1/22 visit with  and 6/13/22 new vascular visit with . I will message Karen to get her thoughts on the bilateral leg CTA results. Patrick STOVER May 19, 2022, 2:51 PM

## 2022-05-19 NOTE — PROGRESS NOTES
CTA reviewed, most disease appears in the lower leg arteries.  Difficult to tell if they are large enough to be intervened upon- this is where Dr. Hamlin will come in.  It may be part of her calf pain, but wouldn't explain any thigh pain.  At this point exercise is the best tmt until Dr. Hamlin can see her- that's always the first step.  If she'd like me to order cardiac rehab for PAD I'm happy to do that.  Otherwise she considered joining the [facility] for water exercise.  Karen Alexander PA-C 5/19/2022 3:21 PM

## 2022-05-20 NOTE — PROGRESS NOTES
Representative from [facility] left message stating pt will need to hold her aspirin for a total of 10-13 days (6 days prior and 3-7 days for the duration of the spinal cord stimulator trial). They will fax over a form for  to sign. Will call on Monday if we still have not received the form. It needs to be faxed back to [phone]. The phone number is [phone], # 1. I already routed an update to  regarding CTA results. Will add the aspirin hold approval question to the call. Patrick STOVER May 20, 2022, 4:40 PM

## 2022-05-20 NOTE — PROGRESS NOTES
Pt called and said that she is expecting her spinal cord stimulator procedure to be 6/8/22. She needs approval to hold her aspirin from . Pt is not sure how long she needs to hold it for. She said Karen at [facility] needs to be called with an update   ( [phone], option # 1). I left message at [facility] to clarify how long pt needs to hold aspirin and whether she needs cardiac clearance. Patrick STOVER May 20, 2022, 9:26 AM         Resident

## 2022-05-25 NOTE — PROGRESS NOTES
I left message at [facility] with update that  is ok with pt holding aspirin for her spinal stimulator procedure. I never received the form for  to sign. I requested that it be faxed to [phone]. I requested a call back with any other questions or if aspirin hold approval can be given verbally over the phone.     I tried to call pt to discuss 's response regarding CTA results and aspirin hold approval, but phone range multiple times and there was no option for voicemail. I sent pt a my chart update.Patrick STOVER May 25, 2022, 1:49 PM

## 2022-05-25 NOTE — PROGRESS NOTES
Pt called back and I let her know  is ok with her holding her aspirin for her spinal cord stimulator trial procedure. Pt said she has her pre-op physical 5/20 and her procedure has been scheduled for 6/8/22. Pt will have 6/1/22 visit with . Patrick STOVER May 25, 2022, 4:55 PM

## 2022-05-25 NOTE — TELEPHONE ENCOUNTER
CTA LE's reviewed. Some severe disease in the small, distal vessels below the knee bilaterally. I am not sure if these can be fixed. Walking, if the back allows, is usually the best treatment. Dr. Hamlin will decide next steps. OK to hold aspirin for spinal stimulator. EE

## 2022-05-27 NOTE — PROGRESS NOTES
filled out and signed pain clinic form and checked that it's ok for pt to hold aspirin 10 to 13 days for her spinal cord stimulator trial. Form faxed back to [facility] at [phone]. Patrick STOVER May 27, 2022, 3:06 PM

## 2022-06-01 ENCOUNTER — OFFICE VISIT (OUTPATIENT)
Dept: CARDIOLOGY | Facility: CLINIC | Age: 77
End: 2022-06-01
Payer: COMMERCIAL

## 2022-06-01 VITALS
WEIGHT: 186.7 LBS | BODY MASS INDEX: 28.3 KG/M2 | SYSTOLIC BLOOD PRESSURE: 130 MMHG | DIASTOLIC BLOOD PRESSURE: 50 MMHG | HEIGHT: 68 IN | HEART RATE: 78 BPM

## 2022-06-01 DIAGNOSIS — E78.5 HYPERLIPIDEMIA LDL GOAL <100: ICD-10-CM

## 2022-06-01 DIAGNOSIS — I10 ESSENTIAL HYPERTENSION: ICD-10-CM

## 2022-06-01 DIAGNOSIS — E11.9 TYPE 2 DIABETES MELLITUS WITHOUT COMPLICATION, WITHOUT LONG-TERM CURRENT USE OF INSULIN (H): ICD-10-CM

## 2022-06-01 DIAGNOSIS — R06.02 SHORTNESS OF BREATH: ICD-10-CM

## 2022-06-01 DIAGNOSIS — I25.10 CORONARY ARTERY DISEASE INVOLVING NATIVE CORONARY ARTERY OF NATIVE HEART WITHOUT ANGINA PECTORIS: ICD-10-CM

## 2022-06-01 DIAGNOSIS — I73.9 PAD (PERIPHERAL ARTERY DISEASE) (H): Primary | ICD-10-CM

## 2022-06-01 DIAGNOSIS — I25.10 ATHEROSCLEROSIS OF NATIVE CORONARY ARTERY OF NATIVE HEART WITHOUT ANGINA PECTORIS: ICD-10-CM

## 2022-06-01 PROCEDURE — 99215 OFFICE O/P EST HI 40 MIN: CPT | Performed by: INTERNAL MEDICINE

## 2022-06-01 RX ORDER — LOSARTAN POTASSIUM 50 MG/1
50 TABLET ORAL DAILY
Qty: 90 TABLET | Refills: 3 | Status: SHIPPED | OUTPATIENT
Start: 2022-06-01 | End: 2023-06-13

## 2022-06-01 NOTE — PROGRESS NOTES
HPI and Plan:   See dictation    Today's clinic visit entailed:  Review of the result(s) of each unique test - nuclear images, echo, cardiac cath, flp, alt, bmp  The following tests were independently interpreted by me as noted in my documentation: nuclaer images, echo, coronary angiogram  Ordering of each unique test  Prescription drug management  65 minutes spent on the date of the encounter doing chart review, review of outside records, review of test results, interpretation of tests, patient visit, documentation, discussion with family and  present for discussion   Provider  Link to MDM Help Grid     The level of medical decision making during this visit was of high complexity.      Orders Placed This Encounter   Procedures     Basic metabolic panel     Lipid Profile     ALT     Follow-Up with Cardiology       Orders Placed This Encounter   Medications     losartan (COZAAR) 50 MG tablet     Sig: Take 1 tablet (50 mg) by mouth daily     Dispense:  90 tablet     Refill:  3       Medications Discontinued During This Encounter   Medication Reason     lisinopril (ZESTRIL) 20 MG tablet          Encounter Diagnoses   Name Primary?     PAD (peripheral artery disease) (H) Yes     Atherosclerosis of native coronary artery of native heart without angina pectoris      Coronary artery disease involving native coronary artery of native heart without angina pectoris      Type 2 diabetes mellitus without complication, without long-term current use of insulin (H)      Shortness of breath      Hyperlipidemia LDL goal <100      Essential hypertension        CURRENT MEDICATIONS:  Current Outpatient Medications   Medication Sig Dispense Refill     acetylcysteine (N-ACETYL CYSTEINE) 600 MG CAPS capsule Take 600 mg by mouth 2 times daily       amoxicillin (AMOXIL) 500 MG capsule 2,000 mg once as needed (Before dental procedures)       ascorbic acid 1000 MG TABS tablet Take 500 mg by mouth       aspirin 81 MG EC tablet Take 81  mg by mouth daily       CINNAMON PO Take 2 capsules by mouth 2 times daily prn       Continuous Blood Gluc Sensor (FREESTYLE PERRI 2 SENSOR) MISC        Continuous Blood Gluc Sensor (FREESTYLE PERRI 2 SENSOR) MISC        cyanocobalamin (CYANOCOBALAMIN) 1000 MCG/ML injection Inject 2 mLs into the muscle every 30 days       DULoxetine (CYMBALTA) 60 MG capsule daily       estradiol (ESTRACE) 0.1 MG/GM vaginal cream Use pea sized amount and apply with finger to vaginal skin just inside opening once a day before bed as needed for vaginal dryness. 42.5 g 3     evolocumab (REPATHA SURECLICK) 140 MG/ML prefilled autoinjector Inject 1 mL (140 mg) Subcutaneous every 14 days 6 mL 3     fluconazole (DIFLUCAN) 150 MG tablet prn       furosemide (LASIX) 20 MG tablet Take 1 tablet (20 mg) by mouth daily 90 tablet 3     insulin lispro (HUMALOG KWIKPEN) 100 UNIT/ML (1 unit dial) KWIKPEN Inject Subcutaneous 3 times daily (before meals)       levothyroxine (SYNTHROID/LEVOTHROID) 100 MCG tablet Take 100 mcg by mouth daily       liraglutide (VICTOZA PEN) 18 MG/3ML solution Inject 1.2 mg Subcutaneous daily (Patient taking differently: Inject 1.8 mg Subcutaneous daily) 3 mL 0     losartan (COZAAR) 50 MG tablet Take 1 tablet (50 mg) by mouth daily 90 tablet 3     mirabegron (MYRBETRIQ) 50 MG 24 hr tablet Take 1 tablet (50 mg) by mouth daily Patient not taking daily 90 tablet 3     nitroGLYcerin (NITROSTAT) 0.4 MG sublingual tablet Place 1 tablet (0.4 mg) under the tongue every 5 minutes as needed for chest pain For chest pain place 1 tablet under the tongue every 5 minutes for 3 doses. If symptoms persist 5 minutes after 1st dose call 911. 25 tablet 11     oxyCODONE (ROXICODONE) 5 MG tablet Take 1-2 tablets (5-10 mg) by mouth every 3 hours as needed for severe pain 30 tablet 0     traMADol (ULTRAM) 50 MG tablet Take 1 tablet (50 mg) by mouth every 6 hours as needed for moderate to severe pain 30 tablet 3     vitamin D3 (CHOLECALCIFEROL)  2000 units tablet Take 1 tablet by mouth 2 times daily       busPIRone (BUSPAR) 10 MG tablet Take 1 tablet by mouth every 12 hours (Patient not taking: Reported on 6/1/2022)       GLIMEPIRIDE 4 MG OR TABS Take 4 mg by mouth daily as needed (Patient not taking: Reported on 6/1/2022)       pregabalin (LYRICA) 25 MG capsule  (Patient not taking: Reported on 6/1/2022)         ALLERGIES     Allergies   Allergen Reactions     Jardiance [Empagliflozin] Other (See Comments)     Ongoing yeast infections     Hmg-Coa-R Inhibitors Muscle Pain (Myalgia)     Oral statins         PAST MEDICAL HISTORY:  Past Medical History:   Diagnosis Date     Anemia      Anxiety      Arthritis      CAD (coronary artery disease)      Chronic bilateral low back pain without sciatica      Coronary artery disease 04/28/2016    cardiac cath 2/2019: patent stents; PTCA with MAYA x 2 to OM1 and MAYA x 1 to p.LAD (4/21/2016 at [facility]); PTCA with intracoronary stent placement of RCA June 2004; MAYA to OM1 11/2016     Cystocele, midline 09/29/2010     Depression      Depression      DM type 2 (diabetes mellitus, type 2) (H)      HYPERPLASTIC  POLYP      colonoscopy in 5-10 yrs.     Hypertension      Hypothyroid      Hypothyroidism     hypothyroid- Dr Majano     Motion sickness      Mumps      OAB (overactive bladder)     complex voiding dysfct, failed interstim     Osteoarthritis      Other and unspecified hyperlipidemia      Palpitations      PONV (postoperative nausea and vomiting)      Postmenopausal bleeding      Shoulder blade pain 5/31/2016     Type II or unspecified type diabetes mellitus without mention of complication, not stated as uncontrolled     Dr. Majano     Unspecified essential hypertension      Urinary frequency 9/29/10    followed by urology. no benefit from detrol or sanctura. month trial of macrobid and flomax 10/10       PAST SURGICAL HISTORY:  Past Surgical History:   Procedure Laterality Date     ------------OTHER-------------       Interstim     Ablation       ARTHROPLASTY HIP Left 7/15/2020    Procedure: Left total hip arthroplasty;  Surgeon: Jayson Victoria MD;  Location:  OR     BACK SURGERY  03/05/2020    spinal fusion     BACK SURGERY       C CT ANGIOGRAPHY CORONARY  1/2005    mod soft plaque LAD and Cx, follow up with left heart cath     CARDIAC SURGERY       CHOLECYSTECTOMY       CHOLECYSTECTOMY       COLONOSCOPY       CORONARY STENT PLACEMENT  2004     x 5 stents      CV CORONARY ANGIOGRAM N/A 4/29/2022    Procedure: Coronary Angiogram;  Surgeon: Sudarshan Lee MD;  Location:  HEART CARDIAC CATH LAB     CV HEART CATHETERIZATION WITH POSSIBLE INTERVENTION N/A 2/14/2019    Procedure: Coronary Angiogram;  Surgeon: Sudarshan Lee MD;  Location:  HEART CARDIAC CATH LAB; patent stents     CV LEFT HEART CATH N/A 4/29/2022    Procedure: Left Heart Catheterization;  Surgeon: Sudarshan Lee MD;  Location:  HEART CARD[facility]AC CATH LAB     CV LEFT VENTRICULOGRAM N/A 4/29/2022    Procedure: Left Ventriculogram;  Surgeon: Sudarshan Lee MD;  Location:  HEART CARDIAC CATH LAB     CYSTOSCOPY       EXCISE LESION UPPER EXTREMITY  6/5/2013    Procedure: EXCISE LESION UPPER EXTREMITY;  EXCISION RIGHT ARM ANTICUBITAL LIPOMA ;  Surgeon: Jayson Joe MD;  Location: [facility] REMOVAL OF TONSILS,12+ Y/O       HEART CATH LEFT HEART CATH  3/2/2016    No intervention - aggressive medical management     HEART CATH LEFT HEART CATH  4/21/2016     MAYA x 2 to OM1, MAYA to LAD, at [facility]     HEART CATH LEFT HEART CATH  6/29/2004    MAYA to RCA     HEART CATH LEFT HEART CATH  3/28/2002    Mild to moderate 3 vessel CAD. Medical management recommended.      HEART CATH LEFT HEART CATH  11/22/2016    MAYA to OM1     hypothyroid       JOINT REPLACEMENT       KNEE SURGERY  4/20/10    right knee replacement     LUMBAR FUSION N/A 3/5/2020    Procedure: BILATERAL LUMBAR 2-3 AND LUMBAR 3-4 POSTERIOR SPINAL FUSION WITH LEFT LATERAL RECESS  DECOMPRESSION LUMBAR 4-5 AND BILATERAL LAMINECTOMY AND DECOMPRESSION LUMBAR 2-3 AND LUMBAR 3-4 WITH ALLOGRAFT AND AUTOGRAFT AND VIVIGEN;  Surgeon: Navid Caicedo MD;  Location: [facility];  Service: Spine     LUMBAR FUSION Bilateral 2021    Procedure: REVISION OF NONUNION BILATERAL LUMBAR 2-3 AND LUMBAR 3-4 WITH REVISION OF INSTRUMENTATION RIGHT LUMBAR 2 AND LEFT LUMBAR 4 WITH LEFT LUMBAR 4-5 LAMINOFORAMINOTOMY AND LUMBAR 1-2 DECOMPRESSION WITH ALLOGRAFT AND BONE MARROW ASPIRATE;  Surgeon: Navid Caicedo MD;  Location: [facility] OR;  Service: Spine     ORTHOPEDIC SURGERY      total knee      REMOVE STIMULATOR SACRAL NERVE N/A 2015    Procedure: REMOVE STIMULATOR SACRAL NERVE;  Surgeon: Gertrudis Frausto MD;  Location:  [facility]     REPLACEMENT TOTAL KNEE Right 2010     SURGICAL HISTORY OF -       Uterine endometrial ablation     ZZC LIGATE FALLOPIAN TUBE      endometrial ablation       FAMILY HISTORY:  Family History   Problem Relation Age of Onset     C.A.D. Father         MI , age 83, had high lipids     Hyperlipidemia Father      Hypertension Father      Coronary Artery Disease Father      Hypertension Mother      Genitourinary Problems Mother         renal failure     Fractures Mother         hip     Osteoporosis Mother      Cerebrovascular Disease Maternal Grandfather         cerebral hemorrhage     Diabetes Maternal Uncle      Colon Cancer Maternal Aunt      Diabetes Maternal Grandmother        SOCIAL HISTORY:  Social History     Socioeconomic History     Marital status:      Spouse name: Kwadwo     Number of children: 3     Years of education: None     Highest education level: None   Occupational History     Occupation: PT Aide     Employer: NONE      Employer: RETIRED   Tobacco Use     Smoking status: Never Smoker     Smokeless tobacco: Never Used   Substance and Sexual Activity     Alcohol use: No     Alcohol/week: 0.0 standard drinks      "Drug use: No     Sexual activity: Never     Partners: Male     Birth control/protection: Post-menopausal   Other Topics Concern     Caffeine Concern Yes     Comment: 6-7 cups caffeine per day     Sleep Concern No     Stress Concern Yes     Weight Concern No     Special Diet No     Comment: eats healthy      Exercise Yes     Comment:  walking & active life style      Parent/sibling w/ CABG, MI or angioplasty before 65F 55M? No       Review of Systems:  Skin:  Negative       Eyes:  Positive for glasses cataract surgery 2014  ENT:  Negative      Respiratory:  Positive for dyspnea on exertion;shortness of breath more SOB recently, cough is a side effect of lisinopril   Cardiovascular:  Negative Positive for;chest pain;fatigue Pain resides with rest.  No Nitro taken for it.  Gastroenterology: Negative      Genitourinary:  Negative urinary frequency not new  Musculoskeletal:  Positive for arthritis;back pain leg pain.  Neurologic:  Negative numbness or tingling of feet hands will turn white occ.  Psychiatric:  Positive for anxiety;depression    Heme/Lymph/Imm:  Positive for allergies    Endocrine:  Positive for diabetes working with endo to control diabetes    Physical Exam:  Vitals: /50   Pulse 78   Ht 1.727 m (5' 8\")   Wt 84.7 kg (186 lb 11.2 oz)   BMI 28.39 kg/m      Constitutional:           Skin:             Head:           Eyes:           Lymph:      ENT:           Neck:           Respiratory:            Cardiac:                                                           GI:           Extremities and Muscular Skeletal:                 Neurological:           Psych:           CC  No referring provider defined for this encounter.              "

## 2022-06-01 NOTE — LETTER
6/1/2022    Gaye Zimmer  [facility] [address]  [facility] [address]    RE: Sarah Longo       Dear Colleague,     I had the pleasure of seeing Sarah Longo in the [facility].  HPI and Plan:   See dictation    Today's clinic visit entailed:  Review of the result(s) of each unique test - nuclear images, echo, cardiac cath, flp, alt, bmp  The following tests were independently interpreted by me as noted in my documentation: nuclaer images, echo, coronary angiogram  Ordering of each unique test  Prescription drug management  65 minutes spent on the date of the encounter doing chart review, review of outside records, review of test results, interpretation of tests, patient visit, documentation, discussion with family and  present for discussion   Provider  Link to MDM Help Grid     The level of medical decision making during this visit was of high complexity.      Orders Placed This Encounter   Procedures     Basic metabolic panel     Lipid Profile     ALT     Follow-Up with Cardiology       Orders Placed This Encounter   Medications     losartan (COZAAR) 50 MG tablet     Sig: Take 1 tablet (50 mg) by mouth daily     Dispense:  90 tablet     Refill:  3       Medications Discontinued During This Encounter   Medication Reason     lisinopril (ZESTRIL) 20 MG tablet          Encounter Diagnoses   Name Primary?     PAD (peripheral artery disease) (H) Yes     Atherosclerosis of native coronary artery of native heart without angina pectoris      Coronary artery disease involving native coronary artery of native heart without angina pectoris      Type 2 diabetes mellitus without complication, without long-term current use of insulin (H)      Shortness of breath      Hyperlipidemia LDL goal <100      Essential hypertension        CURRENT MEDICATIONS:  Current Outpatient Medications   Medication Sig Dispense Refill     acetylcysteine (N-ACETYL CYSTEINE) 600 MG CAPS capsule  Take 600 mg by mouth 2 times daily       amoxicillin (AMOXIL) 500 MG capsule 2,000 mg once as needed (Before dental procedures)       ascorbic acid 1000 MG TABS tablet Take 500 mg by mouth       aspirin 81 MG EC tablet Take 81 mg by mouth daily       CINNAMON PO Take 2 capsules by mouth 2 times daily prn       Continuous Blood Gluc Sensor (FREESTYLE PERRI 2 SENSOR) MISC        Continuous Blood Gluc Sensor (FREESTYLE PERRI 2 SENSOR) MISC        cyanocobalamin (CYANOCOBALAMIN) 1000 MCG/ML injection Inject 2 mLs into the muscle every 30 days       DULoxetine (CYMBALTA) 60 MG capsule daily       estradiol (ESTRACE) 0.1 MG/GM vaginal cream Use pea sized amount and apply with finger to vaginal skin just inside opening once a day before bed as needed for vaginal dryness. 42.5 g 3     evolocumab (REPATHA SURECLICK) 140 MG/ML prefilled autoinjector Inject 1 mL (140 mg) Subcutaneous every 14 days 6 mL 3     fluconazole (DIFLUCAN) 150 MG tablet prn       furosemide (LASIX) 20 MG tablet Take 1 tablet (20 mg) by mouth daily 90 tablet 3     insulin lispro (HUMALOG KWIKPEN) 100 UNIT/ML (1 unit dial) KWIKPEN Inject Subcutaneous 3 times daily (before meals)       levothyroxine (SYNTHROID/LEVOTHROID) 100 MCG tablet Take 100 mcg by mouth daily       liraglutide (VICTOZA PEN) 18 MG/3ML solution Inject 1.2 mg Subcutaneous daily (Patient taking differently: Inject 1.8 mg Subcutaneous daily) 3 mL 0     losartan (COZAAR) 50 MG tablet Take 1 tablet (50 mg) by mouth daily 90 tablet 3     mirabegron (MYRBETRIQ) 50 MG 24 hr tablet Take 1 tablet (50 mg) by mouth daily Patient not taking daily 90 tablet 3     nitroGLYcerin (NITROSTAT) 0.4 MG sublingual tablet Place 1 tablet (0.4 mg) under the tongue every 5 minutes as needed for chest pain For chest pain place 1 tablet under the tongue every 5 minutes for 3 doses. If symptoms persist 5 minutes after 1st dose call 911. 25 tablet 11     oxyCODONE (ROXICODONE) 5 MG tablet Take 1-2 tablets (5-10 mg)  by mouth every 3 hours as needed for severe pain 30 tablet 0     traMADol (ULTRAM) 50 MG tablet Take 1 tablet (50 mg) by mouth every 6 hours as needed for moderate to severe pain 30 tablet 3     vitamin D3 (CHOLECALCIFEROL) 2000 units tablet Take 1 tablet by mouth 2 times daily       busPIRone (BUSPAR) 10 MG tablet Take 1 tablet by mouth every 12 hours (Patient not taking: Reported on 6/1/2022)       GLIMEPIRIDE 4 MG OR TABS Take 4 mg by mouth daily as needed (Patient not taking: Reported on 6/1/2022)       pregabalin (LYRICA) 25 MG capsule  (Patient not taking: Reported on 6/1/2022)         ALLERGIES     Allergies   Allergen Reactions     Jardiance [Empagliflozin] Other (See Comments)     Ongoing yeast infections     Hmg-Coa-R Inhibitors Muscle Pain (Myalgia)     Oral statins         PAST MEDICAL HISTORY:  Past Medical History:   Diagnosis Date     Anemia      Anxiety      Arthritis      CAD (coronary artery disease)      Chronic bilateral low back pain without sciatica      Coronary artery disease 04/28/2016    cardiac cath 2/2019: patent stents; PTCA with MAYA x 2 to OM1 and MAYA x 1 to p.LAD (4/21/2016 at [place]); PTCA with intracoronary stent placement of RCA June 2004; MAYA to OM1 11/2016     Cystocele, midline 09/29/2010     Depression      Depression      DM type 2 (diabetes mellitus, type 2) (H)      HYPERPLASTIC  POLYP      colonoscopy in 5-10 yrs.     Hypertension      Hypothyroid      Hypothyroidism     hypothyroid- Dr Majano     Motion sickness      Mumps      OAB (overactive bladder)     complex voiding dysfct, failed interstim     Osteoarthritis      Other and unspecified hyperlipidemia      Palpitations      PONV (postoperative nausea and vomiting)      Postmenopausal bleeding      Shoulder blade pain 5/31/2016     Type II or unspecified type diabetes mellitus without mention of complication, not stated as uncontrolled     Dr. Majano     Unspecified essential hypertension      Urinary frequency 9/29/10     followed by urology. no benefit from detrol or sanctura. month trial of macrobid and flomax 10/10       PAST SURGICAL HISTORY:  Past Surgical History:   Procedure Laterality Date     ------------OTHER-------------      Interstim     Ablation       ARTHROPLASTY HIP Left 7/15/2020    Procedure: Left total hip arthroplasty;  Surgeon: Jayson Victoria MD;  Location: [facility] OR     BACK SURGERY  03/05/2020    spinal fusion     BACK SURGERY       C CT ANGIOGRAPHY CORONARY  1/2005    mod soft plaque LAD and Cx, follow up with left heart cath     CARDIAC SURGERY       CHOLECYSTECTOMY       CHOLECYSTECTOMY       COLONOSCOPY       CORONARY STENT PLACEMENT  2004     x 5 stents      CV CORONARY ANGIOGRAM N/A 4/29/2022    Procedure: Coronary Angiogram;  Surgeon: Sudarshan Lee MD;  Location: [facility] CARDIAC CATH LAB     CV HEART CATHETERIZATION WITH POSSIBLE INTERVENTION N/A 2/14/2019    Procedure: Coronary Angiogram;  Surgeon: Sudarshan Lee MD;  Location: [facility] CARDIAC CATH LAB; patent stents     CV LEFT HEART CATH N/A 4/29/2022    Procedure: Left Heart Catheterization;  Surgeon: Sudarshan Lee MD;  Location: [facility] CARDIAC CATH LAB     CV LEFT VENTRICULOGRAM N/A 4/29/2022    Procedure: Left Ventriculogram;  Surgeon: Sudarshan Lee MD;  Location: [facility] CARDIAC CATH LAB     CYSTOSCOPY       EXCISE LESION UPPER EXTREMITY  6/5/2013    Procedure: EXCISE LESION UPPER EXTREMITY;  EXCISION RIGHT ARM ANTICUBITAL LIPOMA ;  Surgeon: Jayson Joe MD;  Location: [facility] REMOVAL OF TONSILS,12+ Y/O       HEART CATH LEFT HEART CATH  3/2/2016    No intervention - aggressive medical management     HEART CATH LEFT HEART CATH  4/21/2016     MAYA x 2 to OM1, MAYA to LAD, at [place]     HEART CATH LEFT HEART CATH  6/29/2004    MAYA to RCA     HEART CATH LEFT HEART CATH  3/28/2002    Mild to moderate 3 vessel CAD. Medical management recommended.      HEART CATH LEFT HEART CATH  11/22/2016    MAYA to OM1      hypothyroid       JOINT REPLACEMENT       KNEE SURGERY  4/20/10    right knee replacement     LUMBAR FUSION N/A 3/5/2020    Procedure: BILATERAL LUMBAR 2-3 AND LUMBAR 3-4 POSTERIOR SPINAL FUSION WITH LEFT LATERAL RECESS DECOMPRESSION LUMBAR 4-5 AND BILATERAL LAMINECTOMY AND DECOMPRESSION LUMBAR 2-3 AND LUMBAR 3-4 WITH ALLOGRAFT AND AUTOGRAFT AND VIVIGEN;  Surgeon: Navid Caicedo MD;  Location: [facility];  Service: Spine     LUMBAR FUSION Bilateral 2021    Procedure: REVISION OF NONUNION BILATERAL LUMBAR 2-3 AND LUMBAR 3-4 WITH REVISION OF INSTRUMENTATION RIGHT LUMBAR 2 AND LEFT LUMBAR 4 WITH LEFT LUMBAR 4-5 LAMINOFORAMINOTOMY AND LUMBAR 1-2 DECOMPRESSION WITH ALLOGRAFT AND BONE MARROW ASPIRATE;  Surgeon: Navid Caicedo MD;  Location: [facility];  Service: Spine     ORTHOPEDIC SURGERY      total knee      REMOVE STIMULATOR SACRAL NERVE N/A 2015    Procedure: REMOVE STIMULATOR SACRAL NERVE;  Surgeon: Gertrudis Frausto MD;  Location:  [facility]     REPLACEMENT TOTAL KNEE Right 2010     SURGICAL HISTORY OF -       Uterine endometrial ablation     ZZC LIGATE FALLOPIAN TUBE      endometrial ablation       FAMILY HISTORY:  Family History   Problem Relation Age of Onset     C.A.D. Father         MI , age 83, had high lipids     Hyperlipidemia Father      Hypertension Father      Coronary Artery Disease Father      Hypertension Mother      Genitourinary Problems Mother         renal failure     Fractures Mother         hip     Osteoporosis Mother      Cerebrovascular Disease Maternal Grandfather         cerebral hemorrhage     Diabetes Maternal Uncle      Colon Cancer Maternal Aunt      Diabetes Maternal Grandmother        SOCIAL HISTORY:  Social History     Socioeconomic History     Marital status:      Spouse name: Kwadwo     Number of children: 3     Years of education: None     Highest education level: None   Occupational History     Occupation: PT Aide  "    Employer: NONE      Employer: RETIRED   Tobacco Use     Smoking status: Never Smoker     Smokeless tobacco: Never Used   Substance and Sexual Activity     Alcohol use: No     Alcohol/week: 0.0 standard drinks     Drug use: No     Sexual activity: Never     Partners: Male     Birth control/protection: Post-menopausal   Other Topics Concern     Caffeine Concern Yes     Comment: 6-7 cups caffeine per day     Sleep Concern No     Stress Concern Yes     Weight Concern No     Special Diet No     Comment: eats healthy      Exercise Yes     Comment:  walking & active life style      Parent/sibling w/ CABG, MI or angioplasty before 65F 55M? No       Review of Systems:  Skin:  Negative       Eyes:  Positive for glasses cataract surgery 2014  ENT:  Negative      Respiratory:  Positive for dyspnea on exertion;shortness of breath more SOB recently, cough is a side effect of lisinopril   Cardiovascular:  Negative Positive for;chest pain;fatigue Pain resides with rest.  No Nitro taken for it.  Gastroenterology: Negative      Genitourinary:  Negative urinary frequency not new  Musculoskeletal:  Positive for arthritis;back pain leg pain.  Neurologic:  Negative numbness or tingling of feet hands will turn white occ.  Psychiatric:  Positive for anxiety;depression    Heme/Lymph/Imm:  Positive for allergies    Endocrine:  Positive for diabetes working with endo to control diabetes    Physical Exam:  Vitals: /50   Pulse 78   Ht 1.727 m (5' 8\")   Wt 84.7 kg (186 lb 11.2 oz)   BMI 28.39 kg/m      Constitutional:           Skin:             Head:           Eyes:           Lymph:      ENT:           Neck:           Respiratory:            Cardiac:                                                           GI:           Extremities and Muscular Skeletal:                 Neurological:           Psych:           CC  No referring provider defined for this encounter.                Service Date: 06/01/2022    HISTORY OF PRESENT " ILLNESS:  I had the opportunity to see Ms. Sarah Longo in Cardiology Clinic today at [facility] in [place] for reevaluation of coronary artery disease, dyslipidemia, hypertension and type 2 diabetes.      She has a history of stenting of the right coronary artery back in 2004.  She then developed symptoms of discomfort in her upper back concerning for angina and was found to have an abnormal stress test and complex trifurcation disease of the circumflex obtuse marginal and LAD.  She went to the [facility] for stenting.  This was completed with good result including the obtuse marginal and branch of the obtuse marginal as well as the LAD.  She was noted to have stenosis in the very apical portion of the LAD where it was too small for stenting and has been managed medically since then.  In 2019, she had some recurrent symptoms, but fortunately her coronary angiogram did not show any progressive disease.    She recently presented to clinic here for preoperative evaluation prior to undergoing implantation of another stimulator in her back for treatment of her chronic back pain problems.  She had been having some recurrences of her upper back pain as well and had a stress test performed.  I reviewed the nuclear images myself and she did appear to have some mild reversible abnormality involving the inferior wall of the left ventricle, suggesting ischemia in that region.  For that reason, I suggested that she pursue coronary angiography again to be sure that she did not have any significant recurrent coronary artery disease.  That angiogram was performed on 04/29/2022.  I went over those films with her again today.  Fortunately, there is no new coronary artery disease.  Her stents were all open and the only occlusive lesion was in the very small portion of the apical LAD, again where we could not perform revascularization.    She did have some evidence of elevated left ventricular end-diastolic  pressure at the time of her angiogram and was told that she may have some diastolic dysfunction and possibly some chronic diastolic heart failure.  She of course became very concerned about that issue.    She then had an JARVIS study because of concern about peripheral vascular disease and her JARVSI was abnormal.  That was performed on 05/12/2022 and showed right leg resting JARVIS of 0.73 and left leg resting JARVIS of 0.82, both in the mild range for PAD.  She then went onto have a CT angiogram of the lower extremities, which demonstrated distal disease below the knees with an occluded left anterior tibial artery, but runoff through the left peroneal artery to supply the foot.  The posterior tibial artery on the right was also thought to be occluded.  There was some disease within the popliteal arteries but not conclusively severe.  Flow above that level seemed to be open without significant stenosis.    In discussing her symptoms now, she gets short of breath with exertion, but can do very little activity because of severe disabling back pain.  She is frustrated with her lack of ability to walk and do other activity and has become severely deconditioned as a result.  Her lower extremity symptoms are mostly pain at rest.  She has pain in her legs involving all aspects of her upper and lower legs.    First thing in the morning when she stands up, she sometimes marches in place while she was brushing her teeth and that helps the legs feel somewhat better  Otherwise, her symptoms pretty much sound like rest pain most of the time.  With walking, she does not have claudication type symptoms.  She does not have burning muscle pain and cramping that occurs with exertion, although she does get some cramping at night.  Her symptoms are atypical for lower extremity arterial ischemic disease.    I also went over her echo results with her.  She does have grade 1 diastolic dysfunction but that has been a longstanding issue and quite  normal for her age and other medical conditions.  There is no evidence of high left ventricular filling pressures on the echo.  Her overall systolic function is excellent with an ejection fraction of 60%-65% and no regional wall motion abnormalities or significant valvular disease.    PHYSICAL EXAM:  Today, her blood pressure was well controlled at 130/50, heart rate 78 and weight up a bit at 186, which also frustrates her.  Her lungs sound quite clear.  Heart rhythm is regular with a soft systolic ejection murmur.  There is some aortic valve sclerosis without hemodynamically significant stenosis.  She has a trace of edema in her right foot.    IMPRESSIONS:  Ms. Sarah Longo is a 77-year-old woman with the following issues:  1.  Coronary artery disease with revascularizations in the past as described above.  Recent coronary angiography demonstrated no progression in coronary artery disease.  2.  Chronic severe low back pain causing disability and likely contributing to chronic radiating lower extremity pains, which sound neuropathic to me.  3.  Shortness of breath with exertion, probably due to deconditioning and weight gain.  She has normal left ventricular systolic function with an ejection fraction of 60%-65% and does not have findings on her echocardiogram to suggest high left ventricular filling pressures or volume overload issues.  She did start a low dose of Lasix 20 mg a day as empiric treatment for possible diastolic heart failure, but that has not seemed to help improve her shortness of breath symptoms.  At this point, she is not able to exert herself enough to determine whether she has significant shortness of breath with activity.  4.  Peripheral vascular disease as indicated by abnormal ABIs bilaterally and small vessel distal disease below the knee on CT angiogram.  I do not suspect that she has significant resting limb ischemia.  She does not have symptoms consistent with claudication.  She has no  nonhealing ulcers.  I encouraged her to try to be as active as possible to help encourage development of circulation, but that is difficult for her right now.    Overall, I suggested that she proceed with her back surgery if that has some chance of helping her back pain.  I think the back pain is probably the primary cause of her problems right now, including her pain and shortness of breath as well as her lower extremity pain and weakness.  If she can improve that situation, she might have a chance of improving her activity levels and reducing her discomfort.    We will continue to follow up with her regarding her cardiovascular disease, but I tried to reassure her that she is on excellent medical therapy and do not anticipate any progression in her vascular disease with such good control of her hypertension and cholesterol.    cc:   Gaye Zimmer MD   [address] [address]     Feroz Burgos MD, [facility]        D: 2022   T: 2022   MT:     Name:     KATHY PERRY  MRN:      [id number]        Account:      [id number]   :      1945           Service Date: 2022       Document: [id number]    Thank you for allowing me to participate in the care of your patient.      Sincerely,     FEROZ BURGOS MD     [facility]  cc:   No referring provider defined for this encounter.

## 2022-06-02 NOTE — PROGRESS NOTES
Service Date: 06/01/2022    HISTORY OF PRESENT ILLNESS:  I had the opportunity to see Ms. Sarah Longo in Cardiology Clinic today at [facility] in [place] for reevaluation of coronary artery disease, dyslipidemia, hypertension and type 2 diabetes.      She has a history of stenting of the right coronary artery back in 2004.  She then developed symptoms of discomfort in her upper back concerning for angina and was found to have an abnormal stress test and complex trifurcation disease of the circumflex obtuse marginal and LAD.  She went to the [facility] for stenting.  This was completed with good result including the obtuse marginal and branch of the obtuse marginal as well as the LAD.  She was noted to have stenosis in the very apical portion of the LAD where it was too small for stenting and has been managed medically since then.  In 2019, she had some recurrent symptoms, but fortunately her coronary angiogram did not show any progressive disease.    She recently presented to clinic here for preoperative evaluation prior to undergoing implantation of another stimulator in her back for treatment of her chronic back pain problems.  She had been having some recurrences of her upper back pain as well and had a stress test performed.  I reviewed the nuclear images myself and she did appear to have some mild reversible abnormality involving the inferior wall of the left ventricle, suggesting ischemia in that region.  For that reason, I suggested that she pursue coronary angiography again to be sure that she did not have any significant recurrent coronary artery disease.  That angiogram was performed on 04/29/2022.  I went over those films with her again today.  Fortunately, there is no new coronary artery disease.  Her stents were all open and the only occlusive lesion was in the very small portion of the apical LAD, again where we could not perform revascularization.    She did have some evidence of  elevated left ventricular end-diastolic pressure at the time of her angiogram and was told that she may have some diastolic dysfunction and possibly some chronic diastolic heart failure.  She of course became very concerned about that issue.    She then had an JARVIS study because of concern about peripheral vascular disease and her JARVIS was abnormal.  That was performed on 05/12/2022 and showed right leg resting JARVIS of 0.73 and left leg resting JARVIS of 0.82, both in the mild range for PAD.  She then went onto have a CT angiogram of the lower extremities, which demonstrated distal disease below the knees with an occluded left anterior tibial artery, but runoff through the left peroneal artery to supply the foot.  The posterior tibial artery on the right was also thought to be occluded.  There was some disease within the popliteal arteries but not conclusively severe.  Flow above that level seemed to be open without significant stenosis.    In discussing her symptoms now, she gets short of breath with exertion, but can do very little activity because of severe disabling back pain.  She is frustrated with her lack of ability to walk and do other activity and has become severely deconditioned as a result.  Her lower extremity symptoms are mostly pain at rest.  She has pain in her legs involving all aspects of her upper and lower legs.    First thing in the morning when she stands up, she sometimes marches in place while she was brushing her teeth and that helps the legs feel somewhat better  Otherwise, her symptoms pretty much sound like rest pain most of the time.  With walking, she does not have claudication type symptoms.  She does not have burning muscle pain and cramping that occurs with exertion, although she does get some cramping at night.  Her symptoms are atypical for lower extremity arterial ischemic disease.    I also went over her echo results with her.  She does have grade 1 diastolic dysfunction but that has  been a longstanding issue and quite normal for her age and other medical conditions.  There is no evidence of high left ventricular filling pressures on the echo.  Her overall systolic function is excellent with an ejection fraction of 60%-65% and no regional wall motion abnormalities or significant valvular disease.    PHYSICAL EXAM:  Today, her blood pressure was well controlled at 130/50, heart rate 78 and weight up a bit at 186, which also frustrates her.  Her lungs sound quite clear.  Heart rhythm is regular with a soft systolic ejection murmur.  There is some aortic valve sclerosis without hemodynamically significant stenosis.  She has a trace of edema in her right foot.    IMPRESSIONS:  Ms. Sarah Longo is a 77-year-old woman with the following issues:  1.  Coronary artery disease with revascularizations in the past as described above.  Recent coronary angiography demonstrated no progression in coronary artery disease.  2.  Chronic severe low back pain causing disability and likely contributing to chronic radiating lower extremity pains, which sound neuropathic to me.  3.  Shortness of breath with exertion, probably due to deconditioning and weight gain.  She has normal left ventricular systolic function with an ejection fraction of 60%-65% and does not have findings on her echocardiogram to suggest high left ventricular filling pressures or volume overload issues.  She did start a low dose of Lasix 20 mg a day as empiric treatment for possible diastolic heart failure, but that has not seemed to help improve her shortness of breath symptoms.  At this point, she is not able to exert herself enough to determine whether she has significant shortness of breath with activity.  4.  Peripheral vascular disease as indicated by abnormal ABIs bilaterally and small vessel distal disease below the knee on CT angiogram.  I do not suspect that she has significant resting limb ischemia.  She does not have symptoms  consistent with claudication.  She has no nonhealing ulcers.  I encouraged her to try to be as active as possible to help encourage development of circulation, but that is difficult for her right now.    Overall, I suggested that she proceed with her back surgery if that has some chance of helping her back pain.  I think the back pain is probably the primary cause of her problems right now, including her pain and shortness of breath as well as her lower extremity pain and weakness.  If she can improve that situation, she might have a chance of improving her activity levels and reducing her discomfort.    We will continue to follow up with her regarding her cardiovascular disease, but I tried to reassure her that she is on excellent medical therapy and do not anticipate any progression in her vascular disease with such good control of her hypertension and cholesterol.    cc:   Gaye Zimmer MD   [address] [place] [address]     Feroz Burgos MD, [facility]        D: 2022   T: 2022   MT: KENDALL    Name:     KATHY PERRY ARMANDO  MRN:      [id number]        Account:      [id number]   :      1945           Service Date: 2022       Document: [id number]

## 2022-06-13 ENCOUNTER — OFFICE VISIT (OUTPATIENT)
Dept: CARDIOLOGY | Facility: CLINIC | Age: 77
End: 2022-06-13
Payer: COMMERCIAL

## 2022-06-13 ENCOUNTER — CARE COORDINATION (OUTPATIENT)
Dept: CARDIOLOGY | Facility: CLINIC | Age: 77
End: 2022-06-13

## 2022-06-13 VITALS
WEIGHT: 185 LBS | HEIGHT: 68 IN | SYSTOLIC BLOOD PRESSURE: 109 MMHG | DIASTOLIC BLOOD PRESSURE: 60 MMHG | HEART RATE: 66 BPM | BODY MASS INDEX: 28.04 KG/M2

## 2022-06-13 DIAGNOSIS — I73.9 PAD (PERIPHERAL ARTERY DISEASE) (H): Primary | ICD-10-CM

## 2022-06-13 PROCEDURE — 99215 OFFICE O/P EST HI 40 MIN: CPT | Performed by: INTERNAL MEDICINE

## 2022-06-13 NOTE — PROGRESS NOTES
HPI and Plan:   See dictation    Today's clinic visit entailed:  60 minutes spent on the date of the encounter doing chart review, history and exam, documentation and further activities per the note  Provider  Link to MDM Help Grid     The level of medical decision making during this visit was of high complexity.      Orders Placed This Encounter   Procedures     Case Request Cath Lab: Angiogram Lower Extremity Bilateral       No orders of the defined types were placed in this encounter.      There are no discontinued medications.      Encounter Diagnosis   Name Primary?     PAD (peripheral artery disease) (H) Yes       CURRENT MEDICATIONS:  Current Outpatient Medications   Medication Sig Dispense Refill     acetylcysteine (N-ACETYL CYSTEINE) 600 MG CAPS capsule Take 600 mg by mouth 2 times daily       amoxicillin (AMOXIL) 500 MG capsule 2,000 mg once as needed (Before dental procedures)       ascorbic acid 1000 MG TABS tablet Take 500 mg by mouth       aspirin 81 MG EC tablet Take 81 mg by mouth daily       busPIRone (BUSPAR) 10 MG tablet Take 1 tablet by mouth every 12 hours       CINNAMON PO Take 2 capsules by mouth 2 times daily prn       Continuous Blood Gluc Sensor (FREESTYLE PERRI 2 SENSOR) MISC        Continuous Blood Gluc Sensor (FREESTYLE PERRI 2 SENSOR) MISC        cyanocobalamin (CYANOCOBALAMIN) 1000 MCG/ML injection Inject 2 mLs into the muscle every 30 days       DULoxetine (CYMBALTA) 60 MG capsule daily       estradiol (ESTRACE) 0.1 MG/GM vaginal cream Use pea sized amount and apply with finger to vaginal skin just inside opening once a day before bed as needed for vaginal dryness. 42.5 g 3     evolocumab (REPATHA SURECLICK) 140 MG/ML prefilled autoinjector Inject 1 mL (140 mg) Subcutaneous every 14 days 6 mL 3     fluconazole (DIFLUCAN) 150 MG tablet prn       furosemide (LASIX) 20 MG tablet Take 1 tablet (20 mg) by mouth daily 90 tablet 3     GLIMEPIRIDE 4 MG OR TABS Take 4 mg by mouth daily as  needed       insulin lispro (HUMALOG KWIKPEN) 100 UNIT/ML (1 unit dial) KWIKPEN Inject Subcutaneous 3 times daily (before meals)       levothyroxine (SYNTHROID/LEVOTHROID) 100 MCG tablet Take 100 mcg by mouth daily       liraglutide (VICTOZA PEN) 18 MG/3ML solution Inject 1.2 mg Subcutaneous daily (Patient taking differently: Inject 1.8 mg Subcutaneous daily) 3 mL 0     losartan (COZAAR) 50 MG tablet Take 1 tablet (50 mg) by mouth daily 90 tablet 3     mirabegron (MYRBETRIQ) 50 MG 24 hr tablet Take 1 tablet (50 mg) by mouth daily Patient not taking daily 90 tablet 3     nitroGLYcerin (NITROSTAT) 0.4 MG sublingual tablet Place 1 tablet (0.4 mg) under the tongue every 5 minutes as needed for chest pain For chest pain place 1 tablet under the tongue every 5 minutes for 3 doses. If symptoms persist 5 minutes after 1st dose call 911. 25 tablet 11     oxyCODONE (ROXICODONE) 5 MG tablet Take 1-2 tablets (5-10 mg) by mouth every 3 hours as needed for severe pain 30 tablet 0     pregabalin (LYRICA) 25 MG capsule        traMADol (ULTRAM) 50 MG tablet Take 1 tablet (50 mg) by mouth every 6 hours as needed for moderate to severe pain 30 tablet 3     vitamin D3 (CHOLECALCIFEROL) 2000 units tablet Take 1 tablet by mouth 2 times daily         ALLERGIES     Allergies   Allergen Reactions     Jardiance [Empagliflozin] Other (See Comments)     Ongoing yeast infections     Hmg-Coa-R Inhibitors Muscle Pain (Myalgia)     Oral statins         PAST MEDICAL HISTORY:  Past Medical History:   Diagnosis Date     Anemia      Anxiety      Arthritis      CAD (coronary artery disease)      Chronic bilateral low back pain without sciatica      Coronary artery disease 04/28/2016    cardiac cath 2/2019: patent stents; PTCA with MAYA x 2 to OM1 and MAYA x 1 to p.LAD (4/21/2016 at [place]); PTCA with intracoronary stent placement of RCA June 2004; MAYA to OM1 11/2016     Cystocele, midline 09/29/2010     Depression      Depression      DM type 2 (diabetes  mellitus, type 2) (H)      HYPERPLASTIC  POLYP      colonoscopy in 5-10 yrs.     Hypertension      Hypothyroid      Hypothyroidism     hypothyroid- Dr Majano     Motion sickness      Mumps      OAB (overactive bladder)     complex voiding dysfct, failed interstim     Osteoarthritis      Other and unspecified hyperlipidemia      Palpitations      PONV (postoperative nausea and vomiting)      Postmenopausal bleeding      Shoulder blade pain 5/31/2016     Type II or unspecified type diabetes mellitus without mention of complication, not stated as uncontrolled     Dr. Majano     Unspecified essential hypertension      Urinary frequency 9/29/10    followed by urology. no benefit from detrol or sanctura. month trial of macrobid and flomax 10/10       PAST SURGICAL HISTORY:  Past Surgical History:   Procedure Laterality Date     ------------OTHER-------------      Interstim     Ablation       ARTHROPLASTY HIP Left 7/15/2020    Procedure: Left total hip arthroplasty;  Surgeon: Jayson Victoria MD;  Location: [facility] OR     BACK SURGERY  03/05/2020    spinal fusion     BACK SURGERY       C CT ANGIOGRAPHY CORONARY  1/2005    mod soft plaque LAD and Cx, follow up with left heart cath     CARDIAC SURGERY       CHOLECYSTECTOMY       CHOLECYSTECTOMY       COLONOSCOPY       CORONARY STENT PLACEMENT  2004     x 5 stents      CV CORONARY ANGIOGRAM N/A 4/29/2022    Procedure: Coronary Angiogram;  Surgeon: Sudarshan Lee MD;  Location: [facility] CARDIAC CATH LAB     CV HEART CATHETERIZATION WITH POSSIBLE INTERVENTION N/A 2/14/2019    Procedure: Coronary Angiogram;  Surgeon: Sudarshan Lee MD;  Location: [facility] CARDIAC CATH LAB; patent stents     CV LEFT HEART CATH N/A 4/29/2022    Procedure: Left Heart Catheterization;  Surgeon: Sudarshan Lee MD;  Location: [facility] CARDIAC CATH LAB     CV LEFT VENTRICULOGRAM N/A 4/29/2022    Procedure: Left Ventriculogram;  Surgeon: Sudarshan Lee MD;  Location: [facility]  CARDIAC CATH LAB     CYSTOSCOPY       EXCISE LESION UPPER EXTREMITY  2013    Procedure: EXCISE LESION UPPER EXTREMITY;  EXCISION RIGHT ARM ANTICUBITAL LIPOMA ;  Surgeon: Jayson Joe MD;  Location: [facility] REMOVAL OF TONSILS,13+ Y/O       HEART CATH LEFT HEART CATH  3/2/2016    No intervention - aggressive medical management     HEART CATH LEFT HEART CATH  2016     MAYA x 2 to OM1, MAYA to LAD, at [place]     HEART CATH LEFT HEART CATH  2004    MAYA to RCA     HEART CATH LEFT HEART CATH  3/28/2002    Mild to moderate 3 vessel CAD. Medical management recommended.      HEART CATH LEFT HEART CATH  2016    MAYA to OM1     hypothyroid       JOINT REPLACEMENT       KNEE SURGERY  4/20/10    right knee replacement     LUMBAR FUSION N/A 3/5/2020    Procedure: BILATERAL LUMBAR 2-3 AND LUMBAR 3-4 POSTERIOR SPINAL FUSION WITH LEFT LATERAL RECESS DECOMPRESSION LUMBAR 4-5 AND BILATERAL LAMINECTOMY AND DECOMPRESSION LUMBAR 2-3 AND LUMBAR 3-4 WITH ALLOGRAFT AND AUTOGRAFT AND VIVIGEN;  Surgeon: Navid Caicedo MD;  Location: [facility];  Service: Spine     LUMBAR FUSION Bilateral 2021    Procedure: REVISION OF NONUNION BILATERAL LUMBAR 2-3 AND LUMBAR 3-4 WITH REVISION OF INSTRUMENTATION RIGHT LUMBAR 2 AND LEFT LUMBAR 4 WITH LEFT LUMBAR 4-5 LAMINOFORAMINOTOMY AND LUMBAR 1-2 DECOMPRESSION WITH ALLOGRAFT AND BONE MARROW ASPIRATE;  Surgeon: Navid Caicedo MD;  Location: [facility];  Service: Spine     ORTHOPEDIC SURGERY      total knee      REMOVE STIMULATOR SACRAL NERVE N/A 2015    Procedure: REMOVE STIMULATOR SACRAL NERVE;  Surgeon: Gertrudis Frausto MD;  Location: [facility]     REPLACEMENT TOTAL KNEE Right 2010     SURGICAL HISTORY OF -       Uterine endometrial ablation     ZZC LIGATE FALLOPIAN TUBE      endometrial ablation       FAMILY HISTORY:  Family History   Problem Relation Age of Onset     C.A.D. Father         MI , age 83, had high  "lipids     Hyperlipidemia Father      Hypertension Father      Coronary Artery Disease Father      Hypertension Mother      Genitourinary Problems Mother         renal failure     Fractures Mother         hip     Osteoporosis Mother      Cerebrovascular Disease Maternal Grandfather         cerebral hemorrhage     Diabetes Maternal Uncle      Colon Cancer Maternal Aunt      Diabetes Maternal Grandmother        SOCIAL HISTORY:  Social History     Socioeconomic History     Marital status:      Spouse name: Kwadwo     Number of children: 3     Years of education: None     Highest education level: None   Occupational History     Occupation: PT Aide     Employer: NONE      Employer: RETIRED   Tobacco Use     Smoking status: Never Smoker     Smokeless tobacco: Never Used   Substance and Sexual Activity     Alcohol use: No     Alcohol/week: 0.0 standard drinks     Drug use: No     Sexual activity: Never     Partners: Male     Birth control/protection: Post-menopausal   Other Topics Concern     Caffeine Concern Yes     Comment: 6-7 cups caffeine per day     Sleep Concern No     Stress Concern Yes     Weight Concern No     Special Diet No     Comment: eats healthy      Exercise Yes     Comment:  walking & active life style      Parent/sibling w/ CABG, MI or angioplasty before 65F 55M? No       Review of Systems:  Skin:          Eyes:         ENT:         Respiratory:  Positive for dyspnea on exertion;cough     Cardiovascular:    edema;Positive for    Gastroenterology:        Genitourinary:         Musculoskeletal:         Neurologic:         Psychiatric:         Heme/Lymph/Imm:         Endocrine:           Physical Exam:  Vitals: /60   Pulse 66   Ht 1.727 m (5' 8\")   Wt 83.9 kg (185 lb)   BMI 28.13 kg/m      Constitutional:  cooperative;in no acute distress        Skin:  warm and dry to the touch          Head:  normocephalic        Eyes:  conjunctivae and lids unremarkable        Lymph:      ENT:  no pallor or " cyanosis        Neck:  JVP normal        Respiratory:  clear to auscultation         Cardiac: regular rhythm;no murmurs, gallops or rubs detected                    2+       0 0   2+         0 0          GI:  abdomen soft;non-tender        Extremities and Muscular Skeletal:  no edema              Neurological:  no gross motor deficits        Psych:  Alert and Oriented x 3        CC  No referring provider defined for this encounter.

## 2022-06-13 NOTE — LETTER
6/13/2022    Gaye Zimmer  [facility] [address] [facility] [address]    RE: Sarah Longo       Dear Colleague,     I had the pleasure of seeing Sarah Longo in the [facility].  HPI and Plan:   See dictation    Today's clinic visit entailed:  60 minutes spent on the date of the encounter doing chart review, history and exam, documentation and further activities per the note  Provider  Link to [facility] Help Grid     The level of medical decision making during this visit was of high complexity.      Orders Placed This Encounter   Procedures     Case Request Cath Lab: Angiogram Lower Extremity Bilateral       No orders of the defined types were placed in this encounter.      There are no discontinued medications.      Encounter Diagnosis   Name Primary?     PAD (peripheral artery disease) (H) Yes       CURRENT MEDICATIONS:  Current Outpatient Medications   Medication Sig Dispense Refill     acetylcysteine (N-ACETYL CYSTEINE) 600 MG CAPS capsule Take 600 mg by mouth 2 times daily       amoxicillin (AMOXIL) 500 MG capsule 2,000 mg once as needed (Before dental procedures)       ascorbic acid 1000 MG TABS tablet Take 500 mg by mouth       aspirin 81 MG EC tablet Take 81 mg by mouth daily       busPIRone (BUSPAR) 10 MG tablet Take 1 tablet by mouth every 12 hours       CINNAMON PO Take 2 capsules by mouth 2 times daily prn       Continuous Blood Gluc Sensor (FREESTYLE PERRI 2 SENSOR) MISC        Continuous Blood Gluc Sensor (FREESTYLE PERRI 2 SENSOR) MISC        cyanocobalamin (CYANOCOBALAMIN) 1000 MCG/ML injection Inject 2 mLs into the muscle every 30 days       DULoxetine (CYMBALTA) 60 MG capsule daily       estradiol (ESTRACE) 0.1 MG/GM vaginal cream Use pea sized amount and apply with finger to vaginal skin just inside opening once a day before bed as needed for vaginal dryness. 42.5 g 3     evolocumab (REPATHA SURECLICK) 140 MG/ML prefilled autoinjector Inject 1 mL (140 mg)  Subcutaneous every 14 days 6 mL 3     fluconazole (DIFLUCAN) 150 MG tablet prn       furosemide (LASIX) 20 MG tablet Take 1 tablet (20 mg) by mouth daily 90 tablet 3     GLIMEPIRIDE 4 MG OR TABS Take 4 mg by mouth daily as needed       insulin lispro (HUMALOG KWIKPEN) 100 UNIT/ML (1 unit dial) KWIKPEN Inject Subcutaneous 3 times daily (before meals)       levothyroxine (SYNTHROID/LEVOTHROID) 100 MCG tablet Take 100 mcg by mouth daily       liraglutide (VICTOZA PEN) 18 MG/3ML solution Inject 1.2 mg Subcutaneous daily (Patient taking differently: Inject 1.8 mg Subcutaneous daily) 3 mL 0     losartan (COZAAR) 50 MG tablet Take 1 tablet (50 mg) by mouth daily 90 tablet 3     mirabegron (MYRBETRIQ) 50 MG 24 hr tablet Take 1 tablet (50 mg) by mouth daily Patient not taking daily 90 tablet 3     nitroGLYcerin (NITROSTAT) 0.4 MG sublingual tablet Place 1 tablet (0.4 mg) under the tongue every 5 minutes as needed for chest pain For chest pain place 1 tablet under the tongue every 5 minutes for 3 doses. If symptoms persist 5 minutes after 1st dose call 911. 25 tablet 11     oxyCODONE (ROXICODONE) 5 MG tablet Take 1-2 tablets (5-10 mg) by mouth every 3 hours as needed for severe pain 30 tablet 0     pregabalin (LYRICA) 25 MG capsule        traMADol (ULTRAM) 50 MG tablet Take 1 tablet (50 mg) by mouth every 6 hours as needed for moderate to severe pain 30 tablet 3     vitamin D3 (CHOLECALCIFEROL) 2000 units tablet Take 1 tablet by mouth 2 times daily         ALLERGIES     Allergies   Allergen Reactions     Jardiance [Empagliflozin] Other (See Comments)     Ongoing yeast infections     Hmg-Coa-R Inhibitors Muscle Pain (Myalgia)     Oral statins         PAST MEDICAL HISTORY:  Past Medical History:   Diagnosis Date     Anemia      Anxiety      Arthritis      CAD (coronary artery disease)      Chronic bilateral low back pain without sciatica      Coronary artery disease 04/28/2016    cardiac cath 2/2019: patent stents; PTCA with  MAYA x 2 to OM1 and MAYA x 1 to p.LAD (4/21/2016 at [place]); PTCA with intracoronary stent placement of RCA June 2004; MAYA to OM1 11/2016     Cystocele, midline 09/29/2010     Depression      Depression      DM type 2 (diabetes mellitus, type 2) (H)      HYPERPLASTIC  POLYP      colonoscopy in 5-10 yrs.     Hypertension      Hypothyroid      Hypothyroidism     hypothyroid- Dr Majano     Motion sickness      Mumps      OAB (overactive bladder)     complex voiding dysfct, failed interstim     Osteoarthritis      Other and unspecified hyperlipidemia      Palpitations      PONV (postoperative nausea and vomiting)      Postmenopausal bleeding      Shoulder blade pain 5/31/2016     Type II or unspecified type diabetes mellitus without mention of complication, not stated as uncontrolled     Dr. Majano     Unspecified essential hypertension      Urinary frequency 9/29/10    followed by urology. no benefit from detrol or sanctura. month trial of macrobid and flomax 10/10       PAST SURGICAL HISTORY:  Past Surgical History:   Procedure Laterality Date     ------------OTHER-------------      Interstim     Ablation       ARTHROPLASTY HIP Left 7/15/2020    Procedure: Left total hip arthroplasty;  Surgeon: Jayson Victoria MD;  Location: [facility] OR     BACK SURGERY  03/05/2020    spinal fusion     BACK SURGERY       C CT ANGIOGRAPHY CORONARY  1/2005    mod soft plaque LAD and Cx, follow up with left heart cath     CARDIAC SURGERY       CHOLECYSTECTOMY       CHOLECYSTECTOMY       COLONOSCOPY       CORONARY STENT PLACEMENT  2004     x 5 stents      CV CORONARY ANGIOGRAM N/A 4/29/2022    Procedure: Coronary Angiogram;  Surgeon: Sudarshan Lee MD;  Location: [facility] CARDIAC CATH LAB     CV HEART CATHETERIZATION WITH POSSIBLE INTERVENTION N/A 2/14/2019    Procedure: Coronary Angiogram;  Surgeon: Sudarshan Lee MD;  Location: [facility] CARDIAC CATH LAB; patent stents     CV LEFT HEART CATH N/A 4/29/2022    Procedure: Left Heart  Catheterization;  Surgeon: Sudarshan Lee MD;  Location:  HEART CARDIAC CATH LAB     CV LEFT VENTRICULOGRAM N/A 4/29/2022    Procedure: Left Ventriculogram;  Surgeon: Sudarshan Lee MD;  Location:  HEART CARDIAC CATH LAB     CYSTOSCOPY       EXCISE LESION UPPER EXTREMITY  6/5/2013    Procedure: EXCISE LESION UPPER EXTREMITY;  EXCISION RIGHT ARM ANTICUBITAL LIPOMA ;  Surgeon: Jayson Joe MD;  Location: [facility]     HC REMOVAL OF TONSILS,12+ Y/O       HEART CATH LEFT HEART CATH  3/2/2016    No intervention - aggressive medical management     HEART CATH LEFT HEART CATH  4/21/2016     MAYA x 2 to OM1, MAYA to LAD, at [place]     HEART CATH LEFT HEART CATH  6/29/2004    MAYA to RCA     HEART CATH LEFT HEART CATH  3/28/2002    Mild to moderate 3 vessel CAD. Medical management recommended.      HEART CATH LEFT HEART CATH  11/22/2016    MAYA to OM1     hypothyroid       JOINT REPLACEMENT       KNEE SURGERY  4/20/10    right knee replacement     LUMBAR FUSION N/A 3/5/2020    Procedure: BILATERAL LUMBAR 2-3 AND LUMBAR 3-4 POSTERIOR SPINAL FUSION WITH LEFT LATERAL RECESS DECOMPRESSION LUMBAR 4-5 AND BILATERAL LAMINECTOMY AND DECOMPRESSION LUMBAR 2-3 AND LUMBAR 3-4 WITH ALLOGRAFT AND AUTOGRAFT AND VIVIGEN;  Surgeon: Navid Caicedo MD;  Location: [facility];  Service: Spine     LUMBAR FUSION Bilateral 1/22/2021    Procedure: REVISION OF NONUNION BILATERAL LUMBAR 2-3 AND LUMBAR 3-4 WITH REVISION OF INSTRUMENTATION RIGHT LUMBAR 2 AND LEFT LUMBAR 4 WITH LEFT LUMBAR 4-5 LAMINOFORAMINOTOMY AND LUMBAR 1-2 DECOMPRESSION WITH ALLOGRAFT AND BONE MARROW ASPIRATE;  Surgeon: Navid Caicedo MD;  Location: [facility];  Service: Spine     ORTHOPEDIC SURGERY      total knee 2010     REMOVE STIMULATOR SACRAL NERVE N/A 11/2/2015    Procedure: REMOVE STIMULATOR SACRAL NERVE;  Surgeon: Gertrudis Frausto MD;  Location:  [facility]     REPLACEMENT TOTAL KNEE Right 04/2010     SURGICAL HISTORY OF -  "      Uterine endometrial ablation     ZZC LIGATE FALLOPIAN TUBE      endometrial ablation       FAMILY HISTORY:  Family History   Problem Relation Age of Onset     C.A.D. Father         MI , age 83, had high lipids     Hyperlipidemia Father      Hypertension Father      Coronary Artery Disease Father      Hypertension Mother      Genitourinary Problems Mother         renal failure     Fractures Mother         hip     Osteoporosis Mother      Cerebrovascular Disease Maternal Grandfather         cerebral hemorrhage     Diabetes Maternal Uncle      Colon Cancer Maternal Aunt      Diabetes Maternal Grandmother        SOCIAL HISTORY:  Social History     Socioeconomic History     Marital status:      Spouse name: Kwadwo     Number of children: 3     Years of education: None     Highest education level: None   Occupational History     Occupation: PT Aide     Employer: NONE      Employer: RETIRED   Tobacco Use     Smoking status: Never Smoker     Smokeless tobacco: Never Used   Substance and Sexual Activity     Alcohol use: No     Alcohol/week: 0.0 standard drinks     Drug use: No     Sexual activity: Never     Partners: Male     Birth control/protection: Post-menopausal   Other Topics Concern     Caffeine Concern Yes     Comment: 6-7 cups caffeine per day     Sleep Concern No     Stress Concern Yes     Weight Concern No     Special Diet No     Comment: eats healthy      Exercise Yes     Comment:  walking & active life style      Parent/sibling w/ CABG, MI or angioplasty before 65F 55M? No       Review of Systems:  Skin:          Eyes:         ENT:         Respiratory:  Positive for dyspnea on exertion;cough     Cardiovascular:    edema;Positive for    Gastroenterology:        Genitourinary:         Musculoskeletal:         Neurologic:         Psychiatric:         Heme/Lymph/Imm:         Endocrine:           Physical Exam:  Vitals: /60   Pulse 66   Ht 1.727 m (5' 8\")   Wt 83.9 kg (185 lb)   BMI 28.13 " kg/m      Constitutional:  cooperative;in no acute distress        Skin:  warm and dry to the touch          Head:  normocephalic        Eyes:  conjunctivae and lids unremarkable        Lymph:      ENT:  no pallor or cyanosis        Neck:  JVP normal        Respiratory:  clear to auscultation         Cardiac: regular rhythm;no murmurs, gallops or rubs detected                    2+       0 0   2+         0 0          GI:  abdomen soft;non-tender        Extremities and Muscular Skeletal:  no edema              Neurological:  no gross motor deficits        Psych:  Alert and Oriented x 3      CC  No referring provider defined for this encounter.    Thank you for allowing me to participate in the care of your patient.      Sincerely,   Jun Hamlin MD, MD   [facility]

## 2022-06-13 NOTE — PROGRESS NOTES
Service Date: 06/13/2022    REASON FOR CONSULTATION:  Peripheral arterial disease.    HISTORY OF PRESENT ILLNESS:  I had the pleasure of seeing Sarah Longo at the [facility] in [place] this morning.  She is a 77-year-old female with complex cardiovascular history including coronary artery disease, hypertension, hyperlipidemia, type 2 diabetes, chronic back pain and recently diagnosed peripheral arterial disease.    She had coronary angiogram approximately 6 weeks ago for recurrent angina.  During that angiogram, she tells me her pedal pulses were not palpable.  This led to further investigations for possible peripheral arterial disease.  She had resting JARVIS on 05/12/2022.  Her resting right JARVIS was 0.73 and her resting left JARVIS was 0.82 suggesting mild arterial insufficiency bilaterally.      She then proceeded to have lower extremity Duplex ultrasound, which showed no significantly elevated velocities in her inflow disease.  Her tibial vessels could not be visualized very well.  Because of that, the patient was advised to undergo a CTA of the abdomen and lower extremities with runoff.  CTA showed no significant inflow disease, specifically, no iliac or femoral artery disease.  However, the popliteal arteries had significant calcified plaque with significant stenosis.  Her left anterior vertebral arteries for the most part, occluded.  The dominant left runoff is the peroneal artery.  Her right posterior tibial artery appears to be occluded as well.    It is hard to delineate the patient's symptoms, but from what I can gather, most of her symptoms are resting.  She has debilitating chronic low back pain and is unable to do much.  She is unable to walk much because of her back pain.  She also does get short of breath with minimal activities.  She describes lower back pain as well as pain in her hips and thigh that is mostly shooting pain.  If she is able to walk further, she then  develops perhaps some calf discomfort as well as foot pain.  Fortunately, there is no evidence of lower extremity tissue breakdown or ulcerations.    ASSESSMENT AND PLAN:  A 77-year-old female with the aforementioned medical history.  I did review the patient's recent JARVIS and a Duplex ultrasound as well as CTA of the lower extremity arteries.  The JARVIS findings suggest mild arterial insufficiency at rest.  She did not exercise so it is hard to tell at this point whether she has significant exercise stress-induced arterial insufficiency.  Her CTA suggests perhaps significant bilateral popliteal artery disease as well as below-the-knee disease.    We spent significant amount of time discussing the natural history and pathophysiology of peripheral arterial disease.  She is worried about limb loss. I do not believe she has critical limb ischemia.  Therefore, I told the patient the chance that she could have limb loss from her PAD is extremely low.  I believe most of her symptoms are neurogenic in nature.  I told the patient that it is unlikely that any peripheral vascular intervention will yield significant symptomatic improvement.      Despite this, she is quite frustrated and is desperate to have anything that would potentially improve symptoms.  Although she does not have classic claudication type symptoms, she does endorse some calf discomfort with minimal walking.  It is perhaps plausible that the popliteal stenosis could be contributing somewhat to her calf and foot pain.  For that reason, it is reasonable to proceed with peripheral angiogram to see indeed if she has significant stenosis in her popliteal arteries.  Her right side is more symptomatic, therefore, we will take a look at her right lower extremity first.  I told the patient if she has significant popliteal disease and it is heavily calcified as suggested by the CTA, then, she would be a candidate for intravascular lithotripsy followed by perhaps  drug-coated balloon angioplasty.  I told the patient that she would not be a candidate for stenting given the location of the disease.  We will then see if some of her symptoms improve without intervention before considering looking at the left side.    It was a pleasure seeing Ms. Longo in the clinic this morning.    I appreciate the opportunity to participate in her care.    Jun Hamlin MD        D: 2022   T: 2022   MT: KENDALL    Name:     KATHY LONGO  MRN:      [id number]        Account:      [id number]   :      1945           Service Date: 2022       Document: [id number]

## 2022-06-24 ENCOUNTER — LAB (OUTPATIENT)
Dept: LAB | Facility: CLINIC | Age: 77
End: 2022-06-24
Payer: COMMERCIAL

## 2022-06-24 DIAGNOSIS — I25.10 ATHEROSCLEROSIS OF NATIVE CORONARY ARTERY OF NATIVE HEART WITHOUT ANGINA PECTORIS: ICD-10-CM

## 2022-06-24 DIAGNOSIS — I73.9 PAD (PERIPHERAL ARTERY DISEASE) (H): ICD-10-CM

## 2022-06-24 DIAGNOSIS — I70.213 ATHEROSCLEROSIS OF NATIVE ARTERIES OF EXTREMITIES WITH INTERMITTENT CLAUDICATION, BILATERAL LEGS (H): Primary | ICD-10-CM

## 2022-06-24 DIAGNOSIS — Z20.822 ENCOUNTER FOR LABORATORY TESTING FOR COVID-19 VIRUS: ICD-10-CM

## 2022-06-24 PROCEDURE — U0005 INFEC AGEN DETEC AMPLI PROBE: HCPCS

## 2022-06-24 PROCEDURE — U0003 INFECTIOUS AGENT DETECTION BY NUCLEIC ACID (DNA OR RNA); SEVERE ACUTE RESPIRATORY SYNDROME CORONAVIRUS 2 (SARS-COV-2) (CORONAVIRUS DISEASE [COVID-19]), AMPLIFIED PROBE TECHNIQUE, MAKING USE OF HIGH THROUGHPUT TECHNOLOGIES AS DESCRIBED BY CMS-2020-01-R: HCPCS

## 2022-06-24 RX ORDER — ASPIRIN 325 MG
325 TABLET ORAL ONCE
Status: CANCELLED | OUTPATIENT
Start: 2022-06-24 | End: 2022-06-24

## 2022-06-24 RX ORDER — LIDOCAINE 40 MG/G
CREAM TOPICAL
Status: CANCELLED | OUTPATIENT
Start: 2022-06-24

## 2022-06-24 RX ORDER — ASPIRIN 81 MG/1
243 TABLET, CHEWABLE ORAL ONCE
Status: CANCELLED | OUTPATIENT
Start: 2022-06-24

## 2022-06-24 RX ORDER — SODIUM CHLORIDE 9 MG/ML
INJECTION, SOLUTION INTRAVENOUS CONTINUOUS
Status: CANCELLED | OUTPATIENT
Start: 2022-06-24

## 2022-06-24 RX ORDER — POTASSIUM CHLORIDE 1500 MG/1
20 TABLET, EXTENDED RELEASE ORAL
Status: CANCELLED | OUTPATIENT
Start: 2022-06-24

## 2022-06-24 NOTE — PROGRESS NOTES
PERIPHERAL ANGIOGRAM INSTRUCTIONS :    Pt is scheduled for a peripheral angiogram at [facility] on 2022. Check in time is at 0930am and procedure time is at 1130am.  Advised pt to not eat or drink after midnight on 2022.  Advised pt to take morning medications with some water on the day of the procedure, noting the followin.         Aspirin: Pt is currently taking 81 mg of aspirin, advised pt to take 324mg of aspirin tomorrow morning.   2.         Diabetic Medications: Pt has only been taking short acting insulin and is aware to hold any insulin on Monday morning.   3.         Anticoagulants: Pt does not take an anticoagulant.  4.         Diuretics: Pt is not currently taking any furosemide.   Verified pt does not have an allergy to contrast dye.  Verified pt has someone available to drive them home from the hospital and can stay with them for 24 hours after the procedure.   Patient gave verbal understanding.       COVID testing: Scheduled on 2022 - pending      Angiogram orders have been signed & held.  All questions have been answered.

## 2022-06-25 LAB — SARS-COV-2 RNA RESP QL NAA+PROBE: NEGATIVE

## 2022-06-27 ENCOUNTER — HOSPITAL ENCOUNTER (OUTPATIENT)
Facility: CLINIC | Age: 77
Discharge: HOME | End: 2022-06-27
Admitting: INTERNAL MEDICINE
Payer: COMMERCIAL

## 2022-06-27 VITALS
OXYGEN SATURATION: 100 % | RESPIRATION RATE: 16 BRPM | TEMPERATURE: 97.9 F | HEIGHT: 68 IN | DIASTOLIC BLOOD PRESSURE: 83 MMHG | BODY MASS INDEX: 28.04 KG/M2 | SYSTOLIC BLOOD PRESSURE: 167 MMHG | HEART RATE: 63 BPM | WEIGHT: 185 LBS

## 2022-06-27 DIAGNOSIS — I73.9 PAD (PERIPHERAL ARTERY DISEASE) (H): ICD-10-CM

## 2022-06-27 DIAGNOSIS — I70.213 ATHEROSCLEROSIS OF NATIVE ARTERIES OF EXTREMITIES WITH INTERMITTENT CLAUDICATION, BILATERAL LEGS (H): ICD-10-CM

## 2022-06-27 LAB
ACT BLD: 203 SECONDS (ref 74–150)
ACT BLD: 207 SECONDS (ref 74–150)
ANION GAP SERPL CALCULATED.3IONS-SCNC: 4 MMOL/L (ref 3–14)
APTT PPP: 22 SECONDS (ref 22–38)
BUN SERPL-MCNC: 20 MG/DL (ref 7–30)
CALCIUM SERPL-MCNC: 9.5 MG/DL (ref 8.5–10.1)
CHLORIDE BLD-SCNC: 112 MMOL/L (ref 94–109)
CO2 SERPL-SCNC: 24 MMOL/L (ref 20–32)
CREAT SERPL-MCNC: 0.82 MG/DL (ref 0.52–1.04)
ERYTHROCYTE [DISTWIDTH] IN BLOOD BY AUTOMATED COUNT: 13.3 % (ref 10–15)
GFR SERPL CREATININE-BSD FRML MDRD: 74 ML/MIN/1.73M2
GLUCOSE BLD-MCNC: 166 MG/DL (ref 70–99)
GLUCOSE BLDC GLUCOMTR-MCNC: 100 MG/DL (ref 70–99)
HCT VFR BLD AUTO: 37.1 % (ref 35–47)
HGB BLD-MCNC: 12.1 G/DL (ref 11.7–15.7)
INR PPP: 0.94 (ref 0.85–1.15)
MCH RBC QN AUTO: 30.6 PG (ref 26.5–33)
MCHC RBC AUTO-ENTMCNC: 32.6 G/DL (ref 31.5–36.5)
MCV RBC AUTO: 94 FL (ref 78–100)
PLATELET # BLD AUTO: 189 10E3/UL (ref 150–450)
POTASSIUM BLD-SCNC: 4 MMOL/L (ref 3.4–5.3)
RBC # BLD AUTO: 3.96 10E6/UL (ref 3.8–5.2)
SODIUM SERPL-SCNC: 140 MMOL/L (ref 133–144)
WBC # BLD AUTO: 3.6 10E3/UL (ref 4–11)

## 2022-06-27 PROCEDURE — 82962 GLUCOSE BLOOD TEST: CPT

## 2022-06-27 PROCEDURE — C9765 REVASC INTRA LITHOTRIP-STENT: HCPCS | Performed by: INTERNAL MEDICINE

## 2022-06-27 PROCEDURE — 258N000003 HC RX IP 258 OP 636: Performed by: INTERNAL MEDICINE

## 2022-06-27 PROCEDURE — 99152 MOD SED SAME PHYS/QHP 5/>YRS: CPT | Performed by: INTERNAL MEDICINE

## 2022-06-27 PROCEDURE — 37224 HC REVASCULARIZE FEM-POP ARTERY ANGIOPLASTY: CPT | Performed by: INTERNAL MEDICINE

## 2022-06-27 PROCEDURE — 36415 COLL VENOUS BLD VENIPUNCTURE: CPT | Performed by: INTERNAL MEDICINE

## 2022-06-27 PROCEDURE — C9764 REVASC INTRAVASC LITHOTRIPSY: HCPCS | Mod: LT | Performed by: INTERNAL MEDICINE

## 2022-06-27 PROCEDURE — 85730 THROMBOPLASTIN TIME PARTIAL: CPT | Performed by: INTERNAL MEDICINE

## 2022-06-27 PROCEDURE — C1769 GUIDE WIRE: HCPCS | Performed by: INTERNAL MEDICINE

## 2022-06-27 PROCEDURE — C1887 CATHETER, GUIDING: HCPCS | Performed by: INTERNAL MEDICINE

## 2022-06-27 PROCEDURE — 75716 ARTERY X-RAYS ARMS/LEGS: CPT | Mod: 26 | Performed by: INTERNAL MEDICINE

## 2022-06-27 PROCEDURE — 36591 DRAW BLOOD OFF VENOUS DEVICE: CPT

## 2022-06-27 PROCEDURE — 93005 ELECTROCARDIOGRAM TRACING: CPT

## 2022-06-27 PROCEDURE — 80048 BASIC METABOLIC PNL TOTAL CA: CPT | Performed by: INTERNAL MEDICINE

## 2022-06-27 PROCEDURE — 272N000001 HC OR GENERAL SUPPLY STERILE: Performed by: INTERNAL MEDICINE

## 2022-06-27 PROCEDURE — 75716 ARTERY X-RAYS ARMS/LEGS: CPT | Performed by: INTERNAL MEDICINE

## 2022-06-27 PROCEDURE — 255N000002 HC RX 255 OP 636: Performed by: INTERNAL MEDICINE

## 2022-06-27 PROCEDURE — 999N000054 HC STATISTIC EKG NON-CHARGEABLE

## 2022-06-27 PROCEDURE — 85610 PROTHROMBIN TIME: CPT | Performed by: INTERNAL MEDICINE

## 2022-06-27 PROCEDURE — 99153 MOD SED SAME PHYS/QHP EA: CPT | Performed by: INTERNAL MEDICINE

## 2022-06-27 PROCEDURE — 250N000011 HC RX IP 250 OP 636: Performed by: INTERNAL MEDICINE

## 2022-06-27 PROCEDURE — 250N000013 HC RX MED GY IP 250 OP 250 PS 637: Performed by: INTERNAL MEDICINE

## 2022-06-27 PROCEDURE — 250N000009 HC RX 250: Performed by: INTERNAL MEDICINE

## 2022-06-27 PROCEDURE — 999N000071 HC STATISTIC HEART CATH LAB OR EP LAB

## 2022-06-27 PROCEDURE — 93010 ELECTROCARDIOGRAM REPORT: CPT | Performed by: INTERNAL MEDICINE

## 2022-06-27 PROCEDURE — 85027 COMPLETE CBC AUTOMATED: CPT | Performed by: INTERNAL MEDICINE

## 2022-06-27 PROCEDURE — C1725 CATH, TRANSLUMIN NON-LASER: HCPCS | Performed by: INTERNAL MEDICINE

## 2022-06-27 PROCEDURE — 85347 COAGULATION TIME ACTIVATED: CPT

## 2022-06-27 PROCEDURE — 37224 CV CARDIAC CATHERIZATION: CPT | Mod: LT | Performed by: INTERNAL MEDICINE

## 2022-06-27 PROCEDURE — C1894 INTRO/SHEATH, NON-LASER: HCPCS | Performed by: INTERNAL MEDICINE

## 2022-06-27 RX ORDER — ARGATROBAN 1 MG/ML
350 INJECTION, SOLUTION INTRAVENOUS
Status: DISCONTINUED | OUTPATIENT
Start: 2022-06-27 | End: 2022-06-27 | Stop reason: HOSPADM

## 2022-06-27 RX ORDER — FLUMAZENIL 0.1 MG/ML
0.2 INJECTION, SOLUTION INTRAVENOUS
Status: DISCONTINUED | OUTPATIENT
Start: 2022-06-27 | End: 2022-06-27 | Stop reason: HOSPADM

## 2022-06-27 RX ORDER — NALOXONE HYDROCHLORIDE 0.4 MG/ML
0.4 INJECTION, SOLUTION INTRAMUSCULAR; INTRAVENOUS; SUBCUTANEOUS
Status: DISCONTINUED | OUTPATIENT
Start: 2022-06-27 | End: 2022-06-27 | Stop reason: HOSPADM

## 2022-06-27 RX ORDER — CLOPIDOGREL 300 MG/1
300 TABLET, FILM COATED ORAL ONCE
Status: COMPLETED | OUTPATIENT
Start: 2022-06-27 | End: 2022-06-27

## 2022-06-27 RX ORDER — FENTANYL CITRATE 50 UG/ML
INJECTION, SOLUTION INTRAMUSCULAR; INTRAVENOUS
Status: DISCONTINUED | OUTPATIENT
Start: 2022-06-27 | End: 2022-06-27 | Stop reason: HOSPADM

## 2022-06-27 RX ORDER — PROCHLORPERAZINE MALEATE 5 MG
5 TABLET ORAL EVERY 6 HOURS PRN
Status: DISCONTINUED | OUTPATIENT
Start: 2022-06-27 | End: 2022-06-27 | Stop reason: HOSPADM

## 2022-06-27 RX ORDER — EPTIFIBATIDE 2 MG/ML
180 INJECTION, SOLUTION INTRAVENOUS EVERY 10 MIN PRN
Status: DISCONTINUED | OUTPATIENT
Start: 2022-06-27 | End: 2022-06-27 | Stop reason: HOSPADM

## 2022-06-27 RX ORDER — FENTANYL CITRATE 50 UG/ML
25 INJECTION, SOLUTION INTRAMUSCULAR; INTRAVENOUS
Status: DISCONTINUED | OUTPATIENT
Start: 2022-06-27 | End: 2022-06-27 | Stop reason: HOSPADM

## 2022-06-27 RX ORDER — ASPIRIN 81 MG/1
81 TABLET ORAL DAILY
Status: DISCONTINUED | OUTPATIENT
Start: 2022-06-27 | End: 2022-06-27 | Stop reason: HOSPADM

## 2022-06-27 RX ORDER — PROCHLORPERAZINE 25 MG
12.5 SUPPOSITORY, RECTAL RECTAL EVERY 12 HOURS PRN
Status: DISCONTINUED | OUTPATIENT
Start: 2022-06-27 | End: 2022-06-27 | Stop reason: HOSPADM

## 2022-06-27 RX ORDER — ASPIRIN 325 MG
325 TABLET ORAL ONCE
Status: DISCONTINUED | OUTPATIENT
Start: 2022-06-27 | End: 2022-06-27 | Stop reason: HOSPADM

## 2022-06-27 RX ORDER — ONDANSETRON 2 MG/ML
4 INJECTION INTRAMUSCULAR; INTRAVENOUS EVERY 6 HOURS PRN
Status: DISCONTINUED | OUTPATIENT
Start: 2022-06-27 | End: 2022-06-27 | Stop reason: HOSPADM

## 2022-06-27 RX ORDER — POTASSIUM CHLORIDE 1500 MG/1
20 TABLET, EXTENDED RELEASE ORAL
Status: DISCONTINUED | OUTPATIENT
Start: 2022-06-27 | End: 2022-06-27 | Stop reason: HOSPADM

## 2022-06-27 RX ORDER — CLOPIDOGREL BISULFATE 75 MG/1
75 TABLET ORAL DAILY
Qty: 30 TABLET | Refills: 0 | Status: SHIPPED | OUTPATIENT
Start: 2022-06-28 | End: 2022-10-31

## 2022-06-27 RX ORDER — EPTIFIBATIDE 2 MG/ML
2 INJECTION, SOLUTION INTRAVENOUS CONTINUOUS PRN
Status: DISCONTINUED | OUTPATIENT
Start: 2022-06-27 | End: 2022-06-27 | Stop reason: HOSPADM

## 2022-06-27 RX ORDER — LIDOCAINE 40 MG/G
CREAM TOPICAL
Status: DISCONTINUED | OUTPATIENT
Start: 2022-06-27 | End: 2022-06-27 | Stop reason: HOSPADM

## 2022-06-27 RX ORDER — DOBUTAMINE HYDROCHLORIDE 200 MG/100ML
2-20 INJECTION INTRAVENOUS CONTINUOUS PRN
Status: DISCONTINUED | OUTPATIENT
Start: 2022-06-27 | End: 2022-06-27 | Stop reason: HOSPADM

## 2022-06-27 RX ORDER — HEPARIN SODIUM 1000 [USP'U]/ML
INJECTION, SOLUTION INTRAVENOUS; SUBCUTANEOUS
Status: DISCONTINUED | OUTPATIENT
Start: 2022-06-27 | End: 2022-06-27 | Stop reason: HOSPADM

## 2022-06-27 RX ORDER — NALOXONE HYDROCHLORIDE 0.4 MG/ML
0.2 INJECTION, SOLUTION INTRAMUSCULAR; INTRAVENOUS; SUBCUTANEOUS
Status: DISCONTINUED | OUTPATIENT
Start: 2022-06-27 | End: 2022-06-27 | Stop reason: HOSPADM

## 2022-06-27 RX ORDER — ATROPINE SULFATE 0.1 MG/ML
0.5 INJECTION INTRAVENOUS EVERY 5 MIN PRN
Status: DISCONTINUED | OUTPATIENT
Start: 2022-06-27 | End: 2022-06-27 | Stop reason: HOSPADM

## 2022-06-27 RX ORDER — ASPIRIN 81 MG/1
243 TABLET, CHEWABLE ORAL ONCE
Status: DISCONTINUED | OUTPATIENT
Start: 2022-06-27 | End: 2022-06-27 | Stop reason: HOSPADM

## 2022-06-27 RX ORDER — SODIUM CHLORIDE 9 MG/ML
INJECTION, SOLUTION INTRAVENOUS CONTINUOUS
Status: DISCONTINUED | OUTPATIENT
Start: 2022-06-27 | End: 2022-06-27 | Stop reason: HOSPADM

## 2022-06-27 RX ORDER — DOPAMINE HYDROCHLORIDE 160 MG/100ML
2-20 INJECTION, SOLUTION INTRAVENOUS CONTINUOUS PRN
Status: DISCONTINUED | OUTPATIENT
Start: 2022-06-27 | End: 2022-06-27 | Stop reason: HOSPADM

## 2022-06-27 RX ORDER — HEPARIN SODIUM 10000 [USP'U]/100ML
100-1000 INJECTION, SOLUTION INTRAVENOUS CONTINUOUS PRN
Status: DISCONTINUED | OUTPATIENT
Start: 2022-06-27 | End: 2022-06-27 | Stop reason: HOSPADM

## 2022-06-27 RX ORDER — ARGATROBAN 1 MG/ML
150 INJECTION, SOLUTION INTRAVENOUS
Status: DISCONTINUED | OUTPATIENT
Start: 2022-06-27 | End: 2022-06-27 | Stop reason: HOSPADM

## 2022-06-27 RX ORDER — ONDANSETRON 4 MG/1
4 TABLET, ORALLY DISINTEGRATING ORAL EVERY 6 HOURS PRN
Status: DISCONTINUED | OUTPATIENT
Start: 2022-06-27 | End: 2022-06-27 | Stop reason: HOSPADM

## 2022-06-27 RX ADMIN — SODIUM CHLORIDE: 9 INJECTION, SOLUTION INTRAVENOUS at 13:27

## 2022-06-27 RX ADMIN — SODIUM CHLORIDE: 9 INJECTION, SOLUTION INTRAVENOUS at 09:59

## 2022-06-27 RX ADMIN — CLOPIDOGREL BISULFATE 300 MG: 300 TABLET, FILM COATED ORAL at 13:29

## 2022-06-27 NOTE — Clinical Note
The first balloon was inserted into the other vessel and L popliteal.Max pressure = 4 shade. Total duration = 40 seconds.     Shockwave balloon inflation x 4 at above pressure and time. .

## 2022-06-27 NOTE — PROGRESS NOTES
Status post successful IVL and angioplasty of the left popliteal and distal SFA with drug-coated balloon. DAPT for 30 days

## 2022-06-27 NOTE — PROGRESS NOTES
"Page to Dr. Hamlin: \"Spouse at bedside would like to speak with you for an update - if unable to come bedside can you pls call him? [phone]. Pt will discharge at 1630.\"  "

## 2022-06-27 NOTE — Clinical Note
The first balloon was inserted into the other vessel and L popliteal.Max pressure = 5 shade. Total duration = 40 seconds.     Max pressure = 5 shade. Total duration = 40 seconds.    Balloon reinflated a second time: Max pressure = 5 shade. Total duration = 40 seconds.

## 2022-06-27 NOTE — PROGRESS NOTES
Care Suites Post Procedure Note    Patient Information  Name: Sarah Longo  Age: 77 year old    Post Procedure  Time patient returned to Care Suites: 1250  Concerns/abnormal assessment: None at this time.  If abnormal assessment, provider notified: N/A    Right groin site closed with angioseal and covered with tegaderm.  Site CDI, no swelling. Patient instructed on activity restrictions while on bedrest with verbal understanding received.    Plan/Other: Per orders.  Plan 3 hour bedrest, up OOB at 1600 if meets criteria.      1305  Hand off report given to Purvi Valenzuela RN.    Armida Loja RN

## 2022-06-27 NOTE — Clinical Note
The first balloon was inserted into the other vessel and SFA.Max pressure = 4 shade. Total duration = 44 seconds.     Max pressure = 4 shade. Total duration = 16 seconds.    Balloon reinflated a second time: Max pressure = 4 shade. Total duration = 16 seconds.  Balloon reinflated a third time: Max pressure = 4 shade. Total duration = 12 seconds.

## 2022-06-27 NOTE — PROGRESS NOTES
Spoke to  The spouse and he will be back to hospital ~1530.  Spouse is hoping to talk with Dr. Hamlin. Will text page Dr. Hamlin with update.

## 2022-06-27 NOTE — PROGRESS NOTES
PATIENT/VISITOR WELLNESS SCREENING    Step 1 Patient Screening    1. In the last month, have you been in contact with someone who was confirmed or suspected to have Coronavirus/COVID-19? No    2. Do you have the following symptoms?  Fever/Chills? No   Cough? No   Shortness of breath? No   New loss of taste or smell? No  Sore throat? No  Muscle or body aches? No  Headaches? No  Fatigue? No  Vomiting or diarrhea? No    Step 2 Visitor Screening    1. Name of Visitor (1 visitor per patient): Kwadwo    2. In the last month, have you been in contact with someone who was confirmed or suspected to have Coronavirus/COVID-19? No    3. Do you have the following symptoms?  Fever/Chills? No   Cough? No   Shortness of breath? No   Skin rash? No   Loss of taste or smell? No  Sore throat? No  Runny or stuffy nose? No  Muscle or body aches? No  Headaches? No  Fatigue? No  Vomiting or diarrhea? No    If the visitor has positive symptoms, notify supervisor/manger  Per policy, the visitor will need to leave the facility     Step 3 Refer to logic grid below for actions    NO SYMPTOM(S)    ACTIONS:  1. Standard rooming process  2. Provider to assess per normal protocol  3. Implement precautions as needed and per guidelines     POSITIVE SYMPTOM(S)  If positive for ANY of the following symptoms: fever, cough, shortness of breath, rash    ACTION:  1. Continue to have the patient wear a mask   2. Room patient as soon as possible  3. Don appropriate PPE when entering room  4. Provider evaluation      Care Suites Admission Nursing Note    Patient Information  Name: Sarah Longo  Age: 77 year old  Reason for admission:Ezequiel L/E angio  Care Suites arrival time: ~0920    Visitor Information  Name: Kwadwo-spouse   Informed of visitor restrictions: Yes  1 visitor allowed per patient   Visitor must screen negative for COVID symptoms   Visitor must wear a mask  Waiting rooms closed to visitors    Patient Admission/Assessment   Pre-procedure  assessment complete: Yes  If abnormal assessment/labs, provider notified: N/A  NPO: Yes  Medications held per instructions/orders: Yes  Consent: deferred  Patient oriented to room: Yes  Education/questions answered: Yes  Plan/other: Procedure ~1030    Discharge Planning  Discharge name/phone number: [facility] [phone]  Overnight post sedation caregiver: Spouse   Discharge location: [place]    Purvi Valenzuela RN

## 2022-06-27 NOTE — PROGRESS NOTES
"Page to Dr. Hamlin: \"Spouse at bedside would like to speak with you for an update - if unable to come bedside can you pls call him? [phone]. Pt will discharge at 1630.\"  "

## 2022-06-27 NOTE — PROGRESS NOTES
Care Suites Discharge Nursing Note    Patient Information  Name: Sarah Longo  Age: 77 year old    Discharge Education:  Discharge instructions reviewed: Yes  Additional education/resources provided: angioseal pamphlet, PAD booklet, plavix prescription filled here and given to pt  Patient/patient representative verbalizes understanding: Yes  Patient discharging on new medications: Yes  Medication education completed: Yes    Discharge Plans:   Discharge location: home  Discharge ride contacted: Yes  Approximate discharge time: 1720    Discharge Criteria:  Discharge criteria met and vital signs stable: Yes    Patient Belongs:  Patient belongings returned to patient: Yes    R) groin site covered with tegaderm, site and dressing CDI, no bleeding or hematoma. Pt has ambulated, eaten, and voided without issue. PIV removed. Dr. Hamlin came to bedside to update pt and spouse, all questions answered. Discharged via wheelchair to private vehicle.    Verna Carroll RN

## 2022-06-27 NOTE — Clinical Note
The first balloon was inserted into the other vessel and L popliteal.Max pressure = 12 shade. Total duration = 24 seconds.

## 2022-06-27 NOTE — DISCHARGE INSTRUCTIONS
Lower Extremity Angioplasty Discharge Instructions - Femoral (groin site)    After you go home:    Have an adult stay with you until tomorrow.  Drink extra fluids for 2 days.  You may resume your normal diet.  No smoking       For 24 hours - due to the sedation you received:  Relax and take it easy.  Do NOT make any important or legal decisions.  Do NOT drive or operate machines at home or at work.  Do NOT drink alcohol.    Care of Groin Puncture Site:    For the first 24 hrs - check the puncture site every 1-2 hours while awake.  For 2 days, when you cough, sneeze, laugh or move your bowels, hold your hand over the puncture site and press firmly.  Remove the bandaid after 24 hours. If there is minor oozing, apply another bandaid and remove it after 12 hours.  It is normal to have a small bruise or pea size lump at the site.  You may shower tomorrow. Do NOT take a bath, or use a hot tub or pool for at least 3 days. Do NOT scrub the site. Do not use lotion or powder near the puncture site.     Activity:            For 2 days:  No stooping or squatting  Do NOT do any heavy activity such as exercise, lifting, or straining.   No housework, yard work or any activity that make you sweat  Do NOT lift more than 10 pounds    Bleeding:    If you start bleeding from the site in your groin, lie down flat and press firmly on/above the site for 10 minutes.   Once bleeding stops, lay flat for 2 hours.   Call [facility] as soon as you can.       Call 911 right away if you have heavy bleeding or bleeding that does not stop.      Medicines:    You are taking an antiplatelet medication called Plavix, do not stop taking it until you talk to your doctor.      Take your medications, including blood thinners, unless your provider tells you not to.    If you have stopped any medicines, check with your provider about when to restart them.    Follow Up Appointments:    Follow up with [facility] Nurse Practitioner at [facility] of  patient preference in 7-10 days.  Cardiac Rehab will contact you for follow up care.    Call the clinic if:    You have increased pain or a large or growing hard lump around the site.  The site is red, swollen, hot or tender.  Blood or fluid is draining from the site.  You have chills or a fever greater than 101 F (38 C).  Your leg feels numb, cool or changes color.  You have hives, a rash or unusual itching.  New pain in the back or belly that you cannot control with Tylenol.  Any questions or concerns.          [facility] [place]:    [phone] UMP (7 days a week)

## 2022-06-27 NOTE — Clinical Note
The first balloon was inserted into the other vessel and L popliteal.Max pressure = 12 shade. Total duration = 26 seconds.

## 2022-06-27 NOTE — Clinical Note
The first balloon was inserted into the other vessel.Max pressure = 4 shade. Total duration = 40 seconds.     Shock wave balloon inflation x .

## 2022-06-29 ENCOUNTER — CARE COORDINATION (OUTPATIENT)
Dept: CARDIOLOGY | Facility: CLINIC | Age: 77
End: 2022-06-29

## 2022-06-29 DIAGNOSIS — I73.9 PAD (PERIPHERAL ARTERY DISEASE) (H): Primary | ICD-10-CM

## 2022-06-29 LAB
ATRIAL RATE - MUSE: 64 BPM
DIASTOLIC BLOOD PRESSURE - MUSE: NORMAL MMHG
INTERPRETATION ECG - MUSE: NORMAL
P AXIS - MUSE: 58 DEGREES
PR INTERVAL - MUSE: 160 MS
QRS DURATION - MUSE: 90 MS
QT - MUSE: 418 MS
QTC - MUSE: 431 MS
R AXIS - MUSE: 39 DEGREES
SYSTOLIC BLOOD PRESSURE - MUSE: NORMAL MMHG
T AXIS - MUSE: 56 DEGREES
VENTRICULAR RATE- MUSE: 64 BPM

## 2022-06-29 NOTE — PROGRESS NOTES
Pt called to discuss an update on her peripheral angiogram results. She said  told her she will need her other leg done towards the end of July. Pt wanted to know if she should get that scheduled now. I told pt I will send update to 's team. Patrick STOVER June 29, 2022, 5:02 PM

## 2022-06-30 NOTE — PROGRESS NOTES
Called back to pt. Reviewed per Dr. Hamlin's angiogram report plan is for staged right popliteal artery intervention in approximately 3 to 5 weeks, will request scheduling contact pt to set up. Order placed and message sent to scheduling.

## 2022-07-11 NOTE — PROGRESS NOTES
Received call back from pt, per pt she has not heard from scheduling yet about scheduling her staged procedure. Message sent to scheduling requesting they call pt.     Pt also wanted to update Dr. Hamlin that she feels like she has not had any improvement in her left leg symptoms since her procedure. Pt stated she expected immediate improvement and she feels about the same. Advised will review with Dr. Hamlin. Reviewed plan for follow up with Karen Alexander on 7/27/22.

## 2022-07-19 ENCOUNTER — CARE COORDINATION (OUTPATIENT)
Dept: CARDIOLOGY | Facility: CLINIC | Age: 77
End: 2022-07-19

## 2022-07-19 NOTE — PROGRESS NOTES
Pt left VM on team 7 (Dr. Burgos nurse team) phone wanting to discuss her concerns regarding recommendation from Vascular team to cancel RLE angio.     Pt with multiple complaints this morning. Frustrated with lack of clinic access/unable to get in for an appt for a month post intervention.    Pt states she told Kristine STOVER she didn't notice much improvement post intervention on the left because her LLE symptoms have never been as bad as RLE symptoms. RLE symptoms she reports today sound stable but pt very concerned for risk of limb ischemia.    Pt wishes to discuss her LLE angio results and RLE disease further with Dr. Hamlin. Her procedure is still scheduled for 8/31/22 and she is refusing to cancel. Pt states she has done research and also wants to add a vasodilator. Scheduled pt w/Dr. Hamlin in clinic 8/3/22 to discuss. Pt would like Dr. Hamlin's thoughts on adding a vasodilator. Routed to Dr. Hamlin & team 1. Cherelle Gaston RN on 7/19/2022 at 9:37 AM

## 2022-07-27 ENCOUNTER — OFFICE VISIT (OUTPATIENT)
Dept: CARDIOLOGY | Facility: CLINIC | Age: 77
End: 2022-07-27
Payer: COMMERCIAL

## 2022-07-27 VITALS
OXYGEN SATURATION: 98 % | WEIGHT: 188.4 LBS | BODY MASS INDEX: 28.55 KG/M2 | HEART RATE: 75 BPM | SYSTOLIC BLOOD PRESSURE: 134 MMHG | DIASTOLIC BLOOD PRESSURE: 76 MMHG | HEIGHT: 68 IN

## 2022-07-27 DIAGNOSIS — I73.9 PAD (PERIPHERAL ARTERY DISEASE) (H): Primary | ICD-10-CM

## 2022-07-27 DIAGNOSIS — I70.213 ATHEROSCLEROSIS OF NATIVE ARTERIES OF EXTREMITIES WITH INTERMITTENT CLAUDICATION, BILATERAL LEGS (H): ICD-10-CM

## 2022-07-27 PROCEDURE — 99215 OFFICE O/P EST HI 40 MIN: CPT | Performed by: PHYSICIAN ASSISTANT

## 2022-07-27 NOTE — PROGRESS NOTES
[id number]  50 minutes spent on the date of the encounter doing chart review, review of test results, interpretation of tests, patient visit, documentation and discussion with family     HPI and Plan:   See dictation    Orders this Visit:  No orders of the defined types were placed in this encounter.    No orders of the defined types were placed in this encounter.    There are no discontinued medications.      No diagnosis found.    CURRENT MEDICATIONS:  Current Outpatient Medications   Medication Sig Dispense Refill     acetylcysteine (N-ACETYL CYSTEINE) 600 MG CAPS capsule Take 600 mg by mouth 2 times daily       amoxicillin (AMOXIL) 500 MG capsule 2,000 mg once as needed (Before dental procedures)       ascorbic acid 1000 MG TABS tablet Take 500 mg by mouth       aspirin 81 MG EC tablet Take 81 mg by mouth daily       CINNAMON PO Take 2 capsules by mouth 2 times daily prn       clopidogrel (PLAVIX) 75 MG tablet Take 1 tablet (75 mg) by mouth daily Starting tomorrow. 30 tablet 0     Continuous Blood Gluc Sensor (FREESTYLE PERRI 2 SENSOR) MISC        Continuous Blood Gluc Sensor (FREESTYLE PERRI 2 SENSOR) MISC        cyanocobalamin (CYANOCOBALAMIN) 1000 MCG/ML injection Inject 2 mLs into the muscle every 30 days       DULoxetine (CYMBALTA) 60 MG capsule daily       estradiol (ESTRACE) 0.1 MG/GM vaginal cream Use pea sized amount and apply with finger to vaginal skin just inside opening once a day before bed as needed for vaginal dryness. 42.5 g 3     evolocumab (REPATHA SURECLICK) 140 MG/ML prefilled autoinjector Inject 1 mL (140 mg) Subcutaneous every 14 days 6 mL 3     fluconazole (DIFLUCAN) 150 MG tablet prn       insulin lispro (HUMALOG KWIKPEN) 100 UNIT/ML (1 unit dial) KWIKPEN Inject Subcutaneous 3 times daily (before meals)       levothyroxine (SYNTHROID/LEVOTHROID) 100 MCG tablet Take 100 mcg by mouth daily       liraglutide (VICTOZA PEN) 18 MG/3ML solution Inject 1.2 mg Subcutaneous daily (Patient taking  differently: Inject 1.8 mg Subcutaneous daily) 3 mL 0     losartan (COZAAR) 50 MG tablet Take 1 tablet (50 mg) by mouth daily 90 tablet 3     mirabegron (MYRBETRIQ) 50 MG 24 hr tablet Take 1 tablet (50 mg) by mouth daily Patient not taking daily 90 tablet 3     nitroGLYcerin (NITROSTAT) 0.4 MG sublingual tablet Place 1 tablet (0.4 mg) under the tongue every 5 minutes as needed for chest pain For chest pain place 1 tablet under the tongue every 5 minutes for 3 doses. If symptoms persist 5 minutes after 1st dose call 911. 25 tablet 11     oxyCODONE (ROXICODONE) 5 MG tablet Take 1-2 tablets (5-10 mg) by mouth every 3 hours as needed for severe pain 30 tablet 0     traMADol (ULTRAM) 50 MG tablet Take 1 tablet (50 mg) by mouth every 6 hours as needed for moderate to severe pain 30 tablet 3     vitamin D3 (CHOLECALCIFEROL) 2000 units tablet Take 1 tablet by mouth 2 times daily       busPIRone (BUSPAR) 10 MG tablet Take 1 tablet by mouth every 12 hours (Patient not taking: Reported on 7/27/2022)       furosemide (LASIX) 20 MG tablet Take 1 tablet (20 mg) by mouth daily (Patient not taking: Reported on 7/27/2022) 90 tablet 3     GLIMEPIRIDE 4 MG OR TABS Take 4 mg by mouth daily as needed (Patient not taking: Reported on 7/27/2022)       pregabalin (LYRICA) 25 MG capsule  (Patient not taking: Reported on 7/27/2022)         ALLERGIES     Allergies   Allergen Reactions     Jardiance [Empagliflozin] Other (See Comments)     Ongoing yeast infections     Hmg-Coa-R Inhibitors Muscle Pain (Myalgia)     Oral statins       Versed [Midazolam]      Pt is refusing to have this med d/t memory loss and forgetfulness        PAST MEDICAL HISTORY:  Past Medical History:   Diagnosis Date     Anemia      Anxiety      Arthritis      CAD (coronary artery disease)      Chronic bilateral low back pain without sciatica      Coronary artery disease 04/28/2016    cardiac cath 2/2019: patent stents; PTCA with MAYA x 2 to OM1 and MAYA x 1 to p.LAD  (4/21/2016 at [place]); PTCA with intracoronary stent placement of RCA June 2004; MAYA to OM1 11/2016     Cystocele, midline 09/29/2010     Depression      Depression      DM type 2 (diabetes mellitus, type 2) (H)      HYPERPLASTIC  POLYP      colonoscopy in 5-10 yrs.     Hypertension      Hypothyroid      Hypothyroidism     hypothyroid- Dr Majano     Motion sickness      Mumps      OAB (overactive bladder)     complex voiding dysfct, failed interstim     Osteoarthritis      Other and unspecified hyperlipidemia      Palpitations      PONV (postoperative nausea and vomiting)      Postmenopausal bleeding      Shoulder blade pain 5/31/2016     Type II or unspecified type diabetes mellitus without mention of complication, not stated as uncontrolled     Dr. Majano     Unspecified essential hypertension      Urinary frequency 9/29/10    followed by urology. no benefit from detrol or sanctura. month trial of macrobid and flomax 10/10       PAST SURGICAL HISTORY:  Past Surgical History:   Procedure Laterality Date     ------------OTHER-------------      Interstim     Ablation       ARTHROPLASTY HIP Left 7/15/2020    Procedure: Left total hip arthroplasty;  Surgeon: Jayson Victoria MD;  Location:  OR     BACK SURGERY  03/05/2020    spinal fusion     BACK SURGERY       C CT ANGIOGRAPHY CORONARY  1/2005    mod soft plaque LAD and Cx, follow up with left heart cath     CARDIAC SURGERY       CHOLECYSTECTOMY       CHOLECYSTECTOMY       COLONOSCOPY       CORONARY STENT PLACEMENT  2004     x 5 stents      CV CORONARY ANGIOGRAM N/A 4/29/2022    Procedure: Coronary Angiogram;  Surgeon: Sudarshan Lee MD;  Location: [facility] CARDIAC CATH LAB     CV HEART CATHETERIZATION WITH POSSIBLE INTERVENTION N/A 2/14/2019    Procedure: Coronary Angiogram;  Surgeon: Sudarshan Lee MD;  Location: [facility] CARDIAC CATH LAB; patent stents     CV LEFT HEART CATH N/A 4/29/2022    Procedure: Left Heart Catheterization;  Surgeon: Rosa  Sudarshan CENTENO MD;  Location:  HEART CARDIAC CATH LAB     CV LEFT VENTRICULOGRAM N/A 4/29/2022    Procedure: Left Ventriculogram;  Surgeon: Sudarshan Lee MD;  Location:  HEART CARDIAC CATH LAB     CV LOWER EXTREMITY ANGIOGRAM BILATERAL Bilateral 6/27/2022    Procedure: Angiogram Lower Extremity Bilateral;  Surgeon: Jun Hamlin MD;  Location:  HEART CARDIAC CATH LAB     CYSTOSCOPY       EXCISE LESION UPPER EXTREMITY  6/5/2013    Procedure: EXCISE LESION UPPER EXTREMITY;  EXCISION RIGHT ARM ANTICUBITAL LIPOMA ;  Surgeon: Jayson Joe MD;  Location: [facility] REMOVAL OF TONSILS,12+ Y/O       HEART CATH LEFT HEART CATH  3/2/2016    No intervention - aggressive medical management     HEART CATH LEFT HEART CATH  4/21/2016     MAYA x 2 to OM1, MAYA to LAD, at [place]     HEART CATH LEFT HEART CATH  6/29/2004    MAYA to RCA     HEART CATH LEFT HEART CATH  3/28/2002    Mild to moderate 3 vessel CAD. Medical management recommended.      HEART CATH LEFT HEART CATH  11/22/2016    MAYA to OM1     hypothyroid       JOINT REPLACEMENT       KNEE SURGERY  4/20/10    right knee replacement     LUMBAR FUSION N/A 3/5/2020    Procedure: BILATERAL LUMBAR 2-3 AND LUMBAR 3-4 POSTERIOR SPINAL FUSION WITH LEFT LATERAL RECESS DECOMPRESSION LUMBAR 4-5 AND BILATERAL LAMINECTOMY AND DECOMPRESSION LUMBAR 2-3 AND LUMBAR 3-4 WITH ALLOGRAFT AND AUTOGRAFT AND VIVIGEN;  Surgeon: Navid Caicedo MD;  Location: [facility];  Service: Spine     LUMBAR FUSION Bilateral 1/22/2021    Procedure: REVISION OF NONUNION BILATERAL LUMBAR 2-3 AND LUMBAR 3-4 WITH REVISION OF INSTRUMENTATION RIGHT LUMBAR 2 AND LEFT LUMBAR 4 WITH LEFT LUMBAR 4-5 LAMINOFORAMINOTOMY AND LUMBAR 1-2 DECOMPRESSION WITH ALLOGRAFT AND BONE MARROW ASPIRATE;  Surgeon: Navid Caicedo MD;  Location: [facility] OR;  Service: Spine     ORTHOPEDIC SURGERY      total knee 2010     REMOVE STIMULATOR SACRAL NERVE N/A 11/2/2015    Procedure:  REMOVE STIMULATOR SACRAL NERVE;  Surgeon: Gertrudis Frausto MD;  Location: [facility]     REPLACEMENT TOTAL KNEE Right 2010     SURGICAL HISTORY OF -       Uterine endometrial ablation     ZZC LIGATE FALLOPIAN TUBE      endometrial ablation       FAMILY HISTORY:  Family History   Problem Relation Age of Onset     C.A.D. Father         MI , age 83, had high lipids     Hyperlipidemia Father      Hypertension Father      Coronary Artery Disease Father      Hypertension Mother      Genitourinary Problems Mother         renal failure     Fractures Mother         hip     Osteoporosis Mother      Cerebrovascular Disease Maternal Grandfather         cerebral hemorrhage     Diabetes Maternal Uncle      Colon Cancer Maternal Aunt      Diabetes Maternal Grandmother        SOCIAL HISTORY:  Social History     Socioeconomic History     Marital status:      Spouse name: Kwadwo     Number of children: 3     Years of education: None     Highest education level: None   Occupational History     Occupation: PT Aide     Employer: NONE      Employer: RETIRED   Tobacco Use     Smoking status: Never Smoker     Smokeless tobacco: Never Used   Substance and Sexual Activity     Alcohol use: No     Alcohol/week: 0.0 standard drinks     Drug use: No     Sexual activity: Never     Partners: Male     Birth control/protection: Post-menopausal   Other Topics Concern     Caffeine Concern Yes     Comment: 6-7 cups caffeine per day     Sleep Concern No     Stress Concern Yes     Weight Concern No     Special Diet No     Comment: eats healthy      Exercise Yes     Comment:  walking & active life style      Parent/sibling w/ CABG, MI or angioplasty before 65F 55M? No       Review of Systems:  Skin:  not assessed     Eyes:  not assessed    ENT:  not assessed    Respiratory:  Negative    Cardiovascular:  Negative    Gastroenterology: not assessed    Genitourinary:  not assessed    Musculoskeletal:  not assessed    Neurologic:  not assessed   "  Psychiatric:  not assessed    Heme/Lymph/Imm:  not assessed    Endocrine:  not assessed      Physical Exam:  Vitals: /76   Pulse 75   Ht 1.727 m (5' 8\")   Wt 85.5 kg (188 lb 6.4 oz)   SpO2 98%   BMI 28.65 kg/m     Please refer to dictation for physical exam    Recent Lab Results: all reviewed today  CBC RESULTS:  Lab Results   Component Value Date    WBC 3.6 (L) 06/27/2022    WBC 3.9 (L) 07/19/2020    RBC 3.96 06/27/2022    RBC 2.60 (L) 07/19/2020    HGB 12.1 06/27/2022    HGB 12.1 09/15/2020    HCT 37.1 06/27/2022    HCT 23.8 (L) 07/19/2020    MCV 94 06/27/2022    MCV 92 07/19/2020    MCH 30.6 06/27/2022    MCH 29.6 07/19/2020    MCHC 32.6 06/27/2022    MCHC 32.4 07/19/2020    RDW 13.3 06/27/2022    RDW 12.1 07/19/2020     06/27/2022     07/19/2020       BMP RESULTS:  Lab Results   Component Value Date     06/27/2022     07/19/2020    POTASSIUM 4.0 06/27/2022    POTASSIUM 3.7 07/19/2020    CHLORIDE 112 (H) 06/27/2022    CHLORIDE 107 07/19/2020    CO2 24 06/27/2022    CO2 27 07/19/2020    ANIONGAP 4 06/27/2022    ANIONGAP 2 (L) 07/19/2020     (H) 06/27/2022     (H) 06/27/2022     (H) 07/19/2020    BUN 20 06/27/2022    BUN 14 07/19/2020    CR 0.82 06/27/2022    CR 0.71 07/19/2020    GFRESTIMATED 74 06/27/2022    GFRESTIMATED >60 01/24/2021    GFRESTIMATED 84 07/19/2020    GFRESTBLACK >60 01/24/2021    GFRESTBLACK >90 07/19/2020    LUCIUS 9.5 06/27/2022    LUCIUS 8.5 07/19/2020        INR RESULTS:  Lab Results   Component Value Date    INR 0.94 06/27/2022    INR 0.92 04/29/2022    INR 0.88 02/14/2019    INR 0.94 02/24/2017           CC  No referring provider defined for this encounter.      "

## 2022-07-27 NOTE — PROGRESS NOTES
Received response from Dr. Hamlin:     Jun Hamlin MD Hair, [place] W, RN  Caller: Unspecified (3 weeks ago)  No need for vasodilator. Will discuss procedure with her during follow up     Called pt and reviewed recommendations and plan for follow up on 8/3/22. Pt verbalized understanding and agreement with plan.

## 2022-07-27 NOTE — LETTER
7/27/2022    Gaye Zimmer  [facility] [address] [address]    RE: Sarah Longo       Dear Colleague,     I had the pleasure of seeing Sarah Longo in the [facility].  [phone]  50 minutes spent on the date of the encounter doing chart review, review of test results, interpretation of tests, patient visit, documentation and discussion with family     HPI and Plan:   See dictation    Orders this Visit:  No orders of the defined types were placed in this encounter.    No orders of the defined types were placed in this encounter.    There are no discontinued medications.      No diagnosis found.    CURRENT MEDICATIONS:  Current Outpatient Medications   Medication Sig Dispense Refill     acetylcysteine (N-ACETYL CYSTEINE) 600 MG CAPS capsule Take 600 mg by mouth 2 times daily       amoxicillin (AMOXIL) 500 MG capsule 2,000 mg once as needed (Before dental procedures)       ascorbic acid 1000 MG TABS tablet Take 500 mg by mouth       aspirin 81 MG EC tablet Take 81 mg by mouth daily       CINNAMON PO Take 2 capsules by mouth 2 times daily prn       clopidogrel (PLAVIX) 75 MG tablet Take 1 tablet (75 mg) by mouth daily Starting tomorrow. 30 tablet 0     Continuous Blood Gluc Sensor (FREESTYLE PERRI 2 SENSOR) MISC        Continuous Blood Gluc Sensor (FREESTYLE PERRI 2 SENSOR) MISC        cyanocobalamin (CYANOCOBALAMIN) 1000 MCG/ML injection Inject 2 mLs into the muscle every 30 days       DULoxetine (CYMBALTA) 60 MG capsule daily       estradiol (ESTRACE) 0.1 MG/GM vaginal cream Use pea sized amount and apply with finger to vaginal skin just inside opening once a day before bed as needed for vaginal dryness. 42.5 g 3     evolocumab (REPATHA SURECLICK) 140 MG/ML prefilled autoinjector Inject 1 mL (140 mg) Subcutaneous every 14 days 6 mL 3     fluconazole (DIFLUCAN) 150 MG tablet prn       insulin lispro (HUMALOG KWIKPEN) 100 UNIT/ML (1 unit dial) KWIKPEN Inject  Subcutaneous 3 times daily (before meals)       levothyroxine (SYNTHROID/LEVOTHROID) 100 MCG tablet Take 100 mcg by mouth daily       liraglutide (VICTOZA PEN) 18 MG/3ML solution Inject 1.2 mg Subcutaneous daily (Patient taking differently: Inject 1.8 mg Subcutaneous daily) 3 mL 0     losartan (COZAAR) 50 MG tablet Take 1 tablet (50 mg) by mouth daily 90 tablet 3     mirabegron (MYRBETRIQ) 50 MG 24 hr tablet Take 1 tablet (50 mg) by mouth daily Patient not taking daily 90 tablet 3     nitroGLYcerin (NITROSTAT) 0.4 MG sublingual tablet Place 1 tablet (0.4 mg) under the tongue every 5 minutes as needed for chest pain For chest pain place 1 tablet under the tongue every 5 minutes for 3 doses. If symptoms persist 5 minutes after 1st dose call 911. 25 tablet 11     oxyCODONE (ROXICODONE) 5 MG tablet Take 1-2 tablets (5-10 mg) by mouth every 3 hours as needed for severe pain 30 tablet 0     traMADol (ULTRAM) 50 MG tablet Take 1 tablet (50 mg) by mouth every 6 hours as needed for moderate to severe pain 30 tablet 3     vitamin D3 (CHOLECALCIFEROL) 2000 units tablet Take 1 tablet by mouth 2 times daily       busPIRone (BUSPAR) 10 MG tablet Take 1 tablet by mouth every 12 hours (Patient not taking: Reported on 7/27/2022)       furosemide (LASIX) 20 MG tablet Take 1 tablet (20 mg) by mouth daily (Patient not taking: Reported on 7/27/2022) 90 tablet 3     GLIMEPIRIDE 4 MG OR TABS Take 4 mg by mouth daily as needed (Patient not taking: Reported on 7/27/2022)       pregabalin (LYRICA) 25 MG capsule  (Patient not taking: Reported on 7/27/2022)         ALLERGIES     Allergies   Allergen Reactions     Jardiance [Empagliflozin] Other (See Comments)     Ongoing yeast infections     Hmg-Coa-R Inhibitors Muscle Pain (Myalgia)     Oral statins       Versed [Midazolam]      Pt is refusing to have this med d/t memory loss and forgetfulness        PAST MEDICAL HISTORY:  Past Medical History:   Diagnosis Date     Anemia      Anxiety       Arthritis      CAD (coronary artery disease)      Chronic bilateral low back pain without sciatica      Coronary artery disease 04/28/2016    cardiac cath 2/2019: patent stents; PTCA with MAYA x 2 to OM1 and MAYA x 1 to p.LAD (4/21/2016 at [place]); PTCA with intracoronary stent placement of RCA June 2004; MAYA to OM1 11/2016     Cystocele, midline 09/29/2010     Depression      Depression      DM type 2 (diabetes mellitus, type 2) (H)      HYPERPLASTIC  POLYP      colonoscopy in 5-10 yrs.     Hypertension      Hypothyroid      Hypothyroidism     hypothyroid- Dr Majano     Motion sickness      Mumps      OAB (overactive bladder)     complex voiding dysfct, failed interstim     Osteoarthritis      Other and unspecified hyperlipidemia      Palpitations      PONV (postoperative nausea and vomiting)      Postmenopausal bleeding      Shoulder blade pain 5/31/2016     Type II or unspecified type diabetes mellitus without mention of complication, not stated as uncontrolled     Dr. Majano     Unspecified essential hypertension      Urinary frequency 9/29/10    followed by urology. no benefit from detrol or sanctura. month trial of macrobid and flomax 10/10       PAST SURGICAL HISTORY:  Past Surgical History:   Procedure Laterality Date     ------------OTHER-------------      Interstim     Ablation       ARTHROPLASTY HIP Left 7/15/2020    Procedure: Left total hip arthroplasty;  Surgeon: Jayson Victoria MD;  Location:  OR     BACK SURGERY  03/05/2020    spinal fusion     BACK SURGERY       C CT ANGIOGRAPHY CORONARY  1/2005    mod soft plaque LAD and Cx, follow up with left heart cath     CARDIAC SURGERY       CHOLECYSTECTOMY       CHOLECYSTECTOMY       COLONOSCOPY       CORONARY STENT PLACEMENT  2004     x 5 stents      CV CORONARY ANGIOGRAM N/A 4/29/2022    Procedure: Coronary Angiogram;  Surgeon: Sudarshan Lee MD;  Location: [facility] CARDIAC CATH LAB     CV HEART CATHETERIZATION WITH POSSIBLE INTERVENTION N/A  2/14/2019    Procedure: Coronary Angiogram;  Surgeon: Sudarshan Lee MD;  Location:  HEART CARDIAC CATH LAB; patent stents     CV LEFT HEART CATH N/A 4/29/2022    Procedure: Left Heart Catheterization;  Surgeon: Sudarshan Lee MD;  Location:  HEART CARDIAC CATH LAB     CV LEFT VENTRICULOGRAM N/A 4/29/2022    Procedure: Left Ventriculogram;  Surgeon: Sudarshan Lee MD;  Location:  HEART CARDIAC CATH LAB     CV LOWER EXTREMITY ANGIOGRAM BILATERAL Bilateral 6/27/2022    Procedure: Angiogram Lower Extremity Bilateral;  Surgeon: Jun Hamlin MD;  Location:  HEART CARDIAC CATH LAB     CYSTOSCOPY       EXCISE LESION UPPER EXTREMITY  6/5/2013    Procedure: EXCISE LESION UPPER EXTREMITY;  EXCISION RIGHT ARM ANTICUBITAL LIPOMA ;  Surgeon: Jayson Joe MD;  Location: [facility] REMOVAL OF TONSILS,12+ Y/O       HEART CATH LEFT HEART CATH  3/2/2016    No intervention - aggressive medical management     HEART CATH LEFT HEART CATH  4/21/2016     MAYA x 2 to OM1, MAYA to LAD, at [place]     HEART CATH LEFT HEART CATH  6/29/2004    MAYA to RCA     HEART CATH LEFT HEART CATH  3/28/2002    Mild to moderate 3 vessel CAD. Medical management recommended.      HEART CATH LEFT HEART CATH  11/22/2016    MAYA to OM1     hypothyroid       JOINT REPLACEMENT       KNEE SURGERY  4/20/10    right knee replacement     LUMBAR FUSION N/A 3/5/2020    Procedure: BILATERAL LUMBAR 2-3 AND LUMBAR 3-4 POSTERIOR SPINAL FUSION WITH LEFT LATERAL RECESS DECOMPRESSION LUMBAR 4-5 AND BILATERAL LAMINECTOMY AND DECOMPRESSION LUMBAR 2-3 AND LUMBAR 3-4 WITH ALLOGRAFT AND AUTOGRAFT AND VIVIGEN;  Surgeon: Navid Caicedo MD;  Location: [facility];  Service: Spine     LUMBAR FUSION Bilateral 1/22/2021    Procedure: REVISION OF NONUNION BILATERAL LUMBAR 2-3 AND LUMBAR 3-4 WITH REVISION OF INSTRUMENTATION RIGHT LUMBAR 2 AND LEFT LUMBAR 4 WITH LEFT LUMBAR 4-5 LAMINOFORAMINOTOMY AND LUMBAR 1-2 DECOMPRESSION WITH  ALLOGRAFT AND BONE MARROW ASPIRATE;  Surgeon: Navid Caicedo MD;  Location: [facility];  Service: Spine     ORTHOPEDIC SURGERY      total knee      REMOVE STIMULATOR SACRAL NERVE N/A 2015    Procedure: REMOVE STIMULATOR SACRAL NERVE;  Surgeon: Gertrudis Frausto MD;  Location:  [facility]     REPLACEMENT TOTAL KNEE Right 2010     SURGICAL HISTORY OF -       Uterine endometrial ablation     ZZC LIGATE FALLOPIAN TUBE      endometrial ablation       FAMILY HISTORY:  Family History   Problem Relation Age of Onset     C.A.D. Father         MI , age 83, had high lipids     Hyperlipidemia Father      Hypertension Father      Coronary Artery Disease Father      Hypertension Mother      Genitourinary Problems Mother         renal failure     Fractures Mother         hip     Osteoporosis Mother      Cerebrovascular Disease Maternal Grandfather         cerebral hemorrhage     Diabetes Maternal Uncle      Colon Cancer Maternal Aunt      Diabetes Maternal Grandmother        SOCIAL HISTORY:  Social History     Socioeconomic History     Marital status:      Spouse name: Kwadwo     Number of children: 3     Years of education: None     Highest education level: None   Occupational History     Occupation: PT Aide     Employer: NONE      Employer: RETIRED   Tobacco Use     Smoking status: Never Smoker     Smokeless tobacco: Never Used   Substance and Sexual Activity     Alcohol use: No     Alcohol/week: 0.0 standard drinks     Drug use: No     Sexual activity: Never     Partners: Male     Birth control/protection: Post-menopausal   Other Topics Concern     Caffeine Concern Yes     Comment: 6-7 cups caffeine per day     Sleep Concern No     Stress Concern Yes     Weight Concern No     Special Diet No     Comment: eats healthy      Exercise Yes     Comment:  walking & active life style      Parent/sibling w/ CABG, MI or angioplasty before 65F 55M? No       Review of Systems:  Skin:  not assessed   "   Eyes:  not assessed    ENT:  not assessed    Respiratory:  Negative    Cardiovascular:  Negative    Gastroenterology: not assessed    Genitourinary:  not assessed    Musculoskeletal:  not assessed    Neurologic:  not assessed    Psychiatric:  not assessed    Heme/Lymph/Imm:  not assessed    Endocrine:  not assessed      Physical Exam:  Vitals: /76   Pulse 75   Ht 1.727 m (5' 8\")   Wt 85.5 kg (188 lb 6.4 oz)   SpO2 98%   BMI 28.65 kg/m     Please refer to dictation for physical exam    Recent Lab Results: all reviewed today  CBC RESULTS:  Lab Results   Component Value Date    WBC 3.6 (L) 06/27/2022    WBC 3.9 (L) 07/19/2020    RBC 3.96 06/27/2022    RBC 2.60 (L) 07/19/2020    HGB 12.1 06/27/2022    HGB 12.1 09/15/2020    HCT 37.1 06/27/2022    HCT 23.8 (L) 07/19/2020    MCV 94 06/27/2022    MCV 92 07/19/2020    MCH 30.6 06/27/2022    MCH 29.6 07/19/2020    MCHC 32.6 06/27/2022    MCHC 32.4 07/19/2020    RDW 13.3 06/27/2022    RDW 12.1 07/19/2020     06/27/2022     07/19/2020       BMP RESULTS:  Lab Results   Component Value Date     06/27/2022     07/19/2020    POTASSIUM 4.0 06/27/2022    POTASSIUM 3.7 07/19/2020    CHLORIDE 112 (H) 06/27/2022    CHLORIDE 107 07/19/2020    CO2 24 06/27/2022    CO2 27 07/19/2020    ANIONGAP 4 06/27/2022    ANIONGAP 2 (L) 07/19/2020     (H) 06/27/2022     (H) 06/27/2022     (H) 07/19/2020    BUN 20 06/27/2022    BUN 14 07/19/2020    CR 0.82 06/27/2022    CR 0.71 07/19/2020    GFRESTIMATED 74 06/27/2022    GFRESTIMATED >60 01/24/2021    GFRESTIMATED 84 07/19/2020    GFRESTBLACK >60 01/24/2021    GFRESTBLACK >90 07/19/2020    LUCIUS 9.5 06/27/2022    LUCIUS 8.5 07/19/2020        INR RESULTS:  Lab Results   Component Value Date    INR 0.94 06/27/2022    INR 0.92 04/29/2022    INR 0.88 02/14/2019    INR 0.94 02/24/2017           CC  No referring provider defined for this encounter.        Service Date: 07/27/2022    PRIMARY " CARDIOLOGIST:  Feroz Burgos MD, for coronary artery disease.  Dr. Hamlin for peripheral vascular disease.    REASON FOR VISIT:  PAD follow-up.    HISTORY OF PRESENT ILLNESS:  Ms. Longo is a pleasant 77-year-old woman with past medical history significant for the followin.  Coronary artery disease, with stent to the RCA in 2004, with redevelopment of symptoms in 2016 with an abnormal stress test.  She was found to have a complex trifurcation lesion of the OM and distal LAD managed medically, initially due to the complexity.  She then went to [place] for trifurcation stenting.  This was a superior main branch of the OM, inferior branch, as well as LAD.  In , she had an apical lesion and stable trifurcation stenting with worsening angina.  We sent her back for an angiogram this spring and showed ongoing distal LAD stenosis, stable at 80%, and patent stents.  2.  Dyslipidemia, intolerant to statins, and is on Repatha.  3.  Hypertension.  4.  Type 2 diabetes, on insulin.  5.  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  6.  Chronic lower back pain with spinal fusions and hip replacement with multiple surgeries, unsuccessful trial of spinal cord stimulators.  7.  Elevated LVEDP on angiogram with peak gradient across the LVOT tract at about 30, noted to be in VT at that time, with concerns for some LVH and diastolic dysfunction.  8.  Peripheral arterial disease, status post lithotripsy and angioplasty with a drug-coated balloon of the left popliteal and distal superficial femoral artery with residual right distal superficial femoral arteries as well as left.    Rayna comes in today after her lower extremity angiogram.  Initially, she did not feel much change in her left extremity pain, but now she notes that it is slightly better.  In general, she notes that she has better coloration of her foot and less swelling on the left side.  She denies chest pain, syncope or presyncope.  She is  frustrated with the possibility of not getting a right lower extremity angiogram.  This was apparently mentioned in a phone call.  She does note that her right leg continues to have pain, particularly in her calf, and has more discoloration and her feet are cold.  She also notes that she is getting started on a new diabetes medication, tirzepatide, and that she is hoping this will dramatically improve her diabetes control as she has gained some weight, especially in the abdominal area, being on insulin.  She has not been able to do regular exercise due to leg pain.    SOCIAL HISTORY:  She is a lifelong nonsmoker.  No alcohol use.  She has 7 children and multiple grandchildren, the youngest being a 3-year-old granddaughter.  She has 1 granddaughter who graduated with her Ph.D. at the age of 23.    PHYSICAL EXAMINATION:  GENERAL:  Well-developed, well-nourished woman, in no acute distress.  HEENT:  Normocephalic, atraumatic.  HEART:  Regular rate and rhythm.  I do not appreciate murmur, rub or gallop.  LUNGS:  Clear, without wheezes, rales, or rhonchi.  NECK:  Carotids are quiet.  EXTREMITIES:  Left leg does have better coloration than right.  Right is a little bit any and toes are cool on both feet.  She has barely palpable pulses on both feet.  Poor cap refill.  INTEGUMENTARY:  Skin is otherwise warm and dry.    Studies reviewed today include angiogram, as well as echocardiogram.    ASSESSMENT AND PLAN:    1.  Coronary artery disease with known distal LAD lesion, but otherwise patent stents on last angiogram this spring.  2.  Diastolic dysfunction noted on angiogram, currently without symptoms of heart failure.  We will continue to follow.  3.  Dyslipidemia, on Repatha with excellent control.  4.  Hypertension, reasonable control.  5.  Peripheral arterial disease.  The patient was recommended for a right angiogram as well.  She definitely wants this completed as she now has some benefit in her left leg pain.   Today, I can obviously see the difference in perfusion in her legs as well and I recommend she proceed as well to prevent issues down the road.  We did discuss that the peripheral arteries have a higher risk of restenosis, given that they are not stented, and this may be a procedure that needs to be repeated down the road, depending on how things go.  6.  Type 2 diabetes, with the patient hopefully starting a new diabetes medication soon and being able to lower her insulin dose.  She did have hypoglycemia today, with initial blood sugar alerting at about 69 on her device, then dropping down to 65 and then 62, in spite of having a juice box.  We gave her orange juice and we will be rechecking her blood sugar shortly.  Repeat blood sugar check showed it increasing to 69.    The patient already has a follow-up next week with Dr. Burgos and Dr. Hamlin.  She has an angiogram scheduled for late August and then follow-up with me post.    Thank you for allowing me to participate in this patient's care.  We will continue with follow-ups as scheduled.    Karen Alexander PA-C        D: 2022   T: 2022   MT: al    Name:     KATHY PERRY  MRN:      [id number]        Account:      [id number]   :      1945           Service Date: 2022       Document: [id number]      Thank you for allowing me to participate in the care of your patient.      Sincerely,     Karen Alexander PA-C     [facility]  cc:   No referring provider defined for this encounter.

## 2022-07-27 NOTE — PROGRESS NOTES
Service Date: 2022    PRIMARY CARDIOLOGIST:  Feroz Burgos MD, for coronary artery disease.  Dr. Hamlin for peripheral vascular disease.    REASON FOR VISIT:  PAD follow-up.    HISTORY OF PRESENT ILLNESS:  Ms. Longo is a pleasant 77-year-old woman with past medical history significant for the followin.  Coronary artery disease, with stent to the RCA in 2004, with redevelopment of symptoms in 2016 with an abnormal stress test.  She was found to have a complex trifurcation lesion of the OM and distal LAD managed medically, initially due to the complexity.  She then went to [place] for trifurcation stenting.  This was a superior main branch of the OM, inferior branch, as well as LAD.  In , she had an apical lesion and stable trifurcation stenting with worsening angina.  We sent her back for an angiogram this spring and showed ongoing distal LAD stenosis, stable at 80%, and patent stents.  2.  Dyslipidemia, intolerant to statins, and is on Repatha.  3.  Hypertension.  4.  Type 2 diabetes, on insulin.  5.  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  6.  Chronic lower back pain with spinal fusions and hip replacement with multiple surgeries, unsuccessful trial of spinal cord stimulators.  7.  Elevated LVEDP on angiogram with peak gradient across the LVOT tract at about 30, noted to be in VT at that time, with concerns for some LVH and diastolic dysfunction.  8.  Peripheral arterial disease, status post lithotripsy and angioplasty with a drug-coated balloon of the left popliteal and distal superficial femoral artery with residual right distal superficial femoral arteries as well as left.    Rayna comes in today after her lower extremity angiogram.  Initially, she did not feel much change in her left extremity pain, but now she notes that it is slightly better.  In general, she notes that she has better coloration of her foot and less swelling on the left side.  She denies chest pain,  syncope or presyncope.  She is frustrated with the possibility of not getting a right lower extremity angiogram.  This was apparently mentioned in a phone call.  She does note that her right leg continues to have pain, particularly in her calf, and has more discoloration and her feet are cold.  She also notes that she is getting started on a new diabetes medication, tirzepatide, and that she is hoping this will dramatically improve her diabetes control as she has gained some weight, especially in the abdominal area, being on insulin.  She has not been able to do regular exercise due to leg pain.    SOCIAL HISTORY:  She is a lifelong nonsmoker.  No alcohol use.  She has 7 children and multiple grandchildren, the youngest being a 3-year-old granddaughter.  She has 1 granddaughter who graduated with her Ph.D. at the age of 23.    PHYSICAL EXAMINATION:  GENERAL:  Well-developed, well-nourished woman, in no acute distress.  HEENT:  Normocephalic, atraumatic.  HEART:  Regular rate and rhythm.  I do not appreciate murmur, rub or gallop.  LUNGS:  Clear, without wheezes, rales, or rhonchi.  NECK:  Carotids are quiet.  EXTREMITIES:  Left leg does have better coloration than right.  Right is a little bit any and toes are cool on both feet.  She has barely palpable pulses on both feet.  Poor cap refill.  INTEGUMENTARY:  Skin is otherwise warm and dry.    Studies reviewed today include angiogram, as well as echocardiogram.    ASSESSMENT AND PLAN:    1.  Coronary artery disease with known distal LAD lesion, but otherwise patent stents on last angiogram this spring.  2.  Diastolic dysfunction noted on angiogram, currently without symptoms of heart failure.  We will continue to follow.  3.  Dyslipidemia, on Repatha with excellent control.  4.  Hypertension, reasonable control.  5.  Peripheral arterial disease.  The patient was recommended for a right angiogram as well.  She definitely wants this completed as she now has some  benefit in her left leg pain.  Today, I can obviously see the difference in perfusion in her legs as well and I recommend she proceed as well to prevent issues down the road.  We did discuss that the peripheral arteries have a higher risk of restenosis, given that they are not stented, and this may be a procedure that needs to be repeated down the road, depending on how things go.  6.  Type 2 diabetes, with the patient hopefully starting a new diabetes medication soon and being able to lower her insulin dose.  She did have hypoglycemia today, with initial blood sugar alerting at about 69 on her device, then dropping down to 65 and then 62, in spite of having a juice box.  We gave her orange juice and we will be rechecking her blood sugar shortly.  Repeat blood sugar check showed it increasing to 69.    The patient already has a follow-up next week with Dr. Burgos and Dr. Hamlin.  She has an angiogram scheduled for late August and then follow-up with me post.    Thank you for allowing me to participate in this patient's care.  We will continue with follow-ups as scheduled.    Karen Alexander PA-C        D: 2022   T: 2022   MT: al    Name:     KATHY PERRYAnselmo  MRN:      [id number]        Account:      [id number]   :      1945           Service Date: 2022       Document: [id number]

## 2022-07-27 NOTE — PATIENT INSTRUCTIONS
Thank you for visiting with me today.    We discussed: I'm interested in seeing how the new diabetes medication works.   If it's too expensive look into [employer] patience assistance program.    Medication changes:  continue current medications.      Follow up: as scheduled with next week with Aubrey Rdz with your angiogram and visit with me as scheduled.        Please call my nurse, Monae, at [phone] with any questions or concerns.    Scheduling phone number: [phone]  Reminder: Please bring in all current medications, over the counter supplements and vitamin bottles to your next appointment.

## 2022-07-28 ENCOUNTER — TELEPHONE (OUTPATIENT)
Dept: CARDIOLOGY | Facility: CLINIC | Age: 77
End: 2022-07-28

## 2022-07-28 NOTE — TELEPHONE ENCOUNTER
----- Message from Day Tesfaye sent at 7/28/2022  9:32 AM CDT -----  Regarding: Boubacar/Aubrey bethea,    This patient is scheduled to see Boubacar and Aubrey on 8/3, but patients cannot see 2 general cardiologists on the same day, even if they are being seen for different reasons.    The Aubrey appointment was scheduled in March by Nerissa.    The Boubacar appointment was scheduled in July by Cherelle.    Thanks!  Day

## 2022-07-28 NOTE — TELEPHONE ENCOUNTER
I called pt and let her know that her visit with  needs to be rescheduled. I rescheduled pt to 9/7/22 @ 11:15 in [place] with . Patrick STOVER July 28, 2022, 12:35 PM

## 2022-08-03 ENCOUNTER — OFFICE VISIT (OUTPATIENT)
Dept: CARDIOLOGY | Facility: CLINIC | Age: 77
End: 2022-08-03

## 2022-08-03 VITALS
BODY MASS INDEX: 28.43 KG/M2 | HEART RATE: 68 BPM | OXYGEN SATURATION: 100 % | WEIGHT: 187.6 LBS | HEIGHT: 68 IN | DIASTOLIC BLOOD PRESSURE: 76 MMHG | SYSTOLIC BLOOD PRESSURE: 126 MMHG

## 2022-08-03 DIAGNOSIS — I73.9 PAD (PERIPHERAL ARTERY DISEASE) (H): Primary | ICD-10-CM

## 2022-08-03 PROCEDURE — 99214 OFFICE O/P EST MOD 30 MIN: CPT | Performed by: INTERNAL MEDICINE

## 2022-08-03 NOTE — LETTER
8/3/2022    Gaye Zimmer  [facility] [address]  [facility] [address]    RE: Sarah EMELIA Longo       Dear Colleague,     I had the pleasure of seeing Sarah Longo in the [facility].  Service Date: 08/03/2022    REASON FOR CONSULTATION:  Followup for peripheral arterial disease.    HISTORY OF PRESENT ILLNESS:  I had the pleasure of seeing Sarah Longo at the [facility] in [place] this morning.  She is a 77-year-old female with a complex cardiovascular history including coronary artery disease, peripheral arterial disease, hypertension, hyperlipidemia, type 2 diabetes and chronic back pain.  Please see my note dated 06/13/2022 for details regarding her vascular history.  Briefly, she was recently diagnosed with peripheral arterial disease and evidence of significant bilateral popliteal artery stenosis.  When I saw her for consultation in June, I felt the patient's symptoms were most likely neurogenic in nature.  However, she did have some symptoms in her calves with her limited walking, suggesting perhaps the popliteal stenosis might have contributed.  Given her significant limitation, the patient is very much in favor of angiography and possible revascularization of the popliteal artery to see if any of her symptoms improved.    We performed bilateral lower extremity angiography on 06/27.  As suspected, she has severe calcific bilateral popliteal artery/distal superficial femoral artery stenosis.  We did lithotripsy and angioplasty with drug-coated balloon of the left popliteal and distal superficial femoral artery at that time.  Angiography also revealed residual high-grade stenosis involving the right popliteal artery.  The plan is for her to come back for staged intervention of the right.  She was contacted by my nursing staff, and the patient told them that she did not get any benefit from the left intervention.  As such, I initially  recommended to not proceed with the right intervention, since the patient's symptoms did not improve on the left.    This morning, she reports some improvement on the left leg with regards to calf symptoms.  She is having significant calf discomfort on the right and is hoping to have revascularization of the right lower extremity.    ASSESSMENT AND PLAN:  A very pleasant 77-year-old female with the aforementioned medical history.  She does have peripheral arterial disease, although her symptoms are somewhat atypical.  She did report some benefit on the left.  As such, it is reasonable to proceed with a right distal SFA/popliteal artery lithotripsy followed by drug-coated balloon.  It is my hope that she will get some benefit from this.  Again discussed not only the indications but the risks, benefits and alternatives of the procedure with her and her  in detail.  She expressed understanding and would like to proceed.  In addition to revascularization, we discussed the importance of walking.  She is quite limited but would hope to do some physical therapy and hopefully start walking again.      I appreciate the opportunity to participate in her care.    Jun Hamlin MD        D: 2022   T: 2022   MT: hoa    Name:     KATHY PERRY  MRN:      [id number]        Account:      [id number]   :      1945           Service Date: 2022       Document: [id number]      Thank you for allowing me to participate in the care of your patient.      Sincerely,     Jun Hamlin MD, MD     [facility]  cc:   No referring provider defined for this encounter.

## 2022-08-03 NOTE — PROGRESS NOTES
Service Date: 08/03/2022    REASON FOR CONSULTATION:  Followup for peripheral arterial disease.    HISTORY OF PRESENT ILLNESS:  I had the pleasure of seeing Sarah Longo at the [facility] in [place] this morning.  She is a 77-year-old female with a complex cardiovascular history including coronary artery disease, peripheral arterial disease, hypertension, hyperlipidemia, type 2 diabetes and chronic back pain.  Please see my note dated 06/13/2022 for details regarding her vascular history.  Briefly, she was recently diagnosed with peripheral arterial disease and evidence of significant bilateral popliteal artery stenosis.  When I saw her for consultation in June, I felt the patient's symptoms were most likely neurogenic in nature.  However, she did have some symptoms in her calves with her limited walking, suggesting perhaps the popliteal stenosis might have contributed.  Given her significant limitation, the patient is very much in favor of angiography and possible revascularization of the popliteal artery to see if any of her symptoms improved.    We performed bilateral lower extremity angiography on 06/27.  As suspected, she has severe calcific bilateral popliteal artery/distal superficial femoral artery stenosis.  We did lithotripsy and angioplasty with drug-coated balloon of the left popliteal and distal superficial femoral artery at that time.  Angiography also revealed residual high-grade stenosis involving the right popliteal artery.  The plan is for her to come back for staged intervention of the right.  She was contacted by my nursing staff, and the patient told them that she did not get any benefit from the left intervention.  As such, I initially recommended to not proceed with the right intervention, since the patient's symptoms did not improve on the left.    This morning, she reports some improvement on the left leg with regards to calf symptoms.  She is having  significant calf discomfort on the right and is hoping to have revascularization of the right lower extremity.    ASSESSMENT AND PLAN:  A very pleasant 77-year-old female with the aforementioned medical history.  She does have peripheral arterial disease, although her symptoms are somewhat atypical.  She did report some benefit on the left.  As such, it is reasonable to proceed with a right distal SFA/popliteal artery lithotripsy followed by drug-coated balloon.  It is my hope that she will get some benefit from this.  Again discussed not only the indications but the risks, benefits and alternatives of the procedure with her and her  in detail.  She expressed understanding and would like to proceed.  In addition to revascularization, we discussed the importance of walking.  She is quite limited but would hope to do some physical therapy and hopefully start walking again.      I appreciate the opportunity to participate in her care.    Jun Hamlin MD        D: 2022   T: 2022   MT: hoa    Name:     KATHY PERRY  MRN:      [id number]        Account:      [id number]   :      1945           Service Date: 2022       Document: [id number]

## 2022-08-06 ENCOUNTER — HEALTH MAINTENANCE LETTER (OUTPATIENT)
Age: 77
End: 2022-08-06

## 2022-08-12 ENCOUNTER — LAB REQUISITION (OUTPATIENT)
Dept: LAB | Facility: CLINIC | Age: 77
End: 2022-08-12
Payer: COMMERCIAL

## 2022-08-12 DIAGNOSIS — Z01.419 ENCOUNTER FOR GYNECOLOGICAL EXAMINATION (GENERAL) (ROUTINE) WITHOUT ABNORMAL FINDINGS: ICD-10-CM

## 2022-08-12 DIAGNOSIS — R30.9 PAINFUL MICTURITION, UNSPECIFIED: ICD-10-CM

## 2022-08-12 PROCEDURE — 87624 HPV HI-RISK TYP POOLED RSLT: CPT | Mod: ORL | Performed by: OBSTETRICS & GYNECOLOGY

## 2022-08-12 PROCEDURE — G0124 SCREEN C/V THIN LAYER BY MD: HCPCS | Performed by: PATHOLOGY

## 2022-08-12 PROCEDURE — G0145 SCR C/V CYTO,THINLAYER,RESCR: HCPCS | Mod: ORL | Performed by: OBSTETRICS & GYNECOLOGY

## 2022-08-12 PROCEDURE — 87086 URINE CULTURE/COLONY COUNT: CPT | Mod: ORL | Performed by: OBSTETRICS & GYNECOLOGY

## 2022-08-14 LAB — BACTERIA UR CULT: NO GROWTH

## 2022-08-18 LAB
BKR LAB AP GYN ADEQUACY: ABNORMAL
BKR LAB AP GYN INTERPRETATION: ABNORMAL
BKR LAB AP HPV REFLEX: ABNORMAL
BKR LAB AP LMP: ABNORMAL
BKR LAB AP PREVIOUS ABNL DX: ABNORMAL
BKR LAB AP PREVIOUS ABNORMAL: ABNORMAL
PATH REPORT.COMMENTS IMP SPEC: ABNORMAL
PATH REPORT.COMMENTS IMP SPEC: ABNORMAL
PATH REPORT.RELEVANT HX SPEC: ABNORMAL

## 2022-08-22 LAB
HUMAN PAPILLOMA VIRUS 16 DNA: NEGATIVE
HUMAN PAPILLOMA VIRUS 18 DNA: NEGATIVE
HUMAN PAPILLOMA VIRUS FINAL DIAGNOSIS: NORMAL
HUMAN PAPILLOMA VIRUS OTHER HR: NEGATIVE

## 2022-08-29 ENCOUNTER — LAB (OUTPATIENT)
Dept: LAB | Facility: CLINIC | Age: 77
End: 2022-08-29
Payer: COMMERCIAL

## 2022-08-29 DIAGNOSIS — Z20.822 ENCOUNTER FOR LABORATORY TESTING FOR COVID-19 VIRUS: ICD-10-CM

## 2022-08-29 LAB — SARS-COV-2 RNA RESP QL NAA+PROBE: NEGATIVE

## 2022-08-29 PROCEDURE — U0003 INFECTIOUS AGENT DETECTION BY NUCLEIC ACID (DNA OR RNA); SEVERE ACUTE RESPIRATORY SYNDROME CORONAVIRUS 2 (SARS-COV-2) (CORONAVIRUS DISEASE [COVID-19]), AMPLIFIED PROBE TECHNIQUE, MAKING USE OF HIGH THROUGHPUT TECHNOLOGIES AS DESCRIBED BY CMS-2020-01-R: HCPCS

## 2022-08-30 ENCOUNTER — CARE COORDINATION (OUTPATIENT)
Dept: CARDIOLOGY | Facility: CLINIC | Age: 77
End: 2022-08-30

## 2022-08-30 DIAGNOSIS — I73.9 PAD (PERIPHERAL ARTERY DISEASE) (H): Primary | ICD-10-CM

## 2022-08-30 DIAGNOSIS — I70.213 ATHEROSCLEROSIS OF NATIVE ARTERIES OF EXTREMITIES WITH INTERMITTENT CLAUDICATION, BILATERAL LEGS (H): ICD-10-CM

## 2022-08-30 RX ORDER — POTASSIUM CHLORIDE 1500 MG/1
20 TABLET, EXTENDED RELEASE ORAL
Status: CANCELLED | OUTPATIENT
Start: 2022-08-30

## 2022-08-30 RX ORDER — ASPIRIN 81 MG/1
243 TABLET, CHEWABLE ORAL ONCE
Status: CANCELLED | OUTPATIENT
Start: 2022-08-30

## 2022-08-30 RX ORDER — SODIUM CHLORIDE 9 MG/ML
INJECTION, SOLUTION INTRAVENOUS CONTINUOUS
Status: CANCELLED | OUTPATIENT
Start: 2022-08-30

## 2022-08-30 RX ORDER — ASPIRIN 325 MG
325 TABLET ORAL ONCE
Status: CANCELLED | OUTPATIENT
Start: 2022-08-30 | End: 2022-08-30

## 2022-08-30 RX ORDER — LIDOCAINE 40 MG/G
CREAM TOPICAL
Status: CANCELLED | OUTPATIENT
Start: 2022-08-30

## 2022-08-30 NOTE — PROGRESS NOTES
Called patient to review pre-procedure instructions: patient is scheduled for right lower extremity angiogram on 8/31/22. Patient's arrival time is 7:30 am and procedure is scheduled for second case. Patient is aware to be NPO except for medications after midnight. Patient will hold the medication furosemide. Patient was instructed to take aspirin 325 mg the morning of procedure. Patient is diabetic and stated she already reviewed her diabetes medications with her endocrinologist for the procedure tomorrow. Patient is not taking anti-coagulation medication. Patient has no known contrast dye allergy. Patient's renal function is WNL for procedure. Patient has arranged for a  and someone to stay with them for 24 hours after procedure. Patient had a COVID test on 8/29/22 that was negative. Patient was instructed to take their temperature the morning of procedure and if temperature is >100 degrees F patient should not come in and call [phone]. Patient verbalized understanding of all instructions. Orders for procedure entered. Patient has follow up scheduled on 9/7/22 with Dr. Burgos.

## 2022-08-30 NOTE — PROGRESS NOTES
Pt called to report that Yeseina MCINTOSH called and told her she needs to move her peripheral angiogram to a different date because someone more urgent needs to be scheduled. Pt said she has been waiting several weeks for this procedure and his having more pain in her leg and some discoloration so she did not want to reschedule and did not end up doing so. Pt wanted to know who she can report this to. I told pt that I don't have any control over the cath lab schedule and how people are chosen to be rescheduled. Pt said she wanted it documented in her chart that this happened, and said she will discuss with  at her visit next week. Patrick STOVER August 30, 2022, 3:34 PM

## 2022-08-31 ENCOUNTER — HOSPITAL ENCOUNTER (OUTPATIENT)
Facility: CLINIC | Age: 77
Discharge: HOME | End: 2022-08-31
Attending: INTERNAL MEDICINE | Admitting: INTERNAL MEDICINE
Payer: COMMERCIAL

## 2022-08-31 VITALS
TEMPERATURE: 97 F | HEIGHT: 68 IN | DIASTOLIC BLOOD PRESSURE: 64 MMHG | SYSTOLIC BLOOD PRESSURE: 136 MMHG | BODY MASS INDEX: 28.34 KG/M2 | WEIGHT: 187 LBS | RESPIRATION RATE: 16 BRPM | OXYGEN SATURATION: 100 % | HEART RATE: 69 BPM

## 2022-08-31 DIAGNOSIS — I70.213 ATHEROSCLEROSIS OF NATIVE ARTERIES OF EXTREMITIES WITH INTERMITTENT CLAUDICATION, BILATERAL LEGS (H): ICD-10-CM

## 2022-08-31 DIAGNOSIS — I73.9 PAD (PERIPHERAL ARTERY DISEASE) (H): ICD-10-CM

## 2022-08-31 LAB
ANION GAP SERPL CALCULATED.3IONS-SCNC: 6 MMOL/L (ref 3–14)
APTT PPP: 24 SECONDS (ref 22–38)
BUN SERPL-MCNC: 19 MG/DL (ref 7–30)
CALCIUM SERPL-MCNC: 9.5 MG/DL (ref 8.5–10.1)
CHLORIDE BLD-SCNC: 110 MMOL/L (ref 94–109)
CO2 SERPL-SCNC: 23 MMOL/L (ref 20–32)
CREAT SERPL-MCNC: 0.82 MG/DL (ref 0.52–1.04)
ERYTHROCYTE [DISTWIDTH] IN BLOOD BY AUTOMATED COUNT: 12.8 % (ref 10–15)
GFR SERPL CREATININE-BSD FRML MDRD: 74 ML/MIN/1.73M2
GLUCOSE BLD-MCNC: 199 MG/DL (ref 70–99)
HCT VFR BLD AUTO: 38.6 % (ref 35–47)
HGB BLD-MCNC: 12.6 G/DL (ref 11.7–15.7)
INR PPP: 1.02 (ref 0.85–1.15)
MCH RBC QN AUTO: 30.4 PG (ref 26.5–33)
MCHC RBC AUTO-ENTMCNC: 32.6 G/DL (ref 31.5–36.5)
MCV RBC AUTO: 93 FL (ref 78–100)
PLATELET # BLD AUTO: 192 10E3/UL (ref 150–450)
POTASSIUM BLD-SCNC: 4 MMOL/L (ref 3.4–5.3)
RBC # BLD AUTO: 4.15 10E6/UL (ref 3.8–5.2)
SODIUM SERPL-SCNC: 139 MMOL/L (ref 133–144)
WBC # BLD AUTO: 3.8 10E3/UL (ref 4–11)

## 2022-08-31 PROCEDURE — C1894 INTRO/SHEATH, NON-LASER: HCPCS | Performed by: INTERNAL MEDICINE

## 2022-08-31 PROCEDURE — 272N000001 HC OR GENERAL SUPPLY STERILE: Performed by: INTERNAL MEDICINE

## 2022-08-31 PROCEDURE — 99152 MOD SED SAME PHYS/QHP 5/>YRS: CPT | Performed by: INTERNAL MEDICINE

## 2022-08-31 PROCEDURE — 85730 THROMBOPLASTIN TIME PARTIAL: CPT | Performed by: INTERNAL MEDICINE

## 2022-08-31 PROCEDURE — 85610 PROTHROMBIN TIME: CPT | Performed by: INTERNAL MEDICINE

## 2022-08-31 PROCEDURE — 250N000011 HC RX IP 250 OP 636: Performed by: INTERNAL MEDICINE

## 2022-08-31 PROCEDURE — 36415 COLL VENOUS BLD VENIPUNCTURE: CPT | Performed by: INTERNAL MEDICINE

## 2022-08-31 PROCEDURE — 93005 ELECTROCARDIOGRAM TRACING: CPT

## 2022-08-31 PROCEDURE — 76000 FLUOROSCOPY <1 HR PHYS/QHP: CPT | Performed by: INTERNAL MEDICINE

## 2022-08-31 PROCEDURE — C1887 CATHETER, GUIDING: HCPCS | Performed by: INTERNAL MEDICINE

## 2022-08-31 PROCEDURE — 999N000054 HC STATISTIC EKG NON-CHARGEABLE

## 2022-08-31 PROCEDURE — 250N000009 HC RX 250: Performed by: INTERNAL MEDICINE

## 2022-08-31 PROCEDURE — 80048 BASIC METABOLIC PNL TOTAL CA: CPT | Performed by: INTERNAL MEDICINE

## 2022-08-31 PROCEDURE — 99153 MOD SED SAME PHYS/QHP EA: CPT | Performed by: INTERNAL MEDICINE

## 2022-08-31 PROCEDURE — 85027 COMPLETE CBC AUTOMATED: CPT | Performed by: INTERNAL MEDICINE

## 2022-08-31 PROCEDURE — 258N000003 HC RX IP 258 OP 636: Performed by: INTERNAL MEDICINE

## 2022-08-31 PROCEDURE — 76000 FLUOROSCOPY <1 HR PHYS/QHP: CPT | Mod: 26 | Performed by: INTERNAL MEDICINE

## 2022-08-31 PROCEDURE — 93010 ELECTROCARDIOGRAM REPORT: CPT | Performed by: INTERNAL MEDICINE

## 2022-08-31 PROCEDURE — 999N000071 HC STATISTIC HEART CATH LAB OR EP LAB

## 2022-08-31 PROCEDURE — C1769 GUIDE WIRE: HCPCS | Performed by: INTERNAL MEDICINE

## 2022-08-31 RX ORDER — NALOXONE HYDROCHLORIDE 0.4 MG/ML
0.2 INJECTION, SOLUTION INTRAMUSCULAR; INTRAVENOUS; SUBCUTANEOUS
Status: DISCONTINUED | OUTPATIENT
Start: 2022-08-31 | End: 2022-08-31 | Stop reason: HOSPADM

## 2022-08-31 RX ORDER — ONDANSETRON 2 MG/ML
4 INJECTION INTRAMUSCULAR; INTRAVENOUS EVERY 6 HOURS PRN
Status: DISCONTINUED | OUTPATIENT
Start: 2022-08-31 | End: 2022-08-31 | Stop reason: HOSPADM

## 2022-08-31 RX ORDER — FENTANYL CITRATE 50 UG/ML
25 INJECTION, SOLUTION INTRAMUSCULAR; INTRAVENOUS
Status: DISCONTINUED | OUTPATIENT
Start: 2022-08-31 | End: 2022-08-31 | Stop reason: HOSPADM

## 2022-08-31 RX ORDER — SODIUM CHLORIDE 9 MG/ML
INJECTION, SOLUTION INTRAVENOUS CONTINUOUS
Status: DISCONTINUED | OUTPATIENT
Start: 2022-08-31 | End: 2022-08-31 | Stop reason: HOSPADM

## 2022-08-31 RX ORDER — DOBUTAMINE HYDROCHLORIDE 200 MG/100ML
2-20 INJECTION INTRAVENOUS CONTINUOUS PRN
Status: DISCONTINUED | OUTPATIENT
Start: 2022-08-31 | End: 2022-08-31 | Stop reason: HOSPADM

## 2022-08-31 RX ORDER — POTASSIUM CHLORIDE 1500 MG/1
20 TABLET, EXTENDED RELEASE ORAL
Status: DISCONTINUED | OUTPATIENT
Start: 2022-08-31 | End: 2022-08-31 | Stop reason: HOSPADM

## 2022-08-31 RX ORDER — FLUMAZENIL 0.1 MG/ML
0.2 INJECTION, SOLUTION INTRAVENOUS
Status: DISCONTINUED | OUTPATIENT
Start: 2022-08-31 | End: 2022-08-31 | Stop reason: HOSPADM

## 2022-08-31 RX ORDER — EPTIFIBATIDE 2 MG/ML
2 INJECTION, SOLUTION INTRAVENOUS CONTINUOUS PRN
Status: DISCONTINUED | OUTPATIENT
Start: 2022-08-31 | End: 2022-08-31 | Stop reason: HOSPADM

## 2022-08-31 RX ORDER — HEPARIN SODIUM 10000 [USP'U]/100ML
100-1000 INJECTION, SOLUTION INTRAVENOUS CONTINUOUS PRN
Status: DISCONTINUED | OUTPATIENT
Start: 2022-08-31 | End: 2022-08-31 | Stop reason: HOSPADM

## 2022-08-31 RX ORDER — ONDANSETRON 4 MG/1
4 TABLET, ORALLY DISINTEGRATING ORAL EVERY 6 HOURS PRN
Status: DISCONTINUED | OUTPATIENT
Start: 2022-08-31 | End: 2022-08-31 | Stop reason: HOSPADM

## 2022-08-31 RX ORDER — ARGATROBAN 1 MG/ML
350 INJECTION, SOLUTION INTRAVENOUS
Status: DISCONTINUED | OUTPATIENT
Start: 2022-08-31 | End: 2022-08-31 | Stop reason: HOSPADM

## 2022-08-31 RX ORDER — ASPIRIN 81 MG/1
243 TABLET, CHEWABLE ORAL ONCE
Status: DISCONTINUED | OUTPATIENT
Start: 2022-08-31 | End: 2022-08-31 | Stop reason: HOSPADM

## 2022-08-31 RX ORDER — EPTIFIBATIDE 2 MG/ML
180 INJECTION, SOLUTION INTRAVENOUS EVERY 10 MIN PRN
Status: DISCONTINUED | OUTPATIENT
Start: 2022-08-31 | End: 2022-08-31 | Stop reason: HOSPADM

## 2022-08-31 RX ORDER — NALOXONE HYDROCHLORIDE 0.4 MG/ML
0.4 INJECTION, SOLUTION INTRAMUSCULAR; INTRAVENOUS; SUBCUTANEOUS
Status: DISCONTINUED | OUTPATIENT
Start: 2022-08-31 | End: 2022-08-31 | Stop reason: HOSPADM

## 2022-08-31 RX ORDER — LIDOCAINE 40 MG/G
CREAM TOPICAL
Status: DISCONTINUED | OUTPATIENT
Start: 2022-08-31 | End: 2022-08-31 | Stop reason: HOSPADM

## 2022-08-31 RX ORDER — ASPIRIN 81 MG/1
81 TABLET ORAL DAILY
Status: DISCONTINUED | OUTPATIENT
Start: 2022-08-31 | End: 2022-08-31 | Stop reason: HOSPADM

## 2022-08-31 RX ORDER — INSULIN ASPART 100 [IU]/ML
17 INJECTION, SOLUTION INTRAVENOUS; SUBCUTANEOUS 3 TIMES DAILY PRN
COMMUNITY
Start: 2022-08-01

## 2022-08-31 RX ORDER — PROCHLORPERAZINE 25 MG
12.5 SUPPOSITORY, RECTAL RECTAL EVERY 12 HOURS PRN
Status: DISCONTINUED | OUTPATIENT
Start: 2022-08-31 | End: 2022-08-31 | Stop reason: HOSPADM

## 2022-08-31 RX ORDER — ARGATROBAN 1 MG/ML
150 INJECTION, SOLUTION INTRAVENOUS
Status: DISCONTINUED | OUTPATIENT
Start: 2022-08-31 | End: 2022-08-31 | Stop reason: HOSPADM

## 2022-08-31 RX ORDER — ASPIRIN 325 MG
325 TABLET ORAL ONCE
Status: DISCONTINUED | OUTPATIENT
Start: 2022-08-31 | End: 2022-08-31 | Stop reason: HOSPADM

## 2022-08-31 RX ORDER — DOPAMINE HYDROCHLORIDE 160 MG/100ML
2-20 INJECTION, SOLUTION INTRAVENOUS CONTINUOUS PRN
Status: DISCONTINUED | OUTPATIENT
Start: 2022-08-31 | End: 2022-08-31 | Stop reason: HOSPADM

## 2022-08-31 RX ORDER — ATROPINE SULFATE 0.1 MG/ML
0.5 INJECTION INTRAVENOUS EVERY 5 MIN PRN
Status: DISCONTINUED | OUTPATIENT
Start: 2022-08-31 | End: 2022-08-31 | Stop reason: HOSPADM

## 2022-08-31 RX ORDER — PROCHLORPERAZINE MALEATE 5 MG
5 TABLET ORAL EVERY 6 HOURS PRN
Status: DISCONTINUED | OUTPATIENT
Start: 2022-08-31 | End: 2022-08-31 | Stop reason: HOSPADM

## 2022-08-31 RX ORDER — FENTANYL CITRATE 50 UG/ML
INJECTION, SOLUTION INTRAMUSCULAR; INTRAVENOUS
Status: DISCONTINUED | OUTPATIENT
Start: 2022-08-31 | End: 2022-08-31 | Stop reason: HOSPADM

## 2022-08-31 RX ADMIN — SODIUM CHLORIDE: 9 INJECTION, SOLUTION INTRAVENOUS at 07:54

## 2022-08-31 ASSESSMENT — ACTIVITIES OF DAILY LIVING (ADL)
ADLS_ACUITY_SCORE: 35

## 2022-08-31 NOTE — PROGRESS NOTES
Care Suites Discharge Nursing Note    Patient Information  Name: Sarah Longo  Age: 77 year old    Discharge Education:  Discharge instructions reviewed: Yes  Additional education/resources provided: All questions answered  Patient/patient representative verbalizes understanding: Yes  Patient discharging on new medications: No  Medication education completed: N/A    Discharge Plans:   Discharge location: home  Discharge ride contacted: Yes  Approximate discharge time: 1530    Discharge Criteria:  Discharge criteria met and vital signs stable: Yes    Patient Belongs:  Patient belongings returned to patient: Yes    Kika Tariq RN

## 2022-08-31 NOTE — Clinical Note
Potential access sites were evaluated for patency using ultrasound.   The left femoral artery was selected. Access was obtained under with Sonosite and Fluoroscopic guidance using a micropuncture 21 gauge needle with direct visualization of needle entry.

## 2022-08-31 NOTE — PROGRESS NOTES
1255 Report received from Kika Stauffer RN.  1300 Pt sleeping soundly. Gauze drsg CDI to left groin puncture site. No oozing or hematoma noted. Area soft & flat. Pt's family at bedside. Detailed update given.   1320 Dr Hamlin at bedside to speak with pt & family. Order obtained for 2 hr bedrest.  1330 Pt A/O. No complaints.  1400 Report given to Kika Stauffer RN.

## 2022-08-31 NOTE — PROGRESS NOTES
Care Suites Admission Nursing Note    Patient Information  Name: Sarha Longo  Age: 77 year old  Reason for admission: Lower extremity angiogram  Care Suites arrival time: 0725    Visitor Information  Name: Kwadwo  Informed of visitor restrictions: Yes  1 visitor allowed per patient   Visitor must screen negative for COVID symptoms   Visitor must wear a mask  Waiting rooms closed to visitors    Patient Admission/Assessment   Pre-procedure assessment complete: Yes  If abnormal assessment/labs, provider notified: N/A  NPO: Yes  Medications held per instructions/orders: Yes  Consent: obtained  Patient oriented to room: Yes  Education/questions answered: Yes  Plan/other: to cath lab for procedure    Discharge Planning  Discharge name/phone number: Kwadwo [phone]  Overnight post sedation caregiver: same  Discharge location: home    Kika Tariq RN

## 2022-08-31 NOTE — DISCHARGE INSTRUCTIONS
Lower Extremity Angiogram Discharge Instructions - Femoral    After you go home:    Have an adult stay with you until tomorrow.  Drink extra fluids for 2 days.  You may resume your normal diet.  No smoking       For 24 hours - due to the sedation you received:  Relax and take it easy.  Do NOT make any important or legal decisions.  Do NOT drive or operate machines at home or at work.  Do NOT drink alcohol.    Care of Groin Puncture Site:    For the first 24 hrs - check the puncture site every 1-2 hours while awake.  For 2 days, when you cough, sneeze, laugh or move your bowels, hold your hand over the puncture site and press firmly.  Remove the bandaid after 24 hours. If there is minor oozing, apply another bandaid and remove it after 12 hours.  It is normal to have a small bruise or pea size lump at the site.  You may shower tomorrow. Do NOT take a bath, or use a hot tub or pool for at least 3 days. Do NOT scrub the site. Do not use lotion or powder near the puncture site.    Activity:            For 2 days:  No stooping or squatting  Do NOT do any heavy activity such as exercise, lifting, or straining.   No housework, yard work or any activity that make you sweat  Do NOT lift more than 10 pounds    Bleeding:    If you start bleeding from the site in your groin, lie down flat and press firmly on/above the site for 10 minutes.   Once bleeding stops, lay flat for 2 hours.   Call [facility] as soon as you can.       Call 911 right away if you have heavy bleeding or bleeding that does not stop.      Medicines:    Take your medications, including blood thinners, unless your provider tells you not to.    If you have stopped any medicines, check with your provider about when to restart them.    Follow Up Appointments:    Follow up with [facility] Nurse Practitioner at [facility] of patient preference in 7-10 days.    Call the clinic if:    You have increased pain or a large or growing hard lump around the site.  The  site is red, swollen, hot or tender.  Blood or fluid is draining from the site.  You have chills or a fever greater than 101 F (38 C).  Your leg feels numb, cool or changes color.  You have hives, a rash or unusual itching.  New pain in the back or belly that you cannot control with Tylenol.  Any questions or concerns.          [facility] [place]:    [phone] [facility] (7 days a week)

## 2022-09-01 DIAGNOSIS — I73.9 PAD (PERIPHERAL ARTERY DISEASE) (H): ICD-10-CM

## 2022-09-01 DIAGNOSIS — I70.213 ATHEROSCLEROSIS OF NATIVE ARTERIES OF EXTREMITIES WITH INTERMITTENT CLAUDICATION, BILATERAL LEGS (H): Primary | ICD-10-CM

## 2022-09-01 DIAGNOSIS — I73.9 PAD (PERIPHERAL ARTERY DISEASE) (H): Primary | ICD-10-CM

## 2022-09-02 LAB
ATRIAL RATE - MUSE: 66 BPM
DIASTOLIC BLOOD PRESSURE - MUSE: NORMAL MMHG
INTERPRETATION ECG - MUSE: NORMAL
P AXIS - MUSE: 66 DEGREES
PR INTERVAL - MUSE: 162 MS
QRS DURATION - MUSE: 96 MS
QT - MUSE: 424 MS
QTC - MUSE: 444 MS
R AXIS - MUSE: 43 DEGREES
SYSTOLIC BLOOD PRESSURE - MUSE: NORMAL MMHG
T AXIS - MUSE: 79 DEGREES
VENTRICULAR RATE- MUSE: 66 BPM

## 2022-09-07 ENCOUNTER — OFFICE VISIT (OUTPATIENT)
Dept: CARDIOLOGY | Facility: CLINIC | Age: 77
End: 2022-09-07
Payer: COMMERCIAL

## 2022-09-07 VITALS
BODY MASS INDEX: 28.66 KG/M2 | OXYGEN SATURATION: 100 % | SYSTOLIC BLOOD PRESSURE: 152 MMHG | WEIGHT: 189.1 LBS | HEART RATE: 72 BPM | HEIGHT: 68 IN | DIASTOLIC BLOOD PRESSURE: 71 MMHG

## 2022-09-07 DIAGNOSIS — E78.5 HYPERLIPIDEMIA LDL GOAL <100: ICD-10-CM

## 2022-09-07 DIAGNOSIS — I25.10 CORONARY ARTERY DISEASE INVOLVING NATIVE CORONARY ARTERY OF NATIVE HEART WITHOUT ANGINA PECTORIS: ICD-10-CM

## 2022-09-07 DIAGNOSIS — I73.9 PAD (PERIPHERAL ARTERY DISEASE) (H): Primary | ICD-10-CM

## 2022-09-07 DIAGNOSIS — E11.9 TYPE 2 DIABETES MELLITUS WITHOUT COMPLICATION, WITHOUT LONG-TERM CURRENT USE OF INSULIN (H): ICD-10-CM

## 2022-09-07 DIAGNOSIS — I10 ESSENTIAL HYPERTENSION: ICD-10-CM

## 2022-09-07 PROCEDURE — 99215 OFFICE O/P EST HI 40 MIN: CPT | Performed by: INTERNAL MEDICINE

## 2022-09-07 NOTE — LETTER
9/7/2022    Gaye Zimmer  [facility] [address]  [facility] [id number]    RE: Sarah Longo       Dear Colleague,     I had the pleasure of seeing Sarah Longo in the [facility].  HPI and Plan:   See dictation    Today's clinic visit entailed:  Review of the result(s) of each unique test - LE angiogram, echo, nuclear stress, [place],   The following tests were independently interpreted by me as noted in my documentation: LE angiogram  Ordering of each unique test  Prescription drug management  50 minutes spent on the date of the encounter doing chart review, review of test results, interpretation of tests, patient visit, documentation and discussion with family   Provider  Link to MDM Help Grid     The level of medical decision making during this visit was of high complexity.      Orders Placed This Encounter   Procedures     Lipid Profile     ALT     Basic metabolic panel     Follow-Up with Cardiology       No orders of the defined types were placed in this encounter.      There are no discontinued medications.      Encounter Diagnoses   Name Primary?     PAD (peripheral artery disease) (H) Yes     Coronary artery disease involving native coronary artery of native heart without angina pectoris      Type 2 diabetes mellitus without complication, without long-term current use of insulin (H)      Hyperlipidemia LDL goal <100      Essential hypertension        CURRENT MEDICATIONS:  Current Outpatient Medications   Medication Sig Dispense Refill     acetylcysteine (N-ACETYL CYSTEINE) 600 MG CAPS capsule Take 600 mg by mouth 2 times daily       amoxicillin (AMOXIL) 500 MG capsule 2,000 mg once as needed (Before dental procedures)       ascorbic acid 1000 MG TABS tablet Take 500 mg by mouth       aspirin 81 MG EC tablet Take 81 mg by mouth daily       CINNAMON PO Take 2 capsules by mouth 2 times daily prn       clopidogrel (PLAVIX) 75 MG tablet Take 1 tablet (75 mg) by mouth daily  Starting tomorrow. (Patient taking differently: Take 75 mg by mouth daily As needede) 30 tablet 0     cyanocobalamin (CYANOCOBALAMIN) 1000 MCG/ML injection Inject 2 mLs into the muscle every 30 days       DULoxetine (CYMBALTA) 60 MG capsule daily       estradiol (ESTRACE) 0.1 MG/GM vaginal cream Use pea sized amount and apply with finger to vaginal skin just inside opening once a day before bed as needed for vaginal dryness. 42.5 g 3     evolocumab (REPATHA SURECLICK) 140 MG/ML prefilled autoinjector Inject 1 mL (140 mg) Subcutaneous every 14 days 6 mL 3     fluconazole (DIFLUCAN) 150 MG tablet prn       furosemide (LASIX) 20 MG tablet Take 1 tablet (20 mg) by mouth daily 90 tablet 3     levothyroxine (SYNTHROID/LEVOTHROID) 100 MCG tablet Take 100 mcg by mouth daily       losartan (COZAAR) 50 MG tablet Take 1 tablet (50 mg) by mouth daily 90 tablet 3     mirabegron (MYRBETRIQ) 50 MG 24 hr tablet Take 1 tablet (50 mg) by mouth daily Patient not taking daily 90 tablet 3     MOUNJARO 5 MG/0.5ML SOPN Inject 5 mg Subcutaneous once a week       nitroGLYcerin (NITROSTAT) 0.4 MG sublingual tablet Place 1 tablet (0.4 mg) under the tongue every 5 minutes as needed for chest pain For chest pain place 1 tablet under the tongue every 5 minutes for 3 doses. If symptoms persist 5 minutes after 1st dose call 911. 25 tablet 11     NOVOLOG FLEXPEN 100 UNIT/ML soln Inject 17 Units Subcutaneous 3 times daily as needed for high blood sugar       oxyCODONE (ROXICODONE) 5 MG tablet Take 1-2 tablets (5-10 mg) by mouth every 3 hours as needed for severe pain 30 tablet 0     traMADol (ULTRAM) 50 MG tablet Take 1 tablet (50 mg) by mouth every 6 hours as needed for moderate to severe pain 30 tablet 3     vitamin D3 (CHOLECALCIFEROL) 2000 units tablet Take 1 tablet by mouth 2 times daily         ALLERGIES     Allergies   Allergen Reactions     Jardiance [Empagliflozin] Other (See Comments)     Ongoing yeast infections     Hmg-Coa-R Inhibitors  Muscle Pain (Myalgia)     Oral statins       Versed [Midazolam]      Pt is refusing to have this med d/t memory loss and forgetfulness        PAST MEDICAL HISTORY:  Past Medical History:   Diagnosis Date     Anemia      Anxiety      Arthritis      CAD (coronary artery disease)      Chronic bilateral low back pain without sciatica      Coronary artery disease 04/28/2016    cardiac cath 2/2019: patent stents; PTCA with MAYA x 2 to OM1 and MAYA x 1 to p.LAD (4/21/2016 at [place]); PTCA with intracoronary stent placement of RCA June 2004; MAYA to OM1 11/2016     Cystocele, midline 09/29/2010     Depression      Depression      DM type 2 (diabetes mellitus, type 2) (H)      HYPERPLASTIC  POLYP      colonoscopy in 5-10 yrs.     Hypertension      Hypothyroid      Hypothyroidism     hypothyroid- Dr Majano     Motion sickness      Mumps      OAB (overactive bladder)     complex voiding dysfct, failed interstim     Osteoarthritis      Other and unspecified hyperlipidemia      Palpitations      PONV (postoperative nausea and vomiting)      Postmenopausal bleeding      Shoulder blade pain 5/31/2016     Type II or unspecified type diabetes mellitus without mention of complication, not stated as uncontrolled     Dr. Majano     Unspecified essential hypertension      Urinary frequency 9/29/10    followed by urology. no benefit from detrol or sanctura. month trial of macrobid and flomax 10/10       PAST SURGICAL HISTORY:  Past Surgical History:   Procedure Laterality Date     ------------OTHER-------------      Interstim     Ablation       ARTHROPLASTY HIP Left 7/15/2020    Procedure: Left total hip arthroplasty;  Surgeon: Jayson Victoria MD;  Location: [facility] OR     BACK SURGERY  03/05/2020    spinal fusion     BACK SURGERY       C CT ANGIOGRAPHY CORONARY  1/2005    mod soft plaque LAD and Cx, follow up with left heart cath     CARDIAC SURGERY       CHOLECYSTECTOMY       CHOLECYSTECTOMY       COLONOSCOPY       CORONARY STENT PLACEMENT   2004     x 5 stents      CV CORONARY ANGIOGRAM N/A 4/29/2022    Procedure: Coronary Angiogram;  Surgeon: Sudarshan Lee MD;  Location:  HEART CARDIAC CATH LAB     CV FLUOROSCOPY ONLY N/A 8/31/2022    Procedure: Fluoroscopy Only;  Surgeon: Jun Hamlin MD;  Location:  HEART CARDIAC CATH LAB     CV HEART CATHETERIZATION WITH POSSIBLE INTERVENTION N/A 2/14/2019    Procedure: Coronary Angiogram;  Surgeon: Sudarshan Lee MD;  Location: [facility] CARDIAC CATH LAB; patent stents     CV LEFT HEART CATH N/A 4/29/2022    Procedure: Left Heart Catheterization;  Surgeon: Sudarshan Lee MD;  Location: [facility] CARDIAC CATH LAB     CV LEFT VENTRICULOGRAM N/A 4/29/2022    Procedure: Left Ventriculogram;  Surgeon: Sudarshan Lee MD;  Location:  HEART CARDIAC CATH LAB     CV LOWER EXTREMITY ANGIOGRAM BILATERAL Bilateral 6/27/2022    Procedure: Angiogram Lower Extremity Bilateral;  Surgeon: Jun Hamlin MD;  Location: [facility] CARDIAC CATH LAB     CV LOWER EXTREMITY ANGIOGRAM RIGHT Right 8/31/2022    Procedure: Angiogram Lower Extremity Right-Aborted;  Surgeon: Jun Hamlin MD;  Location: [facility] CARDIAC CATH LAB     CYSTOSCOPY       EXCISE LESION UPPER EXTREMITY  6/5/2013    Procedure: EXCISE LESION UPPER EXTREMITY;  EXCISION RIGHT ARM ANTICUBITAL LIPOMA ;  Surgeon: Jayson Joe MD;  Location: [facility] REMOVAL OF TONSILS,12+ Y/O       HEART CATH LEFT HEART CATH  3/2/2016    No intervention - aggressive medical management     HEART CATH LEFT HEART CATH  4/21/2016     MAYA x 2 to OM1, MAYA to LAD, at [place]     HEART CATH LEFT HEART CATH  6/29/2004    MAYA to RCA     HEART CATH LEFT HEART CATH  3/28/2002    Mild to moderate 3 vessel CAD. Medical management recommended.      HEART CATH LEFT HEART CATH  11/22/2016    MAYA to OM1     hypothyroid       JOINT REPLACEMENT       KNEE SURGERY  4/20/10    right knee replacement     LUMBAR FUSION N/A 3/5/2020    Procedure: BILATERAL LUMBAR  2-3 AND LUMBAR 3-4 POSTERIOR SPINAL FUSION WITH LEFT LATERAL RECESS DECOMPRESSION LUMBAR 4-5 AND BILATERAL LAMINECTOMY AND DECOMPRESSION LUMBAR 2-3 AND LUMBAR 3-4 WITH ALLOGRAFT AND AUTOGRAFT AND VIVIGEN;  Surgeon: Navid Caicedo MD;  Location: [facility] Main OR;  Service: Spine     LUMBAR FUSION Bilateral 2021    Procedure: REVISION OF NONUNION BILATERAL LUMBAR 2-3 AND LUMBAR 3-4 WITH REVISION OF INSTRUMENTATION RIGHT LUMBAR 2 AND LEFT LUMBAR 4 WITH LEFT LUMBAR 4-5 LAMINOFORAMINOTOMY AND LUMBAR 1-2 DECOMPRESSION WITH ALLOGRAFT AND BONE MARROW ASPIRATE;  Surgeon: Navid Caicedo MD;  Location: [facility] OR;  Service: Spine     ORTHOPEDIC SURGERY      total knee      REMOVE STIMULATOR SACRAL NERVE N/A 2015    Procedure: REMOVE STIMULATOR SACRAL NERVE;  Surgeon: Gertrudis Frausto MD;  Location:  [facility]     REPLACEMENT TOTAL KNEE Right 2010     SURGICAL HISTORY OF -       Uterine endometrial ablation     ZZC LIGATE FALLOPIAN TUBE      endometrial ablation       FAMILY HISTORY:  Family History   Problem Relation Age of Onset     C.A.D. Father         MI , age 83, had high lipids     Hyperlipidemia Father      Hypertension Father      Coronary Artery Disease Father      Hypertension Mother      Genitourinary Problems Mother         renal failure     Fractures Mother         hip     Osteoporosis Mother      Cerebrovascular Disease Maternal Grandfather         cerebral hemorrhage     Diabetes Maternal Uncle      Colon Cancer Maternal Aunt      Diabetes Maternal Grandmother        SOCIAL HISTORY:  Social History     Socioeconomic History     Marital status:      Spouse name: Kwadwo     Number of children: 3     Years of education: None     Highest education level: None   Occupational History     Occupation: PT Aide     Employer: NONE      Employer: RETIRED   Tobacco Use     Smoking status: Never Smoker     Smokeless tobacco: Never Used   Substance and Sexual  "Activity     Alcohol use: No     Alcohol/week: 0.0 standard drinks     Drug use: No     Sexual activity: Never     Partners: Male     Birth control/protection: Post-menopausal   Other Topics Concern     Caffeine Concern Yes     Comment: 6-7 cups caffeine per day     Sleep Concern No     Stress Concern Yes     Weight Concern No     Special Diet No     Comment: eats healthy      Exercise Yes     Comment:  walking & active life style      Parent/sibling w/ CABG, MI or angioplasty before 65F 55M? No       Review of Systems:  Skin:  Positive for   red spots on arms   Eyes:  Positive for glasses    ENT:  Negative      Respiratory:  Negative       Cardiovascular:    Positive for;fatigue;edema right foot edema - due to blockage  Gastroenterology: Positive for   occ with manjero  Genitourinary:  Positive for urinary frequency    Musculoskeletal:  Positive for joint pain excessive pain in right leg that goes up to buttocks - pain scale 8.  Neurologic:  Negative      Psychiatric:  Positive for excessive stress    Heme/Lymph/Imm:  Negative      Endocrine:  Positive for diabetes      Physical Exam:  Vitals: BP (!) 152/71 (BP Location: Right arm, Patient Position: Sitting)   Pulse 72   Ht 1.727 m (5' 8\")   Wt 85.8 kg (189 lb 1.6 oz)   SpO2 100%   BMI 28.75 kg/m      Constitutional:           Skin:             Head:           Eyes:           Lymph:      ENT:           Neck:           Respiratory:            Cardiac:                                                           GI:           Extremities and Muscular Skeletal:                 Neurological:           Psych:           CC  No referring provider defined for this encounter.                Service Date: 09/07/2022    CARDIOLOGY OFFICE PROGRESS NOTE    PRIMARY CARE PHYSICIAN:  Gaye Zimmer MD      HISTORY OF PRESENT ILLNESS:  I had the opportunity to see Ms. Sarah Longo in Cardiology Clinic today at [facility] in [place] prior to her upcoming " peripheral vascular interventional procedure.  As you know, I have seen her for many years, mainly to deal with recurrent coronary artery disease issues.  She has a history of stenting of the right coronary artery dating back to 2004.  She has had recurrent symptoms of angina, which is primarily upper back discomfort, and abnormal stress testing resulting in stenting of the circumflex and LAD.  Because of the complexity of that lesion and her preference, she went to the [facility] for that procedure.  The result was good, but she was noted to have a distal LAD stenosis, which was too small for stenting and has been treated medically since then.  She has had some ongoing symptoms over the years, and we have done evaluations with stress testing and angiography without any evidence of recurrent coronary artery disease. Last spring she was having some back discomfort and had a nuclear stress test suggesting inferior infarct with periinfarct ischemia leading to coronary angiography.  Fortunately, there was no new coronary artery disease.    More recently, she has been experiencing a lot of leg pains, and her JARVIS study was abnormal suggesting significant peripheral vascular disease.  She was referred to the Interventional Vascular Cardiology team and underwent a lithotripsy and angioplasty with a drug-coated balloon involving the left superficial femoral artery and left popliteal artery on 06/27/2022.  She had similar severe disease within the right popliteal system and went back for intervention within that artery on 08/31/2022, but we could not obtain vascular access at that time on the left femoral artery and elected to reschedule the procedure.  She is now here today to discuss that the upcoming procedure scheduled for later in September.    I reviewed the notes from Dr. Hamlin with her in detail.  She did have some symptomatic relief with revascularization of her left lower extremity.  She has had better circulation  into her foot with less swelling and less leg pains.  I agree with his notes that her pains are somewhat atypical, because I suspect that she has a combination of arthritic pain and claudication.  She may even have some degree of sciatica in the right leg.  Nevertheless, she is eager to pursue right lower extremity revascularization to help relieve any discomfort that she can.    Review of her laboratory studies looked good.  She has normal kidney function, normal CBC and excellent cholesterol numbers on Repatha.  She is statin intolerant.    PHYSICAL EXAMINATION:  On examination here today, her blood pressure is 152/71, heart rate 72 and weight 189 pounds.  Her lungs are clear.  Heart rhythm is regular.  She has no cardiac murmurs or carotid bruits.    IMPRESSIONS:  Ms. Sarah Longo is a 77-year-old woman with multiple cardiac risk factors, including hypertension, dyslipidemia and diabetes, who has a history of multiple coronary stenting procedures and a previous inferior infarction with preserved left ventricular systolic function.  She also has significant peripheral arterial disease and has undergone angioplasty and lithotripsy of her left lower extremity.  The plan now is to proceed with a similar procedure to revascularize the right popliteal artery.    I discussed the procedure with her as well as the risks, including bleeding, infection and emergent surgery.  She understands and agrees to proceed.  I am hopeful that some of her discomfort will be relieved, and then we can discuss whether some of her other pain might not be related to her vascular disease and whether she should consider pursuing further evaluation and treatment for those issues involving her legs.    Her coronary artery disease status seems to be stable.  She is not having any symptoms suspicious for angina.  I will plan to see her back again in 6 months.  Her risk factors are controlled, and I will not change her medications.  Her blood  pressure today was a bit high, but unusual for her, and I suspect that it is usually more normal.    cc:  [place] [address]    Gunnar Burgos MD, [facility]    D: 2022   T: 2022   MT: gurinder    Name:     KATHY PERRY  MRN:      [id number]        Account:      [id number]   :      1945           Service Date: 2022       Document: [id number]      Thank you for allowing me to participate in the care of your patient.      Sincerely,     GUNNAR BURGOS MD   [facility]  cc: No referring provider defined for this encounter.

## 2022-09-07 NOTE — PROGRESS NOTES
Service Date: 09/07/2022    CARDIOLOGY OFFICE PROGRESS NOTE    PRIMARY CARE PHYSICIAN:  Gaye Zimmer MD      HISTORY OF PRESENT ILLNESS:  I had the opportunity to see Ms. Sarah Longo in Cardiology Clinic today at [facility] in [place] prior to her upcoming peripheral vascular interventional procedure.  As you know, I have seen her for many years, mainly to deal with recurrent coronary artery disease issues.  She has a history of stenting of the right coronary artery dating back to 2004.  She has had recurrent symptoms of angina, which is primarily upper back discomfort, and abnormal stress testing resulting in stenting of the circumflex and LAD.  Because of the complexity of that lesion and her preference, she went to the [facility] for that procedure.  The result was good, but she was noted to have a distal LAD stenosis, which was too small for stenting and has been treated medically since then.  She has had some ongoing symptoms over the years, and we have done evaluations with stress testing and angiography without any evidence of recurrent coronary artery disease. Last spring she was having some back discomfort and had a nuclear stress test suggesting inferior infarct with periinfarct ischemia leading to coronary angiography.  Fortunately, there was no new coronary artery disease.    More recently, she has been experiencing a lot of leg pains, and her JARVIS study was abnormal suggesting significant peripheral vascular disease.  She was referred to the Interventional Vascular Cardiology team and underwent a lithotripsy and angioplasty with a drug-coated balloon involving the left superficial femoral artery and left popliteal artery on 06/27/2022.  She had similar severe disease within the right popliteal system and went back for intervention within that artery on 08/31/2022, but we could not obtain vascular access at that time on the left femoral artery and elected to reschedule the  procedure.  She is now here today to discuss that the upcoming procedure scheduled for later in September.    I reviewed the notes from Dr. Hamlin with her in detail.  She did have some symptomatic relief with revascularization of her left lower extremity.  She has had better circulation into her foot with less swelling and less leg pains.  I agree with his notes that her pains are somewhat atypical, because I suspect that she has a combination of arthritic pain and claudication.  She may even have some degree of sciatica in the right leg.  Nevertheless, she is eager to pursue right lower extremity revascularization to help relieve any discomfort that she can.    Review of her laboratory studies looked good.  She has normal kidney function, normal CBC and excellent cholesterol numbers on Repatha.  She is statin intolerant.    PHYSICAL EXAMINATION:  On examination here today, her blood pressure is 152/71, heart rate 72 and weight 189 pounds.  Her lungs are clear.  Heart rhythm is regular.  She has no cardiac murmurs or carotid bruits.    IMPRESSIONS:  Ms. Sarah Longo is a 77-year-old woman with multiple cardiac risk factors, including hypertension, dyslipidemia and diabetes, who has a history of multiple coronary stenting procedures and a previous inferior infarction with preserved left ventricular systolic function.  She also has significant peripheral arterial disease and has undergone angioplasty and lithotripsy of her left lower extremity.  The plan now is to proceed with a similar procedure to revascularize the right popliteal artery.    I discussed the procedure with her as well as the risks, including bleeding, infection and emergent surgery.  She understands and agrees to proceed.  I am hopeful that some of her discomfort will be relieved, and then we can discuss whether some of her other pain might not be related to her vascular disease and whether she should consider pursuing further evaluation and  treatment for those issues involving her legs.    Her coronary artery disease status seems to be stable.  She is not having any symptoms suspicious for angina.  I will plan to see her back again in 6 months.  Her risk factors are controlled, and I will not change her medications.  Her blood pressure today was a bit high, but unusual for her, and I suspect that it is usually more normal.    cc:  [place] [address]    Feroz Burgos MD, [facility]        D: 2022   T: 2022   MT: gurinder    Name:     KATHY PERRY  MRN:      [id number]        Account:      [id number]   :      1945           Service Date: 2022       Document: [id number]

## 2022-09-07 NOTE — PATIENT INSTRUCTIONS
It was a pleasure seeing you today and thank you for allowing me to be a part of your health care team.  Should you have any questions regarding your visit or future needs please feel free to reach out to my care team for assistance.      Thank you, Dr. Feroz Burgos        **Nursing: [phone]       **Scheduling: [phone]

## 2022-09-07 NOTE — PROGRESS NOTES
HPI and Plan:   See dictation    Today's clinic visit entailed:  Review of the result(s) of each unique test - LE angiogram, echo, nuclear stress, [place],   The following tests were independently interpreted by me as noted in my documentation: LE angiogram  Ordering of each unique test  Prescription drug management  50 minutes spent on the date of the encounter doing chart review, review of test results, interpretation of tests, patient visit, documentation and discussion with family   Provider  Link to [facility] Help Grid     The level of medical decision making during this visit was of high complexity.      Orders Placed This Encounter   Procedures     Lipid Profile     ALT     Basic metabolic panel     Follow-Up with Cardiology       No orders of the defined types were placed in this encounter.      There are no discontinued medications.      Encounter Diagnoses   Name Primary?     PAD (peripheral artery disease) (H) Yes     Coronary artery disease involving native coronary artery of native heart without angina pectoris      Type 2 diabetes mellitus without complication, without long-term current use of insulin (H)      Hyperlipidemia LDL goal <100      Essential hypertension        CURRENT MEDICATIONS:  Current Outpatient Medications   Medication Sig Dispense Refill     acetylcysteine (N-ACETYL CYSTEINE) 600 MG CAPS capsule Take 600 mg by mouth 2 times daily       amoxicillin (AMOXIL) 500 MG capsule 2,000 mg once as needed (Before dental procedures)       ascorbic acid 1000 MG TABS tablet Take 500 mg by mouth       aspirin 81 MG EC tablet Take 81 mg by mouth daily       CINNAMON PO Take 2 capsules by mouth 2 times daily prn       clopidogrel (PLAVIX) 75 MG tablet Take 1 tablet (75 mg) by mouth daily Starting tomorrow. (Patient taking differently: Take 75 mg by mouth daily As needede) 30 tablet 0     cyanocobalamin (CYANOCOBALAMIN) 1000 MCG/ML injection Inject 2 mLs into the muscle every 30 days       DULoxetine  (CYMBALTA) 60 MG capsule daily       estradiol (ESTRACE) 0.1 MG/GM vaginal cream Use pea sized amount and apply with finger to vaginal skin just inside opening once a day before bed as needed for vaginal dryness. 42.5 g 3     evolocumab (REPATHA SURECLICK) 140 MG/ML prefilled autoinjector Inject 1 mL (140 mg) Subcutaneous every 14 days 6 mL 3     fluconazole (DIFLUCAN) 150 MG tablet prn       furosemide (LASIX) 20 MG tablet Take 1 tablet (20 mg) by mouth daily 90 tablet 3     levothyroxine (SYNTHROID/LEVOTHROID) 100 MCG tablet Take 100 mcg by mouth daily       losartan (COZAAR) 50 MG tablet Take 1 tablet (50 mg) by mouth daily 90 tablet 3     mirabegron (MYRBETRIQ) 50 MG 24 hr tablet Take 1 tablet (50 mg) by mouth daily Patient not taking daily 90 tablet 3     MOUNJARO 5 MG/0.5ML SOPN Inject 5 mg Subcutaneous once a week       nitroGLYcerin (NITROSTAT) 0.4 MG sublingual tablet Place 1 tablet (0.4 mg) under the tongue every 5 minutes as needed for chest pain For chest pain place 1 tablet under the tongue every 5 minutes for 3 doses. If symptoms persist 5 minutes after 1st dose call 911. 25 tablet 11     NOVOLOG FLEXPEN 100 UNIT/ML soln Inject 17 Units Subcutaneous 3 times daily as needed for high blood sugar       oxyCODONE (ROXICODONE) 5 MG tablet Take 1-2 tablets (5-10 mg) by mouth every 3 hours as needed for severe pain 30 tablet 0     traMADol (ULTRAM) 50 MG tablet Take 1 tablet (50 mg) by mouth every 6 hours as needed for moderate to severe pain 30 tablet 3     vitamin D3 (CHOLECALCIFEROL) 2000 units tablet Take 1 tablet by mouth 2 times daily         ALLERGIES     Allergies   Allergen Reactions     Jardiance [Empagliflozin] Other (See Comments)     Ongoing yeast infections     Hmg-Coa-R Inhibitors Muscle Pain (Myalgia)     Oral statins       Versed [Midazolam]      Pt is refusing to have this med d/t memory loss and forgetfulness        PAST MEDICAL HISTORY:  Past Medical History:   Diagnosis Date     Anemia       Anxiety      Arthritis      CAD (coronary artery disease)      Chronic bilateral low back pain without sciatica      Coronary artery disease 04/28/2016    cardiac cath 2/2019: patent stents; PTCA with MAYA x 2 to OM1 and MAYA x 1 to p.LAD (4/21/2016 at [place]); PTCA with intracoronary stent placement of RCA June 2004; MAYA to OM1 11/2016     Cystocele, midline 09/29/2010     Depression      Depression      DM type 2 (diabetes mellitus, type 2) (H)      HYPERPLASTIC  POLYP      colonoscopy in 5-10 yrs.     Hypertension      Hypothyroid      Hypothyroidism     hypothyroid- Dr Majano     Motion sickness      Mumps      OAB (overactive bladder)     complex voiding dysfct, failed interstim     Osteoarthritis      Other and unspecified hyperlipidemia      Palpitations      PONV (postoperative nausea and vomiting)      Postmenopausal bleeding      Shoulder blade pain 5/31/2016     Type II or unspecified type diabetes mellitus without mention of complication, not stated as uncontrolled     Dr. Majano     Unspecified essential hypertension      Urinary frequency 9/29/10    followed by urology. no benefit from detrol or sanctura. month trial of macrobid and flomax 10/10       PAST SURGICAL HISTORY:  Past Surgical History:   Procedure Laterality Date     ------------OTHER-------------      Interstim     Ablation       ARTHROPLASTY HIP Left 7/15/2020    Procedure: Left total hip arthroplasty;  Surgeon: Jayson Victoria MD;  Location:  OR     BACK SURGERY  03/05/2020    spinal fusion     BACK SURGERY       C CT ANGIOGRAPHY CORONARY  1/2005    mod soft plaque LAD and Cx, follow up with left heart cath     CARDIAC SURGERY       CHOLECYSTECTOMY       CHOLECYSTECTOMY       COLONOSCOPY       CORONARY STENT PLACEMENT  2004     x 5 stents      CV CORONARY ANGIOGRAM N/A 4/29/2022    Procedure: Coronary Angiogram;  Surgeon: Sudarshan Lee MD;  Location:  HEART CARDIAC CATH LAB     CV FLUOROSCOPY ONLY N/A 8/31/2022     Procedure: Fluoroscopy Only;  Surgeon: Jun Hamlin MD;  Location:  HEART CARDIAC CATH LAB     CV HEART CATHETERIZATION WITH POSSIBLE INTERVENTION N/A 2/14/2019    Procedure: Coronary Angiogram;  Surgeon: Sudarshan Lee MD;  Location: [facility] CARDIAC CATH LAB; patent stents     CV LEFT HEART CATH N/A 4/29/2022    Procedure: Left Heart Catheterization;  Surgeon: Sudarshan Lee MD;  Location:  HEART CARDIAC CATH LAB     CV LEFT VENTRICULOGRAM N/A 4/29/2022    Procedure: Left Ventriculogram;  Surgeon: Sudarshan Lee MD;  Location:  HEART CARDIAC CATH LAB     CV LOWER EXTREMITY ANGIOGRAM BILATERAL Bilateral 6/27/2022    Procedure: Angiogram Lower Extremity Bilateral;  Surgeon: Jun Hamlin MD;  Location:  HEART CARDIAC CATH LAB     CV LOWER EXTREMITY ANGIOGRAM RIGHT Right 8/31/2022    Procedure: Angiogram Lower Extremity Right-Aborted;  Surgeon: Jun Hamlin MD;  Location:  HEART CARDIAC CATH LAB     CYSTOSCOPY       EXCISE LESION UPPER EXTREMITY  6/5/2013    Procedure: EXCISE LESION UPPER EXTREMITY;  EXCISION RIGHT ARM ANTICUBITAL LIPOMA ;  Surgeon: Jayson Joe MD;  Location: [facility] REMOVAL OF TONSILS,12+ Y/O       HEART CATH LEFT HEART CATH  3/2/2016    No intervention - aggressive medical management     HEART CATH LEFT HEART CATH  4/21/2016     MAYA x 2 to OM1, MAYA to LAD, at [place]     HEART CATH LEFT HEART CATH  6/29/2004    MAYA to RCA     HEART CATH LEFT HEART CATH  3/28/2002    Mild to moderate 3 vessel CAD. Medical management recommended.      HEART CATH LEFT HEART CATH  11/22/2016    MAYA to OM1     hypothyroid       JOINT REPLACEMENT       KNEE SURGERY  4/20/10    right knee replacement     LUMBAR FUSION N/A 3/5/2020    Procedure: BILATERAL LUMBAR 2-3 AND LUMBAR 3-4 POSTERIOR SPINAL FUSION WITH LEFT LATERAL RECESS DECOMPRESSION LUMBAR 4-5 AND BILATERAL LAMINECTOMY AND DECOMPRESSION LUMBAR 2-3 AND LUMBAR 3-4 WITH ALLOGRAFT AND AUTOGRAFT AND VIVIGEN;   Surgeon: Navid Caicedo MD;  Location: [facility] OR;  Service: Spine     LUMBAR FUSION Bilateral 2021    Procedure: REVISION OF NONUNION BILATERAL LUMBAR 2-3 AND LUMBAR 3-4 WITH REVISION OF INSTRUMENTATION RIGHT LUMBAR 2 AND LEFT LUMBAR 4 WITH LEFT LUMBAR 4-5 LAMINOFORAMINOTOMY AND LUMBAR 1-2 DECOMPRESSION WITH ALLOGRAFT AND BONE MARROW ASPIRATE;  Surgeon: Navid Caicedo MD;  Location: [facility] OR;  Service: Spine     ORTHOPEDIC SURGERY      total knee      REMOVE STIMULATOR SACRAL NERVE N/A 2015    Procedure: REMOVE STIMULATOR SACRAL NERVE;  Surgeon: Gertrudis Frausto MD;  Location:  [facility]     REPLACEMENT TOTAL KNEE Right 2010     SURGICAL HISTORY OF -       Uterine endometrial ablation     ZZC LIGATE FALLOPIAN TUBE      endometrial ablation       FAMILY HISTORY:  Family History   Problem Relation Age of Onset     C.A.D. Father         MI , age 83, had high lipids     Hyperlipidemia Father      Hypertension Father      Coronary Artery Disease Father      Hypertension Mother      Genitourinary Problems Mother         renal failure     Fractures Mother         hip     Osteoporosis Mother      Cerebrovascular Disease Maternal Grandfather         cerebral hemorrhage     Diabetes Maternal Uncle      Colon Cancer Maternal Aunt      Diabetes Maternal Grandmother        SOCIAL HISTORY:  Social History     Socioeconomic History     Marital status:      Spouse name: Kwadwo     Number of children: 3     Years of education: None     Highest education level: None   Occupational History     Occupation: PT Aide     Employer: NONE      Employer: RETIRED   Tobacco Use     Smoking status: Never Smoker     Smokeless tobacco: Never Used   Substance and Sexual Activity     Alcohol use: No     Alcohol/week: 0.0 standard drinks     Drug use: No     Sexual activity: Never     Partners: Male     Birth control/protection: Post-menopausal   Other Topics Concern     Caffeine  "Concern Yes     Comment: 6-7 cups caffeine per day     Sleep Concern No     Stress Concern Yes     Weight Concern No     Special Diet No     Comment: eats healthy      Exercise Yes     Comment:  walking & active life style      Parent/sibling w/ CABG, MI or angioplasty before 65F 55M? No       Review of Systems:  Skin:  Positive for   red spots on arms   Eyes:  Positive for glasses    ENT:  Negative      Respiratory:  Negative       Cardiovascular:    Positive for;fatigue;edema right foot edema - due to blockage  Gastroenterology: Positive for   occ with manjero  Genitourinary:  Positive for urinary frequency    Musculoskeletal:  Positive for joint pain excessive pain in right leg that goes up to buttocks - pain scale 8.  Neurologic:  Negative      Psychiatric:  Positive for excessive stress    Heme/Lymph/Imm:  Negative      Endocrine:  Positive for diabetes      Physical Exam:  Vitals: BP (!) 152/71 (BP Location: Right arm, Patient Position: Sitting)   Pulse 72   Ht 1.727 m (5' 8\")   Wt 85.8 kg (189 lb 1.6 oz)   SpO2 100%   BMI 28.75 kg/m      Constitutional:           Skin:             Head:           Eyes:           Lymph:      ENT:           Neck:           Respiratory:            Cardiac:                                                           GI:           Extremities and Muscular Skeletal:                 Neurological:           Psych:           CC  No referring provider defined for this encounter.              "

## 2022-09-21 ENCOUNTER — LAB (OUTPATIENT)
Dept: LAB | Facility: CLINIC | Age: 77
End: 2022-09-21
Payer: COMMERCIAL

## 2022-09-21 DIAGNOSIS — I73.9 PAD (PERIPHERAL ARTERY DISEASE) (H): ICD-10-CM

## 2022-09-21 PROCEDURE — U0005 INFEC AGEN DETEC AMPLI PROBE: HCPCS

## 2022-09-21 PROCEDURE — U0003 INFECTIOUS AGENT DETECTION BY NUCLEIC ACID (DNA OR RNA); SEVERE ACUTE RESPIRATORY SYNDROME CORONAVIRUS 2 (SARS-COV-2) (CORONAVIRUS DISEASE [COVID-19]), AMPLIFIED PROBE TECHNIQUE, MAKING USE OF HIGH THROUGHPUT TECHNOLOGIES AS DESCRIBED BY CMS-2020-01-R: HCPCS

## 2022-09-22 DIAGNOSIS — I70.213 ATHEROSCLEROSIS OF NATIVE ARTERIES OF EXTREMITIES WITH INTERMITTENT CLAUDICATION, BILATERAL LEGS (H): ICD-10-CM

## 2022-09-22 DIAGNOSIS — I73.9 PAD (PERIPHERAL ARTERY DISEASE) (H): Primary | ICD-10-CM

## 2022-09-22 LAB — SARS-COV-2 RNA RESP QL NAA+PROBE: NEGATIVE

## 2022-09-22 RX ORDER — ASPIRIN 325 MG
325 TABLET ORAL ONCE
Status: CANCELLED | OUTPATIENT
Start: 2022-09-22 | End: 2022-09-22

## 2022-09-22 RX ORDER — SODIUM CHLORIDE 9 MG/ML
INJECTION, SOLUTION INTRAVENOUS CONTINUOUS
Status: CANCELLED | OUTPATIENT
Start: 2022-09-22

## 2022-09-22 RX ORDER — POTASSIUM CHLORIDE 1500 MG/1
20 TABLET, EXTENDED RELEASE ORAL
Status: CANCELLED | OUTPATIENT
Start: 2022-09-22

## 2022-09-22 RX ORDER — LIDOCAINE 40 MG/G
CREAM TOPICAL
Status: CANCELLED | OUTPATIENT
Start: 2022-09-22

## 2022-09-22 RX ORDER — ASPIRIN 81 MG/1
243 TABLET, CHEWABLE ORAL ONCE
Status: CANCELLED | OUTPATIENT
Start: 2022-09-22

## 2022-09-22 NOTE — PROGRESS NOTES
Called patient to review pre-procedure instructions: patient is scheduled for lower extremity angiogram on 9/23/22. Patient's arrival time is 7:30 am and tentative procedure time is 9:30 am. Patient is aware to be NPO except for medications after midnight. Patient will hold the medication furosemide . Patient was instructed to take aspirin 325 mg the morning of procedure. Patient is diabetic and stated she already spoke with her primary care MD for insulin dosage, she is planning to hold all insulin. Patient is not taking anti-coagulation medication. Patient has no known contrast dye allergy. Patient's renal function is WNL for procedure. Patient has arranged for a  and someone to stay with them for 24 hours after procedure. Patient had a COVID test on 9/21/22 that was negative. Patient was instructed to take their temperature the morning of procedure and if temperature is >100 degrees F patient should not come in and call [phone]. Patient verbalized understanding of all instructions. Orders for procedure entered. Patient has post-angiogram follow up scheduled on 10/31/22 with HARVEY Elizabeth.

## 2022-09-23 ENCOUNTER — HOSPITAL ENCOUNTER (OUTPATIENT)
Facility: CLINIC | Age: 77
Discharge: HOME | End: 2022-09-23
Admitting: INTERNAL MEDICINE
Payer: COMMERCIAL

## 2022-09-23 VITALS
SYSTOLIC BLOOD PRESSURE: 133 MMHG | RESPIRATION RATE: 16 BRPM | TEMPERATURE: 98.7 F | DIASTOLIC BLOOD PRESSURE: 67 MMHG | HEART RATE: 63 BPM | OXYGEN SATURATION: 99 %

## 2022-09-23 DIAGNOSIS — I70.213 ATHEROSCLEROSIS OF NATIVE ARTERIES OF EXTREMITIES WITH INTERMITTENT CLAUDICATION, BILATERAL LEGS (H): ICD-10-CM

## 2022-09-23 DIAGNOSIS — Z98.62 S/P PERIPHERAL ARTERY ANGIOPLASTY: Primary | ICD-10-CM

## 2022-09-23 DIAGNOSIS — I73.9 PAD (PERIPHERAL ARTERY DISEASE) (H): ICD-10-CM

## 2022-09-23 LAB
ACT BLD: 207 SECONDS (ref 74–150)
ANION GAP SERPL CALCULATED.3IONS-SCNC: 6 MMOL/L (ref 3–14)
APTT PPP: 23 SECONDS (ref 22–38)
BUN SERPL-MCNC: 23 MG/DL (ref 7–30)
CALCIUM SERPL-MCNC: 9.7 MG/DL (ref 8.5–10.1)
CHLORIDE BLD-SCNC: 111 MMOL/L (ref 94–109)
CO2 SERPL-SCNC: 22 MMOL/L (ref 20–32)
CREAT SERPL-MCNC: 0.78 MG/DL (ref 0.52–1.04)
ERYTHROCYTE [DISTWIDTH] IN BLOOD BY AUTOMATED COUNT: 13.1 % (ref 10–15)
GFR SERPL CREATININE-BSD FRML MDRD: 78 ML/MIN/1.73M2
GLUCOSE BLD-MCNC: 216 MG/DL (ref 70–99)
HCT VFR BLD AUTO: 38.2 % (ref 35–47)
HGB BLD-MCNC: 12.7 G/DL (ref 11.7–15.7)
INR PPP: 0.96 (ref 0.85–1.15)
MCH RBC QN AUTO: 30.1 PG (ref 26.5–33)
MCHC RBC AUTO-ENTMCNC: 33.2 G/DL (ref 31.5–36.5)
MCV RBC AUTO: 91 FL (ref 78–100)
PLATELET # BLD AUTO: 184 10E3/UL (ref 150–450)
POTASSIUM BLD-SCNC: 4.1 MMOL/L (ref 3.4–5.3)
RBC # BLD AUTO: 4.22 10E6/UL (ref 3.8–5.2)
SODIUM SERPL-SCNC: 139 MMOL/L (ref 133–144)
WBC # BLD AUTO: 4 10E3/UL (ref 4–11)

## 2022-09-23 PROCEDURE — C1760 CLOSURE DEV, VASC: HCPCS | Performed by: INTERNAL MEDICINE

## 2022-09-23 PROCEDURE — 999N000071 HC STATISTIC HEART CATH LAB OR EP LAB

## 2022-09-23 PROCEDURE — 85347 COAGULATION TIME ACTIVATED: CPT

## 2022-09-23 PROCEDURE — 250N000009 HC RX 250: Performed by: INTERNAL MEDICINE

## 2022-09-23 PROCEDURE — 85730 THROMBOPLASTIN TIME PARTIAL: CPT | Performed by: INTERNAL MEDICINE

## 2022-09-23 PROCEDURE — 75716 ARTERY X-RAYS ARMS/LEGS: CPT | Performed by: INTERNAL MEDICINE

## 2022-09-23 PROCEDURE — C1725 CATH, TRANSLUMIN NON-LASER: HCPCS | Performed by: INTERNAL MEDICINE

## 2022-09-23 PROCEDURE — 99152 MOD SED SAME PHYS/QHP 5/>YRS: CPT | Mod: GC | Performed by: INTERNAL MEDICINE

## 2022-09-23 PROCEDURE — C1894 INTRO/SHEATH, NON-LASER: HCPCS | Performed by: INTERNAL MEDICINE

## 2022-09-23 PROCEDURE — 999N000054 HC STATISTIC EKG NON-CHARGEABLE

## 2022-09-23 PROCEDURE — 99153 MOD SED SAME PHYS/QHP EA: CPT | Performed by: INTERNAL MEDICINE

## 2022-09-23 PROCEDURE — 80048 BASIC METABOLIC PNL TOTAL CA: CPT | Performed by: INTERNAL MEDICINE

## 2022-09-23 PROCEDURE — 85027 COMPLETE CBC AUTOMATED: CPT | Performed by: INTERNAL MEDICINE

## 2022-09-23 PROCEDURE — 93005 ELECTROCARDIOGRAM TRACING: CPT

## 2022-09-23 PROCEDURE — 999N000184 HC STATISTIC TELEMETRY

## 2022-09-23 PROCEDURE — 75716 ARTERY X-RAYS ARMS/LEGS: CPT | Mod: 26 | Performed by: INTERNAL MEDICINE

## 2022-09-23 PROCEDURE — C1769 GUIDE WIRE: HCPCS | Performed by: INTERNAL MEDICINE

## 2022-09-23 PROCEDURE — 250N000011 HC RX IP 250 OP 636: Performed by: INTERNAL MEDICINE

## 2022-09-23 PROCEDURE — 99152 MOD SED SAME PHYS/QHP 5/>YRS: CPT | Performed by: INTERNAL MEDICINE

## 2022-09-23 PROCEDURE — 85610 PROTHROMBIN TIME: CPT | Performed by: INTERNAL MEDICINE

## 2022-09-23 PROCEDURE — 93010 ELECTROCARDIOGRAM REPORT: CPT | Performed by: INTERNAL MEDICINE

## 2022-09-23 PROCEDURE — 258N000003 HC RX IP 258 OP 636: Performed by: INTERNAL MEDICINE

## 2022-09-23 PROCEDURE — 37224 CV CARDIAC CATHERIZATION: CPT | Mod: RT | Performed by: INTERNAL MEDICINE

## 2022-09-23 PROCEDURE — 36415 COLL VENOUS BLD VENIPUNCTURE: CPT | Performed by: INTERNAL MEDICINE

## 2022-09-23 PROCEDURE — 37228 HC REVASC TIB-PERON ART ANGIOPLASTY INIT VESSEL: CPT | Mod: RT | Performed by: INTERNAL MEDICINE

## 2022-09-23 PROCEDURE — 36591 DRAW BLOOD OFF VENOUS DEVICE: CPT

## 2022-09-23 PROCEDURE — 272N000001 HC OR GENERAL SUPPLY STERILE: Performed by: INTERNAL MEDICINE

## 2022-09-23 PROCEDURE — C1887 CATHETER, GUIDING: HCPCS | Performed by: INTERNAL MEDICINE

## 2022-09-23 DEVICE — CLOSURE ANGIOSEAL 6FR 610130: Type: IMPLANTABLE DEVICE | Status: FUNCTIONAL

## 2022-09-23 RX ORDER — SODIUM CHLORIDE 9 MG/ML
INJECTION, SOLUTION INTRAVENOUS CONTINUOUS
Status: DISCONTINUED | OUTPATIENT
Start: 2022-09-23 | End: 2022-09-23 | Stop reason: HOSPADM

## 2022-09-23 RX ORDER — ASPIRIN 81 MG/1
243 TABLET, CHEWABLE ORAL ONCE
Status: DISCONTINUED | OUTPATIENT
Start: 2022-09-23 | End: 2022-09-23 | Stop reason: HOSPADM

## 2022-09-23 RX ORDER — FENTANYL CITRATE 50 UG/ML
INJECTION, SOLUTION INTRAMUSCULAR; INTRAVENOUS
Status: DISCONTINUED | OUTPATIENT
Start: 2022-09-23 | End: 2022-09-23 | Stop reason: HOSPADM

## 2022-09-23 RX ORDER — POTASSIUM CHLORIDE 1500 MG/1
20 TABLET, EXTENDED RELEASE ORAL
Status: DISCONTINUED | OUTPATIENT
Start: 2022-09-23 | End: 2022-09-23 | Stop reason: HOSPADM

## 2022-09-23 RX ORDER — ASPIRIN 325 MG
325 TABLET ORAL ONCE
Status: DISCONTINUED | OUTPATIENT
Start: 2022-09-23 | End: 2022-09-23 | Stop reason: HOSPADM

## 2022-09-23 RX ORDER — ASPIRIN 81 MG/1
81 TABLET, CHEWABLE ORAL ONCE
Status: DISCONTINUED | OUTPATIENT
Start: 2022-09-23 | End: 2022-09-23 | Stop reason: HOSPADM

## 2022-09-23 RX ORDER — CLOPIDOGREL BISULFATE 75 MG/1
75 TABLET ORAL DAILY
Qty: 30 TABLET | Refills: 0 | Status: SHIPPED | OUTPATIENT
Start: 2022-09-24 | End: 2022-10-31

## 2022-09-23 RX ORDER — CLOPIDOGREL BISULFATE 75 MG/1
75 TABLET ORAL DAILY
Status: DISCONTINUED | OUTPATIENT
Start: 2022-09-24 | End: 2022-09-23 | Stop reason: HOSPADM

## 2022-09-23 RX ORDER — ONDANSETRON 4 MG/1
4 TABLET, ORALLY DISINTEGRATING ORAL EVERY 6 HOURS PRN
Status: DISCONTINUED | OUTPATIENT
Start: 2022-09-23 | End: 2022-09-23 | Stop reason: HOSPADM

## 2022-09-23 RX ORDER — IOPAMIDOL 755 MG/ML
INJECTION, SOLUTION INTRAVASCULAR
Status: DISCONTINUED | OUTPATIENT
Start: 2022-09-23 | End: 2022-09-23 | Stop reason: HOSPADM

## 2022-09-23 RX ORDER — ONDANSETRON 2 MG/ML
4 INJECTION INTRAMUSCULAR; INTRAVENOUS EVERY 6 HOURS PRN
Status: DISCONTINUED | OUTPATIENT
Start: 2022-09-23 | End: 2022-09-23 | Stop reason: HOSPADM

## 2022-09-23 RX ORDER — LIDOCAINE 40 MG/G
CREAM TOPICAL
Status: DISCONTINUED | OUTPATIENT
Start: 2022-09-23 | End: 2022-09-23 | Stop reason: HOSPADM

## 2022-09-23 RX ORDER — NITROGLYCERIN 0.4 MG/1
0.4 TABLET SUBLINGUAL EVERY 5 MIN PRN
Status: DISCONTINUED | OUTPATIENT
Start: 2022-09-23 | End: 2022-09-23 | Stop reason: HOSPADM

## 2022-09-23 RX ORDER — HYDRALAZINE HYDROCHLORIDE 20 MG/ML
10 INJECTION INTRAMUSCULAR; INTRAVENOUS EVERY 4 HOURS PRN
Status: DISCONTINUED | OUTPATIENT
Start: 2022-09-23 | End: 2022-09-23 | Stop reason: HOSPADM

## 2022-09-23 RX ORDER — HEPARIN SODIUM 1000 [USP'U]/ML
INJECTION, SOLUTION INTRAVENOUS; SUBCUTANEOUS
Status: DISCONTINUED | OUTPATIENT
Start: 2022-09-23 | End: 2022-09-23 | Stop reason: HOSPADM

## 2022-09-23 RX ORDER — ASPIRIN 81 MG/1
81 TABLET ORAL DAILY
Status: DISCONTINUED | OUTPATIENT
Start: 2022-09-24 | End: 2022-09-23 | Stop reason: HOSPADM

## 2022-09-23 RX ORDER — ACETAMINOPHEN 325 MG/1
650 TABLET ORAL EVERY 4 HOURS PRN
Status: DISCONTINUED | OUTPATIENT
Start: 2022-09-23 | End: 2022-09-23 | Stop reason: HOSPADM

## 2022-09-23 RX ORDER — METOPROLOL TARTRATE 1 MG/ML
5 INJECTION, SOLUTION INTRAVENOUS
Status: DISCONTINUED | OUTPATIENT
Start: 2022-09-23 | End: 2022-09-23 | Stop reason: HOSPADM

## 2022-09-23 RX ADMIN — SODIUM CHLORIDE: 9 INJECTION, SOLUTION INTRAVENOUS at 08:11

## 2022-09-23 ASSESSMENT — ACTIVITIES OF DAILY LIVING (ADL)
ADLS_ACUITY_SCORE: 35

## 2022-09-23 NOTE — Clinical Note
The first balloon was inserted into the other vessel and above right knee.Max pressure = 6 shade. Total duration = 31 seconds.

## 2022-09-23 NOTE — PROGRESS NOTES
Care Suites Admission Nursing Note    Patient Information  Name: Sarah Longo  Age: 77 year old  Reason for admission: L/E angiogram.  Explaine pre procedure and answered questions.  Sitting in chair per her request, call light in reach.  Care Suites arrival time: 0740    Visitor Information  Name: helga Burkett  Informed of visitor restrictions: Yes  1 visitor allowed per patient   Visitor must screen negative for COVID symptoms   Visitor must wear a mask  Waiting rooms closed to visitors    Patient Admission/Assessment   Pre-procedure assessment complete: Yes  If abnormal assessment/labs, provider notified: N/A  NPO: Yes  Medications held per instructions/orders: N/A  Consent: deferred  If applicable, pregnancy test status: n/a  Patient oriented to room: Yes  Education/questions answered: Yes  Plan/other: waiting for consent    Discharge Planning  Discharge name/phone number:  helga Burkett [phone]  Overnight post sedation caregiver:  helga Burkett  Discharge location: home    Delfina Leiva RN

## 2022-09-23 NOTE — Clinical Note
The first balloon was inserted into the other vessel and above right knee.Max pressure = 12 shade. Total duration = 35 seconds.

## 2022-09-23 NOTE — DISCHARGE INSTRUCTIONS
L/E Angiogram Discharge Instructions - Femoral    After you go home:    Have an adult stay with you until tomorrow.  Drink extra fluids for 2 days.  You may resume your normal diet.  No smoking       For 24 hours - due to the sedation you received:  Relax and take it easy.  Do NOT make any important or legal decisions.  Do NOT drive or operate machines at home or at work.  Do NOT drink alcohol.    Care of Groin Puncture Site:    For the first 24 hrs - check the puncture site every 1-2 hours while awake.  For 2 days, when you cough, sneeze, laugh or move your bowels, hold your hand over the puncture site and press firmly.  Remove the bandaid after 24 hours. If there is minor oozing, apply another bandaid and remove it after 12 hours.  It is normal to have a small bruise or pea size lump at the site.  You may shower tomorrow. Do NOT take a bath, or use a hot tub or pool for at least 3 days. Do NOT scrub the site. Do not use lotion or powder near the puncture site.    Activity:            For 2 days:  No stooping or squatting  Do NOT do any heavy activity such as exercise, lifting, or straining.   No housework, yard work or any activity that make you sweat  Do NOT lift more than 10 pounds    Bleeding:    If you start bleeding from the site in your groin, lie down flat and press firmly on/above the site for 10 minutes.   Once bleeding stops, lay flat for 2 hours.   Call [facility] as soon as you can.       Call 911 right away if you have heavy bleeding or bleeding that does not stop.      Medicines:    If you are taking an antiplatelet medication such as Plavix, Brilinta or Effient, do not stop taking it until you talk to your cardiologist.    If you are on Metformin (Glucophage), do not restart it until you have blood tests (within 2 to 3 days after discharge).  After you have your blood drawn, you may restart the Metformin.   Take your medications, including blood thinners, unless your provider tells you not to.    If  you take Coumadin (Warfarin), have your INR checked by your provider in  3-5 days. Call your clinic to schedule this.  If you have stopped any medicines, check with your provider about when to restart them.    Follow Up Appointments:    Follow up with [facility] Nurse Practitioner at [facility] of patient preference in 7-10 days.    Call the clinic if:    You have increased pain or a large or growing hard lump around the site.  The site is red, swollen, hot or tender.  Blood or fluid is draining from the site.  You have chills or a fever greater than 101 F (38 C).  Your leg feels numb, cool or changes color.  You have hives, a rash or unusual itching.  New pain in the back or belly that you cannot control with Tylenol.  Any questions or concerns.          [facility] [place]:    [phone] [facility] (7 days a week)

## 2022-09-23 NOTE — Clinical Note
The first balloon was inserted into the other vessel.Max pressure = 12 shade. Total duration = 34 seconds.

## 2022-09-23 NOTE — Clinical Note
The first balloon was inserted into the other vessel and above right knee.Max pressure = 8 shade. Total duration = 180 seconds.

## 2022-09-23 NOTE — PRE-PROCEDURE
GENERAL PRE-PROCEDURE:   Procedure:  Peripheral angiogram +/- peripheral intervention  Date/Time:  9/23/2022 10:44 AM    Verbal consent obtained?: Yes    Written consent obtained?: Yes    Risks and benefits: Risks, benefits and alternatives were discussed    DC Plan: Appropriate discharge home plan in place for patients who are going home after procedure   Consent given by:  Patient  Patient states understanding of procedure being performed: Yes    Patient's understanding of procedure matches consent: Yes    Procedure consent matches procedure scheduled: Yes    Expected level of sedation:  Moderate  Appropriately NPO:  Yes  ASA Class:  2  Mallampati  :  Grade 2- soft palate, base of uvula, tonsillar pillars, and portion of posterior pharyngeal wall visible  Lungs:  Lungs clear with good breath sounds bilaterally  Heart:  Normal heart sounds and rate  History & Physical reviewed:  History and physical reviewed and no updates needed  Statement of review:  I have reviewed the lab findings, diagnostic data, medications, and the plan for sedation

## 2022-09-23 NOTE — PROGRESS NOTES
Care Suites Post Procedure Note    Patient Information  Name: Sarah Longo  Age: 77 year old    Post Procedure  Time patient returned to Care Suites: 1245.  Denies c/o.  See flow sheet.   here and will return. Informed pt of activity restrictions  Concerns/abnormal assessment: none  If abnormal assessment, provider notified: N/A  Plan/Other: Bedrest til 2739    Delfina Leiva RN

## 2022-09-23 NOTE — PROGRESS NOTES
Care Suites Discharge Nursing Note    Patient Information  Name: Sarah Longo  Age: 77 year old    Discharge Education:  Discharge instructions reviewed: Yes  Patient/patient representative verbalizes understanding: Yes  Patient discharging on new medications: Yes, Plavix  Medication education completed: Yes    Discharge Plans:   Discharge location: home  Discharge ride contacted: Yes  Approximate discharge time: 17:00    Discharge Criteria:  Discharge criteria met and vital signs stable: Yes    Patient Belongs:  Patient belongings returned to patient: Yes

## 2022-09-23 NOTE — Clinical Note
The first balloon was inserted into the other vessel.Max pressure = 12 shade. Total duration = 28 seconds.

## 2022-09-26 ENCOUNTER — CARE COORDINATION (OUTPATIENT)
Dept: CARDIOLOGY | Facility: CLINIC | Age: 77
End: 2022-09-26

## 2022-09-26 NOTE — PROGRESS NOTES
Pt called concerned because she did not have a scab at her groin site post peripheral angiogram. She doesn't have any oozing or drainage. She has some bruising, but it has not worsened post angiogram. I told pt that as long as she doesn't have any bleeding or swelling at the site she should not worry too much. Pt will continue to monitor the site and call if she does develop any drainage or swelling. She said she is following the activity limitations that were given to her at discharge. Patrick STOVER September 26, 2022, 3:02 PM

## 2022-10-01 LAB
ATRIAL RATE - MUSE: 68 BPM
DIASTOLIC BLOOD PRESSURE - MUSE: NORMAL MMHG
INTERPRETATION ECG - MUSE: NORMAL
P AXIS - MUSE: 61 DEGREES
PR INTERVAL - MUSE: 162 MS
QRS DURATION - MUSE: 94 MS
QT - MUSE: 408 MS
QTC - MUSE: 433 MS
R AXIS - MUSE: 35 DEGREES
SYSTOLIC BLOOD PRESSURE - MUSE: NORMAL MMHG
T AXIS - MUSE: 75 DEGREES
VENTRICULAR RATE- MUSE: 68 BPM

## 2022-10-13 NOTE — PROCEDURE: MIPS QUALITY
Detail Level: Detailed
Quality 130: Documentation Of Current Medications In The Medical Record: Current Medications Documented
anaphylaxis/urticaria

## 2022-10-16 ENCOUNTER — HEALTH MAINTENANCE LETTER (OUTPATIENT)
Age: 77
End: 2022-10-16

## 2022-10-21 ENCOUNTER — LAB REQUISITION (OUTPATIENT)
Dept: LAB | Facility: CLINIC | Age: 77
End: 2022-10-21

## 2022-10-21 DIAGNOSIS — R39.15 URGENCY OF URINATION: ICD-10-CM

## 2022-10-21 PROCEDURE — 87086 URINE CULTURE/COLONY COUNT: CPT | Performed by: NURSE PRACTITIONER

## 2022-10-23 LAB — BACTERIA UR CULT: NO GROWTH

## 2022-10-25 ENCOUNTER — CARE COORDINATION (OUTPATIENT)
Dept: CARDIOLOGY | Facility: CLINIC | Age: 77
End: 2022-10-25

## 2022-10-25 NOTE — PROGRESS NOTES
Pt called to report that she has still been experiencing a lot of pain in her right leg since her 9/23 peripheral angiogram. Pt wanted to know what percentage of narrowing she still has in the distal right SFA. I reviewed the report and told her it looks like the lesion  went from 90% to 30%. Pt frustrated that her pain has not improved. She wanted to know if a 30% narrowing could still be causing her a lot of pain. Pt has a 10/31 post angiogram visit with Karen Alexander PA-C. I recommend pt update 's nurse team since he is the peripheral vascular specialist and the one that did her procedure. She wanted to know if she needs to have any imaging done on her legs, and I told her that  can answer that better. Pt said she is going to call 's nurse team in the morning. Patrick STOVER October 25, 2022, 4:19 PM

## 2022-10-26 ENCOUNTER — TELEPHONE (OUTPATIENT)
Dept: CARDIOLOGY | Facility: CLINIC | Age: 77
End: 2022-10-26

## 2022-10-26 DIAGNOSIS — I73.9 PAD (PERIPHERAL ARTERY DISEASE) (H): ICD-10-CM

## 2022-10-26 DIAGNOSIS — I70.213 ATHEROSCLEROSIS OF NATIVE ARTERIES OF EXTREMITIES WITH INTERMITTENT CLAUDICATION, BILATERAL LEGS (H): Primary | ICD-10-CM

## 2022-10-26 NOTE — TELEPHONE ENCOUNTER
"Received call with pt reporting right lower extremity pain following procedure with Dr. Hamlin on 9/23/22. Pt reported she has \"a tremendous amount of pain\" in her right leg. Per pt she did not have any relief of the pain after the right lower extremity angiogram and angioplasty of right distal SFA. Pt reported the pain is present when she wakes up, the pain is in her buttocks, sides of legs, calves and feet. Pt stated walking is extremely difficult for her and she is using a cane. Pt stated the pain has only very slightly improved with climbing stairs. Pt stated she will also wake up in the night at times with pain. Per pt the pain is worse than it was prior to the procedure. Pt also wonders why a 30% stenosis remains after the procedure and they were not able to open the vessel up completely and if any further imaging is indicated. Pt has follow up scheduled on Monday, 10/31/22 with HARVEY Elizabeth. Will send update to Dr. Hamlin.   "

## 2022-10-26 NOTE — TELEPHONE ENCOUNTER
Received response from Dr. Hamlin:     Jun Hamlin MD  P [facility] Heart Team 1  Caller: Unspecified (Today,  2:52 PM)  Her pain is not related to circulation issues as I previously told her. Her pain is likely nerve related. We can order right lower extremity arterial ultrasound to look at the ballooned artery.     Order placed for ultrasound .Called back to pt, no answer and unable to leave VM due to VM not set up.    Addendum 10/28/22 - Called and spoke with pt. Reviewed Dr. Hamlin's recommendations. Pt stated she does not agree that her pain is not related to circulation and she does not believe her pain is nerve related. Pt asked if we will prescribe her pain medications. Advised Dr. Hamlin does not prescribe pain medication and encouraged pt to discuss her pain with her PCP. Pt stated she is not happy and she will discuss further at her appointment with Karen on Monday and will call to schedule the ultrasound. Will route update to Karen.

## 2022-10-31 ENCOUNTER — OFFICE VISIT (OUTPATIENT)
Dept: CARDIOLOGY | Facility: CLINIC | Age: 77
End: 2022-10-31
Payer: COMMERCIAL

## 2022-10-31 VITALS
OXYGEN SATURATION: 98 % | HEART RATE: 73 BPM | DIASTOLIC BLOOD PRESSURE: 72 MMHG | HEIGHT: 68 IN | BODY MASS INDEX: 28.75 KG/M2 | SYSTOLIC BLOOD PRESSURE: 133 MMHG

## 2022-10-31 DIAGNOSIS — I70.213 ATHEROSCLEROSIS OF NATIVE ARTERIES OF EXTREMITIES WITH INTERMITTENT CLAUDICATION, BILATERAL LEGS (H): ICD-10-CM

## 2022-10-31 DIAGNOSIS — I73.9 PAD (PERIPHERAL ARTERY DISEASE) (H): ICD-10-CM

## 2022-10-31 DIAGNOSIS — I25.10 CORONARY ARTERY DISEASE INVOLVING NATIVE CORONARY ARTERY OF NATIVE HEART WITHOUT ANGINA PECTORIS: Primary | ICD-10-CM

## 2022-10-31 PROCEDURE — 99215 OFFICE O/P EST HI 40 MIN: CPT | Performed by: PHYSICIAN ASSISTANT

## 2022-10-31 NOTE — LETTER
10/31/2022    Gaye Zimmer  [facility] [address] [address]    RE: Sarah Longo       Dear Colleague,     I had the pleasure of seeing Sarah Longo in the [facility].  [phone]  60 minutes spent on the date of the encounter doing chart review, review of outside records, review of test results, interpretation of tests, patient visit, documentation and discussion with family     HPI and Plan:   See dictation    Orders this Visit:  Orders Placed This Encounter   Procedures     Vascular Surgery Referral     Follow-Up with Cardiology     No orders of the defined types were placed in this encounter.    Medications Discontinued During This Encounter   Medication Reason     clopidogrel (PLAVIX) 75 MG tablet Therapy completed     clopidogrel (PLAVIX) 75 MG tablet Therapy completed         Encounter Diagnoses   Name Primary?     PAD (peripheral artery disease) (H)      Atherosclerosis of native arteries of extremities with intermittent claudication, bilateral legs (H)      Coronary artery disease involving native coronary artery of native heart without angina pectoris Yes       CURRENT MEDICATIONS:  Current Outpatient Medications   Medication Sig Dispense Refill     acetylcysteine (N-ACETYL CYSTEINE) 600 MG CAPS capsule Take 600 mg by mouth 2 times daily       amoxicillin (AMOXIL) 500 MG capsule 2,000 mg once as needed (Before dental procedures)       aspirin 81 MG EC tablet Take 81 mg by mouth daily       CINNAMON PO Take 2 capsules by mouth 2 times daily prn       cyanocobalamin (CYANOCOBALAMIN) 1000 MCG/ML injection Inject 2 mLs into the muscle every 30 days       DULoxetine (CYMBALTA) 60 MG capsule daily       estradiol (ESTRACE) 0.1 MG/GM vaginal cream Use pea sized amount and apply with finger to vaginal skin just inside opening once a day before bed as needed for vaginal dryness. 42.5 g 3     evolocumab (REPATHA SURECLICK) 140 MG/ML prefilled autoinjector Inject  1 mL (140 mg) Subcutaneous every 14 days 6 mL 3     fluconazole (DIFLUCAN) 150 MG tablet prn       levothyroxine (SYNTHROID/LEVOTHROID) 100 MCG tablet Take 100 mcg by mouth daily       losartan (COZAAR) 50 MG tablet Take 1 tablet (50 mg) by mouth daily 90 tablet 3     mirabegron (MYRBETRIQ) 50 MG 24 hr tablet Take 1 tablet (50 mg) by mouth daily Patient not taking daily (Patient taking differently: Take 50 mg by mouth daily as needed Patient not taking daily) 90 tablet 3     MOUNJARO 5 MG/0.5ML SOPN Inject 5 mg Subcutaneous once a week       nitroGLYcerin (NITROSTAT) 0.4 MG sublingual tablet Place 1 tablet (0.4 mg) under the tongue every 5 minutes as needed for chest pain For chest pain place 1 tablet under the tongue every 5 minutes for 3 doses. If symptoms persist 5 minutes after 1st dose call 911. 25 tablet 11     NOVOLOG FLEXPEN 100 UNIT/ML soln Inject 17 Units Subcutaneous 3 times daily as needed for high blood sugar       oxyCODONE (ROXICODONE) 5 MG tablet Take 1-2 tablets (5-10 mg) by mouth every 3 hours as needed for severe pain 30 tablet 0     traMADol (ULTRAM) 50 MG tablet Take 1 tablet (50 mg) by mouth every 6 hours as needed for moderate to severe pain 30 tablet 3     vitamin D3 (CHOLECALCIFEROL) 2000 units tablet Take 1 tablet by mouth 2 times daily       ascorbic acid 1000 MG TABS tablet Take 500 mg by mouth (Patient not taking: Reported on 10/31/2022)       furosemide (LASIX) 20 MG tablet Take 1 tablet (20 mg) by mouth daily (Patient not taking: Reported on 10/31/2022) 90 tablet 3       ALLERGIES     Allergies   Allergen Reactions     Jardiance [Empagliflozin] Other (See Comments)     Ongoing yeast infections     Hmg-Coa-R Inhibitors Muscle Pain (Myalgia)     Oral statins       Versed [Midazolam]      Pt is refusing to have this med d/t memory loss and forgetfulness        PAST MEDICAL HISTORY:  Past Medical History:   Diagnosis Date     Anemia      Anxiety      Arthritis      CAD (coronary artery  disease)      Chronic bilateral low back pain without sciatica      Coronary artery disease 04/28/2016    cardiac cath 2/2019: patent stents; PTCA with MAYA x 2 to OM1 and MAYA x 1 to p.LAD (4/21/2016 at [place]); PTCA with intracoronary stent placement of RCA June 2004; MAYA to OM1 11/2016     Cystocele, midline 09/29/2010     Depression      Depression      DM type 2 (diabetes mellitus, type 2) (H)      HYPERPLASTIC  POLYP      colonoscopy in 5-10 yrs.     Hypertension      Hypothyroid      Hypothyroidism     hypothyroid- Dr Majano     Motion sickness      Mumps      OAB (overactive bladder)     complex voiding dysfct, failed interstim     Osteoarthritis      Other and unspecified hyperlipidemia      Palpitations      PONV (postoperative nausea and vomiting)      Postmenopausal bleeding      Shoulder blade pain 5/31/2016     Type II or unspecified type diabetes mellitus without mention of complication, not stated as uncontrolled     Dr. Majano     Unspecified essential hypertension      Urinary frequency 9/29/10    followed by urology. no benefit from detrol or sanctura. month trial of macrobid and flomax 10/10       PAST SURGICAL HISTORY:  Past Surgical History:   Procedure Laterality Date     ------------OTHER-------------      Interstim     Ablation       ARTHROPLASTY HIP Left 7/15/2020    Procedure: Left total hip arthroplasty;  Surgeon: Jayson Victoria MD;  Location:  OR     BACK SURGERY  03/05/2020    spinal fusion     BACK SURGERY       C CT ANGIOGRAPHY CORONARY  1/2005    mod soft plaque LAD and Cx, follow up with left heart cath     CARDIAC SURGERY       CHOLECYSTECTOMY       CHOLECYSTECTOMY       COLONOSCOPY       CORONARY STENT PLACEMENT  2004     x 5 stents      CV CORONARY ANGIOGRAM N/A 4/29/2022    Procedure: Coronary Angiogram;  Surgeon: Sudarshan Lee MD;  Location:  HEART CARDIAC CATH LAB     CV FLUOROSCOPY ONLY N/A 8/31/2022    Procedure: Fluoroscopy Only;  Surgeon: Jun Hamlin MD;   Location:  HEART CARDIAC CATH LAB     CV HEART CATHETERIZATION WITH POSSIBLE INTERVENTION N/A 2/14/2019    Procedure: Coronary Angiogram;  Surgeon: Sudarshan Lee MD;  Location: [facility] CARDIAC CATH LAB; patent stents     CV LEFT HEART CATH N/A 4/29/2022    Procedure: Left Heart Catheterization;  Surgeon: Sudarshan Lee MD;  Location: [facility] CARDIAC CATH LAB     CV LEFT VENTRICULOGRAM N/A 4/29/2022    Procedure: Left Ventriculogram;  Surgeon: Sudarshan Lee MD;  Location: [facility] CARDIAC CATH LAB     CV LOWER EXTREMITY ANGIOGRAM BILATERAL Bilateral 6/27/2022    Procedure: Angiogram Lower Extremity Bilateral;  Surgeon: uJn Hamlin MD;  Location: [facility] CARDIAC CATH LAB     CV LOWER EXTREMITY ANGIOGRAM BILATERAL Right 9/23/2022    Procedure: Angiogram Lower Extremity Bilateral;  Surgeon: Jun Hamlin MD;  Location: [facility] CARDIAC CATH LAB     CV LOWER EXTREMITY ANGIOGRAM RIGHT Right 8/31/2022    Procedure: Angiogram Lower Extremity Right-Aborted;  Surgeon: Jun Hamlin MD;  Location: [facility] CARDIAC CATH LAB     CYSTOSCOPY       EXCISE LESION UPPER EXTREMITY  6/5/2013    Procedure: EXCISE LESION UPPER EXTREMITY;  EXCISION RIGHT ARM ANTICUBITAL LIPOMA ;  Surgeon: Jayson Joe MD;  Location: [facility] REMOVAL OF TONSILS,12+ Y/O       HEART CATH LEFT HEART CATH  3/2/2016    No intervention - aggressive medical management     HEART CATH LEFT HEART CATH  4/21/2016     MAYA x 2 to OM1, MAYA to LAD, at [employer]     HEART CATH LEFT HEART CATH  6/29/2004    MAYA to RCA     HEART CATH LEFT HEART CATH  3/28/2002    Mild to moderate 3 vessel CAD. Medical management recommended.      HEART CATH LEFT HEART CATH  11/22/2016    MAYA to OM1     hypothyroid       JOINT REPLACEMENT       KNEE SURGERY  4/20/10    right knee replacement     LOWER ANGIOPLASTY RIGHT Right 9/23/2022    Procedure: Lower Extremity Angioplasty Right;  Surgeon: Jun Hamlin MD;  Location: [facility]  CATH LAB     LUMBAR FUSION N/A 3/5/2020    Procedure: BILATERAL LUMBAR 2-3 AND LUMBAR 3-4 POSTERIOR SPINAL FUSION WITH LEFT LATERAL RECESS DECOMPRESSION LUMBAR 4-5 AND BILATERAL LAMINECTOMY AND DECOMPRESSION LUMBAR 2-3 AND LUMBAR 3-4 WITH ALLOGRAFT AND AUTOGRAFT AND VIVIGEN;  Surgeon: Navid Caicedo MD;  Location: [facility] OR;  Service: Spine     LUMBAR FUSION Bilateral 2021    Procedure: REVISION OF NONUNION BILATERAL LUMBAR 2-3 AND LUMBAR 3-4 WITH REVISION OF INSTRUMENTATION RIGHT LUMBAR 2 AND LEFT LUMBAR 4 WITH LEFT LUMBAR 4-5 LAMINOFORAMINOTOMY AND LUMBAR 1-2 DECOMPRESSION WITH ALLOGRAFT AND BONE MARROW ASPIRATE;  Surgeon: Navid Caicedo MD;  Location: [facility] OR;  Service: Spine     ORTHOPEDIC SURGERY      total knee      REMOVE STIMULATOR SACRAL NERVE N/A 2015    Procedure: REMOVE STIMULATOR SACRAL NERVE;  Surgeon: Gertrudis Frausto MD;  Location: [facility]     REPLACEMENT TOTAL KNEE Right 2010     SURGICAL HISTORY OF -       Uterine endometrial ablation     ZZC LIGATE FALLOPIAN TUBE      endometrial ablation       FAMILY HISTORY:  Family History   Problem Relation Age of Onset     C.A.D. Father         MI , age 83, had high lipids     Hyperlipidemia Father      Hypertension Father      Coronary Artery Disease Father      Hypertension Mother      Genitourinary Problems Mother         renal failure     Fractures Mother         hip     Osteoporosis Mother      Cerebrovascular Disease Maternal Grandfather         cerebral hemorrhage     Diabetes Maternal Uncle      Colon Cancer Maternal Aunt      Diabetes Maternal Grandmother        SOCIAL HISTORY:  Social History     Socioeconomic History     Marital status:      Spouse name: Kwadwo     Number of children: 3     Years of education: None     Highest education level: None   Occupational History     Occupation: PT Aide     Employer: NONE      Employer: RETIRED   Tobacco Use     Smoking status: Never  "    Smokeless tobacco: Never   Substance and Sexual Activity     Alcohol use: No     Alcohol/week: 0.0 standard drinks     Drug use: No     Sexual activity: Never     Partners: Male     Birth control/protection: Post-menopausal   Other Topics Concern     Caffeine Concern Yes     Comment: 6-7 cups caffeine per day     Sleep Concern No     Stress Concern Yes     Weight Concern No     Special Diet No     Comment: eats healthy      Exercise Yes     Comment:  walking & active life style      Parent/sibling w/ CABG, MI or angioplasty before 65F 55M? No       Review of Systems:  Skin:  not assessed     Eyes:  not assessed    ENT:  not assessed    Respiratory:  Positive for dyspnea on exertion  Cardiovascular:  Negative    Gastroenterology: not assessed    Genitourinary:  not assessed    Musculoskeletal:  not assessed    Neurologic:  not assessed    Psychiatric:  not assessed    Heme/Lymph/Imm:  Positive for allergies  Endocrine:  Positive for thyroid disorder;diabetes    Physical Exam:  Vitals: /72 (BP Location: Left arm, Cuff Size: Adult Large)   Pulse 73   Ht 1.727 m (5' 8\")   SpO2 98%   BMI 28.75 kg/m     Please refer to dictation for physical exam    Recent Lab Results: all reviewed today  CBC RESULTS:  Lab Results   Component Value Date    WBC 4.0 09/23/2022    WBC 3.9 (L) 07/19/2020    RBC 4.22 09/23/2022    RBC 2.60 (L) 07/19/2020    HGB 12.7 09/23/2022    HGB 12.1 09/15/2020    HCT 38.2 09/23/2022    HCT 23.8 (L) 07/19/2020    MCV 91 09/23/2022    MCV 92 07/19/2020    MCH 30.1 09/23/2022    MCH 29.6 07/19/2020    MCHC 33.2 09/23/2022    MCHC 32.4 07/19/2020    RDW 13.1 09/23/2022    RDW 12.1 07/19/2020     09/23/2022     07/19/2020       BMP RESULTS:  Lab Results   Component Value Date     09/23/2022     07/19/2020    POTASSIUM 4.1 09/23/2022    POTASSIUM 3.7 07/19/2020    CHLORIDE 111 (H) 09/23/2022    CHLORIDE 107 07/19/2020    CO2 22 09/23/2022    CO2 27 07/19/2020    ANIONGAP " 6 09/23/2022    ANIONGAP 2 (L) 07/19/2020     (H) 09/23/2022     (H) 07/19/2020    BUN 23 09/23/2022    BUN 14 07/19/2020    CR 0.78 09/23/2022    CR 0.71 07/19/2020    GFRESTIMATED 78 09/23/2022    GFRESTIMATED >60 01/24/2021    GFRESTIMATED 84 07/19/2020    GFRESTBLACK >60 01/24/2021    GFRESTBLACK >90 07/19/2020    LUCIUS 9.7 09/23/2022    LUCIUS 8.5 07/19/2020        INR RESULTS:  Lab Results   Component Value Date    INR 0.96 09/23/2022    INR 1.02 08/31/2022    INR 0.88 02/14/2019    INR 0.94 02/24/2017       CC  Referred Self,    Thank you for allowing me to participate in the care of your patient.    Sincerely,   Karen Alexander PA-C   [facility]  cc: Referred Self

## 2022-10-31 NOTE — PATIENT INSTRUCTIONS
Thank you for visiting with me today.    We discussed: we'll get the ultrasound of your right arteries to see what the narrowing looks like.      Medication changes:  continue current medications.        Follow up: I want you to get a second opinion with vascular.  Please see Dr. Joe or Jayant- it may take awhile to get into them.      Please set up a follow up with Dr. Burgos in about 3 months. In the meantime, we'll keep working on your leg arteries.    Please call my nurse, Monae, at [phone] with any questions or concerns.    Scheduling phone number: [phone]  Reminder: Please bring in all current medications, over the counter supplements and vitamin bottles to your next appointment.

## 2022-11-02 ENCOUNTER — HOSPITAL ENCOUNTER (OUTPATIENT)
Dept: ULTRASOUND IMAGING | Facility: CLINIC | Age: 77
Discharge: HOME | End: 2022-11-02
Attending: INTERNAL MEDICINE | Admitting: INTERNAL MEDICINE
Payer: COMMERCIAL

## 2022-11-02 DIAGNOSIS — I73.9 PAD (PERIPHERAL ARTERY DISEASE) (H): ICD-10-CM

## 2022-11-02 DIAGNOSIS — I70.213 ATHEROSCLEROSIS OF NATIVE ARTERIES OF EXTREMITIES WITH INTERMITTENT CLAUDICATION, BILATERAL LEGS (H): ICD-10-CM

## 2022-11-02 PROCEDURE — 93926 LOWER EXTREMITY STUDY: CPT | Mod: RT

## 2022-11-02 PROCEDURE — 93926 LOWER EXTREMITY STUDY: CPT | Mod: 26 | Performed by: INTERNAL MEDICINE

## 2022-11-02 NOTE — PROGRESS NOTES
[id number]  60 minutes spent on the date of the encounter doing chart review, review of outside records, review of test results, interpretation of tests, patient visit, documentation and discussion with family     HPI and Plan:   See dictation    Orders this Visit:  Orders Placed This Encounter   Procedures     Vascular Surgery Referral     Follow-Up with Cardiology     No orders of the defined types were placed in this encounter.    Medications Discontinued During This Encounter   Medication Reason     clopidogrel (PLAVIX) 75 MG tablet Therapy completed     clopidogrel (PLAVIX) 75 MG tablet Therapy completed         Encounter Diagnoses   Name Primary?     PAD (peripheral artery disease) (H)      Atherosclerosis of native arteries of extremities with intermittent claudication, bilateral legs (H)      Coronary artery disease involving native coronary artery of native heart without angina pectoris Yes       CURRENT MEDICATIONS:  Current Outpatient Medications   Medication Sig Dispense Refill     acetylcysteine (N-ACETYL CYSTEINE) 600 MG CAPS capsule Take 600 mg by mouth 2 times daily       amoxicillin (AMOXIL) 500 MG capsule 2,000 mg once as needed (Before dental procedures)       aspirin 81 MG EC tablet Take 81 mg by mouth daily       CINNAMON PO Take 2 capsules by mouth 2 times daily prn       cyanocobalamin (CYANOCOBALAMIN) 1000 MCG/ML injection Inject 2 mLs into the muscle every 30 days       DULoxetine (CYMBALTA) 60 MG capsule daily       estradiol (ESTRACE) 0.1 MG/GM vaginal cream Use pea sized amount and apply with finger to vaginal skin just inside opening once a day before bed as needed for vaginal dryness. 42.5 g 3     evolocumab (REPATHA SURECLICK) 140 MG/ML prefilled autoinjector Inject 1 mL (140 mg) Subcutaneous every 14 days 6 mL 3     fluconazole (DIFLUCAN) 150 MG tablet prn       levothyroxine (SYNTHROID/LEVOTHROID) 100 MCG tablet Take 100 mcg by mouth daily       losartan (COZAAR) 50 MG tablet Take 1  tablet (50 mg) by mouth daily 90 tablet 3     mirabegron (MYRBETRIQ) 50 MG 24 hr tablet Take 1 tablet (50 mg) by mouth daily Patient not taking daily (Patient taking differently: Take 50 mg by mouth daily as needed Patient not taking daily) 90 tablet 3     MOUNJARO 5 MG/0.5ML SOPN Inject 5 mg Subcutaneous once a week       nitroGLYcerin (NITROSTAT) 0.4 MG sublingual tablet Place 1 tablet (0.4 mg) under the tongue every 5 minutes as needed for chest pain For chest pain place 1 tablet under the tongue every 5 minutes for 3 doses. If symptoms persist 5 minutes after 1st dose call 911. 25 tablet 11     NOVOLOG FLEXPEN 100 UNIT/ML soln Inject 17 Units Subcutaneous 3 times daily as needed for high blood sugar       oxyCODONE (ROXICODONE) 5 MG tablet Take 1-2 tablets (5-10 mg) by mouth every 3 hours as needed for severe pain 30 tablet 0     traMADol (ULTRAM) 50 MG tablet Take 1 tablet (50 mg) by mouth every 6 hours as needed for moderate to severe pain 30 tablet 3     vitamin D3 (CHOLECALCIFEROL) 2000 units tablet Take 1 tablet by mouth 2 times daily       ascorbic acid 1000 MG TABS tablet Take 500 mg by mouth (Patient not taking: Reported on 10/31/2022)       furosemide (LASIX) 20 MG tablet Take 1 tablet (20 mg) by mouth daily (Patient not taking: Reported on 10/31/2022) 90 tablet 3       ALLERGIES     Allergies   Allergen Reactions     Jardiance [Empagliflozin] Other (See Comments)     Ongoing yeast infections     Hmg-Coa-R Inhibitors Muscle Pain (Myalgia)     Oral statins       Versed [Midazolam]      Pt is refusing to have this med d/t memory loss and forgetfulness        PAST MEDICAL HISTORY:  Past Medical History:   Diagnosis Date     Anemia      Anxiety      Arthritis      CAD (coronary artery disease)      Chronic bilateral low back pain without sciatica      Coronary artery disease 04/28/2016    cardiac cath 2/2019: patent stents; PTCA with MAYA x 2 to OM1 and MAYA x 1 to p.LAD (4/21/2016 at [place]); PTCA with  intracoronary stent placement of RCA June 2004; MAYA to OM1 11/2016     Cystocele, midline 09/29/2010     Depression      Depression      DM type 2 (diabetes mellitus, type 2) (H)      HYPERPLASTIC  POLYP      colonoscopy in 5-10 yrs.     Hypertension      Hypothyroid      Hypothyroidism     hypothyroid- Dr Majano     Motion sickness      Mumps      OAB (overactive bladder)     complex voiding dysfct, failed interstim     Osteoarthritis      Other and unspecified hyperlipidemia      Palpitations      PONV (postoperative nausea and vomiting)      Postmenopausal bleeding      Shoulder blade pain 5/31/2016     Type II or unspecified type diabetes mellitus without mention of complication, not stated as uncontrolled     Dr. Majano     Unspecified essential hypertension      Urinary frequency 9/29/10    followed by urology. no benefit from detrol or sanctura. month trial of macrobid and flomax 10/10       PAST SURGICAL HISTORY:  Past Surgical History:   Procedure Laterality Date     ------------OTHER-------------      Interstim     Ablation       ARTHROPLASTY HIP Left 7/15/2020    Procedure: Left total hip arthroplasty;  Surgeon: Jayson Victoria MD;  Location: [facility] OR     BACK SURGERY  03/05/2020    spinal fusion     BACK SURGERY       C CT ANGIOGRAPHY CORONARY  1/2005    mod soft plaque LAD and Cx, follow up with left heart cath     CARDIAC SURGERY       CHOLECYSTECTOMY       CHOLECYSTECTOMY       COLONOSCOPY       CORONARY STENT PLACEMENT  2004     x 5 stents      CV CORONARY ANGIOGRAM N/A 4/29/2022    Procedure: Coronary Angiogram;  Surgeon: Sudarshan Lee MD;  Location: [facility] CARDIAC CATH LAB     CV FLUOROSCOPY ONLY N/A 8/31/2022    Procedure: Fluoroscopy Only;  Surgeon: Jun Hamlin MD;  Location: [facility] CARDIAC CATH LAB     CV HEART CATHETERIZATION WITH POSSIBLE INTERVENTION N/A 2/14/2019    Procedure: Coronary Angiogram;  Surgeon: Sudarshan Lee MD;  Location: [facility] CARDIAC CATH LAB;  patent stents     CV LEFT HEART CATH N/A 4/29/2022    Procedure: Left Heart Catheterization;  Surgeon: Sudarshan Lee MD;  Location:  HEART CARDIAC CATH LAB     CV LEFT VENTRICULOGRAM N/A 4/29/2022    Procedure: Left Ventriculogram;  Surgeon: Sudarshan Lee MD;  Location: [facility] CARDIAC CATH LAB     CV LOWER EXTREMITY ANGIOGRAM BILATERAL Bilateral 6/27/2022    Procedure: Angiogram Lower Extremity Bilateral;  Surgeon: Jun Hamlin MD;  Location: [facility] CARDIAC CATH LAB     CV LOWER EXTREMITY ANGIOGRAM BILATERAL Right 9/23/2022    Procedure: Angiogram Lower Extremity Bilateral;  Surgeon: Jun Hamlin MD;  Location:  HEART CARDIAC CATH LAB     CV LOWER EXTREMITY ANGIOGRAM RIGHT Right 8/31/2022    Procedure: Angiogram Lower Extremity Right-Aborted;  Surgeon: Jun Hamlin MD;  Location: [facility] CARDIAC CATH LAB     CYSTOSCOPY       EXCISE LESION UPPER EXTREMITY  6/5/2013    Procedure: EXCISE LESION UPPER EXTREMITY;  EXCISION RIGHT ARM ANTICUBITAL LIPOMA ;  Surgeon: Jayson Joe MD;  Location: [facility] REMOVAL OF TONSILS,12+ Y/O       HEART CATH LEFT HEART CATH  3/2/2016    No intervention - aggressive medical management     HEART CATH LEFT HEART CATH  4/21/2016     MAYA x 2 to OM1, MAYA to LAD, at [place]     HEART CATH LEFT HEART CATH  6/29/2004    MAYA to RCA     HEART CATH LEFT HEART CATH  3/28/2002    Mild to moderate 3 vessel CAD. Medical management recommended.      HEART CATH LEFT HEART CATH  11/22/2016    MAYA to OM1     hypothyroid       JOINT REPLACEMENT       KNEE SURGERY  4/20/10    right knee replacement     LOWER ANGIOPLASTY RIGHT Right 9/23/2022    Procedure: Lower Extremity Angioplasty Right;  Surgeon: Jun Hamlin MD;  Location:  HEART CARDIAC CATH LAB     LUMBAR FUSION N/A 3/5/2020    Procedure: BILATERAL LUMBAR 2-3 AND LUMBAR 3-4 POSTERIOR SPINAL FUSION WITH LEFT LATERAL RECESS DECOMPRESSION LUMBAR 4-5 AND BILATERAL LAMINECTOMY AND DECOMPRESSION LUMBAR  2-3 AND LUMBAR 3-4 WITH ALLOGRAFT AND AUTOGRAFT AND VIVIGEN;  Surgeon: Navid Caicedo MD;  Location: [facility] Main OR;  Service: Spine     LUMBAR FUSION Bilateral 2021    Procedure: REVISION OF NONUNION BILATERAL LUMBAR 2-3 AND LUMBAR 3-4 WITH REVISION OF INSTRUMENTATION RIGHT LUMBAR 2 AND LEFT LUMBAR 4 WITH LEFT LUMBAR 4-5 LAMINOFORAMINOTOMY AND LUMBAR 1-2 DECOMPRESSION WITH ALLOGRAFT AND BONE MARROW ASPIRATE;  Surgeon: Navid Caicedo MD;  Location: [facility] OR;  Service: Spine     ORTHOPEDIC SURGERY      total knee      REMOVE STIMULATOR SACRAL NERVE N/A 2015    Procedure: REMOVE STIMULATOR SACRAL NERVE;  Surgeon: Gertrudis Frausto MD;  Location:  [facility]     REPLACEMENT TOTAL KNEE Right 2010     SURGICAL HISTORY OF -       Uterine endometrial ablation     ZZC LIGATE FALLOPIAN TUBE      endometrial ablation       FAMILY HISTORY:  Family History   Problem Relation Age of Onset     C.A.D. Father         MI , age 83, had high lipids     Hyperlipidemia Father      Hypertension Father      Coronary Artery Disease Father      Hypertension Mother      Genitourinary Problems Mother         renal failure     Fractures Mother         hip     Osteoporosis Mother      Cerebrovascular Disease Maternal Grandfather         cerebral hemorrhage     Diabetes Maternal Uncle      Colon Cancer Maternal Aunt      Diabetes Maternal Grandmother        SOCIAL HISTORY:  Social History     Socioeconomic History     Marital status:      Spouse name: Kwadwo     Number of children: 3     Years of education: None     Highest education level: None   Occupational History     Occupation: PT Aide     Employer: NONE      Employer: RETIRED   Tobacco Use     Smoking status: Never     Smokeless tobacco: Never   Substance and Sexual Activity     Alcohol use: No     Alcohol/week: 0.0 standard drinks     Drug use: No     Sexual activity: Never     Partners: Male     Birth control/protection:  "Post-menopausal   Other Topics Concern     Caffeine Concern Yes     Comment: 6-7 cups caffeine per day     Sleep Concern No     Stress Concern Yes     Weight Concern No     Special Diet No     Comment: eats healthy      Exercise Yes     Comment:  walking & active life style      Parent/sibling w/ CABG, MI or angioplasty before 65F 55M? No       Review of Systems:  Skin:  not assessed     Eyes:  not assessed    ENT:  not assessed    Respiratory:  Positive for dyspnea on exertion  Cardiovascular:  Negative    Gastroenterology: not assessed    Genitourinary:  not assessed    Musculoskeletal:  not assessed    Neurologic:  not assessed    Psychiatric:  not assessed    Heme/Lymph/Imm:  Positive for allergies  Endocrine:  Positive for thyroid disorder;diabetes    Physical Exam:  Vitals: /72 (BP Location: Left arm, Cuff Size: Adult Large)   Pulse 73   Ht 1.727 m (5' 8\")   SpO2 98%   BMI 28.75 kg/m     Please refer to dictation for physical exam    Recent Lab Results: all reviewed today  CBC RESULTS:  Lab Results   Component Value Date    WBC 4.0 09/23/2022    WBC 3.9 (L) 07/19/2020    RBC 4.22 09/23/2022    RBC 2.60 (L) 07/19/2020    HGB 12.7 09/23/2022    HGB 12.1 09/15/2020    HCT 38.2 09/23/2022    HCT 23.8 (L) 07/19/2020    MCV 91 09/23/2022    MCV 92 07/19/2020    MCH 30.1 09/23/2022    MCH 29.6 07/19/2020    MCHC 33.2 09/23/2022    MCHC 32.4 07/19/2020    RDW 13.1 09/23/2022    RDW 12.1 07/19/2020     09/23/2022     07/19/2020       BMP RESULTS:  Lab Results   Component Value Date     09/23/2022     07/19/2020    POTASSIUM 4.1 09/23/2022    POTASSIUM 3.7 07/19/2020    CHLORIDE 111 (H) 09/23/2022    CHLORIDE 107 07/19/2020    CO2 22 09/23/2022    CO2 27 07/19/2020    ANIONGAP 6 09/23/2022    ANIONGAP 2 (L) 07/19/2020     (H) 09/23/2022     (H) 07/19/2020    BUN 23 09/23/2022    BUN 14 07/19/2020    CR 0.78 09/23/2022    CR 0.71 07/19/2020    GFRESTIMATED 78 09/23/2022 "    GFRESTIMATED >60 01/24/2021    GFRESTIMATED 84 07/19/2020    GFRESTBLACK >60 01/24/2021    GFRESTBLACK >90 07/19/2020    LUCIUS 9.7 09/23/2022    LUCIUS 8.5 07/19/2020        INR RESULTS:  Lab Results   Component Value Date    INR 0.96 09/23/2022    INR 1.02 08/31/2022    INR 0.88 02/14/2019    INR 0.94 02/24/2017           CC  Referred Mike, MD  No address on file

## 2022-11-02 NOTE — PROGRESS NOTES
Service Date: 10/31/2022    PRIMARY CARDIOLOGIST:  Feroz Burgos MD, for coronary disease.  She has recently seen Dr. Hamlin for peripheral vascular disease.    REASON FOR VISIT:  PAD followup.    HISTORY OF PRESENT ILLNESS:  Ms. Longo is a pleasant 77-year-old woman with a past medical history significant for the followin.  Coronary artery disease with history of stent to the RCA in .  2.  Development of symptoms in  and abnormal stress test.  She was found to have a complex trifurcation lesion of the OM and distal LAD, initially managed medically due to complexity and then had trifurcation stenting at [place].  This was the superior branch of the OM, inferior branch as well as the LAD.  In 2019, she had an apical lesion at 80% and this was in the distal LAD that was unchanged in a small vessel.  Her stents were otherwise patent.  3.  Dyslipidemia, intolerant to statins, on Repatha.  4.  Hypertension.  5.  Type 2 diabetes, on insulin.  6.  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  7.  Chronic low back pain with multiple spinal fusions, hip replacement, multiple surgeries, unsuccessful trial of spinal cord stimulators.  8.  Elevated LVEDP at the time of angiogram with some concerns for LVH and diastolic dysfunction.  9.  Peripheral artery disease, status post lithotripsy and angioplasty with drug-coated balloon to the left popliteal and distal superficial femoral artery, now status post balloon to the right distal superficial femoral artery in a procedure 2022 with a 30% residual lesion.  Unfortunately, this intervention was aborted about a month earlier as they had an unsuccessful attempt at access.    Rayna comes in today feeling frustrated.  She notes with her left leg angioplasty, she had about a 50% improvement in her left leg pain.  She notices no improvement in her right leg pain and perhaps she thinks it may even be a little bit worse.  She is concerned that they  did not get a good result and that is why her pain persists.  She notes that she has pain in her right low back, buttock, hip, thigh and calf.  She does not feel that her calf pain is any better.  She has ongoing neuropathy, which is stable.  She denies chest pain, shortness of breath, orthopnea or PND.  She feels frustrated that things are not improved and thinks perhaps with better results things could be better.    SOCIAL HISTORY:  She is a lifelong nonsmoker.  No alcohol use.  She has 7 children and multiple grandchildren with the youngest being a 3-year-old granddaughter and 1 granddaughter graduated with her Ph.D. at the age of 23.    PHYSICAL EXAMINATION:    GENERAL:  Well-developed, well-nourished woman in no acute distress.  HEENT:  Normocephalic, atraumatic.  HEART:  Regular in rate and rhythm.  I do not appreciate murmur, rub or gallop.  LUNGS:  Clear, without wheezes, rales, or rhonchi.  EXTREMITIES:  With palpable pulses, both on the left and the right, some readiness in her feet, tiny areas that are scabbed, both are otherwise warm and dry.    DIAGNOSTIC STUDIES:  Studies reviewed include both peripheral angiograms.    ASSESSMENT AND PLAN:    1.  Peripheral arterial disease with recent right balloon angioplasty to the distal superficial femoral artery with good results that we reviewed today.  I reviewed in depth that even a 30% residual stenosis typically gives relief of symptoms and it is quite possible her symptoms remain from her back.  She feels some frustration over this and would be interested in a second opinion.  For that reason, we elected to have her be referred to Vascular, when they are available, and that we get the ultrasound of her leg that was ordered by Dr. Hamlin before that visit.  I certainly respect their opinion on whether there is any room for improvement or if we are looking at completely neurogenic pain.  Unfortunately, my hunch is that this is ongoing neurogenic pain, which has  not had a good solution for her, unfortunately.  2.  Coronary artery disease with known distal LAD lesion.  This is a small vessel, but no recurrent anginal symptoms and patent stents.  3.  Some diastolic function noted on angiogram without symptoms of heart failure.  4.  Dyslipidemia, on Repatha with excellent control.  5.  Hypertension, well controlled.  6.  Type 2 diabetes, intolerant of SGLT2.    The patient already has followup with Dr. Burgos in 2023.  We will work on getting her followed up with the vascular team, whose opinion I greatly appreciate.  Thank you for allowing me to participate in this patient's care.    Karen Alexander PA-C        D: 2022   T: 2022   MT: bud    Name:     KATHY PERRY  MRN:      [id number]        Account:      [id number]   :      1945           Service Date: 10/31/2022       Document: [id number]

## 2022-11-03 ENCOUNTER — OFFICE VISIT (OUTPATIENT)
Dept: SURGERY | Facility: CLINIC | Age: 77
End: 2022-11-03
Payer: COMMERCIAL

## 2022-11-03 VITALS
HEIGHT: 68 IN | OXYGEN SATURATION: 98 % | WEIGHT: 187 LBS | BODY MASS INDEX: 28.34 KG/M2 | DIASTOLIC BLOOD PRESSURE: 60 MMHG | SYSTOLIC BLOOD PRESSURE: 120 MMHG

## 2022-11-03 DIAGNOSIS — I70.213 ATHEROSCLEROSIS OF NATIVE ARTERIES OF EXTREMITIES WITH INTERMITTENT CLAUDICATION, BILATERAL LEGS (H): ICD-10-CM

## 2022-11-03 DIAGNOSIS — I73.9 PAD (PERIPHERAL ARTERY DISEASE) (H): ICD-10-CM

## 2022-11-03 PROCEDURE — 99205 OFFICE O/P NEW HI 60 MIN: CPT | Performed by: SURGERY

## 2022-11-03 NOTE — PROGRESS NOTES
Mrs. Sarah Longo is a 77-year-old female who has been referred to us by Karen Alexander PA-C for a second/independent opinion in regards to her lower extremity pain.  She tells me that she has had lower extremity pain for quite a number of years and has had at least 2 spinal surgeries done by orthopedic spine.  This has not relieved the symptoms in her lower extremities.  Pain is present constantly and is worse at night.  Pain is also present when she is not ambulating.    In May of this year she had ankle-brachial indices done.  Right ankle-brachial index was 0.73 and left ankle-brachial index was 0.82.  At that time she went to see Dr. Hamlin and cardiology.  There is a detailed note from him from 13 June 2022.  He had spent quite a bit of time trying to delineate the patient's symptoms.  It appears that he was also not fully convinced that the symptoms of rest pain are due to peripheral arterial disease.  And I have to agree with him that he also believe that most of her symptoms are neurogenic in nature.  He also let the patient know that it is unlikely that any peripheral vascular intervention will yield significant symptomatic improvement.    It seems that the patient has been quite frustrated by her situation and was desperate to try anything and therefore in June 2022 patient underwent left superficial femoral artery balloon angioplasty.  Then in August 2022 she underwent right superficial femoral artery and balloon angioplasty.    She says that her symptoms improved somewhat on the left but have had no improvement on the right side.  I went back and looked at the noninvasive arterial studies I looked at the CT angiogram and I looked at the catheter directed arteriogram of the left and right lower extremity before and after the interventions.    Currently the problem is that she is still having constant right lower extremity pain and sensation of tightening in the calf.  Pain is pretty much present all the time  and is debilitating.  I again tried to probe into the symptoms and she tells me that the pain actually starts in the back and goes down her thighs and into the legs.    Moreover she had a duplex sonography done yesterday and I actually showed her and her  the images that she has normal sized vessel for a female, the velocities do not suggest any significant stenosis and the waveforms are all triphasic all the way down into the tibial vessels.    Going back to the original clinical diagnosis that Dr. Hamlin had made that her symptoms are simply not due to peripheral arterial disease.  I explained the same thing with hand drawings, showing her imaging of the previous studies as well as the angiogram.  I explained the same thing in 5 different approaches that her symptoms are not and were never due to peripheral arterial disease.    From the vascular surgery standpoint I really have nothing to offer.  However, now she has had interventions on both superficial femoral and popliteal arteries which need to be kept under surveillance.  I have advised her to continue her care with Dr. Hamlin as he is the one who will be doing the longitudinal surveillance of the endovascular interventions.    She does not need to follow-up with vascular surgery.    Time taken in looking through her chart, sitting face-to-face with her and her  and showing them the images and explaining the same thing multiple times and documentation was 75 minutes.  Greater than 50% in face-to-face counseling.

## 2022-11-27 ENCOUNTER — HOSPITAL ENCOUNTER (EMERGENCY)
Facility: CLINIC | Age: 77
End: 2022-11-27
Payer: COMMERCIAL

## 2022-12-04 ENCOUNTER — HEALTH MAINTENANCE LETTER (OUTPATIENT)
Age: 77
End: 2022-12-04

## 2023-01-11 ENCOUNTER — OFFICE VISIT (OUTPATIENT)
Dept: CARDIOLOGY | Facility: CLINIC | Age: 78
End: 2023-01-11
Payer: COMMERCIAL

## 2023-01-11 VITALS
OXYGEN SATURATION: 98 % | BODY MASS INDEX: 25.87 KG/M2 | HEIGHT: 68 IN | WEIGHT: 170.7 LBS | DIASTOLIC BLOOD PRESSURE: 77 MMHG | SYSTOLIC BLOOD PRESSURE: 134 MMHG | HEART RATE: 74 BPM

## 2023-01-11 DIAGNOSIS — E11.9 TYPE 2 DIABETES MELLITUS WITHOUT COMPLICATION, WITHOUT LONG-TERM CURRENT USE OF INSULIN (H): ICD-10-CM

## 2023-01-11 DIAGNOSIS — R53.83 OTHER FATIGUE: ICD-10-CM

## 2023-01-11 DIAGNOSIS — I73.9 PAD (PERIPHERAL ARTERY DISEASE) (H): ICD-10-CM

## 2023-01-11 DIAGNOSIS — I25.10 CORONARY ARTERY DISEASE INVOLVING NATIVE CORONARY ARTERY OF NATIVE HEART WITHOUT ANGINA PECTORIS: Primary | ICD-10-CM

## 2023-01-11 DIAGNOSIS — I10 ESSENTIAL HYPERTENSION: ICD-10-CM

## 2023-01-11 DIAGNOSIS — E78.5 HYPERLIPIDEMIA LDL GOAL <100: ICD-10-CM

## 2023-01-11 PROCEDURE — 99215 OFFICE O/P EST HI 40 MIN: CPT | Performed by: INTERNAL MEDICINE

## 2023-01-11 RX ORDER — CILOSTAZOL 100 MG/1
100 TABLET ORAL 2 TIMES DAILY
Qty: 60 TABLET | Refills: 11 | Status: SHIPPED | OUTPATIENT
Start: 2023-01-11

## 2023-01-11 NOTE — PROGRESS NOTES
HPI and Plan:   See dictation    Today's clinic visit entailed:  Review of the result(s) of each unique test - peripheral angiogram, bmp, flp, echo, alt  Ordering of each unique test  Prescription drug management  40 minutes spent on the date of the encounter doing chart review, review of test results, patient visit, documentation, discussion with family and  present   Provider  Link to [facility] Help Grid     The level of medical decision making during this visit was of high complexity.      Orders Placed This Encounter   Procedures     Basic metabolic panel     Lipid Profile     ALT     Follow-Up with Cardiology     Echocardiogram Complete       Orders Placed This Encounter   Medications     cilostazol (PLETAL) 100 MG tablet     Sig: Take 1 tablet (100 mg) by mouth 2 times daily     Dispense:  60 tablet     Refill:  11       There are no discontinued medications.      Encounter Diagnoses   Name Primary?     Coronary artery disease involving native coronary artery of native heart without angina pectoris Yes     PAD (peripheral artery disease) (H)      Type 2 diabetes mellitus without complication, without long-term current use of insulin (H)      Hyperlipidemia LDL goal <100      Essential hypertension      Other fatigue        CURRENT MEDICATIONS:  Current Outpatient Medications   Medication Sig Dispense Refill     acetylcysteine (N-ACETYL CYSTEINE) 600 MG CAPS capsule Take 600 mg by mouth 2 times daily       amoxicillin (AMOXIL) 500 MG capsule 2,000 mg once as needed (Before dental procedures)       ascorbic acid 1000 MG TABS tablet Take 500 mg by mouth       aspirin 81 MG EC tablet Take 81 mg by mouth daily       cilostazol (PLETAL) 100 MG tablet Take 1 tablet (100 mg) by mouth 2 times daily 60 tablet 11     CINNAMON PO Take 2 capsules by mouth 2 times daily prn       cyanocobalamin (CYANOCOBALAMIN) 1000 MCG/ML injection Inject 2 mLs into the muscle every 30 days       DULoxetine (CYMBALTA) 60 MG capsule daily        estradiol (ESTRACE) 0.1 MG/GM vaginal cream Use pea sized amount and apply with finger to vaginal skin just inside opening once a day before bed as needed for vaginal dryness. 42.5 g 3     evolocumab (REPATHA SURECLICK) 140 MG/ML prefilled autoinjector Inject 1 mL (140 mg) Subcutaneous every 14 days 6 mL 3     fluconazole (DIFLUCAN) 150 MG tablet prn       levothyroxine (SYNTHROID/LEVOTHROID) 100 MCG tablet Take 100 mcg by mouth daily       losartan (COZAAR) 50 MG tablet Take 1 tablet (50 mg) by mouth daily 90 tablet 3     mirabegron (MYRBETRIQ) 50 MG 24 hr tablet Take 1 tablet (50 mg) by mouth daily Patient not taking daily (Patient taking differently: Take 50 mg by mouth daily as needed Patient not taking daily) 90 tablet 3     MOUNJARO 5 MG/0.5ML SOPN Inject 5 mg Subcutaneous once a week       nitroGLYcerin (NITROSTAT) 0.4 MG sublingual tablet Place 1 tablet (0.4 mg) under the tongue every 5 minutes as needed for chest pain For chest pain place 1 tablet under the tongue every 5 minutes for 3 doses. If symptoms persist 5 minutes after 1st dose call 911. 25 tablet 11     NOVOLOG FLEXPEN 100 UNIT/ML soln Inject 17 Units Subcutaneous 3 times daily as needed for high blood sugar       oxyCODONE (ROXICODONE) 5 MG tablet Take 1-2 tablets (5-10 mg) by mouth every 3 hours as needed for severe pain 30 tablet 0     traMADol (ULTRAM) 50 MG tablet Take 1 tablet (50 mg) by mouth every 6 hours as needed for moderate to severe pain 30 tablet 3     vitamin D3 (CHOLECALCIFEROL) 2000 units tablet Take 1 tablet by mouth 2 times daily       furosemide (LASIX) 20 MG tablet Take 1 tablet (20 mg) by mouth daily (Patient not taking: Reported on 1/11/2023) 90 tablet 3       ALLERGIES     Allergies   Allergen Reactions     Jardiance [Empagliflozin] Other (See Comments)     Ongoing yeast infections     Hmg-Coa-R Inhibitors Muscle Pain (Myalgia)     Oral statins       Versed [Midazolam]      Pt is refusing to have this med d/t memory  loss and forgetfulness        PAST MEDICAL HISTORY:  Past Medical History:   Diagnosis Date     Anemia      Anxiety      Arthritis      CAD (coronary artery disease)      Chronic bilateral low back pain without sciatica      Coronary artery disease 04/28/2016    cardiac cath 2/2019: patent stents; PTCA with MAYA x 2 to OM1 and MAYA x 1 to p.LAD (4/21/2016 at [place]); PTCA with intracoronary stent placement of RCA June 2004; MAYA to OM1 11/2016     Cystocele, midline 09/29/2010     Depression      Depression      DM type 2 (diabetes mellitus, type 2) (H)      HYPERPLASTIC  POLYP      colonoscopy in 5-10 yrs.     Hypertension      Hypothyroid      Hypothyroidism     hypothyroid- Dr Majano     Motion sickness      Mumps      OAB (overactive bladder)     complex voiding dysfct, failed interstim     Osteoarthritis      Other and unspecified hyperlipidemia      Palpitations      PONV (postoperative nausea and vomiting)      Postmenopausal bleeding      Shoulder blade pain 5/31/2016     Type II or unspecified type diabetes mellitus without mention of complication, not stated as uncontrolled     Dr. Majano     Unspecified essential hypertension      Urinary frequency 9/29/10    followed by urology. no benefit from detrol or sanctura. month trial of macrobid and flomax 10/10       PAST SURGICAL HISTORY:  Past Surgical History:   Procedure Laterality Date     ------------OTHER-------------      Interstim     Ablation       ARTHROPLASTY HIP Left 7/15/2020    Procedure: Left total hip arthroplasty;  Surgeon: Jayson Victoria MD;  Location: [facility] OR     BACK SURGERY  03/05/2020    spinal fusion     BACK SURGERY       C CT ANGIOGRAPHY CORONARY  1/2005    mod soft plaque LAD and Cx, follow up with left heart cath     CARDIAC SURGERY       CHOLECYSTECTOMY       CHOLECYSTECTOMY       COLONOSCOPY       CORONARY STENT PLACEMENT  2004     x 5 stents      CV CORONARY ANGIOGRAM N/A 4/29/2022    Procedure: Coronary Angiogram;  Surgeon:  Sudarshan Lee MD;  Location:  HEART CARDIAC CATH LAB     CV FLUOROSCOPY ONLY N/A 8/31/2022    Procedure: Fluoroscopy Only;  Surgeon: Jun Hamlin MD;  Location:  HEART CARDIAC CATH LAB     CV HEART CATHETERIZATION WITH POSSIBLE INTERVENTION N/A 2/14/2019    Procedure: Coronary Angiogram;  Surgeon: Sudarshan Lee MD;  Location: [facility] CARDIAC CATH LAB; patent stents     CV LEFT HEART CATH N/A 4/29/2022    Procedure: Left Heart Catheterization;  Surgeon: Sudarshan Lee MD;  Location: [facility] CARDIAC CATH LAB     CV LEFT VENTRICULOGRAM N/A 4/29/2022    Procedure: Left Ventriculogram;  Surgeon: Sudarshan Lee MD;  Location:  HEART CARDIAC CATH LAB     CV LOWER EXTREMITY ANGIOGRAM BILATERAL Bilateral 6/27/2022    Procedure: Angiogram Lower Extremity Bilateral;  Surgeon: Jun Hamlin MD;  Location:  HEART CARDIAC CATH LAB     CV LOWER EXTREMITY ANGIOGRAM BILATERAL Right 9/23/2022    Procedure: Angiogram Lower Extremity Bilateral;  Surgeon: Jun Hamlin MD;  Location: [facility] CARDIAC CATH LAB     CV LOWER EXTREMITY ANGIOGRAM RIGHT Right 8/31/2022    Procedure: Angiogram Lower Extremity Right-Aborted;  Surgeon: Jun Hamlin MD;  Location: [facility] CARDIAC CATH LAB     CYSTOSCOPY       EXCISE LESION UPPER EXTREMITY  6/5/2013    Procedure: EXCISE LESION UPPER EXTREMITY;  EXCISION RIGHT ARM ANTICUBITAL LIPOMA ;  Surgeon: Jayson Joe MD;  Location: [facility] REMOVAL OF TONSILS,12+ Y/O       HEART CATH LEFT HEART CATH  3/2/2016    No intervention - aggressive medical management     HEART CATH LEFT HEART CATH  4/21/2016     MAYA x 2 to OM1, MAYA to LAD, at [place]     HEART CATH LEFT HEART CATH  6/29/2004    MAYA to RCA     HEART CATH LEFT HEART CATH  3/28/2002    Mild to moderate 3 vessel CAD. Medical management recommended.      HEART CATH LEFT HEART CATH  11/22/2016    MAYA to OM1     hypothyroid       JOINT REPLACEMENT       KNEE SURGERY  4/20/10    right knee  replacement     LOWER ANGIOPLASTY RIGHT Right 2022    Procedure: Lower Extremity Angioplasty Right;  Surgeon: Jun Hamlin MD;  Location:  HEART CARDIAC CATH LAB     LUMBAR FUSION N/A 3/5/2020    Procedure: BILATERAL LUMBAR 2-3 AND LUMBAR 3-4 POSTERIOR SPINAL FUSION WITH LEFT LATERAL RECESS DECOMPRESSION LUMBAR 4-5 AND BILATERAL LAMINECTOMY AND DECOMPRESSION LUMBAR 2-3 AND LUMBAR 3-4 WITH ALLOGRAFT AND AUTOGRAFT AND VIVIGEN;  Surgeon: Navid Caicedo MD;  Location: [facility];  Service: Spine     LUMBAR FUSION Bilateral 2021    Procedure: REVISION OF NONUNION BILATERAL LUMBAR 2-3 AND LUMBAR 3-4 WITH REVISION OF INSTRUMENTATION RIGHT LUMBAR 2 AND LEFT LUMBAR 4 WITH LEFT LUMBAR 4-5 LAMINOFORAMINOTOMY AND LUMBAR 1-2 DECOMPRESSION WITH ALLOGRAFT AND BONE MARROW ASPIRATE;  Surgeon: Navid Caicedo MD;  Location: [facility] OR;  Service: Spine     ORTHOPEDIC SURGERY      total knee      REMOVE STIMULATOR SACRAL NERVE N/A 2015    Procedure: REMOVE STIMULATOR SACRAL NERVE;  Surgeon: Gertrudis Frausto MD;  Location:  [facility]     REPLACEMENT TOTAL KNEE Right 2010     SURGICAL HISTORY OF -       Uterine endometrial ablation     ZZC LIGATE FALLOPIAN TUBE      endometrial ablation       FAMILY HISTORY:  Family History   Problem Relation Age of Onset     C.A.D. Father         MI , age 83, had high lipids     Hyperlipidemia Father      Hypertension Father      Coronary Artery Disease Father      Hypertension Mother      Genitourinary Problems Mother         renal failure     Fractures Mother         hip     Osteoporosis Mother      Cerebrovascular Disease Maternal Grandfather         cerebral hemorrhage     Diabetes Maternal Uncle      Colon Cancer Maternal Aunt      Diabetes Maternal Grandmother        SOCIAL HISTORY:  Social History     Socioeconomic History     Marital status:      Spouse name: Kwadwo     Number of children: 3     Years of education:  "None     Highest education level: None   Occupational History     Occupation: PT Aide     Employer: NONE      Employer: RETIRED   Tobacco Use     Smoking status: Never     Smokeless tobacco: Never   Substance and Sexual Activity     Alcohol use: No     Alcohol/week: 0.0 standard drinks     Drug use: No     Sexual activity: Never     Partners: Male     Birth control/protection: Post-menopausal   Other Topics Concern     Caffeine Concern Yes     Comment: 6-7 cups caffeine per day     Sleep Concern No     Stress Concern Yes     Weight Concern No     Special Diet No     Comment: eats healthy      Exercise Yes     Comment:  walking & active life style      Parent/sibling w/ CABG, MI or angioplasty before 65F 55M? No       Review of Systems:  Skin:  Positive for scaling itching could be due to dry skin.   Eyes:  Positive for glasses    ENT:  Negative      Respiratory:  Positive for cough had coughing for a week that couldve been a cold, and it seems to have gone away.   Cardiovascular:    fatigue;Positive for;edema fatigue is worse in the morning, she wakes up to urinate and she thinks she's not gettting enough REM sleep. Swelling has gotten better in her ankles - she said there is still blockage, swelling has gone down significantly in R leg  Gastroenterology: Positive for heartburn small issues from mounjaro that is managable.  Genitourinary:  Positive for urinary frequency;urgency overactive bladder, after having 3 kids.  Musculoskeletal:  Positive for arthritis;foot pain    Neurologic:  Negative      Psychiatric:  Positive for anxiety duloxitine helps  Heme/Lymph/Imm:  Negative      Endocrine:  Positive for diabetes      Physical Exam:  Vitals: /77 (BP Location: Left arm, Patient Position: Sitting)   Pulse 74   Ht 1.727 m (5' 8\")   Wt 77.4 kg (170 lb 11.2 oz)   SpO2 98%   BMI 25.95 kg/m      Constitutional:           Skin:             Head:           Eyes:           Lymph:      ENT:           Neck:       "     Respiratory:            Cardiac:                                                           GI:           Extremities and Muscular Skeletal:                 Neurological:           Psych:           CC  Karen Alexander, PA-C  [id number]  [place] [id number]

## 2023-01-11 NOTE — PATIENT INSTRUCTIONS
It was a pleasure seeing you today and thank you for allowing me to be a part of your health care team.  Should you have any questions regarding your visit or future needs please feel free to reach out to my care team for assistance.      Thank you, Dr. Feroz Burgos        **Nursing: [phone]       **Scheduling: [phone]

## 2023-01-11 NOTE — LETTER
1/11/2023    Gaye Zimmer  [facility] [address]  [facility] [address]    RE: Sarah Longo       Dear Colleague,     I had the pleasure of seeing Sarah Longo in the [facility].  HPI and Plan:   See dictation    Today's clinic visit entailed:  Review of the result(s) of each unique test - peripheral angiogram, bmp, flp, echo, alt  Ordering of each unique test  Prescription drug management  40 minutes spent on the date of the encounter doing chart review, review of test results, patient visit, documentation, discussion with family and  present   Provider  Link to MDM Help Grid     The level of medical decision making during this visit was of high complexity.      Orders Placed This Encounter   Procedures     Basic metabolic panel     Lipid Profile     ALT     Follow-Up with Cardiology     Echocardiogram Complete       Orders Placed This Encounter   Medications     cilostazol (PLETAL) 100 MG tablet     Sig: Take 1 tablet (100 mg) by mouth 2 times daily     Dispense:  60 tablet     Refill:  11       There are no discontinued medications.      Encounter Diagnoses   Name Primary?     Coronary artery disease involving native coronary artery of native heart without angina pectoris Yes     PAD (peripheral artery disease) (H)      Type 2 diabetes mellitus without complication, without long-term current use of insulin (H)      Hyperlipidemia LDL goal <100      Essential hypertension      Other fatigue        CURRENT MEDICATIONS:  Current Outpatient Medications   Medication Sig Dispense Refill     acetylcysteine (N-ACETYL CYSTEINE) 600 MG CAPS capsule Take 600 mg by mouth 2 times daily       amoxicillin (AMOXIL) 500 MG capsule 2,000 mg once as needed (Before dental procedures)       ascorbic acid 1000 MG TABS tablet Take 500 mg by mouth       aspirin 81 MG EC tablet Take 81 mg by mouth daily       cilostazol (PLETAL) 100 MG tablet Take 1 tablet (100 mg) by mouth 2 times  daily 60 tablet 11     CINNAMON PO Take 2 capsules by mouth 2 times daily prn       cyanocobalamin (CYANOCOBALAMIN) 1000 MCG/ML injection Inject 2 mLs into the muscle every 30 days       DULoxetine (CYMBALTA) 60 MG capsule daily       estradiol (ESTRACE) 0.1 MG/GM vaginal cream Use pea sized amount and apply with finger to vaginal skin just inside opening once a day before bed as needed for vaginal dryness. 42.5 g 3     evolocumab (REPATHA SURECLICK) 140 MG/ML prefilled autoinjector Inject 1 mL (140 mg) Subcutaneous every 14 days 6 mL 3     fluconazole (DIFLUCAN) 150 MG tablet prn       levothyroxine (SYNTHROID/LEVOTHROID) 100 MCG tablet Take 100 mcg by mouth daily       losartan (COZAAR) 50 MG tablet Take 1 tablet (50 mg) by mouth daily 90 tablet 3     mirabegron (MYRBETRIQ) 50 MG 24 hr tablet Take 1 tablet (50 mg) by mouth daily Patient not taking daily (Patient taking differently: Take 50 mg by mouth daily as needed Patient not taking daily) 90 tablet 3     MOUNJARO 5 MG/0.5ML SOPN Inject 5 mg Subcutaneous once a week       nitroGLYcerin (NITROSTAT) 0.4 MG sublingual tablet Place 1 tablet (0.4 mg) under the tongue every 5 minutes as needed for chest pain For chest pain place 1 tablet under the tongue every 5 minutes for 3 doses. If symptoms persist 5 minutes after 1st dose call 911. 25 tablet 11     NOVOLOG FLEXPEN 100 UNIT/ML soln Inject 17 Units Subcutaneous 3 times daily as needed for high blood sugar       oxyCODONE (ROXICODONE) 5 MG tablet Take 1-2 tablets (5-10 mg) by mouth every 3 hours as needed for severe pain 30 tablet 0     traMADol (ULTRAM) 50 MG tablet Take 1 tablet (50 mg) by mouth every 6 hours as needed for moderate to severe pain 30 tablet 3     vitamin D3 (CHOLECALCIFEROL) 2000 units tablet Take 1 tablet by mouth 2 times daily       furosemide (LASIX) 20 MG tablet Take 1 tablet (20 mg) by mouth daily (Patient not taking: Reported on 1/11/2023) 90 tablet 3       ALLERGIES     Allergies   Allergen  Reactions     Jardiance [Empagliflozin] Other (See Comments)     Ongoing yeast infections     Hmg-Coa-R Inhibitors Muscle Pain (Myalgia)     Oral statins       Versed [Midazolam]      Pt is refusing to have this med d/t memory loss and forgetfulness        PAST MEDICAL HISTORY:  Past Medical History:   Diagnosis Date     Anemia      Anxiety      Arthritis      CAD (coronary artery disease)      Chronic bilateral low back pain without sciatica      Coronary artery disease 04/28/2016    cardiac cath 2/2019: patent stents; PTCA with MAYA x 2 to OM1 and MAYA x 1 to p.LAD (4/21/2016 at [place]); PTCA with intracoronary stent placement of RCA June 2004; MAYA to OM1 11/2016     Cystocele, midline 09/29/2010     Depression      Depression      DM type 2 (diabetes mellitus, type 2) (H)      HYPERPLASTIC  POLYP      colonoscopy in 5-10 yrs.     Hypertension      Hypothyroid      Hypothyroidism     hypothyroid- Dr Majano     Motion sickness      Mumps      OAB (overactive bladder)     complex voiding dysfct, failed interstim     Osteoarthritis      Other and unspecified hyperlipidemia      Palpitations      PONV (postoperative nausea and vomiting)      Postmenopausal bleeding      Shoulder blade pain 5/31/2016     Type II or unspecified type diabetes mellitus without mention of complication, not stated as uncontrolled     Dr. Majano     Unspecified essential hypertension      Urinary frequency 9/29/10    followed by urology. no benefit from detrol or sanctura. month trial of macrobid and flomax 10/10       PAST SURGICAL HISTORY:  Past Surgical History:   Procedure Laterality Date     ------------OTHER-------------      Interstim     Ablation       ARTHROPLASTY HIP Left 7/15/2020    Procedure: Left total hip arthroplasty;  Surgeon: Jayson Victoria MD;  Location: [facility] OR     BACK SURGERY  03/05/2020    spinal fusion     BACK SURGERY       C CT ANGIOGRAPHY CORONARY  1/2005    mod soft plaque LAD and Cx, follow up with left heart cath      CARDIAC SURGERY       CHOLECYSTECTOMY       CHOLECYSTECTOMY       COLONOSCOPY       CORONARY STENT PLACEMENT  2004     x 5 stents      CV CORONARY ANGIOGRAM N/A 4/29/2022    Procedure: Coronary Angiogram;  Surgeon: Sudarshan Lee MD;  Location:  HEART CARDIAC CATH LAB     CV FLUOROSCOPY ONLY N/A 8/31/2022    Procedure: Fluoroscopy Only;  Surgeon: Jun Hamlin MD;  Location: [facility] CARDIAC CATH LAB     CV HEART CATHETERIZATION WITH POSSIBLE INTERVENTION N/A 2/14/2019    Procedure: Coronary Angiogram;  Surgeon: Sudarshan Lee MD;  Location: [facility] CARDIAC CATH LAB; patent stents     CV LEFT HEART CATH N/A 4/29/2022    Procedure: Left Heart Catheterization;  Surgeon: Sudarshan Lee MD;  Location: [facility] CARDIAC CATH LAB     CV LEFT VENTRICULOGRAM N/A 4/29/2022    Procedure: Left Ventriculogram;  Surgeon: Sudarshan Lee MD;  Location: [facility] CARDIAC CATH LAB     CV LOWER EXTREMITY ANGIOGRAM BILATERAL Bilateral 6/27/2022    Procedure: Angiogram Lower Extremity Bilateral;  Surgeon: Jun Hamlin MD;  Location: [facility] CARDIAC CATH LAB     CV LOWER EXTREMITY ANGIOGRAM BILATERAL Right 9/23/2022    Procedure: Angiogram Lower Extremity Bilateral;  Surgeon: Jun Hamlin MD;  Location: [facility] CARDIAC CATH LAB     CV LOWER EXTREMITY ANGIOGRAM RIGHT Right 8/31/2022    Procedure: Angiogram Lower Extremity Right-Aborted;  Surgeon: Jun Hamlin MD;  Location: [facility] CARDIAC CATH LAB     CYSTOSCOPY       EXCISE LESION UPPER EXTREMITY  6/5/2013    Procedure: EXCISE LESION UPPER EXTREMITY;  EXCISION RIGHT ARM ANTICUBITAL LIPOMA ;  Surgeon: Jayson Joe MD;  Location: [facility] REMOVAL OF TONSILS,12+ Y/O       HEART CATH LEFT HEART CATH  3/2/2016    No intervention - aggressive medical management     HEART CATH LEFT HEART CATH  4/21/2016     MAYA x 2 to OM1, MAYA to LAD, at [place]     HEART CATH LEFT HEART CATH  6/29/2004    MAYA to RCA     HEART CATH LEFT HEART CATH   3/28/2002    Mild to moderate 3 vessel CAD. Medical management recommended.      HEART CATH LEFT HEART CATH  2016    MAYA to OM1     hypothyroid       JOINT REPLACEMENT       KNEE SURGERY  4/20/10    right knee replacement     LOWER ANGIOPLASTY RIGHT Right 2022    Procedure: Lower Extremity Angioplasty Right;  Surgeon: Jun Hamlin MD;  Location:  HEART CARDIAC CATH LAB     LUMBAR FUSION N/A 3/5/2020    Procedure: BILATERAL LUMBAR 2-3 AND LUMBAR 3-4 POSTERIOR SPINAL FUSION WITH LEFT LATERAL RECESS DECOMPRESSION LUMBAR 4-5 AND BILATERAL LAMINECTOMY AND DECOMPRESSION LUMBAR 2-3 AND LUMBAR 3-4 WITH ALLOGRAFT AND AUTOGRAFT AND VIVIGEN;  Surgeon: Navid Caicedo MD;  Location: [facility];  Service: Spine     LUMBAR FUSION Bilateral 2021    Procedure: REVISION OF NONUNION BILATERAL LUMBAR 2-3 AND LUMBAR 3-4 WITH REVISION OF INSTRUMENTATION RIGHT LUMBAR 2 AND LEFT LUMBAR 4 WITH LEFT LUMBAR 4-5 LAMINOFORAMINOTOMY AND LUMBAR 1-2 DECOMPRESSION WITH ALLOGRAFT AND BONE MARROW ASPIRATE;  Surgeon: Navid Caicedo MD;  Location: [facility] OR;  Service: Spine     ORTHOPEDIC SURGERY      total knee      REMOVE STIMULATOR SACRAL NERVE N/A 2015    Procedure: REMOVE STIMULATOR SACRAL NERVE;  Surgeon: Gertrudis Frausto MD;  Location:  [facility]     REPLACEMENT TOTAL KNEE Right 2010     SURGICAL HISTORY OF -       Uterine endometrial ablation     ZZC LIGATE FALLOPIAN TUBE      endometrial ablation       FAMILY HISTORY:  Family History   Problem Relation Age of Onset     C.A.D. Father         MI , age 83, had high lipids     Hyperlipidemia Father      Hypertension Father      Coronary Artery Disease Father      Hypertension Mother      Genitourinary Problems Mother         renal failure     Fractures Mother         hip     Osteoporosis Mother      Cerebrovascular Disease Maternal Grandfather         cerebral hemorrhage     Diabetes Maternal Uncle      Colon Cancer  Maternal Aunt      Diabetes Maternal Grandmother        SOCIAL HISTORY:  Social History     Socioeconomic History     Marital status:      Spouse name: Kwadwo     Number of children: 3     Years of education: None     Highest education level: None   Occupational History     Occupation: PT Aide     Employer: NONE      Employer: RETIRED   Tobacco Use     Smoking status: Never     Smokeless tobacco: Never   Substance and Sexual Activity     Alcohol use: No     Alcohol/week: 0.0 standard drinks     Drug use: No     Sexual activity: Never     Partners: Male     Birth control/protection: Post-menopausal   Other Topics Concern     Caffeine Concern Yes     Comment: 6-7 cups caffeine per day     Sleep Concern No     Stress Concern Yes     Weight Concern No     Special Diet No     Comment: eats healthy      Exercise Yes     Comment:  walking & active life style      Parent/sibling w/ CABG, MI or angioplasty before 65F 55M? No       Review of Systems:  Skin:  Positive for scaling itching could be due to dry skin.   Eyes:  Positive for glasses    ENT:  Negative      Respiratory:  Positive for cough had coughing for a week that couldve been a cold, and it seems to have gone away.   Cardiovascular:    fatigue;Positive for;edema fatigue is worse in the morning, she wakes up to urinate and she thinks she's not gettting enough REM sleep. Swelling has gotten better in her ankles - she said there is still blockage, swelling has gone down significantly in R leg  Gastroenterology: Positive for heartburn small issues from mounjaro that is managable.  Genitourinary:  Positive for urinary frequency;urgency overactive bladder, after having 3 kids.  Musculoskeletal:  Positive for arthritis;foot pain    Neurologic:  Negative      Psychiatric:  Positive for anxiety duloxitine helps  Heme/Lymph/Imm:  Negative      Endocrine:  Positive for diabetes      Physical Exam:  Vitals: /77 (BP Location: Left arm, Patient Position: Sitting)    "Pulse 74   Ht 1.727 m (5' 8\")   Wt 77.4 kg (170 lb 11.2 oz)   SpO2 98%   BMI 25.95 kg/m      Constitutional:           Skin:             Head:           Eyes:           Lymph:      ENT:           Neck:           Respiratory:            Cardiac:                                                           GI:           Extremities and Muscular Skeletal:                 Neurological:           Psych:           CC  Karen Alexander PA-C  [address]  [place] [address]                Service Date: 01/11/2023    HISTORY OF PRESENT ILLNESS:  I had the opportunity to see Ms. Rayna Longo in Cardiology Clinic today at [facility] in [place] for reevaluation of coronary artery disease and peripheral vascular disease.    Ms. Longo is a 77-year-old woman with hypertension, dyslipidemia and type 2 diabetes.  She has a history of coronary stenting going back to 2004 and has had recurrent symptoms of angina with subsequent stenting of the circumflex and LAD.    More recently, she was experiencing a lot of leg pains and her peripheral arterial studies were abnormal, suggesting significant disease.  She was referred to the Interventional Vascular Cardiology team and underwent lithotripsy and angioplasty with a drug-coated balloon involving the left superficial femoral artery and left popliteal artery on 06/27/2022.  She had significant improvement in her left lower extremity symptoms after that procedure.      She then had a similar procedure scheduled for 08/31/2022 on the right leg, but access could not be obtained and it was delayed.  She eventually underwent that procedure 09/23/2022, but did not have immediately beneficial results.  She continued to have a lot of discomfort in her right leg after that procedure despite the fact that I reassured her that the arteries that were treated were opened satisfactorily.  She did meet with Vascular Surgery for a second opinion, Dr. Saba, who " reassured her that the efforts made to provide her with lower extremity revascularization were quite heroic.    Since then, she has had some improvement.  She has had less swelling of her right leg and some of the superficial skin erosions that she had there have healed.  She continues to have some pain in her calves when she walks as well as some pain in her right hip.  I suspect that some of the pains that she has had in her legs have been related to arthritis issues, which makes understanding her discomfort more challenging.    Fortunately, she is not having any concerning cardiac symptoms at this time.  She has none of the chest or upper back pain that she had when she had artery blockages in her heart.    PHYSICAL EXAMINATION:  On examination here today, her blood pressure is 134/77, heart rate 74 and weight 170 pounds.  She has lost about 29 pounds after starting Mounjaro injections for her diabetes.  This has similar weight loss and benefits to semaglutide.  Her lungs are clear.  Heart rhythm is regular.  She has no cardiac murmurs.  She has no lower extremity edema.  She has palpable left posterior tibial pulses, but I cannot feel a dorsalis pedis pulse on either foot or posterior tibial on her right.  Nevertheless, her skin is warm and dry and pink.    I reviewed her last cholesterol numbers from a year ago and they were excellent.  Her basic metabolic panel most recently was normal and her hemoglobin and platelets were normal as well.    IMPRESSIONS:  Ms. Rayna Longo is a 77-year-old woman with a long history of coronary artery disease and multiple revascularization procedures on her heart.  She has had problems with peripheral arterial disease as well and last summer underwent procedures to provide better circulation to her distal lower extremities as described above.  She continues to have some discomfort in her legs, which may be due to small vessel peripheral arterial disease.  I suggested that  additional revascularization procedures are not likely to be beneficial.  Instead, I suggested she could try cilostazol 100 mg p.o. b.i.d. and see if that helps.  She will try it for a period of 3 months unless she is having side effects and then decide about the benefit and ongoing therapy.    Otherwise, I will not make any other medication changes and plan to see her back again in 1 year unless she has further problems.    Gunnar Lea MD, F.A.C.C.    cc:    Gaye Zimmer MD   [facility]  [address] [address]     Gunnar Lea MD, [facility]        D: 2023   T: 2023   MT: KENDALL    Name:     KATHY PERRY  MRN:      [id number]        Account:      [id number]   :      1945           Service Date: 2023       Document: [id number]      Thank you for allowing me to participate in the care of your patient.      Sincerely,     GUNNAR LEA MD     [facility]  cc:   Karen Alexander PA-C  [address]  [place] [address]

## 2023-01-12 NOTE — PROGRESS NOTES
Service Date: 01/11/2023    HISTORY OF PRESENT ILLNESS:  I had the opportunity to see Ms. Rayna Longo in Cardiology Clinic today at [facility] in [place] for reevaluation of coronary artery disease and peripheral vascular disease.    Ms. Longo is a 77-year-old woman with hypertension, dyslipidemia and type 2 diabetes.  She has a history of coronary stenting going back to 2004 and has had recurrent symptoms of angina with subsequent stenting of the circumflex and LAD.    More recently, she was experiencing a lot of leg pains and her peripheral arterial studies were abnormal, suggesting significant disease.  She was referred to the Interventional Vascular Cardiology team and underwent lithotripsy and angioplasty with a drug-coated balloon involving the left superficial femoral artery and left popliteal artery on 06/27/2022.  She had significant improvement in her left lower extremity symptoms after that procedure.      She then had a similar procedure scheduled for 08/31/2022 on the right leg, but access could not be obtained and it was delayed.  She eventually underwent that procedure 09/23/2022, but did not have immediately beneficial results.  She continued to have a lot of discomfort in her right leg after that procedure despite the fact that I reassured her that the arteries that were treated were opened satisfactorily.  She did meet with Vascular Surgery for a second opinion, Dr. Saba, who reassured her that the efforts made to provide her with lower extremity revascularization were quite heroic.    Since then, she has had some improvement.  She has had less swelling of her right leg and some of the superficial skin erosions that she had there have healed.  She continues to have some pain in her calves when she walks as well as some pain in her right hip.  I suspect that some of the pains that she has had in her legs have been related to arthritis issues, which makes understanding her  discomfort more challenging.    Fortunately, she is not having any concerning cardiac symptoms at this time.  She has none of the chest or upper back pain that she had when she had artery blockages in her heart.    PHYSICAL EXAMINATION:  On examination here today, her blood pressure is 134/77, heart rate 74 and weight 170 pounds.  She has lost about 29 pounds after starting Mounjaro injections for her diabetes.  This has similar weight loss and benefits to semaglutide.  Her lungs are clear.  Heart rhythm is regular.  She has no cardiac murmurs.  She has no lower extremity edema.  She has palpable left posterior tibial pulses, but I cannot feel a dorsalis pedis pulse on either foot or posterior tibial on her right.  Nevertheless, her skin is warm and dry and pink.    I reviewed her last cholesterol numbers from a year ago and they were excellent.  Her basic metabolic panel most recently was normal and her hemoglobin and platelets were normal as well.    IMPRESSIONS:  Ms. Rayna Longo is a 77-year-old woman with a long history of coronary artery disease and multiple revascularization procedures on her heart.  She has had problems with peripheral arterial disease as well and last summer underwent procedures to provide better circulation to her distal lower extremities as described above.  She continues to have some discomfort in her legs, which may be due to small vessel peripheral arterial disease.  I suggested that additional revascularization procedures are not likely to be beneficial.  Instead, I suggested she could try cilostazol 100 mg p.o. b.i.d. and see if that helps.  She will try it for a period of 3 months unless she is having side effects and then decide about the benefit and ongoing therapy.    Otherwise, I will not make any other medication changes and plan to see her back again in 1 year unless she has further problems.    Feroz Burgos MD, F.A.C.C.    cc:    aGye Zmimer MD   [facility]  [address] [place] [address]     Feroz Burgos MD, [facility]        D: 2023   T: 2023   MT: KENDALL    Name:     KATHY PERRY  MRN:      [id number]        Account:      [id number]   :      1945           Service Date: 2023       Document: [id number]

## 2023-02-20 ENCOUNTER — APPOINTMENT (OUTPATIENT)
Dept: URBAN - METROPOLITAN AREA CLINIC 256 | Age: 78
Setting detail: DERMATOLOGY
End: 2023-02-21

## 2023-02-20 DIAGNOSIS — L66.1 LICHEN PLANOPILARIS: ICD-10-CM

## 2023-02-20 PROCEDURE — OTHER COUNSELING: OTHER

## 2023-02-20 PROCEDURE — OTHER MEDICATION COUNSELING: OTHER

## 2023-02-20 PROCEDURE — OTHER PRESCRIPTION: OTHER

## 2023-02-20 PROCEDURE — 99213 OFFICE O/P EST LOW 20 MIN: CPT

## 2023-02-20 PROCEDURE — OTHER PRESCRIPTION MEDICATION MANAGEMENT: OTHER

## 2023-02-20 PROCEDURE — OTHER MIPS QUALITY: OTHER

## 2023-02-20 RX ORDER — BETAMETHASONE DIPROPIONATE 0.5 MG/ML
APPLY THIN COAT LOTION TOPICAL BID
Qty: 60 | Refills: 1 | Status: ERX | COMMUNITY
Start: 2023-02-20

## 2023-02-20 NOTE — PROCEDURE: PRESCRIPTION MEDICATION MANAGEMENT
Initiate Treatment: Diprolene - Apply thin coat to scalp twice daily for one month.
Plan: Start Diprolene for one month. Return to clinic in one month to reevaluate scalp condition.
Detail Level: Zone
Render In Strict Bullet Format?: No

## 2023-02-20 NOTE — PROCEDURE: MEDICATION COUNSELING
Home Topical Retinoid counseling:  Patient advised to apply a pea-sized amount only at bedtime and wait 30 minutes after washing their face before applying.  If too drying, patient may add a non-comedogenic moisturizer. The patient verbalized understanding of the proper use and possible adverse effects of retinoids.  All of the patient's questions and concerns were addressed.

## 2023-02-20 NOTE — PROCEDURE: COUNSELING
Patient Specific Counseling (Will Not Stick From Patient To Patient): The scalp is not entirely classic for LPP but could be starting with little inflammation except for itching. We will monitor carefully
Detail Level: Detailed

## 2023-03-20 ENCOUNTER — APPOINTMENT (OUTPATIENT)
Dept: URBAN - METROPOLITAN AREA CLINIC 256 | Age: 78
Setting detail: DERMATOLOGY
End: 2023-03-20

## 2023-03-20 DIAGNOSIS — L66.1 LICHEN PLANOPILARIS: ICD-10-CM

## 2023-03-20 DIAGNOSIS — D485 NEOPLASM OF UNCERTAIN BEHAVIOR OF SKIN: ICD-10-CM

## 2023-03-20 PROBLEM — D48.5 NEOPLASM OF UNCERTAIN BEHAVIOR OF SKIN: Status: ACTIVE | Noted: 2023-03-20

## 2023-03-20 PROCEDURE — OTHER MIPS QUALITY: OTHER

## 2023-03-20 PROCEDURE — OTHER BIOPSY BY PUNCH METHOD: OTHER

## 2023-03-20 PROCEDURE — OTHER COUNSELING: OTHER

## 2023-03-20 PROCEDURE — 99213 OFFICE O/P EST LOW 20 MIN: CPT | Mod: 25

## 2023-03-20 PROCEDURE — 11104 PUNCH BX SKIN SINGLE LESION: CPT

## 2023-03-20 ASSESSMENT — LOCATION DETAILED DESCRIPTION DERM: LOCATION DETAILED: POSTERIOR MID-PARIETAL SCALP

## 2023-03-20 ASSESSMENT — LOCATION ZONE DERM: LOCATION ZONE: SCALP

## 2023-03-20 ASSESSMENT — LOCATION SIMPLE DESCRIPTION DERM: LOCATION SIMPLE: POSTERIOR SCALP

## 2023-03-20 NOTE — PROCEDURE: COUNSELING
Detail Level: Detailed
Patient Specific Counseling (Will Not Stick From Patient To Patient): - Discussed in detail the process for applying Betamethasone lotion to scalp.  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  Further treatment options were discussed with the patient such as hydroxycholoroquine but were deferred until more information is obtained from the biopsy\\n- Pt was given a suture post care sheet.

## 2023-03-20 NOTE — PROCEDURE: BIOPSY BY PUNCH METHOD
Biopsy Type: H and E
Size Of Lesion In Cm (Optional): 0
Bill For Surgical Tray: no
Anesthesia Type: 1% lidocaine with epinephrine
Dressing: no dressing applied
Epidermal Sutures: 5-0 Ethilon
Detail Level: Detailed
Anesthesia Volume In Cc (Will Not Render If 0): 0.5
Notification Instructions: Patient will be notified of biopsy results. However, patient instructed to call the office if not contacted within 2 weeks.
Home Suture Removal Text: Patient was provided a home suture removal kit and will remove their sutures at home.  If they have any questions or difficulties they will call the office.
Consent: Written consent was obtained and risks were reviewed including but not limited to scarring, infection, bleeding, scabbing, incomplete removal, nerve damage and allergy to anesthesia.
Post-Care Instructions: I reviewed with the patient in detail post-care instructions. Patient is to keep the biopsy site dry overnight, and then apply bacitracin twice daily until healed. Patient may apply hydrogen peroxide soaks to remove any crusting.
Validate Note Data (See Information Below): Yes
Suture Removal: 14 days
Hemostasis: None
Punch Size In Mm: 3
Billing Type: Third-Party Bill
Information: Selecting Yes will display possible errors in your note based on the variables you have selected. This validation is only offered as a suggestion for you. PLEASE NOTE THAT THE VALIDATION TEXT WILL BE REMOVED WHEN YOU FINALIZE YOUR NOTE. IF YOU WANT TO FAX A PRELIMINARY NOTE YOU WILL NEED TO TOGGLE THIS TO 'NO' IF YOU DO NOT WANT IT IN YOUR FAXED NOTE.
Wound Care: Petrolatum
Depth Of Punch Biopsy: dermis

## 2023-03-20 NOTE — PROCEDURE: MIPS QUALITY
Quality 110: Preventive Care And Screening: Influenza Immunization: Influenza Immunization Administered during Influenza season
Detail Level: Detailed
Alert and oriented to person, place and time

## 2023-04-03 ENCOUNTER — APPOINTMENT (OUTPATIENT)
Dept: URBAN - METROPOLITAN AREA CLINIC 256 | Age: 78
Setting detail: DERMATOLOGY
End: 2023-04-04

## 2023-04-03 ENCOUNTER — APPOINTMENT (OUTPATIENT)
Dept: URBAN - METROPOLITAN AREA CLINIC 256 | Age: 78
Setting detail: DERMATOLOGY
End: 2023-04-03

## 2023-04-03 VITALS — WEIGHT: 165 LBS | HEIGHT: 68 IN

## 2023-04-03 DIAGNOSIS — Z48.02 ENCOUNTER FOR REMOVAL OF SUTURES: ICD-10-CM

## 2023-04-03 DIAGNOSIS — L66.1 LICHEN PLANOPILARIS: ICD-10-CM

## 2023-04-03 PROCEDURE — OTHER COUNSELING: OTHER

## 2023-04-03 PROCEDURE — OTHER ADDITIONAL NOTES: OTHER

## 2023-04-03 PROCEDURE — OTHER SUTURE REMOVAL (GLOBAL PERIOD): OTHER

## 2023-04-03 PROCEDURE — 99214 OFFICE O/P EST MOD 30 MIN: CPT

## 2023-04-03 PROCEDURE — OTHER MIPS QUALITY: OTHER

## 2023-04-03 PROCEDURE — 99024 POSTOP FOLLOW-UP VISIT: CPT

## 2023-04-03 PROCEDURE — OTHER ORDER TESTS: OTHER

## 2023-04-03 ASSESSMENT — LOCATION SIMPLE DESCRIPTION DERM: LOCATION SIMPLE: SCALP

## 2023-04-03 ASSESSMENT — LOCATION ZONE DERM: LOCATION ZONE: SCALP

## 2023-04-03 ASSESSMENT — LOCATION DETAILED DESCRIPTION DERM: LOCATION DETAILED: RIGHT SUPERIOR PARIETAL SCALP

## 2023-04-03 NOTE — PROCEDURE: COUNSELING
Patient Specific Counseling (Will Not Stick From Patient To Patient): - Discussed all testing that is required for using plaquenil and that this drug has certain side effects which we discussed and that it can take some months to start working and that it WILL NOT CURE this problem--the hope is that it keeps it from progressing\\n- Recommend topicals would be helpful to continue for now\\n- Discussed getting Minoxidil OTC from [employer] \\n- Recommend patient see her ophthalmologist before starting hydroxychloroquine treatment \\n- Discussed patient to still use topical treatment for now\\nWe will send the prescription for the plaquenil once the labs are back and that her ophthalmology exam is scheduled Patient Specific Counseling (Will Not Stick From Patient To Patient): - Discussed all testing that is required for using plaquenil and that this drug has certain side effects which we discussed and that it can take some months to start working and that it WILL NOT CURE this problem--the hope is that it keeps it from progressing\\n- Recommend topicals would be helpful to continue for now\\n- Discussed getting Minoxidil OTC from [facility] \\n- Recommend patient see her ophthalmologist before starting hydroxychloroquine treatment \\n- Discussed patient to still use topical treatment for now\\nWe will send the prescription for the plaquenil once the labs are back and that her ophthalmology exam is scheduled

## 2023-04-03 NOTE — PROCEDURE: SUTURE REMOVAL (GLOBAL PERIOD)
Detail Level: Detailed
Add 40118 Cpt? (Important Note: In 2017 The Use Of 56583 Is Being Tracked By Cms To Determine Future Global Period Reimbursement For Global Periods): yes

## 2023-04-10 ENCOUNTER — RX ONLY (RX ONLY)
Age: 78
End: 2023-04-10

## 2023-04-10 RX ORDER — HYDROXYCHLOROQUINE SULFATE 200 MG/1
200MG TABLET ORAL BID
Qty: 60 | Refills: 0 | Status: ERX | COMMUNITY
Start: 2023-04-10

## 2023-04-12 ENCOUNTER — CARE COORDINATION (OUTPATIENT)
Dept: CARDIOLOGY | Facility: CLINIC | Age: 78
End: 2023-04-12
Payer: COMMERCIAL

## 2023-04-12 NOTE — PROGRESS NOTES
Returned pt VM left regarding a question she has for Dr. Burgos on a new medication.    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 She said it started with an itchy scalp followed by massive hair loss. Recommended treatment is hydroxychloroquine and minoxidil, however she hasn't started either yet. As part of baseline testing she had labs drawn and saw an Ophthalmologist.     Rayna also reports she saw Endocrinology today. /60 today. Was started on Mounjaro a few months ago and states she has had success with that and now weighs 161#. She is very happy with this medication and is feeling a little extra energy now.    Asked for an update on her leg claudication and how cilostazol is going since that was started at last her visit w/Dr. Burgos 1/11/23. Rayna reports the claudication is still awful and her back is still painful.  She hasn't started the cilostazol yet. She said she started to look into after her last OV w/Dr. Burgos but was a little hesitant.     Rayna states she will try cilostazol but first would like Dr. Burgos to confirm it's ok to start hydroxychloriquine and minoxidil and that neither of those drugs, or cilostazol, have any interactions with her current meds. Cherelle Gaston RN on 4/12/2023 at 3:30 PM

## 2023-04-13 NOTE — TELEPHONE ENCOUNTER
I would recommend that she hold off on starting the cilostazil now and focus on starting the hydroxychloroquine and minoxidil.  The minoxidil is a potent blood pressure lowering medication, but usually used in low doses for hair loss.  The blood pressure lowering effect, in combination with her other medications, could make her blood pressure low and cause dizziness.  If this occurs, we can look into lowering some of the doses of her other medications in order to prevent low blood pressure issues.  I do not expect any adverse effects from her hydroxychloroquine. EE

## 2023-04-17 NOTE — PROGRESS NOTES
Call out to pt, no answer at home or cell phone. Left VM on home phone stated I would send a [employer] message since we were unable to connect via phone, or she can call me back to review Dr. Burgos's recommendations. Cherelle Gaston RN on 4/17/2023 at 12:14 PM        Call Documentation    Feroz Burgos MD at 4/13/2023  5:39 PM    Status: Signed   I would recommend that she hold off on starting the cilostazil now and focus on starting the hydroxychloroquine and minoxidil.  The minoxidil is a potent blood pressure lowering medication, but usually used in low doses for hair loss.  The blood pressure lowering effect, in combination with her other medications, could make her blood pressure low and cause dizziness.  If this occurs, we can look into lowering some of the doses of her other medications in order to prevent low blood pressure issues.  I do not expect any adverse effects from her hydroxychloroquine. EE

## 2023-04-18 NOTE — PROGRESS NOTES
Pt called back and left VM stating the minoxidil she was prescribed for hair loss r/t new dx of Lichen Planopilaris is a topical rx not oral. She still wonders if she needs to worry about hypotension with her other cardiac meds and if she should discontinue losartan prophylactically.     Call out to pt. Reviewed that she's less likely to develop profound hypotension w/topical minoxidil vs oral. Dr. Burgos didn't think she needed to stop any meds right away. She can monitor for low BP issues and then update Dr. Burgos to consider reduction in doses if necessary, but this issue should be less likely to occur w/a topical vs oral minoxidil. Pt in agreement with plan. Cherelle Gaston RN on 4/18/2023 at 3:09 PM

## 2023-05-22 ENCOUNTER — APPOINTMENT (OUTPATIENT)
Dept: URBAN - METROPOLITAN AREA CLINIC 256 | Age: 78
Setting detail: DERMATOLOGY
End: 2023-05-22

## 2023-05-22 DIAGNOSIS — L66.1 LICHEN PLANOPILARIS: ICD-10-CM

## 2023-05-22 PROCEDURE — OTHER ADDITIONAL NOTES: OTHER

## 2023-05-22 PROCEDURE — 99213 OFFICE O/P EST LOW 20 MIN: CPT

## 2023-05-22 PROCEDURE — OTHER PATIENT SPECIFIC COUNSELING: OTHER

## 2023-05-22 PROCEDURE — OTHER PRESCRIPTION: OTHER

## 2023-05-22 PROCEDURE — OTHER PRESCRIPTION MEDICATION MANAGEMENT: OTHER

## 2023-05-22 PROCEDURE — OTHER COUNSELING: OTHER

## 2023-05-22 PROCEDURE — OTHER MIPS QUALITY: OTHER

## 2023-05-22 RX ORDER — HYDROXYCHLOROQUINE SULFATE 200 MG/1
TABLET ORAL
Qty: 60 | Refills: 0 | Status: ERX | COMMUNITY
Start: 2023-05-22

## 2023-05-22 ASSESSMENT — LOCATION DETAILED DESCRIPTION DERM: LOCATION DETAILED: MID-FRONTAL SCALP

## 2023-05-22 ASSESSMENT — LOCATION ZONE DERM: LOCATION ZONE: SCALP

## 2023-05-22 ASSESSMENT — LOCATION SIMPLE DESCRIPTION DERM: LOCATION SIMPLE: ANTERIOR SCALP

## 2023-05-22 NOTE — PROCEDURE: PATIENT SPECIFIC COUNSELING
Discussed all testing that is required for using plaquenil and that this drug has certain side effects. Discussed with patient that the medication works gradually and it will take about 6 months to work. Patient is aware that blood tests are needed every month for the first three months for monitoring in order to obtain the prescription. Patient requested to have her blood work drawn here in clinic, but due to patient’s request of a certain ventipuncture site we are not able to fulfill her request. Patient was sent to [facility]. Patient needs two more labs, with a follow up lab next month.
Detail Level: Zone

## 2023-05-22 NOTE — PROCEDURE: ADDITIONAL NOTES
Render Risk Assessment In Note?: no
Detail Level: Simple
Additional Notes: Sent patient to [facility] to get blood work done (CMP & CBC). Patient will need to get blood work done again (same tests) in one month and the month after that. Patient needs to call us to order it to [facility] for her.

## 2023-05-22 NOTE — PROCEDURE: MIPS QUALITY
Where Is Your Acne Located?: Face
Quality 431: Preventive Care And Screening: Unhealthy Alcohol Use - Screening: Patient not identified as an unhealthy alcohol user when screened for unhealthy alcohol use using a systematic screening method
Detail Level: Detailed
Quality 111:Pneumonia Vaccination Status For Older Adults: Pneumococcal vaccine (PPSV23) administered on or after patient’s 60th birthday and before the end of the measurement period
Quality 130: Documentation Of Current Medications In The Medical Record: Current Medications Documented
Quality 110: Preventive Care And Screening: Influenza Immunization: Influenza Immunization Administered during Influenza season
Quality 226: Preventive Care And Screening: Tobacco Use: Screening And Cessation Intervention: Patient screened for tobacco use and is an ex/non-smoker

## 2023-06-13 DIAGNOSIS — I10 ESSENTIAL HYPERTENSION: ICD-10-CM

## 2023-06-13 RX ORDER — LOSARTAN POTASSIUM 50 MG/1
50 TABLET ORAL DAILY
Qty: 90 TABLET | Refills: 2 | Status: SHIPPED | OUTPATIENT
Start: 2023-06-13 | End: 2024-01-10

## 2023-06-15 ENCOUNTER — TELEPHONE (OUTPATIENT)
Dept: CARDIOLOGY | Facility: CLINIC | Age: 78
End: 2023-06-15
Payer: COMMERCIAL

## 2023-06-15 NOTE — TELEPHONE ENCOUNTER
Pt called wondering if she needs to reapply for the [email or web address] albert? States this is how she receives her Repatha and doesn't want to lose coverage so would like to know if she needs to reapply? Routed to [facility] for review.Cherelle Gaston RN on 6/15/2023 at 10:13 AM

## 2023-06-15 NOTE — TELEPHONE ENCOUNTER
Nerissa Knutson  [facility] Heart Team 7 1 hour ago (1:55 PM)     LS  Hello!     Unfortunately, the [employer] albert is closed at this time. She would have to enroll in Repath's free drug program through [employer].     Thank you,     Nerissa Knutson [facility]   Specialty Pharmacy Clinic Liaison - CardiologyNeurologyMultiple Sclerosis   [facility]   [address] [place] [address]   Ph: [phone] Fax: [phone]   [email or web address]      Discussed with patient that message above.  Patient provided phone number for patient assistance through [employer] and also information on how to contact the [employer] in the future to see if albert money is available.  Monalisa Carrillo RN on 6/15/2023 at 3:21 PM

## 2023-06-15 NOTE — TELEPHONE ENCOUNTER
"Returned pt call.  is not set up on mobile phone which is the number she requested a call back at. Called out to home phone and reached pt.    She states she has \"tremendous amount of pain\" in her legs and her activity is limited because of this. She said the pain is in her calves and she knows it is r/t PAD. She said she does have back pain, too, which she understands is a separate issue. She is most bothered by her BLE pain. She states she has lost a lot of weight and was hopeful that would help but it has not reduced her pain. By now she said she expected some improvement and would like Dr. Burgos to review whether there are any other options; will a peripheral angiogram ever need to be repeated? She said she sought a second opinion from Dr. Saba (see 11/3/22 note) and neither he nor Dr. Hamlin believe her pain is d/t PAD so she is unsure what to do about this debilitating pain.     Pt also wanted to know if she needs to reapply for the albert for her Repatha. She said her primary insurance is [employer] part D, secondary coverage through the [employer] albert. Will message Nerissa to see whether pt needs to reapply for this albert this year. Cherelle Gaston RN on 6/15/2023 at 10:06 AM    "

## 2023-06-25 NOTE — TELEPHONE ENCOUNTER
I am sorry that she continues to have severe bilateral lower extremity pain.  I am not sure whether this is related to arterial disease.  I believe she underwent bilateral lower extremity percutaneous revascularization of severe disease involving the distal femoral to popliteal arteries by Dr. Hamlin in 2022.  I think it would be reasonable to refer this question to him and have him decide on the best approach for reevaluation.  He may recommend some additional noninvasive evaluation first or consider lower extremity angiography again.  I would defer to his opinion.  Feroz Burgos MD

## 2023-06-26 NOTE — TELEPHONE ENCOUNTER
Informed pt about Dr. Burgos's recommendations to see Dr. Hamlin again to discuss PAD issues further.   Pt does not want to see Dr. Hamlin again and would prefer to see Dr. Saba going forward.   Provided pt with the dept phone number in  where pt last saw Dr. Saba.   Pt gave verbal understanding.

## 2023-06-29 ENCOUNTER — RX ONLY (RX ONLY)
Age: 78
End: 2023-06-29

## 2023-06-29 RX ORDER — HYDROXYCHLOROQUINE SULFATE 200 MG/1
200MG TABLET ORAL BID
Qty: 60 | Refills: 0 | Status: ERX | COMMUNITY
Start: 2023-06-29

## 2023-07-19 ENCOUNTER — APPOINTMENT (OUTPATIENT)
Dept: URBAN - METROPOLITAN AREA CLINIC 256 | Age: 78
Setting detail: DERMATOLOGY
End: 2023-07-19

## 2023-07-19 VITALS — WEIGHT: 150 LBS | HEIGHT: 68 IN

## 2023-07-19 VITALS — HEIGHT: 68 IN | WEIGHT: 150 LBS

## 2023-07-19 DIAGNOSIS — L82.0 INFLAMED SEBORRHEIC KERATOSIS: ICD-10-CM

## 2023-07-19 DIAGNOSIS — L66.1 LICHEN PLANOPILARIS: ICD-10-CM

## 2023-07-19 PROCEDURE — OTHER LIQUID NITROGEN: OTHER

## 2023-07-19 PROCEDURE — OTHER PRESCRIPTION: OTHER

## 2023-07-19 PROCEDURE — OTHER ADDITIONAL NOTES: OTHER

## 2023-07-19 PROCEDURE — OTHER COUNSELING: OTHER

## 2023-07-19 PROCEDURE — OTHER PATIENT SPECIFIC COUNSELING: OTHER

## 2023-07-19 PROCEDURE — 17110 DESTRUCT B9 LESION 1-14: CPT

## 2023-07-19 PROCEDURE — 99213 OFFICE O/P EST LOW 20 MIN: CPT | Mod: 25

## 2023-07-19 RX ORDER — HYDROXYCHLOROQUINE SULFATE 200 MG/1
TABLET ORAL
Qty: 60 | Refills: 3 | Status: ERX

## 2023-07-19 ASSESSMENT — LOCATION ZONE DERM
LOCATION ZONE: LEG
LOCATION ZONE: SCALP

## 2023-07-19 ASSESSMENT — LOCATION DETAILED DESCRIPTION DERM
LOCATION DETAILED: RIGHT DISTAL PRETIBIAL REGION
LOCATION DETAILED: MID-FRONTAL SCALP

## 2023-07-19 ASSESSMENT — LOCATION SIMPLE DESCRIPTION DERM
LOCATION SIMPLE: RIGHT PRETIBIAL REGION
LOCATION SIMPLE: ANTERIOR SCALP

## 2023-07-19 NOTE — PROCEDURE: ADDITIONAL NOTES
Additional Notes: Advised patient that labs are not necessary for another 3 months
Detail Level: Simple
Render Risk Assessment In Note?: no

## 2023-07-19 NOTE — HPI: RASH
What Type Of Note Output Would You Prefer (Optional)?: Standard Output
Is This A New Presentation, Or A Follow-Up?: Follow Up Rash
Additional History: Hydroxychloroquine \\n\\nDr. Meloma \\n\\nRe-examine scalp for improvement

## 2023-07-19 NOTE — PROCEDURE: PATIENT SPECIFIC COUNSELING
- discussed patients concern with having to be on a long-term medication for he condition \\n- discussed that it is hard to do a full assessment of hair growth as hair counts are required \\n- advised patient that new hair follicles are present at todays visit \\n- advised that plaquenil takes about 6 months to work, so we are very surprised with the positive result today
Detail Level: Zone

## 2023-08-04 ENCOUNTER — APPOINTMENT (OUTPATIENT)
Dept: URBAN - METROPOLITAN AREA CLINIC 256 | Age: 78
Setting detail: DERMATOLOGY
End: 2023-08-05

## 2023-08-04 PROBLEM — L89.151 PRESSURE ULCER OF SACRAL REGION, STAGE 1: Status: ACTIVE | Noted: 2023-08-04

## 2023-08-04 PROCEDURE — OTHER MIPS QUALITY: OTHER

## 2023-08-04 PROCEDURE — OTHER COUNSELING: OTHER

## 2023-08-04 PROCEDURE — OTHER PATIENT SPECIFIC COUNSELING: OTHER

## 2023-08-04 PROCEDURE — 99214 OFFICE O/P EST MOD 30 MIN: CPT

## 2023-08-04 PROCEDURE — OTHER PRESCRIPTION: OTHER

## 2023-08-04 PROCEDURE — OTHER PHOTO-DOCUMENTATION: OTHER

## 2023-08-04 PROCEDURE — OTHER MEDICATION COUNSELING: OTHER

## 2023-08-04 PROCEDURE — OTHER PRESCRIPTION MEDICATION MANAGEMENT: OTHER

## 2023-08-04 RX ORDER — TRIAMCINOLONE ACETONIDE 1 MG/G
OINTMENT TOPICAL
Qty: 80 | Refills: 0 | Status: ERX | COMMUNITY
Start: 2023-08-04

## 2023-08-04 NOTE — PROCEDURE: PATIENT SPECIFIC COUNSELING
- advised patient that this is a pressure-caused problem\\n- discussed that the patient's muscle mass is decreasing in her buttock which is indicative of sitting too often or for too long\\n- advised patient that she needs to change her sitting situation before her current lesion potentially turns into a pressure ulcer \\n- discussed that she needs to stop putting pressure on her bottom and that she should purchase a seat cushion/\"donut\"/gel seat pad to alleviate pressure when sitting\\n- advised patient to use a donut pad even when traveling \\n- discussed that the left side that is scaling has the potential to also develop into an ulcer\\n\\n- discussed that her skin breakdown can resolve but that it can just as easily return if she does not change her seating habits or the amount of pressure she applies to her bottom \\n\\n- discussed that she needs to treat the inflammation of the area before any further steps\\n- advised that after the two weeks of applying the Triamcinolone cream we will likely switch her to an A&D barrier cream \\n- advised patient not to cover the area while applying the triamcinolone cream
Detail Level: Zone

## 2023-08-04 NOTE — PROCEDURE: PRESCRIPTION MEDICATION MANAGEMENT
Initiate Treatment: Triamcinolone Acetonide 0.1% topical ointment (apply to affected area twice daily for two weeks)
Plan: Follow up in two weeks
Detail Level: Zone
Render In Strict Bullet Format?: No

## 2023-08-18 ENCOUNTER — APPOINTMENT (OUTPATIENT)
Dept: URBAN - METROPOLITAN AREA CLINIC 256 | Age: 78
Setting detail: DERMATOLOGY
End: 2023-08-24

## 2023-08-18 DIAGNOSIS — L66.1 LICHEN PLANOPILARIS: ICD-10-CM

## 2023-08-18 DIAGNOSIS — L82.0 INFLAMED SEBORRHEIC KERATOSIS: ICD-10-CM

## 2023-08-18 PROBLEM — L89.151 PRESSURE ULCER OF SACRAL REGION, STAGE 1: Status: ACTIVE | Noted: 2023-08-18

## 2023-08-18 PROCEDURE — OTHER PRESCRIPTION MEDICATION MANAGEMENT: OTHER

## 2023-08-18 PROCEDURE — 99214 OFFICE O/P EST MOD 30 MIN: CPT

## 2023-08-18 PROCEDURE — OTHER ADDITIONAL NOTES: OTHER

## 2023-08-18 PROCEDURE — OTHER MIPS QUALITY: OTHER

## 2023-08-18 PROCEDURE — OTHER PRESCRIPTION: OTHER

## 2023-08-18 PROCEDURE — OTHER COUNSELING: OTHER

## 2023-08-18 RX ORDER — FLUOCINONIDE 0.5 MG/ML
SOLUTION TOPICAL
Qty: 60 | Refills: 2 | Status: ERX

## 2023-08-18 RX ORDER — FLUOCINONIDE 0.5 MG/ML
SOLUTION TOPICAL
Qty: 60 | Refills: 2 | Status: ERX | COMMUNITY
Start: 2023-08-18

## 2023-08-18 ASSESSMENT — SEVERITY ASSESSMENT: SEVERITY: MILD

## 2023-08-18 ASSESSMENT — LOCATION DETAILED DESCRIPTION DERM: LOCATION DETAILED: MID-FRONTAL SCALP

## 2023-08-18 ASSESSMENT — PAIN INTENSITY VAS: HOW INTENSE IS YOUR PAIN 0 BEING NO PAIN, 10 BEING THE MOST SEVERE PAIN POSSIBLE?: NO PAIN

## 2023-08-18 ASSESSMENT — LOCATION ZONE DERM: LOCATION ZONE: SCALP

## 2023-08-18 ASSESSMENT — LOCATION SIMPLE DESCRIPTION DERM: LOCATION SIMPLE: ANTERIOR SCALP

## 2023-08-18 NOTE — PROCEDURE: ADDITIONAL NOTES
Render Risk Assessment In Note?: no
Additional Notes: Checking the area previously treated with LN2 for healing and no recurrence.
Detail Level: Simple

## 2023-08-18 NOTE — PROCEDURE: PRESCRIPTION MEDICATION MANAGEMENT
Render In Strict Bullet Format?: No
Detail Level: Zone
Initiate Treatment: Apply fluocinonide 0.05% solution to the scalp twice daily for 14 days, then as needed for flares.
Continue Regimen: Plaquenil 200mg BID

## 2023-08-18 NOTE — PROCEDURE: MIPS QUALITY
Quality 431: Preventive Care And Screening: Unhealthy Alcohol Use - Screening: Patient not identified as an unhealthy alcohol user when screened for unhealthy alcohol use using a systematic screening method
Detail Level: Detailed
Quality 111:Pneumonia Vaccination Status For Older Adults: Pneumococcal vaccine (PPSV23) administered on or after patient’s 60th birthday and before the end of the measurement period
Quality 130: Documentation Of Current Medications In The Medical Record: Current Medications Documented
Quality 110: Preventive Care And Screening: Influenza Immunization: Influenza Immunization Administered during Influenza season
Quality 226: Preventive Care And Screening: Tobacco Use: Screening And Cessation Intervention: Patient screened for tobacco use and is an ex/non-smoker

## 2023-08-18 NOTE — PROCEDURE: COUNSELING
Detail Level: Detailed
Patient Specific Counseling (Will Not Stick From Patient To Patient): Duoderm CGF 4x4 wound dressing\\nShe will DC the [facility] now
Detail Level: Zone

## 2023-08-26 ENCOUNTER — HEALTH MAINTENANCE LETTER (OUTPATIENT)
Age: 78
End: 2023-08-26

## 2023-10-25 ENCOUNTER — CARE COORDINATION (OUTPATIENT)
Dept: CARDIOLOGY | Facility: CLINIC | Age: 78
End: 2023-10-25
Payer: COMMERCIAL

## 2023-10-25 DIAGNOSIS — E78.5 HYPERLIPIDEMIA LDL GOAL <100: ICD-10-CM

## 2023-10-25 RX ORDER — EVOLOCUMAB 140 MG/ML
140 INJECTION, SOLUTION SUBCUTANEOUS
Qty: 6 ML | Refills: 3 | Status: SHIPPED | OUTPATIENT
Start: 2023-10-25 | End: 2024-01-10

## 2023-10-25 NOTE — PROGRESS NOTES
Pt left message requesting to speak to one of 's nurses. I tried calling pt back on mobile and home number and it rang, with no voicemail option. Will try again later. Patrick STOVER October 25, 2023, 11:12 AM

## 2023-10-25 NOTE — PROGRESS NOTES
Pt called back and said she got a albert for repatha. She needs an updated rx to be sent to [facility] pharmacy. She is due for labs, echo, and visit with  in January and would like to schedule that. Will send an updated rx and message scheduling to call pt. Patrick STOVER October 25, 2023, 4:41 PM

## 2023-11-04 ENCOUNTER — HEALTH MAINTENANCE LETTER (OUTPATIENT)
Age: 78
End: 2023-11-04

## 2023-11-07 ENCOUNTER — APPOINTMENT (OUTPATIENT)
Dept: URBAN - METROPOLITAN AREA CLINIC 256 | Age: 78
Setting detail: DERMATOLOGY
End: 2023-11-07

## 2023-11-07 DIAGNOSIS — L66.1 LICHEN PLANOPILARIS: ICD-10-CM

## 2023-11-07 PROBLEM — L89.151 PRESSURE ULCER OF SACRAL REGION, STAGE 1: Status: ACTIVE | Noted: 2023-11-07

## 2023-11-07 PROCEDURE — OTHER PRESCRIPTION MEDICATION MANAGEMENT: OTHER

## 2023-11-07 PROCEDURE — 99214 OFFICE O/P EST MOD 30 MIN: CPT

## 2023-11-07 PROCEDURE — OTHER MIPS QUALITY: OTHER

## 2023-11-07 PROCEDURE — OTHER OTC TREATMENT REGIMEN: OTHER

## 2023-11-07 PROCEDURE — OTHER PATIENT SPECIFIC COUNSELING: OTHER

## 2023-11-07 PROCEDURE — OTHER COUNSELING: OTHER

## 2023-11-07 ASSESSMENT — LOCATION SIMPLE DESCRIPTION DERM: LOCATION SIMPLE: ANTERIOR SCALP

## 2023-11-07 ASSESSMENT — LOCATION ZONE DERM: LOCATION ZONE: SCALP

## 2023-11-07 ASSESSMENT — LOCATION DETAILED DESCRIPTION DERM: LOCATION DETAILED: MID-FRONTAL SCALP

## 2023-11-07 NOTE — PROCEDURE: PRESCRIPTION MEDICATION MANAGEMENT
Render In Strict Bullet Format?: No
Detail Level: Zone
Discontinue Regimen: Plaquenil 200mg BID (per patient request)\\nfluocinonide 0.05% solution
Continue Regimen: TMC 0.1% topical ointment \\nVanicream

## 2023-11-07 NOTE — PROCEDURE: PATIENT SPECIFIC COUNSELING
Detail Level: Zone
- advised patient it is up to her discretion as to whether she wants to move forward with discontinuing the plaquenil or not\\n- advised the patient that increased itching and/or redness means the condition is recurring\\n- discussed that plaquenil is a suppressive method, not a cure\\n- advised patient to continue following up with her eye doctor per concerns of vision loss due to plaquenil if the patient re-initiates

## 2023-12-07 ENCOUNTER — TRANSFERRED RECORDS (OUTPATIENT)
Dept: HEALTH INFORMATION MANAGEMENT | Facility: CLINIC | Age: 78
End: 2023-12-07
Payer: COMMERCIAL

## 2023-12-07 LAB
CHOLESTEROL (EXTERNAL): 141 MG/DL (ref 50–199)
HBA1C MFR BLD: 7.1 %
HDLC SERPL-MCNC: 84 MG/DL
LDL CHOLESTEROL CALCULATED (EXTERNAL): 46 MG/DL
TRIGLYCERIDES (EXTERNAL): 57 MG/DL (ref 10–150)

## 2023-12-29 ENCOUNTER — HOSPITAL ENCOUNTER (OUTPATIENT)
Dept: CARDIOLOGY | Facility: CLINIC | Age: 78
Discharge: HOME | End: 2023-12-29
Attending: INTERNAL MEDICINE | Admitting: INTERNAL MEDICINE
Payer: COMMERCIAL

## 2023-12-29 ENCOUNTER — LAB (OUTPATIENT)
Dept: LAB | Facility: CLINIC | Age: 78
End: 2023-12-29
Payer: COMMERCIAL

## 2023-12-29 DIAGNOSIS — E78.5 HYPERLIPIDEMIA LDL GOAL <100: ICD-10-CM

## 2023-12-29 DIAGNOSIS — I25.10 CORONARY ARTERY DISEASE INVOLVING NATIVE CORONARY ARTERY OF NATIVE HEART WITHOUT ANGINA PECTORIS: ICD-10-CM

## 2023-12-29 DIAGNOSIS — I10 ESSENTIAL HYPERTENSION: ICD-10-CM

## 2023-12-29 LAB
ALT SERPL W P-5'-P-CCNC: 22 U/L (ref 0–50)
ANION GAP SERPL CALCULATED.3IONS-SCNC: 10 MMOL/L (ref 7–15)
BUN SERPL-MCNC: 18.1 MG/DL (ref 8–23)
CALCIUM SERPL-MCNC: 10.5 MG/DL (ref 8.8–10.2)
CHLORIDE SERPL-SCNC: 104 MMOL/L (ref 98–107)
CHOLEST SERPL-MCNC: 162 MG/DL
CREAT SERPL-MCNC: 0.73 MG/DL (ref 0.51–0.95)
DEPRECATED HCO3 PLAS-SCNC: 26 MMOL/L (ref 22–29)
EGFRCR SERPLBLD CKD-EPI 2021: 84 ML/MIN/1.73M2
FASTING STATUS PATIENT QL REPORTED: YES
GLUCOSE SERPL-MCNC: 137 MG/DL (ref 70–99)
HDLC SERPL-MCNC: 77 MG/DL
LDLC SERPL CALC-MCNC: 71 MG/DL
LVEF ECHO: NORMAL
NONHDLC SERPL-MCNC: 85 MG/DL
POTASSIUM SERPL-SCNC: 4.6 MMOL/L (ref 3.4–5.3)
SODIUM SERPL-SCNC: 140 MMOL/L (ref 135–145)
TRIGL SERPL-MCNC: 69 MG/DL

## 2023-12-29 PROCEDURE — 80061 LIPID PANEL: CPT | Performed by: INTERNAL MEDICINE

## 2023-12-29 PROCEDURE — 36415 COLL VENOUS BLD VENIPUNCTURE: CPT | Performed by: INTERNAL MEDICINE

## 2023-12-29 PROCEDURE — 80048 BASIC METABOLIC PNL TOTAL CA: CPT | Performed by: INTERNAL MEDICINE

## 2023-12-29 PROCEDURE — 84460 ALANINE AMINO (ALT) (SGPT): CPT | Performed by: INTERNAL MEDICINE

## 2023-12-29 PROCEDURE — 93306 TTE W/DOPPLER COMPLETE: CPT

## 2023-12-29 PROCEDURE — 93306 TTE W/DOPPLER COMPLETE: CPT | Mod: 26 | Performed by: INTERNAL MEDICINE

## 2024-01-10 ENCOUNTER — OFFICE VISIT (OUTPATIENT)
Dept: CARDIOLOGY | Facility: CLINIC | Age: 79
End: 2024-01-10
Payer: COMMERCIAL

## 2024-01-10 VITALS
WEIGHT: 152 LBS | SYSTOLIC BLOOD PRESSURE: 132 MMHG | BODY MASS INDEX: 23.04 KG/M2 | HEIGHT: 68 IN | HEART RATE: 79 BPM | OXYGEN SATURATION: 95 % | DIASTOLIC BLOOD PRESSURE: 68 MMHG

## 2024-01-10 DIAGNOSIS — E78.5 HYPERLIPIDEMIA LDL GOAL <100: ICD-10-CM

## 2024-01-10 DIAGNOSIS — E11.9 TYPE 2 DIABETES MELLITUS WITHOUT COMPLICATION, WITHOUT LONG-TERM CURRENT USE OF INSULIN (H): Primary | ICD-10-CM

## 2024-01-10 DIAGNOSIS — I73.9 CLAUDICATION OF BOTH LOWER EXTREMITIES (H): ICD-10-CM

## 2024-01-10 DIAGNOSIS — I25.10 CORONARY ARTERY DISEASE INVOLVING NATIVE CORONARY ARTERY OF NATIVE HEART WITHOUT ANGINA PECTORIS: ICD-10-CM

## 2024-01-10 DIAGNOSIS — I73.9 PAD (PERIPHERAL ARTERY DISEASE) (H): ICD-10-CM

## 2024-01-10 DIAGNOSIS — I25.10 ATHEROSCLEROSIS OF NATIVE CORONARY ARTERY OF NATIVE HEART WITHOUT ANGINA PECTORIS: ICD-10-CM

## 2024-01-10 DIAGNOSIS — I10 ESSENTIAL HYPERTENSION: ICD-10-CM

## 2024-01-10 PROCEDURE — 99215 OFFICE O/P EST HI 40 MIN: CPT | Performed by: INTERNAL MEDICINE

## 2024-01-10 RX ORDER — EVOLOCUMAB 140 MG/ML
140 INJECTION, SOLUTION SUBCUTANEOUS
Qty: 6 ML | Refills: 3 | Status: SHIPPED | OUTPATIENT
Start: 2024-01-10

## 2024-01-10 RX ORDER — LOSARTAN POTASSIUM 50 MG/1
50 TABLET ORAL DAILY
Qty: 90 TABLET | Refills: 3 | Status: SHIPPED | OUTPATIENT
Start: 2024-01-10

## 2024-01-10 RX ORDER — NITROGLYCERIN 0.4 MG/1
0.4 TABLET SUBLINGUAL EVERY 5 MIN PRN
Qty: 25 TABLET | Refills: 1 | Status: SHIPPED | OUTPATIENT
Start: 2024-01-10

## 2024-01-10 NOTE — PATIENT INSTRUCTIONS
It was a pleasure seeing you today and thank you for allowing me to be a part of your health care team.  Should you have any questions regarding your visit or future needs please feel free to reach out to my care team for assistance.      Thank you, Dr. Feroz Burgos        **Nursing: [phone]       **Scheduling: [phone]

## 2024-01-10 NOTE — LETTER
1/10/2024    Gaye Zimmer  [address]  [facility] [address]    RE: Sarah Longo       Dear Colleague,     I had the pleasure of seeing Sarah Longo in the [facility].    General Cardiology Clinic Progress Note  Sarah Longo MRN# [id number]   YOB: 1945 Age: 78 year old       Reason for visit: Coronary artery disease and peripheral arterial disease    History of presenting illness:    I had the opportunity to see Ms. Rayna Longo in Cardiology Clinic today at [facility] in [place] for reevaluation of coronary artery disease and peripheral vascular disease.     Ms. Longo is a 78-year-old woman with hypertension, dyslipidemia and type 2 diabetes.  She has a history of coronary stenting going back to 2004 and has had recurrent symptoms of angina with subsequent stenting of the circumflex and LAD.  She has rare episodes of left upper chest discomfort at various times but these seem to be brief and are not bothering her consistently.  She does not seem to be too concerned about these episodes.  I urged her to contact me if these become more persistent.     Her primary complaint now is regarding bilateral lower extremity pain. She was experiencing a lot of leg pains in 2022, and her peripheral arterial studies were abnormal, suggesting significant disease.  She was referred to the Interventional Vascular Cardiology team and underwent lithotripsy and angioplasty with a drug-coated balloon involving the left superficial femoral artery and left popliteal artery on 06/27/2022.  She had significant improvement in her left lower extremity symptoms after that procedure.       She then had a similar procedure scheduled for 08/31/2022 on the right leg, but access could not be obtained and it was delayed.  She eventually underwent that procedure 09/23/2022, but did not have immediately beneficial results.  She continued to have a lot of discomfort in  "her right leg after that procedure despite the fact that I reassured her that the arteries that were treated were opened satisfactorily.  She did meet with Vascular Surgery for a second opinion, Dr. Saba, who reassured her that the efforts made to provide her with lower extremity revascularization were quite heroic.  He did not offer any additional treatment at that time.  However, she felt a great deal of trust in him and appreciated his opinion and efforts.    However, since then she has only gotten worse.  Her primary complaint today is of severe, perhaps intractable, bilateral lower extremity pain.  She has severe cramping in her calves at night and this only improves with getting up to walk around and stretch out her legs.  She describes this as a typical \"charley horse\" type of pain.  She also has almost continuous pain in her legs, both in the quadriceps area and the calves throughout the day.  This hurts worse with walking and seems to improve somewhat with rest.  It seems to have gotten progressively worse since her procedure last year.  She is wondering whether her arteries have clogged up again since a stent could not be placed.  She does not have any nonhealing skin wound issues.  Her feet tend to stay warm as long as she is wearing warm shoes.    She also has chronic back pain issues and has had a number of treatments for the that type of pain, which has radiated down her legs as well.    On examination today, I do feel some pulses in her feet, possibly the dorsalis pedis on the left, and posterior tibial right, but not very clearly.  She has no edema.  Skin color looks normal.  Skin is warm and dry.              Assessment and Plan:     ASSESSMENT:    Ms. Sarah Longo is a 78-year-old woman with hypertension, dyslipidemia, type 2 diabetes, and vascular disease, including recurrent coronary artery disease requiring multiple revascularization procedures in the past.  At this time she is having rare " episodes of left upper chest discomfort which are not typical of her usual angina.  These seem to be brief and infrequent and not bothersome for her.  I recommended that she contact me if these become more concerning.  Her cardiac risk factors are under excellent control with an LDL of 71, A1c down to almost 7, and good blood pressure control now.  She would prefer not to    She also has peripheral arterial disease and has undergone bilateral lower extremity revascularization using lithotripsy and angioplasty procedures in 2022.  Those were partially beneficial for her although she continued to have some leg pains even after those procedures were performed.  It seems that her pains have progressively gotten worse since then.  She is now concerned that her arteries are blocked again.  She is having some cramping pains in her legs at night and encouraged her to hydrate well, especially with electrolyte solutions and try some magnesium supplement.  However, her primary complaint seems to be more suggestive of claudication, although somewhat atypical as well, with both an exercise component and a resting component.    She would prefer to follow-up with Dr. Saba for additional vascular evaluations.  I recommended that we start with a lower extremity arterial ultrasound to get some basic information and help to guide our decision to consider repeat lower extremity angiography.  Based on those results, I may have her return to see Dr. Saba to consider that procedure.  Based on her level of discomfort desperation to find some relief, I would suggest that we take an aggressive approach with angiography to help understand the situation and consider whether restenosis may be playing a role here.    Feroz Burgos MD           Orders this Visit:  Orders Placed This Encounter   Procedures    US Lower Extremity Arterial Duplex Bilateral    Basic metabolic panel    Lipid Profile    ALT    Follow-Up with Cardiology     Orders  "Placed This Encounter   Medications    nitroGLYcerin (NITROSTAT) 0.4 MG sublingual tablet     Sig: Place 1 tablet (0.4 mg) under the tongue every 5 minutes as needed for chest pain For chest pain place 1 tablet under the tongue every 5 minutes for 3 doses. If symptoms persist 5 minutes after 1st dose call 911.     Dispense:  25 tablet     Refill:  1    evolocumab (REPATHA SURECLICK) 140 MG/ML prefilled autoinjector     Sig: Inject 1 mL (140 mg) Subcutaneous every 14 days     Dispense:  6 mL     Refill:  3    losartan (COZAAR) 50 MG tablet     Sig: Take 1 tablet (50 mg) by mouth daily     Dispense:  90 tablet     Refill:  3     Medications Discontinued During This Encounter   Medication Reason    mirabegron (MYRBETRIQ) 50 MG 24 hr tablet     nitroGLYcerin (NITROSTAT) 0.4 MG sublingual tablet Reorder (No AVS)    losartan (COZAAR) 50 MG tablet Reorder (No AVS)    evolocumab (REPATHA SURECLICK) 140 MG/ML prefilled autoinjector Reorder (No AVS)       Today's clinic visit entailed:    50 minutes spent by me on the date of the encounter doing chart review, history and exam, documentation and further activities per the note  Provider  Link to [facility] Help Grid     The level of medical decision making during this visit was of high complexity.           Review of Systems:     Review of Systems:  Skin:  Positive for pigmentation   Eyes:       ENT:       Respiratory:  Negative    Cardiovascular:    chest pain;Positive for  Gastroenterology:      Genitourinary:       Musculoskeletal:       Neurologic:       Psychiatric:       Heme/Lymph/Imm:       Endocrine:                 Physical Exam:     Vitals: /68   Pulse 79   Ht 1.727 m (5' 8\")   Wt 68.9 kg (152 lb)   SpO2 95%   BMI 23.11 kg/m    Constitutional: Well nourished and in no apparent distress.  Eyes: Pupils equal, round. Sclerae anicteric.   HEENT: Normocephalic, atraumatic.   Neck: Supple. JVD   Respiratory: Breathing non-labored. Lungs clear to auscultation " bilaterally. No crackles, wheezes, rhonchi, or rales.  Cardiovascular:  Regular rate and rhythm, normal S1 and S2. No murmur, rub, or gallop.  Skin: Warm, dry. No rashes, cyanosis, or xanthelasma.  Extremities: No edema.  Neurologic: No gross motor deficits. Alert, awake, and oriented to person, place and time.  Psychiatric: Affect appropriate.             Medications:     Current Outpatient Medications   Medication Sig Dispense Refill    acetylcysteine (N-ACETYL CYSTEINE) 600 MG CAPS capsule Take 600 mg by mouth 2 times daily      amoxicillin (AMOXIL) 500 MG capsule 2,000 mg once as needed (Before dental procedures)      ascorbic acid 1000 MG TABS tablet Take 500 mg by mouth as needed      aspirin 81 MG EC tablet Take 81 mg by mouth daily      CINNAMON PO Take 2 capsules by mouth 2 times daily prn      cyanocobalamin (CYANOCOBALAMIN) 1000 MCG/ML injection Inject 2 mLs into the muscle every 30 days      DULoxetine (CYMBALTA) 60 MG capsule daily      estradiol (ESTRACE) 0.1 MG/GM vaginal cream Use pea sized amount and apply with finger to vaginal skin just inside opening once a day before bed as needed for vaginal dryness. 42.5 g 3    evolocumab (REPATHA SURECLICK) 140 MG/ML prefilled autoinjector Inject 1 mL (140 mg) Subcutaneous every 14 days 6 mL 3    fluconazole (DIFLUCAN) 150 MG tablet prn      levothyroxine (SYNTHROID/LEVOTHROID) 100 MCG tablet Take 100 mcg by mouth daily      losartan (COZAAR) 50 MG tablet Take 1 tablet (50 mg) by mouth daily 90 tablet 3    nitroGLYcerin (NITROSTAT) 0.4 MG sublingual tablet Place 1 tablet (0.4 mg) under the tongue every 5 minutes as needed for chest pain For chest pain place 1 tablet under the tongue every 5 minutes for 3 doses. If symptoms persist 5 minutes after 1st dose call 911. 25 tablet 1    NOVOLOG FLEXPEN 100 UNIT/ML soln Inject 17 Units Subcutaneous 3 times daily as needed for high blood sugar      tirzepatide (MOUNJARO) 10 MG/0.5ML pen Inject 10 mg Subcutaneous once  a week      vitamin D3 (CHOLECALCIFEROL) 2000 units tablet Take 1 tablet by mouth 2 times daily      cilostazol (PLETAL) 100 MG tablet Take 1 tablet (100 mg) by mouth 2 times daily (Patient not taking: Reported on 1/10/2024) 60 tablet 11    furosemide (LASIX) 20 MG tablet Take 1 tablet (20 mg) by mouth daily (Patient not taking: Reported on 1/10/2024) 90 tablet 3    oxyCODONE (ROXICODONE) 5 MG tablet Take 1-2 tablets (5-10 mg) by mouth every 3 hours as needed for severe pain (Patient not taking: Reported on 1/10/2024) 30 tablet 0    traMADol (ULTRAM) 50 MG tablet Take 1 tablet (50 mg) by mouth every 6 hours as needed for moderate to severe pain (Patient not taking: Reported on 1/10/2024) 30 tablet 3       Family History   Problem Relation Age of Onset    C.A.D. Father         MI , age 83, had high lipids    Hyperlipidemia Father     Hypertension Father     Coronary Artery Disease Father     Hypertension Mother     Genitourinary Problems Mother         renal failure    Fractures Mother         hip    Osteoporosis Mother     Cerebrovascular Disease Maternal Grandfather         cerebral hemorrhage    Diabetes Maternal Uncle     Colon Cancer Maternal Aunt     Diabetes Maternal Grandmother        Social History     Socioeconomic History    Marital status:      Spouse name: Kwadwo    Number of children: 3    Years of education: Not on file    Highest education level: Not on file   Occupational History    Occupation: PT Aide     Employer: NONE      Employer: RETIRED   Tobacco Use    Smoking status: Never    Smokeless tobacco: Never   Substance and Sexual Activity    Alcohol use: No     Alcohol/week: 0.0 standard drinks of alcohol    Drug use: No    Sexual activity: Never     Partners: Male     Birth control/protection: Post-menopausal   Other Topics Concern     Service Not Asked    Blood Transfusions Not Asked    Caffeine Concern Yes     Comment: 6-7 cups caffeine per day    Occupational Exposure Not  Asked    Hobby Hazards Not Asked    Sleep Concern No    Stress Concern Yes    Weight Concern No    Special Diet No     Comment: eats healthy     Back Care Not Asked    Exercise Yes     Comment:  walking & active life style     Bike Helmet Not Asked    Seat Belt Not Asked    Self-Exams Not Asked    Parent/sibling w/ CABG, MI or angioplasty before 65F 55M? No   Social History Narrative    Not on file     Social Determinants of Health     Financial Resource Strain: Not on file   Food Insecurity: Not on file   Transportation Needs: Not on file   Physical Activity: Not on file   Stress: Not on file   Social Connections: Not on file   Interpersonal Safety: Not on file   Housing Stability: Not on file            Past Medical History:     Past Medical History:   Diagnosis Date    Anemia     Anxiety     Arthritis     CAD (coronary artery disease)     Chronic bilateral low back pain without sciatica     Coronary artery disease 04/28/2016    cardiac cath 2/2019: patent stents; PTCA with MAYA x 2 to OM1 and MAYA x 1 to p.LAD (4/21/2016 at [facility]); PTCA with intracoronary stent placement of RCA June 2004; MAYA to OM1 11/2016    Cystocele, midline 09/29/2010    Depression     Depression     DM type 2 (diabetes mellitus, type 2) (H)     HYPERPLASTIC  POLYP      colonoscopy in 5-10 yrs.    Hypertension     Hypothyroid     Hypothyroidism     hypothyroid- Dr Majano    Motion sickness     Mumps     OAB (overactive bladder)     complex voiding dysfct, failed interstim    Osteoarthritis     Other and unspecified hyperlipidemia     Palpitations     PONV (postoperative nausea and vomiting)     Postmenopausal bleeding     Shoulder blade pain 05/31/2016    Status post coronary angiogram 03/02/2016    Type II or unspecified type diabetes mellitus without mention of complication, not stated as uncontrolled     Dr. Majano    Unspecified essential hypertension     Urinary frequency 09/29/2010    followed by urology. no benefit from detrol or  sanctura. month trial of macrobid and flomax 10/10              Past Surgical History:     Past Surgical History:   Procedure Laterality Date    ------------OTHER-------------      Interstim    Ablation      ARTHROPLASTY HIP Left 7/15/2020    Procedure: Left total hip arthroplasty;  Surgeon: Jayson Victoria MD;  Location: [facility] OR    BACK SURGERY  03/05/2020    spinal fusion    BACK SURGERY      C CT ANGIOGRAPHY CORONARY  1/2005    mod soft plaque LAD and Cx, follow up with left heart cath    CARDIAC SURGERY      CHOLECYSTECTOMY      CHOLECYSTECTOMY      COLONOSCOPY      CORONARY STENT PLACEMENT  2004     x 5 stents     CV CORONARY ANGIOGRAM N/A 4/29/2022    Procedure: Coronary Angiogram;  Surgeon: Sudarshan Lee MD;  Location: [facility] CARDIAC CATH LAB    CV FLUOROSCOPY ONLY N/A 8/31/2022    Procedure: Fluoroscopy Only;  Surgeon: Jun Hamlin MD;  Location: [facility] CARDIAC CATH LAB    CV HEART CATHETERIZATION WITH POSSIBLE INTERVENTION N/A 2/14/2019    Procedure: Coronary Angiogram;  Surgeon: Sudarshan Lee MD;  Location: [facility] CARDIAC CATH LAB; patent stents    CV LEFT HEART CATH N/A 4/29/2022    Procedure: Left Heart Catheterization;  Surgeon: Sudarshan Lee MD;  Location: [facility] CARDIAC CATH LAB    CV LEFT VENTRICULOGRAM N/A 4/29/2022    Procedure: Left Ventriculogram;  Surgeon: Sudarshan Lee MD;  Location: [facility] CARDIAC CATH LAB    CV LOWER EXTREMITY ANGIOGRAM BILATERAL Bilateral 6/27/2022    Procedure: Angiogram Lower Extremity Bilateral;  Surgeon: Jun Hamlin MD;  Location: [facility] CARDIAC CATH LAB    CV LOWER EXTREMITY ANGIOGRAM BILATERAL Right 9/23/2022    Procedure: Angiogram Lower Extremity Bilateral;  Surgeon: Jun Hamlin MD;  Location: [facility] CARDIAC CATH LAB    CV LOWER EXTREMITY ANGIOGRAM RIGHT Right 8/31/2022    Procedure: Angiogram Lower Extremity Right-Aborted;  Surgeon: Jun Hamlin MD;  Location: [facility] CARDIAC CATH LAB    CYSTOSCOPY       EXCISE LESION UPPER EXTREMITY  6/5/2013    Procedure: EXCISE LESION UPPER EXTREMITY;  EXCISION RIGHT ARM ANTICUBITAL LIPOMA ;  Surgeon: Jayson Joe MD;  Location: [facility] REMOVAL OF TONSILS,12+ Y/O      HEART CATH LEFT HEART CATH  3/2/2016    No intervention - aggressive medical management    HEART CATH LEFT HEART CATH  4/21/2016     MAYA x 2 to OM1, MAYA to LAD, at [facility]    HEART CATH LEFT HEART CATH  6/29/2004    MAYA to RCA    HEART CATH LEFT HEART CATH  3/28/2002    Mild to moderate 3 vessel CAD. Medical management recommended.     HEART CATH LEFT HEART CATH  11/22/2016    MAYA to OM1    hypothyroid      JOINT REPLACEMENT      KNEE SURGERY  4/20/10    right knee replacement    LOWER ANGIOPLASTY RIGHT Right 9/23/2022    Procedure: Lower Extremity Angioplasty Right;  Surgeon: Jun Hamlin MD;  Location:  HEART CARDIAC CATH LAB    LUMBAR FUSION N/A 3/5/2020    Procedure: BILATERAL LUMBAR 2-3 AND LUMBAR 3-4 POSTERIOR SPINAL FUSION WITH LEFT LATERAL RECESS DECOMPRESSION LUMBAR 4-5 AND BILATERAL LAMINECTOMY AND DECOMPRESSION LUMBAR 2-3 AND LUMBAR 3-4 WITH ALLOGRAFT AND AUTOGRAFT AND VIVIGEN;  Surgeon: Navid Caicedo MD;  Location: [facility];  Service: Spine    LUMBAR FUSION Bilateral 1/22/2021    Procedure: REVISION OF NONUNION BILATERAL LUMBAR 2-3 AND LUMBAR 3-4 WITH REVISION OF INSTRUMENTATION RIGHT LUMBAR 2 AND LEFT LUMBAR 4 WITH LEFT LUMBAR 4-5 LAMINOFORAMINOTOMY AND LUMBAR 1-2 DECOMPRESSION WITH ALLOGRAFT AND BONE MARROW ASPIRATE;  Surgeon: Navid Caicedo MD;  Location: [facility] OR;  Service: Spine    ORTHOPEDIC SURGERY      total knee 2010    REMOVE STIMULATOR SACRAL NERVE N/A 11/2/2015    Procedure: REMOVE STIMULATOR SACRAL NERVE;  Surgeon: Gertrudis Frausto MD;  Location: [facility]    REPLACEMENT TOTAL KNEE Right 04/2010    SURGICAL HISTORY OF -       Uterine endometrial ablation    ZZC LIGATE FALLOPIAN TUBE      endometrial ablation               Allergies:   Jardiance [empagliflozin], Statins, and Versed [midazolam]       Data:   All laboratory data reviewed:    Recent Labs   Lab Test 12/29/23  1115 01/13/23  1055 11/18/21  0849 09/15/20  0806 03/11/20  0850 10/23/19  0000 08/28/17  0927 02/09/17  0000   LDL 71  --  56 41  --   --    < > 56   HDL 77  --  74 71  --   --    < > 67   NHDL 85  --  69 56  --   --    < >  --    CHOL 162  --  143 127  --   --    < > 136   TRIG 69 59 67 77  --   --    < > 64   TSH  --   --   --   --   --  1.02  --  0.42   IRON  --   --   --   --  16*  --   --   --     < > = values in this interval not displayed.       Lab Results   Component Value Date    WBC 4.0 09/23/2022    WBC 3.9 (L) 07/19/2020    RBC 4.22 09/23/2022    RBC 2.60 (L) 07/19/2020    HGB 12.7 09/23/2022    HGB 12.1 09/15/2020    HCT 38.2 09/23/2022    HCT 23.8 (L) 07/19/2020    MCV 91 09/23/2022    MCV 92 07/19/2020    MCH 30.1 09/23/2022    MCH 29.6 07/19/2020    MCHC 33.2 09/23/2022    MCHC 32.4 07/19/2020    RDW 13.1 09/23/2022    RDW 12.1 07/19/2020     09/23/2022     07/19/2020       Lab Results   Component Value Date     12/29/2023     07/19/2020    POTASSIUM 4.6 12/29/2023    POTASSIUM 4.1 09/23/2022    POTASSIUM 3.7 07/19/2020    CHLORIDE 104 12/29/2023    CHLORIDE 102 01/13/2023    CHLORIDE 107 07/19/2020    CO2 26 12/29/2023    CO2 22 09/23/2022    CO2 27 07/19/2020    ANIONGAP 10 12/29/2023    ANIONGAP 6 09/23/2022    ANIONGAP 2 (L) 07/19/2020     (H) 12/29/2023     (H) 09/23/2022     (H) 07/19/2020    BUN 18.1 12/29/2023    BUN 23 09/23/2022    BUN 14 07/19/2020    CR 0.73 12/29/2023    CR 0.71 07/19/2020    GFRESTIMATED 84 12/29/2023    GFRESTIMATED >60 01/24/2021    GFRESTIMATED 84 07/19/2020    GFRESTBLACK >60 01/24/2021    GFRESTBLACK >90 07/19/2020    LUCIUS 10.5 (H) 12/29/2023    LUCIUS 8.5 07/19/2020      Lab Results   Component Value Date    AST 16 01/24/2021    AST 21 07/18/2020    ALT 22 12/29/2023     ALT 49 09/15/2020       Lab Results   Component Value Date    A1C 8.0 (H) 01/22/2021    A1C 8.5 (H) 07/19/2020       Lab Results   Component Value Date    INR 0.96 09/23/2022    INR 1.02 08/31/2022    INR 0.88 02/14/2019    INR 0.94 02/24/2017         FEROZ BURGOS MD  [facility]    Thank you for allowing me to participate in the care of your patient.      Sincerely,     FEROZ BURGOS MD     [facility]  cc:   Feroz Burgos MD  [address]  [place] [address]

## 2024-01-11 NOTE — PROGRESS NOTES
General Cardiology Clinic Progress Note  Sarah Longo MRN# [id number]   YOB: 1945 Age: 78 year old       Reason for visit: Coronary artery disease and peripheral arterial disease    History of presenting illness:    I had the opportunity to see Ms. Rayna Longo in Cardiology Clinic today at [facility] in [place] for reevaluation of coronary artery disease and peripheral vascular disease.     Ms. Longo is a 78-year-old woman with hypertension, dyslipidemia and type 2 diabetes.  She has a history of coronary stenting going back to 2004 and has had recurrent symptoms of angina with subsequent stenting of the circumflex and LAD.  She has rare episodes of left upper chest discomfort at various times but these seem to be brief and are not bothering her consistently.  She does not seem to be too concerned about these episodes.  I urged her to contact me if these become more persistent.     Her primary complaint now is regarding bilateral lower extremity pain. She was experiencing a lot of leg pains in 2022, and her peripheral arterial studies were abnormal, suggesting significant disease.  She was referred to the Interventional Vascular Cardiology team and underwent lithotripsy and angioplasty with a drug-coated balloon involving the left superficial femoral artery and left popliteal artery on 06/27/2022.  She had significant improvement in her left lower extremity symptoms after that procedure.       She then had a similar procedure scheduled for 08/31/2022 on the right leg, but access could not be obtained and it was delayed.  She eventually underwent that procedure 09/23/2022, but did not have immediately beneficial results.  She continued to have a lot of discomfort in her right leg after that procedure despite the fact that I reassured her that the arteries that were treated were opened satisfactorily.  She did meet with Vascular Surgery for a second opinion, Dr. Saba, who  "reassured her that the efforts made to provide her with lower extremity revascularization were quite heroic.  He did not offer any additional treatment at that time.  However, she felt a great deal of trust in him and appreciated his opinion and efforts.    However, since then she has only gotten worse.  Her primary complaint today is of severe, perhaps intractable, bilateral lower extremity pain.  She has severe cramping in her calves at night and this only improves with getting up to walk around and stretch out her legs.  She describes this as a typical \"charley horse\" type of pain.  She also has almost continuous pain in her legs, both in the quadriceps area and the calves throughout the day.  This hurts worse with walking and seems to improve somewhat with rest.  It seems to have gotten progressively worse since her procedure last year.  She is wondering whether her arteries have clogged up again since a stent could not be placed.  She does not have any nonhealing skin wound issues.  Her feet tend to stay warm as long as she is wearing warm shoes.    She also has chronic back pain issues and has had a number of treatments for the that type of pain, which has radiated down her legs as well.    On examination today, I do feel some pulses in her feet, possibly the dorsalis pedis on the left, and posterior tibial right, but not very clearly.  She has no edema.  Skin color looks normal.  Skin is warm and dry.              Assessment and Plan:     ASSESSMENT:    Ms. Sarah Longo is a 78-year-old woman with hypertension, dyslipidemia, type 2 diabetes, and vascular disease, including recurrent coronary artery disease requiring multiple revascularization procedures in the past.  At this time she is having rare episodes of left upper chest discomfort which are not typical of her usual angina.  These seem to be brief and infrequent and not bothersome for her.  I recommended that she contact me if these become more " concerning.  Her cardiac risk factors are under excellent control with an LDL of 71, A1c down to almost 7, and good blood pressure control now.  She would prefer not to    She also has peripheral arterial disease and has undergone bilateral lower extremity revascularization using lithotripsy and angioplasty procedures in 2022.  Those were partially beneficial for her although she continued to have some leg pains even after those procedures were performed.  It seems that her pains have progressively gotten worse since then.  She is now concerned that her arteries are blocked again.  She is having some cramping pains in her legs at night and encouraged her to hydrate well, especially with electrolyte solutions and try some magnesium supplement.  However, her primary complaint seems to be more suggestive of claudication, although somewhat atypical as well, with both an exercise component and a resting component.    She would prefer to follow-up with Dr. Saba for additional vascular evaluations.  I recommended that we start with a lower extremity arterial ultrasound to get some basic information and help to guide our decision to consider repeat lower extremity angiography.  Based on those results, I may have her return to see Dr. Saba to consider that procedure.  Based on her level of discomfort desperation to find some relief, I would suggest that we take an aggressive approach with angiography to help understand the situation and consider whether restenosis may be playing a role here.    Feroz Burgos MD           Orders this Visit:  Orders Placed This Encounter   Procedures    US Lower Extremity Arterial Duplex Bilateral    Basic metabolic panel    Lipid Profile    ALT    Follow-Up with Cardiology     Orders Placed This Encounter   Medications    nitroGLYcerin (NITROSTAT) 0.4 MG sublingual tablet     Sig: Place 1 tablet (0.4 mg) under the tongue every 5 minutes as needed for chest pain For chest pain place 1 tablet  "under the tongue every 5 minutes for 3 doses. If symptoms persist 5 minutes after 1st dose call 911.     Dispense:  25 tablet     Refill:  1    evolocumab (REPATHA SURECLICK) 140 MG/ML prefilled autoinjector     Sig: Inject 1 mL (140 mg) Subcutaneous every 14 days     Dispense:  6 mL     Refill:  3    losartan (COZAAR) 50 MG tablet     Sig: Take 1 tablet (50 mg) by mouth daily     Dispense:  90 tablet     Refill:  3     Medications Discontinued During This Encounter   Medication Reason    mirabegron (MYRBETRIQ) 50 MG 24 hr tablet     nitroGLYcerin (NITROSTAT) 0.4 MG sublingual tablet Reorder (No AVS)    losartan (COZAAR) 50 MG tablet Reorder (No AVS)    evolocumab (REPATHA SURECLICK) 140 MG/ML prefilled autoinjector Reorder (No AVS)       Today's clinic visit entailed:    50 minutes spent by me on the date of the encounter doing chart review, history and exam, documentation and further activities per the note  Provider  Link to [facility] Help Grid     The level of medical decision making during this visit was of high complexity.           Review of Systems:     Review of Systems:  Skin:  Positive for pigmentation   Eyes:       ENT:       Respiratory:  Negative    Cardiovascular:    chest pain;Positive for  Gastroenterology:      Genitourinary:       Musculoskeletal:       Neurologic:       Psychiatric:       Heme/Lymph/Imm:       Endocrine:                 Physical Exam:     Vitals: /68   Pulse 79   Ht 1.727 m (5' 8\")   Wt 68.9 kg (152 lb)   SpO2 95%   BMI 23.11 kg/m    Constitutional: Well nourished and in no apparent distress.  Eyes: Pupils equal, round. Sclerae anicteric.   HEENT: Normocephalic, atraumatic.   Neck: Supple. JVD   Respiratory: Breathing non-labored. Lungs clear to auscultation bilaterally. No crackles, wheezes, rhonchi, or rales.  Cardiovascular:  Regular rate and rhythm, normal S1 and S2. No murmur, rub, or gallop.  Skin: Warm, dry. No rashes, cyanosis, or xanthelasma.  Extremities: No " edema.  Neurologic: No gross motor deficits. Alert, awake, and oriented to person, place and time.  Psychiatric: Affect appropriate.             Medications:     Current Outpatient Medications   Medication Sig Dispense Refill    acetylcysteine (N-ACETYL CYSTEINE) 600 MG CAPS capsule Take 600 mg by mouth 2 times daily      amoxicillin (AMOXIL) 500 MG capsule 2,000 mg once as needed (Before dental procedures)      ascorbic acid 1000 MG TABS tablet Take 500 mg by mouth as needed      aspirin 81 MG EC tablet Take 81 mg by mouth daily      CINNAMON PO Take 2 capsules by mouth 2 times daily prn      cyanocobalamin (CYANOCOBALAMIN) 1000 MCG/ML injection Inject 2 mLs into the muscle every 30 days      DULoxetine (CYMBALTA) 60 MG capsule daily      estradiol (ESTRACE) 0.1 MG/GM vaginal cream Use pea sized amount and apply with finger to vaginal skin just inside opening once a day before bed as needed for vaginal dryness. 42.5 g 3    evolocumab (REPATHA SURECLICK) 140 MG/ML prefilled autoinjector Inject 1 mL (140 mg) Subcutaneous every 14 days 6 mL 3    fluconazole (DIFLUCAN) 150 MG tablet prn      levothyroxine (SYNTHROID/LEVOTHROID) 100 MCG tablet Take 100 mcg by mouth daily      losartan (COZAAR) 50 MG tablet Take 1 tablet (50 mg) by mouth daily 90 tablet 3    nitroGLYcerin (NITROSTAT) 0.4 MG sublingual tablet Place 1 tablet (0.4 mg) under the tongue every 5 minutes as needed for chest pain For chest pain place 1 tablet under the tongue every 5 minutes for 3 doses. If symptoms persist 5 minutes after 1st dose call 911. 25 tablet 1    NOVOLOG FLEXPEN 100 UNIT/ML soln Inject 17 Units Subcutaneous 3 times daily as needed for high blood sugar      tirzepatide (MOUNJARO) 10 MG/0.5ML pen Inject 10 mg Subcutaneous once a week      vitamin D3 (CHOLECALCIFEROL) 2000 units tablet Take 1 tablet by mouth 2 times daily      cilostazol (PLETAL) 100 MG tablet Take 1 tablet (100 mg) by mouth 2 times daily (Patient not taking: Reported on  1/10/2024) 60 tablet 11    furosemide (LASIX) 20 MG tablet Take 1 tablet (20 mg) by mouth daily (Patient not taking: Reported on 1/10/2024) 90 tablet 3    oxyCODONE (ROXICODONE) 5 MG tablet Take 1-2 tablets (5-10 mg) by mouth every 3 hours as needed for severe pain (Patient not taking: Reported on 1/10/2024) 30 tablet 0    traMADol (ULTRAM) 50 MG tablet Take 1 tablet (50 mg) by mouth every 6 hours as needed for moderate to severe pain (Patient not taking: Reported on 1/10/2024) 30 tablet 3       Family History   Problem Relation Age of Onset    C.A.D. Father         MI , age 83, had high lipids    Hyperlipidemia Father     Hypertension Father     Coronary Artery Disease Father     Hypertension Mother     Genitourinary Problems Mother         renal failure    Fractures Mother         hip    Osteoporosis Mother     Cerebrovascular Disease Maternal Grandfather         cerebral hemorrhage    Diabetes Maternal Uncle     Colon Cancer Maternal Aunt     Diabetes Maternal Grandmother        Social History     Socioeconomic History    Marital status:      Spouse name: Kwadwo    Number of children: 3    Years of education: Not on file    Highest education level: Not on file   Occupational History    Occupation: PT Aide     Employer: NONE      Employer: RETIRED   Tobacco Use    Smoking status: Never    Smokeless tobacco: Never   Substance and Sexual Activity    Alcohol use: No     Alcohol/week: 0.0 standard drinks of alcohol    Drug use: No    Sexual activity: Never     Partners: Male     Birth control/protection: Post-menopausal   Other Topics Concern     Service Not Asked    Blood Transfusions Not Asked    Caffeine Concern Yes     Comment: 6-7 cups caffeine per day    Occupational Exposure Not Asked    Hobby Hazards Not Asked    Sleep Concern No    Stress Concern Yes    Weight Concern No    Special Diet No     Comment: eats healthy     Back Care Not Asked    Exercise Yes     Comment:  walking & active  life style     Bike Helmet Not Asked    Seat Belt Not Asked    Self-Exams Not Asked    Parent/sibling w/ CABG, MI or angioplasty before 65F 55M? No   Social History Narrative    Not on file     Social Determinants of Health     Financial Resource Strain: Not on file   Food Insecurity: Not on file   Transportation Needs: Not on file   Physical Activity: Not on file   Stress: Not on file   Social Connections: Not on file   Interpersonal Safety: Not on file   Housing Stability: Not on file            Past Medical History:     Past Medical History:   Diagnosis Date    Anemia     Anxiety     Arthritis     CAD (coronary artery disease)     Chronic bilateral low back pain without sciatica     Coronary artery disease 04/28/2016    cardiac cath 2/2019: patent stents; PTCA with MAYA x 2 to OM1 and MAYA x 1 to p.LAD (4/21/2016 at [facility]); PTCA with intracoronary stent placement of RCA June 2004; MAYA to OM1 11/2016    Cystocele, midline 09/29/2010    Depression     Depression     DM type 2 (diabetes mellitus, type 2) (H)     HYPERPLASTIC  POLYP      colonoscopy in 5-10 yrs.    Hypertension     Hypothyroid     Hypothyroidism     hypothyroid- Dr Majano    Motion sickness     Mumps     OAB (overactive bladder)     complex voiding dysfct, failed interstim    Osteoarthritis     Other and unspecified hyperlipidemia     Palpitations     PONV (postoperative nausea and vomiting)     Postmenopausal bleeding     Shoulder blade pain 05/31/2016    Status post coronary angiogram 03/02/2016    Type II or unspecified type diabetes mellitus without mention of complication, not stated as uncontrolled     Dr. Majaon    Unspecified essential hypertension     Urinary frequency 09/29/2010    followed by urology. no benefit from detrol or sanctura. month trial of macrobid and flomax 10/10              Past Surgical History:     Past Surgical History:   Procedure Laterality Date    ------------OTHER-------------      Interstim    Ablation       ARTHROPLASTY HIP Left 7/15/2020    Procedure: Left total hip arthroplasty;  Surgeon: Jayson Victoria MD;  Location: [facility] OR    BACK SURGERY  03/05/2020    spinal fusion    BACK SURGERY      C CT ANGIOGRAPHY CORONARY  1/2005    mod soft plaque LAD and Cx, follow up with left heart cath    CARDIAC SURGERY      CHOLECYSTECTOMY      CHOLECYSTECTOMY      COLONOSCOPY      CORONARY STENT PLACEMENT  2004     x 5 stents     CV CORONARY ANGIOGRAM N/A 4/29/2022    Procedure: Coronary Angiogram;  Surgeon: Sudarshan Lee MD;  Location:  HEART CARDIAC CATH LAB    CV FLUOROSCOPY ONLY N/A 8/31/2022    Procedure: Fluoroscopy Only;  Surgeon: Jun Hamlin MD;  Location:  HEART CARDIAC CATH LAB    CV HEART CATHETERIZATION WITH POSSIBLE INTERVENTION N/A 2/14/2019    Procedure: Coronary Angiogram;  Surgeon: Sudarshan Lee MD;  Location: [facility] CARDIAC CATH LAB; patent stents    CV LEFT HEART CATH N/A 4/29/2022    Procedure: Left Heart Catheterization;  Surgeon: Sudarshan Lee MD;  Location: [facility] CARDIAC CATH LAB    CV LEFT VENTRICULOGRAM N/A 4/29/2022    Procedure: Left Ventriculogram;  Surgeon: Sudarshan Lee MD;  Location: [facility] CARDIAC CATH LAB    CV LOWER EXTREMITY ANGIOGRAM BILATERAL Bilateral 6/27/2022    Procedure: Angiogram Lower Extremity Bilateral;  Surgeon: Jun Hamlin MD;  Location: [facility] CARDIAC CATH LAB    CV LOWER EXTREMITY ANGIOGRAM BILATERAL Right 9/23/2022    Procedure: Angiogram Lower Extremity Bilateral;  Surgeon: Jun Hamlin MD;  Location: [facility] CARDIAC CATH LAB    CV LOWER EXTREMITY ANGIOGRAM RIGHT Right 8/31/2022    Procedure: Angiogram Lower Extremity Right-Aborted;  Surgeon: Jun Hamlin MD;  Location: [facility] CARDIAC CATH LAB    CYSTOSCOPY      EXCISE LESION UPPER EXTREMITY  6/5/2013    Procedure: EXCISE LESION UPPER EXTREMITY;  EXCISION RIGHT ARM ANTICUBITAL LIPOMA ;  Surgeon: Jayson Joe MD;  Location: [facility] REMOVAL OF  TONSILS,12+ Y/O      HEART CATH LEFT HEART CATH  3/2/2016    No intervention - aggressive medical management    HEART CATH LEFT HEART CATH  4/21/2016     MAYA x 2 to OM1, MAYA to LAD, at [facility]    HEART CATH LEFT HEART CATH  6/29/2004    MAYA to RCA    HEART CATH LEFT HEART CATH  3/28/2002    Mild to moderate 3 vessel CAD. Medical management recommended.     HEART CATH LEFT HEART CATH  11/22/2016    MAYA to OM1    hypothyroid      JOINT REPLACEMENT      KNEE SURGERY  4/20/10    right knee replacement    LOWER ANGIOPLASTY RIGHT Right 9/23/2022    Procedure: Lower Extremity Angioplasty Right;  Surgeon: Jun Hamlin MD;  Location:  HEART CARDIAC CATH LAB    LUMBAR FUSION N/A 3/5/2020    Procedure: BILATERAL LUMBAR 2-3 AND LUMBAR 3-4 POSTERIOR SPINAL FUSION WITH LEFT LATERAL RECESS DECOMPRESSION LUMBAR 4-5 AND BILATERAL LAMINECTOMY AND DECOMPRESSION LUMBAR 2-3 AND LUMBAR 3-4 WITH ALLOGRAFT AND AUTOGRAFT AND VIVIGEN;  Surgeon: Navid Caicedo MD;  Location: [facility];  Service: Spine    LUMBAR FUSION Bilateral 1/22/2021    Procedure: REVISION OF NONUNION BILATERAL LUMBAR 2-3 AND LUMBAR 3-4 WITH REVISION OF INSTRUMENTATION RIGHT LUMBAR 2 AND LEFT LUMBAR 4 WITH LEFT LUMBAR 4-5 LAMINOFORAMINOTOMY AND LUMBAR 1-2 DECOMPRESSION WITH ALLOGRAFT AND BONE MARROW ASPIRATE;  Surgeon: Navid Caicedo MD;  Location: [facility];  Service: Spine    ORTHOPEDIC SURGERY      total knee 2010    REMOVE STIMULATOR SACRAL NERVE N/A 11/2/2015    Procedure: REMOVE STIMULATOR SACRAL NERVE;  Surgeon: Gertrudis Frausto MD;  Location:  [facility]    REPLACEMENT TOTAL KNEE Right 04/2010    SURGICAL HISTORY OF -       Uterine endometrial ablation    ZZC LIGATE FALLOPIAN TUBE      endometrial ablation              Allergies:   Jardiance [empagliflozin], Statins, and Versed [midazolam]       Data:   All laboratory data reviewed:    Recent Labs   Lab Test 12/29/23  1115 01/13/23  1055 11/18/21  0849 09/15/20  0806  03/11/20  0850 10/23/19  0000 08/28/17  0927 02/09/17  0000   LDL 71  --  56 41  --   --    < > 56   HDL 77  --  74 71  --   --    < > 67   NHDL 85  --  69 56  --   --    < >  --    CHOL 162  --  143 127  --   --    < > 136   TRIG 69 59 67 77  --   --    < > 64   TSH  --   --   --   --   --  1.02  --  0.42   IRON  --   --   --   --  16*  --   --   --     < > = values in this interval not displayed.       Lab Results   Component Value Date    WBC 4.0 09/23/2022    WBC 3.9 (L) 07/19/2020    RBC 4.22 09/23/2022    RBC 2.60 (L) 07/19/2020    HGB 12.7 09/23/2022    HGB 12.1 09/15/2020    HCT 38.2 09/23/2022    HCT 23.8 (L) 07/19/2020    MCV 91 09/23/2022    MCV 92 07/19/2020    MCH 30.1 09/23/2022    MCH 29.6 07/19/2020    MCHC 33.2 09/23/2022    MCHC 32.4 07/19/2020    RDW 13.1 09/23/2022    RDW 12.1 07/19/2020     09/23/2022     07/19/2020       Lab Results   Component Value Date     12/29/2023     07/19/2020    POTASSIUM 4.6 12/29/2023    POTASSIUM 4.1 09/23/2022    POTASSIUM 3.7 07/19/2020    CHLORIDE 104 12/29/2023    CHLORIDE 102 01/13/2023    CHLORIDE 107 07/19/2020    CO2 26 12/29/2023    CO2 22 09/23/2022    CO2 27 07/19/2020    ANIONGAP 10 12/29/2023    ANIONGAP 6 09/23/2022    ANIONGAP 2 (L) 07/19/2020     (H) 12/29/2023     (H) 09/23/2022     (H) 07/19/2020    BUN 18.1 12/29/2023    BUN 23 09/23/2022    BUN 14 07/19/2020    CR 0.73 12/29/2023    CR 0.71 07/19/2020    GFRESTIMATED 84 12/29/2023    GFRESTIMATED >60 01/24/2021    GFRESTIMATED 84 07/19/2020    GFRESTBLACK >60 01/24/2021    GFRESTBLACK >90 07/19/2020    LUCIUS 10.5 (H) 12/29/2023    LUCIUS 8.5 07/19/2020      Lab Results   Component Value Date    AST 16 01/24/2021    AST 21 07/18/2020    ALT 22 12/29/2023    ALT 49 09/15/2020       Lab Results   Component Value Date    A1C 8.0 (H) 01/22/2021    A1C 8.5 (H) 07/19/2020       Lab Results   Component Value Date    INR 0.96 09/23/2022    INR 1.02 08/31/2022     INR 0.88 02/14/2019    INR 0.94 02/24/2017         GUNNAR LEA MD  [facility]

## 2024-01-13 ENCOUNTER — HEALTH MAINTENANCE LETTER (OUTPATIENT)
Age: 79
End: 2024-01-13

## 2024-01-18 ENCOUNTER — HOSPITAL ENCOUNTER (OUTPATIENT)
Dept: ULTRASOUND IMAGING | Facility: CLINIC | Age: 79
Discharge: HOME | End: 2024-01-18
Attending: INTERNAL MEDICINE | Admitting: INTERNAL MEDICINE
Payer: COMMERCIAL

## 2024-01-18 DIAGNOSIS — I73.9 PAD (PERIPHERAL ARTERY DISEASE) (H): ICD-10-CM

## 2024-01-18 DIAGNOSIS — I73.9 CLAUDICATION OF BOTH LOWER EXTREMITIES (H): ICD-10-CM

## 2024-01-18 PROCEDURE — 93925 LOWER EXTREMITY STUDY: CPT | Mod: 26 | Performed by: INTERNAL MEDICINE

## 2024-01-18 PROCEDURE — 93925 LOWER EXTREMITY STUDY: CPT

## 2024-01-19 ENCOUNTER — CARE COORDINATION (OUTPATIENT)
Dept: CARDIOLOGY | Facility: CLINIC | Age: 79
End: 2024-01-19
Payer: COMMERCIAL

## 2024-01-19 NOTE — PROGRESS NOTES
Routed results to Dr. Burgos for review/recommendations. Cherelle Gaston RN on 1/19/2024 at 4:43 PM    Study Result    Narrative & Impression   Duplex ultrasound of right lower extremity arteries dated 1/18/24     Clinical information : Peripheral artery disease; PTCA of right distal  SFA 9/23/2022     Comparison: Duplex ultrasound 11/2/2022 for the right and 5/12/2022  for the left     Impression:   Right femoral artery widely patent with triphasic waveforms, calcific  shadowing of lower leg vessels but triphasic waveforms throughout.        Findings:      Right lower extremity:   Common femoral artery: 128 cm/sec.  Proximal SFA: 107 cm/sec.  Mid SFA: 106 cm/sec.  Distal SFA: 116 cm/sec.  Popliteal artery: 170 cm/sec.  ALPESH distal: 59 cm/s  PTA distal: 24 cm/s  Peroneal not visualized.     Compared to the prior study, velocities in the superficial femoral  artery are similar, velocity in the popliteal artery has increased  from 127 cm/sec to 170 cm/sec and velocities in the ALPESH and PTA have  decreased (ALPESH was 114 cm/s and PTA was 120 cm/s)     Left lower extremity:   Common femoral artery: 140 cm/sec.  Proximal SFA: 118 cm/sec.  Mid SFA: 118 cm/sec.  Distal SFA: 62 cm/sec.  Popliteal artery: 251 cm/sec.  ALPESH distal: 58 cm/s  PTA distal: 91 cm/s  Peroneal distal: 77 cm/s     Compared to the prior ultrasound from 5/2022, the velocity in the  popliteal artery has increased from 131 cm/s to 251 cm/sec.      APRIL BELLAMY MD

## 2024-01-19 NOTE — TELEPHONE ENCOUNTER
The interpretation of the arterial ultrasound of the lower extremities suggest that there are no significant areas of recurrent or progressive  arterial stenosis.  The right lower extremity which was treated with angioplasty most recently, is widely patent with good blood flow.  Dr. Saba could give her a more thorough explanation and I would encourage her to follow through with an evaluation with him if she wants to consider doing a lower extremity angiogram.  However, this result would suggest that her leg pains are not likely related to arterial insufficiency and may be more related to problems in her back.  Feroz Burgos MD

## 2024-01-22 ENCOUNTER — TELEPHONE (OUTPATIENT)
Dept: OTHER | Facility: CLINIC | Age: 79
End: 2024-01-22
Payer: COMMERCIAL

## 2024-01-22 DIAGNOSIS — I73.9 PAD (PERIPHERAL ARTERY DISEASE) (H): Primary | ICD-10-CM

## 2024-01-22 NOTE — PROGRESS NOTES
Call out to pt w/Dr. Burgos's interpretation an comments on 1/18/24 US LE. Pt would like Dr. Burgos's further comments more specifically on the LLE. She appreciates his comments on the RLE results but would like more comment on the LLE findings. Discussed pt's current symptoms. She states she continues to walk regularly as she was told walking would be the best treatment for her. Pt denies symptoms such as temperature changes or increased pain. Pt denies numbness or weakness of BLE. At this time pt states she is limited by a tightening feeling in calves. She does report BLE have a shiny, brownish appearance. Denies pallor or pain. Pt agrees to contact Dr. Saba to make an appt soon to discuss the US results more thoroughly. She would especially like to discuss the significance of the LLE increased velocities with him. Pt to contact us in the mean time if any worsening symptoms, concerns or difficulty getting an appt w/Dr. Saba. Cherelle Gaston RN on 1/22/2024 at 3:41 PM      Feroz Burgos MD  [facility] 73 days ago     EE  The interpretation of the arterial ultrasound of the lower extremities suggest that there are no significant areas of recurrent or progressive  arterial stenosis.  The right lower extremity which was treated with angioplasty most recently, is widely patent with good blood flow.  Dr. Saba could give her a more thorough explanation and I would encourage her to follow through with an evaluation with him if she wants to consider doing a lower extremity angiogram.  However, this result would suggest that her leg pains are not likely related to arterial insufficiency and may be more related to problems in her back.  Feroz Burgos MD

## 2024-01-22 NOTE — TELEPHONE ENCOUNTER
[facility]    Who is the name of the provider?:  ORALIA SABA   What is the location you see this provider at/preferred location?: [place]  Person calling / Facility: Sarah Longo  Phone number:  [phone] (Mobile)   Nurse call back needed:  yes     Reason for call:  Dr Burgos referred Rayna to be seen by Dr Saba - she had an US done 1/18/2024     US LWR EXT ART DUP BILAT     She is wanting to schedule an appt    Pharmacy location:     Outside Imaging: n/a   Can we leave a detailed message on this number?  YES     1/22/2024, 3:54 PM

## 2024-01-23 NOTE — TELEPHONE ENCOUNTER
Pt referred to [facility] by Dr. Burgos for Peripheral artery disease; PTCA of right distal  SFA 9/23/2022.     Chart review:   Pt s/p lower extremity bilateral angiogram with Dr. Hamlin/Dr. Saba in 6/22.     Pt needs to be scheduled for JARVIS/TBI and New pt in clinic consult with Dr. Saba (per referring provider request).  Will route to scheduling to coordinate an appointment at next available.    Appt note: new pt (for clinic, pt previously had angiogram in 6/2022 with Dr. Hamlin/Dr. Mantilla). PAD, PTCA of right distal SFA 9/23/22. JARVIS/TBI to be obtained prior, US Le arterial duplex bilateral in [facility].     Zoë Almaraz, BSN, RN  [facility] [place]

## 2024-01-24 NOTE — TELEPHONE ENCOUNTER
Spoke with patient and she is scheduled for the requested appointments.  Patient requested same day for testing and office visit and in [place] which pushed the next available out.

## 2024-02-07 ENCOUNTER — APPOINTMENT (OUTPATIENT)
Dept: URBAN - METROPOLITAN AREA CLINIC 256 | Age: 79
Setting detail: DERMATOLOGY
End: 2024-02-07

## 2024-02-07 VITALS — WEIGHT: 150 LBS | HEIGHT: 68 IN

## 2024-02-07 VITALS — HEIGHT: 68 IN | WEIGHT: 150 LBS

## 2024-02-07 DIAGNOSIS — L66.1 LICHEN PLANOPILARIS: ICD-10-CM

## 2024-02-07 PROBLEM — L89.151 PRESSURE ULCER OF SACRAL REGION, STAGE 1: Status: ACTIVE | Noted: 2024-02-07

## 2024-02-07 PROCEDURE — OTHER PATIENT SPECIFIC COUNSELING: OTHER

## 2024-02-07 PROCEDURE — 99214 OFFICE O/P EST MOD 30 MIN: CPT

## 2024-02-07 PROCEDURE — OTHER PRESCRIPTION MEDICATION MANAGEMENT: OTHER

## 2024-02-07 PROCEDURE — OTHER COUNSELING: OTHER

## 2024-02-07 PROCEDURE — OTHER MIPS QUALITY: OTHER

## 2024-02-07 PROCEDURE — OTHER PRESCRIPTION: OTHER

## 2024-02-07 RX ORDER — FLUOCINONIDE 0.5 MG/ML
SOLUTION TOPICAL
Qty: 60 | Refills: 5 | Status: ERX

## 2024-02-07 RX ORDER — HYDROXYCHLOROQUINE SULFATE 200 MG/1
TABLET ORAL
Qty: 60 | Refills: 2 | Status: ERX

## 2024-02-07 ASSESSMENT — LOCATION SIMPLE DESCRIPTION DERM: LOCATION SIMPLE: ANTERIOR SCALP

## 2024-02-07 ASSESSMENT — LOCATION ZONE DERM: LOCATION ZONE: SCALP

## 2024-02-07 ASSESSMENT — LOCATION DETAILED DESCRIPTION DERM: LOCATION DETAILED: MID-FRONTAL SCALP

## 2024-02-07 NOTE — PROCEDURE: PATIENT SPECIFIC COUNSELING
Detail Level: Zone
Discussed the best option is to re-initiate Plaquenil but that it is a suppressive method, not a cure. Advised the patient needs to follow up with her ophthalmologist in the near future due to Plaquenil re-initiation. Assured that Plaquenil is best for treating her condition. Reviewed side effects being bone marrow suppression and that labs would need to be conducted (WBC, hemoglobin, kidney, liver) in the future to monitor. Reviewed chronic nature of lichen planopilaris.
Advised patient she needs to bring her seat cushion with her everywhere to avoid recurrence or exacerbation.

## 2024-02-07 NOTE — PROCEDURE: PRESCRIPTION MEDICATION MANAGEMENT
Render In Strict Bullet Format?: No
Detail Level: Zone
Plan: The patient is to follow up in 2 months for re-evaluation.
Initiate Treatment: Plaquenil 200mg BID & fluocinonide 0.05% solution

## 2024-02-22 ENCOUNTER — OFFICE VISIT (OUTPATIENT)
Dept: SURGERY | Facility: CLINIC | Age: 79
End: 2024-02-22
Attending: SURGERY
Payer: COMMERCIAL

## 2024-02-22 ENCOUNTER — HOSPITAL ENCOUNTER (OUTPATIENT)
Dept: ULTRASOUND IMAGING | Facility: CLINIC | Age: 79
Discharge: HOME | End: 2024-02-22
Attending: SURGERY | Admitting: SURGERY
Payer: COMMERCIAL

## 2024-02-22 VITALS
DIASTOLIC BLOOD PRESSURE: 62 MMHG | BODY MASS INDEX: 23.04 KG/M2 | HEART RATE: 72 BPM | SYSTOLIC BLOOD PRESSURE: 128 MMHG | WEIGHT: 152 LBS | HEIGHT: 68 IN | OXYGEN SATURATION: 99 %

## 2024-02-22 DIAGNOSIS — I74.3 EMBOLISM AND THROMBOSIS OF ARTERIES OF THE LOWER EXTREMITIES (H): ICD-10-CM

## 2024-02-22 DIAGNOSIS — I73.9 PERIPHERAL ARTERY DISEASE (H): Primary | ICD-10-CM

## 2024-02-22 DIAGNOSIS — I73.9 PAD (PERIPHERAL ARTERY DISEASE) (H): ICD-10-CM

## 2024-02-22 DIAGNOSIS — I73.9 PAD (PERIPHERAL ARTERY DISEASE) (H): Primary | ICD-10-CM

## 2024-02-22 PROCEDURE — 93924 LWR XTR VASC STDY BILAT: CPT

## 2024-02-22 PROCEDURE — 99213 OFFICE O/P EST LOW 20 MIN: CPT | Performed by: SURGERY

## 2024-02-22 NOTE — PROGRESS NOTES
I had the pleasure of seeing Mr. Sarah Longo again today.  She is a 78-year-old female with peripheral arterial disease as well as neurogenic claudication.  She has previously had bilateral superficial femoral artery balloon angioplasty is under the care of Dr. Hamlin from cardiology.  Previously when I had seen her I noted that the symptoms were purely due to her neurogenic claudication.    Recently she underwent a sonography which shows recurrent stenosis of the left second segment popliteal artery.  It shows the luminal diameter of 3.7 mm with a velocity of greater than 50 cm/s.    She did noninvasive arterial studies which showed normal ankle-brachial indexes at rest and postexercise.    I explained that it is worthwhile taking a look with an angiogram of the left lower extremity as now we are dealing with an artery that is not going to follow the natural history of disease.  This is mostly out of abundance of caution.  Will proceed with an angiogram of the left lower extremity through a right transfemoral approach.  If I do not think that the degree of stenosis is severe then we will do intravascular ultrasonography and then decide whether or not repeat balloon angioplasty would be necessary.    All of this was explained to the patient and to her .

## 2024-02-23 ENCOUNTER — HOSPITAL ENCOUNTER (OUTPATIENT)
Facility: CLINIC | Age: 79
End: 2024-02-23
Payer: COMMERCIAL

## 2024-02-23 ENCOUNTER — TELEPHONE (OUTPATIENT)
Dept: OTHER | Facility: CLINIC | Age: 79
End: 2024-02-23
Payer: COMMERCIAL

## 2024-02-23 ENCOUNTER — MYC MEDICAL ADVICE (OUTPATIENT)
Dept: OTHER | Facility: CLINIC | Age: 79
End: 2024-02-23
Payer: COMMERCIAL

## 2024-02-23 NOTE — PATIENT INSTRUCTIONS
Sarah Longo,     Your visit to [facility] for your procedure is coming soon and we look forward to seeing you! This friendly reminder and pre-procedure checklist will help to ensure your procedure goes smoothly and meets your expectations. At [facility], our goal is to provide you with a great patient experience and to deliver genuine, professional care to every patient.      Please complete all the steps in advance of your visit. If you have any questions about the items listed below, please give our office a call. We can be reached at [phone] or visit our website at [email or web address] for more information.      Procedure: Left  Leg  Angiogram      Procedure Date :  03/12/2024     Procedure Time :  12:00pm     Arrival Time: 10:30am     2 week Post Procedure Appointment with   and also ultrasound: 03/25/24 JARVIS US @3:00PM, Dr Saba @4:00pm     Admission Type: Outpatient     Surgeon:      Procedure Location: [facility]:  [address] [place] [address] (Fax: [phone])     Pre-Procedure checklist:     [x]  IF YOU TAKE BLOOD THINNERS SEE SPECIFIC INSTRUCTIONS BELOW:  PLEASE DO NOT STOP YOUR ASPIRIN OR PLAVIX UNLESS SPECIFICALLY DIRECTED BY THE VASCULAR SURGEON TO STOP!  In most cases Vascular providers want you to continue these. This is different from most NON vascular surgeries and may not be well known by your Primary Care Provider.   -Aspirin: PLEASE DO NOT STOP THIS MEDICATION PRIOR TO SURGERY/ PROCEDURE.      [x] If you take these diabetic medications, please discuss with your primary doctor and follow the hold instructions:   Hold seven (7) days prior for once weekly injectable doses [semaglutide (Ozempic, Wegovy), dulaglutide  (Trulicity), exenatide ER (Bydureon), tirzepatide (Mounjaro)]  Hold the day before and day of for once daily injectable GLP-1 agonists [exenatide  (Byetta), liraglutide (Saxenda,Victoza)]  Hold seven (7) days for oral semaglutide (Rybelsus)     [x] Eating and drinking restrictions  Do not eat 8 hours before your procedure. You may have clear liquids up to 2 hours before surgery.  Clear liquids include water, soda, apple or cranberry juice, Jell-O, popsicles, black coffee or tea.  Dairy products and Tomato products are NOT considered Clear Liquids.     [x] Arrange for a family member or friend to drive you home.  They must be able understand the discharge instructions and stay with you the first night.     [x] A Pre-op physical within 30 days of the procedure is required. You will need to set up an appointment with your primary care provider.  Please be sure to address all other medications including diabetic medications with your Primary Care Physician prior to your angiogram.  If you are on Metformin, please HOLD for 48 hours after the procedure.     [x] Contact your insurance regarding coverage  If you would like a [place] Estimate for your upcoming procedure at [facility] Location, contact Cost of Care Estimates.   [phone] or [phone] for questions regarding an estimate.  [phone] for questions about your bill or to dispute your bill.  Advocates are available Monday through Friday 8am - 5pm.    [email or web address]   Anesthesia services are billed separately through [facility]. [facility] does not have access to their contracted rates. Please call [phone] for their estimate.     [x] DO NOT BRING FMLA WITH TO SURGERY.  These should be sent to the provider's office by fax to [phone].          Day of Surgery  Medications - Take as indicated or directed by your PCP with sips of water.   Take a shower the morning of surgery.  Do not put on any lotions, perfumes/cologne, makeup, etc, after your shower.  Wear comfortable loose-fitting clothes. Wear your glasses-Not  contacts. Do not wear jewelry and remove body piercings. Surgery may be cancelled if they are not removed.   You may have 1 family member wait in the lobby during your surgery. Visitor restrictions are subject to change. Please verify with the surgery nurse when they call.   Have a someone come with you to surgery that can help you understand the surgeon's instructions, drive you home and stay with you overnight the first night.     What is Peripheral Angiography?     Peripheral angiography is an outpatient procedure that makes a  map  of the vessels (arteries) in your lower body, legs, and arms, using X-ray and dye.  This map can show where blood flow may be blocked.  An angiogram is commonly performed under sedation with the use of local anesthesia.     The procedure usually starts with a needle put into the femoral (groin) artery. From one treatment site, areas all over the body can be treated.  After access is established, catheters (thin tubes) and wires are threaded through the arterial system to a specific area of interest or throughout the entire body.  As a contrast agent (iodine dye) is injected, X-ray images are taken to let your provider view the flow of the dye and identify blockages. The surgeon can then choose the best mode of therapy for you - whether during or following the angiogram. This decision depends on your symptoms and the severity and characteristics of the blockages.  Two common therapies that can be provided during the angiogram are balloon angioplasty and stent placement.                   Angioplasty can be used to open arterial blockages. Guided by X-ray, your provider navigates through the blockage with a wire and introduces a special device equipped with an inflatable balloon. After positioning the balloon device across the blocked portion of the artery, the provider inflates the balloon to expand the artery and compress the blockage. The balloon is then deflated and removed while  keeping the wire in place across the area that has been treated. Next, contrast dye is injected to assess the result. Treatment is considered a success if blood flow is improved and less than 30% of the blockage remains. If the vessel is still considerably narrowed, placing a stent may be the next step.  Stents are used to prop open an artery at the site of a narrowing. Stents are generally placed after balloon angioplasty when there is residual narrowing or insufficient blood flow in a treated vessel. Stents are considered a permanent implant and cannot be used if you have a metal allergy. Stents that are used in the leg are constructed of a nickel-titanium alloy (Nitinol), a memory-shaped metal. This alloy has a predetermined size and shape at body temperature and expands to this size and shape after being introduced through a catheter. These stents resist kinking and are flexible so that damage from activities that involve your legs is minimized.  Your procedure may require other techniques to address the problem or plaque.      If surgery is felt to be a better option, your vascular provider will obtain any additional X-ray images needed to plan a surgical bypass of the blocked vessel/s and will then conclude the angiogram.     During the procedure  Here is what to expect:  You may get medicine through an IV (intravenous) line to relax you. You re given an injection to numb the insertion site. Then, a tiny skin cut (incision) is made near an artery in your groin.  Your provider inserts a thin tube (catheter) through the incision. He or she then threads the catheter into an artery while looking at a video monitor.  Contrast  dye  is injected into the catheter to confirm position. You may feel warmth or pressure in your legs and back. You lie still as X-rays are taken. The catheter is then taken out.     After the procedure  You ll be taken to a recovery area. A healthcare provider will apply pressure to the site  for about 10 minutes. Your healthcare provider will tell you how long to lie down and keep the insertion site still. Your healthcare provider will discuss the results with you soon after the procedure.     Angiogram Procedure Discharge Instructions  1. If you received sedation for your procedure: Do not drive or operate heavy machinery for the rest of the day.  2. Avoid strenuous activity for 72 hours (3 days):                        - Do not lift greater than 10 pounds.                        - Excessive exercise                        - Straining                        - Return to your normal activities as you tolerate after the 3 days restriction  3. Avoid tub baths, Jacuzzis, hot tubs and pools for 72 hours (3 days) or until puncture site is will healed.  4. You may shower beginning tomorrow. Do not scrub puncture site(s) until well healed, pat dry.  5. You can expect to return to work 1-2 days after your procedure - depending on the nature of your profession.  6. It is normal to have some tenderness and minimal swelling at puncture site. A small area of discoloration may be present. Tenderness typically subsides in 24-48 hours. A small knot may also be present at puncture site for 6-8 weeks, this can be a normal part of the healing process.     After the angiogram and what to watch for when you get home  1. If you experience any bleeding or active swelling from puncture site: Lie down, firmly apply pressure to puncture site and CALL 9-1-1  2. Fever greater than 101 degrees Fahrenheit.  3. Redness, swelling, warmth to touch, or purulent (yellow/green/foul smelling) drainage from the puncture site.  4. Increasing pain, tenderness, or swelling at puncture site OR of arm/leg near puncture site.  5. Feeling weak or faint.  6. Change in color, temperature, or sensation of arm/leg where puncture was made.     All invasive procedures can have complications. While the risk of an angiogram is low it is not zero. The most  common complications are related to the arterial access site and include the following:  Bruising is common.  You will likely have bruising (ecchymosis) where the artery was entered.    Pain and bleeding.  Less commonly, patients experience pain and bleeding that may include blood collecting under the skin (hematoma).    Blockage and leakage.  In rare cases, the access artery can become blocked. Infrequently, patients experience persistent leakage of blood where the artery was entered, which can result in the formation of a pseudoaneurysm--a blood-filled sac--that may require further treatment.  Allergic reaction to the iodine contrast dye, which can lead to the development of kidney failure.  Very rarely during balloon angioplasty and/or stent placement, part of the arterial blockage can break off (embolism) and travel to more distant arteries. This can worsen blood flow.     Notify our office right away, if you have any changes in your health status, or if you develop a cold, flu, diarrhea, infection, fever or sore throat before your scheduled surgery date.   We can be reached at  [phone] Monday-Friday 8 am-4:30 pm if you have any questions.   Thank you for choosing [facility].

## 2024-02-26 NOTE — TELEPHONE ENCOUNTER
Spoke to patient reviewed surgery date/time with patient. Patient aware pre-op is needed and will coordinate with PCP or someone within group. Post op scheduled with JARVIS prior on 03/25/24 in [place] with Dr Saba. Mailing surgery packet on 02/26/24.

## 2024-03-01 ENCOUNTER — APPOINTMENT (OUTPATIENT)
Dept: ULTRASOUND IMAGING | Facility: CLINIC | Age: 79
End: 2024-03-01
Attending: EMERGENCY MEDICINE
Payer: COMMERCIAL

## 2024-03-01 ENCOUNTER — APPOINTMENT (OUTPATIENT)
Dept: GENERAL RADIOLOGY | Facility: CLINIC | Age: 79
End: 2024-03-01
Attending: EMERGENCY MEDICINE
Payer: COMMERCIAL

## 2024-03-01 ENCOUNTER — HOSPITAL ENCOUNTER (EMERGENCY)
Facility: CLINIC | Age: 79
Discharge: HOME | End: 2024-03-01
Attending: EMERGENCY MEDICINE | Admitting: EMERGENCY MEDICINE
Payer: COMMERCIAL

## 2024-03-01 VITALS
OXYGEN SATURATION: 100 % | TEMPERATURE: 97.7 F | SYSTOLIC BLOOD PRESSURE: 155 MMHG | DIASTOLIC BLOOD PRESSURE: 65 MMHG | HEART RATE: 77 BPM | RESPIRATION RATE: 18 BRPM

## 2024-03-01 DIAGNOSIS — L03.115 CELLULITIS OF RIGHT FOOT: ICD-10-CM

## 2024-03-01 LAB
ANION GAP SERPL CALCULATED.3IONS-SCNC: 10 MMOL/L (ref 7–15)
BASOPHILS # BLD AUTO: 0 10E3/UL (ref 0–0.2)
BASOPHILS NFR BLD AUTO: 1 %
BUN SERPL-MCNC: 17.9 MG/DL (ref 8–23)
CALCIUM SERPL-MCNC: 10.2 MG/DL (ref 8.8–10.2)
CHLORIDE SERPL-SCNC: 104 MMOL/L (ref 98–107)
CREAT SERPL-MCNC: 0.68 MG/DL (ref 0.51–0.95)
CRP SERPL-MCNC: 11.76 MG/L
DEPRECATED HCO3 PLAS-SCNC: 23 MMOL/L (ref 22–29)
EGFRCR SERPLBLD CKD-EPI 2021: 89 ML/MIN/1.73M2
EOSINOPHIL # BLD AUTO: 0.1 10E3/UL (ref 0–0.7)
EOSINOPHIL NFR BLD AUTO: 4 %
ERYTHROCYTE [DISTWIDTH] IN BLOOD BY AUTOMATED COUNT: 12.2 % (ref 10–15)
ERYTHROCYTE [SEDIMENTATION RATE] IN BLOOD BY WESTERGREN METHOD: 14 MM/HR (ref 0–30)
GLUCOSE SERPL-MCNC: 194 MG/DL (ref 70–99)
HCT VFR BLD AUTO: 39.2 % (ref 35–47)
HGB BLD-MCNC: 13.1 G/DL (ref 11.7–15.7)
HOLD SPECIMEN: NORMAL
HOLD SPECIMEN: NORMAL
IMM GRANULOCYTES # BLD: 0 10E3/UL
IMM GRANULOCYTES NFR BLD: 0 %
LYMPHOCYTES # BLD AUTO: 0.7 10E3/UL (ref 0.8–5.3)
LYMPHOCYTES NFR BLD AUTO: 20 %
MCH RBC QN AUTO: 30.6 PG (ref 26.5–33)
MCHC RBC AUTO-ENTMCNC: 33.4 G/DL (ref 31.5–36.5)
MCV RBC AUTO: 92 FL (ref 78–100)
MONOCYTES # BLD AUTO: 0.3 10E3/UL (ref 0–1.3)
MONOCYTES NFR BLD AUTO: 8 %
NEUTROPHILS # BLD AUTO: 2.4 10E3/UL (ref 1.6–8.3)
NEUTROPHILS NFR BLD AUTO: 67 %
NRBC # BLD AUTO: 0 10E3/UL
NRBC BLD AUTO-RTO: 0 /100
PLATELET # BLD AUTO: 180 10E3/UL (ref 150–450)
POTASSIUM SERPL-SCNC: 4 MMOL/L (ref 3.4–5.3)
RBC # BLD AUTO: 4.28 10E6/UL (ref 3.8–5.2)
SODIUM SERPL-SCNC: 137 MMOL/L (ref 135–145)
WBC # BLD AUTO: 3.5 10E3/UL (ref 4–11)

## 2024-03-01 PROCEDURE — 96365 THER/PROPH/DIAG IV INF INIT: CPT

## 2024-03-01 PROCEDURE — 99285 EMERGENCY DEPT VISIT HI MDM: CPT | Mod: 25

## 2024-03-01 PROCEDURE — 36415 COLL VENOUS BLD VENIPUNCTURE: CPT | Performed by: EMERGENCY MEDICINE

## 2024-03-01 PROCEDURE — 93971 EXTREMITY STUDY: CPT | Mod: RT

## 2024-03-01 PROCEDURE — 87040 BLOOD CULTURE FOR BACTERIA: CPT | Performed by: EMERGENCY MEDICINE

## 2024-03-01 PROCEDURE — 250N000011 HC RX IP 250 OP 636: Performed by: EMERGENCY MEDICINE

## 2024-03-01 PROCEDURE — 85652 RBC SED RATE AUTOMATED: CPT | Performed by: EMERGENCY MEDICINE

## 2024-03-01 PROCEDURE — 85004 AUTOMATED DIFF WBC COUNT: CPT | Performed by: EMERGENCY MEDICINE

## 2024-03-01 PROCEDURE — 80048 BASIC METABOLIC PNL TOTAL CA: CPT | Performed by: EMERGENCY MEDICINE

## 2024-03-01 PROCEDURE — 73630 X-RAY EXAM OF FOOT: CPT | Mod: RT

## 2024-03-01 PROCEDURE — 86140 C-REACTIVE PROTEIN: CPT | Performed by: EMERGENCY MEDICINE

## 2024-03-01 RX ORDER — CEFAZOLIN SODIUM 1 G/3ML
1 INJECTION, POWDER, FOR SOLUTION INTRAMUSCULAR; INTRAVENOUS ONCE
Status: COMPLETED | OUTPATIENT
Start: 2024-03-01 | End: 2024-03-01

## 2024-03-01 RX ADMIN — CEFAZOLIN 1 G: 1 INJECTION, POWDER, FOR SOLUTION INTRAMUSCULAR; INTRAVENOUS at 11:47

## 2024-03-01 ASSESSMENT — ACTIVITIES OF DAILY LIVING (ADL)
ADLS_ACUITY_SCORE: 37

## 2024-03-01 NOTE — ED TRIAGE NOTES
Patient reports cellulitis of the right lower ankle . Patient was on oral antibiotics but got an upset stomach from it. Patient then did not follow up to get a second antibiotic. Pain and swelling is now worse.

## 2024-03-01 NOTE — ED PROVIDER NOTES
History     Chief Complaint:  Wound Check     HPI   Sarah Longo is a 78 year old female with a history of PAD s/p angioplasty, coronary artery disease s/p stent placement, type 2 diabetes mellitus, hypertension, and hyperlipidemia who presents with right foot redness, warmth, and swelling. She saw a podiatrist 9 days ago who was concerned for gout or cellulitis. He started her on doxycycline, which she took for 2 days, then was switched to Keflex due to adverse side effects with doxycycline. She was unable to to fill this prescription and has not been taking it. She took 3 doses of Keflex she had at home, starting yesterday. Reports redness and swelling has been progressing over the past few days. It was initial only in the arch of the foot, and now involves the entire foot and much of the lower leg. Denies fever. No history of blood clots. She is scheduled for lower extremity angiogram with Dr. Carmona on March 12th.     Independent Historian:   The patient's  is at bedside and corroborates the above history.     The patient's daughter has arrived and is very involved with the patient's care.  She notes that the patient did not respond well to doxycycline.  She is also helping her mother to follow-up for her upcoming vascular surgery appointments.    Review of External Notes:   Vascular surgery office visit reviewed from fibber 22nd 2024 and the patient was seen for peripheral arterial disease.  There is concern that she needs a repeat balloon angioplasty.    Medications:    Aspirin 81 mg   Pletal  Cymbalta  Repatha sureclick  Diflucan  Lasix  Cozaar  Novolog  Moujaro  Ultram  Levothyroxine     Past Medical History:    Anxiety   Coronary artery disease   Chronic low back pain  Cystocele  Depression   Type 2 diabetes mellitus   Hypertension   Hypothyroidism   Hyperlipidemia   Overactive bladder  Osteoarthritis   Peripheral artery disease     Past Surgical History:    Ablation  Total hip  arthroplasty, left  Cholecystectomy   Coronary stent placement  Lower extremity angiogram bilateral   Tonsillectomy   Excise lesion upper extremity  Lower extremity angioplasty, right  Lumbar fusion  Tubal ligation  Total knee arthroplasty, right  Remove stimulator sacral nerve  Cataract surgery   Endometrial ablation    Physical Exam   Patient Vitals for the past 24 hrs:   BP Temp Temp src Pulse Resp SpO2   03/01/24 1230 (!) 155/65 97.7  F (36.5  C) Oral 77 18 100 %   03/01/24 1009 (!) 188/92 98.6  F (37  C) -- 75 15 100 %        Physical Exam  Constitutional:       General: She is not in acute distress.     Appearance: Normal appearance. She is not diaphoretic.   HENT:      Head: Atraumatic.      Mouth/Throat:      Mouth: Mucous membranes are moist.   Eyes:      General: No scleral icterus.     Conjunctiva/sclera: Conjunctivae normal.   Cardiovascular:      Rate and Rhythm: Normal rate and regular rhythm.      Heart sounds: Normal heart sounds.      Comments: Perfusion of the right foot is excellent.  Dorsalis pedis and posterior tibial pulses are present.  Cap refill is less than 2 seconds.  Pulmonary:      Effort: No respiratory distress.      Breath sounds: Normal breath sounds.   Abdominal:      General: Abdomen is flat. There is no distension.      Tenderness: There is no abdominal tenderness.   Musculoskeletal:      Cervical back: Neck supple.      Comments: The right foot and lower calf are edematous compared to the left.  There is not an open wound but the patient does have diffuse erythema of the dorsum of the foot.  She has a mild amount of erythema without lymphangitis on the anterior right lower leg.  No crepitance.  Minimal tenderness.   Skin:     General: Skin is warm.      Capillary Refill: Capillary refill takes less than 2 seconds.      Findings: No rash.   Neurological:      General: No focal deficit present.      Mental Status: She is alert and oriented to person, place, and time.      Comments:  Strength and sensation intact to the right foot.   Psychiatric:         Mood and Affect: Mood normal.         Behavior: Behavior normal.           Emergency Department Course   Imaging:  Foot  XR, G/E 3 views, right   Final Result   IMPRESSION: No acute fracture or malalignment. Moderate first MTP   joint degenerative changes. Otherwise mild degenerative changes   throughout the foot. Hammertoe deformities. Osteopenia. Vascular   calcification.      KEVIN TORRES MD            SYSTEM ID:  [id number]      US Lower Extremity Venous Duplex Right   Final Result   IMPRESSION: Negative for DVT in the right lower extremity.      KEVIN MYLES MD            SYSTEM ID:  [id number]           Laboratory:  Labs Ordered and Resulted from Time of ED Arrival to Time of ED Departure   BASIC METABOLIC PANEL - Abnormal       Result Value    Sodium 137      Potassium 4.0      Chloride 104      Carbon Dioxide (CO2) 23      Anion Gap 10      Urea Nitrogen 17.9      Creatinine 0.68      GFR Estimate 89      Calcium 10.2      Glucose 194 (*)    CRP INFLAMMATION - Abnormal    CRP Inflammation 11.76 (*)    CBC WITH PLATELETS AND DIFFERENTIAL - Abnormal    WBC Count 3.5 (*)     RBC Count 4.28      Hemoglobin 13.1      Hematocrit 39.2      MCV 92      MCH 30.6      MCHC 33.4      RDW 12.2      Platelet Count 180      % Neutrophils 67      % Lymphocytes 20      % Monocytes 8      % Eosinophils 4      % Basophils 1      % Immature Granulocytes 0      NRBCs per 100 WBC 0      Absolute Neutrophils 2.4      Absolute Lymphocytes 0.7 (*)     Absolute Monocytes 0.3      Absolute Eosinophils 0.1      Absolute Basophils 0.0      Absolute Immature Granulocytes 0.0      Absolute NRBCs 0.0     ERYTHROCYTE SEDIMENTATION RATE AUTO - Normal    Erythrocyte Sedimentation Rate 14     BLOOD CULTURE   BLOOD CULTURE      Emergency Department Course & Assessments:       Interventions:  Medications   ceFAZolin (ANCEF) 1 g vial to attach to  ml bag for ADULT  or 50 ml bag for PEDS (0 g Intravenous Stopped 3/1/24 1220)        Independent Interpretation (X-rays, CTs, rhythm strip):  X-ray of the right foot independently reviewed.  Arthritic changes without clear osteomyelitis or fracture.    Assessments/Consultations/Discussion of Management or Tests:  ED Course as of 03/01/24 1420   Fri Mar 01, 2024   1014 I obtained the history and examined the patient as noted above.    1254 I rechecked and updated the patient. They were amenable to plan for discharge.        Social Determinants of Health affecting care:   The patient has multiple family members who are very active in her care.    Disposition:  The patient was discharged to home.     Impression & Plan    Medical Decision Making:  This patient is a 78-year-old presents to the emergency department with cellulitis in her foot.  She and her family bring pictures over the last few days.  She has had progressive swelling and redness.  She has peripheral vascular disease which certainly will put her at risk for nonhealing infection.  She is about a day and a half of doxycycline and then stopped antibiotics.  Yesterday she took 2 doses of an old prescription of cephalexin while waiting to get cefalexin filled at her pharmacy.    Given the patient's underlying conditions and her worsening infection I recommended that she be admitted to the hospital.  She verbalizes that she understands my concerns about sepsis, osteomyelitis, worsening infection. The patient is adamantly opposed to being admitted and very much wants a further trial of outpatient management.  Technically this is not yet a failure of outpatient antibiotics and she has not had more than a day or 2 of continuous antibiotics and even this has been in divided doses with an old prescription.  The patient was given Ancef here.  DVT study negative.  X-ray without clear osteomyelitis.    The patient has confirmed with her pharmacy that she can now  her cefalexin.   She will start taking it today.  Should keep her leg elevated and stay off is much as possible.  The family will continue to take pictures for comparison.  They agreed to seek immediate medical attention if she has any increased redness, swelling, pain, or if she develops a fever.    Diagnosis:    ICD-10-CM    1. Cellulitis of right foot  L03.115          Scribe Disclosure:  I, Prema Heller, am serving as a scribe at 10:26 AM on 3/1/2024 to document services personally performed by Amilcar Bansal MD based on my observations and the provider's statements to me.     3/1/2024   Amilcar Bansal MD McRoberts, Sean Edward, MD  03/01/24 5217

## 2024-03-01 NOTE — DISCHARGE INSTRUCTIONS
Return to the hospital for fever, increased pain, increased redness, increased swelling, or any new concerning changes.    Please rest and keep your leg elevated is much as possible.    You should follow-up for a recheck of your leg on Monday morning through your primary care clinic.    You should start taking your prescribed Keflex today.

## 2024-03-04 ENCOUNTER — TELEPHONE (OUTPATIENT)
Dept: OTHER | Facility: CLINIC | Age: 79
End: 2024-03-04
Payer: COMMERCIAL

## 2024-03-04 NOTE — TELEPHONE ENCOUNTER
[facility]    Who is the name of the provider?:  ORALIA JALLOH   What is the location you see this provider at/preferred location?: [place]  Person calling / Facility: Sarah Longo  Phone number:  [phone]   Nurse call back needed:  YES     Reason for call:  Patient describes ER visit at [facility] on 3/1 for cellulitis. Patient concerned about how this effects her treatment plan. She asked for a callback to discuss.    Pharmacy location:     N/A  Outside Imaging: n/a   Can we leave a detailed message on this number?  YES     3/4/2024, 8:26 AM

## 2024-03-04 NOTE — TELEPHONE ENCOUNTER
Per chart review patient was seen in ED on 3/1/24 for cellulitis and admission was recommended but patient declined admission and is currently on PO Cephalexin.     She would like to know if she can still proceed with her scheduled angiogram with Dr. Saba on 3/12/24. I explained we need her to update us in a few days to see if she is responding well to the oral antibiotics but if something seems to be getting worse and she starts feeling weak, body aches, chills or develops fever to go back into the ED for possible admission and may need IV antibiotics as explained when she was in the ED on 3/1/24 and she is in agreement.     Routing to Dr. Saba as FYI and to advise anything further.    Audrey FAULKNER, RN    [facility]  Office: [phone]  Fax: [phone]

## 2024-03-06 LAB
BACTERIA BLD CULT: NO GROWTH
BACTERIA BLD CULT: NO GROWTH

## 2024-03-06 NOTE — TELEPHONE ENCOUNTER
"Patient called stating she feels her cellulitis is getting better but is not gone.  \"I would like to know if I am having my procedure on Tuesday!\"   I explained that we need the cellulitis to be completley resolved to proceed with the procedure and need to touch base in a few days to see how things are.  She responded- \" so you are saying I will not be having the procedure?\"  I again explained that we need to see if the cellulitis resolves before Tuesday before we can answer that.  She responded-\" I also have not completed a pre-op exam and do not have one scheduled. Can't we just use my ER visit from 3/1/24?\"  I explained I will need to send a message to Dr. Saba to get his ok to use the ED visit on 3/1/24 instead of a formal pre-op exam.  She responded- \" so you are telling me I need a pre-op exam?\"  I explained that is not what I said and that again I need to discuss with Dr. Saba and will get back to her.   She then ended the call.    Routing to Dr. Saba to advise on if patient needs pre-op or ok to use ED visit.    Audrey FAULKNER RN    [facility]  Office: [phone]  Fax: [phone]      "

## 2024-03-07 ENCOUNTER — HOSPITAL ENCOUNTER (OUTPATIENT)
Facility: CLINIC | Age: 79
End: 2024-03-07
Payer: COMMERCIAL

## 2024-03-07 ENCOUNTER — VIRTUAL VISIT (OUTPATIENT)
Dept: SURGERY | Facility: CLINIC | Age: 79
End: 2024-03-07
Payer: COMMERCIAL

## 2024-03-07 VITALS — BODY MASS INDEX: 23.04 KG/M2 | HEIGHT: 68 IN | WEIGHT: 152 LBS

## 2024-03-07 DIAGNOSIS — I70.213 ATHEROSCLEROSIS OF NATIVE ARTERIES OF EXTREMITIES WITH INTERMITTENT CLAUDICATION, BILATERAL LEGS (H): Primary | ICD-10-CM

## 2024-03-07 DIAGNOSIS — I73.9 PAD (PERIPHERAL ARTERY DISEASE) (H): Primary | ICD-10-CM

## 2024-03-07 PROCEDURE — 99441 PR PHYSICIAN TELEPHONE EVALUATION 5-10 MIN: CPT | Mod: 93 | Performed by: SURGERY

## 2024-03-07 RX ORDER — CEPHALEXIN 500 MG/1
500 CAPSULE ORAL 2 TIMES DAILY
COMMUNITY

## 2024-03-07 NOTE — TELEPHONE ENCOUNTER
Patient's angiogram needs to be rescheduled due to cellulitis.  Spoke with patient and the only date to avoid for scheduling is 4/2/24.      Informed her we will work on getting her rescheduled and will get back to her.

## 2024-03-07 NOTE — TELEPHONE ENCOUNTER
Dr. Saba would like to do a phone visit with Rayna today at 1 pm.  Discussed with Rayna who is in agreement.     Audrey FAULKNER, JOLANTA    [facility]  Office: [phone]  Fax: [phone]

## 2024-03-07 NOTE — PROGRESS NOTES
Sarah Longo is a 78 year old year old who is being evaluated via a billable telephone visit.      What phone number would you like to be contacted at? [phone]       Phone call duration: 5 minutes     I called Mrs. Longo.  We had planned on doing a left lower extremity arteriogram today right transfemoral approach next week.  She has developed a cellulitis in the right lower extremity.  I advised that I would recommend not proceeding with the angiogram next week.  I explained that we will be continuing the right common femoral artery for the left lower extremity arteriogram.  After the procedure I used an Angio-Seal device.  In light of her recovering from an infection in the right foot it would be not advisable to place a prosthetic closure device.    She is fully agreeable.    I have advised her to keep her preop appointment and then we will get her rescheduled for the next couple of weeks.  All questions answered.  I called her from the phone in the [place] clinic and she was at home.

## 2024-03-12 ENCOUNTER — TELEPHONE (OUTPATIENT)
Dept: OTHER | Facility: CLINIC | Age: 79
End: 2024-03-12

## 2024-03-14 NOTE — TELEPHONE ENCOUNTER
I called Mrs. Longo.  I requested that we move her case to 2 April, 2024.  However she has to take her  for a colonoscopy on that day.  We will I will move it to April 9, 2024.  She is agreeable.

## 2024-03-14 NOTE — TELEPHONE ENCOUNTER
Spoke with patient and rescheduled her procedure to 4/5/24 and reviewed the time for check-in and the procedure.  She spoke with Dr. Saba earlier and he offered her 4/9/24, but 4/5/24 works best with his schedule.  She was understanding.

## 2024-03-23 ENCOUNTER — HEALTH MAINTENANCE LETTER (OUTPATIENT)
Age: 79
End: 2024-03-23

## 2024-03-26 NOTE — PATIENT INSTRUCTIONS
Thanks for coming into [facility] today.    We discussed: that you can take Aleve/ naproxen once in a while (one a week or so) if needed for pain.    Try scopolamine patches for your motion sickness.  You can leave these on for up to 3 days.      Medication changes:  Increase pantoprazole to twice a day.    Try ranolazine 500 mg once a day to see if your back pain/ angina improves.  If it does, we can look at increasing it to twice a day.       Follow up: as scheduled with Dr. Burgos.        Please call my nurse at [phone] with any questions or concerns.    Scheduling phone number: [phone]  Reminder: Please bring in all current medications, over the counter supplements and vitamin bottles to your next appointment.          
My mom.

## 2024-04-02 RX ORDER — HEPARIN SODIUM 200 [USP'U]/100ML
1 INJECTION, SOLUTION INTRAVENOUS CONTINUOUS PRN
Status: CANCELLED | OUTPATIENT
Start: 2024-04-02

## 2024-04-02 RX ORDER — SODIUM CHLORIDE 9 MG/ML
INJECTION, SOLUTION INTRAVENOUS CONTINUOUS
Status: CANCELLED | OUTPATIENT
Start: 2024-04-02

## 2024-04-02 RX ORDER — LIDOCAINE 40 MG/G
CREAM TOPICAL
Status: CANCELLED | OUTPATIENT
Start: 2024-04-02

## 2024-04-05 ENCOUNTER — APPOINTMENT (OUTPATIENT)
Dept: SURGERY | Facility: PHYSICIAN GROUP | Age: 79
End: 2024-04-05
Payer: COMMERCIAL

## 2024-04-05 ENCOUNTER — APPOINTMENT (OUTPATIENT)
Dept: INTERVENTIONAL RADIOLOGY/VASCULAR | Facility: CLINIC | Age: 79
End: 2024-04-05
Attending: SURGERY
Payer: COMMERCIAL

## 2024-04-05 ENCOUNTER — HOSPITAL ENCOUNTER (OUTPATIENT)
Facility: CLINIC | Age: 79
Discharge: HOME | End: 2024-04-05
Admitting: SURGERY
Payer: COMMERCIAL

## 2024-04-05 VITALS
OXYGEN SATURATION: 99 % | TEMPERATURE: 98.2 F | DIASTOLIC BLOOD PRESSURE: 69 MMHG | HEART RATE: 72 BPM | RESPIRATION RATE: 16 BRPM | HEIGHT: 68 IN | SYSTOLIC BLOOD PRESSURE: 151 MMHG | BODY MASS INDEX: 23.2 KG/M2 | WEIGHT: 153.1 LBS

## 2024-04-05 DIAGNOSIS — I74.3 EMBOLISM AND THROMBOSIS OF ARTERIES OF THE LOWER EXTREMITIES (H): ICD-10-CM

## 2024-04-05 DIAGNOSIS — I70.213 ATHEROSCLEROSIS OF NATIVE ARTERIES OF EXTREMITIES WITH INTERMITTENT CLAUDICATION, BILATERAL LEGS (H): Primary | ICD-10-CM

## 2024-04-05 DIAGNOSIS — I73.9 PAD (PERIPHERAL ARTERY DISEASE) (H): ICD-10-CM

## 2024-04-05 LAB
ANION GAP SERPL CALCULATED.3IONS-SCNC: 12 MMOL/L (ref 7–15)
APTT PPP: 24 SECONDS (ref 22–38)
BUN SERPL-MCNC: 15.8 MG/DL (ref 8–23)
CALCIUM SERPL-MCNC: 10 MG/DL (ref 8.8–10.2)
CHLORIDE SERPL-SCNC: 107 MMOL/L (ref 98–107)
CREAT SERPL-MCNC: 0.76 MG/DL (ref 0.51–0.95)
DEPRECATED HCO3 PLAS-SCNC: 22 MMOL/L (ref 22–29)
EGFRCR SERPLBLD CKD-EPI 2021: 80 ML/MIN/1.73M2
ERYTHROCYTE [DISTWIDTH] IN BLOOD BY AUTOMATED COUNT: 12.3 % (ref 10–15)
GLUCOSE SERPL-MCNC: 145 MG/DL (ref 70–99)
HBA1C MFR BLD: 7.2 %
HCT VFR BLD AUTO: 38.9 % (ref 35–47)
HGB BLD-MCNC: 12.6 G/DL (ref 11.7–15.7)
INR PPP: 1.01 (ref 0.85–1.15)
MCH RBC QN AUTO: 30.1 PG (ref 26.5–33)
MCHC RBC AUTO-ENTMCNC: 32.4 G/DL (ref 31.5–36.5)
MCV RBC AUTO: 93 FL (ref 78–100)
PLATELET # BLD AUTO: 209 10E3/UL (ref 150–450)
POTASSIUM SERPL-SCNC: 4.8 MMOL/L (ref 3.4–5.3)
RBC # BLD AUTO: 4.19 10E6/UL (ref 3.8–5.2)
SODIUM SERPL-SCNC: 141 MMOL/L (ref 135–145)
WBC # BLD AUTO: 4 10E3/UL (ref 4–11)

## 2024-04-05 PROCEDURE — 999N000012 HC STATISTIC ANGIOGRAM, STENT, VERTEBRO PLASTY

## 2024-04-05 PROCEDURE — 80061 LIPID PANEL: CPT | Performed by: SURGERY

## 2024-04-05 PROCEDURE — 258N000003 HC RX IP 258 OP 636: Performed by: SURGERY

## 2024-04-05 PROCEDURE — 250N000011 HC RX IP 250 OP 636: Performed by: SURGERY

## 2024-04-05 PROCEDURE — 85027 COMPLETE CBC AUTOMATED: CPT | Performed by: SURGERY

## 2024-04-05 PROCEDURE — 255N000002 HC RX 255 OP 636: Performed by: SURGERY

## 2024-04-05 PROCEDURE — 85610 PROTHROMBIN TIME: CPT | Performed by: SURGERY

## 2024-04-05 PROCEDURE — 75710 ARTERY X-RAYS ARM/LEG: CPT | Mod: LT

## 2024-04-05 PROCEDURE — 99152 MOD SED SAME PHYS/QHP 5/>YRS: CPT | Performed by: SURGERY

## 2024-04-05 PROCEDURE — 75710 ARTERY X-RAYS ARM/LEG: CPT | Mod: 26 | Performed by: SURGERY

## 2024-04-05 PROCEDURE — 250N000009 HC RX 250: Performed by: SURGERY

## 2024-04-05 PROCEDURE — 76937 US GUIDE VASCULAR ACCESS: CPT | Mod: 26 | Performed by: SURGERY

## 2024-04-05 PROCEDURE — C1760 CLOSURE DEV, VASC: HCPCS

## 2024-04-05 PROCEDURE — 36415 COLL VENOUS BLD VENIPUNCTURE: CPT | Performed by: SURGERY

## 2024-04-05 PROCEDURE — 85730 THROMBOPLASTIN TIME PARTIAL: CPT | Performed by: SURGERY

## 2024-04-05 PROCEDURE — 272N000196 HC ACCESSORY CR5

## 2024-04-05 PROCEDURE — C1769 GUIDE WIRE: HCPCS

## 2024-04-05 PROCEDURE — 272N000116 HC CATH CR1

## 2024-04-05 PROCEDURE — 36247 INS CATH ABD/L-EXT ART 3RD: CPT | Performed by: SURGERY

## 2024-04-05 PROCEDURE — 80048 BASIC METABOLIC PNL TOTAL CA: CPT | Performed by: SURGERY

## 2024-04-05 PROCEDURE — 83036 HEMOGLOBIN GLYCOSYLATED A1C: CPT | Performed by: SURGERY

## 2024-04-05 RX ORDER — ONDANSETRON 2 MG/ML
4 INJECTION INTRAMUSCULAR; INTRAVENOUS
Status: DISCONTINUED | OUTPATIENT
Start: 2024-04-05 | End: 2024-04-05 | Stop reason: HOSPADM

## 2024-04-05 RX ORDER — ACETAMINOPHEN 325 MG/1
650 TABLET ORAL EVERY 6 HOURS PRN
Status: DISCONTINUED | OUTPATIENT
Start: 2024-04-05 | End: 2024-04-05 | Stop reason: HOSPADM

## 2024-04-05 RX ORDER — SODIUM CHLORIDE 9 MG/ML
INJECTION, SOLUTION INTRAVENOUS CONTINUOUS
Status: DISCONTINUED | OUTPATIENT
Start: 2024-04-05 | End: 2024-04-05 | Stop reason: HOSPADM

## 2024-04-05 RX ORDER — HEPARIN SODIUM 200 [USP'U]/100ML
1 INJECTION, SOLUTION INTRAVENOUS CONTINUOUS PRN
Status: DISCONTINUED | OUTPATIENT
Start: 2024-04-05 | End: 2024-04-05 | Stop reason: HOSPADM

## 2024-04-05 RX ORDER — HEPARIN SODIUM 1000 [USP'U]/ML
1000-10000 INJECTION, SOLUTION INTRAVENOUS; SUBCUTANEOUS ONCE
Status: COMPLETED | OUTPATIENT
Start: 2024-04-05 | End: 2024-04-05

## 2024-04-05 RX ORDER — HYDROXYCHLOROQUINE SULFATE 200 MG/1
200 TABLET, FILM COATED ORAL DAILY
COMMUNITY

## 2024-04-05 RX ORDER — LIDOCAINE 40 MG/G
CREAM TOPICAL
Status: DISCONTINUED | OUTPATIENT
Start: 2024-04-05 | End: 2024-04-05 | Stop reason: HOSPADM

## 2024-04-05 RX ORDER — OXYCODONE HYDROCHLORIDE 5 MG/1
5 TABLET ORAL EVERY 4 HOURS PRN
Status: DISCONTINUED | OUTPATIENT
Start: 2024-04-05 | End: 2024-04-05 | Stop reason: HOSPADM

## 2024-04-05 RX ORDER — NALOXONE HYDROCHLORIDE 0.4 MG/ML
0.2 INJECTION, SOLUTION INTRAMUSCULAR; INTRAVENOUS; SUBCUTANEOUS
Status: DISCONTINUED | OUTPATIENT
Start: 2024-04-05 | End: 2024-04-05 | Stop reason: HOSPADM

## 2024-04-05 RX ORDER — NALOXONE HYDROCHLORIDE 0.4 MG/ML
0.4 INJECTION, SOLUTION INTRAMUSCULAR; INTRAVENOUS; SUBCUTANEOUS
Status: DISCONTINUED | OUTPATIENT
Start: 2024-04-05 | End: 2024-04-05 | Stop reason: HOSPADM

## 2024-04-05 RX ORDER — FENTANYL CITRATE 50 UG/ML
25-50 INJECTION, SOLUTION INTRAMUSCULAR; INTRAVENOUS EVERY 5 MIN PRN
Status: DISCONTINUED | OUTPATIENT
Start: 2024-04-05 | End: 2024-04-05 | Stop reason: HOSPADM

## 2024-04-05 RX ORDER — IODIXANOL 320 MG/ML
100 INJECTION, SOLUTION INTRAVASCULAR ONCE
Status: COMPLETED | OUTPATIENT
Start: 2024-04-05 | End: 2024-04-05

## 2024-04-05 RX ADMIN — FENTANYL CITRATE 25 MCG: 50 INJECTION, SOLUTION INTRAMUSCULAR; INTRAVENOUS at 13:13

## 2024-04-05 RX ADMIN — IODIXANOL 25 ML: 320 INJECTION, SOLUTION INTRAVASCULAR at 13:21

## 2024-04-05 RX ADMIN — SODIUM CHLORIDE: 9 INJECTION, SOLUTION INTRAVENOUS at 11:37

## 2024-04-05 RX ADMIN — HEPARIN SODIUM 3000 UNITS: 1000 INJECTION INTRAVENOUS; SUBCUTANEOUS at 13:09

## 2024-04-05 RX ADMIN — FENTANYL CITRATE 50 MCG: 50 INJECTION, SOLUTION INTRAMUSCULAR; INTRAVENOUS at 13:03

## 2024-04-05 RX ADMIN — HEPARIN SODIUM 3 BAG: 200 INJECTION, SOLUTION INTRAVENOUS at 13:26

## 2024-04-05 RX ADMIN — LIDOCAINE HYDROCHLORIDE 10 ML: 10 INJECTION, SOLUTION INFILTRATION; PERINEURAL at 13:24

## 2024-04-05 ASSESSMENT — ACTIVITIES OF DAILY LIVING (ADL)
ADLS_ACUITY_SCORE: 37

## 2024-04-05 NOTE — PROGRESS NOTES
Care Suites Discharge Nursing Note    Patient Information  Name: Sarah Longo  Age: 78 year old    Discharge Education:  Discharge instructions reviewed: Yes - by Alfonso PAGE RN  Additional education/resources provided: angioseal pamphlet  Patient/patient representative verbalizes understanding: Yes  Patient discharging on new medications: No  Medication education completed: N/A    Discharge Plans:   Discharge location: home  Discharge ride contacted: Yes  Approximate discharge time: 1610    Discharge Criteria:  Discharge criteria met and vital signs stable: Yes    Patient Belongs:  Patient belongings returned to patient: Yes    Pt ambulated in sanchez at baseline with cane, voided, and tolerated PO intake without issue. PIV removed. Discharged via wheelchair to private vehicle. No new pain or complaints at time of discharge.     Verna Carroll RN

## 2024-04-05 NOTE — TELEPHONE ENCOUNTER
4/4/24 @ 8:55AM Patient left message on surgery scheduling line to call her to make sure she is ready for her procedure on 4/5/24.    4/5/24 @ 8:40AM  Spoke with patient and informed her I was not in the office yesterday.  She said she followed the instructions of holding the Mounjaro and followed the diet restrictions.  She will be checking in today for her procedure.

## 2024-04-05 NOTE — DISCHARGE INSTRUCTIONS
Peripheral Angiogram Discharge Instructions - Femoral     After you go home:    Have an adult stay with you until tomorrow.  Drink extra fluids for 2 days.  You may resume your normal diet.  No smoking       For 24 hours - due to the sedation you received:  Relax and take it easy.  Do NOT make any important or legal decisions.  Do NOT drive or operate machines at home or at work.  Do NOT drink alcohol.    Care of Groin Puncture Site:    For the first 24 hrs - check the puncture site every 1-2 hours while awake.  For 2 days, when you cough, sneeze, laugh or move your bowels, hold your hand over the puncture site and press firmly.  Remove the bandaid after 24 hours. If there is minor oozing, apply another bandaid and remove it after 12 hours.  It is normal to have a small bruise or pea size lump at the site.  You may shower tomorrow.  Do NOT take a bath, or use a hot tub or pool for at least 3 days. Do NOT scrub the site. Do not use lotion or powder near the puncture site.     Activity:            For 2 days:  No stooping or squatting  Do NOT do any heavy activity such as exercise, lifting, or straining.   No housework, yard work or any activity that make you sweat  Do NOT lift more than 10 pounds    Bleeding:    If you start bleeding from the site in your groin, lie down flat and press firmly on/above the site for 10 minutes.   Once bleeding stops, lay flat for 2 hours.   Call the [facility] as soon as you can.       Call 911 right away if you have heavy bleeding or bleeding that does not stop.      Medicines:    If you are on Metformin (Glucophage) and your GFR (kidney function level) is >30, you may continue taking your Metformin.  If you are on Metformin (Glucophage) and your GFR (kidney function level) is <30, do not restart the Metformin for 48 hours after your procedure. Check with your primary care giver before restarting the Metformin to see if you need to have blood drawn to recheck your kidney  function (GFR).  If you are taking an antiplatelet medication such as Plavix, do not stop taking it until you talk to your provider.     Take your medications, including blood thinners, unless your provider tells you not to.    If you take Coumadin (Warfarin), have your INR checked by your provider in  3-5 days. Call your clinic to schedule this.  If you have stopped any medicines, check with your provider about when to restart them.        Follow Up Appointments:    Follow up with [facility] as directed.    Call the clinic if:    You have increased pain or a large or growing hard lump around the site.  The site is red, swollen, hot or tender.  Blood or fluid is draining from the site.  You have chills or a fever greater than 101 F (38 C).  Your leg feels numb, cool or changes color.  You have hives, a rash or unusual itching.  New pain in the back or belly that you cannot control with Tylenol.  Any questions or concerns.    Other Instructions:    If you received a stent - carry your stent card with you at all times.      If you have questions or your original symptoms do not improve, call:         [facility] @ [phone]

## 2024-04-05 NOTE — PROGRESS NOTES
Care Suites Post Procedure Note    Patient Information  Name: Sarah Longo  Age: 78 year old    Post Procedure  Time patient returned to Care Suites: 1330  Concerns/abnormal assessment: None at this time  If abnormal assessment, provider notified: N/A  Plan/Other: Monitor site, vitals and sedation    Alfonso Rodríguez RN

## 2024-04-05 NOTE — IR NOTE
Interventional Radiology Intra-procedural Nursing Note    Patient Name: Sarah Longo  Medical Record Number: [id number]  Today's Date: April 5, 2024    Procedure: Left Leg Angiogram  Start time: 1301  End time: 1320  Report provided to: Alfonso STOVER    Note: Patient entered Interventional Radiology Suite number 1 via cart. Patient awake, alert and orientated. Assisted onto procedural table in supine position. Prepped and draped.  Dr. Saba in room. Time out and procedure started. Vital signs stable. Telemetry reading NSR.    Procedure well tolerated by patient without complications. Procedure end with debrief by Dr. Saba.    Angioseal deployed 2 hours of bedrest, may ambulate at 1530.      Administered medication totals:  Lidocaine 1% 10 mL Intradermal  Heparin 3000 Units IVP  Protamine 10 mg IVP  Fentanyl 75 mcg IVP    Last dose of sedation administered at 1313.

## 2024-04-05 NOTE — PROGRESS NOTES
Care Suites Admission Nursing Note    Patient Information  Name: Sarah Longo  Age: 78 year old  Reason for admission: LE angiogram  Care Suites arrival time: 1024    Visitor Information  Name: Kwadwo     Patient Admission/Assessment   Pre-procedure assessment complete: Yes  If abnormal assessment/labs, provider notified: N/A  NPO: Yes  Medications held per instructions/orders: Yes  Consent: obtained  Patient oriented to room: Yes  Education/questions answered: Yes  Plan/other: Proceed as planned per orders    Discharge Planning  Discharge name/phone number: Kwadwo [phone]  Overnight post sedation caregiver: Kwadwo  Discharge location: home    Verna Carroll RN

## 2024-04-05 NOTE — PRE-PROCEDURE
GENERAL PRE-PROCEDURE:   Procedure:  LEFT LOWER EXTREMITY ARTERIOGRAM WITH POSSIBLE INTERVENTION  Date/Time:  4/5/2024 11:00 AM    Written consent obtained?: Yes    Risks and benefits: Risks, benefits and alternatives were discussed    Consent given by:  Patient  Patient states understanding of procedure being performed: Yes    Patient's understanding of procedure matches consent: Yes    Procedure consent matches procedure scheduled: Yes    Expected level of sedation:  Moderate  Appropriately NPO:  Yes  ASA Class:  3  Mallampati  :  Grade 2- soft palate, base of uvula, tonsillar pillars, and portion of posterior pharyngeal wall visible  Lungs:  Lungs clear with good breath sounds bilaterally  Heart:  Normal heart sounds and rate  History & Physical reviewed:  History and physical reviewed and no updates needed  Statement of review:  I have reviewed the lab findings, diagnostic data, medications, and the plan for sedation

## 2024-04-06 LAB
CHOLEST SERPL-MCNC: 153 MG/DL
FASTING STATUS PATIENT QL REPORTED: YES
HDLC SERPL-MCNC: 79 MG/DL
LDLC SERPL CALC-MCNC: 60 MG/DL
NONHDLC SERPL-MCNC: 74 MG/DL
TRIGL SERPL-MCNC: 70 MG/DL

## 2024-04-10 ENCOUNTER — TELEPHONE (OUTPATIENT)
Dept: OTHER | Facility: CLINIC | Age: 79
End: 2024-04-10
Payer: COMMERCIAL

## 2024-04-10 DIAGNOSIS — I73.9 PAD (PERIPHERAL ARTERY DISEASE) (H): Primary | ICD-10-CM

## 2024-04-10 NOTE — TELEPHONE ENCOUNTER
----- Message from Alec Saba MD sent at 4/5/2024  3:05 PM CDT -----  Regarding: Follow-up  No intervention was undertaken during her angiogram today.  We do not need to see her in 2 weeks with any imaging.  We will see her back in 6 months with ankle-brachial indices with exercise.  I have discussed with the patient and her  and they are okay with that plan.  I also told them that there is no need for the 2-week follow-up and she they are also okay with that.  Thanks,  Ayush

## 2024-04-10 NOTE — TELEPHONE ENCOUNTER
Please see below and call patient to cancel scheduled appointments and JARVIS US.   Please explain we will mail a letter closer to the time she is due for follow up in 6 months.  Gold sheet filed.    Audrey FAULKNER RN    [facility]  [facility]  Office: [phone]  Fax: [phone]

## 2024-04-11 ENCOUNTER — APPOINTMENT (OUTPATIENT)
Dept: URBAN - METROPOLITAN AREA CLINIC 256 | Age: 79
Setting detail: DERMATOLOGY
End: 2024-04-12

## 2024-04-11 DIAGNOSIS — L82.1 OTHER SEBORRHEIC KERATOSIS: ICD-10-CM

## 2024-04-11 DIAGNOSIS — L66.1 LICHEN PLANOPILARIS: ICD-10-CM

## 2024-04-11 PROCEDURE — OTHER ADDITIONAL NOTES: OTHER

## 2024-04-11 PROCEDURE — 99214 OFFICE O/P EST MOD 30 MIN: CPT

## 2024-04-11 PROCEDURE — OTHER PRESCRIPTION MEDICATION MANAGEMENT: OTHER

## 2024-04-11 PROCEDURE — OTHER MIPS QUALITY: OTHER

## 2024-04-11 PROCEDURE — OTHER PRESCRIPTION: OTHER

## 2024-04-11 PROCEDURE — OTHER COUNSELING: OTHER

## 2024-04-11 RX ORDER — HYDROXYCHLOROQUINE SULFATE 200 MG/1
TABLET ORAL
Qty: 60 | Refills: 2 | Status: ERX

## 2024-04-11 NOTE — PROCEDURE: ADDITIONAL NOTES
Additional Notes: - Discussed with patient this is the best treatment for patient’s condition at this time and to continue with the medications.\\n- Discussed the chronic nature of the condition and it’s possibility of improving over time.\\n- Advised for regular lab work with her PCP.
Render Risk Assessment In Note?: no
Detail Level: Simple
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 Can treat with liquid nitrogen, but this treatment will leave scars.

## 2024-04-11 NOTE — PROCEDURE: PRESCRIPTION MEDICATION MANAGEMENT
Render In Strict Bullet Format?: No
Detail Level: Zone
Continue Regimen: Plaquenil 200mg BID & fluocinonide 0.05% solution

## 2024-07-25 ENCOUNTER — APPOINTMENT (OUTPATIENT)
Dept: URBAN - METROPOLITAN AREA CLINIC 256 | Age: 79
Setting detail: DERMATOLOGY
End: 2024-07-26

## 2024-07-25 VITALS — WEIGHT: 150 LBS | HEIGHT: 68 IN

## 2024-07-25 DIAGNOSIS — L66.1 LICHEN PLANOPILARIS: ICD-10-CM

## 2024-07-25 PROCEDURE — OTHER PRESCRIPTION: OTHER

## 2024-07-25 PROCEDURE — 99214 OFFICE O/P EST MOD 30 MIN: CPT

## 2024-07-25 PROCEDURE — OTHER COUNSELING: OTHER

## 2024-07-25 PROCEDURE — OTHER PRESCRIPTION MEDICATION MANAGEMENT: OTHER

## 2024-07-25 PROCEDURE — OTHER ADDITIONAL NOTES: OTHER

## 2024-07-25 PROCEDURE — OTHER MIPS QUALITY: OTHER

## 2024-07-25 RX ORDER — HYDROXYCHLOROQUINE SULFATE 200 MG/1
TABLET ORAL
Qty: 60 | Refills: 5 | Status: ERX

## 2024-07-25 NOTE — PROCEDURE: ADDITIONAL NOTES
Additional Notes: -Discussed that shampooing hair more can help make her scalp less itchy. Recommend shampooing every other day.\\n-Reiterated that this is a chronic condition.\\n-Advised her to stop using her topical steroid medications on the scalp.\\n-Advised her to continue seeing her Ophthalmologist frequently.
Render Risk Assessment In Note?: no
Detail Level: Simple

## 2024-07-25 NOTE — PROCEDURE: PRESCRIPTION MEDICATION MANAGEMENT
Render In Strict Bullet Format?: No
Detail Level: Zone
Discontinue Regimen: fluocinonide 0.05% solution
Continue Regimen: Plaquenil 200mg BID

## 2024-08-10 ENCOUNTER — HEALTH MAINTENANCE LETTER (OUTPATIENT)
Age: 79
End: 2024-08-10

## 2024-09-04 ENCOUNTER — LAB REQUISITION (OUTPATIENT)
Dept: LAB | Facility: CLINIC | Age: 79
End: 2024-09-04
Payer: COMMERCIAL

## 2024-09-04 DIAGNOSIS — N89.8 OTHER SPECIFIED NONINFLAMMATORY DISORDERS OF VAGINA: ICD-10-CM

## 2024-09-04 PROCEDURE — 87086 URINE CULTURE/COLONY COUNT: CPT | Mod: ORL | Performed by: OBSTETRICS & GYNECOLOGY

## 2024-09-06 LAB — BACTERIA UR CULT: NORMAL

## 2024-10-09 NOTE — LETTER
5/17/2017    Gaye Zimmer  [facility]   [address]  [facility] [address]    RE: Sarah KAPOOR Donta       Dear Colleague,    I had the pleasure of seeing Sarah Longo in the [facility].    I had the opportunity to see Ms. Sarah Longo in Cardiology Clinic today at the [facility] in [place] for reevaluation of recurrent coronary artery disease and cardiac risk factors including hypertension, dyslipidemia and type 2 diabetes.  As you know, she underwent angioplasty and stenting of her obtuse marginal and LAD at the [facility] ER in 04/2016 and then returned with recurrent symptoms in 11/2016 and had restenosis with repeat stenting of her obtuse marginal at that time.  This is a drug-eluting stent.  The LAD did not show any evidence of restenosis.  There was significant residual disease in the apical LAD, but this is a very small caliber artery and had not changed from previous angiograms and was therefore left alone, and medical therapy was recommended.      She was having some concerning symptoms again in February which prompted us to refer her for a Lexiscan nuclear perfusion imaging study on 02/17/2017.  That study demonstrated a moderate-sized inferior and inferolateral defect suggestive of ischemia, but when we went on to do the angiogram again, there was no change in her coronary artery disease findings since the completion of her November angiogram study.  There was no progression in her restenosis or new significant lesions.  Again, medical management was recommended.      Since then, she has been bothered by a lot of back pain.  She has low, middle and upper back pain.  However, it was the upper back pain that we were concerned about in the past as a possible anginal equivalent.  This certainly makes it challenging for us to determine whether she is having recurrent angina or whether this may be all attributable to musculoskeletal  checking blood pressures.  His weight is stable.    Past Medical History:  Problem List Items Addressed This Visit    None     Past Medical History:   Diagnosis Date    CAD (coronary artery disease) 02/12/2023    4 stents 2003 [facility].    Mixed hyperlipidemia 07/21/2023    LDL at baseline 180      Past Surgical History:  He has no past surgical history on file.      Social History:  He reports that he has never smoked. He has never been exposed to tobacco smoke. He has never used smokeless tobacco. He reports current alcohol use of about 7.0 standard drinks of alcohol per week. He reports current drug use. Frequency: 7.00 times per week. Drug: Marijuana.    Family History:  Family History   Problem Relation Name Age of Onset    Accidental death Mother          motor vehicle accident    Brain cancer Father          due to occupational exposure     Allergies:  Penicillins and Statins-hmg-coa reductase inhibitors    Outpatient Medications:  Current Outpatient Medications   Medication Instructions    aspirin 81 mg, oral, Daily    codeine-guaifenesin (Robitussin-AC)  mg/5 mL syrup 5 mL, oral, Every 8 hours PRN    meloxicam (MOBIC) 15 mg, oral, Daily    metoprolol succinate XL (TOPROL-XL) 100 mg, oral, Daily    metoprolol succinate XL (TOPROL-XL) 100 mg, oral, Daily    sildenafil (Viagra) 50 mg tablet oral     Last Recorded Vitals:  There were no vitals filed for this visit.    Physical Exam  Patient is alert and oriented x3.  HEENT is unremarkable mucous members are moist  Neck no JVP no bruits upstrokes are full no thyromegaly  Lungs are clear bilaterally.  No wheezing crackles or rales  Heart regular rhythm normal S1-S2 there is no S3 no murmurs are  heard.  Abdomen is soft bs are positive nontender nondistended no organomegaly no pulsatile masses  Extremities have no edema.  Distal pulses present palpable.  Neuro is grossly nonfocal  Skin has no rashes     Last Labs:  CBC -  Lab Results   Component Value Date    WBC 7.9 03/25/2024    HGB 16.3 03/25/2024    HCT 50.6 03/25/2024    MCV 96 03/25/2024     03/25/2024     CMP -  Lab Results   Component Value Date    CALCIUM 9.9 03/25/2024    PHOS 2.9 07/17/2023    PROT 7.1 03/25/2024    ALBUMIN 4.3 03/25/2024    AST 16 03/25/2024    ALT 23 03/25/2024    ALKPHOS 63 03/25/2024    BILITOT 0.7 03/25/2024     LIPID PANEL -   Lab Results   Component Value Date    CHOL 286 (H) 03/25/2024    HDL 52.3 03/25/2024    CHHDL 5.5 03/25/2024    VLDL 34 03/25/2024    TRIG 169 (H) 03/25/2024    NHDL 234 (H) 03/25/2024     RENAL FUNCTION PANEL -   Lab Results   Component Value Date    K 4.8 03/25/2024    PHOS 2.9 07/17/2023     Lab Results   Component Value Date    HGBA1C 6.0 (H) 03/25/2024     Procedure    Cath 6/13/23 50% LM, 80% LAD, 80% mid LAD, Cx  70%, OM 80%, 90% OM2, % ISR CABG advised    Carotid 7/2023 50% bl    PHARM NST [04/18/2023]: Abnormal = ev/for ischemia inferoposterior wall. WMAs, calc EF 55%.     ECHO [04/18/2023]: EF 60-65%. SD = impaired relaxation pattern.        Assessment/Plan   1. CAD. 4 stents placed in 2003 [place]. 7/2023 CABG x 4 LIMA to LAD, SVG to PDA, FRA horseshoe graft from MAMADOU to OM and diag no symptoms.  He is on metoprolol succinate 100 mg daily and an aspirin.  He is unable to and declines statins and PCSK9 inhibitors.  I have once again had a conversation with the bed about how concerned I am.  He does not want to start any medicines.    2. Hyperlipidemia. His baseline LDL was 200.  He states he is unable to take statins.  He is not willing to consider an injection with Repatha.  He took Nexletol and Zetia but states he developed muscle aches and pains so therefore stopped it.  pain.  She takes Imdur 45 mg a day and believes that the discomfort in her upper back seems to get better shortly after she takes her Imdur.  This discomfort does not reliably occur with exertion but seems to come back if she tries to mow the lawn, which of course requires some upper extremity exercise.  She does not have any chest pain symptoms or discomfort down her arms or in her neck or jaw.  She has no shortness of breath or dizzy spells.      She continues to have the chronic leg pains that she attributes to statin drug use from the past.  She is currently on Repatha and seems to be doing well with that.      PHYSICAL EXAMINATION:  On examination today her blood pressure was 112/58, heart rate 67, weight 174 pounds.  Her lungs are clear.  Heart rhythm is regular.  She has no cardiac murmurs or carotid bruits.     Outpatient Encounter Prescriptions as of 5/17/2017   Medication Sig Dispense Refill     isosorbide mononitrate (IMDUR) 30 MG 24 hr tablet Take 1.5 tablets (45 mg) by mouth daily 135 tablet 3     ranolazine (RANEXA) 500 MG 12 hr tablet Take 1 tablet (500 mg) by mouth 2 times daily 60 tablet 11     evolocumab (REPATHA SURECLICK) 140 MG/ML prefilled autoinjector Inject 1 mL (140 mg) Subcutaneous every 14 days (Patient taking differently: Inject 140 mg Subcutaneous every 14 days Has not started) 2 mL 11     linagliptin-metFORMIN (JENTADUETO) 2.5-1000 MG per tablet Take 1 tablet by mouth 2 times daily (with meals)       PANTOPRAZOLE SODIUM PO Take 20 mg by mouth 2 times daily (before meals)        lisinopril (PRINIVIL/ZESTRIL) 2.5 MG tablet Take 1 tablet (2.5 mg) by mouth daily 90 tablet 3     [DISCONTINUED] nebivolol (BYSTOLIC) 2.5 MG tablet Take 1 tablet (2.5 mg) by mouth daily 90 tablet 3     cholecalciferol 2000 UNITS CAPS Take 1 capsule by mouth daily       NONFORMULARY Tumeric 500 mg bid       [DISCONTINUED] clopidogrel (PLAVIX) 75 MG tablet Take 1 tablet (75 mg) by mouth daily 90 tablet 3      Blood pressures are very high.  I will add amlodipine 10 5 mg to his regimen.  I am encouraging him to check blood pressures at home so we have a better assessment of what his blood pressures are.      Return 6 months    Ameya Jane MD     Instructions and follow up     mirabegron (MYRBETRIQ) 50 MG 24 hr tablet Take 1 tablet (50 mg) by mouth daily 90 tablet 3     Luannea Sylvestris Leaf POWD 500 mg 2 times daily        nitroglycerin (NITROSTAT) 0.4 MG SL tablet Place 1 tablet (0.4 mg) under the tongue every 5 minutes as needed for chest pain If symptoms persist after 3 doses (15 minutes) call 911. 25 tablet 3     Omega-3 Fatty Acids (OMEGA-3 FISH OIL PO) Take 2,400 mg by mouth 2 times daily (with meals)       CINNAMON PO Take 2 capsules by mouth 2 times daily       GLIMEPIRIDE 4 MG OR TABS 1 tablet BID       SYNTHROID 125 MCG OR TABS 1 tablet daily       RESTASIS 0.05 % OP EMUL twice daily       CO ENZYME Q-10 50 MG OR CAPS daliy  0     ASPIRIN 81 MG OR TABS 1 tab po QD (Once per day)       Camphor-Menthol-Methyl Sal (SALONPAS) 1.2-5.7-6.3 % PTCH Patient uses 3-4 patches daily       [DISCONTINUED] isosorbide mononitrate (IMDUR) 60 MG 24 hr tablet Take 1 tablet (60 mg) by mouth daily (Patient taking differently: Take 60 mg by mouth daily Patient has been taking 45 mg) 90 tablet 3     amoxicillin (AMOXIL) 500 MG capsule Reported on 5/17/2017       [DISCONTINUED] alirocumab (PRALUENT) 75 MG/ML injectable pen Inject 1 mL (75 mg) Subcutaneous every 14 days 2 mL 11     STATIN NOT PRESCRIBED, INTENTIONAL, 1 each At Bedtime Statin not prescribed intentionally due to Allergy to statin 0 each 0     No facility-administered encounter medications on file as of 5/17/2017.       IMPRESSIONS:  Ms. Sarah Longo is a 72-year-old woman with multiple cardiac risk factors including hypertension, dyslipidemia and suboptimally controlled type 2 diabetes.  She also has recurrent coronary artery disease, and I am concerned that she may have had some increased risk of restenosis due to her poorly controlled diabetes.  Her hemoglobin A1c in February was 9.2.  However, I am not highly suspicious that she has significant restenosis at this time.  She does have some persistent mild discomfort in her upper  back between her shoulder blades which may have been an anginal equivalent in the past but does not feel as severe as it has when she had last restenosis episode.  For that reason, I think we can continue to try to treat this medically and I will have her stay on the Imdur, which seems to help.  I suggested that she try Ranexa 500 mg p.o. b.i.d. and have given her some samples.  If the discomfort gets better with this, perhaps we can continue that medication as well.  If not, that may give more support to the idea that this may not be cardiac in nature.      I will have her follow up with some repeat cholesterol numbers to make sure that she is doing well on her Repatha and have her follow up with me again in September.  In the meantime, I have encouraged her to follow up with you and her endocrinologist to manage her diabetes more aggressively and to follow her endocrinologist's advice in terms of medical therapy to accomplish that goal.      Thank you for allowing me to participate in Ms. Longo' care.  She did mention that she was going to have a biopsy of her gums for evaluation of a small spot of possible cancer.  I advised her not to stop her aspirin and Plavix for that procedure but to manage any kind of persistent bleeding locally.  She apparently is not a good candidate for an epidural injection because of the use of Plavix.  I do not think we can stop that in her situation until at least a year has gone by, in order to avoid the possibility of acute thrombosis or restenosis.      Thank you for allowing me to participate in Ms. Longo' lianna.     Sincerely,    GUNNAR LEA MD     [facility]

## 2024-10-10 ENCOUNTER — HOSPITAL ENCOUNTER (OUTPATIENT)
Dept: ULTRASOUND IMAGING | Facility: CLINIC | Age: 79
Discharge: HOME | End: 2024-10-10
Attending: SURGERY | Admitting: SURGERY
Payer: COMMERCIAL

## 2024-10-10 DIAGNOSIS — I73.9 PAD (PERIPHERAL ARTERY DISEASE) (H): ICD-10-CM

## 2024-10-10 PROCEDURE — 93924 LWR XTR VASC STDY BILAT: CPT

## 2024-10-19 ENCOUNTER — HEALTH MAINTENANCE LETTER (OUTPATIENT)
Age: 79
End: 2024-10-19

## 2024-10-21 NOTE — PROCEDURE: ADDITIONAL NOTES
Render Risk Assessment In Note?: no
Additional Notes: Sent patient to [facility] to get blood work done.\\nAlso patient will need to get blood work done again in one month
Detail Level: Simple
supervision

## 2024-10-22 ENCOUNTER — MYC MEDICAL ADVICE (OUTPATIENT)
Dept: SURGERY | Facility: CLINIC | Age: 79
End: 2024-10-22
Payer: COMMERCIAL

## 2024-11-27 NOTE — Clinical Note
Sheath prepped and inserted in the artery.  Angiogram of the  left lower extremity.  Detail Level: Detailed Additional Area 6 Units: 0 Show Additional Area 6: Yes Show Ucl Units: No Show Inventory Tab: Show Consent: Written consent obtained. Risks include but not limited to lid/brow ptosis, bruising, swelling, diplopia, temporary effect, incomplete chemical denervation. Post-Care Instructions: Patient instructed to not lie down for 4 hours and limit physical activity for 24 hours. Dilution (U/0.1 Cc): 4 Incrementing Botox Units: By 0.5 Units

## 2025-01-02 DIAGNOSIS — I73.9 PAD (PERIPHERAL ARTERY DISEASE) (H): ICD-10-CM

## 2025-01-02 DIAGNOSIS — I25.10 ATHEROSCLEROSIS OF NATIVE CORONARY ARTERY OF NATIVE HEART WITHOUT ANGINA PECTORIS: ICD-10-CM

## 2025-01-02 DIAGNOSIS — I10 ESSENTIAL HYPERTENSION: ICD-10-CM

## 2025-01-02 RX ORDER — LOSARTAN POTASSIUM 50 MG/1
50 TABLET ORAL DAILY
Qty: 90 TABLET | Refills: 0 | Status: SHIPPED | OUTPATIENT
Start: 2025-01-02

## 2025-01-02 RX ORDER — NITROGLYCERIN 0.4 MG/1
0.4 TABLET SUBLINGUAL EVERY 5 MIN PRN
Qty: 25 TABLET | Refills: 1 | Status: SHIPPED | OUTPATIENT
Start: 2025-01-02

## 2025-01-02 RX ORDER — CILOSTAZOL 100 MG/1
100 TABLET ORAL 2 TIMES DAILY
Qty: 180 TABLET | Refills: 0 | Status: SHIPPED | OUTPATIENT
Start: 2025-01-02

## 2025-01-13 ENCOUNTER — APPOINTMENT (OUTPATIENT)
Dept: URBAN - METROPOLITAN AREA CLINIC 256 | Age: 80
Setting detail: DERMATOLOGY
End: 2025-01-13

## 2025-01-13 DIAGNOSIS — L64.8 OTHER ANDROGENIC ALOPECIA: ICD-10-CM

## 2025-01-13 DIAGNOSIS — L21.8 OTHER SEBORRHEIC DERMATITIS: ICD-10-CM

## 2025-01-13 DIAGNOSIS — L66.11 CLASSIC LICHEN PLANOPILARIS: ICD-10-CM

## 2025-01-13 PROCEDURE — OTHER PRESCRIPTION: OTHER

## 2025-01-13 PROCEDURE — 99214 OFFICE O/P EST MOD 30 MIN: CPT

## 2025-01-13 PROCEDURE — OTHER ADDITIONAL NOTES: OTHER

## 2025-01-13 PROCEDURE — OTHER PRESCRIPTION MEDICATION MANAGEMENT: OTHER

## 2025-01-13 PROCEDURE — OTHER MIPS QUALITY: OTHER

## 2025-01-13 PROCEDURE — OTHER COUNSELING: OTHER

## 2025-01-13 PROCEDURE — OTHER PATIENT SPECIFIC COUNSELING: OTHER

## 2025-01-13 RX ORDER — HYDROXYCHLOROQUINE SULFATE 200 MG/1
TABLET ORAL
Qty: 60 | Refills: 5 | Status: ERX

## 2025-01-13 RX ORDER — KETOCONAZOLE 20 MG/ML
SHAMPOO, SUSPENSION TOPICAL
Qty: 120 | Refills: 5 | Status: ERX | COMMUNITY
Start: 2025-01-13

## 2025-01-13 RX ORDER — FLUOCINONIDE 0.5 MG/ML
SOLUTION TOPICAL
Qty: 120 | Refills: 1 | Status: ERX

## 2025-01-13 NOTE — PROCEDURE: ADDITIONAL NOTES
Detail Level: Simple
Additional Notes: - Scalp is stable.\\n- Patient plans to call Endocrinologist to send over recent labs results.
Render Risk Assessment In Note?: no
Byron Navarro

## 2025-01-13 NOTE — PROCEDURE: PATIENT SPECIFIC COUNSELING
Detail Level: Zone
- Discussed current hair loss seems to be family of androgenetic alopecia this may be exacerbated by seborrheic dermatitis.\\n- Explained to the patient that laser is not helpful.\\n- Patient expressed understanding.
- Discussed with the patient that it does not seem to be correlated with Lichen Planopilaris.

## 2025-01-13 NOTE — PROCEDURE: PRESCRIPTION MEDICATION MANAGEMENT
Render In Strict Bullet Format?: No
Detail Level: Zone
Continue Regimen: Plaquenil 200mg BID & Fluocinonide 0.05% topical solution BID
Initiate Treatment: Ketoconazole 2% shampoo QD in rotation with H&S and Fluocinonide 0.05% topical solution affected area of scalp 1-2 times daily.

## 2025-01-14 ENCOUNTER — LAB (OUTPATIENT)
Dept: LAB | Facility: CLINIC | Age: 80
End: 2025-01-14
Payer: COMMERCIAL

## 2025-01-14 DIAGNOSIS — E78.5 HYPERLIPIDEMIA LDL GOAL <100: ICD-10-CM

## 2025-01-14 DIAGNOSIS — I10 ESSENTIAL HYPERTENSION: ICD-10-CM

## 2025-01-14 LAB
ALT SERPL W P-5'-P-CCNC: 31 U/L (ref 0–50)
ANION GAP SERPL CALCULATED.3IONS-SCNC: 13 MMOL/L (ref 7–15)
BUN SERPL-MCNC: 20.4 MG/DL (ref 8–23)
CALCIUM SERPL-MCNC: 10.5 MG/DL (ref 8.8–10.4)
CHLORIDE SERPL-SCNC: 105 MMOL/L (ref 98–107)
CHOLEST SERPL-MCNC: 190 MG/DL
CREAT SERPL-MCNC: 0.86 MG/DL (ref 0.51–0.95)
EGFRCR SERPLBLD CKD-EPI 2021: 69 ML/MIN/1.73M2
FASTING STATUS PATIENT QL REPORTED: YES
GLUCOSE SERPL-MCNC: 194 MG/DL (ref 70–99)
HCO3 SERPL-SCNC: 23 MMOL/L (ref 22–29)
HDLC SERPL-MCNC: 71 MG/DL
LDLC SERPL CALC-MCNC: 105 MG/DL
NONHDLC SERPL-MCNC: 119 MG/DL
POTASSIUM SERPL-SCNC: 4.2 MMOL/L (ref 3.4–5.3)
SODIUM SERPL-SCNC: 141 MMOL/L (ref 135–145)
TRIGL SERPL-MCNC: 70 MG/DL

## 2025-01-16 ENCOUNTER — OFFICE VISIT (OUTPATIENT)
Dept: CARDIOLOGY | Facility: CLINIC | Age: 80
End: 2025-01-16
Payer: COMMERCIAL

## 2025-01-16 VITALS
OXYGEN SATURATION: 98 % | WEIGHT: 150 LBS | BODY MASS INDEX: 22.73 KG/M2 | HEIGHT: 68 IN | DIASTOLIC BLOOD PRESSURE: 65 MMHG | HEART RATE: 71 BPM | SYSTOLIC BLOOD PRESSURE: 115 MMHG

## 2025-01-16 DIAGNOSIS — I25.10 CORONARY ARTERY DISEASE INVOLVING NATIVE CORONARY ARTERY OF NATIVE HEART WITHOUT ANGINA PECTORIS: Primary | ICD-10-CM

## 2025-01-16 DIAGNOSIS — I73.9 PAD (PERIPHERAL ARTERY DISEASE): ICD-10-CM

## 2025-01-16 RX ORDER — CILOSTAZOL 100 MG/1
100 TABLET ORAL 2 TIMES DAILY
Qty: 180 TABLET | Refills: 3 | Status: SHIPPED | OUTPATIENT
Start: 2025-01-16

## 2025-01-16 NOTE — PROGRESS NOTES
Cardiology Clinic Progress Note    Service Date: 2025    Primary Cardiologist: Dr. Burgos      Reason for Visit: CAD, HTN, HLD     HPI:   Sarah Longo is a pleasant 78 yo woman with PMH significant for the followin.  Coronary artery disease with history of stent to the RCA in .  -Development of symptoms in 2016 and abnormal stress test.  She was found to have a complex trifurcation lesion of the OM and distal LAD, initially managed medically due to complexity and then had trifurcation stenting at [place].  This was the superior branch of the OM, inferior branch as well as the LAD.    -In 2019, she had an apical lesion at 80% and this was in the distal LAD that was unchanged in a small vessel.  Her stents were otherwise patent.  3.  Dyslipidemia, intolerant to statins, on Repatha.  4.  Hypertension.  5.  Type 2 diabetes, on insulin.  6.  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  7.  Chronic low back pain with multiple spinal fusions, hip replacement, multiple surgeries, unsuccessful trial of spinal cord stimulators.  8.  Elevated LVEDP at the time of angiogram with some concerns for LVH and diastolic dysfunction.  9.  Peripheral artery disease,  lithotripsy and angioplasty with drug-coated balloon to the left popliteal and distal superficial femoral artery,  balloon to the right distal superficial femoral artery with a 30% residual lesion. Repeat angiogram  showed stable disease, follows with Dr. Saba    She comes in today for routine f/u.  Over the last couple of days she has noticed right-sided chest discomfort this is tender to the touch.  Her angina has been and previously in her back.  She denies orthopnea or PND.  She continues to have significant leg pain that is bothersome and ongoing back pain.  She denies shortness of breath syncope or presyncope.  She was off her Repatha for about 2 months, so she knows her cholesterol was high for that  "reason.    Social History:  She is a lifelong nonsmoker. No alcohol use. She has 3 children, 7 grandchildren and 2 great-grandchildren.  She is .    Physical Exam:  /65   Pulse 71   Ht 1.727 m (5' 8\")   Wt 68 kg (150 lb)   SpO2 98%   BMI 22.81 kg/m     Well-developed well-nourished woman in no acute distress.  Normocephalic/atraumatic.  Heart is regular rate rhythm there is a 2 out of 6 systolic murmur heard best at the right sternal border.  Right chest is tender to palpation along the right sternal border in the right pectoral region.  Lungs are clear without wheezes rales or rhonchi.  She has palpable pulses in both feet 2+ dorsalis pedis and posterior tibialis bilaterally.  Skin is warm and dry.    Data reviewed:  Echo 9/23 shows EF 60 to 65%, mild AS with trace AR    Assessment and Plan:  1.  Coronary disease with history of stenting in 2016.  Her typical anginal symptoms are pain between her shoulder blades.  Addition, her chest pain is clearly musculoskeletal as she is tender to palpation.  At this point she is without anginal symptoms.    2.  Dyslipidemia, intolerant to statins, had gone off Repatha as she want to see how her labs looked since she had lost so much weight.  She is definitely willing to go back on it we will repeat lipid panel in 2 months.  Will also get an ALT at that time.    3.  Peripheral arterial disease with angiogram last year showing reasonably good flow and palpable pulses today.  She continues to have severe bilateral leg pain that is likely mixed, Dr. Burgos has suggested a trial of Pletal, and that was ordered today if it helps after 1 month she can continue on iron if it does not I think it be reasonable to discontinue.    4.  Hypertension excellent control    5.  Type 2 diabetes well-controlled    It is my great pleasure to participate in this patient's care, she can follow-up with Dr. Burgos in 1 year, sooner with concerns.    SHYANN [facility] " Cardiology     This note was written using Dragon voice recognition system, please excuse any misspelled names, or nonsensical words and call with any questions.      The longitudinal plan of care for the diagnosis(es)/condition(s) as documented were addressed during this visit. Due to the added complexity in care, I will continue to support Rayna in the subsequent management and with ongoing continuity of care.    Orders this visit:  Orders Placed This Encounter   Procedures    Lipid panel reflex to direct LDL Fasting    Basic metabolic panel    CBC with platelets    Follow-Up with Cardiology    Echocardiogram Complete     Orders Placed This Encounter   Medications    cilostazol (PLETAL) 100 MG tablet     Sig: Take 1 tablet (100 mg) by mouth 2 times daily.     Dispense:  180 tablet     Refill:  3     Medications Discontinued During This Encounter   Medication Reason    cilostazol (PLETAL) 100 MG tablet     furosemide (LASIX) 20 MG tablet     cephALEXin (KEFLEX) 500 MG capsule     cyanocobalamin (CYANOCOBALAMIN) 1000 MCG/ML injection     acetylcysteine (N-ACETYL CYSTEINE) 600 MG CAPS capsule     traMADol (ULTRAM) 50 MG tablet     oxyCODONE (ROXICODONE) 5 MG tablet      Encounter Diagnoses   Name Primary?    PAD (peripheral artery disease)     Coronary artery disease involving native coronary artery of native heart without angina pectoris Yes       Current Medications:  Current Outpatient Medications   Medication Sig Dispense Refill    amoxicillin (AMOXIL) 500 MG capsule 2,000 mg once as needed (Before dental procedures)      ascorbic acid 1000 MG TABS tablet Take 500 mg by mouth as needed      aspirin 81 MG EC tablet Take 81 mg by mouth daily      cilostazol (PLETAL) 100 MG tablet Take 1 tablet (100 mg) by mouth 2 times daily. 180 tablet 3    CINNAMON PO Take 2 capsules by mouth 2 times daily prn      DULoxetine (CYMBALTA) 60 MG capsule daily      estradiol (ESTRACE) 0.1 MG/GM vaginal cream Use pea sized amount and  apply with finger to vaginal skin just inside opening once a day before bed as needed for vaginal dryness. 42.5 g 3    evolocumab (REPATHA SURECLICK) 140 MG/ML prefilled autoinjector Inject 1 mL (140 mg) Subcutaneous every 14 days 6 mL 3    fluconazole (DIFLUCAN) 150 MG tablet prn      hydroxychloroquine (PLAQUENIL) 200 MG tablet Take 200 mg by mouth daily      levothyroxine (SYNTHROID/LEVOTHROID) 100 MCG tablet Take 100 mcg by mouth daily      losartan (COZAAR) 50 MG tablet Take 1 tablet (50 mg) by mouth daily. 90 tablet 0    nitroGLYcerin (NITROSTAT) 0.4 MG sublingual tablet Place 1 tablet (0.4 mg) under the tongue every 5 minutes as needed for chest pain. For chest pain place 1 tablet under the tongue every 5 minutes for 3 doses. If symptoms persist 5 minutes after 1st dose call 911. 25 tablet 1    NOVOLOG FLEXPEN 100 UNIT/ML soln Inject 17 Units Subcutaneous 3 times daily as needed for high blood sugar      tirzepatide (MOUNJARO) 10 MG/0.5ML pen Inject 10 mg Subcutaneous once a week      vitamin D3 (CHOLECALCIFEROL) 2000 units tablet Take 1 tablet by mouth 2 times daily         Allergies Reviewed and Updated:  Allergies   Allergen Reactions    Jardiance [Empagliflozin] Other (See Comments)     Ongoing yeast infections    Statins Muscle Pain (Myalgia)     Oral statins      Versed [Midazolam]      Pt is refusing to have this med d/t memory loss and forgetfulness        Review of Systems:  Skin:  Positive for pigmentation   Eyes:       ENT:       Respiratory:  Negative    Cardiovascular:    chest pain, Positive for  Gastroenterology:      Genitourinary:       Musculoskeletal:       Neurologic:       Psychiatric:       Heme/Lymph/Imm:       Endocrine:          Pertinent Past Medical, Surgical and Family History Reviewed and noted above.     CC  Feroz Burgos MD  [address]  [place] [address]

## 2025-01-16 NOTE — PATIENT INSTRUCTIONS
Thank you for visiting with me today.    We discussed:     Medication changes:   Start taking Pletal/ cilostazol 100 mg twice a day.  Give this about 1 month, if it helps we'll keep you on it, if it doesn't you can stop that medication.   Restart your repatha.     Next Steps:   We'll recheck your fasting cholesterol in about 2 months.    Please see Dr. Burgos back in 1 year with an echocardiogram and labs before that visit.      Please call my nurses, Ilene Araujo Stacy, or Zakia at [phone] with questions.      *If you have concerns after hours, please call [phone], option 2 to speak with on call Cardiologist.

## 2025-01-16 NOTE — LETTER
2025    Gaye Zimmer  [address]   [place] [address]    RE: Sarah KAPOOR Donta       Dear Colleague,     I had the pleasure of seeing Sarah Longo in the [facility].    Cardiology Clinic Progress Note    Service Date: 2025    Primary Cardiologist: Dr. Burgos      Reason for Visit: CAD, HTN, HLD     HPI:   Sarah Longo is a pleasant 78 yo woman with PMH significant for the followin.  Coronary artery disease with history of stent to the RCA in .  -Development of symptoms in  and abnormal stress test.  She was found to have a complex trifurcation lesion of the OM and distal LAD, initially managed medically due to complexity and then had trifurcation stenting at [place].  This was the superior branch of the OM, inferior branch as well as the LAD.    -In , she had an apical lesion at 80% and this was in the distal LAD that was unchanged in a small vessel.  Her stents were otherwise patent.  3.  Dyslipidemia, intolerant to statins, on Repatha.  4.  Hypertension.  5.  Type 2 diabetes, on insulin.  6.  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  7.  Chronic low back pain with multiple spinal fusions, hip replacement, multiple surgeries, unsuccessful trial of spinal cord stimulators.  8.  Elevated LVEDP at the time of angiogram with some concerns for LVH and diastolic dysfunction.  9.  Peripheral artery disease,  lithotripsy and angioplasty with drug-coated balloon to the left popliteal and distal superficial femoral artery,  balloon to the right distal superficial femoral artery with a 30% residual lesion. Repeat angiogram  showed stable disease, follows with Dr. Saba    She comes in today for routine f/u.  Over the last couple of days she has noticed right-sided chest discomfort this is tender to the touch.  Her angina has been and previously in her back.  She denies orthopnea or PND.  She continues to have  "significant leg pain that is bothersome and ongoing back pain.  She denies shortness of breath syncope or presyncope.  She was off her Repatha for about 2 months, so she knows her cholesterol was high for that reason.    Social History:  She is a lifelong nonsmoker. No alcohol use. She has 3 children, 7 grandchildren and 2 great-grandchildren.  She is .    Physical Exam:  /65   Pulse 71   Ht 1.727 m (5' 8\")   Wt 68 kg (150 lb)   SpO2 98%   BMI 22.81 kg/m     Well-developed well-nourished woman in no acute distress.  Normocephalic/atraumatic.  Heart is regular rate rhythm there is a 2 out of 6 systolic murmur heard best at the right sternal border.  Right chest is tender to palpation along the right sternal border in the right pectoral region.  Lungs are clear without wheezes rales or rhonchi.  She has palpable pulses in both feet 2+ dorsalis pedis and posterior tibialis bilaterally.  Skin is warm and dry.    Data reviewed:  Echo 9/23 shows EF 60 to 65%, mild AS with trace AR    Assessment and Plan:  1.  Coronary disease with history of stenting in 2016.  Her typical anginal symptoms are pain between her shoulder blades.  Addition, her chest pain is clearly musculoskeletal as she is tender to palpation.  At this point she is without anginal symptoms.    2.  Dyslipidemia, intolerant to statins, had gone off Repatha as she want to see how her labs looked since she had lost so much weight.  She is definitely willing to go back on it we will repeat lipid panel in 2 months.  Will also get an ALT at that time.    3.  Peripheral arterial disease with angiogram last year showing reasonably good flow and palpable pulses today.  She continues to have severe bilateral leg pain that is likely mixed, Dr. Burgos has suggested a trial of Pletal, and that was ordered today if it helps after 1 month she can continue on iron if it does not I think it be reasonable to discontinue.    4.  Hypertension excellent " control    5.  Type 2 diabetes well-controlled    It is my great pleasure to participate in this patient's care, she can follow-up with Dr. Burgos in 1 year, sooner with concerns.    Karen Alexander PA-C  [facility]     This note was written using Dragon voice recognition system, please excuse any misspelled names, or nonsensical words and call with any questions.      The longitudinal plan of care for the diagnosis(es)/condition(s) as documented were addressed during this visit. Due to the added complexity in care, I will continue to support Rayna in the subsequent management and with ongoing continuity of care.    Orders this visit:  Orders Placed This Encounter   Procedures     Lipid panel reflex to direct LDL Fasting     Basic metabolic panel     CBC with platelets     Follow-Up with Cardiology     Echocardiogram Complete     Orders Placed This Encounter   Medications     cilostazol (PLETAL) 100 MG tablet     Sig: Take 1 tablet (100 mg) by mouth 2 times daily.     Dispense:  180 tablet     Refill:  3     Medications Discontinued During This Encounter   Medication Reason     cilostazol (PLETAL) 100 MG tablet      furosemide (LASIX) 20 MG tablet      cephALEXin (KEFLEX) 500 MG capsule      cyanocobalamin (CYANOCOBALAMIN) 1000 MCG/ML injection      acetylcysteine (N-ACETYL CYSTEINE) 600 MG CAPS capsule      traMADol (ULTRAM) 50 MG tablet      oxyCODONE (ROXICODONE) 5 MG tablet      Encounter Diagnoses   Name Primary?     PAD (peripheral artery disease)      Coronary artery disease involving native coronary artery of native heart without angina pectoris Yes       Current Medications:  Current Outpatient Medications   Medication Sig Dispense Refill     amoxicillin (AMOXIL) 500 MG capsule 2,000 mg once as needed (Before dental procedures)       ascorbic acid 1000 MG TABS tablet Take 500 mg by mouth as needed       aspirin 81 MG EC tablet Take 81 mg by mouth daily       cilostazol (PLETAL) 100 MG tablet Take  1 tablet (100 mg) by mouth 2 times daily. 180 tablet 3     CINNAMON PO Take 2 capsules by mouth 2 times daily prn       DULoxetine (CYMBALTA) 60 MG capsule daily       estradiol (ESTRACE) 0.1 MG/GM vaginal cream Use pea sized amount and apply with finger to vaginal skin just inside opening once a day before bed as needed for vaginal dryness. 42.5 g 3     evolocumab (REPATHA SURECLICK) 140 MG/ML prefilled autoinjector Inject 1 mL (140 mg) Subcutaneous every 14 days 6 mL 3     fluconazole (DIFLUCAN) 150 MG tablet prn       hydroxychloroquine (PLAQUENIL) 200 MG tablet Take 200 mg by mouth daily       levothyroxine (SYNTHROID/LEVOTHROID) 100 MCG tablet Take 100 mcg by mouth daily       losartan (COZAAR) 50 MG tablet Take 1 tablet (50 mg) by mouth daily. 90 tablet 0     nitroGLYcerin (NITROSTAT) 0.4 MG sublingual tablet Place 1 tablet (0.4 mg) under the tongue every 5 minutes as needed for chest pain. For chest pain place 1 tablet under the tongue every 5 minutes for 3 doses. If symptoms persist 5 minutes after 1st dose call 911. 25 tablet 1     NOVOLOG FLEXPEN 100 UNIT/ML soln Inject 17 Units Subcutaneous 3 times daily as needed for high blood sugar       tirzepatide (MOUNJARO) 10 MG/0.5ML pen Inject 10 mg Subcutaneous once a week       vitamin D3 (CHOLECALCIFEROL) 2000 units tablet Take 1 tablet by mouth 2 times daily         Allergies Reviewed and Updated:  Allergies   Allergen Reactions     Jardiance [Empagliflozin] Other (See Comments)     Ongoing yeast infections     Statins Muscle Pain (Myalgia)     Oral statins       Versed [Midazolam]      Pt is refusing to have this med d/t memory loss and forgetfulness        Review of Systems:  Skin:  Positive for pigmentation   Eyes:       ENT:       Respiratory:  Negative    Cardiovascular:    chest pain, Positive for  Gastroenterology:      Genitourinary:       Musculoskeletal:       Neurologic:       Psychiatric:       Heme/Lymph/Imm:       Endocrine:          Pertinent  Past Medical, Surgical and Family History Reviewed and noted above.     CC  Feroz Burgos MD  [address]  [place]  [place] [address]      Thank you for allowing me to participate in the care of your patient.      Sincerely,     Karen Alexander PA-C     [facility]  cc:   Feroz Burgos MD  [address]  [place]  [place] [address]

## 2025-01-23 ENCOUNTER — APPOINTMENT (OUTPATIENT)
Dept: URBAN - METROPOLITAN AREA CLINIC 256 | Age: 80
Setting detail: DERMATOLOGY
End: 2025-01-23

## 2025-01-23 DIAGNOSIS — Z79.899 OTHER LONG TERM (CURRENT) DRUG THERAPY: ICD-10-CM

## 2025-01-23 PROCEDURE — OTHER ORDER TESTS: OTHER

## 2025-01-23 NOTE — PROCEDURE: ORDER TESTS
Performing Laboratory: [id number]
Bill For Surgical Tray: no
Expected Date Of Service: 01/23/2025
Billing Type: Third-Party Bill
Lab Facility: [id number]

## 2025-01-26 ENCOUNTER — HEALTH MAINTENANCE LETTER (OUTPATIENT)
Age: 80
End: 2025-01-26

## 2025-01-29 ENCOUNTER — HOSPITAL ENCOUNTER (OUTPATIENT)
Dept: CT IMAGING | Facility: CLINIC | Age: 80
Discharge: HOME | End: 2025-01-29
Attending: INTERNAL MEDICINE
Payer: COMMERCIAL

## 2025-01-29 DIAGNOSIS — E27.9 ADRENAL ABNORMALITY: ICD-10-CM

## 2025-01-29 PROCEDURE — 74150 CT ABDOMEN W/O CONTRAST: CPT

## 2025-03-03 ENCOUNTER — APPOINTMENT (OUTPATIENT)
Dept: URBAN - METROPOLITAN AREA CLINIC 256 | Age: 80
Setting detail: DERMATOLOGY
End: 2025-03-03

## 2025-03-03 DIAGNOSIS — L66.11 CLASSIC LICHEN PLANOPILARIS: ICD-10-CM

## 2025-03-03 DIAGNOSIS — L72.0 EPIDERMAL CYST: ICD-10-CM

## 2025-03-03 DIAGNOSIS — L21.8 OTHER SEBORRHEIC DERMATITIS: ICD-10-CM

## 2025-03-03 PROBLEM — D23.122 OTHER BENIGN NEOPLASM OF SKIN OF LEFT LOWER EYELID, INCLUDING CANTHUS: Status: ACTIVE | Noted: 2025-03-03

## 2025-03-03 PROBLEM — D23.112 OTHER BENIGN NEOPLASM OF SKIN OF RIGHT LOWER EYELID, INCLUDING CANTHUS: Status: ACTIVE | Noted: 2025-03-03

## 2025-03-03 PROCEDURE — 99214 OFFICE O/P EST MOD 30 MIN: CPT

## 2025-03-03 PROCEDURE — OTHER COUNSELING: OTHER

## 2025-03-03 PROCEDURE — OTHER MIPS QUALITY: OTHER

## 2025-03-03 PROCEDURE — OTHER PRESCRIPTION MEDICATION MANAGEMENT: OTHER

## 2025-03-03 PROCEDURE — OTHER ADDITIONAL NOTES: OTHER

## 2025-03-03 ASSESSMENT — LOCATION DETAILED DESCRIPTION DERM: LOCATION DETAILED: RIGHT LATERAL INFERIOR EYELID

## 2025-03-03 ASSESSMENT — LOCATION SIMPLE DESCRIPTION DERM: LOCATION SIMPLE: RIGHT INFERIOR EYELID

## 2025-03-03 ASSESSMENT — LOCATION ZONE DERM: LOCATION ZONE: EYELID

## 2025-03-03 ASSESSMENT — SEVERITY ASSESSMENT: SEVERITY: MILD

## 2025-03-03 NOTE — PROCEDURE: ADDITIONAL NOTES
Detail Level: Simple
Additional Notes: - Scalp is stable.\\n- Patient is visiting her eye doctor soon for a check up.
Render Risk Assessment In Note?: no

## 2025-03-03 NOTE — PROCEDURE: PRESCRIPTION MEDICATION MANAGEMENT
Render In Strict Bullet Format?: No
Detail Level: Zone
Discontinue Regimen: Fluocinonide 0.05% topical solution
Continue Regimen: Plaquenil 200mg BID
Continue Regimen: Ketoconazole 2% shampoo QD in rotation with H&S

## 2025-03-12 ENCOUNTER — LAB (OUTPATIENT)
Dept: LAB | Facility: CLINIC | Age: 80
End: 2025-03-12
Payer: COMMERCIAL

## 2025-03-12 DIAGNOSIS — I25.10 CORONARY ARTERY DISEASE INVOLVING NATIVE CORONARY ARTERY OF NATIVE HEART WITHOUT ANGINA PECTORIS: ICD-10-CM

## 2025-03-12 LAB
ANION GAP SERPL CALCULATED.3IONS-SCNC: 9 MMOL/L (ref 7–15)
BUN SERPL-MCNC: 21.4 MG/DL (ref 8–23)
CALCIUM SERPL-MCNC: 10.1 MG/DL (ref 8.8–10.4)
CHLORIDE SERPL-SCNC: 103 MMOL/L (ref 98–107)
CHOLEST SERPL-MCNC: 146 MG/DL
CREAT SERPL-MCNC: 0.76 MG/DL (ref 0.51–0.95)
EGFRCR SERPLBLD CKD-EPI 2021: 80 ML/MIN/1.73M2
ERYTHROCYTE [DISTWIDTH] IN BLOOD BY AUTOMATED COUNT: 12.4 % (ref 10–15)
FASTING STATUS PATIENT QL REPORTED: YES
GLUCOSE SERPL-MCNC: 181 MG/DL (ref 70–99)
HCO3 SERPL-SCNC: 26 MMOL/L (ref 22–29)
HCT VFR BLD AUTO: 39.5 % (ref 35–47)
HDLC SERPL-MCNC: 86 MG/DL
HGB BLD-MCNC: 12.9 G/DL (ref 11.7–15.7)
LDLC SERPL CALC-MCNC: 47 MG/DL
MCH RBC QN AUTO: 30.4 PG (ref 26.5–33)
MCHC RBC AUTO-ENTMCNC: 32.7 G/DL (ref 31.5–36.5)
MCV RBC AUTO: 93 FL (ref 78–100)
NONHDLC SERPL-MCNC: 60 MG/DL
PLATELET # BLD AUTO: 187 10E3/UL (ref 150–450)
POTASSIUM SERPL-SCNC: 4.5 MMOL/L (ref 3.4–5.3)
RBC # BLD AUTO: 4.25 10E6/UL (ref 3.8–5.2)
SODIUM SERPL-SCNC: 138 MMOL/L (ref 135–145)
TRIGL SERPL-MCNC: 64 MG/DL
WBC # BLD AUTO: 4.1 10E3/UL (ref 4–11)

## 2025-05-03 ENCOUNTER — HEALTH MAINTENANCE LETTER (OUTPATIENT)
Age: 80
End: 2025-05-03

## 2025-05-08 DIAGNOSIS — E78.5 HYPERLIPIDEMIA LDL GOAL <100: ICD-10-CM

## 2025-05-08 RX ORDER — EVOLOCUMAB 140 MG/ML
140 INJECTION, SOLUTION SUBCUTANEOUS
Qty: 6 ML | Refills: 2 | Status: SHIPPED | OUTPATIENT
Start: 2025-05-08

## 2025-07-03 NOTE — PROGRESS NOTES
Pt called back and I reviewed nuclear stress test results with pt. I let her know that  previously said that if not    none

## 2025-08-30 ENCOUNTER — HOSPITAL ENCOUNTER (OUTPATIENT)
Dept: CT IMAGING | Facility: CLINIC | Age: 80
Discharge: HOME | End: 2025-08-30
Attending: INTERNAL MEDICINE | Admitting: INTERNAL MEDICINE
Payer: COMMERCIAL

## 2025-08-30 DIAGNOSIS — E21.0 PRIMARY HYPERPARATHYROIDISM: ICD-10-CM

## 2025-08-30 LAB
CREAT BLD-MCNC: 0.7 MG/DL (ref 0.5–1)
EGFRCR SERPLBLD CKD-EPI 2021: >60 ML/MIN/1.73M2

## 2025-08-30 PROCEDURE — 82565 ASSAY OF CREATININE: CPT

## 2025-08-30 PROCEDURE — 255N000002 HC RX 255 OP 636: Performed by: INTERNAL MEDICINE

## 2025-08-30 PROCEDURE — 70492 CT SFT TSUE NCK W/O & W/DYE: CPT

## 2025-08-30 RX ADMIN — IOHEXOL 71 ML: 350 INJECTION, SOLUTION INTRAVENOUS at 12:57

## 2025-09-03 ENCOUNTER — APPOINTMENT (OUTPATIENT)
Dept: URBAN - METROPOLITAN AREA CLINIC 256 | Age: 80
Setting detail: DERMATOLOGY
End: 2025-09-03

## 2025-09-03 VITALS — HEIGHT: 68 IN | WEIGHT: 150 LBS

## 2025-09-03 DIAGNOSIS — L66.11 CLASSIC LICHEN PLANOPILARIS: ICD-10-CM

## 2025-09-03 PROCEDURE — OTHER COUNSELING: OTHER

## 2025-09-03 PROCEDURE — OTHER PRESCRIPTION MEDICATION MANAGEMENT: OTHER

## 2025-09-03 PROCEDURE — OTHER PHOTO-DOCUMENTATION: OTHER

## 2025-09-03 PROCEDURE — OTHER MIPS QUALITY: OTHER

## 2025-09-03 PROCEDURE — 99214 OFFICE O/P EST MOD 30 MIN: CPT

## 2025-09-03 PROCEDURE — OTHER PATIENT SPECIFIC COUNSELING: OTHER

## 2025-09-03 PROCEDURE — OTHER PRESCRIPTION: OTHER

## 2025-09-03 RX ORDER — HYDROXYCHLOROQUINE SULFATE 200 MG/1
TABLET ORAL
Qty: 180 | Refills: 1 | Status: ERX

## (undated) DEVICE — CATH ANGIO ANG GLIDE 4FRX65CM CG415

## (undated) DEVICE — SYSTEM PANNUS RETENTION 4 PAD 2 STRAP CZ-PRS-04

## (undated) DEVICE — LINEN DRAPE 54X72" 5467

## (undated) DEVICE — PREP CHLORAPREP 26ML TINTED ORANGE  260815

## (undated) DEVICE — MANIFOLD KIT ANGIO AUTOMATED 014613

## (undated) DEVICE — CATH ANGIO JUDKINS JL4 6FRX100CM INFINITI 534620T

## (undated) DEVICE — CATH TRAY FOLEY SURESTEP 16FR DRAIN BAG STATOCK A899916

## (undated) DEVICE — INTRO SHEATH 5FRX10CM PINNACLE RSS502

## (undated) DEVICE — RAD INTRODUCER KIT MICRO 5FRX10CM .018 NITINOL G/W

## (undated) DEVICE — KIT HAND CONTROL ANGIOTOUCH ACIST 65CM AT-P65

## (undated) DEVICE — CATH BALLOON NC EMERGE 4.00X20MM H7493926720400

## (undated) DEVICE — SU VICRYL 1 CTX 36" J371H

## (undated) DEVICE — IMM PILLOW ABDUCT HIP MED M60-025-M

## (undated) DEVICE — CATH BALLOON EMERGE 3.0X30MM H7493918930300

## (undated) DEVICE — DRSG AQUACEL AG 3.5X9.75" HYDROFIBER 412011

## (undated) DEVICE — SYR ANGIOGRAPHY MULTIUSE KIT ACIST 014612

## (undated) DEVICE — Device

## (undated) DEVICE — INTRO GLIDESHEATH SLENDER 6FR 10X45CM 60-1060

## (undated) DEVICE — INFL DVC BASIXCOMPAK PLYCRB 30 ATM 13IN 20ML IN4530

## (undated) DEVICE — SLEEVE TR BAND RADIAL COMPRESSION DEVICE 24CM TRB24-REG

## (undated) DEVICE — GUIDEWIRE SPARTA CORE .014"X300CM 5CM TIP SS 1005203

## (undated) DEVICE — INFL DVC KIT W/10CC NITRO IN4530

## (undated) DEVICE — INTRODUCER SHEATH GREEN 6.5FRX11CM .038IN PSI-6F-11-038ACT

## (undated) DEVICE — GUIDEWIRE VERSACORE 0.035"X300CM  J-TIP 1012068-07

## (undated) DEVICE — PACK TOTAL HIP RIDGES LATEX PO15HIFSG

## (undated) DEVICE — TOTE ANGIO CORP PC15AT SAN32CC83O

## (undated) DEVICE — CATH ANGIO INFINITI PIGTAIL 145 6 SH 6FRX110CM  534-652S

## (undated) DEVICE — SU VICRYL 2-0 CT-1 36" UND J945H

## (undated) DEVICE — SUCTION MANIFOLD NEPTUNE 2 SYS 4 PORT 0702-020-000

## (undated) DEVICE — SLEEVE CATH CABLE SHOCKWAVE 13X244CM C2 IVL10CS

## (undated) DEVICE — DEFIB PRO-PADZ LVP LQD GEL ADULT 8900-2105-01

## (undated) DEVICE — SOL ADH LIQUID BENZOIN SWAB 0.6ML C1544

## (undated) DEVICE — CATH ANGIO INFINITI IM 4FRX100CM 538460

## (undated) DEVICE — NDL PERC ENTRY THINWALL 18GA 7.0" G00166

## (undated) DEVICE — INTRO CATH CHECK-FLO FLEXOR ANSEL-1 6FRX45CM G29982

## (undated) DEVICE — WIRE GUIDE 0.035"X260CM SAFE-T-J EXCHANGE G00517

## (undated) DEVICE — CATH ANGIO JUDKINS R4 6FRX100CM INFINITI 534621T

## (undated) DEVICE — GLOVE PROTEXIS POWDER FREE 7.5 ORTHOPEDIC 2D73ET75

## (undated) DEVICE — LINEN ORTHO ACL PACK 5447

## (undated) DEVICE — GUIDEWIRE VASC 0.035INX150CM INQWIRE J TIP IQ35F150J3F/A

## (undated) DEVICE — LINEN HALF SHEET 5512

## (undated) DEVICE — GLOVE PROTEXIS BLUE W/NEU-THERA 7.0  2D73EB70

## (undated) DEVICE — GUIDEWIRE HI-TORQUE VERSACORE MOD J 1

## (undated) DEVICE — GLOVE PROTEXIS POWDER FREE 6.5 ORTHOPEDIC 2D73ET65

## (undated) DEVICE — GLIDEWIRE TERUMO RADIOFOCUS .035X260CM ANG EXCHANGE GR3509

## (undated) DEVICE — SET HANDPIECE INTERPULSE W/COAXIAL FAN SPRAY TIP 0210118000

## (undated) DEVICE — CATH ANGIO OMNI FLUSH 5FRX65CM CVD 10732201

## (undated) DEVICE — KIT HAND CONTROL ACIST 016795

## (undated) DEVICE — NDL PERC ENTRY 21GA 4CM G00280

## (undated) DEVICE — LINE MONITOR NASAL SMART CAPNOLINE ADULT LONG 12463

## (undated) DEVICE — INTRODUCER CATH VASC 5FRX10CM  MPIS-501-NT-U-SST

## (undated) DEVICE — BAG CLEAR TRASH 1.3M 39X33" P4040C

## (undated) DEVICE — REFLECTION THREADED HOLE COVER
Type: IMPLANTABLE DEVICE | Site: HIP | Status: NON-FUNCTIONAL
Brand: REFLECTION

## (undated) DEVICE — CATH BALLOON NC EMERGE 4.50X20MM H7493926720450

## (undated) DEVICE — INTRO MICRO MINI STICK 4FR STIFF NITINOL 45-753

## (undated) DEVICE — LINEN FULL SHEET 5511

## (undated) DEVICE — BLADE SAW SAGITTAL STRK 25X90X1.27MM HD SYS 6 6125-127-090

## (undated) RX ORDER — PROPOFOL 10 MG/ML
INJECTION, EMULSION INTRAVENOUS
Status: DISPENSED
Start: 2020-07-15

## (undated) RX ORDER — REGADENOSON 0.08 MG/ML
INJECTION, SOLUTION INTRAVENOUS
Status: DISPENSED
Start: 2018-02-21

## (undated) RX ORDER — CEFAZOLIN SODIUM 2 G/100ML
INJECTION, SOLUTION INTRAVENOUS
Status: DISPENSED
Start: 2020-07-15

## (undated) RX ORDER — HEPARIN SODIUM 200 [USP'U]/100ML
INJECTION, SOLUTION INTRAVENOUS
Status: DISPENSED
Start: 2022-04-29

## (undated) RX ORDER — LIDOCAINE HYDROCHLORIDE 10 MG/ML
INJECTION, SOLUTION EPIDURAL; INFILTRATION; INTRACAUDAL; PERINEURAL
Status: DISPENSED
Start: 2022-06-27

## (undated) RX ORDER — ONDANSETRON 2 MG/ML
INJECTION INTRAMUSCULAR; INTRAVENOUS
Status: DISPENSED
Start: 2020-07-15

## (undated) RX ORDER — DEXAMETHASONE SODIUM PHOSPHATE 4 MG/ML
INJECTION, SOLUTION INTRA-ARTICULAR; INTRALESIONAL; INTRAMUSCULAR; INTRAVENOUS; SOFT TISSUE
Status: DISPENSED
Start: 2020-07-15

## (undated) RX ORDER — HEPARIN SODIUM 1000 [USP'U]/ML
INJECTION, SOLUTION INTRAVENOUS; SUBCUTANEOUS
Status: DISPENSED
Start: 2022-06-27

## (undated) RX ORDER — HEPARIN SODIUM 1000 [USP'U]/ML
INJECTION, SOLUTION INTRAVENOUS; SUBCUTANEOUS
Status: DISPENSED
Start: 2024-04-05

## (undated) RX ORDER — LIDOCAINE HYDROCHLORIDE 10 MG/ML
INJECTION, SOLUTION INFILTRATION; PERINEURAL
Status: DISPENSED
Start: 2024-04-05

## (undated) RX ORDER — FENTANYL CITRATE 50 UG/ML
INJECTION, SOLUTION INTRAMUSCULAR; INTRAVENOUS
Status: DISPENSED
Start: 2022-06-27

## (undated) RX ORDER — HEPARIN SODIUM 200 [USP'U]/100ML
INJECTION, SOLUTION INTRAVENOUS
Status: DISPENSED
Start: 2022-09-23

## (undated) RX ORDER — FENTANYL CITRATE 50 UG/ML
INJECTION, SOLUTION INTRAMUSCULAR; INTRAVENOUS
Status: DISPENSED
Start: 2022-08-31

## (undated) RX ORDER — FENTANYL CITRATE 50 UG/ML
INJECTION, SOLUTION INTRAMUSCULAR; INTRAVENOUS
Status: DISPENSED
Start: 2024-04-05

## (undated) RX ORDER — HEPARIN SODIUM 1000 [USP'U]/ML
INJECTION, SOLUTION INTRAVENOUS; SUBCUTANEOUS
Status: DISPENSED
Start: 2022-04-29

## (undated) RX ORDER — HEPARIN SODIUM 1000 [USP'U]/ML
INJECTION, SOLUTION INTRAVENOUS; SUBCUTANEOUS
Status: DISPENSED
Start: 2017-02-24

## (undated) RX ORDER — REGADENOSON 0.08 MG/ML
INJECTION, SOLUTION INTRAVENOUS
Status: DISPENSED
Start: 2017-02-17

## (undated) RX ORDER — REGADENOSON 0.08 MG/ML
INJECTION, SOLUTION INTRAVENOUS
Status: DISPENSED
Start: 2021-01-13

## (undated) RX ORDER — CLOPIDOGREL 300 MG/1
TABLET, FILM COATED ORAL
Status: DISPENSED
Start: 2022-06-27

## (undated) RX ORDER — VERAPAMIL HYDROCHLORIDE 2.5 MG/ML
INJECTION, SOLUTION INTRAVENOUS
Status: DISPENSED
Start: 2017-02-24

## (undated) RX ORDER — HEPARIN SODIUM 200 [USP'U]/100ML
INJECTION, SOLUTION INTRAVENOUS
Status: DISPENSED
Start: 2022-06-27

## (undated) RX ORDER — LIDOCAINE HYDROCHLORIDE 10 MG/ML
INJECTION, SOLUTION EPIDURAL; INFILTRATION; INTRACAUDAL; PERINEURAL
Status: DISPENSED
Start: 2022-04-29

## (undated) RX ORDER — HEPARIN SODIUM 200 [USP'U]/100ML
INJECTION, SOLUTION INTRAVENOUS
Status: DISPENSED
Start: 2022-08-31

## (undated) RX ORDER — FENTANYL CITRATE 50 UG/ML
INJECTION, SOLUTION INTRAMUSCULAR; INTRAVENOUS
Status: DISPENSED
Start: 2020-07-15

## (undated) RX ORDER — ADENOSINE 3 MG/ML
INJECTION, SOLUTION INTRAVENOUS
Status: DISPENSED
Start: 2017-02-24

## (undated) RX ORDER — HEPARIN SODIUM 1000 [USP'U]/ML
INJECTION, SOLUTION INTRAVENOUS; SUBCUTANEOUS
Status: DISPENSED
Start: 2019-02-14

## (undated) RX ORDER — LIDOCAINE HYDROCHLORIDE 10 MG/ML
INJECTION, SOLUTION EPIDURAL; INFILTRATION; INTRACAUDAL; PERINEURAL
Status: DISPENSED
Start: 2019-02-14

## (undated) RX ORDER — FENTANYL CITRATE 50 UG/ML
INJECTION, SOLUTION INTRAMUSCULAR; INTRAVENOUS
Status: DISPENSED
Start: 2022-04-29

## (undated) RX ORDER — FENTANYL CITRATE 50 UG/ML
INJECTION, SOLUTION INTRAMUSCULAR; INTRAVENOUS
Status: DISPENSED
Start: 2019-02-14

## (undated) RX ORDER — HEPARIN SODIUM 200 [USP'U]/100ML
INJECTION, SOLUTION INTRAVENOUS
Status: DISPENSED
Start: 2024-04-05

## (undated) RX ORDER — LIDOCAINE HYDROCHLORIDE 10 MG/ML
INJECTION, SOLUTION EPIDURAL; INFILTRATION; INTRACAUDAL; PERINEURAL
Status: DISPENSED
Start: 2020-07-15

## (undated) RX ORDER — NITROGLYCERIN 5 MG/ML
VIAL (ML) INTRAVENOUS
Status: DISPENSED
Start: 2022-04-29

## (undated) RX ORDER — REGADENOSON 0.08 MG/ML
INJECTION, SOLUTION INTRAVENOUS
Status: DISPENSED
Start: 2022-04-11

## (undated) RX ORDER — GLYCOPYRROLATE 0.2 MG/ML
INJECTION INTRAMUSCULAR; INTRAVENOUS
Status: DISPENSED
Start: 2020-07-15

## (undated) RX ORDER — REGADENOSON 0.08 MG/ML
INJECTION, SOLUTION INTRAVENOUS
Status: DISPENSED
Start: 2019-01-29

## (undated) RX ORDER — REGADENOSON 0.08 MG/ML
INJECTION, SOLUTION INTRAVENOUS
Status: DISPENSED
Start: 2020-01-13

## (undated) RX ORDER — NEOSTIGMINE METHYLSULFATE 1 MG/ML
VIAL (ML) INJECTION
Status: DISPENSED
Start: 2020-07-15

## (undated) RX ORDER — VERAPAMIL HYDROCHLORIDE 2.5 MG/ML
INJECTION, SOLUTION INTRAVENOUS
Status: DISPENSED
Start: 2019-02-14

## (undated) RX ORDER — FENTANYL CITRATE 50 UG/ML
INJECTION, SOLUTION INTRAMUSCULAR; INTRAVENOUS
Status: DISPENSED
Start: 2017-02-24

## (undated) RX ORDER — VERAPAMIL HYDROCHLORIDE 2.5 MG/ML
INJECTION, SOLUTION INTRAVENOUS
Status: DISPENSED
Start: 2022-04-29

## (undated) RX ORDER — PROTAMINE SULFATE 10 MG/ML
INJECTION, SOLUTION INTRAVENOUS
Status: DISPENSED
Start: 2024-04-05

## (undated) RX ORDER — HEPARIN SODIUM 1000 [USP'U]/ML
INJECTION, SOLUTION INTRAVENOUS; SUBCUTANEOUS
Status: DISPENSED
Start: 2022-08-31

## (undated) RX ORDER — LIDOCAINE HYDROCHLORIDE 10 MG/ML
INJECTION, SOLUTION EPIDURAL; INFILTRATION; INTRACAUDAL; PERINEURAL
Status: DISPENSED
Start: 2022-08-31

## (undated) RX ORDER — FENTANYL CITRATE 50 UG/ML
INJECTION, SOLUTION INTRAMUSCULAR; INTRAVENOUS
Status: DISPENSED
Start: 2022-09-23

## (undated) RX ORDER — HEPARIN SODIUM 1000 [USP'U]/ML
INJECTION, SOLUTION INTRAVENOUS; SUBCUTANEOUS
Status: DISPENSED
Start: 2022-09-23

## (undated) RX ORDER — AMINOPHYLLINE 25 MG/ML
INJECTION, SOLUTION INTRAVENOUS
Status: DISPENSED
Start: 2017-02-17

## (undated) RX ORDER — LIDOCAINE HYDROCHLORIDE 10 MG/ML
INJECTION, SOLUTION EPIDURAL; INFILTRATION; INTRACAUDAL; PERINEURAL
Status: DISPENSED
Start: 2022-09-23